# Patient Record
Sex: MALE | Race: WHITE | NOT HISPANIC OR LATINO | Employment: OTHER | URBAN - METROPOLITAN AREA
[De-identification: names, ages, dates, MRNs, and addresses within clinical notes are randomized per-mention and may not be internally consistent; named-entity substitution may affect disease eponyms.]

---

## 2017-01-09 ENCOUNTER — GENERIC CONVERSION - ENCOUNTER (OUTPATIENT)
Dept: OTHER | Facility: OTHER | Age: 77
End: 2017-01-09

## 2017-01-09 ENCOUNTER — APPOINTMENT (OUTPATIENT)
Dept: LAB | Facility: MEDICAL CENTER | Age: 77
End: 2017-01-09
Payer: MEDICARE

## 2017-01-09 ENCOUNTER — TRANSCRIBE ORDERS (OUTPATIENT)
Dept: ADMINISTRATIVE | Facility: HOSPITAL | Age: 77
End: 2017-01-09

## 2017-01-09 DIAGNOSIS — K76.0 FATTY (CHANGE OF) LIVER, NOT ELSEWHERE CLASSIFIED: ICD-10-CM

## 2017-01-09 DIAGNOSIS — E87.1 HYPO-OSMOLALITY AND HYPONATREMIA: ICD-10-CM

## 2017-01-09 DIAGNOSIS — E87.6 HYPOKALEMIA: ICD-10-CM

## 2017-01-09 DIAGNOSIS — D72.829 ELEVATED WHITE BLOOD CELL COUNT: ICD-10-CM

## 2017-01-09 LAB
ALBUMIN SERPL BCP-MCNC: 3.9 G/DL (ref 3.5–5)
ALP SERPL-CCNC: 72 U/L (ref 46–116)
ALT SERPL W P-5'-P-CCNC: 128 U/L (ref 12–78)
ANION GAP SERPL CALCULATED.3IONS-SCNC: 10 MMOL/L (ref 4–13)
AST SERPL W P-5'-P-CCNC: 104 U/L (ref 5–45)
BASOPHILS # BLD AUTO: 0.03 THOUSANDS/ΜL (ref 0–0.1)
BASOPHILS NFR BLD AUTO: 0 % (ref 0–1)
BILIRUB SERPL-MCNC: 0.6 MG/DL (ref 0.2–1)
BUN SERPL-MCNC: 27 MG/DL (ref 5–25)
CALCIUM SERPL-MCNC: 9.5 MG/DL (ref 8.3–10.1)
CHLORIDE SERPL-SCNC: 102 MMOL/L (ref 100–108)
CO2 SERPL-SCNC: 28 MMOL/L (ref 21–32)
CREAT SERPL-MCNC: 1 MG/DL (ref 0.6–1.3)
EOSINOPHIL # BLD AUTO: 0.02 THOUSAND/ΜL (ref 0–0.61)
EOSINOPHIL NFR BLD AUTO: 0 % (ref 0–6)
ERYTHROCYTE [DISTWIDTH] IN BLOOD BY AUTOMATED COUNT: 13.4 % (ref 11.6–15.1)
GFR SERPL CREATININE-BSD FRML MDRD: >60 ML/MIN/1.73SQ M
GLUCOSE SERPL-MCNC: 128 MG/DL (ref 65–140)
HCT VFR BLD AUTO: 42.1 % (ref 36.5–49.3)
HGB BLD-MCNC: 14.5 G/DL (ref 12–17)
LYMPHOCYTES # BLD AUTO: 4.06 THOUSANDS/ΜL (ref 0.6–4.47)
LYMPHOCYTES NFR BLD AUTO: 50 % (ref 14–44)
MCH RBC QN AUTO: 30.9 PG (ref 26.8–34.3)
MCHC RBC AUTO-ENTMCNC: 34.4 G/DL (ref 31.4–37.4)
MCV RBC AUTO: 90 FL (ref 82–98)
MONOCYTES # BLD AUTO: 1.13 THOUSAND/ΜL (ref 0.17–1.22)
MONOCYTES NFR BLD AUTO: 14 % (ref 4–12)
NEUTROPHILS # BLD AUTO: 2.93 THOUSANDS/ΜL (ref 1.85–7.62)
NEUTS SEG NFR BLD AUTO: 36 % (ref 43–75)
NRBC BLD AUTO-RTO: 0 /100 WBCS
PLATELET # BLD AUTO: 279 THOUSANDS/UL (ref 149–390)
PMV BLD AUTO: 9.1 FL (ref 8.9–12.7)
POTASSIUM SERPL-SCNC: 3.7 MMOL/L (ref 3.5–5.3)
PROT SERPL-MCNC: 8.1 G/DL (ref 6.4–8.2)
RBC # BLD AUTO: 4.69 MILLION/UL (ref 3.88–5.62)
SODIUM SERPL-SCNC: 140 MMOL/L (ref 136–145)
WBC # BLD AUTO: 8.19 THOUSAND/UL (ref 4.31–10.16)

## 2017-01-09 PROCEDURE — 36415 COLL VENOUS BLD VENIPUNCTURE: CPT

## 2017-01-09 PROCEDURE — 80053 COMPREHEN METABOLIC PANEL: CPT

## 2017-01-09 PROCEDURE — 85025 COMPLETE CBC W/AUTO DIFF WBC: CPT

## 2017-01-11 ENCOUNTER — ALLSCRIPTS OFFICE VISIT (OUTPATIENT)
Dept: OTHER | Facility: OTHER | Age: 77
End: 2017-01-11

## 2017-01-11 LAB
BILIRUB UR QL STRIP: NORMAL
CLARITY UR: NORMAL
COLOR UR: YELLOW
GLUCOSE (HISTORICAL): NORMAL
HGB UR QL STRIP.AUTO: NORMAL
KETONES UR STRIP-MCNC: NORMAL MG/DL
LEUKOCYTE ESTERASE UR QL STRIP: NORMAL
NITRITE UR QL STRIP: NORMAL
PH UR STRIP.AUTO: 5 [PH]
PROT UR STRIP-MCNC: NORMAL MG/DL
SP GR UR STRIP.AUTO: 1.01
UROBILINOGEN UR QL STRIP.AUTO: NORMAL

## 2017-02-10 ENCOUNTER — APPOINTMENT (EMERGENCY)
Dept: CT IMAGING | Facility: HOSPITAL | Age: 77
End: 2017-02-10
Payer: MEDICARE

## 2017-02-10 ENCOUNTER — HOSPITAL ENCOUNTER (EMERGENCY)
Facility: HOSPITAL | Age: 77
Discharge: HOME | End: 2017-02-10
Attending: EMERGENCY MEDICINE | Admitting: EMERGENCY MEDICINE
Payer: MEDICARE

## 2017-02-10 ENCOUNTER — APPOINTMENT (EMERGENCY)
Dept: RADIOLOGY | Facility: HOSPITAL | Age: 77
End: 2017-02-10
Payer: MEDICARE

## 2017-02-10 VITALS
OXYGEN SATURATION: 95 % | WEIGHT: 193.8 LBS | HEART RATE: 92 BPM | RESPIRATION RATE: 20 BRPM | DIASTOLIC BLOOD PRESSURE: 85 MMHG | TEMPERATURE: 98.2 F | SYSTOLIC BLOOD PRESSURE: 181 MMHG | BODY MASS INDEX: 35.45 KG/M2

## 2017-02-10 DIAGNOSIS — R07.89 NON-CARDIAC CHEST PAIN: Primary | ICD-10-CM

## 2017-02-10 LAB
ANION GAP SERPL CALCULATED.3IONS-SCNC: 13 MMOL/L (ref 4–13)
ATRIAL RATE: 106 BPM
BASOPHILS # BLD AUTO: 0.02 THOUSANDS/ΜL (ref 0–0.1)
BASOPHILS NFR BLD AUTO: 0 % (ref 0–1)
BUN SERPL-MCNC: 23 MG/DL (ref 5–25)
CALCIUM SERPL-MCNC: 9.2 MG/DL (ref 8.3–10.1)
CHLORIDE SERPL-SCNC: 101 MMOL/L (ref 100–108)
CO2 SERPL-SCNC: 24 MMOL/L (ref 21–32)
CREAT SERPL-MCNC: 0.95 MG/DL (ref 0.6–1.3)
EOSINOPHIL # BLD AUTO: 0.02 THOUSAND/ΜL (ref 0–0.61)
EOSINOPHIL NFR BLD AUTO: 0 % (ref 0–6)
ERYTHROCYTE [DISTWIDTH] IN BLOOD BY AUTOMATED COUNT: 13.2 % (ref 11.6–15.1)
GFR SERPL CREATININE-BSD FRML MDRD: >60 ML/MIN/1.73SQ M
GLUCOSE SERPL-MCNC: 103 MG/DL (ref 65–140)
HCT VFR BLD AUTO: 41.1 % (ref 36.5–49.3)
HGB BLD-MCNC: 14.6 G/DL (ref 12–17)
LYMPHOCYTES # BLD AUTO: 1.9 THOUSANDS/ΜL (ref 0.6–4.47)
LYMPHOCYTES NFR BLD AUTO: 30 % (ref 14–44)
MCH RBC QN AUTO: 31.1 PG (ref 26.8–34.3)
MCHC RBC AUTO-ENTMCNC: 35.5 G/DL (ref 31.4–37.4)
MCV RBC AUTO: 87 FL (ref 82–98)
MONOCYTES # BLD AUTO: 0.67 THOUSAND/ΜL (ref 0.17–1.22)
MONOCYTES NFR BLD AUTO: 11 % (ref 4–12)
NEUTROPHILS # BLD AUTO: 3.74 THOUSANDS/ΜL (ref 1.85–7.62)
NEUTS SEG NFR BLD AUTO: 59 % (ref 43–75)
NRBC BLD AUTO-RTO: 0 /100 WBCS
P AXIS: 44 DEGREES
PLATELET # BLD AUTO: 183 THOUSANDS/UL (ref 149–390)
PMV BLD AUTO: 9.4 FL (ref 8.9–12.7)
POTASSIUM SERPL-SCNC: 3.8 MMOL/L (ref 3.5–5.3)
PR INTERVAL: 174 MS
QRS AXIS: -44 DEGREES
QRSD INTERVAL: 92 MS
QT INTERVAL: 332 MS
QTC INTERVAL: 441 MS
RBC # BLD AUTO: 4.7 MILLION/UL (ref 3.88–5.62)
SODIUM SERPL-SCNC: 138 MMOL/L (ref 136–145)
SPECIMEN SOURCE: NORMAL
T WAVE AXIS: 60 DEGREES
TROPONIN I BLD-MCNC: 0.01 NG/ML (ref 0–0.08)
TROPONIN I SERPL-MCNC: <0.02 NG/ML
VENTRICULAR RATE: 106 BPM
WBC # BLD AUTO: 6.35 THOUSAND/UL (ref 4.31–10.16)

## 2017-02-10 PROCEDURE — 84484 ASSAY OF TROPONIN QUANT: CPT

## 2017-02-10 PROCEDURE — 36415 COLL VENOUS BLD VENIPUNCTURE: CPT | Performed by: EMERGENCY MEDICINE

## 2017-02-10 PROCEDURE — 93005 ELECTROCARDIOGRAM TRACING: CPT | Performed by: EMERGENCY MEDICINE

## 2017-02-10 PROCEDURE — 85025 COMPLETE CBC W/AUTO DIFF WBC: CPT | Performed by: EMERGENCY MEDICINE

## 2017-02-10 PROCEDURE — 71275 CT ANGIOGRAPHY CHEST: CPT

## 2017-02-10 PROCEDURE — 99285 EMERGENCY DEPT VISIT HI MDM: CPT

## 2017-02-10 PROCEDURE — 71020 HB CHEST X-RAY 2VW FRONTAL&LATL: CPT

## 2017-02-10 PROCEDURE — 80048 BASIC METABOLIC PNL TOTAL CA: CPT | Performed by: EMERGENCY MEDICINE

## 2017-02-10 PROCEDURE — 96375 TX/PRO/DX INJ NEW DRUG ADDON: CPT

## 2017-02-10 PROCEDURE — 96374 THER/PROPH/DIAG INJ IV PUSH: CPT

## 2017-02-10 PROCEDURE — 84484 ASSAY OF TROPONIN QUANT: CPT | Performed by: EMERGENCY MEDICINE

## 2017-02-10 RX ORDER — MIRTAZAPINE 15 MG/1
7.5 TABLET, FILM COATED ORAL
COMMUNITY
End: 2018-03-23 | Stop reason: DRUGHIGH

## 2017-02-10 RX ORDER — ASPIRIN 81 MG/1
324 TABLET, CHEWABLE ORAL ONCE
Status: COMPLETED | OUTPATIENT
Start: 2017-02-10 | End: 2017-02-10

## 2017-02-10 RX ORDER — ALPRAZOLAM 0.25 MG/1
0.25 TABLET ORAL
COMMUNITY
End: 2018-03-23 | Stop reason: SDUPTHER

## 2017-02-10 RX ORDER — LORAZEPAM 2 MG/ML
1 INJECTION INTRAMUSCULAR ONCE
Status: COMPLETED | OUTPATIENT
Start: 2017-02-10 | End: 2017-02-10

## 2017-02-10 RX ORDER — QUETIAPINE FUMARATE 50 MG/1
50 TABLET, FILM COATED ORAL
Qty: 14 TABLET | Refills: 0 | Status: SHIPPED | OUTPATIENT
Start: 2017-02-10 | End: 2017-09-04

## 2017-02-10 RX ORDER — MORPHINE SULFATE 4 MG/ML
4 INJECTION, SOLUTION INTRAMUSCULAR; INTRAVENOUS ONCE
Status: COMPLETED | OUTPATIENT
Start: 2017-02-10 | End: 2017-02-10

## 2017-02-10 RX ORDER — MAGNESIUM HYDROXIDE/ALUMINUM HYDROXICE/SIMETHICONE 120; 1200; 1200 MG/30ML; MG/30ML; MG/30ML
30 SUSPENSION ORAL ONCE
Status: COMPLETED | OUTPATIENT
Start: 2017-02-10 | End: 2017-02-10

## 2017-02-10 RX ADMIN — ASPIRIN 81 MG 324 MG: 81 TABLET ORAL at 14:33

## 2017-02-10 RX ADMIN — MORPHINE SULFATE 4 MG: 4 INJECTION, SOLUTION INTRAMUSCULAR; INTRAVENOUS at 14:39

## 2017-02-10 RX ADMIN — LORAZEPAM 1 MG: 2 INJECTION INTRAMUSCULAR; INTRAVENOUS at 14:35

## 2017-02-10 RX ADMIN — IOHEXOL 85 ML: 350 INJECTION, SOLUTION INTRAVENOUS at 15:30

## 2017-02-10 RX ADMIN — ALUMINUM HYDROXIDE, MAGNESIUM HYDROXIDE, AND SIMETHICONE 30 ML: 200; 200; 20 SUSPENSION ORAL at 14:33

## 2017-02-14 ENCOUNTER — ALLSCRIPTS OFFICE VISIT (OUTPATIENT)
Dept: OTHER | Facility: OTHER | Age: 77
End: 2017-02-14

## 2017-02-17 ENCOUNTER — GENERIC CONVERSION - ENCOUNTER (OUTPATIENT)
Dept: OTHER | Facility: OTHER | Age: 77
End: 2017-02-17

## 2017-03-17 ENCOUNTER — ALLSCRIPTS OFFICE VISIT (OUTPATIENT)
Dept: OTHER | Facility: OTHER | Age: 77
End: 2017-03-17

## 2017-04-11 ENCOUNTER — GENERIC CONVERSION - ENCOUNTER (OUTPATIENT)
Dept: OTHER | Facility: OTHER | Age: 77
End: 2017-04-11

## 2017-04-19 ENCOUNTER — APPOINTMENT (OUTPATIENT)
Dept: LAB | Facility: MEDICAL CENTER | Age: 77
End: 2017-04-19
Payer: MEDICARE

## 2017-04-19 ENCOUNTER — TRANSCRIBE ORDERS (OUTPATIENT)
Dept: ADMINISTRATIVE | Facility: HOSPITAL | Age: 77
End: 2017-04-19

## 2017-04-19 DIAGNOSIS — R73.9 HYPERGLYCEMIA: ICD-10-CM

## 2017-04-19 DIAGNOSIS — R73.9 BLOOD GLUCOSE ELEVATED: Primary | ICD-10-CM

## 2017-04-19 DIAGNOSIS — I10 ESSENTIAL (PRIMARY) HYPERTENSION: ICD-10-CM

## 2017-04-19 LAB
ALBUMIN SERPL BCP-MCNC: 3.9 G/DL (ref 3.5–5)
ALP SERPL-CCNC: 62 U/L (ref 46–116)
ALT SERPL W P-5'-P-CCNC: 106 U/L (ref 12–78)
ANION GAP SERPL CALCULATED.3IONS-SCNC: 10 MMOL/L (ref 4–13)
AST SERPL W P-5'-P-CCNC: 91 U/L (ref 5–45)
BASOPHILS # BLD AUTO: 0.03 THOUSANDS/ΜL (ref 0–0.1)
BASOPHILS NFR BLD AUTO: 1 % (ref 0–1)
BILIRUB SERPL-MCNC: 0.76 MG/DL (ref 0.2–1)
BUN SERPL-MCNC: 17 MG/DL (ref 5–25)
CALCIUM SERPL-MCNC: 9.4 MG/DL (ref 8.3–10.1)
CHLORIDE SERPL-SCNC: 103 MMOL/L (ref 100–108)
CHOLEST SERPL-MCNC: 177 MG/DL (ref 50–200)
CO2 SERPL-SCNC: 26 MMOL/L (ref 21–32)
CREAT SERPL-MCNC: 0.83 MG/DL (ref 0.6–1.3)
EOSINOPHIL # BLD AUTO: 0.05 THOUSAND/ΜL (ref 0–0.61)
EOSINOPHIL NFR BLD AUTO: 1 % (ref 0–6)
ERYTHROCYTE [DISTWIDTH] IN BLOOD BY AUTOMATED COUNT: 13.8 % (ref 11.6–15.1)
EST. AVERAGE GLUCOSE BLD GHB EST-MCNC: 105 MG/DL
GFR SERPL CREATININE-BSD FRML MDRD: >60 ML/MIN/1.73SQ M
GLUCOSE P FAST SERPL-MCNC: 81 MG/DL (ref 65–99)
HBA1C MFR BLD: 5.3 % (ref 4.2–6.3)
HCT VFR BLD AUTO: 40.4 % (ref 36.5–49.3)
HDLC SERPL-MCNC: 52 MG/DL (ref 40–60)
HGB BLD-MCNC: 13.9 G/DL (ref 12–17)
LDLC SERPL CALC-MCNC: 80 MG/DL (ref 0–100)
LYMPHOCYTES # BLD AUTO: 3.17 THOUSANDS/ΜL (ref 0.6–4.47)
LYMPHOCYTES NFR BLD AUTO: 52 % (ref 14–44)
MCH RBC QN AUTO: 30.5 PG (ref 26.8–34.3)
MCHC RBC AUTO-ENTMCNC: 34.4 G/DL (ref 31.4–37.4)
MCV RBC AUTO: 89 FL (ref 82–98)
MONOCYTES # BLD AUTO: 0.69 THOUSAND/ΜL (ref 0.17–1.22)
MONOCYTES NFR BLD AUTO: 11 % (ref 4–12)
NEUTROPHILS # BLD AUTO: 2.13 THOUSANDS/ΜL (ref 1.85–7.62)
NEUTS SEG NFR BLD AUTO: 35 % (ref 43–75)
NRBC BLD AUTO-RTO: 0 /100 WBCS
PLATELET # BLD AUTO: 268 THOUSANDS/UL (ref 149–390)
PMV BLD AUTO: 10.1 FL (ref 8.9–12.7)
POTASSIUM SERPL-SCNC: 3.7 MMOL/L (ref 3.5–5.3)
PROT SERPL-MCNC: 7.7 G/DL (ref 6.4–8.2)
RBC # BLD AUTO: 4.56 MILLION/UL (ref 3.88–5.62)
SODIUM SERPL-SCNC: 139 MMOL/L (ref 136–145)
TRIGL SERPL-MCNC: 226 MG/DL
TSH SERPL DL<=0.05 MIU/L-ACNC: 0.87 UIU/ML (ref 0.36–3.74)
WBC # BLD AUTO: 6.08 THOUSAND/UL (ref 4.31–10.16)

## 2017-04-19 PROCEDURE — 85025 COMPLETE CBC W/AUTO DIFF WBC: CPT | Performed by: FAMILY MEDICINE

## 2017-04-19 PROCEDURE — 83036 HEMOGLOBIN GLYCOSYLATED A1C: CPT | Performed by: FAMILY MEDICINE

## 2017-04-19 PROCEDURE — 84443 ASSAY THYROID STIM HORMONE: CPT | Performed by: FAMILY MEDICINE

## 2017-04-19 PROCEDURE — 80061 LIPID PANEL: CPT | Performed by: FAMILY MEDICINE

## 2017-04-19 PROCEDURE — 80053 COMPREHEN METABOLIC PANEL: CPT | Performed by: FAMILY MEDICINE

## 2017-04-19 PROCEDURE — 36415 COLL VENOUS BLD VENIPUNCTURE: CPT

## 2017-04-20 ENCOUNTER — GENERIC CONVERSION - ENCOUNTER (OUTPATIENT)
Dept: OTHER | Facility: OTHER | Age: 77
End: 2017-04-20

## 2017-04-27 ENCOUNTER — ALLSCRIPTS OFFICE VISIT (OUTPATIENT)
Dept: OTHER | Facility: OTHER | Age: 77
End: 2017-04-27

## 2017-04-27 ENCOUNTER — GENERIC CONVERSION - ENCOUNTER (OUTPATIENT)
Dept: OTHER | Facility: OTHER | Age: 77
End: 2017-04-27

## 2017-05-02 ENCOUNTER — GENERIC CONVERSION - ENCOUNTER (OUTPATIENT)
Dept: OTHER | Facility: OTHER | Age: 77
End: 2017-05-02

## 2017-05-12 ENCOUNTER — GENERIC CONVERSION - ENCOUNTER (OUTPATIENT)
Dept: OTHER | Facility: OTHER | Age: 77
End: 2017-05-12

## 2017-07-13 ENCOUNTER — ALLSCRIPTS OFFICE VISIT (OUTPATIENT)
Dept: OTHER | Facility: OTHER | Age: 77
End: 2017-07-13

## 2017-07-13 LAB
CLARITY UR: NORMAL
COLOR UR: YELLOW
GLUCOSE (HISTORICAL): NORMAL
HGB UR QL STRIP.AUTO: NORMAL
KETONES UR STRIP-MCNC: NORMAL MG/DL
LEUKOCYTE ESTERASE UR QL STRIP: NORMAL
NITRITE UR QL STRIP: NORMAL
PH UR STRIP.AUTO: 6.5 [PH]
PROT UR STRIP-MCNC: NORMAL MG/DL
SP GR UR STRIP.AUTO: 1.01

## 2017-09-04 ENCOUNTER — HOSPITAL ENCOUNTER (OUTPATIENT)
Facility: HOSPITAL | Age: 77
Setting detail: OBSERVATION
Discharge: HOME | End: 2017-09-05
Attending: EMERGENCY MEDICINE | Admitting: FAMILY MEDICINE
Payer: MEDICARE

## 2017-09-04 ENCOUNTER — APPOINTMENT (EMERGENCY)
Dept: RADIOLOGY | Facility: HOSPITAL | Age: 77
End: 2017-09-04
Payer: MEDICARE

## 2017-09-04 DIAGNOSIS — R07.9 CHEST PAIN: Primary | ICD-10-CM

## 2017-09-04 PROBLEM — N40.0 BPH (BENIGN PROSTATIC HYPERPLASIA): Chronic | Status: ACTIVE | Noted: 2017-09-04

## 2017-09-04 PROBLEM — R05.3 CHRONIC COUGH: Status: ACTIVE | Noted: 2017-09-04

## 2017-09-04 LAB
ALBUMIN SERPL BCP-MCNC: 4 G/DL (ref 3.5–5)
ALP SERPL-CCNC: 64 U/L (ref 46–116)
ALT SERPL W P-5'-P-CCNC: 109 U/L (ref 12–78)
ANION GAP SERPL CALCULATED.3IONS-SCNC: 11 MMOL/L (ref 4–13)
APTT PPP: 27 SECONDS (ref 23–35)
AST SERPL W P-5'-P-CCNC: 86 U/L (ref 5–45)
BASOPHILS # BLD AUTO: 0.03 THOUSANDS/ΜL (ref 0–0.1)
BASOPHILS NFR BLD AUTO: 0 % (ref 0–1)
BILIRUB SERPL-MCNC: 0.48 MG/DL (ref 0.2–1)
BUN SERPL-MCNC: 23 MG/DL (ref 5–25)
CALCIUM SERPL-MCNC: 9 MG/DL (ref 8.3–10.1)
CHLORIDE SERPL-SCNC: 104 MMOL/L (ref 100–108)
CO2 SERPL-SCNC: 27 MMOL/L (ref 21–32)
CREAT SERPL-MCNC: 0.96 MG/DL (ref 0.6–1.3)
EOSINOPHIL # BLD AUTO: 0.04 THOUSAND/ΜL (ref 0–0.61)
EOSINOPHIL NFR BLD AUTO: 1 % (ref 0–6)
ERYTHROCYTE [DISTWIDTH] IN BLOOD BY AUTOMATED COUNT: 14.3 % (ref 11.6–15.1)
GFR SERPL CREATININE-BSD FRML MDRD: 75 ML/MIN/1.73SQ M
GLUCOSE SERPL-MCNC: 104 MG/DL (ref 65–140)
HCT VFR BLD AUTO: 38.6 % (ref 36.5–49.3)
HGB BLD-MCNC: 13.5 G/DL (ref 12–17)
INR PPP: 0.95 (ref 0.86–1.16)
LYMPHOCYTES # BLD AUTO: 2.54 THOUSANDS/ΜL (ref 0.6–4.47)
LYMPHOCYTES NFR BLD AUTO: 31 % (ref 14–44)
MCH RBC QN AUTO: 29.7 PG (ref 26.8–34.3)
MCHC RBC AUTO-ENTMCNC: 35 G/DL (ref 31.4–37.4)
MCV RBC AUTO: 85 FL (ref 82–98)
MONOCYTES # BLD AUTO: 1.14 THOUSAND/ΜL (ref 0.17–1.22)
MONOCYTES NFR BLD AUTO: 14 % (ref 4–12)
NEUTROPHILS # BLD AUTO: 4.35 THOUSANDS/ΜL (ref 1.85–7.62)
NEUTS SEG NFR BLD AUTO: 54 % (ref 43–75)
NRBC BLD AUTO-RTO: 0 /100 WBCS
PLATELET # BLD AUTO: 205 THOUSANDS/UL (ref 149–390)
PMV BLD AUTO: 9.5 FL (ref 8.9–12.7)
POTASSIUM SERPL-SCNC: 3.7 MMOL/L (ref 3.5–5.3)
PROT SERPL-MCNC: 7.8 G/DL (ref 6.4–8.2)
PROTHROMBIN TIME: 12.7 SECONDS (ref 12.1–14.4)
RBC # BLD AUTO: 4.54 MILLION/UL (ref 3.88–5.62)
SODIUM SERPL-SCNC: 142 MMOL/L (ref 136–145)
SPECIMEN SOURCE: NORMAL
TROPONIN I BLD-MCNC: 0 NG/ML (ref 0–0.08)
TROPONIN I SERPL-MCNC: <0.02 NG/ML
WBC # BLD AUTO: 8.1 THOUSAND/UL (ref 4.31–10.16)

## 2017-09-04 PROCEDURE — 99285 EMERGENCY DEPT VISIT HI MDM: CPT

## 2017-09-04 PROCEDURE — 85730 THROMBOPLASTIN TIME PARTIAL: CPT | Performed by: EMERGENCY MEDICINE

## 2017-09-04 PROCEDURE — 85610 PROTHROMBIN TIME: CPT | Performed by: EMERGENCY MEDICINE

## 2017-09-04 PROCEDURE — 71020 HB CHEST X-RAY 2VW FRONTAL&LATL: CPT

## 2017-09-04 PROCEDURE — 85025 COMPLETE CBC W/AUTO DIFF WBC: CPT | Performed by: EMERGENCY MEDICINE

## 2017-09-04 PROCEDURE — 80053 COMPREHEN METABOLIC PANEL: CPT | Performed by: EMERGENCY MEDICINE

## 2017-09-04 PROCEDURE — 84484 ASSAY OF TROPONIN QUANT: CPT | Performed by: FAMILY MEDICINE

## 2017-09-04 PROCEDURE — 93005 ELECTROCARDIOGRAM TRACING: CPT | Performed by: EMERGENCY MEDICINE

## 2017-09-04 PROCEDURE — 84484 ASSAY OF TROPONIN QUANT: CPT

## 2017-09-04 PROCEDURE — 36415 COLL VENOUS BLD VENIPUNCTURE: CPT

## 2017-09-04 RX ORDER — TAMSULOSIN HYDROCHLORIDE 0.4 MG/1
0.4 CAPSULE ORAL
Status: DISCONTINUED | OUTPATIENT
Start: 2017-09-05 | End: 2017-09-05 | Stop reason: HOSPADM

## 2017-09-04 RX ORDER — FINASTERIDE 5 MG/1
5 TABLET, FILM COATED ORAL DAILY
Status: DISCONTINUED | OUTPATIENT
Start: 2017-09-05 | End: 2017-09-05 | Stop reason: HOSPADM

## 2017-09-04 RX ORDER — NITROGLYCERIN 0.4 MG/1
0.4 TABLET SUBLINGUAL ONCE
Status: COMPLETED | OUTPATIENT
Start: 2017-09-04 | End: 2017-09-04

## 2017-09-04 RX ORDER — POLYETHYLENE GLYCOL 3350 17 G/17G
17 POWDER, FOR SOLUTION ORAL DAILY
Status: DISCONTINUED | OUTPATIENT
Start: 2017-09-05 | End: 2017-09-05 | Stop reason: HOSPADM

## 2017-09-04 RX ORDER — HYDROCHLOROTHIAZIDE 25 MG/1
25 TABLET ORAL 2 TIMES DAILY
Status: DISCONTINUED | OUTPATIENT
Start: 2017-09-04 | End: 2017-09-05 | Stop reason: HOSPADM

## 2017-09-04 RX ORDER — PANTOPRAZOLE SODIUM 40 MG/1
40 TABLET, DELAYED RELEASE ORAL
Status: DISCONTINUED | OUTPATIENT
Start: 2017-09-05 | End: 2017-09-05 | Stop reason: HOSPADM

## 2017-09-04 RX ORDER — AMLODIPINE BESYLATE 10 MG/1
10 TABLET ORAL DAILY
Status: DISCONTINUED | OUTPATIENT
Start: 2017-09-05 | End: 2017-09-05 | Stop reason: HOSPADM

## 2017-09-04 RX ORDER — MIRTAZAPINE 15 MG/1
7.5 TABLET, FILM COATED ORAL
Status: DISCONTINUED | OUTPATIENT
Start: 2017-09-04 | End: 2017-09-05 | Stop reason: HOSPADM

## 2017-09-04 RX ORDER — ASPIRIN 325 MG
325 TABLET ORAL DAILY
Status: DISCONTINUED | OUTPATIENT
Start: 2017-09-05 | End: 2017-09-05 | Stop reason: HOSPADM

## 2017-09-04 RX ORDER — ALPRAZOLAM 0.25 MG/1
0.25 TABLET ORAL
Status: DISCONTINUED | OUTPATIENT
Start: 2017-09-04 | End: 2017-09-05 | Stop reason: HOSPADM

## 2017-09-04 RX ADMIN — MIRTAZAPINE 7.5 MG: 15 TABLET, FILM COATED ORAL at 22:38

## 2017-09-04 RX ADMIN — HYDROCHLOROTHIAZIDE 25 MG: 25 TABLET ORAL at 22:37

## 2017-09-04 RX ADMIN — NITROGLYCERIN 0.4 MG: 0.4 TABLET SUBLINGUAL at 20:18

## 2017-09-05 ENCOUNTER — APPOINTMENT (OUTPATIENT)
Dept: NON INVASIVE DIAGNOSTICS | Facility: HOSPITAL | Age: 77
End: 2017-09-05
Payer: MEDICARE

## 2017-09-05 ENCOUNTER — APPOINTMENT (OUTPATIENT)
Dept: NUCLEAR MEDICINE | Facility: HOSPITAL | Age: 77
End: 2017-09-05
Payer: MEDICARE

## 2017-09-05 VITALS
BODY MASS INDEX: 34.18 KG/M2 | RESPIRATION RATE: 18 BRPM | HEART RATE: 76 BPM | OXYGEN SATURATION: 98 % | HEIGHT: 63 IN | WEIGHT: 192.9 LBS | DIASTOLIC BLOOD PRESSURE: 85 MMHG | SYSTOLIC BLOOD PRESSURE: 153 MMHG | TEMPERATURE: 97.9 F

## 2017-09-05 PROBLEM — R07.9 CHEST PAIN: Status: RESOLVED | Noted: 2017-09-04 | Resolved: 2017-09-05

## 2017-09-05 LAB
ANION GAP SERPL CALCULATED.3IONS-SCNC: 7 MMOL/L (ref 4–13)
ATRIAL RATE: 93 BPM
BUN SERPL-MCNC: 21 MG/DL (ref 5–25)
CALCIUM SERPL-MCNC: 8.6 MG/DL (ref 8.3–10.1)
CHEST PAIN STATEMENT: NORMAL
CHLORIDE SERPL-SCNC: 104 MMOL/L (ref 100–108)
CO2 SERPL-SCNC: 28 MMOL/L (ref 21–32)
CREAT SERPL-MCNC: 0.88 MG/DL (ref 0.6–1.3)
ERYTHROCYTE [DISTWIDTH] IN BLOOD BY AUTOMATED COUNT: 14.3 % (ref 11.6–15.1)
GFR SERPL CREATININE-BSD FRML MDRD: 82 ML/MIN/1.73SQ M
GLUCOSE P FAST SERPL-MCNC: 100 MG/DL (ref 65–99)
GLUCOSE SERPL-MCNC: 100 MG/DL (ref 65–140)
HCT VFR BLD AUTO: 36.7 % (ref 36.5–49.3)
HGB BLD-MCNC: 12.6 G/DL (ref 12–17)
MAX DIASTOLIC BP: 78 MMHG
MAX HEART RATE: 109 BPM
MAX PREDICTED HEART RATE: 141 BPM
MAX. SYSTOLIC BP: 156 MMHG
MCH RBC QN AUTO: 29.2 PG (ref 26.8–34.3)
MCHC RBC AUTO-ENTMCNC: 34.3 G/DL (ref 31.4–37.4)
MCV RBC AUTO: 85 FL (ref 82–98)
P AXIS: 28 DEGREES
PLATELET # BLD AUTO: 188 THOUSANDS/UL (ref 149–390)
PMV BLD AUTO: 9.7 FL (ref 8.9–12.7)
POTASSIUM SERPL-SCNC: 3.4 MMOL/L (ref 3.5–5.3)
PR INTERVAL: 174 MS
PROTOCOL NAME: NORMAL
QRS AXIS: -21 DEGREES
QRSD INTERVAL: 98 MS
QT INTERVAL: 360 MS
QTC INTERVAL: 447 MS
RBC # BLD AUTO: 4.31 MILLION/UL (ref 3.88–5.62)
REASON FOR TERMINATION: NORMAL
SODIUM SERPL-SCNC: 139 MMOL/L (ref 136–145)
T WAVE AXIS: -4 DEGREES
TARGET HR FORMULA: NORMAL
TEST INDICATION: NORMAL
TIME IN EXERCISE PHASE: 180 S
TROPONIN I SERPL-MCNC: <0.02 NG/ML
VENTRICULAR RATE: 93 BPM
WBC # BLD AUTO: 6.54 THOUSAND/UL (ref 4.31–10.16)

## 2017-09-05 PROCEDURE — A9502 TC99M TETROFOSMIN: HCPCS

## 2017-09-05 PROCEDURE — 78452 HT MUSCLE IMAGE SPECT MULT: CPT

## 2017-09-05 PROCEDURE — 85027 COMPLETE CBC AUTOMATED: CPT | Performed by: FAMILY MEDICINE

## 2017-09-05 PROCEDURE — 93017 CV STRESS TEST TRACING ONLY: CPT

## 2017-09-05 PROCEDURE — 93005 ELECTROCARDIOGRAM TRACING: CPT | Performed by: FAMILY MEDICINE

## 2017-09-05 PROCEDURE — 84484 ASSAY OF TROPONIN QUANT: CPT | Performed by: FAMILY MEDICINE

## 2017-09-05 PROCEDURE — 80048 BASIC METABOLIC PNL TOTAL CA: CPT | Performed by: FAMILY MEDICINE

## 2017-09-05 RX ORDER — HYDRALAZINE HYDROCHLORIDE 20 MG/ML
5 INJECTION INTRAMUSCULAR; INTRAVENOUS EVERY 6 HOURS PRN
Status: DISCONTINUED | OUTPATIENT
Start: 2017-09-05 | End: 2017-09-05 | Stop reason: HOSPADM

## 2017-09-05 RX ADMIN — ENOXAPARIN SODIUM 40 MG: 40 INJECTION SUBCUTANEOUS at 08:12

## 2017-09-05 RX ADMIN — AMLODIPINE BESYLATE 10 MG: 10 TABLET ORAL at 08:16

## 2017-09-05 RX ADMIN — ASPIRIN 325 MG: 325 TABLET ORAL at 08:13

## 2017-09-05 RX ADMIN — HYDROCHLOROTHIAZIDE 25 MG: 25 TABLET ORAL at 08:12

## 2017-09-05 RX ADMIN — FINASTERIDE 5 MG: 5 TABLET, FILM COATED ORAL at 08:12

## 2017-09-05 RX ADMIN — PANTOPRAZOLE SODIUM 40 MG: 40 TABLET, DELAYED RELEASE ORAL at 05:25

## 2017-09-05 RX ADMIN — REGADENOSON 0.4 MG: 0.08 INJECTION, SOLUTION INTRAVENOUS at 10:32

## 2017-09-07 LAB
ATRIAL RATE: 76 BPM
ATRIAL RATE: 80 BPM
P AXIS: 44 DEGREES
P AXIS: 47 DEGREES
PR INTERVAL: 184 MS
PR INTERVAL: 184 MS
QRS AXIS: -41 DEGREES
QRS AXIS: -41 DEGREES
QRSD INTERVAL: 108 MS
QRSD INTERVAL: 110 MS
QT INTERVAL: 400 MS
QT INTERVAL: 408 MS
QTC INTERVAL: 459 MS
QTC INTERVAL: 461 MS
T WAVE AXIS: 18 DEGREES
T WAVE AXIS: 20 DEGREES
VENTRICULAR RATE: 76 BPM
VENTRICULAR RATE: 80 BPM

## 2017-09-08 ENCOUNTER — ALLSCRIPTS OFFICE VISIT (OUTPATIENT)
Dept: OTHER | Facility: OTHER | Age: 77
End: 2017-09-08

## 2017-09-22 ENCOUNTER — GENERIC CONVERSION - ENCOUNTER (OUTPATIENT)
Dept: OTHER | Facility: OTHER | Age: 77
End: 2017-09-22

## 2017-10-19 ENCOUNTER — TRANSCRIBE ORDERS (OUTPATIENT)
Dept: ADMINISTRATIVE | Facility: HOSPITAL | Age: 77
End: 2017-10-19

## 2017-10-19 ENCOUNTER — APPOINTMENT (OUTPATIENT)
Dept: LAB | Facility: MEDICAL CENTER | Age: 77
End: 2017-10-19
Payer: MEDICARE

## 2017-10-19 DIAGNOSIS — I10 ESSENTIAL (PRIMARY) HYPERTENSION: ICD-10-CM

## 2017-10-19 DIAGNOSIS — E87.6 HYPOKALEMIA: ICD-10-CM

## 2017-10-19 LAB
ALBUMIN SERPL BCP-MCNC: 4 G/DL (ref 3.5–5)
ALP SERPL-CCNC: 67 U/L (ref 46–116)
ALT SERPL W P-5'-P-CCNC: 97 U/L (ref 12–78)
ANION GAP SERPL CALCULATED.3IONS-SCNC: 11 MMOL/L (ref 4–13)
AST SERPL W P-5'-P-CCNC: 85 U/L (ref 5–45)
BASOPHILS # BLD AUTO: 0.03 THOUSANDS/ΜL (ref 0–0.1)
BASOPHILS NFR BLD AUTO: 0 % (ref 0–1)
BILIRUB SERPL-MCNC: 0.99 MG/DL (ref 0.2–1)
BUN SERPL-MCNC: 18 MG/DL (ref 5–25)
CALCIUM SERPL-MCNC: 9 MG/DL (ref 8.3–10.1)
CHLORIDE SERPL-SCNC: 98 MMOL/L (ref 100–108)
CHOLEST SERPL-MCNC: 159 MG/DL (ref 50–200)
CO2 SERPL-SCNC: 27 MMOL/L (ref 21–32)
CREAT SERPL-MCNC: 0.77 MG/DL (ref 0.6–1.3)
EOSINOPHIL # BLD AUTO: 0.08 THOUSAND/ΜL (ref 0–0.61)
EOSINOPHIL NFR BLD AUTO: 1 % (ref 0–6)
ERYTHROCYTE [DISTWIDTH] IN BLOOD BY AUTOMATED COUNT: 13.9 % (ref 11.6–15.1)
GFR SERPL CREATININE-BSD FRML MDRD: 86 ML/MIN/1.73SQ M
GLUCOSE P FAST SERPL-MCNC: 106 MG/DL (ref 65–99)
HCT VFR BLD AUTO: 38.5 % (ref 36.5–49.3)
HDLC SERPL-MCNC: 52 MG/DL (ref 40–60)
HGB BLD-MCNC: 12.7 G/DL (ref 12–17)
LDLC SERPL CALC-MCNC: 71 MG/DL (ref 0–100)
LYMPHOCYTES # BLD AUTO: 3.27 THOUSANDS/ΜL (ref 0.6–4.47)
LYMPHOCYTES NFR BLD AUTO: 47 % (ref 14–44)
MCH RBC QN AUTO: 27.7 PG (ref 26.8–34.3)
MCHC RBC AUTO-ENTMCNC: 33 G/DL (ref 31.4–37.4)
MCV RBC AUTO: 84 FL (ref 82–98)
MONOCYTES # BLD AUTO: 0.91 THOUSAND/ΜL (ref 0.17–1.22)
MONOCYTES NFR BLD AUTO: 13 % (ref 4–12)
NEUTROPHILS # BLD AUTO: 2.74 THOUSANDS/ΜL (ref 1.85–7.62)
NEUTS SEG NFR BLD AUTO: 39 % (ref 43–75)
NRBC BLD AUTO-RTO: 0 /100 WBCS
PLATELET # BLD AUTO: 195 THOUSANDS/UL (ref 149–390)
PMV BLD AUTO: 9.9 FL (ref 8.9–12.7)
POTASSIUM SERPL-SCNC: 3.1 MMOL/L (ref 3.5–5.3)
PROT SERPL-MCNC: 7.7 G/DL (ref 6.4–8.2)
RBC # BLD AUTO: 4.59 MILLION/UL (ref 3.88–5.62)
SODIUM SERPL-SCNC: 136 MMOL/L (ref 136–145)
TRIGL SERPL-MCNC: 178 MG/DL
TSH SERPL DL<=0.05 MIU/L-ACNC: 1.86 UIU/ML (ref 0.36–3.74)
WBC # BLD AUTO: 7.05 THOUSAND/UL (ref 4.31–10.16)

## 2017-10-19 PROCEDURE — 80061 LIPID PANEL: CPT

## 2017-10-19 PROCEDURE — 85025 COMPLETE CBC W/AUTO DIFF WBC: CPT

## 2017-10-19 PROCEDURE — 80053 COMPREHEN METABOLIC PANEL: CPT

## 2017-10-19 PROCEDURE — 36415 COLL VENOUS BLD VENIPUNCTURE: CPT

## 2017-10-19 PROCEDURE — 84443 ASSAY THYROID STIM HORMONE: CPT

## 2017-10-20 ENCOUNTER — GENERIC CONVERSION - ENCOUNTER (OUTPATIENT)
Dept: OTHER | Facility: OTHER | Age: 77
End: 2017-10-20

## 2017-10-26 ENCOUNTER — ALLSCRIPTS OFFICE VISIT (OUTPATIENT)
Dept: OTHER | Facility: OTHER | Age: 77
End: 2017-10-26

## 2017-10-27 NOTE — PROGRESS NOTES
Assessment  1  Hypertension (401 9) (I10)   2  Hyperlipidemia (272 4) (E78 5)   3  Hyperglycemia (790 29) (R73 9)   4  GERD without esophagitis (530 81) (K21 9)   5  Fatty liver disease, nonalcoholic (699 8) (G49 4)   6  Enlarged prostate without lower urinary tract symptoms (luts) (600 00) (N40 0)    Plan  Enlarged prostate without lower urinary tract symptoms (luts), Fatty liver disease,  nonalcoholic, GERD without esophagitis, Hyperglycemia, Hyperlipidemia, Hypertension    · Follow-up visit in 6 months Evaluation and Treatment  Follow-up  Status: Hold For -  Scheduling  Requested for: 19Apr2018  GERD without esophagitis    · Avoid alcoholic beverages ; Status:Complete;   Done: 08GQF9053   · Avoid foods and beverages that contain caffeine ; Status:Complete;   Done: 98KCJ3235   · Do not eat anything for at least 2 hours before going to bed ; Status:Complete;   Done:  26Oct2017   · Eat small frequent meals ; Status:Complete;   Done: 31TJM1445   · Raise the head of your bed to keep stomach acid from coming back up ;  Status:Complete;   Done: 26Oct2017   · Regular aerobic exercise can help reduce stress ; Status:Complete;   Done: 26Oct2017   · Several things can be done to help treat and prevent your gastric reflux ;  Status:Complete;   Done: 09BXD1790   · Call [phone] if: Breathing starts to have a wheeze or whistling sound ;  Status:Complete;   Done: 96AZM8203   · Call [phone] if: The wheezing is getting worse ; Status:Complete;   Done:  87GJV6722   · Call [phone] if: Your indigestion is worse ; Status:Complete;   Done: 13DFY3320   · Call [phone] if:  Your voice is becoming hoarse, or scratchy sounding ;  Status:Complete;   Done: 83QVI8988   · Seek Immediate Medical Attention if: You are vomiting any blood or material that looks  black, or like coffee grounds ; Status:Complete;   Done: 95NKR8892   · Seek Immediate Medical Attention if: You see any blood in the stool ; Status:Complete; Done: 00EJL1321  GERD without esophagitis, Hyperlipidemia    · Call [phone] if: You have pain in the stomach area ; Status:Complete;   Done:  64WGH9761   · Call [phone] if: You start vomiting ; Status:Complete;   Done: 88ZOL0167  GERD without esophagitis, PMH: History of chronic cough    · AmLODIPine Besylate 10 MG Oral Tablet; TAKE 1 TABLET DAILY AS DIRECTED  Hyperlipidemia    · A diet low in sugar may help your condition ; Status:Complete;   Done: 64GUZ0095   · Call [phone] if: You have muscle cramps ; Status:Complete;   Done: 87JNC9941  Hyperlipidemia, Hypertension    · Eat a low fat and low cholesterol diet ; Status:Complete;   Done: 11LKF4120   · Call 222 if: You experience a new kind of chest pain (angina) or pressure ;  Status:Complete;   Done: 36ICR2029   · Call 911 if: You have any symptoms of a stroke ; Status:Complete;   Done: 20JZM4927  Hypertension    · (1) CBC/PLT/DIFF; Status:Active; Requested LJN:47STC9903;    · (1) COMPREHENSIVE METABOLIC PANEL; Status:Active; Requested KPK:50FXL5046;    · (1) LIPID PANEL, FASTING; Status:Active; Requested for:19Apr2018;    · (1) TSH; Status:Active; Requested for:19Apr2018;    · Continue with our present treatment plan ; Status:Complete;   Done: 66QEO4844   · Restrict the salt in your diet by avoiding highly salted foods ; Status:Complete;   Done:  51GSU1132   · There are many exercise options for seniors ; Status:Complete;   Done: 66TEU7237   · Call [phone] if: You become dizzy or lightheaded, especially when you stand up  after sitting for a while ; Status:Complete;   Done: 37LMC9958   · Call [phone] if: You develop double vision (see two of everything) ;  Status:Complete;   Done: 68NCW5493   · Call [phone] if:  Your blood pressure is frequently higher than 140/90 ;  Status:Complete;   Done: 51TWD6963   · Seek Immediate Medical Attention if: You have a severe headache that will not go away ;  Status:Complete;   Done: 16SHQ3417   · Seek Immediate Medical Attention if: Your blood pressure is greater than 250/120 for 2  consecutive readings ; Status:Complete;   Done: 95QGK2741    Discussion/Summary    Patient cinthya blood pressures are all <150/90 We will montior blood pressure and contineu current meds for now Patient will stick with thte potassium and we will have repeat labs in 11/2017 for that Rest of labs reviewed and were stable  Chief Complaint  HERE TODAY FOR CHECK UP FOR HTN AND LIPIDS  REQUESTING REFILLS FOR 90 DAYS      History of Present Illness  Patient is here for checkup of hypertension, hyerplipidemia, hyperglycemia, GERD and also his hypokalemia pateint is doing well He has no new complaints at this time Patient had labs and they were also reviewed today patient has no compaltin He is not due to see GI until 2018 for his liver follwoup as things have been stable he has not had GERD symptoms either   The patient is being seen for follow-up of gastroesophageal reflux disease  The patient reports doing well  The patient is currently asymptomatic  No associated symptoms are reported  Medications:  the patient is adherent to his medication regimen, but-- he denies medication side effects  Disease management:  the patient is doing well with his goals  The patient is here today for a follow-up visit  His last LDL was 71 mg/dL  His hypertension is primary, but-- stable  the patient is adherent with his medication regimen  -- He denies medication side effects  Symptoms: The patient denies any symptoms currently  Lifestyle and Disease Management:      Review of Systems    Constitutional: No fever or chills, feels well, no tiredness, no recent weight gain or weight loss  Eyes: no eye pain-- and-- no eyesight problems  ENT: no complaints of earache, no hearing loss, no nosebleeds, no nasal discharge, no sore throat, no hoarseness  Cardiovascular: as noted in HPI  Respiratory: as noted in HPI  Gastrointestinal: as noted in HPI  Genitourinary: no dysuria-- and-- no incontinence  Musculoskeletal: no arthralgias-- and-- no myalgias  Integumentary: no rashes  Neurological: No compliants of headache, no confusion, no convulsions, no numbness or tingling, no dizziness or fainting, no limb weakness, no difficulty walking  Psychiatric: no anxiety,-- no sleep disturbances-- and-- no depression  Active Problems  1  Enlarged prostate without lower urinary tract symptoms (luts) (600 00) (N40 0)   2  Fatty liver disease, nonalcoholic (800 4) (P22 7)   3  GERD without esophagitis (530 81) (K21 9)   4  Hemangioma (228 00) (D18 00)   5  Hyperglycemia (790 29) (R73 9)   6  Hyperlipidemia (272 4) (E78 5)   7  Hypertension (401 9) (I10)   8  Hypokalemia (276 8) (E87 6)   9  Need for vaccination (V05 9) (Z23)   10  Organic impotence (607 84) (N52 9)    Past Medical History  1  History of Abdominal pain, LLQ (left lower quadrant) (789 04) (R10 32)   2  History of Actinic keratosis (702 0) (L57 0)   3  Acute bronchitis (466 0) (J20 9)   4  Acute upper respiratory infection (465 9) (J06 9)   5  History of Acute UTI (599 0) (N39 0)   6  History of Anxiety (300 00) (F41 9)   7  History of Atelectasis of right lung (518 0) (J98 11)   8  History of Atypical chest pain (786 59) (R07 89)   9  History of Benign paroxysmal vertigo (386 11) (H81 10)   10  History of Bronchitis (490) (J40)   11  History of Colonoscopy (Fiberoptic) Screening   12  History of Cough (786 2) (R05)   13  History of Degenerative arthritis of knee, bilateral (715 96) (M17 0)   14  History of Diverticulosis (562 10) (K57 90)   15  History of Foreign body, eye (930 9) (T15 90XA)   16  History of Functional gait abnormality (781 2) (R26 89)   17  History of acute bronchitis (V12 69) (Z87 09)   18  History of allergic rhinitis (V12 69) (Z87 09)   19  History of anxiety disorder (V11 8) (Z86 59)   20  History of chest pain (V13 89) (Z87 898)   21  History of chest pain (V13 89) (Z87 898)   22  History of chronic cough (V12 69) (Z87 09)   23  History of constipation (V12 79) (Z87 19)   24  History of diverticulitis of colon (V12 79) (Z87 19)   25  History of leukocytosis (V12 3) (Z86 2)   26  History of transient cerebral ischemia (V12 54) (Z86 73)   27  History of urinary incontinence (V13 09) (Z87 898)   28  History of Hyponatremia (276 1) (E87 1)   29  History of Medicare annual wellness visit, initial (V70 0) (Z00 00)   30  History of Medicare annual wellness visit, subsequent (V70 0) (Z00 00)   31  History of Newly recognized murmur (785 2) (R01 1)   32  History of Olecranon bursitis, unspecified laterality (726 33) (M70 20)   33  History of Preop examination (V72 84) (Z01 818)   34  History of Presence of indwelling urethral catheter (V45 89) (Z96 0)   35  History of Screening for glaucoma (V80 1) (Z13 5)   36  Status post left knee replacement (V43 65) (Z96 652)   37  Status post total right knee replacement (V43 65) (Z96 651)   38  History of Strain Of Right Biceps Tendon (840 8)   39  History of Symptoms of upper respiratory infection (URI) (786 09) (R09 89)   40  History of 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 (600 91,788 20)    (N40 1,R33  8)    The active problems and past medical history were reviewed and updated today  Surgical History  1  History of Adenoidectomy   2  History of Appendectomy   3  History of Colonoscopy (Fiberoptic)   4  History of Inguinal Hernia Repair For Person Over Age 6   11  History of Sinus Surgery   6  History of Tonsillectomy    The surgical history was reviewed and updated today  Family History  Family History    1  Family history of No Significant Family History    The family history was reviewed and updated today         Social History   · Being A Social Drinker   · Denied: History of Drug Use   · Former smoker (V15 82) (G59 423)   · Uses Safety Equipment - Seatbelts  The social history was reviewed and is unchanged  Current Meds   1  AmLODIPine Besylate 10 MG Oral Tablet; TAKE 1 TABLET DAILY AS DIRECTED; Therapy: 47WKG3444 to (Rosemary Meals)  Requested for: 95Qgw7523; Last   Rx:15Eqp3048 Ordered   2  Finasteride 5 MG Oral Tablet; TAKE 1 TABLET DAILY; Therapy: 18JXB6763 to (Evaluate:97Lyp3301); Last Rx:78Ddo5516 Ordered   3  Fluorouracil 5 % External Cream;   Therapy: 54QJO3666 to Recorded   4  HydroCHLOROthiazide 25 MG Oral Tablet; Take 1 tablet twice daily; Therapy: 63AOF4436 to (Evaluate:19Ntd0364)  Requested for: 48Pyx2951; Last   Rx:55Cvo3170 Ordered   5  Mirtazapine 7 5 MG Oral Tablet; TAKE 1 TABLET AT BEDTIME; Therapy: 38SYP1549 to (Evaluate:92Jyk7559)  Requested for: 79MKP3151; Last   FK:85ABT7182 Ordered   6  Pantoprazole Sodium 40 MG Oral Tablet Delayed Release; TAKE 1 TABLET TWICE DAILY   30 MINUTES BEFORE BREAKFAST AND DINNER; Therapy: 40TEN0738 to (Evaluate:27Nfy4711)  Requested for: 69ELN9898; Last   Rx:68Jsn4583 Ordered   7  Potassium Chloride ER 20 MEQ Oral Tablet Extended Release; Take 1 tablet daily; Therapy: 62SVO0700 to (Last Vasu Keto)  Requested for: 56Ueh4763 Ordered   8  Pravastatin Sodium 40 MG Oral Tablet; take one tablet by mouth daily; Therapy: 82LWP4593 to (77 873 135)  Requested for: 44Zrk0360; Last   Rx:63Hkg5479 Ordered   9  Tamsulosin HCl - 0 4 MG Oral Capsule; TAKE ONE CAPSULE BY MOUTH EVERY DAY AT   BEDTIME; Therapy: 89CZU8236 to (Evaluate:32Fyv5516)  Requested for: 02Qsx9964; Last   Rx:53Vee7623 Ordered    The medication list was reviewed and updated today  Allergies  1  ACE Inhibitors    Vitals  Vital Signs    Recorded: 56WZS6897 08:26AM   Temperature 61 7 F   Systolic 149   Diastolic 68   Height 5 ft 3 in   Weight 197 lb 4 oz   BMI Calculated 34 94   BSA Calculated 1 92     Physical Exam    Constitutional   General appearance: No acute distress, well appearing and well nourished      Eyes   Conjunctiva and lids: No swelling, erythema, or discharge  Pupils and irises: Equal, round and reactive to light  Ears, Nose, Mouth, and Throat   External inspection of ears and nose: Normal     Otoscopic examination: Tympanic membrance translucent with normal light reflex  Canals patent without erythema  Nasal mucosa, septum, and turbinates: Normal without edema or erythema  Oropharynx: Normal with no erythema, edema, exudate or lesions  Pulmonary   Respiratory effort: No increased work of breathing or signs of respiratory distress  Auscultation of lungs: Clear to auscultation, equal breath sounds bilaterally, no wheezes, no rales, no rhonci  Cardiovascular   Auscultation of heart: Normal rate and rhythm, normal S1 and S2, without murmurs  Examination of extremities for edema and/or varicosities: Normal     Carotid pulses: Normal     Abdomen   Abdomen: Non-tender, no masses  Liver and spleen: No hepatomegaly or splenomegaly  Lymphatic   Palpation of lymph nodes in neck: No lymphadenopathy  Musculoskeletal   Gait and station: Normal     Skin   Skin and subcutaneous tissue: Normal without rashes or lesions  Neurologic   Cranial nerves: Cranial nerves 2-12 intact  Psychiatric   Orientation to person, place and time: Normal     Mood and affect: Normal          Future Appointments    Date/Time Provider Specialty Site   07/19/2018 10:30 AM MAGUE Oliver   Urology  [facility]     Signatures   Electronically signed by : Eliot Richardson DO; Oct 26 2017  9:04AM EST                       (Author)

## 2017-11-03 ENCOUNTER — GENERIC CONVERSION - ENCOUNTER (OUTPATIENT)
Dept: OTHER | Facility: OTHER | Age: 77
End: 2017-11-03

## 2017-11-03 ENCOUNTER — APPOINTMENT (OUTPATIENT)
Dept: LAB | Facility: MEDICAL CENTER | Age: 77
End: 2017-11-03
Payer: MEDICARE

## 2017-11-03 DIAGNOSIS — E87.6 HYPOKALEMIA: ICD-10-CM

## 2017-11-03 LAB
ANION GAP SERPL CALCULATED.3IONS-SCNC: 8 MMOL/L (ref 4–13)
BUN SERPL-MCNC: 15 MG/DL (ref 5–25)
CALCIUM SERPL-MCNC: 9.1 MG/DL (ref 8.3–10.1)
CHLORIDE SERPL-SCNC: 105 MMOL/L (ref 100–108)
CO2 SERPL-SCNC: 25 MMOL/L (ref 21–32)
CREAT SERPL-MCNC: 0.79 MG/DL (ref 0.6–1.3)
GFR SERPL CREATININE-BSD FRML MDRD: 85 ML/MIN/1.73SQ M
GLUCOSE P FAST SERPL-MCNC: 101 MG/DL (ref 65–99)
POTASSIUM SERPL-SCNC: 3.4 MMOL/L (ref 3.5–5.3)
SODIUM SERPL-SCNC: 138 MMOL/L (ref 136–145)

## 2017-11-03 PROCEDURE — 36415 COLL VENOUS BLD VENIPUNCTURE: CPT

## 2017-11-03 PROCEDURE — 80048 BASIC METABOLIC PNL TOTAL CA: CPT

## 2017-11-14 ENCOUNTER — GENERIC CONVERSION - ENCOUNTER (OUTPATIENT)
Dept: OTHER | Facility: OTHER | Age: 77
End: 2017-11-14

## 2017-11-14 ENCOUNTER — ALLSCRIPTS OFFICE VISIT (OUTPATIENT)
Dept: OTHER | Facility: OTHER | Age: 77
End: 2017-11-14

## 2018-01-02 ENCOUNTER — ALLSCRIPTS OFFICE VISIT (OUTPATIENT)
Dept: OTHER | Facility: OTHER | Age: 78
End: 2018-01-02

## 2018-01-02 DIAGNOSIS — J32.9 CHRONIC SINUSITIS: ICD-10-CM

## 2018-01-03 NOTE — PROGRESS NOTES
Assessment   1  Recurrent sinusitis (473 9) (J32 9)    Plan   Recurrent sinusitis    · Ciprofloxacin HCl - 500 MG Oral Tablet; Take 1 tablet twice daily   · Promethazine-DM 6 25-15 MG/5ML Oral Syrup; TAKE 5 ML Every 6 hours PRN    cough and congestion   · Follow Up if Not Better Evaluation and Treatment  Follow-up  Status: Complete  Done:    57CPJ5878   · Call [phone] if: The symptoms come back after the medications are finished ;    Status:Complete;   Done: 33OPS6053   · Call [phone] if: You start vomiting ; Status:Complete;   Done: 38KPU0855   · Call [phone] if: Your sinus pain is worse ; Status:Complete;   Done: 73DQS5235   · Call [phone] if: Your temperature is higher than 101F ; Status:Complete;   Done:    67ZZH0651   · Seek Immediate Medical Attention if: You have a fever, headache, and vomiting, or have    a stiff neck ; Status:Complete;   Done: 95GOX8911   · Seek Immediate Medical Attention if: You have a severe headache that will not go away ;    Status:Complete;   Done: 21KTN2337   · Seek Immediate Medical Attention if: You have signs of infection in or around the affected    area ; Status:Complete;   Done: 97OFE6206   · Drink at least 6 glasses of water or juice a day ; Status:Complete;   Done: 74CYT9453   · CT SINUS WO CONTRAST; Status:Hold For - Scheduling; Requested for:02Jan2018; Discussion/Summary      Due to recurrent nature of the SInusitis I will order Ct scan of sinuses as well as 10 days of cipro with promethazine DM for cough and congestion  Possible side effects of new medications were reviewed with the patient/guardian today  The treatment plan was reviewed with the patient/guardian  The patient/guardian understands and agrees with the treatment plan      Chief Complaint   1  Cold Symptoms  cough,congestion x 1 - 2 wks  taking otc meds        History of Present Illness   HPI: Patient is here for cold symptoms for last 2 weeks he states he never fully recovered from the 11/14/2017 He states that after finishing those meds he had less congestion and sinus pressure but no resolution Patient then 2 weeks ago started with increase sinus pressure and discolored mucous he has postnasal drip also This is the 3 rd occurrence in last 1 yrs    Cold Symptoms:    Issac Aponte presents with complaints of sudden onset of constant episodes of moderate cold symptoms  Episodes started about 2 weeks ago  He is currently experiencing cold symptoms  Symptoms are improved by OTC cold medications  Symptoms are made worse by activity  Symptoms are unchanged  Risk Factors: exposure to cough and exposure to URI  Pertinent Medical History: allergic rhinitis and recurrent sinusitis  Associated symptoms include nasal congestion,-- runny nose,-- post nasal drainage,-- scratchy throat,-- dry cough,-- facial pressure-- and-- facial pain, but-- no sneezing,-- no sore throat,-- no hoarseness,-- no productive cough,-- no headache,-- no plugged ear(s),-- no ear pain,-- no swollen lymph nodes,-- no wheezing,-- no shortness of breath,-- no fatigue,-- no weakness,-- no nausea,-- no vomiting,-- no diarrhea,-- no fever-- and-- no chills  Review of Systems        Constitutional: no fever or chills, feels well, no tiredness, no recent weight loss or weight gain  ENT: as noted in HPI  Cardiovascular: no chest pain-- and-- no palpitations  Respiratory: as noted in HPI  Gastrointestinal: no nausea,-- no vomiting-- and-- no diarrhea  Active Problems   1  Enlarged prostate without lower urinary tract symptoms (luts) (600 00) (N40 0)   2  Fatty liver disease, nonalcoholic (881 0) (X99 9)   3  GERD without esophagitis (530 81) (K21 9)   4  Hemangioma (228 00) (D18 00)   5  Hyperglycemia (790 29) (R73 9)   6  Hyperlipidemia (272 4) (E78 5)   7  Hypertension (401 9) (I10)   8  Hypokalemia (276 8) (E87 6)   9  Need for vaccination (V05 9) (Z23)   10   Organic impotence (607 84) (N52 9)   11  Denied: History of Substance abuse    Past Medical History   1  History of Abdominal pain, LLQ (left lower quadrant) (789 04) (R10 32)   2  History of Actinic keratosis (702 0) (L57 0)   3  Acute bronchitis (466 0) (J20 9)   4  Acute upper respiratory infection (465 9) (J06 9)   5  History of Acute UTI (599 0) (N39 0)   6  History of Anxiety (300 00) (F41 9)   7  History of Atelectasis of right lung (518 0) (J98 11)   8  History of Atypical chest pain (786 59) (R07 89)   9  History of Benign paroxysmal vertigo (386 11) (H81 10)   10  History of Bronchitis (490) (J40)   11  History of Colonoscopy (Fiberoptic) Screening   12  History of Cough (786 2) (R05)   13  History of Degenerative arthritis of knee, bilateral (715 96) (M17 0)   14  History of Diverticulosis (562 10) (K57 90)   15  History of Foreign body, eye (930 9) (T15 90XA)   16  History of Functional gait abnormality (781 2) (R26 89)   17  History of acute bronchitis (V12 69) (Z87 09)   18  History of acute sinusitis (V12 69) (Z87 09)   19  History of allergic rhinitis (V12 69) (Z87 09)   20  History of anxiety disorder (V11 8) (Z86 59)   21  History of chest pain (V13 89) (Z87 898)   22  History of chest pain (V13 89) (Z87 898)   23  History of chronic cough (V12 69) (Z87 09)   24  History of constipation (V12 79) (Z87 19)   25  History of diverticulitis of colon (V12 79) (Z87 19)   26  History of leukocytosis (V12 3) (Z86 2)   27  History of transient cerebral ischemia (V12 54) (Z86 73)   28  History of urinary incontinence (V13 09) (Z87 898)   29  History of Hyponatremia (276 1) (E87 1)   30  History of Medicare annual wellness visit, initial (V70 0) (Z00 00)   31  History of Medicare annual wellness visit, subsequent (V70 0) (Z00 00)   32  History of Newly recognized murmur (785 2) (R01 1)   33  History of Olecranon bursitis, unspecified laterality (726 33) (M70 20)   34  History of Preop examination (V72 84) (Z01 818)   35  History of Presence of indwelling urethral catheter (V45 89) (Z96 0)   36  History of Screening for glaucoma (V80 1) (Z13 5)   37  Status post left knee replacement (V43 65) (Z96 652)   38  Status post total right knee replacement (V43 65) (Z96 651)   39  History of Strain Of Right Biceps Tendon (840 8)   40  Denied: History of Substance abuse   41  History of Symptoms of upper respiratory infection (URI) (786 09) (R09 89)   42  History of 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 (600 91,788 20)      (N40 1,R33  8)  Active Problems And Past Medical History Reviewed: The active problems and past medical history were reviewed and updated today  Family History   Family History    1  Denied: Family history of mental disorder   2  Denied: Family history of substance abuse   3  Family history of No Significant Family History    Social History    · Being A Social Drinker   · Denied: History of Drug Use   · Former smoker (V15 82) (Q94 798)   · [place]   · Uses Safety Equipment - Seatbelts  The social history was reviewed and is unchanged  Surgical History   1  History of Adenoidectomy   2  History of Appendectomy   3  History of Colonoscopy (Fiberoptic)   4  History of Inguinal Hernia Repair For Person Over Age 6   11  History of Sinus Surgery   6  History of Tonsillectomy    Current Meds    1  AmLODIPine Besylate 10 MG Oral Tablet; TAKE 1 TABLET DAILY AS DIRECTED; Therapy: 67WQG0461 to (Jesse Li)  Requested for: 91CPC9044; Last     Rx:29Nov2017 Ordered   2  Finasteride 5 MG Oral Tablet; TAKE 1 TABLET DAILY; Therapy: 12LPL1779 to (Evaluate:51Yzs5696); Last Rx:06Flk4563 Ordered   3  Fluorouracil 5 % External Cream;     Therapy: 07TQS3913 to Recorded   4  HydroCHLOROthiazide 25 MG Oral Tablet; Take 1 tablet twice daily; Therapy: 67FWR2276 to (Evaluate:19Jun2017)  Requested for: 89Fzy8546; Last     Rx:25Pnz4956 Ordered   5   Mirtazapine 7 5 MG Oral Tablet; TAKE 1 TABLET AT BEDTIME; Therapy: 31BIB2161 to (Evaluate:06Kqy8873)  Requested for: 53DNB9419; Last     ZE:87QVN3606 Ordered   6  Pantoprazole Sodium 40 MG Oral Tablet Delayed Release; TAKE 1 TABLET TWICE     DAILY 30 MINUTES BEFORE BREAKFAST AND DINNER; Therapy: 28NZW9483 to (Evaluate:73Uiw4134)  Requested for: 87JKS3890; Last     Rx:02Oct2017 Ordered   7  Potassium Chloride ER 20 MEQ Oral Tablet Extended Release; Take 1 tablet daily; Therapy: 10FQC2775 to (Last Rx:14Nov2017)  Requested for: 38HVW6998 Ordered   8  Pravastatin Sodium 40 MG Oral Tablet; take one tablet by mouth daily; Therapy: 70XMY3829 to (Dianne )  Requested for: 80SAR9506; Last     Rx:03Nov2017 Ordered   9  Tamsulosin HCl - 0 4 MG Oral Capsule; TAKE ONE CAPSULE BY MOUTH EVERY DAY     AT BEDTIME; Therapy: 81KAA0287 to (Evaluate:36Mnw3609)  Requested for: 38Inv2482; Last     Rx:58Oni7038 Ordered     The medication list was reviewed and updated today  Allergies   1  ACE Inhibitors    Vitals    Recorded: 02Jan2018 01:38PM   Temperature 64 5 F   Systolic 699   Diastolic 80   Height 5 ft 3 in   Weight 195 lb    BMI Calculated 34 54   BSA Calculated 1 91     Physical Exam        Constitutional: General appearance: No acute distress, well appearing and well nourished  -- Ability to communicate: Normal       Head and Face: Head and face: Normal -- Palpation of the face for sinus tenderness: Abnormal -- maxillary sinus pain to palpation  Eyes: Ocular motility: Normal       Ears, Nose, Mouth, and Throat: Otoscopic examination: Tympanic membranes gray, translucent with good bony landmarks and light reflex  Canals patent without erythema  -- Hearing: Normal -- External inspection of ears and nose: Normal without deformities or discharge  -- Nasal mucosa, septum, and turbinates: Abnormal -- purulent rhinorrhea  -- Lips, teeth, and gums: Normal, good dentition  -- Oropharynx: Abnormal -- PND        Neck: Neck: Supple, symmetric, trachea midline, no masses  -- Thyroid: Normal, no thyromegaly  Pulmonary: Respiratory effort: Normal respiratory rate and rhythm, no increased work of breathing -- Auscultation of lungs: Clear bilaterally  Cardiovascular: Auscultation of heart: Regular rate and rhythm, normal S1 and S2, no murmur -- Peripheral vascular system: Normal       Lymphatic: Palpation of lymph nodes: No lymphadenopathy  Neurological/Psychiatric: Cranial nerves: Normal -- Orientation to person, place, and time: Normal -- Mood and affect: Normal       Future Appointments      Date/Time Provider Specialty Site   07/19/2018 10:30 AM MAGUE Fitzpatrick   Urology  [facility]   04/26/2018 09:30 AM Elias Pérez DO Family Medicine [facility]     Signatures    Electronically signed by : Bea Young DO; Jan 2 2018  2:02PM EST                       (Author)

## 2018-01-11 ENCOUNTER — HOSPITAL ENCOUNTER (OUTPATIENT)
Dept: CT IMAGING | Facility: HOSPITAL | Age: 78
Discharge: HOME | End: 2018-01-11
Payer: MEDICARE

## 2018-01-11 DIAGNOSIS — J32.9 CHRONIC SINUSITIS: ICD-10-CM

## 2018-01-11 PROCEDURE — 70486 CT MAXILLOFACIAL W/O DYE: CPT

## 2018-01-11 NOTE — RESULT NOTES
Verified Results  (1) CBC/PLT/DIFF 19Apr2017 08:01AM Uriah Euceda     Test Name Result Flag Reference   WBC COUNT 6 08 Thousand/uL  4 31-10 16   RBC COUNT 4 56 Million/uL  3 88-5 62   HEMOGLOBIN 13 9 g/dL  12 0-17 0   HEMATOCRIT 40 4 %  36 5-49 3   MCV 89 fL  82-98   MCH 30 5 pg  26 8-34 3   MCHC 34 4 g/dL  31 4-37 4   RDW 13 8 %  11 6-15 1   MPV 10 1 fL  8 9-12 7   PLATELET COUNT 435 Thousands/uL  149-390   nRBC AUTOMATED 0 /100 WBCs     NEUTROPHILS RELATIVE PERCENT 35 % L 43-75   LYMPHOCYTES RELATIVE PERCENT 52 % H 14-44   MONOCYTES RELATIVE PERCENT 11 %  4-12   EOSINOPHILS RELATIVE PERCENT 1 %  0-6   BASOPHILS RELATIVE PERCENT 1 %  0-1   NEUTROPHILS ABSOLUTE COUNT 2 13 Thousands/? ??L  1 85-7 62   LYMPHOCYTES ABSOLUTE COUNT 3 17 Thousands/? ??L  0 60-4 47   MONOCYTES ABSOLUTE COUNT 0 69 Thousand/? ??L  0 17-1 22   EOSINOPHILS ABSOLUTE COUNT 0 05 Thousand/? ??L  0 00-0 61   BASOPHILS ABSOLUTE COUNT 0 03 Thousands/? ??L  0 00-0 10   This bloodwork is non-fasting  Please drink two glasses of water morning of  bloodwork  (1) LIPID PANEL, FASTING 19Apr2017 08:01AM Uriah Euceda     Test Name Result Flag Reference   CHOLESTEROL 177 mg/dL     HDL,DIRECT 52 mg/dL  40-60   Specimen collection should occur prior to Metamizole administration due to the potential for falsely depressed results  LDL CHOLESTEROL CALCULATED 80 mg/dL  0-100   This is a fasting blood test  Water,black tea or black  coffee only after 9:00pm the night before test  Drink 2 glasses of water the morning of test         Triglyceride:         Normal              <150 mg/dl       Borderline High    150-199 mg/dl       High               200-499 mg/dl       Very High          >499 mg/dl  Cholesterol:         Desirable        <200 mg/dl      Borderline High  200-239 mg/dl      High             >239 mg/dl  HDL Cholesterol:        High    >59 mg/dL      Low     <41 mg/dL  LDL CALCULATED:    This screening LDL is a calculated result    It does not have the accuracy of the Direct Measured LDL in the monitoring of patients with hyperlipidemia and/or statin therapy  Direct Measure LDL (FEB002) must be ordered separately in these patients  TRIGLYCERIDES 226 mg/dL H <=150   Specimen collection should occur prior to N-Acetylcysteine or Metamizole administration due to the potential for falsely depressed results  (1) COMPREHENSIVE METABOLIC PANEL [id number] 85:40BV Cherre Alicia     Test Name Result Flag Reference   SODIUM 139 mmol/L  136-145   POTASSIUM 3 7 mmol/L  3 5-5 3   CHLORIDE 103 mmol/L  100-108   CARBON DIOXIDE 26 mmol/L  21-32   ANION GAP (CALC) 10 mmol/L  4-13   BLOOD UREA NITROGEN 17 mg/dL  5-25   CREATININE 0 83 mg/dL  0 60-1 30   Standardized to IDMS reference method   CALCIUM 9 4 mg/dL  8 3-10 1   BILI, TOTAL 0 76 mg/dL  0 20-1 00   ALK PHOSPHATAS 62 U/L     ALT (SGPT) 106 U/L H 12-78   AST(SGOT) 91 U/L H 5-45   ALBUMIN 3 9 g/dL  3 5-5 0   TOTAL PROTEIN 7 7 g/dL  6 4-8 2   eGFR Non-African American      >60 0 ml/min/1 73sq [facility] Disease Education Program recommendations are as follows:  GFR calculation is accurate only with a steady state creatinine  Chronic Kidney disease less than 60 ml/min/1 73 sq  meters  Kidney failure less than 15 ml/min/1 73 sq  meters  GLUCOSE FASTING 81 mg/dL  65-99     (1) TSH 19Apr2017 08:01AM Landon Gonzalezia     Test Name Result Flag Reference   TSH 0 872 uIU/mL  0 358-3 740   This bloodwork is non-fasting  Please drink two glasses of water morning of  bloodwork  Patients undergoing fluorescein dye angiography may retain small amounts of fluorescein in the body for 48-72 hours post procedure  Samples containing fluorescein can produce falsely depressed TSH values  If the patient had this procedure,a specimen should be resubmitted post fluorescein clearance       (1) HEMOGLOBIN A1C [id number] 08:01AM Cherre Elrosa     Test Name Result Flag Reference   HEMOGLOBIN A1C 5 3 %  4 2-6 3   EST  AVG   GLUCOSE 105 mg/dl

## 2018-01-12 ENCOUNTER — GENERIC CONVERSION - ENCOUNTER (OUTPATIENT)
Dept: OTHER | Facility: OTHER | Age: 78
End: 2018-01-12

## 2018-01-12 VITALS
DIASTOLIC BLOOD PRESSURE: 72 MMHG | BODY MASS INDEX: 34.55 KG/M2 | HEIGHT: 63 IN | WEIGHT: 195 LBS | SYSTOLIC BLOOD PRESSURE: 148 MMHG

## 2018-01-12 VITALS
WEIGHT: 195 LBS | BODY MASS INDEX: 35.67 KG/M2 | DIASTOLIC BLOOD PRESSURE: 80 MMHG | HEART RATE: 100 BPM | SYSTOLIC BLOOD PRESSURE: 160 MMHG

## 2018-01-13 VITALS
BODY MASS INDEX: 34.95 KG/M2 | SYSTOLIC BLOOD PRESSURE: 160 MMHG | TEMPERATURE: 98.2 F | DIASTOLIC BLOOD PRESSURE: 68 MMHG | WEIGHT: 197.25 LBS | HEIGHT: 63 IN

## 2018-01-13 VITALS
DIASTOLIC BLOOD PRESSURE: 78 MMHG | SYSTOLIC BLOOD PRESSURE: 140 MMHG | WEIGHT: 195 LBS | HEIGHT: 63 IN | BODY MASS INDEX: 34.55 KG/M2

## 2018-01-14 VITALS
DIASTOLIC BLOOD PRESSURE: 78 MMHG | WEIGHT: 195.25 LBS | BODY MASS INDEX: 34.59 KG/M2 | SYSTOLIC BLOOD PRESSURE: 150 MMHG | HEIGHT: 63 IN

## 2018-01-14 NOTE — RESULT NOTES
Verified Results  (1) COMPREHENSIVE METABOLIC PANEL [id number] 62:16FI Malena Lynn Order Number: [id number]     Test Name Result Flag Reference   GLUCOSE,RANDM 128 mg/dL     If the patient is fasting, the ADA then defines impaired fasting glucose as > 100 mg/dL and diabetes as > or equal to 123 mg/dL  SODIUM 140 mmol/L  136-145   POTASSIUM 3 7 mmol/L  3 5-5 3   CHLORIDE 102 mmol/L  100-108   CARBON DIOXIDE 28 mmol/L  21-32   ANION GAP (CALC) 10 mmol/L  4-13   BLOOD UREA NITROGEN 27 mg/dL H 5-25   CREATININE 1 00 mg/dL  0 60-1 30   Standardized to IDMS reference method   CALCIUM 9 5 mg/dL  8 3-10 1   BILI, TOTAL 0 60 mg/dL  0 20-1 00   ALK PHOSPHATAS 72 U/L     ALT (SGPT) 128 U/L H 12-78   AST(SGOT) 104 U/L H 5-45   ALBUMIN 3 9 g/dL  3 5-5 0   TOTAL PROTEIN 8 1 g/dL  6 4-8 2   eGFR Non-African American      >60 0 ml/min/1 73sq m   [facility] Disease Education Program recommendations are as follows:  GFR calculation is accurate only with a steady state creatinine  Chronic Kidney disease less than 60 ml/min/1 73 sq  meters  Kidney failure less than 15 ml/min/1 73 sq  meters       (1) CBC/PLT/DIFF [id number] 07:15AM Serenity Manuel    Order Number: [id number]     Test Name Result Flag Reference   WBC COUNT 8 19 Thousand/uL  4 31-10 16   RBC COUNT 4 69 Million/uL  3 88-5 62   HEMOGLOBIN 14 5 g/dL  12 0-17 0   HEMATOCRIT 42 1 %  36 5-49 3   MCV 90 fL  82-98   MCH 30 9 pg  26 8-34 3   MCHC 34 4 g/dL  31 4-37 4   RDW 13 4 %  11 6-15 1   MPV 9 1 fL  8 9-12 7   PLATELET COUNT 247 Thousands/uL  149-390   nRBC AUTOMATED 0 /100 WBCs     NEUTROPHILS RELATIVE PERCENT 36 % L 43-75   LYMPHOCYTES RELATIVE PERCENT 50 % H 14-44   MONOCYTES RELATIVE PERCENT 14 % H 4-12   EOSINOPHILS RELATIVE PERCENT 0 %  0-6   BASOPHILS RELATIVE PERCENT 0 %  0-1   NEUTROPHILS ABSOLUTE COUNT 2 93 Thousands/?L  1 85-7 62   LYMPHOCYTES ABSOLUTE COUNT 4 06 Thousands/?L  0 60-4 47   MONOCYTES ABSOLUTE COUNT 1 13 Thousand/?L  0 17-1 22   EOSINOPHILS ABSOLUTE COUNT 0 02 Thousand/?L  0 00-0 61   BASOPHILS ABSOLUTE COUNT 0 03 Thousands/?L  0 00-0 10   - Patient Instructions: This bloodwork is non-fasting  Please drink two glasses of water morning of bloodwork  - Patient Instructions: This bloodwork is non-fasting  Please drink two glasses of water morning of bloodwork

## 2018-01-14 NOTE — RESULT NOTES
Verified Results  (1) BASIC METABOLIC PROFILE [id number] 06:56AM Marissa ARIAS Order Number: [id number]     Test Name Result Flag Reference   SODIUM 138 mmol/L  136-145   POTASSIUM 3 4 mmol/L L 3 5-5 3   CHLORIDE 105 mmol/L  100-108   CARBON DIOXIDE 25 mmol/L  21-32   ANION GAP (CALC) 8 mmol/L  4-13   BLOOD UREA NITROGEN 15 mg/dL  5-25   CREATININE 0 79 mg/dL  0 60-1 30   Standardized to IDMS reference method   CALCIUM 9 1 mg/dL  8 3-10 1   eGFR 85 ml/min/1 73sq m     National Kidney Disease Education Program recommendations are as follows:  GFR calculation is accurate only with a steady state creatinine  Chronic Kidney disease less than 60 ml/min/1 73 sq  meters  Kidney failure less than 15 ml/min/1 73 sq  meters  GLUCOSE FASTING 101 mg/dL H 65-99   Specimen collection should occur prior to Sulfasalazine administration due to the potential for falsely depressed results  Specimen collection should occur prior to Sulfapyridine administration due to the potential for falsely elevated results

## 2018-01-15 VITALS
DIASTOLIC BLOOD PRESSURE: 82 MMHG | HEIGHT: 63 IN | BODY MASS INDEX: 35.28 KG/M2 | WEIGHT: 199.13 LBS | HEART RATE: 88 BPM | SYSTOLIC BLOOD PRESSURE: 162 MMHG

## 2018-01-15 NOTE — MISCELLANEOUS
Assessment   1  Chest pain, unspecified type (786 50) (R07 9)  2  Hypertension (401 9) (I10)  3  Hyperlipidemia (272 4) (E78 5)  4  GERD without esophagitis (530 81) (K21 9)  5  Chronic cough (786 2) (R05)  6  Need for vaccination (V05 9) (Z23)    Plan  Chronic cough, GERD without esophagitis    · Start: AmLODIPine Besylate 10 MG Oral Tablet; TAKE 1 TABLET DAILY AS DIRECTED  Rx By: Juan Herrera; Dispense: 30 Days ; #:30 Tablet; Refill: 5;For: Chronic cough,   GERD without esophagitis; FANNY = N; Sent To: [facility]  Hypertension    · Stop: Amlodipine Besy-Benazepril HCl - 10-40 MG Oral Capsule (Lotrel)  Rx By: Juan Herrera; Dispense: 90 Days ; #:90 Capsule; Refill: 3;For: Hypertension;   FANNY = N; Verified Transmission to [facility]  Need for vaccination    · Administer: Fluzone High-Dose 0 5 ML Intramuscular Suspension Prefilled Syringe;  INJECT 0 5  ML Intramuscular; To Be Done: [id number]  For: Need for vaccination; Ordered By:Eliana Carlson; Effective Date:08Sep2017    Discussion/Summary  Discussion Summary: We will discontinue the benazapril Patient will take the amlodipine 10 mg daily and will then have blood pressure check here in 2 weeks and will adjust meds as needed Patient will check home blood pressures If his home blood pressures are >140/90 prior to the visit in 2 weeks they will call me Patient hosptila records were reviewed with patient including the stress test Patient will continue same cholesterol meds and also his GERD medicaiton1        1 Amended By: Juan Herrera; Sep 08 2017 2:56 PM EST    Chief Complaint  Chief Complaint Free Text Note Form: Here for [facility] visit      History of Present Illness  TCM Communication [facility]: The patient is being contacted for 09/08/2017  He was hospitalized at [address]  The date of admission: 09/04/2017, date of discharge: 09/05/2017  Diagnosis: Chest Pain  He was discharged to home   Counseling was provided to the patient  Communication performed and completed by [facility]   HPI: Patient is here for followup of his hospital stay He started with chest pain on 9/4/2017 Patient was admitted and had troponin done all 3 of them were negative Patient then had a Mk Ean and it was negative Patient states he has had cough dry that has come on often Patient would like to have a trial off the benzapril as the doctor in the hospital suggested that Patient has no GERD complaints at this time      Review of Systems  Complete-Male:   Constitutional: No fever or chills, feels well, no tiredness, no recent weight gain or weight loss  Eyes: no eye pain and no eyesight problems  ENT: no complaints of earache, no hearing loss, no nosebleeds, no nasal discharge, no sore throat, no hoarseness  Cardiovascular: no chest pain, no intermittent leg claudication, no palpitations and no extremity edema  Respiratory: No complaints of shortness of breath, no wheezing, no cough, no SOB on exertion, no orthopnea or PND  Gastrointestinal: No complaints of abdominal pain, no constipation, no nausea or vomiting, no diarrhea or bloody stools  Genitourinary: no dysuria and no incontinence  Musculoskeletal: no arthralgias and no myalgias  Integumentary: no rashes  Neurological: No compliants of headache, no confusion, no convulsions, no numbness or tingling, no dizziness or fainting, no limb weakness, no difficulty walking  Psychiatric: no anxiety, no sleep disturbances and no depression  Active Problems   1  Atelectasis of right lung (518 0) (J98 11)  2  Enlarged prostate without lower urinary tract symptoms (luts) (600 00) (N40 0)  3  Fatty liver disease, nonalcoholic (300 8) (V47 8)  4  GERD without esophagitis (530 81) (K21 9)  5  Hemangioma (228 00) (D18 00)  6  Hyperglycemia (790 29) (R73 9)  7  Hyperlipidemia (272 4) (E78 5)  8  Hypertension (401 9) (I10)  9  Medicare annual wellness visit, subsequent (V70 0) (Z00 00)  10  Organic impotence (607 84) (N52 9)  11  Screening for glaucoma (V80 1) (Z13 5)    Past Medical History   1  History of Abdominal pain, LLQ (left lower quadrant) (789 04) (R10 32)  2  History of Actinic keratosis (702 0) (L57 0)  3  Acute bronchitis (466 0) (J20 9)  4  Acute upper respiratory infection (465 9) (J06 9)  5  History of Acute UTI (599 0) (N39 0)  6  History of Anxiety (300 00) (F41 9)  7  History of Atypical chest pain (786 59) (R07 89)  8  History of Benign paroxysmal vertigo (386 11) (H81 10)  9  History of Bronchitis (490) (J40)  10  History of Colonoscopy (Fiberoptic) Screening  11  History of Cough (786 2) (R05)  12  History of Degenerative arthritis of knee, bilateral (715 96) (M17 0)  13  History of Diverticulosis (562 10) (K57 90)  14  History of Foreign body, eye (930 9) (T15 90XA)  15  History of Functional gait abnormality (781 2) (R26 89)  16  History of acute bronchitis (V12 69) (Z87 09)  17  History of allergic rhinitis (V12 69) (Z87 09)  18  History of anxiety disorder (V11 8) (Z86 59)  19  History of chest pain (V13 89) (Z87 898)  20  History of constipation (V12 79) (Z87 19)  21  History of diverticulitis of colon (V12 79) (Z87 19)  22  History of hypokalemia (V12 29) (Z86 39)  23  History of leukocytosis (V12 3) (Z86 2)  24  History of transient cerebral ischemia (V12 54) (Z86 73)  25  History of urinary incontinence (V13 09) (Z87 898)  26  History of Hyponatremia (276 1) (E87 1)  27  History of Medicare annual wellness visit, initial (V70 0) (Z00 00)  28  History of Newly recognized murmur (785 2) (R01 1)  29  History of Olecranon bursitis, unspecified laterality (726 33) (M70 20)  30  History of Preop examination (V72 84) (Z01 818)  31  History of Presence of indwelling urethral catheter (V45 89) (Z96 0)  32  Status post left knee replacement (V43 65) (Z96 652)  33  Status post total right knee replacement (V43 65) (Z96 651)  34  History of Strain Of Right Biceps Tendon (840 8)  35  History of Symptoms of upper respiratory infection (URI) (786 09) (R09 89)  36  History of 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 (600 91,788 20)    (N40 1,R33 8)    Surgical History   1  History of Adenoidectomy  2  History of Appendectomy  3  History of Colonoscopy (Fiberoptic)  4  History of Inguinal Hernia Repair For Person Over Age 11  5  History of Sinus Surgery  6  History of Tonsillectomy  Surgical History Reviewed: The surgical history was reviewed and updated today  Family History  Mother   1  No pertinent family history  Family History   2  Family history of No Significant Family History  Family History Reviewed: The family history was reviewed and updated today  Social History    · Being A Social Drinker   · Denied: History of Drug Use   · Former smoker (V15 82) (W91 204)   · Uses Safety Equipment - Seatbelts  Social History Reviewed: The social history was reviewed and is unchanged  Current Meds  1  Amlodipine Besy-Benazepril HCl - 10-40 MG Oral Capsule; TAKE ONE CAPSULE BY   MOUTH EVERY DAY; Therapy: 33UHP4210 to (Evaluate:22Apr2018)  Requested for: 27Apr2017; Last   Rx:27Apr2017 Ordered  2  Finasteride 5 MG Oral Tablet; TAKE 1 TABLET DAILY; Therapy: 13UDI6411 to (Evaluate:97Ivd4242); Last Rx:69Qpo0860 Ordered  3  Fluorouracil 5 % External Cream;   Therapy: 41GVX4495 to Recorded  4  HydroCHLOROthiazide 25 MG Oral Tablet; Take 1 tablet twice daily; Therapy: 00PZJ6082 to (Evaluate:19Jun2017)  Requested for: 02Dbt1030; Last   Rx:33Clu9028 Ordered  5  Mirtazapine 7 5 MG Oral Tablet; TAKE 1 TABLET AT BEDTIME; Therapy: 85WIN9404 to (Evaluate:98Ijy9916)  Requested for: 25HNE8972; Last   ZF:38RLF3159 Ordered  6  Pantoprazole Sodium 40 MG Oral Tablet Delayed Release; TAKE 1 TABLET TWICE DAILY   30 MINUTES BEFORE BREAKFAST AND DINNER; Therapy: 90MAG3671 to (Evaluate:88Aev4807)  Requested for: 27Jun2017; Last   SO:02TRR6489 Ordered  7   Polyethylene Glycol 3350 Oral Powder; 2 TABLESPOONS IN 8OZ WATER DAILY; Therapy: 83LHO8760 to (Last Rx:96Xrz4801) Ordered  8  Pravastatin Sodium 40 MG Oral Tablet; take one tablet by mouth daily; Therapy: 73CUW2216 to (23 873 135)  Requested for: 27Apr2017; Last   Rx:27Apr2017 Ordered  9  Tamsulosin HCl - 0 4 MG Oral Capsule; TAKE ONE CAPSULE BY MOUTH EVERY DAY AT   BEDTIME; Therapy: 93LYJ5645 to (Karlos Bonilla)  Requested for: 68Det7750; Last   Rx:06Hyb4071 Ordered  Medication List Reviewed: The medication list was reviewed and updated today  Allergies   1  No Known Drug Allergies    Vitals  Signs   Recorded: 08Sep2017 02:36PM   Temperature: 98 F  Systolic: 548, LUE, Sitting  Diastolic: 60, LUE, Sitting  BP Cuff Size: Large  Height: 5 ft 3 in  Weight: 195 lb   BMI Calculated: 34 54  BSA Calculated: 1 91    Physical Exam    Constitutional   General appearance: No acute distress, well appearing and well nourished  Eyes   Conjunctiva and lids: No swelling, erythema, or discharge  Pupils and irises: Equal, round and reactive to light  Ears, Nose, Mouth, and Throat   External inspection of ears and nose: Normal     Oropharynx: Normal with no erythema, edema, exudate or lesions  Pulmonary   Respiratory effort: No increased work of breathing or signs of respiratory distress  Auscultation of lungs: Clear to auscultation, equal breath sounds bilaterally, no wheezes, no rales, no rhonci  Cardiovascular   Auscultation of heart: Normal rate and rhythm, normal S1 and S2, without murmurs  Examination of extremities for edema and/or varicosities: Normal     Carotid pulses: Normal     Abdomen   Abdomen: Non-tender, no masses  Liver and spleen: No hepatomegaly or splenomegaly  Lymphatic   Palpation of lymph nodes in neck: No lymphadenopathy  Musculoskeletal   Gait and station: Normal     Skin   Skin and subcutaneous tissue: Normal without rashes or lesions      Neurologic   Cranial nerves: Cranial nerves 2-12 intact  Psychiatric   Orientation to person, place and time: Normal     Mood and affect: Normal          Future Appointments    Date/Time Provider Specialty Site   07/19/2018 10:30 AM MAGUE Valencia   [facility] [address] [facility]   10/26/2017 08:30 AM Nico Martinez DO Family Medicine [facility]     Signatures   Electronically signed by : Vania Caldera DO; Sep  8 2017  2:55PM EST                       (Author)    Electronically signed by : Vania Caldera DO; Sep  8 2017  2:56PM EST                       (Author)

## 2018-01-15 NOTE — MISCELLANEOUS
Assessment    1  Acute UTI (599 0) (N39 0)   2  Atelectasis of right lung (518 0) (J98 11)   3  Constipation (564 00) (K59 00)   4  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 (600 01,788 29) (N28 89,R33 8)   5  Anxiety (300 00) (F41 9)   6  Fatty liver disease, nonalcoholic (163 9) (L69 4)   7  Hypertension (401 9) (I10)   8  Hyponatremia (276 1) (E87 1)   9  Leukocytosis (288 60) (D72 829)   10  Hypokalemia (276 8) (E87 6)    Plan  Acute UTI, Anxiety, Atelectasis of right lung, Constipation, Fatty liver disease,  nonalcoholic, Hypokalemia, Hyponatremia, Leukocytosis, Urinary retention due to benign  prostatic hyperplasia    · Follow-up visit in 6 weeks Evaluation and Treatment  Follow-up  Status: Hold For -  Scheduling  Requested for: 47MUN2010   Ordered; For: Acute UTI, Anxiety, Atelectasis of right lung, Constipation, Fatty liver disease, nonalcoholic, Hypokalemia, Hyponatremia, Leukocytosis, 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; Ordered By: Oscar Ortiz Performed:  Due: 32ZIC9872  Anxiety    · Mirtazapine 7 5 MG Oral Tablet; TAKE 1 TABLET AT BEDTIME   Rx By: Oscar Ortiz; Dispense: 30 Days ; #:30 Tablet; Refill: 1; For: Anxiety; FANNY = N; Verified Transmission to [facility]; Last Updated By: System, [employer]; 12/8/2016 9:45:39 AM  Atelectasis of right lung    · * CT CHEST WO CONTRAST; Status:Hold For - Scheduling; Requested GHX:[id number];    Perform:[facility]; BFL:[id number]; Ordered; For:Atelectasis of right lung; Ordered By:Eliana Carlson;  Constipation    · Polyethylene Glycol 3350 Oral Powder; 2 TABLESPOONS IN 8OZ WATER DAILY   Rx By: Oscar Ortiz; Dispense: 0 Days ; #:238 GM; Refill: 1; For: Constipation; FANNY = N; Record  Fatty liver disease, nonalcoholic, Hypokalemia, Hyponatremia    · (1) COMPREHENSIVE METABOLIC PANEL; Status:Active; Requested EGA:51JOA7207;    Perform:[facility]; PEO:57IAV5965; Ordered;   For:Fatty liver disease, nonalcoholic, Hypokalemia, Hyponatremia; Ordered By:Eliana Carlson;  Leukocytosis    · (1) CBC/PLT/DIFF; Status:Active; Requested OAV:[id number];    Perform:[facility]; CAU:34JNR5604; Ordered; For:Leukocytosis; Ordered By:Lia Carlson;    Discussion/Summary  Discussion Summary:   Patient needs to have repeat labs in one month to insure stability of his sodium, potassium, liver functions and his white blood cell count  I explained to patient and his significant other that the infection worsened liver tests but not his known fatty liver disease  The infection caused increase in white blood cells as his body was trying to fight infections  The infection also caused some dehydration because he is also on a diuretic for control of blood pressure  For the urinary retention we will continue both flomax and proscar  We will avoid constipation with miralax and 6-8 glasses of water  We will continue thw medication for his nerves nightly and have them sign for records from Dr Nuris De Oliveira regarding that  I will repeat the CT scan of the chest in 3 months to be sure the lung issue has cleared up I will see him in 6 months He will have the repeat urine culture next week as urology requested  Chief Complaint  Chief Complaint Free Text Note Form: [address] [facility]      History of Present Illness  TCM Communication [facility]: The patient is being contacted for 12/08/2016  He was hospitalized at [facility]  The date of admission: 12/02/2016, date of discharge: 12/05/2016  Diagnosis: UTI  He was discharged to home  Medications reviewed and updated today  The patient is currently asymptomatic  anxiety is only issue at this [place] Counseling was provided to the patient     Communication performed and completed by Ever Harding   HPI: Patient was seen in ED on 11/252/016 for contipation that worsened urinary retention He had a catheter placed Patient then on 12/2/2016 developed dysuria, abdominal pain and fever He was takent to ER and a UTI was found  Patent was also slightly dehydrated had hyponatremia, hypokalemia, leukocytosis Patient also had a increase in his LFT's and increase in the sound of his heart murmur There was a ECHO done that was stable Patient had a CT scan done showing fracture left 10th rib as well as atelectasis of the right lung Radiology recommended a repeat CT scan Patient is very anxious Patient was seen by psychiatry and was supposed to take mirtazapine daily He has not been doing that and has been very anxious and having nightmares, crying spells and over all worry per his significant other She is questioning what they can do for his nerves Patient is seeing the urologist for followup next month      Review of Systems  Complete-Male:   Constitutional: No fever or chills, feels well, no tiredness, no recent weight gain or weight loss  Eyes: no eye pain and no eyesight problems  Cardiovascular: no chest pain, no intermittent leg claudication, no palpitations and no extremity edema  Respiratory: No complaints of shortness of breath, no wheezing, no cough, no SOB on exertion, no orthopnea or PND  Gastrointestinal: No complaints of abdominal pain, no constipation, no nausea or vomiting, no diarrhea or bloody stools  Genitourinary: catheter out since yesterday, but no dysuria, no urinary hesitancy, no incontinence and no nocturia  Musculoskeletal: no arthralgias and no myalgias  Integumentary: no rashes  Neurological: No compliants of headache, no confusion, no convulsions, no numbness or tingling, no dizziness or fainting, no limb weakness, no difficulty walking  Psychiatric: no anxiety, no sleep disturbances and no depression  Active Problems     1  Fatty liver disease, nonalcoholic (773 8) (X14 1)   2  GERD without esophagitis (530 81) (K21 9)   3  Hemangioma (228 00) (D18 00)   4  Hyperglycemia (790 29) (R73 9)   5  Hyperlipidemia (272 4) (E78 5)   6   Hypertension (401 9) (I10)   7  Need for immunization against influenza (V04 81) (Z23)   8  Organic impotence (607 84) (N52 9)   9  Presence of indwelling urethral catheter (V45 89) (Z96 0)    Enlarged prostate without lower urinary tract symptoms (luts) (600 00) (N40 0)          Past Medical History    1  History of Abdominal pain, LLQ (left lower quadrant) (789 04) (R10 32)   2  History of Actinic keratosis (702 0) (L57 0)   3  Acute bronchitis (466 0) (J20 9)   4  Acute upper respiratory infection (465 9) (J06 9)   5  History of Atypical chest pain (786 59) (R07 89)   6  History of Benign paroxysmal vertigo (386 11) (H81 10)   7  History of Bronchitis (490) (J40)   8  History of Chronic cough (786 2) (R05)   9  History of Colonoscopy (Fiberoptic) Screening   10  History of Cough (786 2) (R05)   11  History of Degenerative arthritis of knee, bilateral (715 96) (M17 0)   12  History of Diverticulosis (562 10) (K57 90)   13  History of Foreign body, eye (930 9) (T15 90XA)   14  History of Functional gait abnormality (781 2) (R26 89)   15  History of acute bronchitis (V12 69) (Z87 09)   16  History of allergic rhinitis (V12 69) (Z87 09)   17  History of anxiety disorder (V11 8) (Z86 59)   18  History of chest pain (V13 89) (Z87 898)   19  History of diverticulitis of colon (V12 79) (Z87 19)   20  History of transient cerebral ischemia (V12 54) (Z86 73)   21  History of urinary incontinence (V13 09) (Z87 898)   22  History of Medicare annual wellness visit, initial (V70 0) (Z00 00)   23  History of Newly recognized murmur (785 2) (R01 1)   24  History of Olecranon bursitis, unspecified laterality (726 33) (M70 20)   25  History of Preop examination (V72 84) (Z01 818)   26  History of Screening for glaucoma (V80 1) (Z13 5)   27  Status post left knee replacement (V43 65) (Z96 652)   28  Status post total right knee replacement (V43 65) (Z96 651)   29  History of Strain Of Right Biceps Tendon (840 8)    Surgical History    1   History of Adenoidectomy   2  History of Appendectomy   3  History of Colonoscopy (Fiberoptic)   4  History of Inguinal Hernia Repair For Person Over Age 6   11  History of Sinus Surgery   6  History of Tonsillectomy  Surgical History Reviewed: The surgical history was reviewed and updated today  Family History  Mother    1  No pertinent family history  Family History    2  Family history of No Significant Family History  Family History Reviewed: The family history was reviewed and updated today  Social History    · Being A Social Drinker   · Denied: History of Drug Use   · Former smoker (V15 82) (Z89 535)   · Uses Safety Equipment - Seatbelts  Social History Reviewed: The social history was reviewed and is unchanged  Current Meds   1  Amlodipine Besy-Benazepril HCl - 10-40 MG Oral Capsule; TAKE ONE CAPSULE BY   MOUTH EVERY DAY; Therapy: 61XOQ7589 to (Evaluate:16Apr2017)  Requested for: 21Apr2016; Last   Rx:21Apr2016 Ordered   2  Fluorouracil 5 % External Cream;   Therapy: 56ZCI8555 to Recorded   3  Fluticasone Propionate 50 MCG/ACT Nasal Suspension; USE 2 SPRAYS IN EACH   NOSTRIL ONCE DAILY  Requested for: 36NVE3568; Last Rx:13Jun2014 Ordered   4  HydroCHLOROthiazide 25 MG Oral Tablet; Take 1 tablet twice daily; Therapy: 88FBK2645 to (Evaluate:18Oct2016)  Requested for: 21Apr2016; Last   Rx:21Apr2016 Ordered   5  Lidocaine HCl - 2 % External Gel; 10 ml lido-jet; Therapy: 63VBX3077 to (Last Rx:11Jrn3733)  Requested for: 76XCF4217; Status:   ACTIVE - Retrospective Authorization Ordered   6  Oxybutynin Chloride ER 10 MG Oral Tablet Extended Release 24 Hour; take one tablet by   mouth every day; Therapy: 51QEE6967 to (Evaluate:16Jun2017)  Requested for: 21Jun2016; Last   Rx:21Jun2016 Ordered   7  Pantoprazole Sodium 40 MG Oral Tablet Delayed Release; take one tablet by mouth   twice daily; Therapy: 11DPB4854 to (Evaluate:17Jan2017)  Requested for: 35LPC1153; Last   Rx:19Oct2016 Ordered   8  Pravastatin Sodium 40 MG Oral Tablet; take one tablet by mouth daily; Therapy: 98AYS4371 to (Evaluate:14Jan2017)  Requested for: 56PHG7525; Last   Rx:28Yrm3164 Ordered   9  Tamsulosin HCl - 0 4 MG Oral Capsule; TAKE ONE CAPSULE BY MOUTH EVERY DAY AT   BEDTIME; Therapy: 34CZP1777 to (Evaluate:16Jun2017)  Requested for: 21Jun2016; Last   Rx:21Jun2016 Ordered   10  Viagra 100 MG Oral Tablet; TAKE AS DIRECTED; Therapy: 05SAA8368 to (Magnusell Nipper)  Requested for: 91ZXB7879; Last    Rx:81Odw6758 Ordered  Medication List Reviewed: The medication list was reviewed and updated today  Allergies    1  No Known Drug Allergies    Vitals  Signs   Recorded: 26IOA3630 29:53YS   Systolic: 352  Diastolic: 78  Height: 5 ft 3 in  Weight: 193 lb 6 oz  BMI Calculated: 34 26  BSA Calculated: 1 91    Physical Exam    Constitutional   General appearance: No acute distress, well appearing and well nourished  Eyes   Conjunctiva and lids: No swelling, erythema, or discharge  Pupils and irises: Equal, round and reactive to light  Ears, Nose, Mouth, and Throat   External inspection of ears and nose: Normal     Otoscopic examination: Tympanic membrance translucent with normal light reflex  Canals patent without erythema  Nasal mucosa, septum, and turbinates: Normal without edema or erythema  Oropharynx: Normal with no erythema, edema, exudate or lesions  Pulmonary   Respiratory effort: No increased work of breathing or signs of respiratory distress  Auscultation of lungs: Clear to auscultation, equal breath sounds bilaterally, no wheezes, no rales, no rhonci  Cardiovascular   Auscultation of heart: Normal rate and rhythm, normal S1 and S2, without murmurs  Examination of extremities for edema and/or varicosities: Normal     Carotid pulses: Normal     Abdomen   Abdomen: Non-tender, no masses  Liver and spleen: No hepatomegaly or splenomegaly      Lymphatic   Palpation of lymph nodes in neck: No lymphadenopathy  Musculoskeletal   Gait and station: Normal     Skin   Skin and subcutaneous tissue: Normal without rashes or lesions  Neurologic   Cranial nerves: Cranial nerves 2-12 intact  Psychiatric   Orientation to person, place and time: Normal     Mood and affect: Normal          Future Appointments    Date/Time Provider Specialty Site   03/23/2017 09:00 AM MAGUE Li   Urology [facility]   04/27/2017 08:30 AM Bree Tatum DO [place] [id number] [facility]   01/11/2017 01:45 PM Priscilla Christopher [facility] Urology [address]   Electronically signed by : Aria Quzeada DO; Dec  8 2016  9:50AM EST                       (Author)

## 2018-01-16 NOTE — RESULT NOTES
Verified Results  (1) CBC/PLT/DIFF 19Oct2017 07:02AM Jeanna Dawson Order Number: [id number]     Test Name Result Flag Reference   WBC COUNT 7 05 Thousand/uL  4 31-10 16   RBC COUNT 4 59 Million/uL  3 88-5 62   HEMOGLOBIN 12 7 g/dL  12 0-17 0   HEMATOCRIT 38 5 %  36 5-49 3   MCV 84 fL  82-98   MCH 27 7 pg  26 8-34 3   MCHC 33 0 g/dL  31 4-37 4   RDW 13 9 %  11 6-15 1   MPV 9 9 fL  8 9-12 7   PLATELET COUNT 062 Thousands/uL  149-390   nRBC AUTOMATED 0 /100 WBCs     NEUTROPHILS RELATIVE PERCENT 39 % L 43-75   LYMPHOCYTES RELATIVE PERCENT 47 % H 14-44   MONOCYTES RELATIVE PERCENT 13 % H 4-12   EOSINOPHILS RELATIVE PERCENT 1 %  0-6   BASOPHILS RELATIVE PERCENT 0 %  0-1   NEUTROPHILS ABSOLUTE COUNT 2 74 Thousands/? ??L  1 85-7 62   LYMPHOCYTES ABSOLUTE COUNT 3 27 Thousands/? ??L  0 60-4 47   MONOCYTES ABSOLUTE COUNT 0 91 Thousand/? ??L  0 17-1 22   EOSINOPHILS ABSOLUTE COUNT 0 08 Thousand/? ??L  0 00-0 61   BASOPHILS ABSOLUTE COUNT 0 03 Thousands/? ??L  0 00-0 10     (1) COMPREHENSIVE METABOLIC PANEL 55KRL8311 21:26XB Jeanna Dawson Order Number: [id number]     Test Name Result Flag Reference   SODIUM 136 mmol/L  136-145   POTASSIUM 3 1 mmol/L L 3 5-5 3   CHLORIDE 98 mmol/L L 100-108   CARBON DIOXIDE 27 mmol/L  21-32   ANION GAP (CALC) 11 mmol/L  4-13   BLOOD UREA NITROGEN 18 mg/dL  5-25   CREATININE 0 77 mg/dL  0 60-1 30   Standardized to IDMS reference method   CALCIUM 9 0 mg/dL  8 3-10 1   BILI, TOTAL 0 99 mg/dL  0 20-1 00   ALK PHOSPHATAS 67 U/L     ALT (SGPT) 97 U/L H 12-78   Specimen collection should occur prior to Sulfasalazine and/or Sulfapyridine administration due to the potential for falsely depressed results  AST(SGOT) 85 U/L H 5-45   Specimen collection should occur prior to Sulfasalazine administration due to the potential for falsely depressed results     ALBUMIN 4 0 g/dL  3 5-5 0   TOTAL PROTEIN 7 7 g/dL  6 4-8 2   eGFR 86 ml/min/1 73sq m     [place] [employer] Disease Education Program recommendations are as follows:  GFR calculation is accurate only with a steady state creatinine  Chronic Kidney disease less than 60 ml/min/1 73 sq  meters  Kidney failure less than 15 ml/min/1 73 sq  meters  GLUCOSE FASTING 106 mg/dL H 65-99   Specimen collection should occur prior to Sulfasalazine administration due to the potential for falsely depressed results  Specimen collection should occur prior to Sulfapyridine administration due to the potential for falsely elevated results  (1) LIPID PANEL, FASTING 27ANT4184 07:02AM [employer] Order Number: [id number]     Test Name Result Flag Reference   CHOLESTEROL 159 mg/dL     HDL,DIRECT 52 mg/dL  40-60   Specimen collection should occur prior to Metamizole administration due to the potential for falsley depressed results  LDL CHOLESTEROL CALCULATED 71 mg/dL  0-100   Triglyceride:        Normal <150 mg/dl   Borderline High 150-199 mg/dl   High 200-499 mg/dl   Very High >499 mg/dl      Cholesterol:       Desirable <200 mg/dl    Borderline High 200-239 mg/dl    High >239 mg/dl      HDL Cholesterol:       High>59 mg/dL    Low <41 mg/dL      This screening LDL is a calculated result  It does not have the accuracy of the Direct Measured LDL in the monitoring of patients with hyperlipidemia and/or statin therapy  Direct Measure LDL (UHE583) must be ordered separately in these patients  TRIGLYCERIDES 178 mg/dL H <=150   Specimen collection should occur prior to N-Acetylcysteine or Metamizole administration due to the potential for falsely depressed results  (1) TSH 19Oct2017 07:02AM [employer] Order Number: [id number]     Test Name Result Flag Reference   TSH 1 860 uIU/mL  0 358-3 740   Patients undergoing fluorescein dye angiography may retain small amounts of fluorescein in the body for 48-72 hours post procedure   Samples containing fluorescein can produce falsely depressed TSH values  If the patient had this procedure,a specimen should be resubmitted post fluorescein clearance  Plan  Hypokalemia    · Potassium Chloride ER 20 MEQ Oral Tablet Extended Release; Take 1 tablet daily   · (1) BASIC METABOLIC PROFILE; Status:Active;  Requested YR:[id number];

## 2018-01-16 NOTE — RESULT NOTES
Verified Results  (1) CBC/PLT/DIFF 38Klo9637 07:51AM Cherre Noyack     Test Name Result Flag Reference   WBC COUNT 7 92 Thousand/uL  4 31-10 16   RBC COUNT 4 83 Million/uL  3 88-5 62   HEMOGLOBIN 14 9 g/dL  12 0-17 0   HEMATOCRIT 43 1 %  36 5-49 3   MCV 89 fL  82-98   MCH 30 8 pg  26 8-34 3   MCHC 34 6 g/dL  31 4-37 4   RDW 12 7 %  11 6-15 1   MPV 10 5 fL  8 9-12 7   PLATELET COUNT 995 Thousands/uL  149-390   nRBC AUTOMATED 0 /100 WBCs     NEUTROPHILS RELATIVE PERCENT 41 % L 43-75   LYMPHOCYTES RELATIVE PERCENT 43 %  14-44   MONOCYTES RELATIVE PERCENT 13 % H 4-12   EOSINOPHILS RELATIVE PERCENT 2 %  0-6   BASOPHILS RELATIVE PERCENT 1 %  0-1   NEUTROPHILS ABSOLUTE COUNT 3 25 Thousands/µL  1 85-7 62   LYMPHOCYTES ABSOLUTE COUNT 3 47 Thousands/µL  0 60-4 47   MONOCYTES ABSOLUTE COUNT 1 00 Thousand/µL  0 17-1 22   EOSINOPHILS ABSOLUTE COUNT 0 14 Thousand/µL  0 00-0 61   BASOPHILS ABSOLUTE COUNT 0 04 Thousands/µL  0 00-0 10

## 2018-01-17 NOTE — RESULT NOTES
Verified Results  (1) HEPATIC FUNCTION PANEL [id number] 07:49AM Vitaliyalo Destinee Order Number: [id number]   Order Number: [id number]     Test Name Result Flag Reference   BILI, DIRECT 0 27 mg/dL H 0 00-0 20

## 2018-01-19 ENCOUNTER — GENERIC CONVERSION - ENCOUNTER (OUTPATIENT)
Dept: FAMILY MEDICINE CLINIC | Facility: CLINIC | Age: 78
End: 2018-01-19

## 2018-01-22 VITALS
BODY MASS INDEX: 34.95 KG/M2 | DIASTOLIC BLOOD PRESSURE: 78 MMHG | HEIGHT: 63 IN | SYSTOLIC BLOOD PRESSURE: 140 MMHG | WEIGHT: 197.25 LBS | TEMPERATURE: 98.2 F

## 2018-01-22 VITALS
SYSTOLIC BLOOD PRESSURE: 124 MMHG | TEMPERATURE: 98 F | HEIGHT: 63 IN | DIASTOLIC BLOOD PRESSURE: 60 MMHG | BODY MASS INDEX: 34.55 KG/M2 | WEIGHT: 195 LBS

## 2018-01-22 VITALS
SYSTOLIC BLOOD PRESSURE: 122 MMHG | BODY MASS INDEX: 34.73 KG/M2 | WEIGHT: 196 LBS | DIASTOLIC BLOOD PRESSURE: 80 MMHG | HEIGHT: 63 IN

## 2018-01-22 VITALS
SYSTOLIC BLOOD PRESSURE: 140 MMHG | TEMPERATURE: 98.4 F | DIASTOLIC BLOOD PRESSURE: 80 MMHG | BODY MASS INDEX: 34.55 KG/M2 | WEIGHT: 195 LBS | HEIGHT: 63 IN

## 2018-02-26 NOTE — RESULT NOTES
Verified Results  CT SINUS WO CONTRAST [id number] 03:44PM Mariluz Tobias Order Number: [id number]    - Patient Instructions: To schedule this appointment, please contact Central Scheduling at [phone]  Test Name Result Flag Reference   CT SINUS WO CONTRAST (Report)     CT SINUSES     INDICATION: Chronic sinusitis  Congestion  COMPARISON: None  TECHNIQUE: Axial CT imaging through the sinuses was performed  In addition, sagittal and coronal reformatted images were submitted for interpretation  Radiation dose length product (DLP) for this visit: 291 mGy-cm   This examination, like all CT scans performed in the [facility], was performed utilizing techniques to minimize radiation dose exposure, including the use of iterative    reconstruction and automated exposure control  IMAGE QUALITY: Diagnostic  FINDINGS:      FRONTAL SINUSES: Small mucus retention cyst anterior medial right frontal sinus  ETHMOID AIR CELLS: Clear  MAXILLARY SINUSES: Mild mucosal thickening with mucous retention cyst left maxillary sinus  Complete opacification of the right maxillary sinus identified  Decreased right sinus volume identified suggesting potential of chronic obstruction and silent    sinus syndrome  SPHENOID SINUSES: Clear  NASAL SEPTUM AND CAVITY: Rightward deviated Normal nasal cavity  ANTERIOR OSTEOMEATAL COMPLEX: Obstructed on the right  OSSEOUS STRUCTURES: No bony erosion or destruction  Normal cribiform plate and Planum Spendoidale  Visulaized mastoid temporal bones are clear  The bony walls of the carotid canals are intact  SOFT TISSUES: Normal        IMPRESSION:     Chronic opacification right maxillary sinus with decreased right maxillary sinus volume, and slight lateral tenting of the medial wall the right maxillary sinus  Correlate clinically for silent sinus syndrome  Polypoid mucosal thickening left maxillary   sinus noted  Rightward nasal septal deviation         Workstation performed: [id number]     Signed by:   Gracia 6, DO   1/12/18       Plan  Abnormal computed tomography of paranasal sinus, Recurrent sinusitis    · 2 - Hectorndgris DO, Billy  (Otolaryngology) Co-Management  *  Status: Hold For -  Scheduling  Requested for: 89FBA2512  Care Summary provided  : Yes

## 2018-03-05 ENCOUNTER — TELEPHONE (OUTPATIENT)
Dept: FAMILY MEDICINE CLINIC | Facility: CLINIC | Age: 78
End: 2018-03-05

## 2018-03-05 NOTE — TELEPHONE ENCOUNTER
Call patient son back and let him know we need his dad's permission to speak to him about his dad's health care and we have no record of that in our system  See if he will then give you some sort of message

## 2018-03-23 ENCOUNTER — OFFICE VISIT (OUTPATIENT)
Dept: FAMILY MEDICINE CLINIC | Facility: CLINIC | Age: 78
End: 2018-03-23
Payer: MEDICARE

## 2018-03-23 ENCOUNTER — OFFICE VISIT (OUTPATIENT)
Dept: URGENT CARE | Facility: MEDICAL CENTER | Age: 78
End: 2018-03-23
Payer: MEDICARE

## 2018-03-23 VITALS
HEIGHT: 63 IN | DIASTOLIC BLOOD PRESSURE: 70 MMHG | SYSTOLIC BLOOD PRESSURE: 140 MMHG | BODY MASS INDEX: 34.55 KG/M2 | WEIGHT: 195 LBS

## 2018-03-23 VITALS
TEMPERATURE: 97 F | HEART RATE: 90 BPM | SYSTOLIC BLOOD PRESSURE: 138 MMHG | DIASTOLIC BLOOD PRESSURE: 60 MMHG | RESPIRATION RATE: 20 BRPM | OXYGEN SATURATION: 97 %

## 2018-03-23 DIAGNOSIS — F33.1 MODERATE EPISODE OF RECURRENT MAJOR DEPRESSIVE DISORDER (HCC): Primary | ICD-10-CM

## 2018-03-23 DIAGNOSIS — R06.02 SHORTNESS OF BREATH: ICD-10-CM

## 2018-03-23 DIAGNOSIS — I49.3 PVC (PREMATURE VENTRICULAR CONTRACTION): Primary | ICD-10-CM

## 2018-03-23 DIAGNOSIS — F41.1 GENERALIZED ANXIETY DISORDER: ICD-10-CM

## 2018-03-23 PROBLEM — R05.3 CHRONIC COUGH: Status: RESOLVED | Noted: 2017-09-04 | Resolved: 2018-03-23

## 2018-03-23 LAB
ATRIAL RATE: 106 BPM
ATRIAL RATE: 107 BPM
P AXIS: 33 DEGREES
P AXIS: 35 DEGREES
PR INTERVAL: 160 MS
PR INTERVAL: 174 MS
QRS AXIS: -40 DEGREES
QRS AXIS: -42 DEGREES
QRSD INTERVAL: 96 MS
QRSD INTERVAL: 96 MS
QT INTERVAL: 374 MS
QT INTERVAL: 380 MS
QTC INTERVAL: 496 MS
QTC INTERVAL: 507 MS
T WAVE AXIS: 108 DEGREES
T WAVE AXIS: 99 DEGREES
VENTRICULAR RATE: 106 BPM
VENTRICULAR RATE: 107 BPM

## 2018-03-23 PROCEDURE — G0463 HOSPITAL OUTPT CLINIC VISIT: HCPCS | Performed by: PHYSICIAN ASSISTANT

## 2018-03-23 PROCEDURE — 93005 ELECTROCARDIOGRAM TRACING: CPT | Performed by: PHYSICIAN ASSISTANT

## 2018-03-23 PROCEDURE — 71046 X-RAY EXAM CHEST 2 VIEWS: CPT

## 2018-03-23 PROCEDURE — 93010 ELECTROCARDIOGRAM REPORT: CPT | Performed by: INTERNAL MEDICINE

## 2018-03-23 PROCEDURE — 99214 OFFICE O/P EST MOD 30 MIN: CPT | Performed by: FAMILY MEDICINE

## 2018-03-23 PROCEDURE — 99213 OFFICE O/P EST LOW 20 MIN: CPT | Performed by: PHYSICIAN ASSISTANT

## 2018-03-23 RX ORDER — MIRTAZAPINE 15 MG/1
15 TABLET, FILM COATED ORAL
Qty: 30 TABLET | Refills: 1 | Status: SHIPPED | OUTPATIENT
Start: 2018-03-23 | End: 2018-05-25 | Stop reason: SDUPTHER

## 2018-03-23 RX ORDER — ALPRAZOLAM 0.25 MG/1
0.25 TABLET ORAL 2 TIMES DAILY PRN
Qty: 30 TABLET | Refills: 0 | Status: SHIPPED | OUTPATIENT
Start: 2018-03-23 | End: 2018-03-28 | Stop reason: SDUPTHER

## 2018-03-23 NOTE — PROGRESS NOTES
[facility]        NAME: Robert Barnett is a [de-identified] y o  male  : 1940    MRN: [id number]  DATE: 2018  TIME: 7:48 PM    Assessment and Plan   PVC (premature ventricular contraction) [I49 3]  1  PVC (premature ventricular contraction)     2  Shortness of breath  XR chest pa & lateral         Patient Instructions       Follow up with PCP in 3-5 days  Proceed to  ER if symptoms worsen  Chief Complaint     Chief Complaint   Patient presents with    Fatigue     Pt c/o of weakness, fatigue, SOB upon exertion and not feeling well for the past few days  Pt has trouble sleeping  History of Present Illness       78-year-old male with a history of hyperlipidemia, hypertension, mitral regurgitation and anxiety presents with granddaughter, with the granddaughter complaining of irregular pulse  Patient's saw PCP earlier today for follow-up of depression and anxiety and he did not mention any complaints  He states that he feels fine today, maybe a little tired  Granddaughter is an R N  and is stating that she notices that he appears to get short of breatheasier when walking around and this was concerning to her  When taking his pulse she noted that it was irregular  No chest pain or sensation of palpitations, nausea, vomiting, diaphoresis, pain radiating down arms or to jaw  EKG today showed PVCs  EKG in September did not show PVCs  He also had a stress test at that time which was unremarkable  Chest x-ray today was negative        Review of Systems   Review of Systems   Constitutional: Positive for fatigue  Negative for chills, diaphoresis and fever  Respiratory: Positive for shortness of breath  Negative for cough  Cardiovascular: Positive for leg swelling  Negative for chest pain and palpitations  Gastrointestinal: Negative for abdominal pain, diarrhea, nausea, rectal pain and vomiting  Musculoskeletal: Negative for arthralgias and myalgias     Skin: Negative for pallor and rash  Neurological: Negative for light-headedness and headaches  Current Medications       Current Outpatient Prescriptions:     ALPRAZolam (XANAX) 0 25 mg tablet, Take 1 tablet (0 25 mg total) by mouth 2 (two) times a day as needed for anxiety, Disp: 30 tablet, Rfl: 0    amLODIPine-benazepril (LOTREL) 10-40 MG per capsule, Take 1 capsule by mouth daily  , Disp: , Rfl:     aspirin 325 mg tablet, Take 325 mg by mouth daily, Disp: , Rfl:     finasteride (PROSCAR) 5 mg tablet, Take 1 tablet by mouth daily for 30 days, Disp: 30 tablet, Rfl: 0    hydrochlorothiazide (HYDRODIURIL) 12 5 mg tablet, Take 25 mg by mouth 2 (two) times a day   , Disp: , Rfl:     mirtazapine (REMERON) 15 mg tablet, Take 1 tablet (15 mg total) by mouth daily at bedtime, Disp: 30 tablet, Rfl: 1    pantoprazole (PROTONIX) 40 mg tablet, Take 40 mg by mouth 2 (two) times a day , Disp: , Rfl:     polyethylene glycol (MIRALAX) 17 g packet, Take 17 g by mouth daily, Disp: , Rfl:     tamsulosin (FLOMAX) 0 4 mg, Take 1 capsule by mouth daily with dinner for 30 days, Disp: 30 capsule, Rfl: 0    Current Allergies     Allergies as of 03/23/2018 - Reviewed 03/23/2018   Allergen Reaction Noted    Ace inhibitors Cough 09/22/2017            The following portions of the patient's history were reviewed and updated as appropriate: allergies, current medications, past family history, past medical history, past social history, past surgical history and problem list      Past Medical History:   Diagnosis Date    Anxiety     Chronic GERD     Hyperlipidemia     Hypertension     Murmur        Past Surgical History:   Procedure Laterality Date    APPENDECTOMY      JOINT REPLACEMENT      b/l TKR Sept 2015       History reviewed  No pertinent family history  Medications have been verified          Objective   /60   Pulse 90   Temp (!) 97 °F (36 1 °C)   Resp 20   SpO2 97%        Physical Exam     Physical Exam   Constitutional: He is oriented to person, place, and time  He appears well-developed and well-nourished  No distress  HENT:   Head: Normocephalic and atraumatic  Mouth/Throat: Oropharynx is clear and moist    Eyes: Conjunctivae and EOM are normal  Pupils are equal, round, and reactive to light  Right eye exhibits no discharge  Left eye exhibits no discharge  No scleral icterus  Neck: Normal range of motion  Neck supple  No thyromegaly present  Cardiovascular: Normal rate and normal heart sounds  An irregular rhythm present  Exam reveals no gallop and no friction rub  No murmur heard  Pulmonary/Chest: Effort normal and breath sounds normal  No respiratory distress  He has no wheezes  He has no rales  Musculoskeletal: Normal range of motion  He exhibits no edema  Lymphadenopathy:     He has no cervical adenopathy  Neurological: He is alert and oriented to person, place, and time  No cranial nerve deficit  Skin: Skin is warm and dry  No rash noted  He is not diaphoretic  No erythema  No pallor

## 2018-03-23 NOTE — PATIENT INSTRUCTIONS
Patient has agreed to see the geriatrician He will try to take the xanax for acute anxiety patient will increase hte mirtzapineDepression, Ambulatory Care   GENERAL INFORMATION:   Depression  is a medical condition that causes feelings of sadness or hopelessness that do not go away  Depression may cause you to lose interest in things you used to enjoy  These feelings may interfere with your daily life  Common symptoms include the following:   · Appetite changes, or weight gain or loss    · Trouble going to sleep or staying asleep, or sleeping too much    · Fatigue or lack of energy    · Feeling restless, irritable, or withdrawn    · Feeling worthless, hopeless, discouraged, or very guilty    · Trouble concentrating, remembering things, doing daily tasks, or making decisions    · Thoughts about hurting or killing yourself  Seek immediate care for the following symptoms:   · You think about harming yourself or someone else  Treatment for depression  may include medicine to improve or balance your mood  Therapy may also be used to treat your depression  A therapist will help you learn to cope with your thoughts and feelings  Therapy can be done alone or in a group  It may also be done with family members or a significant other  Manage depression:   · Get regular physical activity  Try to exercise for 30 minutes, 3 to 5 days a week  Work with your healthcare provider to develop an exercise plan that you enjoy  · Get enough sleep  Create a routine to help you relax before bed  Try to go to bed and wake up at the same time every day  Sleep is important for emotional health  · Eat a variety of healthy foods  Healthy foods include fruits, vegetables, whole-grain breads, low-fat dairy products, beans, lean meats, and fish  A healthy meal plan is low in fat, salt, and added sugar  · Avoid or limit alcohol  Ask your healthcare provider how much alcohol is safe for you to drink   A drink of alcohol is 12 ounces of beer, 5 ounces of wine, or 1½ ounces of liquor  Follow up with your healthcare provider as directed: You will need to return so your healthcare provider can monitor your progress  Write down your questions so you remember to ask them during your visits  CARE AGREEMENT:   You have the right to help plan your care  Learn about your health condition and how it may be treated  Discuss treatment options with your caregivers to decide what care you want to receive  You always have the right to refuse treatment  The above information is an  only  It is not intended as medical advice for individual conditions or treatments  Talk to your doctor, nurse or pharmacist before following any medical regimen to see if it is safe and effective for you  © 2014 [address] is for End User's use only and may not be sold, redistributed or otherwise used for commercial purposes  All illustrations and images included in CareNotes® are the copyrighted property of A D A M , Inc  or Aravind Monreal

## 2018-03-23 NOTE — ASSESSMENT & PLAN NOTE
Patient in the past utilized xanax for acute anxiety and it 'does help" Patient will go ahead and try that out again He will consider counselling He has agreed to see geriatrician

## 2018-03-23 NOTE — PROGRESS NOTES
Assessment/Plan:    Generalized anxiety disorder  Patient in the past utilized xanax for acute anxiety and it 'does help" Patient will go ahead and try that out again He will consider counselling He has agreed to see geriatrician    Moderate episode of recurrent major depressive disorder ([facility] [address] )  Patient depression has worsened I will increase the mirtazpine to 15 mg daily and refer him to geriatrician also He will see me in one month We discussed that if he has any suicidal or homicidal they should call 911 or crisis intervention 30 minutes spent discussing depression and its treatment options We also discussed counselor to [facility] him deal with his stressors patient denies any suicidal or homicidal ideation       Diagnoses and all orders for this visit:    Moderate episode of recurrent major depressive disorder (HCC)  -     mirtazapine (REMERON) 15 mg tablet; Take 1 tablet (15 mg total) by mouth daily at bedtime  -     Ambulatory referral to Geriatrics; Future    Generalized anxiety disorder  -     ALPRAZolam (XANAX) 0 25 mg tablet; Take 1 tablet (0 25 mg total) by mouth 2 (two) times a day as needed for anxiety  -     Ambulatory referral to Geriatrics; Future          Subjective:   Chief Complaint   Patient presents with    Depression          Patient ID: Bernadette Hamilton is a [de-identified] y o  male      Patient is here with his girlfreind for worsening of his depressionand anxiety patient was initally seen by psychiatry late in 2016 He was started on mitrapine and also xanax patent was to followup with them He did not He felt that he "was handling things" Patient girlfriend states he has terrible time sleeping, he is upset all the time about family issues, about friends passing away Patient girlfriend thinks it is much worse in the winter Ivy He 541 for last seeral weeks has not been able to sleep much at all He cries for no apparent reason Finally yesterday after his son told him that they were not able to come see him for Alexandra Guillermo, it was too much and he agreed to come in Patient does not want to see psychiatry He will agree to talking to a geriatrician and he and his girlfrined got a recommendation from An Mccain  in law for Dr Jostin Bowers Patient is having nightmares at night also      Depression   This is a chronic problem  The current episode started more than 1 year ago  The problem occurs constantly  The problem has been gradually worsening  Associated symptoms include anorexia and fatigue  Pertinent negatives include no abdominal pain, arthralgias, change in bowel habit, chest pain, chills, congestion, coughing, diaphoresis, fever, headaches, joint swelling, myalgias, nausea, neck pain, numbness, rash, sore throat, swollen glands, urinary symptoms, vertigo, visual change, vomiting or weakness  The symptoms are aggravated by stress  He has tried rest and relaxation for the symptoms  The treatment provided mild relief  The following portions of the patient's history were reviewed and updated as appropriate: allergies, current medications, past social history and problem list     Review of Systems   Constitutional: Positive for fatigue  Negative for activity change, appetite change, chills, diaphoresis, fever and unexpected weight change  HENT: Negative for congestion and sore throat  Respiratory: Negative for cough and chest tightness  Cardiovascular: Negative for chest pain and palpitations  Gastrointestinal: Positive for anorexia  Negative for abdominal pain, change in bowel habit, nausea and vomiting  Musculoskeletal: Negative for arthralgias, joint swelling, myalgias and neck pain  Skin: Negative for rash  Neurological: Negative for dizziness, vertigo, tremors, syncope, facial asymmetry, speech difficulty, weakness, numbness and headaches  Psychiatric/Behavioral: Positive for depression, dysphoric mood and sleep disturbance  Negative for confusion, hallucinations, self-injury and suicidal ideas   The patient is nervous/anxious  The patient is not hyperactive  Objective:      /70   Ht 5' 3" (1 6 m)   Wt 88 5 kg (195 lb)   BMI 34 54 kg/m²          Physical Exam   Constitutional: He is oriented to person, place, and time  He appears well-developed and well-nourished  HENT:   Head: Normocephalic and atraumatic  Right Ear: Hearing, tympanic membrane and external ear normal    Left Ear: Hearing, tympanic membrane and external ear normal    Eyes: Conjunctivae and EOM are normal  Pupils are equal, round, and reactive to light  Neck: Neck supple  No thyromegaly present  Cardiovascular: Normal rate and normal heart sounds  Pulmonary/Chest: Effort normal and breath sounds normal  He has no wheezes  He has no rales  Abdominal: Soft  Bowel sounds are normal  He exhibits no distension  There is no tenderness  Musculoskeletal: He exhibits no edema or tenderness  Lymphadenopathy:     He has no cervical adenopathy  Neurological: He is alert and oriented to person, place, and time  No cranial nerve deficit  Coordination normal    Skin: Skin is warm and dry  No rash noted  Psychiatric: His speech is normal  His mood appears anxious  He is withdrawn  He is not agitated, not aggressive, not hyperactive and not combative  Thought content is not paranoid and not delusional  Cognition and memory are not impaired  He does not express impulsivity or inappropriate judgment  He exhibits a depressed mood  He expresses no homicidal and no suicidal ideation  He expresses no suicidal plans and no homicidal plans  He exhibits normal recent memory and normal remote memory  Patient is tearful in office He relates taht he "will be fine" he states that "my problems cannot be changed" He cites the familial isseus and the sense of loss he has had over losing friends over the last few years   Patient also states he "doesn't think I can talk to anyone about all this'

## 2018-03-23 NOTE — ASSESSMENT & PLAN NOTE
Patient depression has worsened I will increase the mirtazpine to 15 mg daily and refer him to geriatrician also He will see me in one month We discussed that if he has any suicidal or homicidal they should call 911 or crisis intervention 30 minutes spent discussing depression and its treatment options We also discussed counselor to hlep him deal with his stressors patient denies any suicidal or homicidal ideation

## 2018-03-27 ENCOUNTER — TELEPHONE (OUTPATIENT)
Dept: FAMILY MEDICINE CLINIC | Facility: CLINIC | Age: 78
End: 2018-03-27

## 2018-03-27 DIAGNOSIS — R00.2 PALPITATIONS: Primary | ICD-10-CM

## 2018-03-28 DIAGNOSIS — F41.1 GENERALIZED ANXIETY DISORDER: ICD-10-CM

## 2018-03-28 RX ORDER — ALPRAZOLAM 0.25 MG/1
TABLET ORAL
Qty: 30 TABLET | Refills: 0 | OUTPATIENT
Start: 2018-03-28 | End: 2018-04-26 | Stop reason: SDUPTHER

## 2018-04-10 ENCOUNTER — OFFICE VISIT (OUTPATIENT)
Dept: CARDIOLOGY CLINIC | Facility: CLINIC | Age: 78
End: 2018-04-10
Payer: MEDICARE

## 2018-04-10 VITALS
WEIGHT: 197 LBS | DIASTOLIC BLOOD PRESSURE: 60 MMHG | BODY MASS INDEX: 34.91 KG/M2 | SYSTOLIC BLOOD PRESSURE: 124 MMHG | HEART RATE: 100 BPM | HEIGHT: 63 IN

## 2018-04-10 DIAGNOSIS — R00.2 PALPITATIONS: ICD-10-CM

## 2018-04-10 DIAGNOSIS — I10 ESSENTIAL HYPERTENSION: ICD-10-CM

## 2018-04-10 DIAGNOSIS — I49.3 PVC (PREMATURE VENTRICULAR CONTRACTION): ICD-10-CM

## 2018-04-10 DIAGNOSIS — R01.1 MURMUR: Primary | ICD-10-CM

## 2018-04-10 PROCEDURE — 93000 ELECTROCARDIOGRAM COMPLETE: CPT | Performed by: INTERNAL MEDICINE

## 2018-04-10 PROCEDURE — 99204 OFFICE O/P NEW MOD 45 MIN: CPT | Performed by: INTERNAL MEDICINE

## 2018-04-10 RX ORDER — DIPHENOXYLATE HYDROCHLORIDE AND ATROPINE SULFATE 2.5; .025 MG/1; MG/1
1 TABLET ORAL
COMMUNITY
End: 2019-01-28 | Stop reason: ALTCHOICE

## 2018-04-10 NOTE — PROGRESS NOTES
Consultation - Cardiology   Bryan Bhardwaj [de-identified] y o  male MRN: [id number]    Encounter: [id number]    Assessment/Plan     Assessment:   PVCs  HTN  Murmur    Plan:    PVCs: He is relatively asymptomatic  No previous significant cardiac history  Will do echocardiogram and holter    HTN: BP is stable on current medical therapy  Murmur: c/w AS  Will do echocardiogram  He is asymptomatic  History of Present Illness   Physician Requesting Consult: No att  providers found  Reason for Consult / Principal Problem: PVC  HPI: Bryan Bhardwaj is a [de-identified]y o  year old male who presents with PVCs  He recently has had some difficulty with anxiety and also some shaking as well  His daughter in law checked his pulse and he went to urgent care  He was found to have PVCs and was referred here for further evaluation  He is rather sedentary during the winter  He is active during the spring  He has no chest pain, no palpitations, no exertional dyspnea, no dizziness, and has trace LE edema  No documented history of CAD or CHF  He has had bilateral knee replacements  He has hypertension and Bp has been stable at home  Review of Systems   Constitution: Negative  HENT: Negative  Eyes: Negative  Cardiovascular: Negative  Respiratory: Negative  Endocrine: Negative  Hematologic/Lymphatic: Negative  Skin: Negative  Musculoskeletal: Negative  Gastrointestinal: Negative  Genitourinary: Negative  Neurological: Negative  Psychiatric/Behavioral: Negative  Allergic/Immunologic: Negative          Historical Information   Past Medical History:   Diagnosis Date    Actinic keratosis     LAST ASSESSED: [id number]    Allergic rhinitis     LAST ASSESSED: [id number]    Anxiety     LAST ASSESSED: [id number]    Anxiety disorder     LAST ASSESSED: [id number]    Atelectasis of right lung     ABNORMAL CT SCAN OF THE [address] 12/2/2016 REPEAT NEEDED PER RADIOLOGY IN 3 MONTHS LAST ASSESSED: [id number]  Atypical chest pain     LAST ASSESSED: [id number]    Benign paroxysmal vertigo     LAST ASSESSED: [id number]    Chronic cough     LAST ASSESSED: [id number]    Chronic GERD     Degenerative arthritis of knee, bilateral     LAST ASSESSED: [id number]    Diverticulitis of colon     LAST ASSESSED: [id number]    Diverticulosis     LAST ASSESSED: [id number]    Foreign body, eye     LAST ASSESSED: [id number]    Functional gait abnormality     LAST ASSESSED: [id number]    Hyperlipidemia     Hypertension     Hyponatremia     LAST ASSESSED: [id number]    Leukocytosis     LAST ASSESSED: [id number]    Murmur     Newly recognized murmur     LAST ASSESSED: [id number]    Olecranon bursitis, unspecified elbow     Presence of indwelling urethral catheter     RESOLVED: [id number]    Transient cerebral ischemia     LAST ASSESSED: [id number]    Urinary incontinence     LAST ASSESSED: [id number]    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     LAST ASSESSED: [id number]     Past Surgical History:   Procedure Laterality Date    ADENOIDECTOMY      APPENDECTOMY      COLONOSCOPY  10/2006    FIBEROPTIC    INGUINAL HERNIA REPAIR      JOINT REPLACEMENT      b/l TKR Sept 2015    SINUS SURGERY      TONSILLECTOMY         Social History:  History   Alcohol Use    Yes     Comment: occ, SOCIAL     History   Drug Use No     History   Smoking Status    Former Smoker    Quit date: 1960   Smokeless Tobacco    Never Used       Family History:   Family History   Problem Relation Age of Onset    No Known Problems Family        Meds/Allergies   Allergies   Allergen Reactions    Ace Inhibitors Cough       Current Outpatient Prescriptions:     ALPRAZolam (XANAX) 0 25 mg tablet, TAKE 1 TABLET BY MOUTH TWICE DAILY AS NEEDED FOR ANXIETY, Disp: 30 tablet, Rfl: 0    amLODIPine-benazepril (LOTREL) 10-40 MG per capsule, Take 1 capsule by mouth daily  , Disp: , Rfl:     finasteride (PROSCAR) 5 mg tablet, Take 1 tablet by mouth daily for 30 days, Disp: 30 tablet, Rfl: 0    mirtazapine (REMERON) 15 mg tablet, Take 1 tablet (15 mg total) by mouth daily at bedtime, Disp: 30 tablet, Rfl: 1    multivitamin (THERAGRAN) TABS, Take 1 tablet by mouth, Disp: , Rfl:     Omega-3 Fatty Acids (FISH OIL PO), Take by mouth 2 (two) times a day, Disp: , Rfl:     pantoprazole (PROTONIX) 40 mg tablet, Take 40 mg by mouth 2 (two) times a day , Disp: , Rfl:     Probiotic Product (PROBIOTIC DAILY PO), Take by mouth daily, Disp: , Rfl:     tamsulosin (FLOMAX) 0 4 mg, Take 1 capsule by mouth daily with dinner for 30 days, Disp: 30 capsule, Rfl: 0    VITAMIN D, ERGOCALCIFEROL, PO, Take by mouth, Disp: , Rfl:     VITAMIN E PO, Take by mouth, Disp: , Rfl:     aspirin 325 mg tablet, Take 325 mg by mouth daily, Disp: , Rfl:     hydrochlorothiazide (HYDRODIURIL) 12 5 mg tablet, Take 25 mg by mouth 2 (two) times a day   , Disp: , Rfl:     polyethylene glycol (MIRALAX) 17 g packet, Take 17 g by mouth daily, Disp: , Rfl:     Vitals:   Pulse: 100  Blood Pressue: 124/60  Weight: 89 4 kg (197 lb)      Physical Exam   Constitutional: He is oriented to person, place, and time  No distress  HENT:   Mouth/Throat: No oropharyngeal exudate  Eyes: No scleral icterus  Neck: No JVD present  Cardiovascular: Normal rate and regular rhythm  Murmur heard  Pulmonary/Chest: Effort normal and breath sounds normal  No respiratory distress  He has no wheezes  He has no rales  Abdominal: Soft  Bowel sounds are normal  He exhibits no distension  There is no tenderness  There is no rebound  Musculoskeletal: He exhibits no edema  Neurological: He is alert and oriented to person, place, and time  Skin: Skin is warm and dry  He is not diaphoretic  Psychiatric: He has a normal mood and affect   His behavior is normal        [unfilled]    Invasive Devices     Peripheral Intravenous Line            Peripheral IV 09/04/17 Right Antecubital 217 days                Lab Results Component Value Date     11/03/2017     11/03/2017    CO2 25 11/03/2017    ANIONGAP 8 11/03/2017    BUN 15 11/03/2017    CREATININE 0 79 11/03/2017    EGFR 85 11/03/2017    GLUCOSE 100 09/05/2017    CALCIUM 9 1 11/03/2017    AST 85 (H) 10/19/2017    ALT 97 (H) 10/19/2017    ALKPHOS 67 10/19/2017    PROT 7 7 10/19/2017    BILITOT 0 99 10/19/2017     Lab Results   Component Value Date    WBC 7 05 10/19/2017    HGB 12 7 10/19/2017     10/19/2017     No components found for: TROP    Imaging:     EKG: Normal Sinus Rhythm  PVCs  Counseling / Coordination of Care  Total floor / unit time spent today 45 minutes  Greater than 50% of total time was spent with the patient and / or family counseling and / or coordination of care  A description of the counseling / coordination of care

## 2018-04-19 ENCOUNTER — APPOINTMENT (OUTPATIENT)
Dept: LAB | Facility: MEDICAL CENTER | Age: 78
End: 2018-04-19
Payer: MEDICARE

## 2018-04-19 DIAGNOSIS — I10 ESSENTIAL (PRIMARY) HYPERTENSION: ICD-10-CM

## 2018-04-19 LAB
ALBUMIN SERPL BCP-MCNC: 3.7 G/DL (ref 3.5–5)
ALP SERPL-CCNC: 70 U/L (ref 46–116)
ALT SERPL W P-5'-P-CCNC: 76 U/L (ref 12–78)
ANION GAP SERPL CALCULATED.3IONS-SCNC: 9 MMOL/L (ref 4–13)
AST SERPL W P-5'-P-CCNC: 89 U/L (ref 5–45)
BASOPHILS # BLD AUTO: 0.05 THOUSANDS/ΜL (ref 0–0.1)
BASOPHILS NFR BLD AUTO: 1 % (ref 0–1)
BILIRUB SERPL-MCNC: 0.93 MG/DL (ref 0.2–1)
BUN SERPL-MCNC: 18 MG/DL (ref 5–25)
CALCIUM SERPL-MCNC: 8.7 MG/DL (ref 8.3–10.1)
CHLORIDE SERPL-SCNC: 102 MMOL/L (ref 100–108)
CHOLEST SERPL-MCNC: 138 MG/DL (ref 50–200)
CO2 SERPL-SCNC: 25 MMOL/L (ref 21–32)
CREAT SERPL-MCNC: 0.71 MG/DL (ref 0.6–1.3)
EOSINOPHIL # BLD AUTO: 0.06 THOUSAND/ΜL (ref 0–0.61)
EOSINOPHIL NFR BLD AUTO: 1 % (ref 0–6)
ERYTHROCYTE [DISTWIDTH] IN BLOOD BY AUTOMATED COUNT: 16.4 % (ref 11.6–15.1)
GFR SERPL CREATININE-BSD FRML MDRD: 89 ML/MIN/1.73SQ M
GLUCOSE P FAST SERPL-MCNC: 110 MG/DL (ref 65–99)
HCT VFR BLD AUTO: 34.6 % (ref 36.5–49.3)
HDLC SERPL-MCNC: 40 MG/DL (ref 40–60)
HGB BLD-MCNC: 10.5 G/DL (ref 12–17)
LDLC SERPL CALC-MCNC: 69 MG/DL (ref 0–100)
LYMPHOCYTES # BLD AUTO: 2.3 THOUSANDS/ΜL (ref 0.6–4.47)
LYMPHOCYTES NFR BLD AUTO: 33 % (ref 14–44)
MCH RBC QN AUTO: 24.7 PG (ref 26.8–34.3)
MCHC RBC AUTO-ENTMCNC: 30.3 G/DL (ref 31.4–37.4)
MCV RBC AUTO: 81 FL (ref 82–98)
MONOCYTES # BLD AUTO: 1.04 THOUSAND/ΜL (ref 0.17–1.22)
MONOCYTES NFR BLD AUTO: 15 % (ref 4–12)
NEUTROPHILS # BLD AUTO: 3.46 THOUSANDS/ΜL (ref 1.85–7.62)
NEUTS SEG NFR BLD AUTO: 50 % (ref 43–75)
NONHDLC SERPL-MCNC: 98 MG/DL
NRBC BLD AUTO-RTO: 0 /100 WBCS
PLATELET # BLD AUTO: 241 THOUSANDS/UL (ref 149–390)
PMV BLD AUTO: 9.6 FL (ref 8.9–12.7)
POTASSIUM SERPL-SCNC: 3.5 MMOL/L (ref 3.5–5.3)
PROT SERPL-MCNC: 7.5 G/DL (ref 6.4–8.2)
RBC # BLD AUTO: 4.25 MILLION/UL (ref 3.88–5.62)
SODIUM SERPL-SCNC: 136 MMOL/L (ref 136–145)
TRIGL SERPL-MCNC: 143 MG/DL
TSH SERPL DL<=0.05 MIU/L-ACNC: 1.21 UIU/ML (ref 0.36–3.74)
WBC # BLD AUTO: 6.93 THOUSAND/UL (ref 4.31–10.16)

## 2018-04-19 PROCEDURE — 85025 COMPLETE CBC W/AUTO DIFF WBC: CPT

## 2018-04-19 PROCEDURE — 80053 COMPREHEN METABOLIC PANEL: CPT

## 2018-04-19 PROCEDURE — 36415 COLL VENOUS BLD VENIPUNCTURE: CPT

## 2018-04-19 PROCEDURE — 80061 LIPID PANEL: CPT

## 2018-04-19 PROCEDURE — 84443 ASSAY THYROID STIM HORMONE: CPT

## 2018-04-26 ENCOUNTER — OFFICE VISIT (OUTPATIENT)
Dept: FAMILY MEDICINE CLINIC | Facility: CLINIC | Age: 78
End: 2018-04-26
Payer: MEDICARE

## 2018-04-26 VITALS
WEIGHT: 199.2 LBS | HEIGHT: 63 IN | DIASTOLIC BLOOD PRESSURE: 62 MMHG | BODY MASS INDEX: 35.3 KG/M2 | SYSTOLIC BLOOD PRESSURE: 118 MMHG

## 2018-04-26 DIAGNOSIS — R29.6 MULTIPLE FALLS: ICD-10-CM

## 2018-04-26 DIAGNOSIS — F33.1 MODERATE EPISODE OF RECURRENT MAJOR DEPRESSIVE DISORDER (HCC): ICD-10-CM

## 2018-04-26 DIAGNOSIS — R01.1 MURMUR: Chronic | ICD-10-CM

## 2018-04-26 DIAGNOSIS — F41.1 GENERALIZED ANXIETY DISORDER: ICD-10-CM

## 2018-04-26 DIAGNOSIS — Z00.00 MEDICARE ANNUAL WELLNESS VISIT, SUBSEQUENT: Primary | ICD-10-CM

## 2018-04-26 DIAGNOSIS — D50.8 IRON DEFICIENCY ANEMIA SECONDARY TO INADEQUATE DIETARY IRON INTAKE: ICD-10-CM

## 2018-04-26 DIAGNOSIS — I49.3 PVC (PREMATURE VENTRICULAR CONTRACTION): ICD-10-CM

## 2018-04-26 DIAGNOSIS — I10 ESSENTIAL HYPERTENSION: ICD-10-CM

## 2018-04-26 PROCEDURE — 99214 OFFICE O/P EST MOD 30 MIN: CPT | Performed by: FAMILY MEDICINE

## 2018-04-26 PROCEDURE — G0439 PPPS, SUBSEQ VISIT: HCPCS | Performed by: FAMILY MEDICINE

## 2018-04-26 RX ORDER — FERROUS SULFATE 325(65) MG
325 TABLET ORAL
COMMUNITY
End: 2018-07-19 | Stop reason: SDUPTHER

## 2018-04-26 RX ORDER — ALPRAZOLAM 0.25 MG/1
0.25 TABLET ORAL 2 TIMES DAILY PRN
Qty: 30 TABLET | Refills: 0 | OUTPATIENT
Start: 2018-04-26 | End: 2018-06-13 | Stop reason: SDUPTHER

## 2018-04-26 NOTE — PATIENT INSTRUCTIONS

## 2018-04-26 NOTE — PROGRESS NOTES
Assessment/Plan:    Medicare annual wellness visit, subsequent  Patient is to eat more fruits and vegetables patient has had multiple falls and will go for PT patient has no other concerns at this time    Multiple falls  Patient will have PT       Diagnoses and all orders for this visit:    Medicare annual wellness visit, subsequent    Multiple falls  -     Ambulatory referral to Physical Therapy; Future          Subjective:   Chief Complaint   Patient presents with    Medicare Wellness Visit          Patient ID: Cyndi Bronson is a [de-identified] y o  male  HPI    The following portions of the patient's history were reviewed and updated as appropriate: allergies, current medications, past social history and problem list     Review of Systems      Objective:      /62   Ht 5' 3" (1 6 m)   Wt 90 4 kg (199 lb 3 2 oz)   BMI 35 29 kg/m²          Physical Exam      HPI:  Cyndi Bronson is a [de-identified] y o  male here for his Subsequent Wellness Visit      Patient Active Problem List   Diagnosis    Chronic GERD    Mixed hyperlipidemia    Essential hypertension    Murmur    Constipation    BPH (benign prostatic hyperplasia)    Enlarged prostate without lower urinary tract symptoms (luts)    Fatty liver disease, nonalcoholic    Generalized anxiety disorder    Moderate episode of recurrent major depressive disorder ([address] )    PVC (premature ventricular contraction)    Shortness of breath    Medicare annual wellness visit, subsequent    Multiple falls     Past Medical History:   Diagnosis Date    Actinic keratosis     LAST ASSESSED: [id number]    Allergic rhinitis     LAST ASSESSED: [id number]    Anxiety     LAST ASSESSED: [id number]    Anxiety disorder     LAST ASSESSED: [id number]    Atelectasis of right lung     ABNORMAL CT SCAN OF THE [address] 12/2/2016 REPEAT NEEDED PER RADIOLOGY IN 3 MONTHS LAST ASSESSED: [id number]    Atypical chest pain     LAST ASSESSED: [id number]    Benign paroxysmal vertigo LAST ASSESSED: [id number]    Chronic cough     LAST ASSESSED: 20STJ5120    Chronic GERD     Degenerative arthritis of knee, bilateral     LAST ASSESSED: 77UTM1360    Diverticulitis of colon     LAST ASSESSED: [id number]    Diverticulosis     LAST ASSESSED: [id number]    Foreign body, eye     LAST ASSESSED: [id number]    Functional gait abnormality     LAST ASSESSED: 49GII6042    Hyperlipidemia     Hypertension     Hyponatremia     LAST ASSESSED: [id number]    Leukocytosis     LAST ASSESSED: 71DRI4799    Murmur     Newly recognized murmur     LAST ASSESSED: 74SRL9120    Olecranon bursitis, unspecified elbow     Presence of indwelling urethral catheter     RESOLVED: [id number]    Transient cerebral ischemia     LAST ASSESSED: 80EMC1408    Urinary incontinence     LAST ASSESSED: 55XNB1269    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     LAST ASSESSED: 67FJT0843     Past Surgical History:   Procedure Laterality Date    ADENOIDECTOMY      APPENDECTOMY      COLONOSCOPY  10/2006    FIBEROPTIC    INGUINAL HERNIA REPAIR      JOINT REPLACEMENT      b/l TKR Sept 2015    SINUS SURGERY      TONSILLECTOMY       Family History   Problem Relation Age of Onset    No Known Problems Family      History   Smoking Status    Former Smoker    Quit date: 1960   Smokeless Tobacco    Never Used     History   Alcohol Use    Yes     Comment: occ, SOCIAL      History   Drug Use No     /62   Ht 5' 3" (1 6 m)   Wt 90 4 kg (199 lb 3 2 oz)   BMI 35 29 kg/m²       Current Outpatient Prescriptions   Medication Sig Dispense Refill    ALPRAZolam (XANAX) 0 25 mg tablet TAKE 1 TABLET BY MOUTH TWICE DAILY AS NEEDED FOR ANXIETY 30 tablet 0    amLODIPine-benazepril (LOTREL) 10-40 MG per capsule Take 1 capsule by mouth daily   aspirin 325 mg tablet Take 325 mg by mouth daily      hydrochlorothiazide (HYDRODIURIL) 12 5 mg tablet Take 25 mg by mouth 2 (two) times a day          mirtazapine (REMERON) 15 mg tablet Take 1 tablet (15 mg total) by mouth daily at bedtime 30 tablet 1    multivitamin (THERAGRAN) TABS Take 1 tablet by mouth      Omega-3 Fatty Acids (FISH OIL PO) Take by mouth 2 (two) times a day      pantoprazole (PROTONIX) 40 mg tablet Take 40 mg by mouth 2 (two) times a day   polyethylene glycol (MIRALAX) 17 g packet Take 17 g by mouth daily      Probiotic Product (PROBIOTIC DAILY PO) Take by mouth daily      VITAMIN D, ERGOCALCIFEROL, PO Take by mouth      VITAMIN E PO Take by mouth      finasteride (PROSCAR) 5 mg tablet Take 1 tablet by mouth daily for 30 days 30 tablet 0    tamsulosin (FLOMAX) 0 4 mg Take 1 capsule by mouth daily with dinner for 30 days 30 capsule 0     No current facility-administered medications for this visit        Allergies   Allergen Reactions    Ace Inhibitors Cough     Immunization History   Administered Date(s) Administered    Influenza Split High Dose Preservative Free IM 10/06/2014, 09/18/2015, 10/28/2016, 09/08/2017    Influenza TIV (IM) 08/31/2011, 09/25/2012    Pneumococcal Conjugate 13-Valent 04/21/2016    Pneumococcal Polysaccharide PPV23 12/09/2014    Tdap 01/23/2016    Zoster 05/23/2016       Patient Care Team:  Ritesh Esteves DO as PCP - General  Newell Caldron, MD Brain Riedel, PA-C      Medicare Screening Tests and Risk Assessments:  AWV Clinical     ISAR:   Previous hospitalizations?:  Yes   How many hospitalizations have you had in the last year?:  1-2   Additional Comments:  ER visit       Once in a Lifetime Medicare Screening:   EKG performed?:  Yes Results:  PVS   AAA screening performed? (if performed, please add date to Health Maintenance):  No       Medicare Screening Tests and Risk Assessment:   AAA Risk Assessment    None Indicated:  Yes    Osteoporosis Risk Assessment    :  Yes    HIV Risk Assessment    None indicated:  Yes        Drug and Alcohol Use:   Tobacco use    Cigarettes:  former smoker    Smokeless:  never used smokeless tobacco    Tobacco use duration    Tobacco Cessation Readiness    Alcohol use    Alcohol use:  occasional use    Alcohol Treatment Readiness   Illicit Drug Use    Drug use:  never    Drug type:  sedative use       Diet & Exercise:   Diet   How many servings a day of the following:   Fruits and Vegetables:  1-2 Meat:  1-2   Whole Grains:  0 Simple Carbs:  0   Dairy:  2 Soda:  0   Coffee:  1 Tea:  0   Exercise    Do you currently exercise?:  yes    Frequency:  daily    Minutes per day:  20    Type of exercise:  walking       Cognitive Impairment Screening:   Cognitive Impairment Screening    Do you have difficulty learning or retaining new information?:  No Do you have difficulty handling new tasks?:  No   Do you have difficulty with reasoning?:  No Do you have difficulty with spatial ability and orientation?:  No   Do you have difficulty with language?:  No Do you have difficulty with behavior?:  No       Functional Ability/Level of Safety:   Hearing    Hearing difficulties:  No Bilateral:  normal   Left:  normal Right:  normal   Hearing aid:  No    Hearing Impairment Assessment    Hearing status:  No impairment   Current Activities    Status:  unlimited ADL's, unlimited IADL's, unlimited social activities   Help needed with the folllowing:    Using the phone:  No Transportation:  No   Shopping:  No Preparing Meals:  No   Doing Housework:  No Doing Laundry:  No   Managing Medications:  No    ADL    Feeding:  Independant   Oral hygiene and Facial grooming:  Independant   Bathing:  Independant   Upper Body Dressing:  Independant   Lower Body Dressing:  Independant   Toileting:  Independant   Bed Mobility:  Independant   Fall Risk   Have you fallen in the last 12 months?:  Yes    How many times?:  2    Injury History   Polypharmacy:  Yes Antidepressant Use:  Yes   Sedative Use:  Yes Antihypertensive Use:  Yes   Previous Fall:  No Alcohol Use:  No   Deconditioning:  No Visual Impairment:  No   Cogitive Impairment:  No Mmobility Impairment:  No   Postural Hypotension:  No Urinary Incontinence:  No       Home Safety:   Are there hazards in your environment?:  No   If you fell, would you need help to get back up from the ground?:  Yes Do you have problems or concerns getting in/out of a bed, chair, tub, or toilet?:  No   Do you feel unsteady when walking?:  No Is your activity limited by pain?:  No   Do you have handrails and grab-bars in the home?:  Yes Are emergency numbers kept by the phone and regularly updated?:  Yes   Do you have working smoke alarms and fire extinguisher?:  Yes Do all household members know how to use them?:  Yes   Have you left the stove on unsupervised?:  No    Home Safety Risk Factors   Unfamilar with surroundings:  No Uneven floors:  No   Stairs or handrail saftey risk:  Yes Loose rugs:  No   Household clutter:  No Poor household lighting:  No   No grab bars in bathroom:  Yes        Advanced Directives:   Advanced Directives    Living Will:  Yes Durable POA for healthcare:   Yes   Advanced directive:  Yes    Patient's End of Life Decisions    Reviewed with patient:  Yes Provider agrees with end of life decisions:  Yes   End of life decisions comments:  See copy from 9/2017       Urinary Incontinence:   Do you have urinary incontinence?:  No Do you have incomplete emptying?:  No   Do you urinate frequently?:  No Do you have urinary urgency?:  No   Do you have urinary hesitancy?:  No Do you have dysuria (painful and/or difficult urination)?:  No   Do you have nocturia (waking up to urinate)?:  Yes Do you strain when urinating (have to push to urinate)?:  No   Do you have a weak stream when urinating?:  No Do you have intermittent streaming when urinating?:  No   Do you dribble urine after finishing?:  No        Glaucoma:            Provider Screening     Preventative Screening/Counseling:   Cardiovascular Screening/Counseling:   (Labs Q5 years, EKG optional one-time)   General:  Risks and Benefits Discussed, Screening Current           Diabetes Screening/Counseling:   (2 tests/year if Pre-Diabetes or 1 test/year if no Diabetes)   General:  Risks and Benefits Discussed, Screening Current           Colorectal Cancer Screening/Counseling:   (FOBT Q1 yr; Flex Sig Q4 yrs or Q10 yrs after Screening Colonoscopy; Screening Colonoscpy Q2 yrs High Risk or Q10 yrs Low Risk; Barium Enema Q2 yrs High Risk or Q4 yrs Low Risk)   General:  Risks and Benefits Discussed, Screening Not Indicated           Prostate Cancer Screening/Counseling:   (Annual)    General:  Risks and Benefits Discussed, Screening Not Indicated          Breast Cancer Screening/Counseling:   (Baseline Age 28 - 43; Annual Age 36+)         Cervical Cancer Screening/Counseling:   (Annual for High Risk or Childbearing Age with Abnormal Pap in Last 3 yrs; Every 2 all others)         Osteoporosis Screening/Counseling:   (Every 2 Yrs if at risk or more if medically necessary)   General:  Risks and Benefits Discussed, Screening Not Indicated           AAA Screening/Counseling:   (Once per Lifetime with risk factors)    General:  Risks and Benefits Discussed, Screening Not Indicated           Glaucoma Screening/Counseling:   (Annual)   General:  Risks and Benefits Discussed, Screening Current          HIV Screening/Counseling:   (Voluntary; Once annually for high risk OR 3 times for Pregnancy at diagnosis of IUP; 3rd trimester; and at Labor   General:  Risks and Benefits Discussed, Screening Not Indicated           Hepatitis C Screening:   Hepatitis C Counseling Provided:   Yes               Immunizations:   Influenza (annual):  Risks & Benefits Discussed, Influenza UTD This Year   Pneumococcal (Once in a Lifetime):  Risks & Benefits Discussed, Pneumococcal Due Today   Hepatitis B Series (low risk patients):  Prevention Counseling, Series Not Indicated   Zostavax (Medicare D Coverage, Pt >72 yo):  Risks & Benefits Discussed, Zostavax Vaccine UTD   TD (Non-Medicare Wellness  Visit required):  Risks & Benefits Discussed, Patient Declines   Tdap (Non-Medicare Wellness Visit required):  Risks & Benefits Discussed, Patient Declines       Other Preventative Couseling (Non-Medicare Wellness Visit Required):       Referrals (Non-Medicare Wellness Visit Required):       Medical Equipment/Suppliers:           No exam data present  Reviewed Updated [facility] Prior Wellness Visits:   Last Medicare wellness visit information was reviewed, patient interviewed , no change since last AWVyes  Last Medicare wellness visit information was reviewed, patient interviewed and updates made to the record today yes    Assessment and Plan:  1  Medicare annual wellness visit, subsequent     2   Multiple falls  Ambulatory referral to Physical Therapy       Health Maintenance Due   Topic Date Due    Fall Risk  01/18/2003    GLAUCOMA SCREENING 67+ YR  01/18/2005

## 2018-04-26 NOTE — PROGRESS NOTES
Assessment/Plan:    Medicare annual wellness visit, subsequent  Patient is to eat more fruits and vegetables patient has had multiple falls and will go for PT patient has no other concerns at this time    Multiple falls  Patient will have PT    Essential hypertension  Blood pressure is controlled continue [facility] care    PVC (premature ventricular contraction)  Patient to have holter monitor He has no symptoms of palpiatations of shortness of breath Suggest avoid alcohol and caffeine    Generalized anxiety disorder  Anxiety is controlled he has taken 20 pills of the xanax since 3/23/2018    Iron deficiency anemia secondary to inadequate dietary iron intake  Start daily iron Will do hemoccult test and will repeat CBC in 3 motnhs    Moderate episode of recurrent major depressive disorder ([facility] [address] )  Depression is improved Continue current care       Diagnoses and all orders for this visit:    Medicare annual wellness visit, subsequent    Multiple falls  -     Ambulatory referral to Physical Therapy; Future    Essential hypertension    PVC (premature ventricular contraction)    Moderate episode of recurrent major depressive disorder (HCC)    Murmur    Iron deficiency anemia secondary to inadequate dietary iron intake  -     CBC and differential; Future  -     Occult Bloood,Fecal Immunochemical; Future    Generalized anxiety disorder    Other orders  -     ferrous sulfate 325 (65 Fe) mg tablet; Take 325 mg by mouth daily with breakfast          Subjective:      Patient ID: Mona Bond is a [de-identified] y o  male      Patient is here for followup of his hypertension, PVC's, heart murmur, depression with anxiety and to reveiwe his labs Patient blood pressure is controlled jose last cardiology note was reveiwed Patient has ECHO scheduled due to heart murmur and will also have holter monitor done Patient depression and anxiety is much improved with medication He does not want to see geriatrician or any psychiatrist at this time Patient last hshowed hemoglobine of 10 5 otherwise labs were nromal He has chronci GERD he denies any rectal bleeding and he denies any dark stools Patient has no weakness or fatigue However he does note he had 2 falls in last 2 months Both where he "slipped in the grass" Patient has now felt that getting outside has helped his moods        The following portions of the patient's history were reviewed and updated as appropriate: allergies, current medications, past social history and problem list     Review of Systems   Constitutional: Negative for fatigue, fever and unexpected weight change  HENT: Negative for congestion, sinus pain and trouble swallowing  Eyes: Negative for discharge and visual disturbance  Respiratory: Negative for cough, chest tightness, shortness of breath and wheezing  Cardiovascular: Negative for chest pain, palpitations and leg swelling  Gastrointestinal: Negative for abdominal pain, blood in stool, constipation, diarrhea, nausea and vomiting  Genitourinary: Negative for difficulty urinating, dysuria, frequency and hematuria  Musculoskeletal: Negative for arthralgias, gait problem and joint swelling  Skin: Negative for rash and wound  Allergic/Immunologic: Negative for environmental allergies and food allergies  Neurological: Negative for dizziness, syncope, weakness, numbness and headaches  Hematological: Negative for adenopathy  Does not bruise/bleed easily  Psychiatric/Behavioral: Negative for confusion, decreased concentration and sleep disturbance  The patient is not nervous/anxious  Objective:      /62   Ht 5' 3" (1 6 m)   Wt 90 4 kg (199 lb 3 2 oz)   BMI 35 29 kg/m²          Physical Exam   Constitutional: He is oriented to person, place, and time  He appears well-developed and well-nourished  HENT:   Head: Normocephalic and atraumatic     Right Ear: Hearing, tympanic membrane and external ear normal    Left Ear: Hearing, tympanic membrane and external ear normal    Eyes: Conjunctivae and EOM are normal  Pupils are equal, round, and reactive to light  Neck: Neck supple  No thyromegaly present  Cardiovascular: Normal rate  Murmur heard  2/6 BRENDAN   Pulmonary/Chest: Effort normal and breath sounds normal  He has no wheezes  He has no rales  Abdominal: Soft  Bowel sounds are normal  He exhibits no distension  There is no tenderness  Musculoskeletal: He exhibits no edema or tenderness  Lymphadenopathy:     He has no cervical adenopathy  Neurological: He is alert and oriented to person, place, and time  No cranial nerve deficit  Coordination normal    Skin: Skin is warm and dry  No rash noted  Psychiatric: He has a normal mood and affect   His behavior is normal  Judgment and thought content normal

## 2018-04-26 NOTE — ASSESSMENT & PLAN NOTE
Patient is to eat more fruits and vegetables patient has had multiple falls and will go for PT patient has no other concerns at this time

## 2018-04-26 NOTE — ASSESSMENT & PLAN NOTE
Patient to have holter monitor He has no symptoms of palpiatations of shortness of breath Suggest avoid alcohol and caffeine

## 2018-05-10 ENCOUNTER — EVALUATION (OUTPATIENT)
Dept: PHYSICAL THERAPY | Facility: REHABILITATION | Age: 78
End: 2018-05-10
Payer: MEDICARE

## 2018-05-10 DIAGNOSIS — R29.6 MULTIPLE FALLS: Primary | ICD-10-CM

## 2018-05-10 PROCEDURE — 97162 PT EVAL MOD COMPLEX 30 MIN: CPT

## 2018-05-10 PROCEDURE — G8979 MOBILITY GOAL STATUS: HCPCS

## 2018-05-10 PROCEDURE — 97110 THERAPEUTIC EXERCISES: CPT

## 2018-05-10 PROCEDURE — G8978 MOBILITY CURRENT STATUS: HCPCS

## 2018-05-10 NOTE — PROGRESS NOTES
PT Initial Evalution     Date: 5/10/2018  Name: Abe Ramirez  : 1940  MRN: [id number]  DUNF:[phone] (home)   Mobile: [phone] (mobile)  Insurance Information: Payor: Lelia Ellis / Plan: MEDICARE A AND B / Product Type: Medicare A & B Fee for Service /   Referring Provider: Lacey Payer,     Subjective    HPI: Abe Ramirez is a [de-identified] y o  male referred to outpatient physical therapy for   1  Multiple falls      Patient reports he was referred to outpatient physical therapy secondary to 3 falls within the last year  The first was a result of catching toe on a sidewalk when stepping up onto a curb  The second and third falls were a result of slipping on a rock outside  Patient reports he had B knee replacements that were completed at the same time  He says for about two years he was doing well with keeping up with his home exercises and performing a frequent walking routine with his wife  However, his pain returned and therefore he stopped exercising  Patient says that he does not think his falls were secondary to poor balance and were more related to the knees  Home Environment: two steps to enter with railing, 1 flight to basement with B rails  DME: [facility], RW  Pain: R knee pain at rest 5/10  L knee pain at rest 3/10  R knee pain with activity 6-7/10  L knee pain with activity 5/10  Patient Goal: Patient wants to feel better  He would like to be able to garden, go on vacation, and walk more  Objective    LE ROM: WFL throughout B    Manual Muscle Testing - Hip Left Right   Flexion 4 4   Adduction 5 5   Abduction 5 5   External Rotation 5 - with pain 5- with pain      Manual Muscle Testing - Knee Left Right   Flexion 5 5 - with pain    Extension 5 5     Manual Muscle Testing - Ankle Left Right   Doriflexion 3+ 5   Plantarflexion NT NT       Gait Assessment: Ambulating with decreased gait speed, decreased toe clearance B R > L, decreased trunk rotation   Good arm swing    Outcome Measures    6 Minute Walk Test (ft): 1000 ft with no AD   10MWT 10 50 sec    5x Sit To Stand (s): 13 25 sec    TUG: NT     MCTSIB Sway Index   Firm, Eyes Open 0 99   Firm, Eyes Closed 1 24   Foam, Eyes Open 1 86   Foam, Eyes Closed Falls in 15 sec     Abbreviated ABC: 33 3% confidence     HEP Provided:  Supine SLR  SL SLR  Bridges  Sit <> stands  Seated HS curls    Assessment/Plan    Assessment:  Patient presents to outpatient physical therapy with a mild decrease in LE strength, decreased balance, decreased endurance, pain that limits function, and gait deviations including those listed in observation section  These impairments are limiting the patient's ability to work in his garden, walk with his wife for exercise, and go on vacation  The patient is at an increased falls risk indicated by the time to complete 5x sit <> stand and he has low confidence in his balance indicated by the score on the abbreviated ABC  Patient will benefit from skilled outpatient physical therapy to address the above impairments in order to improve patient independence and safety in home and the community       STG's:     - Patient will complete 30 sec of  EC on foam during the [facility] for improved static balance within 4 wk    - Patient is independent with initial home exercise program for improvements at home within 4 wk    - Patient ambulates for 10 consecutive min with appropriate vital sign response for improved endurance within 4 wk  LTG's:   - Patient improves distance ambulated on 6MWT by 10% within 8 wk for improved endurance   - Patient decreased time to complete 5x sit <> stand by 3 seconds for decreased risk of falls   - Patient improves gait speed to within 10% of age matched norms within 8 wk   - Patient reports an overall decreased in knee pain by at least 2 points on a 10 point scale within 8 wk for improved QOL    - Patient has at least a 10% increase in abbreviated ABC within 8 wk for improved confidence his his balance  Plan:  Patient will benefit from skilled outpatient physical therapy 2x/wk for 8wk  A table of sample exercises can be found below      Specialty Daily Treatment Diary     Manual         Knee medial glides                                            Exercise Diary  5/10       Sit <> stands 10 min       Ambulation 6 min       Bridges 10x       Supine SLR 10x B       SL SLR 10x B       Seated HS curl 10x B with green Tband       Biodex        Static Balance        Dynamic Balance

## 2018-05-14 ENCOUNTER — TELEPHONE (OUTPATIENT)
Dept: FAMILY MEDICINE CLINIC | Facility: CLINIC | Age: 78
End: 2018-05-14

## 2018-05-14 DIAGNOSIS — I10 ESSENTIAL HYPERTENSION: Primary | ICD-10-CM

## 2018-05-14 RX ORDER — AMLODIPINE BESYLATE AND BENAZEPRIL HYDROCHLORIDE 10; 40 MG/1; MG/1
1 CAPSULE ORAL DAILY
Qty: 90 CAPSULE | Refills: 1 | Status: SHIPPED | OUTPATIENT
Start: 2018-05-14 | End: 2018-05-16 | Stop reason: SDUPTHER

## 2018-05-14 NOTE — TELEPHONE ENCOUNTER
We took him off it when he was having his memory/depression issues Find out what dose he was on and since he is doing better I will restart it

## 2018-05-16 ENCOUNTER — OFFICE VISIT (OUTPATIENT)
Dept: PHYSICAL THERAPY | Facility: REHABILITATION | Age: 78
End: 2018-05-16
Payer: MEDICARE

## 2018-05-16 DIAGNOSIS — R29.6 MULTIPLE FALLS: Primary | ICD-10-CM

## 2018-05-16 DIAGNOSIS — I10 ESSENTIAL HYPERTENSION: ICD-10-CM

## 2018-05-16 DIAGNOSIS — E78.2 MIXED HYPERLIPIDEMIA: Primary | ICD-10-CM

## 2018-05-16 PROCEDURE — 97110 THERAPEUTIC EXERCISES: CPT | Performed by: PHYSICAL THERAPIST

## 2018-05-16 PROCEDURE — 97112 NEUROMUSCULAR REEDUCATION: CPT | Performed by: PHYSICAL THERAPIST

## 2018-05-16 RX ORDER — PRAVASTATIN SODIUM 40 MG
40 TABLET ORAL DAILY
Qty: 90 TABLET | Refills: 0 | Status: SHIPPED | OUTPATIENT
Start: 2018-05-16 | End: 2018-06-16 | Stop reason: SDUPTHER

## 2018-05-16 RX ORDER — AMLODIPINE BESYLATE AND BENAZEPRIL HYDROCHLORIDE 10; 40 MG/1; MG/1
1 CAPSULE ORAL DAILY
Qty: 90 CAPSULE | Refills: 0 | Status: SHIPPED | OUTPATIENT
Start: 2018-05-16 | End: 2018-05-18 | Stop reason: SINTOL

## 2018-05-16 NOTE — PROGRESS NOTES
PROGRESS NOTE     Date: 18  Name: Mona Bond  : 1940  MRN: [id number]  CNYY:[phone] (home)   Mobile: [phone] (mobile)  Insurance Information: Payor: Kylee Fuentes / Plan: MEDICARE A AND B / Product Type: Medicare A & B Fee for Service /   Referring Provider: Michelle Esposito, DO    HPI: Mona Bond is a [de-identified] y o  male referred to outpatient physical therapy for   1  Multiple falls    SUBJECTIVE  Patient has treadmill and bike at home, but reports over the past couple months he hasn't used them because of knee pain  Patient does walking outside around the block when the weather is better, started again a week ago  OBJECTIVE    Specialty Daily Treatment Diary     Manual   18      Knee medial glides                                            Exercise Diary  5/10 5/16/18      Recumbent bike  Level 1-2, 6 min      Sit <> stands 10 min 10 x 2 sets       heel raise  15 each x 2 sets      Step ups  6" step, frequent use of railing, 10 each      Ambulation 6 min --      Biodex        Static balance   Modified SLS tap objects 40 sec x 2 each    Semi-tandem EC, 30 sec x 2    FTEC head movements 1 min    Passing soccer ball sole of foot, 3 min    FA foam head movements, 1 min  FA foam EC, 1 min        Dynamic balance  High step walking 1 min      LE strengthening        Bridges 10x Single leg       Supine SLR 10x B 15 B      SL SLR 10x B 15 B      Seated HS curl 10x B with green Tband 15 standing open chain each      stretch  Standing calf stretch 1 min B                                                                                                                ASSESSMENT AND PLAN: Good return demonstration of initial home exercises, progress as tolerated

## 2018-05-17 ENCOUNTER — HOSPITAL ENCOUNTER (OUTPATIENT)
Dept: NON INVASIVE DIAGNOSTICS | Facility: HOSPITAL | Age: 78
Discharge: HOME | End: 2018-05-17
Attending: INTERNAL MEDICINE
Payer: MEDICARE

## 2018-05-17 ENCOUNTER — TELEPHONE (OUTPATIENT)
Dept: FAMILY MEDICINE CLINIC | Facility: CLINIC | Age: 78
End: 2018-05-17

## 2018-05-17 DIAGNOSIS — I49.3 PVC (PREMATURE VENTRICULAR CONTRACTION): ICD-10-CM

## 2018-05-17 DIAGNOSIS — R01.1 MURMUR: ICD-10-CM

## 2018-05-17 PROCEDURE — 93226 XTRNL ECG REC<48 HR SCAN A/R: CPT

## 2018-05-17 PROCEDURE — 93225 XTRNL ECG REC<48 HRS REC: CPT

## 2018-05-17 PROCEDURE — 93306 TTE W/DOPPLER COMPLETE: CPT

## 2018-05-17 PROCEDURE — 93306 TTE W/DOPPLER COMPLETE: CPT | Performed by: INTERNAL MEDICINE

## 2018-05-18 ENCOUNTER — TELEPHONE (OUTPATIENT)
Dept: FAMILY MEDICINE CLINIC | Facility: CLINIC | Age: 78
End: 2018-05-18

## 2018-05-18 DIAGNOSIS — I10 ESSENTIAL HYPERTENSION: Primary | ICD-10-CM

## 2018-05-18 RX ORDER — AMLODIPINE BESYLATE 10 MG/1
10 TABLET ORAL DAILY
Qty: 90 TABLET | Refills: 1 | Status: SHIPPED | OUTPATIENT
Start: 2018-05-18 | End: 2018-11-12 | Stop reason: SDUPTHER

## 2018-05-18 NOTE — TELEPHONE ENCOUNTER
Something is wrong here Patient had the cough so was off the benzapril portion of the the lotrel and on amlodipine 10mg  (norvasc ) with the HCTZ   I think the amlodipine/benzarpil  (lotrel ) came through by mistake He should be on amlodipine 10 mg with HCTZ

## 2018-05-22 ENCOUNTER — OFFICE VISIT (OUTPATIENT)
Dept: PHYSICAL THERAPY | Facility: REHABILITATION | Age: 78
End: 2018-05-22
Payer: MEDICARE

## 2018-05-22 DIAGNOSIS — R29.6 MULTIPLE FALLS: Primary | ICD-10-CM

## 2018-05-22 PROCEDURE — 97112 NEUROMUSCULAR REEDUCATION: CPT

## 2018-05-22 PROCEDURE — 97110 THERAPEUTIC EXERCISES: CPT

## 2018-05-22 PROCEDURE — 97140 MANUAL THERAPY 1/> REGIONS: CPT

## 2018-05-22 NOTE — PROGRESS NOTES
PROGRESS NOTE     Date: 18  Name: Kelton John  : 1940  MRN: [id number]  HRZX:[phone] (home)   Mobile: [phone] (mobile)  Insurance Information: Payor: Lloyd All / Plan: MEDICARE A AND B / Product Type: Medicare A & B Fee for Service /   Referring Provider: Serenity Manuel DO    HPI: Kelton John is a [de-identified] y o  male referred to outpatient physical therapy for   1  Multiple falls      SUBJECTIVE  Patient reports he had a fall a couple of days ago  He slipped on his stairs and scraped his elbow  OBJECTIVE    Specialty Daily Treatment Diary     Manual   18     Knee medial glides   10 min total                                         Exercise Diary  5/10 5/16/18 5/22/18     Recumbent bike  Level 1-2, 6 min Level 1, 7 min     Sit <> stands 10x  10 x 2 sets      heel raise  15 each x 2 sets      Step ups  6" step, frequent use of railing, 10 each      Ambulation 6 min --      Biodex   Lateral weight shifts locked 1 min no UE support    Lateral weight shifts unlocked 1 min unilateral UE support    LOS locked 17%    LOS unlocked 30%       Static balance   Modified SLS tap objects 40 sec x 2 each    Semi-tandem EC, 30 sec x 2    FTEC head movements 1 min    Passing soccer ball sole of foot, 3 min    FA foam head movements, 1 min  FA foam EC, 1 min   FTEO: 30 sec    FTEC: 30 sec x 2    Semi tandem: 1 min B     Dynamic balance  High step walking 1 min Side-stepping 2 min    Small hurdles 2 min    Backward walking 2 min     LE strengthening        Bridges 10x Single leg       Supine SLR 10x B 15 B      SL SLR 10x B 15 B      Seated HS curl 10x B with green Tband 15 standing open chain each      stretch  Standing calf stretch 1 min B Supine HS stretch 1 min B                                                                                                               ASSESSMENT AND PLAN: Patient with good participation this session   He reported no fatigue at end of session  Reported no pain in knees at end of session  Continue with plan of care

## 2018-05-24 ENCOUNTER — OFFICE VISIT (OUTPATIENT)
Dept: PHYSICAL THERAPY | Facility: REHABILITATION | Age: 78
End: 2018-05-24
Payer: MEDICARE

## 2018-05-24 DIAGNOSIS — R29.6 MULTIPLE FALLS: Primary | ICD-10-CM

## 2018-05-24 PROCEDURE — 97110 THERAPEUTIC EXERCISES: CPT

## 2018-05-24 PROCEDURE — 97112 NEUROMUSCULAR REEDUCATION: CPT

## 2018-05-24 NOTE — PROGRESS NOTES
Daily Note    Date: 18  Name: Indra Jackson  : 1940  MRN: [id number]  UZF:[phone] (home)   Mobile: [phone] (mobile)  Insurance Information: Payor: Ray Matthew / Plan: MEDICARE A AND B / Product Type: Medicare A & B Fee for Service /   Referring Provider: Candace Hardy DO    HPI: Indra Jackson is a [de-identified] y o  male referred to outpatient physical therapy for   1  Multiple falls      SUBJECTIVE  Patient reports no new falls since last visit  OBJECTIVE    Specialty Daily Treatment Diary     Manual   18    Knee medial glides   10 min total No co pain                                        Exercise Diary  5/10 5/16/18 5/22/18 5/24/18    Recumbent bike  Level 1-2, 6 min Level 1, 7 min Level 1, 7 min    Sit <> stands 10x  10 x 2 sets  10 x 2 sets    heel raise  15 each x 2 sets  15 ea x 2 sets    Step ups  6" step, frequent use of railing, 10 each  6" step, use of railing 15 ea    Ambulation 6 min --      Biodex   Lateral weight shifts locked 1 min no UE support    Lateral weight shifts unlocked 1 min unilateral UE support    LOS locked 17%    LOS unlocked 30%   Late weight shifts locked   Unlocked 0-1 UE support    Lateral weight shifts unlocked 1 min unilat UE support    Static balance   Modified SLS tap objects 40 sec x 2 each    Semi-tandem EC, 30 sec x 2    FTEC head movements 1 min    Passing soccer ball sole of foot, 3 min    FA foam head movements, 1 min  FA foam EC, 1 min   FTEO: 30 sec    FTEC: 30 sec x 2    Semi tandem: 1 min B FTEO 30 sec x 2    FTEC 30 sec x 2    Semi tasdem 1 min B    Dynamic balance  High step walking 1 min Side-stepping 2 min    Small hurdles 2 min    Backward walking 2 min Side- stepping 2 min    Small hurdles 2 min forwards  sideways    LE strengthening        Bridges 10x Single leg       Supine SLR 10x B 15 B      SL SLR 10x B 15 B      Seated HS curl 10x B with green Tband 15 standing open chain each      stretch  Standing calf stretch 1 min B Supine HS stretch 1 min B                                                                                                               ASSESSMENT AND PLAN: Patient with good participation this session  He reported no fatigue at end of session  Reported no pain in knees at end of session  Continue with plan of care

## 2018-05-24 NOTE — PROGRESS NOTES
Daily Note     Date: 18  Name: Ronny Richardson  : 1940  MRN: [id number]  UFIR:[phone] (home)   Mobile: [phone] (mobile)  Insurance Information: Payor: Fariba BanerjeeBosworth / Plan: MEDICARE A AND B / Product Type: Medicare A & B Fee for Service /   Referring Provider: Janiya Lake DO    HPI: Ronny Richardson is a [de-identified] y o  male referred to outpatient physical therapy for   1  Multiple falls      SUBJECTIVE  Patient reports he had a fall a couple of days ago  He slipped on his stairs and scraped his elbow  OBJECTIVE    Specialty Daily Treatment Diary     Manual   18    Knee medial glides   10 min total KS NT                                        Exercise Diary  5/10 5/16/18 5/22/18 5/24/18    Recumbent bike  Level 1-2, 6 min Level 1, 7 min Level 1 7 min    Sit <> stands 10x  10 x 2 sets  10 x 2    heel raise  15 each x 2 sets      Step ups  6" step, frequent use of railing, 10 each      Ambulation 6 min --      Biodex   Lateral weight shifts locked 1 min no UE support    Lateral weight shifts unlocked 1 min unilateral UE support    LOS locked 17%    LOS unlocked 30%   Lat wght shifts, locked 1 non no  UE support x 2    Lat   Weigh shift unlocked 1 min unilat UE support x 2    LOS  Unlocked 30%    Static balance   Modified SLS tap objects 40 sec x 2 each    Semi-tandem EC, 30 sec x 2    FTEC head movements 1 min    Passing soccer ball sole of foot, 3 min    FA foam head movements, 1 min  FA foam EC, 1 min   FTEO: 30 sec    FTEC: 30 sec x 2    Semi tandem: 1 min B FTEO 30 sec  FTEC 30 sec    FT Seme tamdem 1 min B    FA foam head movements EO/EC 30 sec    Dynamic balance  High step walking 1 min Side-stepping 2 min    Small hurdles 2 min    Backward walking 2 min Side step 2 min    Small hurdles grw/ sideways    Backwardwalking 2 min    LE strengthening        Bridges 10x Single leg       Supine SLR 10x B 15 B      SL SLR 10x B 15 B      Seated HS curl 10x B with green Tband 15 standing open chain each      stretch  Standing calf stretch 1 min B Supine HS stretch 1 min B     5x sit to stans    16 24 seconds    hamstrinh stretches    Green strap 30 " x 3 ea                                                                                              ASSESSMENT AND PLAN: patient is very motivated  Requires cues as for taking his time to perform exercises as well as proper   Will continue to benefit from skilled PT intervention    Will continue with plan of care

## 2018-05-25 DIAGNOSIS — F33.1 MODERATE EPISODE OF RECURRENT MAJOR DEPRESSIVE DISORDER (HCC): ICD-10-CM

## 2018-05-25 RX ORDER — MIRTAZAPINE 15 MG/1
15 TABLET, FILM COATED ORAL
Qty: 90 TABLET | Refills: 1 | Status: SHIPPED | OUTPATIENT
Start: 2018-05-25 | End: 2018-08-20 | Stop reason: SDUPTHER

## 2018-05-29 ENCOUNTER — OFFICE VISIT (OUTPATIENT)
Dept: PHYSICAL THERAPY | Facility: REHABILITATION | Age: 78
End: 2018-05-29
Payer: MEDICARE

## 2018-05-29 DIAGNOSIS — R29.6 MULTIPLE FALLS: Primary | ICD-10-CM

## 2018-05-29 PROCEDURE — 97112 NEUROMUSCULAR REEDUCATION: CPT

## 2018-05-29 PROCEDURE — 97110 THERAPEUTIC EXERCISES: CPT

## 2018-05-29 NOTE — PROGRESS NOTES
Daily Note     Date: 18  Name: Erwin Madden  : 1940  MRN: [id number]  KNYP:[phone] (home)   Mobile: [phone] (mobile)  Insurance Information: Payor: Brandan Sheth / Plan: MEDICARE A AND B / Product Type: Medicare A & B Fee for Service /   Referring Provider: Daniela Quesada DO    HPI: Erwin Madden is a [address] y o  male referred to outpatient physical therapy for   1  Multiple falls      SUBJECTIVE  No falls since last seen  Minimal pain in knees today  OBJECTIVE    Specialty Daily Treatment Diary     Manual  18   Knee medial glides  10 min total KS NT NT                                   Exercise Diary  18   Recumbent bike Level 1-2, 6 min Level 1, 7 min Level 1 7 min Level 1, 8 min   Sit <> stands 10 x 2 sets  10 x 2 10x2   Standing hip ext    10x2 orange   Standing hip abd    10x2 orange   heel raise 15 each x 2 sets   15x 2    Step ups 6" step, frequent use of railing, 10 each   10x B 6" step    Then step downs 10x B   Ambulation --      Biodex  Lateral weight shifts locked 1 min no UE support    Lateral weight shifts unlocked 1 min unilateral UE support    LOS locked 17%    LOS unlocked 30%   Lat wght shifts, locked 1 non no  UE support x 2    Lat   Weigh shift unlocked 1 min unilat UE support x 2    LOS  Unlocked 30%    Static balance  Modified SLS tap objects 40 sec x 2 each    Semi-tandem EC, 30 sec x 2    FTEC head movements 1 min    Passing soccer ball sole of foot, 3 min    FA foam head movements, 1 min  FA foam EC, 1 min   FTEO: 30 sec    FTEC: 30 sec x 2    Semi tandem: 1 min B FTEO 30 sec  FTEC 30 sec    FT Seme tamdem 1 min B    FA foam head movements EO/EC 30 sec    Dynamic balance High step walking 1 min Side-stepping 2 min    Small hurdles 2 min    Backward walking 2 min Side step 2 min    Small hurdles grw/ sideways    Backwardwalking 2 min Side-stepping 2 min    Forward with narrow CHRISTINE 2 min    Backward walking 2 min    Small hurdles 2 min     Bridges Single leg       Supine SLR 15 B      SL SLR 15 B      Seated HS curl 15 standing open chain each      stretching Standing calf stretch 1 min B Supine HS stretch 1 min B  Supine HS stretch 1 min B    Hip ER stretch 1 min B    SKTC 1 min B    Gastroc stretch 1 min B   5x sit to stans   16 24 seconds    hamstring stretches   Green strap 30 " x 3 ea                                                                                   ASSESSMENT AND PLAN: Patient had a great session today  He was unable to perform tandem walking secondary to balance deficits, therefore, he completed ambulation with a narrow CHRISTINE instead  No rest breaks required, however, patient was fatigued at end of session  Continue with plan of care

## 2018-05-31 ENCOUNTER — OFFICE VISIT (OUTPATIENT)
Dept: PHYSICAL THERAPY | Facility: REHABILITATION | Age: 78
End: 2018-05-31
Payer: MEDICARE

## 2018-05-31 DIAGNOSIS — R29.6 MULTIPLE FALLS: Primary | ICD-10-CM

## 2018-05-31 PROCEDURE — 97112 NEUROMUSCULAR REEDUCATION: CPT

## 2018-05-31 PROCEDURE — 97140 MANUAL THERAPY 1/> REGIONS: CPT

## 2018-05-31 PROCEDURE — 97110 THERAPEUTIC EXERCISES: CPT

## 2018-05-31 NOTE — PROGRESS NOTES
Daily Note     Date: 18  Name: Balaji Fan  : 1940  MRN: [id number]  ZX:[phone] (home)   Mobile: [phone] (mobile)  Insurance Information: Payor: Rosaline Coronado / Plan: MEDICARE A AND B / Product Type: Medicare A & B Fee for Service /   Referring Provider: Patrica Harvey DO    HPI: Balaji Fan is a [de-identified] y o  male referred to outpatient physical therapy for   1  Multiple falls      SUBJECTIVE  No falls since last seen  Minimal pain in knees today  OBJECTIVE    Specialty Daily Treatment Diary     Manual  18   Knee medial glides 10 min total KS NT NT                                    Exercise Diary  18   Recumbent bike Level 1, 7 min Level 1 7 min Level 1, 8 min Lv 1 4 min   Sit <> stands  10 x 2 10x2 10x feet on foam, 10x feet on gray dynadisc   Standing hip ext   10x2 orange    Standing hip abd   10x2 orange    heel raise   15x 2     Step ups   10x B 6" step    Then step downs 10x B    Ambulation    4 min    Biodex Lateral weight shifts locked 1 min no UE support    Lateral weight shifts unlocked 1 min unilateral UE support    LOS locked 17%    LOS unlocked 30%   Lat wght shifts, locked 1 non no  UE support x 2    Lat   Weigh shift unlocked 1 min unilat UE support x 2    LOS  Unlocked 30%     Static balance  FTEO: 30 sec    FTEC: 30 sec x 2    Semi tandem: 1 min B FTEO 30 sec  FTEC 30 sec    FT Seme tamdem 1 min B    FA foam head movements EO/EC 30 sec     Dynamic balance Side-stepping 2 min    Small hurdles 2 min    Backward walking 2 min Side step 2 min    Small hurdles grw/ sideways    Backwardwalking 2 min Side-stepping 2 min    Forward with narrow CHRISTINE 2 min    Backward walking 2 min    Small hurdles 2 min Side-stepping 2 min    Ambulation with intermittent 180 degree turns 2 min    High knee walking 2 min    Walking over objects hidden under mat 2 min     Bridges    15x2   HL clamshells    20x blue TB   Supine SLR    10x2 with resisted ext green TB   SL SLR       Seated HS curl       stretching Supine HS stretch 1 min B  Supine HS stretch 1 min B    Hip ER stretch 1 min B    SKTC 1 min B    Gastroc stretch 1 min B    5x sit to stands  16 24 seconds     hamstring stretches  Green strap 30 " x 3 ea                                                                                    ASSESSMENT AND PLAN: Patient with good endurance this session  Patient is to be seen for 2 more weeks

## 2018-06-05 ENCOUNTER — OFFICE VISIT (OUTPATIENT)
Dept: PHYSICAL THERAPY | Facility: REHABILITATION | Age: 78
End: 2018-06-05
Payer: MEDICARE

## 2018-06-05 DIAGNOSIS — R29.6 MULTIPLE FALLS: Primary | ICD-10-CM

## 2018-06-05 PROCEDURE — 97112 NEUROMUSCULAR REEDUCATION: CPT | Performed by: PHYSICAL THERAPIST

## 2018-06-05 PROCEDURE — 97110 THERAPEUTIC EXERCISES: CPT | Performed by: PHYSICAL THERAPIST

## 2018-06-05 NOTE — PROGRESS NOTES
Daily Note     Date: 18  Name: Melodie Partida  : 1940  MRN: [id number]  [phone] (home)   Mobile: [phone] (mobile)  Insurance Information: Payor: Gustavo Liner / Plan: MEDICARE A AND B / Product Type: Medicare A & B Fee for Service /   Referring Provider: Joce Mixon DO    HPI: Melodie Partida is a [de-identified] y o  male referred to outpatient physical therapy for   1  Multiple falls      SUBJECTIVE  Patient reports frequent pain about the knee caps  Denies pain with stairs, but reports pain when bending in the yard  OBJECTIVE    Specialty Daily Treatment Diary     Manual  18   Knee medial glides KS NT NT                                     Exercise Diary  18   Recumbent bike Level 1 7 min Level 1, 8 min Lv 1 4 min Level 1, 6 5 min   Sit <> stands 10 x 2 10x2 10x feet on foam, 10x feet on gray dynadisc 10 x 2 sets on foam   Standing hip ext  10x2 orange     Standing hip abd  10x2 orange  10 each orange   heel raise  15x 2   15 each   Step ups  10x B 6" step    Then step downs 10x B  Lateral step down 4" step 10 each   Ambulation   4 min     Biodex Lat wght shifts, locked 1 non no  UE support x 2    Lat   Weigh shift unlocked 1 min unilat UE support x 2    LOS  Unlocked 30%      Static balance  FTEO 30 sec  FTEC 30 sec    FT Seme tamdem 1 min B    FA foam head movements EO/EC 30 sec   On foam:   Eyes closed 1 min    Head movements FT 1 min x 2   Dynamic balance Side step 2 min    Small hurdles grw/ sideways    Backwardwalking 2 min Side-stepping 2 min    Forward with narrow CHRISTINE 2 min    Backward walking 2 min    Small hurdles 2 min Side-stepping 2 min    Ambulation with intermittent 180 degree turns 2 min    High knee walking 2 min    Walking over objects hidden under mat 2 min   Ambulation with intermittent 180 degree turns 2 min    High knee walking 2 min       Bridges   15x2 15   10 each   HL clamshells   20x blue TB    Supine SLR   10x2 with resisted ext green TB 3 lbs, 12 each   SL SLR    3 lbs, 12 each   Seated HS curl       stretching  Supine HS stretch 1 min B    Hip ER stretch 1 min B    SKTC 1 min B    Gastroc stretch 1 min B     5x sit to stands 16 24 seconds      hamstring stretches Green strap 30 " x 3 ea   Seated hamstring stretch, 30 sec                           ASSESSMENT AND PLAN: Good endurance this session, limited and short rest breaks  Continue per plan of care

## 2018-06-07 ENCOUNTER — OFFICE VISIT (OUTPATIENT)
Dept: PHYSICAL THERAPY | Facility: REHABILITATION | Age: 78
End: 2018-06-07
Payer: MEDICARE

## 2018-06-07 DIAGNOSIS — R29.6 MULTIPLE FALLS: Primary | ICD-10-CM

## 2018-06-07 PROCEDURE — 97110 THERAPEUTIC EXERCISES: CPT | Performed by: PHYSICAL THERAPIST

## 2018-06-07 PROCEDURE — 97112 NEUROMUSCULAR REEDUCATION: CPT | Performed by: PHYSICAL THERAPIST

## 2018-06-07 PROCEDURE — G8979 MOBILITY GOAL STATUS: HCPCS | Performed by: PHYSICAL THERAPIST

## 2018-06-07 PROCEDURE — G8978 MOBILITY CURRENT STATUS: HCPCS | Performed by: PHYSICAL THERAPIST

## 2018-06-07 NOTE — PROGRESS NOTES
Re-evaluation / Daily Note     Date: 18  Name: Fanny Iverson  : 1940  MRN: [id number]  YRRI:[phone] (home)   Mobile: [phone] (mobile)  Insurance Information: Payor: Emile Alejandro / Plan: MEDICARE A AND B / Product Type: Medicare A & B Fee for Service /   Referring Provider: Livia Carpenter DO    HPI: Fanny Iverson is a [de-identified] y o  male referred to outpatient physical therapy for   1  Multiple falls      SUBJECTIVE  Patient is now walking two times per day  He notes difficulty sustaining kneeling for more than a couple minutes        OBJECTIVE    Outcome Measures    6 Minute Walk Test (ft):  1140 feet (initial evaluation 1000 ft)   10MWT  10 5 sec    5x Sit To Stand (s): 9 0 secc    TUG: NT     Dynamic Gait Index  3/3 Gait level surface  3/3 Change in gait speed  3/3 Gait with horizontal head turns  2/3 Gait with vertical head turns  2/3 Gait and pivot turn  1/3 Step over obstacle  2/3 Step around obstacles  2/3 Steps  18/24 Total score (less than 20/24 indicates increased risk of falls in elderly; at least 22/24 indicates safe ambulators)    Activities-specific Balance Confidence Scale: 53%     Specialty Daily Treatment Diary     Exercise Diary  18   Recumbent bike Lv 1 4 min Level 1, 6 5 min    Sit <> stands 10x feet on foam, 10x feet on gray dynadisc 10 x 2 sets on foam 10 x   12 x foam   Standing hip ext      Standing hip abd  10 each orange 15 each orange band   heel raise  15 each 20 each   Step ups  Lateral step down 4" step 10 each 15 each close supervision no railing   Ambulation 4 min      Biodex      Static balance   On foam:   Eyes closed 1 min    Head movements FT 1 min x 2    Dynamic balance Side-stepping 2 min    Ambulation with intermittent 180 degree turns 2 min    High knee walking 2 min    Walking over objects hidden under mat 2 min   Ambulation with intermittent 180 degree turns 2 min    High knee walking 2 min   Step over 4" obstacles, 2 min    Walking head turns 3 min     Bridges 15x2 15   10 each Increased one-sided weight bearing 12 each   HL clamshells 20x blue TB     Supine SLR 10x2 with resisted ext green TB 3 lbs, 12 each    SL SLR  3 lbs, 12 each    Seated HS curl      stretching      5x sit to stands      hamstring stretches  Seated hamstring stretch, 30 sec                         ASSESSMENT  Patient with improved endurance, functional lower extremity strength and balance  Continue physical therapy 2-4 weeks to maximize mobility and minimize risk of falling  STG's:     - Patient will complete 30 sec of  EC on foam during the MCTSIB for improved static balance within 4 wk    - Patient is independent with initial home exercise program for improvements at home within 4 wk  Met     - Patient ambulates for 10 consecutive min with appropriate vital sign response for improved endurance within 4 wk  Met per activity report, vitals not assessed  - Patient improves Dynamic Gait Index to at least 20/24 in 3 weeks  LTG's:   - Patient improves distance ambulated on 6MWT by 10% within 8 wk for improved endurance  Met    - Patient decreased time to complete 5x sit <> stand by 3 seconds for decreased risk of falls  Met    - Patient improves gait speed to within 10% of age matched norms within 8 wk  Not met  - Patient reports an overall decreased in knee pain by at least 2 points on a 10 point scale within 8 wk for improved QOL  Not assessed  - Patient has at least a 10% increase in abbreviated ABC within 8 wk for improved confidence his his balance  Met  Plan:  Therapeutic exercise, neuromuscular re-education will benefit from skilled outpatient physical therapy 2x/wk for 8wk  A table of sample exercises can be found below

## 2018-06-12 ENCOUNTER — OFFICE VISIT (OUTPATIENT)
Dept: PHYSICAL THERAPY | Facility: REHABILITATION | Age: 78
End: 2018-06-12
Payer: MEDICARE

## 2018-06-12 DIAGNOSIS — R29.6 MULTIPLE FALLS: Primary | ICD-10-CM

## 2018-06-12 PROCEDURE — 97110 THERAPEUTIC EXERCISES: CPT

## 2018-06-12 PROCEDURE — 97112 NEUROMUSCULAR REEDUCATION: CPT

## 2018-06-12 NOTE — PROGRESS NOTES
Daily Note     Date: 18  Name: Maritza Best  : 1940  MRN: [id number]  LGBX:[phone] (home)   Mobile: [phone] (mobile)  Insurance Information: Payor: Kishor Wu / Plan: MEDICARE A AND B / Product Type: Medicare A & B Fee for Service /   Referring Provider: Valeria Garcia DO    HPI: Maritza Best is a [de-identified] y o  male referred to outpatient physical therapy for   1  Multiple falls      SUBJECTIVE  Patient reports he is doing a lot better  He has had no LOB recently and is performing exercises at home       OBJECTIVE  From 18  Outcome Measures    6 Minute Walk Test (ft):  1140 feet (initial evaluation 1000 ft)   10MWT  10 5 sec    5x Sit To Stand (s): 9 0 secc    TUG: NT     From 18  Dynamic Gait Index  3/3 Gait level surface  3/3 Change in gait speed  3/3 Gait with horizontal head turns  2/3 Gait with vertical head turns  2/3 Gait and pivot turn  1/3 Step over obstacle  2/3 Step around obstacles  2/3 Steps  18/24 Total score (less than 20/24 indicates increased risk of falls in elderly; at least 22/24 indicates safe ambulators)    From 18  Activities-specific Balance Confidence Scale: 53%     Specialty Daily Treatment Diary     BP: 155/68    Exercise Diary  18   Recumbent bike Level 1, 6 5 min     Sit <> stands 10 x 2 sets on foam 10 x   12 x foam 12x seated on foam   Standing hip ext   15 each orange band   Standing hip abd 10 each orange 15 each orange band 15 each orange band   heel raise 15 each 20 each 20 each     Runner's stretch 20 sec x 2 B   Step ups Lateral step down 4" step 10 each 15 each close supervision no railing    Ambulation   TM 1 3 mph 5 min   BP: 160/73   Biodex      Static balance  On foam:   Eyes closed 1 min    Head movements FT 1 min x 2  FTEC: 30 sec  Semitandem: 30 sec B  A/P shifts EO: 1 min  A/P shifts EC: 30 sec    Dynamic balance Ambulation with intermittent 180 degree turns 2 min    High knee walking 2 min   Step over 4" obstacles, 2 min    Walking head turns 3 min Step over 4" obstacles, 2 min    Lateral step over 4" obstacles, 2 min    Backwards walking 2 min    Weaving around cones, bouncing physioball, 2 min     Bridges 15   10 each Increased one-sided weight bearing 12 each    HL clamshells      Supine SLR 3 lbs, 12 each     SL SLR 3 lbs, 12 each     Seated HS curl      stretching      5x sit to stands      hamstring stretches Seated hamstring stretch, 30 sec                          Assessment:  Patient had a great session today as he tolerated added exercises well and demonstrated good balance strategies during static and dynamic balance activities  He reported fatigue post treatment       Plan:  Review HEP and D/C next session

## 2018-06-12 NOTE — PROGRESS NOTES
PT D/C Summary / Daily Note     Date: 2018  Name: Ida Awan  : 1940  MRN: [id number]  ZW:[phone] (home)   Mobile: [phone] (mobile)  Insurance Information: Payor: Narciso Mclean / Plan: MEDICARE A AND B / Product Type: Medicare A & B Fee for Service /   Referring Provider: DO Tatianna Donnelly    HPI: Ida Awan is a [de-identified] y o  male referred to outpatient physical therapy for   No diagnosis found  Patient reports he was referred to outpatient physical therapy secondary to 3 falls within the last year  The first was a result of catching toe on a sidewalk when stepping up onto a curb  The second and third falls were a result of slipping on a rock outside  Patient reports he had B knee replacements that were completed at the same time  He says for about two years he was doing well with keeping up with his home exercises and performing a frequent walking routine with his wife  However, his pain returned and therefore he stopped exercising  Patient says that he does not think his falls were secondary to poor balance and were more related to the knees  Home Environment: two steps to enter with railing, 1 flight to basement with B rails  DME: SPC, RW  Pain: R knee pain at rest 5/10  L knee pain at rest 3/10  R knee pain with activity 6-7/10  L knee pain with activity 5/10  Patient Goal: Patient wants to feel better  He would like to be able to garden, go on vacation, and walk more  Objective    LE ROM: WFL throughout B    Manual Muscle Testing - Hip Left Right   Flexion 4 4   Adduction 5 5   Abduction 5 5   External Rotation 5 - with pain 5- with pain      Manual Muscle Testing - Knee Left Right   Flexion 5 5 - with pain    Extension 5 5     Manual Muscle Testing - Ankle Left Right   Doriflexion 3+ 5   Plantarflexion NT NT       Gait Assessment: Ambulating with decreased gait speed, decreased toe clearance B R > L, decreased trunk rotation   Good arm swing    Outcome Measures    6 Minute Walk Test (ft): 1000 ft with no AD   10MWT 10 50 sec    5x Sit To Stand (s): 13 25 sec    TUG: NT     MCTSIB Sway Index   Firm, Eyes Open 0 99   Firm, Eyes Closed 1 24   Foam, Eyes Open 1 86   Foam, Eyes Closed Falls in 15 sec     Abbreviated ABC: 33 3% confidence     Assessment/Plan    Assessment:  Patient presents to outpatient physical therapy with a mild decrease in LE strength, decreased balance, decreased endurance, pain that limits function, and gait deviations including those listed in observation section  These impairments are limiting the patient's ability to work in his garden, walk with his wife for exercise, and go on vacation  The patient is at an increased falls risk indicated by the time to complete 5x sit <> stand and he has low confidence in his balance indicated by the score on the abbreviated ABC  Patient will benefit from skilled outpatient physical therapy to address the above impairments in order to improve patient independence and safety in home and the community  STG's:     - Patient will complete 30 sec of  EC on foam during the [facility] for improved static balance within 4 wk    - Patient is independent with initial home exercise program for improvements at home within 4 wk    - Patient ambulates for 10 consecutive min with appropriate vital sign response for improved endurance within 4 wk  LTG's:   - Patient improves distance ambulated on 6MWT by 10% within 8 wk for improved endurance   - Patient decreased time to complete 5x sit <> stand by 3 seconds for decreased risk of falls   - Patient improves gait speed to within 10% of age matched norms within 8 wk   - Patient reports an overall decreased in knee pain by at least 2 points on a 10 point scale within 8 wk for improved QOL   - Patient has at least a 10% increase in abbreviated ABC within 8 wk for improved confidence his his balance  Plan:  Patient to be D/C from outpatient physical therapy at this time

## 2018-06-13 DIAGNOSIS — F41.1 GENERALIZED ANXIETY DISORDER: ICD-10-CM

## 2018-06-13 RX ORDER — ALPRAZOLAM 0.25 MG/1
0.25 TABLET ORAL 2 TIMES DAILY PRN
Qty: 30 TABLET | Refills: 0 | OUTPATIENT
Start: 2018-06-13 | End: 2018-08-20 | Stop reason: SDUPTHER

## 2018-06-14 ENCOUNTER — OFFICE VISIT (OUTPATIENT)
Dept: PHYSICAL THERAPY | Facility: REHABILITATION | Age: 78
End: 2018-06-14
Payer: MEDICARE

## 2018-06-14 DIAGNOSIS — R29.6 MULTIPLE FALLS: Primary | ICD-10-CM

## 2018-06-14 PROCEDURE — G8979 MOBILITY GOAL STATUS: HCPCS

## 2018-06-14 PROCEDURE — 97110 THERAPEUTIC EXERCISES: CPT

## 2018-06-14 PROCEDURE — 97112 NEUROMUSCULAR REEDUCATION: CPT

## 2018-06-14 PROCEDURE — G8980 MOBILITY D/C STATUS: HCPCS

## 2018-06-14 PROCEDURE — 97150 GROUP THERAPEUTIC PROCEDURES: CPT

## 2018-06-14 NOTE — PROGRESS NOTES
Discharge Summary / Daily Note     Date: 2018  Name: Alexandra Phillips  : 1940  MRN: [id number]  LEVA:[phone] (home)   Mobile: [phone] (mobile)  Insurance Information: Payor: Alex Saliva / Plan: MEDICARE A AND B / Product Type: Medicare A & B Fee for Service /   Referring Provider: DO Tatianna Jordan    HPI: Alexandra Phillips is a [de-identified] y o  male referred to outpatient physical therapy for   1  Multiple falls      Patient reports he was referred to outpatient physical therapy secondary to 3 falls within the last year  He reports since starting PT he has become more confident in his balance  He is now taking 2 shorts bouts of walking a day and has had no recent falls  During his walks, he carries his [facility] with him, however, has not been using it  He has been walking out to his pond to feed his fish with less difficulty  Home Environment: two steps to enter with railing, 1 flight to basement with B rails  DME: SPC, RW  Pain: B knee pain at rest 0/10  R knee pain with activity 6-7/10  Objective  Gait Assessment: Ambulating with decreased gait speed, decreased toe clearance B R > L, decreased trunk rotation   Good arm swing    Outcome Measures    6 Minute Walk Test (ft): 1100 ft with no AD (intial evaluation 1000ft)   10MWT 9 68 sec,  1 03 m/s (10 5 sec initial evaluation)   5x Sit To Stand (s): 9 03 sec    TUG: NT     MCTSIB Sway Index   Firm, Eyes Open 0 95   Firm, Eyes Closed 2 15   Foam, Eyes Open 1 64   Foam, Eyes Closed Falls in 15 sec     Abbreviated ABC: 53 3% confidence     Exercise Diary  18   Recumbent bike        Sit <> stands 10 x   12 x foam 12x seated on foam    Standing hip ext   15 each orange band 10x2 B   Standing hip flex   10x2 B   Standing HS curls   10x B   Standing hip abd 15 each orange band 15 each orange band 10x2 B   heel raise 20 each 20 each      Runner's stretch 20 sec x 2 B 20x   Step ups 15 each close supervision no railing      Ambulation   TM 1 3 mph 5 min   BP: 160/73 6MWT   Biodex        Static balance    FTEC: 30 sec  Semitandem: 30 sec B  A/P shifts EO: 1 min  A/P shifts EC: 30 sec  FTEO: 1 min  FTEC: 30 sec  Semi-tandem: 30 sec B  FTHT: 1 min  AP shifts: 1 min   Dynamic balance Step over 4" obstacles, 2 min     Walking head turns 3 min Step over 4" obstacles, 2 min     Lateral step over 4" obstacles, 2 min     Backwards walking 2 min     Weaving around cones, bouncing physioball, 2 min    Bridges Increased one-sided weight bearing 12 each      HL clamshells        Supine SLR        SL SLR        Seated HS curl        stretching        5x sit to stands     Performed   hamstring stretches                                          Updated HEP provided:  Sit <> stands  Standing hip abd  Standing hip ext  Standing hip flex  Standing HS curls  Heel raises  FTEO  FTEC  Semi-tandem  AP shifts  FTHT    Assessment/Plan    Assessment:  Patient has met 6/9 goals for outpatient physical therapy  He has improved his confidence in his mobility which has increased his activity level at home  Patient was provided with an updated HEP for LE strengthening and balance that he is to perform 3-5x a week  He has had no recent falls and is appropriate for D/C at this time  STG's:     - Patient will complete 30 sec of  EC on foam during the [facility] for improved static balance within 4 wk - Not met, D/C goal  Provided with EC task in HEP    - Patient is independent with initial home exercise program for improvements at home within 4 wk  Met     - Patient ambulates for 10 consecutive min with appropriate vital sign response for improved endurance within 4 wk  Met per activity report, vitals not assessed  - Patient improves Dynamic Gait Index to at least 20/24 in 3 weeks  - Not Met, 18/24, D/C goal    LTG's:   - Patient improves distance ambulated on 6MWT by 10% within 8 wk for improved endurance    Met    - Patient decreased time to complete 5x sit <> stand by 3 seconds for decreased risk of falls  Met    - Patient improves gait speed to within 10% of age matched norms within 8 wk  Not met, however, improved    - Patient reports an overall decreased in knee pain by at least 2 points on a 10 point scale within 8 wk for improved QOL  Not met with activity, however, met at rest      - Patient has at least a 10% increase in abbreviated ABC within 8 wk for improved confidence his his balance  Met  Plan:  Patient to be D/C from outpatient physical therapy at this time

## 2018-06-16 DIAGNOSIS — N40.1 BENIGN LOCALIZED PROSTATIC HYPERPLASIA WITH LOWER URINARY TRACT SYMPTOMS (LUTS): Primary | ICD-10-CM

## 2018-06-16 DIAGNOSIS — E78.2 MIXED HYPERLIPIDEMIA: ICD-10-CM

## 2018-06-18 RX ORDER — TAMSULOSIN HYDROCHLORIDE 0.4 MG/1
CAPSULE ORAL
Qty: 90 CAPSULE | Refills: 3 | Status: SHIPPED | OUTPATIENT
Start: 2018-06-18 | End: 2018-08-09 | Stop reason: SDUPTHER

## 2018-06-18 RX ORDER — PRAVASTATIN SODIUM 40 MG
TABLET ORAL
Qty: 90 TABLET | Refills: 0 | Status: SHIPPED | OUTPATIENT
Start: 2018-06-18 | End: 2018-08-20 | Stop reason: SDUPTHER

## 2018-07-10 DIAGNOSIS — K21.9 GASTROESOPHAGEAL REFLUX DISEASE WITHOUT ESOPHAGITIS: ICD-10-CM

## 2018-07-10 DIAGNOSIS — I10 ESSENTIAL HYPERTENSION: Primary | ICD-10-CM

## 2018-07-10 RX ORDER — HYDROCHLOROTHIAZIDE 12.5 MG/1
25 TABLET ORAL 2 TIMES DAILY
Qty: 180 TABLET | Refills: 1 | Status: SHIPPED | OUTPATIENT
Start: 2018-07-10 | End: 2018-07-26 | Stop reason: SDUPTHER

## 2018-07-10 RX ORDER — PANTOPRAZOLE SODIUM 40 MG/1
40 TABLET, DELAYED RELEASE ORAL DAILY
Qty: 90 TABLET | Refills: 1 | Status: SHIPPED | OUTPATIENT
Start: 2018-07-10 | End: 2018-12-20 | Stop reason: SDUPTHER

## 2018-07-18 ENCOUNTER — APPOINTMENT (OUTPATIENT)
Dept: LAB | Facility: MEDICAL CENTER | Age: 78
End: 2018-07-18
Payer: MEDICARE

## 2018-07-18 DIAGNOSIS — D50.8 IRON DEFICIENCY ANEMIA SECONDARY TO INADEQUATE DIETARY IRON INTAKE: ICD-10-CM

## 2018-07-18 LAB
BASOPHILS # BLD AUTO: 0.12 THOUSANDS/ΜL (ref 0–0.1)
BASOPHILS NFR BLD AUTO: 2 % (ref 0–1)
EOSINOPHIL # BLD AUTO: 0.18 THOUSAND/ΜL (ref 0–0.61)
EOSINOPHIL NFR BLD AUTO: 2 % (ref 0–6)
ERYTHROCYTE [DISTWIDTH] IN BLOOD BY AUTOMATED COUNT: 17.3 % (ref 11.6–15.1)
HCT VFR BLD AUTO: 33.3 % (ref 36.5–49.3)
HGB BLD-MCNC: 9.7 G/DL (ref 12–17)
IMM GRANULOCYTES # BLD AUTO: 0.02 THOUSAND/UL (ref 0–0.2)
IMM GRANULOCYTES NFR BLD AUTO: 0 % (ref 0–2)
LYMPHOCYTES # BLD AUTO: 3.14 THOUSANDS/ΜL (ref 0.6–4.47)
LYMPHOCYTES NFR BLD AUTO: 40 % (ref 14–44)
MCH RBC QN AUTO: 22 PG (ref 26.8–34.3)
MCHC RBC AUTO-ENTMCNC: 29.1 G/DL (ref 31.4–37.4)
MCV RBC AUTO: 76 FL (ref 82–98)
MONOCYTES # BLD AUTO: 1.25 THOUSAND/ΜL (ref 0.17–1.22)
MONOCYTES NFR BLD AUTO: 16 % (ref 4–12)
NEUTROPHILS # BLD AUTO: 3.24 THOUSANDS/ΜL (ref 1.85–7.62)
NEUTS SEG NFR BLD AUTO: 40 % (ref 43–75)
NRBC BLD AUTO-RTO: 0 /100 WBCS
PLATELET # BLD AUTO: 194 THOUSANDS/UL (ref 149–390)
PMV BLD AUTO: 10.1 FL (ref 8.9–12.7)
RBC # BLD AUTO: 4.4 MILLION/UL (ref 3.88–5.62)
WBC # BLD AUTO: 7.95 THOUSAND/UL (ref 4.31–10.16)

## 2018-07-18 PROCEDURE — 36415 COLL VENOUS BLD VENIPUNCTURE: CPT

## 2018-07-18 PROCEDURE — 85025 COMPLETE CBC W/AUTO DIFF WBC: CPT

## 2018-07-19 ENCOUNTER — TELEPHONE (OUTPATIENT)
Dept: FAMILY MEDICINE CLINIC | Facility: CLINIC | Age: 78
End: 2018-07-19

## 2018-07-19 DIAGNOSIS — D50.9 IRON DEFICIENCY ANEMIA, UNSPECIFIED IRON DEFICIENCY ANEMIA TYPE: Primary | ICD-10-CM

## 2018-07-19 RX ORDER — FERROUS SULFATE 325(65) MG
325 TABLET ORAL 2 TIMES DAILY WITH MEALS
Qty: 60 TABLET | Refills: 1 | Status: SHIPPED | OUTPATIENT
Start: 2018-07-19 | End: 2018-11-12 | Stop reason: SDUPTHER

## 2018-07-19 NOTE — TELEPHONE ENCOUNTER
[id number] Junie Barlow I will resend it as it is on med list However I still want him to see hematologist as they may recommend that we do IV infusion of iron

## 2018-07-26 ENCOUNTER — OFFICE VISIT (OUTPATIENT)
Dept: FAMILY MEDICINE CLINIC | Facility: CLINIC | Age: 78
End: 2018-07-26
Payer: MEDICARE

## 2018-07-26 VITALS
WEIGHT: 197 LBS | BODY MASS INDEX: 34.91 KG/M2 | HEIGHT: 63 IN | SYSTOLIC BLOOD PRESSURE: 120 MMHG | DIASTOLIC BLOOD PRESSURE: 78 MMHG

## 2018-07-26 DIAGNOSIS — K59.03 DRUG-INDUCED CONSTIPATION: ICD-10-CM

## 2018-07-26 DIAGNOSIS — F41.1 GENERALIZED ANXIETY DISORDER: ICD-10-CM

## 2018-07-26 DIAGNOSIS — I10 ESSENTIAL HYPERTENSION: Primary | ICD-10-CM

## 2018-07-26 DIAGNOSIS — F33.1 MODERATE EPISODE OF RECURRENT MAJOR DEPRESSIVE DISORDER (HCC): ICD-10-CM

## 2018-07-26 DIAGNOSIS — D50.8 IRON DEFICIENCY ANEMIA SECONDARY TO INADEQUATE DIETARY IRON INTAKE: ICD-10-CM

## 2018-07-26 PROBLEM — R06.02 SHORTNESS OF BREATH: Status: RESOLVED | Noted: 2018-03-23 | Resolved: 2018-07-26

## 2018-07-26 PROBLEM — Z00.00 MEDICARE ANNUAL WELLNESS VISIT, SUBSEQUENT: Status: RESOLVED | Noted: 2018-04-26 | Resolved: 2018-07-26

## 2018-07-26 PROBLEM — R29.6 MULTIPLE FALLS: Status: RESOLVED | Noted: 2018-04-26 | Resolved: 2018-07-26

## 2018-07-26 PROCEDURE — 99214 OFFICE O/P EST MOD 30 MIN: CPT | Performed by: FAMILY MEDICINE

## 2018-07-26 RX ORDER — POTASSIUM CHLORIDE 20 MEQ/1
20 TABLET, EXTENDED RELEASE ORAL DAILY
COMMUNITY
End: 2018-08-10 | Stop reason: SDUPTHER

## 2018-07-26 RX ORDER — HYDROCHLOROTHIAZIDE 12.5 MG/1
25 TABLET ORAL 2 TIMES DAILY
Qty: 180 TABLET | Refills: 1 | Status: SHIPPED | OUTPATIENT
Start: 2018-07-26 | End: 2018-08-20 | Stop reason: DRUGHIGH

## 2018-07-26 NOTE — PATIENT INSTRUCTIONS
Patient will continue same medication as he is stable Patient will have repeat fasting labs in 10/2018 Patient to keep his cardiology and hematology follwoup appointments

## 2018-07-26 NOTE — PROGRESS NOTES
Assessment/Plan:    Constipation  Continue iwht miralax    Essential hypertension  Blood pressure is well controlled continue current care    Generalized anxiety disorder  Nerves are stable on as needed alprazolam    Moderate episode of recurrent major depressive disorder (HCC)  depressin is stable on meds continue same medication Patient to see me in 4 months    Iron deficiency anemia secondary to inadequate dietary iron intake  Patient is back on iron therapy Patien to see hematology Await the hemoccult card testing       Diagnoses and all orders for this visit:    Moderate episode of recurrent major depressive disorder ([address] )    Essential hypertension  -     hydrochlorothiazide (HYDRODIURIL) 12 5 mg tablet; Take 2 tablets (25 mg total) by mouth 2 (two) times a day  -     Comprehensive metabolic panel; Future  -     Lipid panel; Future  -     TSH baseline; Future    Generalized anxiety disorder    Iron deficiency anemia secondary to inadequate dietary iron intake  -     CBC and differential; Future    Drug-induced constipation    Other orders  -     potassium chloride (K-DUR,KLOR-CON) 20 mEq tablet; Take 20 mEq by mouth daily          Subjective:   Chief Complaint   Patient presents with    Hypertension    Hyperlipidemia    GERD    Anxiety          Patient ID: Tania Lipscomb is a [de-identified] y o  male      Patient is here for follwoup of his depression, anxiety anemia and his hypertension and multiple fall history Patient has had PT and feels his balance is much improved He is doing home exercises and walking daily Patient depression and anxiety is also much improved and he is sleeping better Patient anemia has worsened Hb is 9 7 was 10 5 in April 2018 and was 12 in 10/2017 Patient deneis any dark colored stool or any blood in stool He saw Dr Silvia Farr last week and had hemoccult testing done and is awaiting the results Patient is seeing hematology in mid August Patient is taking the irone daily and his constipation is being managed with the miralax Patient is overall doing well Patient has no complaints at this time        The following portions of the patient's history were reviewed and updated as appropriate: allergies, current medications, past social history and problem list     Review of Systems   Constitutional: Negative for fatigue, fever and unexpected weight change  HENT: Negative for congestion, sinus pain and trouble swallowing  Eyes: Negative for discharge and visual disturbance  Respiratory: Negative for cough, chest tightness, shortness of breath and wheezing  Cardiovascular: Negative for chest pain, palpitations and leg swelling  Gastrointestinal: Negative for abdominal pain, blood in stool, constipation, diarrhea, nausea and vomiting  Genitourinary: Negative for difficulty urinating, dysuria, frequency and hematuria  Musculoskeletal: Negative for arthralgias, gait problem and joint swelling  Skin: Negative for rash and wound  Allergic/Immunologic: Negative for environmental allergies and food allergies  Neurological: Negative for dizziness, syncope, weakness, numbness and headaches  Hematological: Negative for adenopathy  Does not bruise/bleed easily  Psychiatric/Behavioral: Negative for confusion, decreased concentration and sleep disturbance  The patient is not nervous/anxious  Objective:      /78   Ht 5' 3" (1 6 m)   Wt 89 4 kg (197 lb)   BMI 34 90 kg/m²          Physical Exam   Constitutional: He is oriented to person, place, and time  He appears well-developed and well-nourished  HENT:   Head: Normocephalic and atraumatic  Right Ear: Hearing, tympanic membrane and external ear normal    Left Ear: Hearing, tympanic membrane and external ear normal    Eyes: Conjunctivae and EOM are normal  Pupils are equal, round, and reactive to light  Neck: Neck supple  No thyromegaly present  Cardiovascular: Normal rate  Murmur heard    Pulmonary/Chest: Effort normal and breath sounds normal  He has no wheezes  He has no rales  Abdominal: Soft  Bowel sounds are normal  He exhibits no distension  There is no tenderness  Musculoskeletal: He exhibits no edema or tenderness  Lymphadenopathy:     He has no cervical adenopathy  Neurological: He is alert and oriented to person, place, and time  No cranial nerve deficit  Coordination normal    Skin: Skin is warm and dry  No rash noted  Psychiatric: He has a normal mood and affect   His behavior is normal  Judgment and thought content normal

## 2018-08-09 ENCOUNTER — OFFICE VISIT (OUTPATIENT)
Dept: UROLOGY | Facility: CLINIC | Age: 78
End: 2018-08-09
Payer: MEDICARE

## 2018-08-09 VITALS
BODY MASS INDEX: 32.99 KG/M2 | HEIGHT: 65 IN | HEART RATE: 60 BPM | DIASTOLIC BLOOD PRESSURE: 80 MMHG | SYSTOLIC BLOOD PRESSURE: 140 MMHG | WEIGHT: 198 LBS

## 2018-08-09 DIAGNOSIS — N40.1 BENIGN LOCALIZED PROSTATIC HYPERPLASIA WITH LOWER URINARY TRACT SYMPTOMS (LUTS): ICD-10-CM

## 2018-08-09 DIAGNOSIS — Z87.438 HISTORY OF BPH: Primary | ICD-10-CM

## 2018-08-09 LAB
POST-VOID RESIDUAL VOLUME, ML POC: 57 ML
SL AMB  POCT GLUCOSE, UA: NORMAL
SL AMB LEUKOCYTE ESTERASE,UA: NORMAL
SL AMB POCT BILIRUBIN,UA: NORMAL
SL AMB POCT BLOOD,UA: NORMAL
SL AMB POCT CLARITY,UA: CLEAR
SL AMB POCT COLOR,UA: YELLOW
SL AMB POCT KETONES,UA: NORMAL
SL AMB POCT NITRITE,UA: NORMAL
SL AMB POCT PH,UA: 7
SL AMB POCT SPECIFIC GRAVITY,UA: 1
SL AMB POCT URINE PROTEIN: NORMAL
SL AMB POCT UROBILINOGEN: NORMAL

## 2018-08-09 PROCEDURE — 81002 URINALYSIS NONAUTO W/O SCOPE: CPT | Performed by: UROLOGY

## 2018-08-09 PROCEDURE — 99213 OFFICE O/P EST LOW 20 MIN: CPT | Performed by: UROLOGY

## 2018-08-09 PROCEDURE — 51798 US URINE CAPACITY MEASURE: CPT | Performed by: UROLOGY

## 2018-08-09 RX ORDER — TAMSULOSIN HYDROCHLORIDE 0.4 MG/1
0.4 CAPSULE ORAL
Qty: 90 CAPSULE | Refills: 2 | Status: SHIPPED | OUTPATIENT
Start: 2018-08-09 | End: 2019-05-31 | Stop reason: SDUPTHER

## 2018-08-09 NOTE — PROGRESS NOTES
8/9/2018    Sheryle Doles  1940  [id number]        Assessment  BPH    Plan  We discussed his prostate examination  I recommend discontinuing Proscar at this time if he wishes  They are very happy to discontinue medications  For now will continue tamsulosin and observe symptoms  Will schedule routine follow-up in 1 year, sooner if he develops symptoms  They are comfortable with this plan  History of Present Illness  Ga Padgett is a [de-identified] y o  male with history of BPH, urinary tract infection, urinary retention  He has done very well for the last few years  He has been taking tamsulosin and finasteride daily  No urinary complaints at this time  AUA symptom score 6  Bladder scan 57 mL postvoid residual       AUA SYMPTOM SCORE      Most Recent Value   AUA SYMPTOM SCORE   How often have you had a sensation of not emptying your bladder completely after you finished urinating? 0   How often have you had to urinate again less than two hours after you finished urinating? 2   How often have you found you stopped and started again several times when you urinate?  0   How often have you found it difficult to postpone urination? 1   How often have you had a weak urinary stream?  0   How often have you had to push or strain to begin urination? 1   How many times did you most typically get up to urinate from the time you went to bed at night until the time you got up in the morning? 2   Quality of Life: If you were to spend the rest of your life with your urinary condition just the way it is now, how would you feel about that?  0   AUA SYMPTOM SCORE  6          Review of Systems  Review of Systems   Constitutional: Negative  HENT: Negative  Respiratory: Negative  Cardiovascular: Negative  Gastrointestinal: Negative  Genitourinary:        As per HPI   Musculoskeletal: Negative  Skin: Negative  Neurological: Negative  Hematological: Negative            Past Medical History  Past Medical History:   Diagnosis Date    Actinic keratosis     LAST ASSESSED: [id number]    Allergic rhinitis     LAST ASSESSED: [id number]    Anxiety     LAST ASSESSED: [id number]    Anxiety disorder     LAST ASSESSED: [id number]    Atelectasis of right lung     ABNORMAL CT SCAN OF [address] 12/2/2016 REPEAT NEEDED PER RADIOLOGY IN 3 MONTHS LAST ASSESSED: [id number]    Atypical chest pain     LAST ASSESSED: [id number]    Benign paroxysmal vertigo     LAST ASSESSED: 25VBX6448    Chronic cough     LAST ASSESSED: 48GLF3643    Chronic GERD     Degenerative arthritis of knee, bilateral     LAST ASSESSED: [id number]    Diverticulitis of colon     LAST ASSESSED: [id number]    Diverticulosis     LAST ASSESSED: [id number]    Foreign body, eye     LAST ASSESSED: 08TXW1128    Functional gait abnormality     LAST ASSESSED: 81ICV6325    Hyperlipidemia     Hypertension     Hyponatremia     LAST ASSESSED: [id number]    Leukocytosis     LAST ASSESSED: [id number]    Murmur     Newly recognized murmur     LAST ASSESSED: [id number]    Olecranon bursitis, unspecified elbow     Presence of indwelling urethral catheter     RESOLVED: [id number]    Transient cerebral ischemia     LAST ASSESSED: [id number]    Urinary incontinence     LAST ASSESSED: [id number]    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     LAST ASSESSED: 43KDZ5150       Past Social History  Past Surgical History:   Procedure Laterality Date    ADENOIDECTOMY      APPENDECTOMY      COLONOSCOPY  10/2006    FIBEROPTIC    INGUINAL HERNIA REPAIR      JOINT REPLACEMENT      b/l TKR Sept 2015    SINUS SURGERY      TONSILLECTOMY         Past Family History  Family History   Problem Relation Age of Onset    No Known Problems Family        Past Social history  Social History     Social History    Marital status:       Spouse name: N/A    Number of children: N/A    Years of education: N/A     Occupational History    Michael perry retired      Social History Main Topics    Smoking status: Former Smoker     Quit date: 1960    Smokeless tobacco: Never Used    Alcohol use Yes      Comment: occ, SOCIAL    Drug use: No    Sexual activity: Not on file     Other Topics Concern    Not on file     Social History Narrative    USES [employer] - [employer]         History   Smoking Status    Former Smoker    Quit date: 1960   Smokeless Tobacco    Never Used       Current Medications  Current Outpatient Prescriptions   Medication Sig Dispense Refill    ALPRAZolam (XANAX) 0 25 mg tablet Take 1 tablet (0 25 mg total) by mouth 2 (two) times a day as needed for anxiety 30 tablet 0    amLODIPine (NORVASC) 10 mg tablet Take 1 tablet (10 mg total) by mouth daily 90 tablet 1    aspirin 325 mg tablet Take 325 mg by mouth daily      ferrous sulfate 325 (65 Fe) mg tablet Take 1 tablet (325 mg total) by mouth 2 (two) times a day with meals 60 tablet 1    hydrochlorothiazide (HYDRODIURIL) 12 5 mg tablet Take 2 tablets (25 mg total) by mouth 2 (two) times a day 180 tablet 1    mirtazapine (REMERON) 15 mg tablet Take 1 tablet (15 mg total) by mouth daily at bedtime 90 tablet 1    multivitamin (THERAGRAN) TABS Take 1 tablet by mouth      Omega-3 Fatty Acids (FISH OIL PO) Take by mouth 2 (two) times a day      pantoprazole (PROTONIX) 40 mg tablet Take 1 tablet (40 mg total) by mouth daily 90 tablet 1    polyethylene glycol (MIRALAX) 17 g packet Take 17 g by mouth daily      potassium chloride (K-DUR,KLOR-CON) 20 mEq tablet Take 20 mEq by mouth daily      pravastatin (PRAVACHOL) 40 mg tablet TAKE 1 TABLET BY MOUTH ONCE DAILY 90 tablet 0    Probiotic Product (PROBIOTIC DAILY PO) Take by mouth daily      tamsulosin (FLOMAX) 0 4 mg Take 1 capsule (0 4 mg total) by mouth daily at bedtime 90 capsule 2    VITAMIN D, ERGOCALCIFEROL, PO Take by mouth      VITAMIN E PO Take by mouth      finasteride (PROSCAR) 5 mg tablet Take 1 tablet by mouth daily for 30 days 30 tablet 0     No current facility-administered medications for this visit  Allergies  Allergies   Allergen Reactions    Ace Inhibitors Cough       Past Medical History, Social History, Family History, medications and allergies were reviewed  Vitals  Vitals:    08/09/18 0826   BP: 140/80   Pulse: 60   Weight: 89 8 kg (198 lb)   Height: 5' 4 5" (1 638 m)       Physical Exam  Physical Exam   Constitutional: He is oriented to person, place, and time  He appears well-developed and well-nourished  Cardiovascular: Normal rate  Pulmonary/Chest: Effort normal    Abdominal: Soft  Genitourinary:   Genitourinary Comments: Prostate relatively small, about 30 g, smooth and soft  Musculoskeletal: Normal range of motion  Neurological: He is alert and oriented to person, place, and time  Skin: Skin is warm, dry and intact  Psychiatric: He has a normal mood and affect  Vitals reviewed          Results  No results found for: PSA  Lab Results   Component Value Date    GLUCOSE 100 09/05/2017    CALCIUM 8 7 04/19/2018     04/19/2018    K 3 5 04/19/2018    CO2 25 04/19/2018     04/19/2018    BUN 18 04/19/2018    CREATININE 0 71 04/19/2018     Lab Results   Component Value Date    WBC 7 95 07/18/2018    HGB 9 7 (L) 07/18/2018    HCT 33 3 (L) 07/18/2018    MCV 76 (L) 07/18/2018     07/18/2018

## 2018-08-09 NOTE — LETTER
August 9, 2018     [facility], DO  [address] [facility]  [address]  [address]    Patient: Sonali Stokes   YOB: 1940   Date of Visit: 8/9/2018       Dear Dr Corinne Knight:    Thank you for referring Sonali Stokes to me for evaluation  Below are my notes for this consultation  If you have questions, please do not hesitate to call me  I look forward to following your patient along with you  Sincerely,        Nico Jarrett MD        CC: No Recipients  Nico Jarrett MD  8/9/2018  9:44 AM  Sign at close encounter  8/9/2018    Sonali Stokes  1940  [id number]        Assessment  BPH    Plan  We discussed his prostate examination  I recommend discontinuing Proscar at this time if he wishes  They are very happy to discontinue medications  For now will continue tamsulosin and observe symptoms  Will schedule routine follow-up in 1 year, sooner if he develops symptoms  They are comfortable with this plan  History of Present Illness  Devi Bell is a [de-identified] y o  male with history of BPH, urinary tract infection, urinary retention  He has done very well for the last few years  He has been taking tamsulosin and finasteride daily  No urinary complaints at this time  AUA symptom score 6  Bladder scan 57 mL postvoid residual       AUA SYMPTOM SCORE      Most Recent Value   AUA SYMPTOM SCORE   How often have you had a sensation of not emptying your bladder completely after you finished urinating? 0   How often have you had to urinate again less than two hours after you finished urinating? 2   How often have you found you stopped and started again several times when you urinate?  0   How often have you found it difficult to postpone urination? 1   How often have you had a weak urinary stream?  0   How often have you had to push or strain to begin urination?   1   How many times did you most typically get up to urinate from the time you went to bed at night until the time you got up in the morning? 2   Quality of Life: If you were to spend the rest of your life with your urinary condition just the way it is now, how would you feel about that?  0   AUA SYMPTOM SCORE  6          Review of Systems  Review of Systems   Constitutional: Negative  HENT: Negative  Respiratory: Negative  Cardiovascular: Negative  Gastrointestinal: Negative  Genitourinary:        As per HPI   Musculoskeletal: Negative  Skin: Negative  Neurological: Negative  Hematological: Negative            Past Medical History  Past Medical History:   Diagnosis Date    Actinic keratosis     LAST ASSESSED: [id number]    Allergic rhinitis     LAST ASSESSED: 11CMW7192    Anxiety     LAST ASSESSED: 63DCM7001    Anxiety disorder     LAST ASSESSED: 37EJK4717    Atelectasis of right lung     ABNORMAL CT SCAN OF [address] 12/2/2016 REPEAT NEEDED PER RADIOLOGY IN 3 MONTHS LAST ASSESSED: [id number]    Atypical chest pain     LAST ASSESSED: 73DJW7764    Benign paroxysmal vertigo     LAST ASSESSED: 56HDP1104    Chronic cough     LAST ASSESSED: 25WHM1320    Chronic GERD     Degenerative arthritis of knee, bilateral     LAST ASSESSED: 18HQK5812    Diverticulitis of colon     LAST ASSESSED: 79JTS2506    Diverticulosis     LAST ASSESSED: 57RLY8784    Foreign body, eye     LAST ASSESSED: 22SFY7512    Functional gait abnormality     LAST ASSESSED: 94ZRT6243    Hyperlipidemia     Hypertension     Hyponatremia     LAST ASSESSED: 09FKK0528    Leukocytosis     LAST ASSESSED: 20ASQ2443    Murmur     Newly recognized murmur     LAST ASSESSED: 25EKV5816    Olecranon bursitis, unspecified elbow     Presence of indwelling urethral catheter     RESOLVED: 06HDA9954    Transient cerebral ischemia     LAST ASSESSED: 48ZIW5345    Urinary incontinence     LAST ASSESSED: 41RVR4662    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     LAST ASSESSED: 56WVE8109       Past Social History  Past Surgical History:   Procedure Laterality Date    ADENOIDECTOMY      APPENDECTOMY      COLONOSCOPY  10/2006    FIBEROPTIC    INGUINAL HERNIA REPAIR      JOINT REPLACEMENT      b/l TKR Sept 2015    SINUS SURGERY      TONSILLECTOMY         Past Family History  Family History   Problem Relation Age of Onset    No Known Problems Family        Past Social history  Social History     Social History    Marital status:       Spouse name: N/A    Number of children: N/A    Years of education: N/A     Occupational History    tailor, 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      Social History Main Topics    Smoking status: Former Smoker     Quit date: 1960    Smokeless tobacco: Never Used    Alcohol use Yes      Comment: occ, SOCIAL    Drug use: No    Sexual activity: Not on file     Other Topics Concern    Not on file     Social History Narrative    USES SAFETY EQUIPMENT - [employer]         History   Smoking Status    Former Smoker    Quit date: 1960   Smokeless Tobacco    Never Used       Current Medications  Current Outpatient Prescriptions   Medication Sig Dispense Refill    ALPRAZolam (XANAX) 0 25 mg tablet Take 1 tablet (0 25 mg total) by mouth 2 (two) times a day as needed for anxiety 30 tablet 0    amLODIPine (NORVASC) 10 mg tablet Take 1 tablet (10 mg total) by mouth daily 90 tablet 1    aspirin 325 mg tablet Take 325 mg by mouth daily      ferrous sulfate 325 (65 Fe) mg tablet Take 1 tablet (325 mg total) by mouth 2 (two) times a day with meals 60 tablet 1    hydrochlorothiazide (HYDRODIURIL) 12 5 mg tablet Take 2 tablets (25 mg total) by mouth 2 (two) times a day 180 tablet 1    mirtazapine (REMERON) 15 mg tablet Take 1 tablet (15 mg total) by mouth daily at bedtime 90 tablet 1    multivitamin (THERAGRAN) TABS Take 1 tablet by mouth      Omega-3 Fatty Acids (FISH OIL PO) Take by mouth 2 (two) times a day      pantoprazole (PROTONIX) 40 mg tablet Take 1 tablet (40 mg total) by mouth daily 90 tablet 1    polyethylene glycol (MIRALAX) 17 g packet Take 17 g by mouth daily      potassium chloride (K-DUR,KLOR-CON) 20 mEq tablet Take 20 mEq by mouth daily      pravastatin (PRAVACHOL) 40 mg tablet TAKE 1 TABLET BY MOUTH ONCE DAILY 90 tablet 0    Probiotic Product (PROBIOTIC DAILY PO) Take by mouth daily      tamsulosin (FLOMAX) 0 4 mg Take 1 capsule (0 4 mg total) by mouth daily at bedtime 90 capsule 2    VITAMIN D, ERGOCALCIFEROL, PO Take by mouth      VITAMIN E PO Take by mouth      finasteride (PROSCAR) 5 mg tablet Take 1 tablet by mouth daily for 30 days 30 tablet 0     No current facility-administered medications for this visit  Allergies  Allergies   Allergen Reactions    Ace Inhibitors Cough       Past Medical History, Social History, Family History, medications and allergies were reviewed  Vitals  Vitals:    08/09/18 0826   BP: 140/80   Pulse: 60   Weight: 89 8 kg (198 lb)   Height: 5' 4 5" (1 638 m)       Physical Exam  Physical Exam   Constitutional: He is oriented to person, place, and time  He appears well-developed and well-nourished  Cardiovascular: Normal rate  Pulmonary/Chest: Effort normal    Abdominal: Soft  Genitourinary:   Genitourinary Comments: Prostate relatively small, about 30 g, smooth and soft  Musculoskeletal: Normal range of motion  Neurological: He is alert and oriented to person, place, and time  Skin: Skin is warm, dry and intact  Psychiatric: He has a normal mood and affect  Vitals reviewed          Results  No results found for: PSA  Lab Results   Component Value Date    GLUCOSE 100 09/05/2017    CALCIUM 8 7 04/19/2018     04/19/2018    K 3 5 04/19/2018    CO2 25 04/19/2018     04/19/2018    BUN 18 04/19/2018    CREATININE 0 71 04/19/2018     Lab Results   Component Value Date    WBC 7 95 07/18/2018    HGB 9 7 (L) 07/18/2018    HCT 33 3 (L) 07/18/2018    MCV 76 (L) 07/18/2018     07/18/2018

## 2018-08-10 DIAGNOSIS — E87.6 HYPOKALEMIA: Primary | ICD-10-CM

## 2018-08-10 RX ORDER — POTASSIUM CHLORIDE 20 MEQ/1
20 TABLET, EXTENDED RELEASE ORAL DAILY
Qty: 90 TABLET | Refills: 0 | Status: SHIPPED | OUTPATIENT
Start: 2018-08-10 | End: 2018-12-20 | Stop reason: SDUPTHER

## 2018-08-15 ENCOUNTER — TELEPHONE (OUTPATIENT)
Dept: HEMATOLOGY ONCOLOGY | Facility: CLINIC | Age: 78
End: 2018-08-15

## 2018-08-15 ENCOUNTER — OFFICE VISIT (OUTPATIENT)
Dept: HEMATOLOGY ONCOLOGY | Facility: CLINIC | Age: 78
End: 2018-08-15
Payer: MEDICARE

## 2018-08-15 VITALS
BODY MASS INDEX: 34.15 KG/M2 | OXYGEN SATURATION: 95 % | DIASTOLIC BLOOD PRESSURE: 76 MMHG | HEART RATE: 97 BPM | WEIGHT: 200 LBS | TEMPERATURE: 98.6 F | HEIGHT: 64 IN | SYSTOLIC BLOOD PRESSURE: 138 MMHG | RESPIRATION RATE: 17 BRPM

## 2018-08-15 DIAGNOSIS — D53.9 NUTRITIONAL ANEMIA: Primary | ICD-10-CM

## 2018-08-15 DIAGNOSIS — D50.9 IRON DEFICIENCY ANEMIA, UNSPECIFIED IRON DEFICIENCY ANEMIA TYPE: ICD-10-CM

## 2018-08-15 PROCEDURE — 99204 OFFICE O/P NEW MOD 45 MIN: CPT | Performed by: PHYSICIAN ASSISTANT

## 2018-08-15 NOTE — TELEPHONE ENCOUNTER
Per Linda Section called [facility] to get Leopoldo's office notes with Dr Rojas and occult stool testing results that he had done there  I spoke with their Medical Records Office  They are sending the office notes and are looking for the occult stool testing results  I had them fax it to [phone]

## 2018-08-15 NOTE — PROGRESS NOTES
Hematology/Oncology Outpatient Consult  Ronny Richardson [de-identified] y o  male 1940 [id number]    Date:  8/15/2018      Assessment and Plan:  1  Iron deficiency anemia, unspecified iron deficiency anemia type  68-year-old male presents for consultation regarding anemia  This began in April 2018  MCV was decreased at that time and subsequently is more decreased in CBC assessed in July 2018  Previously, CBCs have been entirely normal   Likely, patient has iron deficiency anemia due to microcytosis and elevated RDW  He has been taking oral iron twice daily for 3 weeks  He has been tolerating this well except for constipation for which he has needed MiraLax  Advised patient to decrease oral iron to once daily as recent studies have showed that the twice daily dosing does not have greater benefit in regards to iron absorption, only increase in side effects  Patient was recently evaluated by his gastroenterologist, Dr Shania Almazan  Per patient he states that he was not recommended to have endoscopy to assess for source of blood due to occult stool testing being negative  I will request records from this office  Discussed that other possibility is that patient may have malabsorption of iron due to use of proton pump inhibitor for 10 years or greater  He will continue on oral iron  If hemoglobin does not improve, patient may need intravenous iron therapy  Thankfully, patient is minimally symptomatic from his anemia only having mild fatigue  WBC and platelet count remain normal -- thus likely not underlying bone marrow condition       - Ambulatory referral to Hematology / Oncology  - Iron Panel; Future  - CBC and differential  - Reticulocytes; Future  - Sedimentation rate, automated; Future    2  Nutritional anemia  - Vitamin B12; Future  - Folate; Future      HPI:  [de-identified]year old female presents for consultation for anemia  Patient was noted to have decrease in hemoglobin in April 2018, hemoglobin 10 5, MCV 81  Repeat hemoglobin in July 2018 was 9 7, MCV 76  Platelets and WBC remain normal on both readings  Previously hemoglobin had been in the normal range  Most recent colonoscopy 10-12 years ago  He saw Dr Coby Anaya in July  Occult stool testing was negative  Patient states that he was told by Dr Coby Anaya that he did not need to have scope  He also follows with him due to chronic fatty liver  I will request records from the office  He has been taking oral iron BID for 3 weeks  He did have constipation with this but now resolved with Miralax  He takes pantoprazole for GERD and has been taking for about 10 years  He states he thinks he takes this BID  Records though show once daily  He will take once daily in the AM      ROS: Review of Systems   Constitutional: Positive for fatigue (mild )  Negative for activity change, appetite change, chills, fever and unexpected weight change  HENT: Negative for mouth sores and nosebleeds  Respiratory: Negative for cough and shortness of breath  Cardiovascular: Negative for chest pain, palpitations and leg swelling  Gastrointestinal: Negative for abdominal pain, blood in stool, constipation, diarrhea, nausea and vomiting  Genitourinary: Negative for difficulty urinating, dysuria and hematuria (urine dip at urology was negative in Aug 2018)  Musculoskeletal: Negative for arthralgias, joint swelling and myalgias  Skin: Negative  Neurological: Negative for dizziness, weakness, light-headedness, numbness and headaches  Hematological: Negative  Psychiatric/Behavioral: Negative          Past Medical History:   Diagnosis Date    Actinic keratosis     LAST ASSESSED: 12JUN2012    Allergic rhinitis     LAST ASSESSED: 66SXZ2390    Anxiety     LAST ASSESSED: 23TMC3939    Anxiety disorder     LAST ASSESSED: 88OJP0691    Atelectasis of right lung     ABNORMAL CT SCAN OF THE [address] 12/2/2016 REPEAT NEEDED PER RADIOLOGY IN 3 MONTHS LAST ASSESSED: [id number]    Atypical chest pain     LAST ASSESSED: 54YRQ6242    Benign paroxysmal vertigo     LAST ASSESSED: 28TCX6233    Chronic cough     LAST ASSESSED: 14XFK9543    Chronic GERD     Degenerative arthritis of knee, bilateral     LAST ASSESSED: 74ELY0640    Diverticulitis of colon     LAST ASSESSED: 18YXS1412    Diverticulosis     LAST ASSESSED: 67NEJ2197    Foreign body, eye     LAST ASSESSED: 64WGQ8307    Functional gait abnormality     LAST ASSESSED: 89HUJ7570    Hyperlipidemia     Hypertension     Hyponatremia     LAST ASSESSED: 89QPK1829    Leukocytosis     LAST ASSESSED: 31FZB0457    Murmur     Newly recognized murmur     LAST ASSESSED: 10WQZ9085    Olecranon bursitis, unspecified elbow     Presence of indwelling urethral catheter     RESOLVED: 67FPD7498    Transient cerebral ischemia     LAST ASSESSED: 88ALP9570    Urinary incontinence     LAST ASSESSED: 04CXR8901    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     LAST ASSESSED: 43FFE4625       Past Surgical History:   Procedure Laterality Date    ADENOIDECTOMY      APPENDECTOMY      COLONOSCOPY  10/2006    FIBEROPTIC    INGUINAL HERNIA REPAIR      JOINT REPLACEMENT      b/l TKR Sept 2015    SINUS SURGERY      TONSILLECTOMY         Social History     Social History    Marital status:       Spouse name: N/A    Number of children: N/A    Years of education: N/A     Occupational History    tailor, 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      Social History Main Topics    Smoking status: Former Smoker     Quit date: 1960    Smokeless tobacco: Never Used    Alcohol use Yes      Comment: occ, SOCIAL    Drug use: No    Sexual activity: Not on file     Other Topics Concern    Not on file     Social History Narrative    USES [employer]AFETY EQUIPMENT - [employer]           Family History   Problem Relation Age of Onset    No Known Problems Family        Allergies   Allergen Reactions    Ace Inhibitors Cough         Current Outpatient Prescriptions:     ALPRAZolam (XANAX) 0 25 mg tablet, Take 1 tablet (0 25 mg total) by mouth 2 (two) times a day as needed for anxiety, Disp: 30 tablet, Rfl: 0    amLODIPine (NORVASC) 10 mg tablet, Take 1 tablet (10 mg total) by mouth daily, Disp: 90 tablet, Rfl: 1    aspirin 325 mg tablet, Take 325 mg by mouth daily, Disp: , Rfl:     ferrous sulfate 325 (65 Fe) mg tablet, Take 1 tablet (325 mg total) by mouth 2 (two) times a day with meals, Disp: 60 tablet, Rfl: 1    finasteride (PROSCAR) 5 mg tablet, Take 1 tablet by mouth daily for 30 days, Disp: 30 tablet, Rfl: 0    hydrochlorothiazide (HYDRODIURIL) 12 5 mg tablet, Take 2 tablets (25 mg total) by mouth 2 (two) times a day, Disp: 180 tablet, Rfl: 1    mirtazapine (REMERON) 15 mg tablet, Take 1 tablet (15 mg total) by mouth daily at bedtime, Disp: 90 tablet, Rfl: 1    multivitamin (THERAGRAN) TABS, Take 1 tablet by mouth, Disp: , Rfl:     Omega-3 Fatty Acids (FISH OIL PO), Take by mouth 2 (two) times a day, Disp: , Rfl:     pantoprazole (PROTONIX) 40 mg tablet, Take 1 tablet (40 mg total) by mouth daily, Disp: 90 tablet, Rfl: 1    polyethylene glycol (MIRALAX) 17 g packet, Take 17 g by mouth daily, Disp: , Rfl:     potassium chloride (K-DUR,KLOR-CON) 20 mEq tablet, Take 1 tablet (20 mEq total) by mouth daily, Disp: 90 tablet, Rfl: 0    pravastatin (PRAVACHOL) 40 mg tablet, TAKE 1 TABLET BY MOUTH ONCE DAILY, Disp: 90 tablet, Rfl: 0    Probiotic Product (PROBIOTIC DAILY PO), Take by mouth daily, Disp: , Rfl:     tamsulosin (FLOMAX) 0 4 mg, Take 1 capsule (0 4 mg total) by mouth daily at bedtime, Disp: 90 capsule, Rfl: 2    VITAMIN D, ERGOCALCIFEROL, PO, Take by mouth, Disp: , Rfl:     VITAMIN E PO, Take by mouth, Disp: , Rfl:       Physical Exam:  There were no vitals taken for this visit  Physical Exam   Constitutional: He is oriented to person, place, and time  He appears well-developed and well-nourished  No distress     HENT:   Head: Normocephalic and atraumatic  Eyes: Conjunctivae are normal  No scleral icterus  Neck: Normal range of motion  Neck supple  Cardiovascular: Normal rate and regular rhythm  Murmur heard  Pulmonary/Chest: Effort normal and breath sounds normal  No respiratory distress  Abdominal: Soft  There is no tenderness  Musculoskeletal: Normal range of motion  He exhibits no edema or tenderness  Lymphadenopathy:     He has no cervical adenopathy  Neurological: He is alert and oriented to person, place, and time  No cranial nerve deficit  Skin: Skin is warm and dry  Psychiatric: He has a normal mood and affect  Labs:  Lab Results   Component Value Date    WBC 7 95 07/18/2018    HGB 9 7 (L) 07/18/2018    HCT 33 3 (L) 07/18/2018    MCV 76 (L) 07/18/2018     07/18/2018     Lab Results   Component Value Date     04/19/2018    K 3 5 04/19/2018     04/19/2018    CO2 25 04/19/2018    ANIONGAP 9 04/19/2018    BUN 18 04/19/2018    CREATININE 0 71 04/19/2018    GLUCOSE 100 09/05/2017    GLUF 110 (H) 04/19/2018    CALCIUM 8 7 04/19/2018    AST 89 (H) 04/19/2018    ALT 76 04/19/2018    ALKPHOS 70 04/19/2018    PROT 7 5 04/19/2018    BILITOT 0 93 04/19/2018    EGFR 89 04/19/2018       Patient voiced understanding and agreement in the above discussion  Aware to contact our office with questions/symptoms in the interim

## 2018-08-15 NOTE — Clinical Note
Please request records from GI, Dr Talya Hanley at [facility] in [facility]  I need office notes and occult stool testing  Thanks!

## 2018-08-17 ENCOUNTER — TELEPHONE (OUTPATIENT)
Dept: HEMATOLOGY ONCOLOGY | Facility: HOSPITAL | Age: 78
End: 2018-08-17

## 2018-08-17 NOTE — TELEPHONE ENCOUNTER
Per Jah Saravia, called Adriana Santiago and LM to let him know that we did receive his GI records from [facility]  Adriana Santiago did not have the occult stool testing done like he thought he had so I also reminded him that he needs to have that done and that Dr Rojas wants to see him back in the next 2-3 weeks so he should follow up with [facility]  I gave him our number to call if he had any questions

## 2018-08-17 NOTE — TELEPHONE ENCOUNTER
Received records from GI in July 2018  Patient did not complete occult stool testing  He needs to complete  He was also instructed to follow up with GI in 2-3 weeks  He needs follow up with Dr Ivett Gaitan at [facility]

## 2018-08-20 ENCOUNTER — TELEPHONE (OUTPATIENT)
Dept: HEMATOLOGY ONCOLOGY | Facility: CLINIC | Age: 78
End: 2018-08-20

## 2018-08-20 ENCOUNTER — OFFICE VISIT (OUTPATIENT)
Dept: FAMILY MEDICINE CLINIC | Facility: CLINIC | Age: 78
End: 2018-08-20
Payer: MEDICARE

## 2018-08-20 VITALS
DIASTOLIC BLOOD PRESSURE: 78 MMHG | HEIGHT: 65 IN | WEIGHT: 196 LBS | SYSTOLIC BLOOD PRESSURE: 140 MMHG | BODY MASS INDEX: 32.65 KG/M2

## 2018-08-20 DIAGNOSIS — F33.1 MODERATE EPISODE OF RECURRENT MAJOR DEPRESSIVE DISORDER (HCC): ICD-10-CM

## 2018-08-20 DIAGNOSIS — B35.4 TINEA CORPORIS: Primary | ICD-10-CM

## 2018-08-20 DIAGNOSIS — I10 ESSENTIAL HYPERTENSION: ICD-10-CM

## 2018-08-20 DIAGNOSIS — F41.1 GENERALIZED ANXIETY DISORDER: ICD-10-CM

## 2018-08-20 DIAGNOSIS — E78.2 MIXED HYPERLIPIDEMIA: ICD-10-CM

## 2018-08-20 PROCEDURE — 99213 OFFICE O/P EST LOW 20 MIN: CPT | Performed by: FAMILY MEDICINE

## 2018-08-20 RX ORDER — CLOTRIMAZOLE 1 %
CREAM (GRAM) TOPICAL 2 TIMES DAILY
Qty: 60 G | Refills: 0 | Status: SHIPPED | OUTPATIENT
Start: 2018-08-20 | End: 2018-11-20 | Stop reason: ALTCHOICE

## 2018-08-20 RX ORDER — ALPRAZOLAM 0.25 MG/1
0.25 TABLET ORAL 2 TIMES DAILY PRN
Qty: 30 TABLET | Refills: 0 | OUTPATIENT
Start: 2018-08-20 | End: 2018-08-22 | Stop reason: SDUPTHER

## 2018-08-20 RX ORDER — PRAVASTATIN SODIUM 40 MG
40 TABLET ORAL DAILY
Qty: 90 TABLET | Refills: 1 | Status: SHIPPED | OUTPATIENT
Start: 2018-08-20 | End: 2019-02-26 | Stop reason: SDUPTHER

## 2018-08-20 RX ORDER — HYDROCHLOROTHIAZIDE 25 MG/1
25 TABLET ORAL DAILY
Qty: 90 TABLET | Refills: 1 | Status: SHIPPED | OUTPATIENT
Start: 2018-08-20 | End: 2019-02-26 | Stop reason: SDUPTHER

## 2018-08-20 NOTE — TELEPHONE ENCOUNTER
Itzel Castillo called saying this patient stopped in their office today confused  She said they did do stool screen cards--which were all negative  They do not need a follow up visit, only a follow up phone call in 2-3 weeks  They did not know about a records request--saying if we need records they can be requested from their medical records department

## 2018-08-20 NOTE — PATIENT INSTRUCTIONS
Tinea Corporis   AMBULATORY CARE:   Tinea corporis , or ringworm, is a skin infection caused by a fungus  It usually affects the skin on your face, chest, or limbs  Tinea corporis is most common in children and athletes  Common signs and symptoms include the following:  Tinea corporis may begin as 1 or more flat, red patches  As the infection grows, it spreads out in a Pala or ring, leaving normal-looking skin in the middle  At the edge of the ring, the skin is red and raised  It may be either dry and scaly, or moist and crusty  The infected skin may itch  Although the infection looks like you have a worm under your skin, there is no worm  Contact your healthcare provider if:   · You have a fever  · Your rash continues to spread after 7 days of treatment  · Your rash is not gone in 2 weeks  · The area around your rash becomes red, warm, tender, swollen, or smells bad  · You have questions or concerns about your condition or care  Treatment:  Tinea corporis is usually treated with antifungal medicine  It may be given as a cream or pill  Take the medicine until it is gone, even if it looks like your infection is gone sooner  Prevent the spread of tinea corporis:   · Wash all items that come into contact with infected skin  Wash all towels, clothes, and bedding in hot water  Use laundry soap  Clean shower stalls, mats, and floors with a germ-killing or fungus-killing   · Do not share personal items  Do not share towels, brushes, gonzalez, or hair accessories  · Keep your skin, hair, and nails clean and dry  Bathe every day, and dry your skin before you put medicine on the infected area  Wash your hands often  Do not scratch your sores  This may cause the infection to spread  · Do not participate in contact sports , such as wrestling, for 72 hours after starting treatment  Talk to your healthcare provider before you participate in contact sports       · Have infected pets treated by a   A patch of missing fur is a sign of infection in a pet  Wear gloves and long sleeves if you handle an infected animal  Always wash your hands after handling the animal  Vacuum your home to remove infected fur or skin flakes  Disinfect surfaces and bedding that your animal comes into contact with  Follow up with your healthcare provider as directed:  Write down your questions so you remember to ask them during your visits  © 2017 [facility] Information is for End User's use only and may not be sold, redistributed or otherwise used for commercial purposes  All illustrations and images included in CareNotes® are the copyrighted property of A D A M [employer] Inc  or Aravind Monreal  The above information is an  only  It is not intended as medical advice for individual conditions or treatments  Talk to your doctor, nurse or pharmacist before following any medical regimen to see if it is safe and effective for you

## 2018-08-20 NOTE — PROGRESS NOTES
Assessment/Plan:    Tinea corporis  Patient to use the cream as directed       Diagnoses and all orders for this visit:    Tinea corporis  -     clotrimazole (LOTRIMIN) 1 % cream; Apply topically 2 (two) times a day    Essential hypertension  -     hydrochlorothiazide (HYDRODIURIL) 25 mg tablet; Take 1 tablet (25 mg total) by mouth daily (Patient taking differently: Take 12 5 mg by mouth 2 (two) times a day  )    Mixed hyperlipidemia  -     pravastatin (PRAVACHOL) 40 mg tablet; Take 1 tablet (40 mg total) by mouth daily    Generalized anxiety disorder  -     Discontinue: ALPRAZolam (XANAX) 0 25 mg tablet; Take 1 tablet (0 25 mg total) by mouth 2 (two) times a day as needed for anxiety          Subjective:   Chief Complaint   Patient presents with    Rash     buttocks  x 1 month           Patient ID: Saima Barber is a [de-identified] y o  male  Patient is here for rash for last 4 weeks Patient rash is on his buttocks It is 3 circular lesions Patient states they itch No new soaps or detergents Patient has not had any new medications either Patent ahs not tried anything for the rash      Rash   This is a new problem  The current episode started 1 to 4 weeks ago  The problem has been gradually worsening since onset  The affected locations include the right buttock and left buttock  The rash is characterized by dryness, itchiness, redness and scaling  It is unknown if there was an exposure to a precipitant  Pertinent negatives include no anorexia, congestion, cough, diarrhea, eye pain, facial edema, fatigue, fever, joint pain, nail changes, rhinorrhea, shortness of breath, sore throat or vomiting  Past treatments include nothing  There is no history of allergies, asthma or eczema  The following portions of the patient's history were reviewed and updated as appropriate: allergies, current medications, past social history and problem list     Review of Systems   Constitutional: Negative for fatigue and fever     HENT: Negative for congestion, rhinorrhea and sore throat  Eyes: Negative for pain  Respiratory: Negative for cough and shortness of breath  Gastrointestinal: Negative for anorexia, diarrhea and vomiting  Musculoskeletal: Negative for joint pain  Skin: Positive for rash  Negative for nail changes  Objective:      /78   Ht 5' 4 5" (1 638 m)   Wt 88 9 kg (196 lb)   BMI 33 12 kg/m²          Physical Exam   Constitutional: He appears well-developed and well-nourished  Eyes: Conjunctivae and EOM are normal  Pupils are equal, round, and reactive to light  Cardiovascular: Normal rate, regular rhythm and normal heart sounds  Pulmonary/Chest: Effort normal and breath sounds normal  No respiratory distress  He has no wheezes  He has no rales  Skin: Rash noted  3 coin sized lesions on the buttocks that are red and circular with flaking   Psychiatric: He has a normal mood and affect   His behavior is normal  Judgment and thought content normal

## 2018-08-20 NOTE — TELEPHONE ENCOUNTER
Called [facility]  The last time I had spoken to them regarding Hellen Albrecht and his records, they faxed everything to me, but stated his fecal occult stool tests were ordered, but not done  Evidently, there were stool tests done in office  They don't have anything documented other than a note from the nurse to the doctor stating that they were done and that they were negative  I asked them to fax me that note  I passed it along to [facility]

## 2018-08-21 RX ORDER — MIRTAZAPINE 15 MG/1
TABLET, FILM COATED ORAL
Qty: 90 TABLET | Refills: 1 | Status: SHIPPED | OUTPATIENT
Start: 2018-08-21 | End: 2018-11-20 | Stop reason: SDUPTHER

## 2018-08-22 DIAGNOSIS — F41.1 GENERALIZED ANXIETY DISORDER: ICD-10-CM

## 2018-08-22 RX ORDER — ALPRAZOLAM 0.25 MG/1
0.25 TABLET ORAL 2 TIMES DAILY PRN
Qty: 30 TABLET | Refills: 0 | OUTPATIENT
Start: 2018-08-22 | End: 2018-11-12 | Stop reason: SDUPTHER

## 2018-09-15 ENCOUNTER — APPOINTMENT (OUTPATIENT)
Dept: LAB | Facility: MEDICAL CENTER | Age: 78
End: 2018-09-15
Payer: MEDICARE

## 2018-09-15 DIAGNOSIS — D50.9 IRON DEFICIENCY ANEMIA, UNSPECIFIED IRON DEFICIENCY ANEMIA TYPE: ICD-10-CM

## 2018-09-15 DIAGNOSIS — D53.9 NUTRITIONAL ANEMIA: ICD-10-CM

## 2018-09-15 LAB
BASOPHILS # BLD AUTO: 0.08 THOUSANDS/ΜL (ref 0–0.1)
BASOPHILS NFR BLD AUTO: 1 % (ref 0–1)
EOSINOPHIL # BLD AUTO: 0.11 THOUSAND/ΜL (ref 0–0.61)
EOSINOPHIL NFR BLD AUTO: 2 % (ref 0–6)
ERYTHROCYTE [DISTWIDTH] IN BLOOD BY AUTOMATED COUNT: 19.4 % (ref 11.6–15.1)
ERYTHROCYTE [SEDIMENTATION RATE] IN BLOOD: 13 MM/HOUR (ref 0–10)
FERRITIN SERPL-MCNC: 22 NG/ML (ref 8–388)
FOLATE SERPL-MCNC: >20 NG/ML (ref 3.1–17.5)
HCT VFR BLD AUTO: 41.6 % (ref 36.5–49.3)
HGB BLD-MCNC: 13.4 G/DL (ref 12–17)
IMM GRANULOCYTES # BLD AUTO: 0.01 THOUSAND/UL (ref 0–0.2)
IMM GRANULOCYTES NFR BLD AUTO: 0 % (ref 0–2)
IRON SATN MFR SERPL: 33 %
IRON SERPL-MCNC: 137 UG/DL (ref 65–175)
LYMPHOCYTES # BLD AUTO: 2.91 THOUSANDS/ΜL (ref 0.6–4.47)
LYMPHOCYTES NFR BLD AUTO: 41 % (ref 14–44)
MCH RBC QN AUTO: 26.5 PG (ref 26.8–34.3)
MCHC RBC AUTO-ENTMCNC: 32.2 G/DL (ref 31.4–37.4)
MCV RBC AUTO: 82 FL (ref 82–98)
MONOCYTES # BLD AUTO: 0.97 THOUSAND/ΜL (ref 0.17–1.22)
MONOCYTES NFR BLD AUTO: 14 % (ref 4–12)
NEUTROPHILS # BLD AUTO: 2.95 THOUSANDS/ΜL (ref 1.85–7.62)
NEUTS SEG NFR BLD AUTO: 42 % (ref 43–75)
NRBC BLD AUTO-RTO: 0 /100 WBCS
PLATELET # BLD AUTO: 232 THOUSANDS/UL (ref 149–390)
PMV BLD AUTO: 10.7 FL (ref 8.9–12.7)
RBC # BLD AUTO: 5.05 MILLION/UL (ref 3.88–5.62)
RETICS # AUTO: ABNORMAL 10*3/UL (ref 14356–105094)
RETICS # CALC: 2.09 % (ref 0.37–1.87)
TIBC SERPL-MCNC: 411 UG/DL (ref 250–450)
VIT B12 SERPL-MCNC: 701 PG/ML (ref 100–900)
WBC # BLD AUTO: 7.03 THOUSAND/UL (ref 4.31–10.16)

## 2018-09-15 PROCEDURE — 36415 COLL VENOUS BLD VENIPUNCTURE: CPT | Performed by: PHYSICIAN ASSISTANT

## 2018-09-15 PROCEDURE — 82746 ASSAY OF FOLIC ACID SERUM: CPT

## 2018-09-15 PROCEDURE — 85652 RBC SED RATE AUTOMATED: CPT

## 2018-09-15 PROCEDURE — 82607 VITAMIN B-12: CPT

## 2018-09-15 PROCEDURE — 82728 ASSAY OF FERRITIN: CPT

## 2018-09-15 PROCEDURE — 83540 ASSAY OF IRON: CPT

## 2018-09-15 PROCEDURE — 85025 COMPLETE CBC W/AUTO DIFF WBC: CPT | Performed by: PHYSICIAN ASSISTANT

## 2018-09-15 PROCEDURE — 83550 IRON BINDING TEST: CPT

## 2018-09-15 PROCEDURE — 85045 AUTOMATED RETICULOCYTE COUNT: CPT

## 2018-09-19 ENCOUNTER — OFFICE VISIT (OUTPATIENT)
Dept: HEMATOLOGY ONCOLOGY | Facility: CLINIC | Age: 78
End: 2018-09-19
Payer: MEDICARE

## 2018-09-19 VITALS
SYSTOLIC BLOOD PRESSURE: 150 MMHG | OXYGEN SATURATION: 96 % | WEIGHT: 197 LBS | RESPIRATION RATE: 18 BRPM | TEMPERATURE: 99 F | HEART RATE: 90 BPM | BODY MASS INDEX: 32.82 KG/M2 | HEIGHT: 65 IN | DIASTOLIC BLOOD PRESSURE: 92 MMHG

## 2018-09-19 DIAGNOSIS — D50.0 IRON DEFICIENCY ANEMIA DUE TO CHRONIC BLOOD LOSS: Primary | ICD-10-CM

## 2018-09-19 PROCEDURE — 99213 OFFICE O/P EST LOW 20 MIN: CPT | Performed by: PHYSICIAN ASSISTANT

## 2018-09-19 NOTE — PROGRESS NOTES
Hematology/Oncology Outpatient Follow-up  Mona Bond [de-identified] y o  male 1940 [id number]    Date:  9/19/2018      Assessment and Plan:  1  Iron deficiency anemia due to chronic blood loss  [de-identified]year old male with iron deficiency anemia  He had hemoglobin 9 7 in July 2018  He started to take oral iron once daily and hemoglobin now is 13 4  His energy level is improved  He has not yet had follow up with GI  Discussed that he should still follow up with GI regarding his iron deficiency  Underlying malignancy in the GI tract is not excluded  His iron deficiency is unlikely related to malabsorption  If it was due to malabsorption, oral iron would not have been beneficial  Oral iron was very effective in this patient  He will continue to take oral iron 1 tablet daily        - CBC and differential  - Iron Panel; Future      HPI:  [de-identified]year old female presents for consultation for anemia  Patient was noted to have decrease in hemoglobin in April 2018, hemoglobin 10 5, MCV 81  Repeat hemoglobin in July 2018 was 9 7, MCV 76  Platelets and WBC remain normal on both readings  Previously hemoglobin had been in the normal range       Most recent colonoscopy 10-12 years ago       He saw Dr Ade De La Rosa in July  Occult stool testing was negative  Patient states that he was told by Dr Ade De La Rosa that he did not need to have scope  He also follows with him due to chronic fatty liver  Records from Dr Ade De La Rosa did indicate occult stool testing was negative in the office  He did want to see patient back in the office       He has been taking oral iron BID for 3 weeks  He did have constipation with this but now resolved with Miralax       He takes pantoprazole for GERD and has been taking for about 10 years  He states he thinks he takes this BID  Records though show once daily   He will take once daily in the AM      Interval history: feeling better since taking oral iron    ROS: Review of Systems   Constitutional: Negative for appetite change, chills, fatigue, fever and unexpected weight change  HENT: Negative for mouth sores and nosebleeds  Respiratory: Negative for cough and shortness of breath  Cardiovascular: Negative for chest pain, palpitations and leg swelling  Gastrointestinal: Negative for abdominal pain, blood in stool, constipation, diarrhea, nausea and vomiting  Genitourinary: Negative for difficulty urinating, dysuria and hematuria  Musculoskeletal: Negative for arthralgias, joint swelling and myalgias  Skin: Negative  Neurological: Negative for dizziness, weakness, light-headedness, numbness and headaches  Hematological: Negative  Psychiatric/Behavioral: Negative          Past Medical History:   Diagnosis Date    Actinic keratosis     LAST ASSESSED: [id number]    Allergic rhinitis     LAST ASSESSED: [id number]    Anxiety     LAST ASSESSED: [id number]    Anxiety disorder     LAST ASSESSED: [id number]    Atelectasis of right lung     ABNORMAL CT SCAN OF [address] 12/2/2016 REPEAT NEEDED PER RADIOLOGY IN 3 MONTHS LAST ASSESSED: [id number]    Atypical chest pain     LAST ASSESSED: [id number]    Benign paroxysmal vertigo     LAST ASSESSED: 53JEB2266    Chronic cough     LAST ASSESSED: 99HHQ1951    Chronic GERD     Degenerative arthritis of knee, bilateral     LAST ASSESSED: 40JPX9765    Diverticulitis of colon     LAST ASSESSED: 49IGM8714    Diverticulosis     LAST ASSESSED: [id number]    Foreign body, eye     LAST ASSESSED: 36KOV2469    Functional gait abnormality     LAST ASSESSED: 02MLF4159    Hyperlipidemia     Hypertension     Hyponatremia     LAST ASSESSED: 71IWG3462    Leukocytosis     LAST ASSESSED: 24XOF0956    Murmur     Newly recognized murmur     LAST ASSESSED: 41UVW6671    Olecranon bursitis, unspecified elbow     Presence of indwelling urethral catheter     RESOLVED: 15BTK4314    Transient cerebral ischemia     LAST ASSESSED: 64QXX1579    Urinary incontinence     LAST ASSESSED: 57BXU5358    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     LAST ASSESSED: 58BRW6574       Past Surgical History:   Procedure Laterality Date    ADENOIDECTOMY      APPENDECTOMY      COLONOSCOPY  10/2006    FIBEROPTIC    INGUINAL HERNIA REPAIR      JOINT REPLACEMENT      b/l TKR Sept 2015    SINUS SURGERY      TONSILLECTOMY         Social History     Social History    Marital status:       Spouse name: N/A    Number of children: N/A    Years of education: N/A     Occupational History    tailor, 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      Social History Main Topics    Smoking status: Former Smoker     Quit date: 1960    Smokeless tobacco: Never Used      Comment: 2 ppd for 12 years     Alcohol use Yes      Comment: occ, SOCIAL    Drug use: No    Sexual activity: Not on file     Other Topics Concern    Not on file     Social History Narrative    USES [employer] E[employer]MENT - [employer]           Family History   Problem Relation Age of Onset    No Known Problems Family        Allergies   Allergen Reactions    Ace Inhibitors Cough     Pt denied any allergies           Current Outpatient Prescriptions:     ALPRAZolam (XANAX) 0 25 mg tablet, Take 1 tablet (0 25 mg total) by mouth 2 (two) times a day as needed for anxiety, Disp: 30 tablet, Rfl: 0    amLODIPine (NORVASC) 10 mg tablet, Take 1 tablet (10 mg total) by mouth daily, Disp: 90 tablet, Rfl: 1    aspirin 325 mg tablet, Take 325 mg by mouth daily, Disp: , Rfl:     clotrimazole (LOTRIMIN) 1 % cream, Apply topically 2 (two) times a day, Disp: 60 g, Rfl: 0    ferrous sulfate 325 (65 Fe) mg tablet, Take 1 tablet (325 mg total) by mouth 2 (two) times a day with meals, Disp: 60 tablet, Rfl: 1    hydrochlorothiazide (HYDRODIURIL) 25 mg tablet, Take 1 tablet (25 mg total) by mouth daily, Disp: 90 tablet, Rfl: 1    mirtazapine (REMERON) 15 mg tablet, TAKE 1 TABLET BY MOUTH ONCE DAILY AT BEDTIME, Disp: 90 tablet, Rfl: 1    multivitamin (THERAGRAN) TABS, Take 1 tablet by mouth, Disp: , Rfl:     Omega-3 Fatty Acids (FISH OIL PO), Take by mouth 2 (two) times a day, Disp: , Rfl:     pantoprazole (PROTONIX) 40 mg tablet, Take 1 tablet (40 mg total) by mouth daily, Disp: 90 tablet, Rfl: 1    polyethylene glycol (MIRALAX) 17 g packet, Take 17 g by mouth daily, Disp: , Rfl:     potassium chloride (K-DUR,KLOR-CON) 20 mEq tablet, Take 1 tablet (20 mEq total) by mouth daily, Disp: 90 tablet, Rfl: 0    pravastatin (PRAVACHOL) 40 mg tablet, Take 1 tablet (40 mg total) by mouth daily, Disp: 90 tablet, Rfl: 1    Probiotic Product (PROBIOTIC DAILY PO), Take by mouth daily, Disp: , Rfl:     tamsulosin (FLOMAX) 0 4 mg, Take 1 capsule (0 4 mg total) by mouth daily at bedtime, Disp: 90 capsule, Rfl: 2    VITAMIN D, ERGOCALCIFEROL, PO, Take by mouth, Disp: , Rfl:     VITAMIN E PO, Take by mouth, Disp: , Rfl:     finasteride (PROSCAR) 5 mg tablet, Take 1 tablet by mouth daily for 30 days, Disp: 30 tablet, Rfl: 0      Physical Exam:  /92 (BP Location: Left arm, Patient Position: Sitting, Cuff Size: Standard)   Pulse 90   Temp 99 °F (37 2 °C) (Tympanic)   Resp 18   Ht 5' 4 6" (1 641 m)   Wt 89 4 kg (197 lb)   SpO2 96%   BMI 33 19 kg/m²     Physical Exam   Constitutional: He is oriented to person, place, and time  He appears well-developed and well-nourished  No distress  HENT:   Head: Normocephalic and atraumatic  Eyes: Conjunctivae are normal  No scleral icterus  Neck: Normal range of motion  Neck supple  Cardiovascular: Normal rate, regular rhythm and normal heart sounds  No murmur heard  Pulmonary/Chest: Effort normal and breath sounds normal  No respiratory distress  Abdominal: Soft  There is no tenderness  Musculoskeletal: Normal range of motion  He exhibits no edema or tenderness  Neurological: He is alert and oriented to person, place, and time  No cranial nerve deficit  Skin: Skin is warm and dry     Psychiatric: He has a normal mood and affect  Vitals reviewed  Labs:  Lab Results   Component Value Date    WBC 7 03 09/15/2018    HGB 13 4 09/15/2018    HCT 41 6 09/15/2018    MCV 82 09/15/2018     09/15/2018         Patient voiced understanding and agreement in the above discussion  Aware to contact our office with questions/symptoms in the interim

## 2018-09-25 ENCOUNTER — TELEPHONE (OUTPATIENT)
Dept: FAMILY MEDICINE CLINIC | Facility: CLINIC | Age: 78
End: 2018-09-25

## 2018-09-25 DIAGNOSIS — B35.4 TINEA CORPORIS: Primary | ICD-10-CM

## 2018-09-25 RX ORDER — KETOCONAZOLE 20 MG/G
CREAM TOPICAL DAILY
Qty: 60 G | Refills: 0 | Status: SHIPPED | OUTPATIENT
Start: 2018-09-25 | End: 2018-11-20 | Stop reason: ALTCHOICE

## 2018-10-03 ENCOUNTER — OFFICE VISIT (OUTPATIENT)
Dept: CARDIOLOGY CLINIC | Facility: CLINIC | Age: 78
End: 2018-10-03
Payer: MEDICARE

## 2018-10-03 VITALS
DIASTOLIC BLOOD PRESSURE: 78 MMHG | WEIGHT: 196 LBS | SYSTOLIC BLOOD PRESSURE: 118 MMHG | HEIGHT: 64 IN | BODY MASS INDEX: 33.46 KG/M2 | HEART RATE: 89 BPM

## 2018-10-03 DIAGNOSIS — I49.3 PVC (PREMATURE VENTRICULAR CONTRACTION): Primary | ICD-10-CM

## 2018-10-03 PROCEDURE — 99214 OFFICE O/P EST MOD 30 MIN: CPT | Performed by: INTERNAL MEDICINE

## 2018-10-03 NOTE — PROGRESS NOTES
Cardiology Follow Up    Apolinar Silva  1940  [id number]  [address] [facility]  [phone]    No diagnosis found  Interval History: Followup for PVCs  No chest pain, no dyspnea and no palpitations       Problem List     Chronic GERD (Chronic)    Mixed hyperlipidemia    Essential hypertension    Murmur (Chronic)    Constipation    BPH (benign prostatic hyperplasia) (Chronic)    Enlarged prostate without lower urinary tract symptoms (luts)    Fatty liver disease, nonalcoholic    Generalized anxiety disorder    Moderate episode of recurrent major depressive disorder (HCC)    PVC (premature ventricular contraction)    Iron deficiency anemia secondary to inadequate dietary iron intake    Tinea corporis        Past Medical History:   Diagnosis Date    Actinic keratosis     LAST ASSESSED: [id number]    Allergic rhinitis     LAST ASSESSED: 90QJQ9029    Anxiety     LAST ASSESSED: 65ULM6804    Anxiety disorder     LAST ASSESSED: 48OET5730    Atelectasis of right lung     ABNORMAL CT SCAN OF [address] [facility] 12/2/2016 REPEAT NEEDED PER RADIOLOGY IN 3 MONTHS LAST ASSESSED: [id number]    Atypical chest pain     LAST ASSESSED: 07NJJ8005    Benign paroxysmal vertigo     LAST ASSESSED: 36PLG3480    Chronic cough     LAST ASSESSED: 86VAA9064    Chronic GERD     Degenerative arthritis of knee, bilateral     LAST ASSESSED: 12KGC6281    Diverticulitis of colon     LAST ASSESSED: 71TAI7863    Diverticulosis     LAST ASSESSED: 53PVY1047    Foreign body, eye     LAST ASSESSED: 65PCO6194    Functional gait abnormality     LAST ASSESSED: 52FGV3910    Hyperlipidemia     Hypertension     Hyponatremia     LAST ASSESSED: 34AKQ5153    Leukocytosis     LAST ASSESSED: 73XER1433    Murmur     Newly recognized murmur     LAST ASSESSED: 26VXG3917    Olecranon bursitis, unspecified elbow     Presence of indwelling urethral catheter     RESOLVED: 71RGF1765    Transient cerebral ischemia     LAST ASSESSED: 58IUC3743    Urinary incontinence     LAST ASSESSED: 84IQM7912    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     LAST ASSESSED: 73VWE2312     Social History     Social History    Marital status:       Spouse name: N/A    Number of children: N/A    Years of education: N/A     Occupational History    tailor, 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      Social History Main Topics    Smoking status: Former Smoker     Quit date: 1960    Smokeless tobacco: Never Used      Comment: 2 ppd for 12 years     Alcohol use Yes      Comment: occ, SOCIAL    Drug use: No    Sexual activity: Not on file     Other Topics Concern    Not on file     Social History Narrative    USES [employer]          Family History   Problem Relation Age of Onset    No Known Problems Family      Past Surgical History:   Procedure Laterality Date    ADENOIDECTOMY      APPENDECTOMY      COLONOSCOPY  10/2006    FIBEROPTIC    INGUINAL HERNIA REPAIR      JOINT REPLACEMENT      b/l TKR Sept 2015    SINUS SURGERY      TONSILLECTOMY         Current Outpatient Prescriptions:     ALPRAZolam (XANAX) 0 25 mg tablet, Take 1 tablet (0 25 mg total) by mouth 2 (two) times a day as needed for anxiety, Disp: 30 tablet, Rfl: 0    amLODIPine (NORVASC) 10 mg tablet, Take 1 tablet (10 mg total) by mouth daily, Disp: 90 tablet, Rfl: 1    aspirin 325 mg tablet, Take 81 mg by mouth  , Disp: , Rfl:     clotrimazole (LOTRIMIN) 1 % cream, Apply topically 2 (two) times a day, Disp: 60 g, Rfl: 0    ferrous sulfate 325 (65 Fe) mg tablet, Take 1 tablet (325 mg total) by mouth 2 (two) times a day with meals, Disp: 60 tablet, Rfl: 1    finasteride (PROSCAR) 5 mg tablet, Take 1 tablet by mouth daily for 30 days, Disp: 30 tablet, Rfl: 0    ketoconazole (NIZORAL) 2 % cream, Apply topically daily Apply daily for 2 weeks, Disp: 60 g, Rfl: 0    mirtazapine (REMERON) 15 mg tablet, TAKE 1 TABLET BY MOUTH ONCE DAILY AT BEDTIME, Disp: 90 tablet, Rfl: 1    multivitamin (THERAGRAN) TABS, Take 1 tablet by mouth, Disp: , Rfl:     Omega-3 Fatty Acids (FISH OIL PO), Take by mouth 2 (two) times a day, Disp: , Rfl:     pantoprazole (PROTONIX) 40 mg tablet, Take 1 tablet (40 mg total) by mouth daily, Disp: 90 tablet, Rfl: 1    polyethylene glycol (MIRALAX) 17 g packet, Take 17 g by mouth daily, Disp: , Rfl:     potassium chloride (K-DUR,KLOR-CON) 20 mEq tablet, Take 1 tablet (20 mEq total) by mouth daily, Disp: 90 tablet, Rfl: 0    pravastatin (PRAVACHOL) 40 mg tablet, Take 1 tablet (40 mg total) by mouth daily, Disp: 90 tablet, Rfl: 1    Probiotic Product (PROBIOTIC DAILY PO), Take by mouth daily, Disp: , Rfl:     tamsulosin (FLOMAX) 0 4 mg, Take 1 capsule (0 4 mg total) by mouth daily at bedtime, Disp: 90 capsule, Rfl: 2    VITAMIN E PO, Take by mouth, Disp: , Rfl:     hydrochlorothiazide (HYDRODIURIL) 25 mg tablet, Take 1 tablet (25 mg total) by mouth daily, Disp: 90 tablet, Rfl: 1    VITAMIN D, ERGOCALCIFEROL, PO, Take by mouth, Disp: , Rfl:   Allergies   Allergen Reactions    Ace Inhibitors Cough     Pt denied any allergies         Labs:     Chemistry        Component Value Date/Time     04/19/2018 1014     (L) 07/25/2015 0545    K 3 5 04/19/2018 1014    K 3 5 07/25/2015 0545     04/19/2018 1014     07/25/2015 0545    CO2 25 04/19/2018 1014    CO2 24 9 07/25/2015 0545    BUN 18 04/19/2018 1014    BUN 14 07/25/2015 0545    CREATININE 0 71 04/19/2018 1014    CREATININE 0 77 07/25/2015 0545        Component Value Date/Time    CALCIUM 8 7 04/19/2018 1014    CALCIUM 8 7 07/25/2015 0545    ALKPHOS 70 04/19/2018 1014    ALKPHOS 57 07/24/2015 1345    AST 89 (H) 04/19/2018 1014    AST 35 07/24/2015 1345    ALT 76 04/19/2018 1014    ALT 68 07/24/2015 1345    BILITOT 0 92 07/24/2015 1345            Lab Results   Component Value Date    CHOL 161 07/25/2015    CHOL 181 06/18/2015    CHOL 177 06/13/2014     Lab Results   Component Value Date    HDL 40 04/19/2018    HDL 52 10/19/2017    HDL 52 04/19/2017     Lab Results   Component Value Date    LDLCALC 69 04/19/2018    LDLCALC 71 10/19/2017    LDLCALC 80 04/19/2017     Lab Results   Component Value Date    TRIG 143 04/19/2018    TRIG 178 (H) 10/19/2017    TRIG 226 (H) 04/19/2017     No components found for: CHOLHDL    Imaging: No results found  Review of Systems   Constitution: Negative  HENT: Negative  Eyes: Negative  Cardiovascular: Negative  Respiratory: Negative  Endocrine: Negative  Hematologic/Lymphatic: Negative  Skin: Negative  Musculoskeletal: Negative  Gastrointestinal: Negative  Genitourinary: Negative  Neurological: Negative  Psychiatric/Behavioral: Negative  Allergic/Immunologic: Negative  Vitals:    10/03/18 0858   BP: 118/78   Pulse: 89           Physical Exam   Constitutional: He is oriented to person, place, and time  No distress  HENT:   Mouth/Throat: No oropharyngeal exudate  Eyes: No scleral icterus  Neck: No JVD present  Cardiovascular: Normal rate and regular rhythm  No murmur heard  Pulmonary/Chest: Effort normal and breath sounds normal  No respiratory distress  He has no wheezes  He has no rales  Abdominal: Soft  Bowel sounds are normal  He exhibits no distension  There is no tenderness  There is no rebound  Musculoskeletal: He exhibits no edema  Neurological: He is alert and oriented to person, place, and time  Skin: Skin is warm and dry  He is not diaphoretic  Psychiatric: He has a normal mood and affect  His behavior is normal        Discussion/Summary:    PVCs: He is relatively asymptomatic  No previous significant cardiac history   Normal degree of ectopy on his last holter       HTN: BP is stable on current medical therapy       Mild to Moderate AS: Will continue to follow           The patient was counseled regarding diagnostic results, instructions for management, risk factor reductions, impressions  total time of encounter was 25 minutes and 15 minutes was spent counseling

## 2018-11-10 ENCOUNTER — LAB (OUTPATIENT)
Dept: LAB | Facility: MEDICAL CENTER | Age: 78
End: 2018-11-10
Payer: MEDICARE

## 2018-11-10 DIAGNOSIS — I10 ESSENTIAL HYPERTENSION: ICD-10-CM

## 2018-11-10 DIAGNOSIS — D50.8 IRON DEFICIENCY ANEMIA SECONDARY TO INADEQUATE DIETARY IRON INTAKE: ICD-10-CM

## 2018-11-10 LAB
ALBUMIN SERPL BCP-MCNC: 3.8 G/DL (ref 3.5–5)
ALP SERPL-CCNC: 62 U/L (ref 46–116)
ALT SERPL W P-5'-P-CCNC: 44 U/L (ref 12–78)
ANION GAP SERPL CALCULATED.3IONS-SCNC: 7 MMOL/L (ref 4–13)
AST SERPL W P-5'-P-CCNC: 30 U/L (ref 5–45)
BASOPHILS # BLD AUTO: 0.09 THOUSANDS/ΜL (ref 0–0.1)
BASOPHILS NFR BLD AUTO: 1 % (ref 0–1)
BILIRUB SERPL-MCNC: 0.86 MG/DL (ref 0.2–1)
BUN SERPL-MCNC: 19 MG/DL (ref 5–25)
CALCIUM SERPL-MCNC: 9.2 MG/DL (ref 8.3–10.1)
CHLORIDE SERPL-SCNC: 102 MMOL/L (ref 100–108)
CHOLEST SERPL-MCNC: 156 MG/DL (ref 50–200)
CO2 SERPL-SCNC: 28 MMOL/L (ref 21–32)
CREAT SERPL-MCNC: 0.88 MG/DL (ref 0.6–1.3)
EOSINOPHIL # BLD AUTO: 0.16 THOUSAND/ΜL (ref 0–0.61)
EOSINOPHIL NFR BLD AUTO: 2 % (ref 0–6)
ERYTHROCYTE [DISTWIDTH] IN BLOOD BY AUTOMATED COUNT: 14.6 % (ref 11.6–15.1)
GFR SERPL CREATININE-BSD FRML MDRD: 81 ML/MIN/1.73SQ M
GLUCOSE P FAST SERPL-MCNC: 111 MG/DL (ref 65–99)
HCT VFR BLD AUTO: 44 % (ref 36.5–49.3)
HDLC SERPL-MCNC: 50 MG/DL (ref 40–60)
HGB BLD-MCNC: 15 G/DL (ref 12–17)
IMM GRANULOCYTES # BLD AUTO: 0.03 THOUSAND/UL (ref 0–0.2)
IMM GRANULOCYTES NFR BLD AUTO: 0 % (ref 0–2)
LDLC SERPL CALC-MCNC: 67 MG/DL (ref 0–100)
LYMPHOCYTES # BLD AUTO: 3.34 THOUSANDS/ΜL (ref 0.6–4.47)
LYMPHOCYTES NFR BLD AUTO: 39 % (ref 14–44)
MCH RBC QN AUTO: 29.9 PG (ref 26.8–34.3)
MCHC RBC AUTO-ENTMCNC: 34.1 G/DL (ref 31.4–37.4)
MCV RBC AUTO: 88 FL (ref 82–98)
MONOCYTES # BLD AUTO: 1.03 THOUSAND/ΜL (ref 0.17–1.22)
MONOCYTES NFR BLD AUTO: 12 % (ref 4–12)
NEUTROPHILS # BLD AUTO: 3.94 THOUSANDS/ΜL (ref 1.85–7.62)
NEUTS SEG NFR BLD AUTO: 46 % (ref 43–75)
NONHDLC SERPL-MCNC: 106 MG/DL
NRBC BLD AUTO-RTO: 0 /100 WBCS
PLATELET # BLD AUTO: 196 THOUSANDS/UL (ref 149–390)
PMV BLD AUTO: 10.9 FL (ref 8.9–12.7)
POTASSIUM SERPL-SCNC: 3.5 MMOL/L (ref 3.5–5.3)
PROT SERPL-MCNC: 7.4 G/DL (ref 6.4–8.2)
RBC # BLD AUTO: 5.01 MILLION/UL (ref 3.88–5.62)
SODIUM SERPL-SCNC: 137 MMOL/L (ref 136–145)
TRIGL SERPL-MCNC: 193 MG/DL
TSH SERPL DL<=0.05 MIU/L-ACNC: 1.2 UIU/ML
WBC # BLD AUTO: 8.59 THOUSAND/UL (ref 4.31–10.16)

## 2018-11-10 PROCEDURE — 80061 LIPID PANEL: CPT

## 2018-11-10 PROCEDURE — 85025 COMPLETE CBC W/AUTO DIFF WBC: CPT

## 2018-11-10 PROCEDURE — 84443 ASSAY THYROID STIM HORMONE: CPT

## 2018-11-10 PROCEDURE — 80053 COMPREHEN METABOLIC PANEL: CPT

## 2018-11-10 PROCEDURE — 36415 COLL VENOUS BLD VENIPUNCTURE: CPT

## 2018-11-12 DIAGNOSIS — I10 ESSENTIAL HYPERTENSION: ICD-10-CM

## 2018-11-12 DIAGNOSIS — D50.9 IRON DEFICIENCY ANEMIA, UNSPECIFIED IRON DEFICIENCY ANEMIA TYPE: ICD-10-CM

## 2018-11-12 DIAGNOSIS — F41.1 GENERALIZED ANXIETY DISORDER: ICD-10-CM

## 2018-11-12 RX ORDER — ALPRAZOLAM 0.25 MG/1
0.25 TABLET ORAL 2 TIMES DAILY PRN
Qty: 30 TABLET | Refills: 0 | OUTPATIENT
Start: 2018-11-12 | End: 2018-11-15 | Stop reason: SDUPTHER

## 2018-11-12 RX ORDER — FERROUS SULFATE 325(65) MG
325 TABLET ORAL 2 TIMES DAILY WITH MEALS
Qty: 180 TABLET | Refills: 1 | Status: SHIPPED | OUTPATIENT
Start: 2018-11-12 | End: 2019-03-22 | Stop reason: HOSPADM

## 2018-11-12 RX ORDER — AMLODIPINE BESYLATE AND BENAZEPRIL HYDROCHLORIDE 10; 40 MG/1; MG/1
1 CAPSULE ORAL
COMMUNITY
End: 2018-11-17

## 2018-11-12 RX ORDER — AMLODIPINE BESYLATE 10 MG/1
TABLET ORAL
Qty: 90 TABLET | Refills: 1 | Status: SHIPPED | OUTPATIENT
Start: 2018-11-12 | End: 2019-02-26 | Stop reason: SDUPTHER

## 2018-11-15 DIAGNOSIS — F41.1 GENERALIZED ANXIETY DISORDER: ICD-10-CM

## 2018-11-15 RX ORDER — ALPRAZOLAM 0.25 MG/1
0.25 TABLET ORAL 2 TIMES DAILY PRN
Qty: 30 TABLET | Refills: 0 | OUTPATIENT
Start: 2018-11-15 | End: 2018-11-28 | Stop reason: SDUPTHER

## 2018-11-17 ENCOUNTER — HOSPITAL ENCOUNTER (EMERGENCY)
Facility: HOSPITAL | Age: 78
Discharge: HOME | End: 2018-11-17
Attending: EMERGENCY MEDICINE | Admitting: EMERGENCY MEDICINE
Payer: MEDICARE

## 2018-11-17 VITALS
HEART RATE: 91 BPM | HEIGHT: 64 IN | WEIGHT: 185 LBS | BODY MASS INDEX: 31.58 KG/M2 | TEMPERATURE: 98 F | OXYGEN SATURATION: 94 % | DIASTOLIC BLOOD PRESSURE: 74 MMHG | RESPIRATION RATE: 22 BRPM | SYSTOLIC BLOOD PRESSURE: 168 MMHG

## 2018-11-17 DIAGNOSIS — E87.6 HYPOKALEMIA: ICD-10-CM

## 2018-11-17 DIAGNOSIS — F32.A DEPRESSION: ICD-10-CM

## 2018-11-17 DIAGNOSIS — L40.9 PSORIASIS: Primary | ICD-10-CM

## 2018-11-17 LAB
ALBUMIN SERPL BCP-MCNC: 3.7 G/DL (ref 3.5–5)
ALP SERPL-CCNC: 70 U/L (ref 46–116)
ALT SERPL W P-5'-P-CCNC: 48 U/L (ref 12–78)
ANION GAP SERPL CALCULATED.3IONS-SCNC: 11 MMOL/L (ref 4–13)
AST SERPL W P-5'-P-CCNC: 38 U/L (ref 5–45)
BASOPHILS # BLD AUTO: 0.05 THOUSANDS/ΜL (ref 0–0.1)
BASOPHILS NFR BLD AUTO: 1 % (ref 0–1)
BILIRUB SERPL-MCNC: 0.9 MG/DL (ref 0.2–1)
BUN SERPL-MCNC: 23 MG/DL (ref 5–25)
CALCIUM SERPL-MCNC: 8.9 MG/DL (ref 8.3–10.1)
CHLORIDE SERPL-SCNC: 101 MMOL/L (ref 100–108)
CO2 SERPL-SCNC: 28 MMOL/L (ref 21–32)
CREAT SERPL-MCNC: 0.99 MG/DL (ref 0.6–1.3)
EOSINOPHIL # BLD AUTO: 0.01 THOUSAND/ΜL (ref 0–0.61)
EOSINOPHIL NFR BLD AUTO: 0 % (ref 0–6)
ERYTHROCYTE [DISTWIDTH] IN BLOOD BY AUTOMATED COUNT: 13.9 % (ref 11.6–15.1)
ETHANOL SERPL-MCNC: <3 MG/DL (ref 0–3)
GFR SERPL CREATININE-BSD FRML MDRD: 72 ML/MIN/1.73SQ M
GLUCOSE SERPL-MCNC: 177 MG/DL (ref 65–140)
HCT VFR BLD AUTO: 43.1 % (ref 36.5–49.3)
HGB BLD-MCNC: 14.7 G/DL (ref 12–17)
IMM GRANULOCYTES # BLD AUTO: 0.01 THOUSAND/UL (ref 0–0.2)
IMM GRANULOCYTES NFR BLD AUTO: 0 % (ref 0–2)
LYMPHOCYTES # BLD AUTO: 1.31 THOUSANDS/ΜL (ref 0.6–4.47)
LYMPHOCYTES NFR BLD AUTO: 21 % (ref 14–44)
MCH RBC QN AUTO: 29.8 PG (ref 26.8–34.3)
MCHC RBC AUTO-ENTMCNC: 34.1 G/DL (ref 31.4–37.4)
MCV RBC AUTO: 87 FL (ref 82–98)
MONOCYTES # BLD AUTO: 1.01 THOUSAND/ΜL (ref 0.17–1.22)
MONOCYTES NFR BLD AUTO: 16 % (ref 4–12)
NEUTROPHILS # BLD AUTO: 3.78 THOUSANDS/ΜL (ref 1.85–7.62)
NEUTS SEG NFR BLD AUTO: 62 % (ref 43–75)
NRBC BLD AUTO-RTO: 0 /100 WBCS
PLATELET # BLD AUTO: 185 THOUSANDS/UL (ref 149–390)
PMV BLD AUTO: 9.5 FL (ref 8.9–12.7)
POTASSIUM SERPL-SCNC: 3.1 MMOL/L (ref 3.5–5.3)
PROT SERPL-MCNC: 7.5 G/DL (ref 6.4–8.2)
RBC # BLD AUTO: 4.94 MILLION/UL (ref 3.88–5.62)
SODIUM SERPL-SCNC: 140 MMOL/L (ref 136–145)
TSH SERPL DL<=0.05 MIU/L-ACNC: 0.41 UIU/ML (ref 0.36–3.74)
WBC # BLD AUTO: 6.17 THOUSAND/UL (ref 4.31–10.16)

## 2018-11-17 PROCEDURE — 99284 EMERGENCY DEPT VISIT MOD MDM: CPT

## 2018-11-17 PROCEDURE — 85025 COMPLETE CBC W/AUTO DIFF WBC: CPT

## 2018-11-17 PROCEDURE — 84443 ASSAY THYROID STIM HORMONE: CPT

## 2018-11-17 PROCEDURE — 36415 COLL VENOUS BLD VENIPUNCTURE: CPT

## 2018-11-17 PROCEDURE — 80320 DRUG SCREEN QUANTALCOHOLS: CPT

## 2018-11-17 PROCEDURE — 80053 COMPREHEN METABOLIC PANEL: CPT

## 2018-11-17 PROCEDURE — 93005 ELECTROCARDIOGRAM TRACING: CPT

## 2018-11-17 RX ORDER — HYDROXYZINE HYDROCHLORIDE 25 MG/1
25 TABLET, FILM COATED ORAL EVERY 6 HOURS PRN
Qty: 15 TABLET | Refills: 0 | Status: SHIPPED | OUTPATIENT
Start: 2018-11-17 | End: 2019-03-22 | Stop reason: SDUPTHER

## 2018-11-17 RX ORDER — PREDNISONE 20 MG/1
TABLET ORAL
Qty: 15 TABLET | Refills: 0 | Status: SHIPPED | OUTPATIENT
Start: 2018-11-17 | End: 2019-03-22 | Stop reason: ALTCHOICE

## 2018-11-17 RX ORDER — POTASSIUM CHLORIDE 20 MEQ/1
40 TABLET, EXTENDED RELEASE ORAL ONCE
Status: COMPLETED | OUTPATIENT
Start: 2018-11-17 | End: 2018-11-17

## 2018-11-17 RX ADMIN — POTASSIUM CHLORIDE 40 MEQ: 1500 TABLET, EXTENDED RELEASE ORAL at 11:10

## 2018-11-17 NOTE — DISCHARGE INSTRUCTIONS
Follow up with the numbers given to you by crisis worker  Follow up with family doctor in 1 week for recheck  Follow up with dermatologist as scheduled  Take prednisone as directed and atarax as needed for itching  Depression   WHAT YOU NEED TO KNOW:   Depression is a medical condition that causes feelings of sadness or hopelessness that do not go away  Depression may cause you to lose interest in things you used to enjoy  These feelings may interfere with your daily life  DISCHARGE INSTRUCTIONS:   Call 911 for any of the following:   · You think about harming yourself or someone else  Contact your healthcare provider if:   · Your symptoms do not improve  · You cannot make it to your next appointment  · You have new symptoms  · You have questions or concerns about your condition or care  Medicines:   · Antidepressants  may be given to improve or balance your mood  You may need to take this medicine for several weeks before you begin to feel better  · Take your medicine as directed  Contact your healthcare provider if you think your medicine is not helping or if you have side effects  Tell him of her if you are allergic to any medicine  Keep a list of the medicines, vitamins, and herbs you take  Include the amounts, and when and why you take them  Bring the list or the pill bottles to follow-up visits  Carry your medicine list with you in case of an emergency  Therapy  may be used to treat your depression  A therapist will help you learn to cope with your thoughts and feelings  This can be done alone or in a group  It may also be done with family members or a significant other  Self-care:   · Get regular physical activity  Try to exercise for 30 minutes, 3 to 5 days a week  Work with your healthcare provider to develop an exercise plan that you enjoy  Physical activity may improve your symptoms  · Get enough sleep  Create a routine to help you relax before bed   You can listen to music, read, or do yoga  Try to go to bed and wake up at the same time every day  Sleep is important for emotional health  · Eat a variety of healthy foods from all of the food groups  A healthy meal plan is low in fat, salt, and added sugar  Ask your healthcare provider for more information about a meal plan that is right for you  · Do not drink alcohol or use drugs  Alcohol and drugs can make your symptoms worse  Follow up with your healthcare provider as directed: Your healthcare provider will monitor your progress at follow-up visits  He or she will also monitor your medicine if you take antidepressants  Your healthcare provider will ask if the medicine is helping  Tell him or her about any side effects or problems you may have with your medicine  The type or amount of medicine may need to be changed  Write down your questions so you remember to ask them during your visits  © 2017 [address] [employer] Information is for End User's use only and may not be sold, redistributed or otherwise used for commercial purposes  All illustrations and images included in CareNotes® are the copyrighted property of A [employer] A M [employer]  or Aravind Monreal  The above information is an  only  It is not intended as medical advice for individual conditions or treatments  Talk to your doctor, nurse or pharmacist before following any medical regimen to see if it is safe and effective for you  Hypokalemia   WHAT YOU NEED TO KNOW:   Hypokalemia is a low level of potassium in your blood  Potassium helps control how your muscles, heart, and digestive system work  Hypokalemia occurs when your body loses too much potassium or does not absorb enough from food  DISCHARGE INSTRUCTIONS:   Seek care immediately if:   · You cannot move your arm or leg  · You have a fast or irregular heartbeat  · You are too tired or weak to stand up    Contact your healthcare provider if:   · You are vomiting, or you have diarrhea  · You have numbness or tingling in your arms or legs  · Your symptoms do not go away or they get worse  · You have questions or concerns about your condition or care  Medicines:   · Potassium  will be given to bring your potassium levels back to normal     · Take your medicine as directed  Contact your healthcare provider if you think your medicine is not helping or if you have side effects  Tell him of her if you are allergic to any medicine  Keep a list of the medicines, vitamins, and herbs you take  Include the amounts, and when and why you take them  Bring the list or the pill bottles to follow-up visits  Carry your medicine list with you in case of an emergency  Eat foods that are high in potassium:  Foods that are high in potassium include bananas, oranges, tomatoes, potatoes, and avocado  Schwarz beans, turkey, salmon, lean beef, yogurt, and milk are also high in potassium  Ask your healthcare provider or dietitian for more information about foods that are high in potassium  Follow up with your healthcare provider as directed:  Write down your questions so you remember to ask them during your visits  © 2017 [address] [facility] Information is for End User's use only and may not be sold, redistributed or otherwise used for commercial purposes  All illustrations and images included in CareNotes® are the copyrighted property of [employer]  or Aravind Monreal  The above information is an  only  It is not intended as medical advice for individual conditions or treatments  Talk to your doctor, nurse or pharmacist before following any medical regimen to see if it is safe and effective for you  Psoriasis   WHAT YOU NEED TO KNOW:   Psoriasis is a long-term skin disease in which the skin cells grow faster than normal  This abnormal growth causes a buildup of cells on the surface of the skin   Red, raised patches of skin that are covered with silver-colored scales form on your skin  DISCHARGE INSTRUCTIONS:   Medicines:   · Topical medicine: These medicines can be ointments, creams, and pastes and are applied on the skin  ¨ Moisturizers: These soothe your skin by keeping it moist and preventing dryness  ¨ Steroids: This medicine may be given to decrease inflammation  ¨ Vitamin D and retinoids: These are vitamin-based creams that are used to clear plaques  ¨ Anthralin:  This medicine decreases swelling and excess skin cells that form scales  ¨ Salicylic acid: This peeling agent helps decrease scaling of the skin and scalp  ¨ Tar preparations: These medicines decrease your itching, scaling, and inflammation  They may be shampoos, creams, or bath oils  · Oral medicine: These medicines are used to treat serious types of psoriasis and are taken by mouth  These medicines include steroids or retinoids  They may also include medicines that decrease the rate of growth of your skin cells or that affect your immune system  · Take your medicine as directed  Contact your healthcare provider if you think your medicine is not helping or if you have side effects  Tell him or her if you are allergic to any medicine  Keep a list of the medicines, vitamins, and herbs you take  Include the amounts, and when and why you take them  Bring the list or the pill bottles to follow-up visits  Carry your medicine list with you in case of an emergency  Follow up with your healthcare provider or dermatologist as directed:  Write down your questions so you remember to ask them during your visits  Self-care:   · Take care of your skin:  Apply emollients, lubricants, or moisturizing creams to your skin regularly  Apply these while your skin is still damp when you bathe  Stop using them if they sting or irritate your skin  Use mild soaps and add bath oils to soothe your skin when you bathe       · Protect your skin from sun exposure:  When you get sun exposure for short periods of time, it can help your psoriasis  Too much sun exposure or a sunburn can make your psoriasis worse  Talk to your dermatologist or healthcare provider about how much sun exposure is right for you  · Manage stress:  Stress can trigger a flare-up  Find healthy ways to manage stress, such as deep breathing or meditation  · Watch for symptoms with new medicines or herbal supplements:  Some medicines, including herbal supplements, may trigger a psoriasis flare-up  Ask if any of the medicines you take may be making your psoriasis worse  Always check for skin changes when you take your medicines  · Do not smoke:  Smoking can trigger a flare-up of psoriasis  If you smoke, it is never too late to quit  Ask for information about how to stop  · Avoid triggers:  Injury to the skin, cold weather, and heavy alcohol use are other things that can trigger psoriasis flare-ups  Contact your healthcare provider if:   · You get pregnant  · You have a fever  · Your skin plaques are not getting better or are getting worse  · You cannot sleep because your skin itches  · Your skin plaques have pus draining from them or they have soft yellow scabs  · You have questions or concerns about your condition or care  Seek care immediately if:   · Psoriasis suddenly covers larger areas of your body and becomes more painful  © 2017 [address] Information is for End User's use only and may not be sold, redistributed or otherwise used for commercial purposes  All illustrations and images included in CareNotes® are the copyrighted property of [employer]  or Aravind Monreal  The above information is an  only  It is not intended as medical advice for individual conditions or treatments  Talk to your doctor, nurse or pharmacist before following any medical regimen to see if it is safe and effective for you

## 2018-11-17 NOTE — ED NOTES
Patient denies current complaints  Family remains at bedside  Will continue to monitor        Nia Snider RN  11/17/18 0620

## 2018-11-17 NOTE — ED PROVIDER NOTES
History  Chief Complaint   Patient presents with    Depression     Patient brought in with family due to increased depression and anxiety  Family reports "a lot of loss in the family recently " Patient has expressed feelings of "wanting to die " Patient denies SI/HI or a plan to harm himself  Son states that patient does not show much emotion due to cultural expectations  Patient is an [de-identified] y/o M that presents to the ED with a rash to his trunk for over 1 month  He has been seen by dermatologist and was told it is psoriasis  He was given 2 types of steroid creams, but they are not helping  He does have an appointment with dermatologist again in 3 days for recheck, but states he cannot take the itching for another 3 days  He was taking benadryl at night, but did not take it last night because he drank alcohol  His family concerned because patient has been depressed due to recent deaths in the family  Patient denies SI/HI  He states he does not want to see a psychiatrist because he "is not crazy " Patient does live with a girlfriend who is currently in the ER with patient  She states he has been drinking a little more alcohol lately due to his depression  Patient does agree to f/u with outpatient psychiatrist, but declines inpatient treatment  I spoke with Farzana Mandujano from crisis and he did send a list of outpatient psychiatrists and therapists for patient           History provided by:  Patient  Depression   Presenting symptoms: depression    Presenting symptoms: no suicidal thoughts    Patient accompanied by:  Family member (girlfriend)  Timing:  Constant  Progression:  Unchanged  Chronicity:  New  Context: stressful life event    Treatment compliance:  Untreated  Relieved by:  Nothing  Worsened by:  Nothing  Ineffective treatments:  None tried  Associated symptoms: feelings of worthlessness    Associated symptoms: no anxiety    Risk factors: no hx of mental illness    Rash   Location:  Torso  Torso rash location:  Upper back, lower back, L chest and R chest  Quality: dryness, itchiness and redness    Quality: not blistering, not painful and not swelling    Severity:  Moderate  Onset quality:  Gradual  Duration:  1 month  Timing:  Constant  Progression:  Unchanged  Chronicity:  New  Relieved by:  Nothing  Worsened by:  Nothing  Ineffective treatments:  Topical steroids  Associated symptoms: no fever, no nausea and not vomiting        Prior to Admission Medications   Prescriptions Last Dose Informant Patient Reported? Taking?    ALPRAZolam (XANAX) 0 25 mg tablet   No No   Sig: Take 1 tablet (0 25 mg total) by mouth 2 (two) times a day as needed for anxiety   Omega-3 Fatty Acids (FISH OIL PO)  Self Yes No   Sig: Take by mouth 2 (two) times a day   Probiotic Product (PROBIOTIC DAILY PO)  Self Yes No   Sig: Take by mouth daily   VITAMIN D, ERGOCALCIFEROL, PO  Self Yes No   Sig: Take by mouth   VITAMIN E PO  Self Yes No   Sig: Take by mouth   amLODIPine (NORVASC) 10 mg tablet   No No   Sig: TAKE 1 TABLET BY MOUTH ONCE DAILY   aspirin 325 mg tablet  Self Yes No   Sig: Take 81 mg by mouth     clotrimazole (LOTRIMIN) 1 % cream  Self No No   Sig: Apply topically 2 (two) times a day   ferrous sulfate 325 (65 Fe) mg tablet   No No   Sig: Take 1 tablet (325 mg total) by mouth 2 (two) times a day with meals   finasteride (PROSCAR) 5 mg tablet   No No   Sig: Take 1 tablet by mouth daily for 30 days   hydrochlorothiazide (HYDRODIURIL) 25 mg tablet  Self No No   Sig: Take 1 tablet (25 mg total) by mouth daily   Patient taking differently: Take 12 5 mg by mouth 2 (two) times a day     ketoconazole (NIZORAL) 2 % cream   No No   Sig: Apply topically daily Apply daily for 2 weeks   mirtazapine (REMERON) 15 mg tablet  Self No No   Sig: TAKE 1 TABLET BY MOUTH ONCE DAILY AT BEDTIME   multivitamin (THERAGRAN) TABS  Self Yes No   Sig: Take 1 tablet by mouth   pantoprazole (PROTONIX) 40 mg tablet  Self No No   Sig: Take 1 tablet (40 mg total) by mouth daily   polyethylene glycol (MIRALAX) 17 g packet  Self Yes No   Sig: Take 17 g by mouth daily   potassium chloride (K-DUR,KLOR-CON) 20 mEq tablet  Self No No   Sig: Take 1 tablet (20 mEq total) by mouth daily   pravastatin (PRAVACHOL) 40 mg tablet  Self No No   Sig: Take 1 tablet (40 mg total) by mouth daily   tamsulosin (FLOMAX) 0 4 mg  Self No No   Sig: Take 1 capsule (0 4 mg total) by mouth daily at bedtime      Facility-Administered Medications: None       Past Medical History:   Diagnosis Date    Actinic keratosis     LAST ASSESSED: [id number]    Allergic rhinitis     LAST ASSESSED: [id number]    Anxiety     LAST ASSESSED: 87ILR7580    Anxiety disorder     LAST ASSESSED: 49HZZ1571    Atelectasis of right lung     ABNORMAL CT SCAN OF 14 [facility] 12/2/2016 REPEAT NEEDED PER RADIOLOGY IN 3 MONTHS LAST ASSESSED: [id number]    Atypical chest pain     LAST ASSESSED: [id number]    Benign paroxysmal vertigo     LAST ASSESSED: 22OEP4841    Chronic cough     LAST ASSESSED: 28PSZ3740    Chronic GERD     Degenerative arthritis of knee, bilateral     LAST ASSESSED: 21GDR4896    Diverticulitis of colon     LAST ASSESSED: 72MTV4867    Diverticulosis     LAST ASSESSED: 11LXC4864    Foreign body, eye     LAST ASSESSED: 72DMY9834    Functional gait abnormality     LAST ASSESSED: 26RXN3082    Hyperlipidemia     Hypertension     Hyponatremia     LAST ASSESSED: 80OVU8861    Leukocytosis     LAST ASSESSED: 20FQZ6810    Murmur     Newly recognized murmur     LAST ASSESSED: 52CPR8422    Olecranon bursitis, unspecified elbow     Presence of indwelling urethral catheter     RESOLVED: 19FCW6760    Transient cerebral ischemia     LAST ASSESSED: 24CNP7264    Urinary incontinence     LAST ASSESSED: 86EJO9630    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     LAST ASSESSED: 50CRK8025       Past Surgical History:   Procedure Laterality Date    ADENOIDECTOMY      APPENDECTOMY      COLONOSCOPY  10/2006    FIBEROPTIC    INGUINAL HERNIA REPAIR      JOINT REPLACEMENT      b/l TKR Sept 2015    SINUS SURGERY      TONSILLECTOMY         Family History   Problem Relation Age of Onset    No Known Problems Family      I have reviewed and agree with the history as documented  Social History   Substance Use Topics    Smoking status: Former Smoker     Quit date: 1960    Smokeless tobacco: Never Used      Comment: 2 ppd for 12 years     Alcohol use Yes      Comment: occ, SOCIAL        Review of Systems   Constitutional: Negative for chills and fever  Gastrointestinal: Negative for nausea and vomiting  Skin: Positive for rash  Neurological: Negative for dizziness, weakness and numbness  Psychiatric/Behavioral: Positive for depression  Negative for suicidal ideas  The patient is not nervous/anxious  All other systems reviewed and are negative  Physical Exam  Physical Exam   Constitutional: He is oriented to person, place, and time  He appears well-developed and well-nourished  HENT:   Head: Normocephalic and atraumatic  Eyes: Conjunctivae are normal    Neck: Normal range of motion  Cardiovascular: Normal rate and normal heart sounds  An irregular rhythm present  No murmur heard  Pulmonary/Chest: Effort normal and breath sounds normal  He has no wheezes  He has no rhonchi  He has no rales  Abdominal: Soft  Normal appearance and bowel sounds are normal  There is no tenderness  Neurological: He is alert and oriented to person, place, and time  No sensory deficit  Skin: Skin is warm and dry  Rash noted  Annular plaques to back and chest c/w psoriasis  Psychiatric: His speech is normal and behavior is normal  He exhibits a depressed mood  He expresses no homicidal and no suicidal ideation  Nursing note and vitals reviewed        Vital Signs  ED Triage Vitals [11/17/18 0931]   Temperature Pulse Respirations Blood Pressure SpO2   98 °F (36 7 °C) 99 19 164/75 95 % Temp Source Heart Rate Source Patient Position - Orthostatic VS BP Location FiO2 (%)   Temporal Monitor Sitting Right arm --      Pain Score       No Pain           Vitals:    11/17/18 1000 11/17/18 1015 11/17/18 1030 11/17/18 1100   BP: 145/61 147/65 140/69 168/74   Pulse: 92 92 95 91   Patient Position - Orthostatic VS: Sitting Sitting Sitting        Visual Acuity      ED Medications  Medications   potassium chloride (K-DUR,KLOR-CON) CR tablet 40 mEq (40 mEq Oral Given 11/17/18 1110)       Diagnostic Studies  Results Reviewed     Procedure Component Value Units Date/Time    Ethanol [[id number]]  (Normal) Collected:  11/17/18 1008    Lab Status:  Final result Specimen:  Blood from Arm, Left Updated:  11/17/18 1050     Ethanol Lvl <3 0 mg/dL     TSH [[id number]]  (Normal) Collected:  11/17/18 1008    Lab Status:  Final result Specimen:  Blood from Arm, Left Updated:  11/17/18 1045     TSH 3RD GENERATON 0 414 uIU/mL     Narrative:         Patients undergoing fluorescein dye angiography may retain small amounts of fluorescein in the body for 48-72 hours post procedure  Samples containing fluorescein can produce falsely depressed TSH values  If the patient had this procedure,a specimen should be resubmitted post fluorescein clearance      Comprehensive metabolic panel [[id number]]  (Abnormal) Collected:  11/17/18 1008    Lab Status:  Final result Specimen:  Blood from Arm, Left Updated:  11/17/18 1037     Sodium 140 mmol/L      Potassium 3 1 (L) mmol/L      Chloride 101 mmol/L      CO2 28 mmol/L      ANION GAP 11 mmol/L      BUN 23 mg/dL      Creatinine 0 99 mg/dL      Glucose 177 (H) mg/dL      Calcium 8 9 mg/dL      AST 38 U/L      ALT 48 U/L      Alkaline Phosphatase 70 U/L      Total Protein 7 5 g/dL      Albumin 3 7 g/dL      Total Bilirubin 0 90 mg/dL      eGFR 72 ml/min/1 73sq m     Narrative:         National Kidney Disease Education Program recommendations are as follows:  GFR calculation is accurate only with a steady state creatinine  Chronic Kidney disease less than 60 ml/min/1 73 sq  meters  Kidney failure less than 15 ml/min/1 73 sq  meters  CBC and differential [[id number]]  (Abnormal) Collected:  11/17/18 1008    Lab Status:  Final result Specimen:  Blood from Arm, Left Updated:  11/17/18 1020     WBC 6 17 Thousand/uL      RBC 4 94 Million/uL      Hemoglobin 14 7 g/dL      Hematocrit 43 1 %      MCV 87 fL      MCH 29 8 pg      MCHC 34 1 g/dL      RDW 13 9 %      MPV 9 5 fL      Platelets 197 Thousands/uL      nRBC 0 /100 WBCs      Neutrophils Relative 62 %      Immat GRANS % 0 %      Lymphocytes Relative 21 %      Monocytes Relative 16 (H) %      Eosinophils Relative 0 %      Basophils Relative 1 %      Neutrophils Absolute 3 78 Thousands/µL      Immature Grans Absolute 0 01 Thousand/uL      Lymphocytes Absolute 1 31 Thousands/µL      Monocytes Absolute 1 01 Thousand/µL      Eosinophils Absolute 0 01 Thousand/µL      Basophils Absolute 0 05 Thousands/µL                  No orders to display              Procedures  ECG 12 Lead Documentation  Date/Time: 11/17/2018 10:00 AM  Performed by: Prashanth Costello by: Mg Chambers     Indications / Diagnosis:  BHU clearance  Patient location:  ED  Previous ECG:     Previous ECG:  Compared to current    Similarity:  No change  Rate:     ECG rate:  100  Rhythm:     Rhythm: sinus rhythm    Ectopy:     Ectopy: PVCs      PVCs:  Infrequent  Conduction:     Conduction: abnormal      Abnormal conduction: LAFB    ST segments:     ST segments:  Normal  Other findings:     Other findings: prolonged qTc interval             Phone Contacts  ED Phone Contact    ED Course  ED Course as of Nov 17 1545   Sat Nov 17, 2018   1031 Spoke with Karlee Ortiz from crisis, he will fax numbers of psychiatrist and therapists in the area                                   MDM  Number of Diagnoses or Management Options  Depression: new and requires workup  Hypokalemia: new and does not require workup  Psoriasis: established and worsening  Diagnosis management comments: Patient with psoriasis which is worsening, will start on oral steroids and atarax for itching  Patient given numbers for outpatient [facility]  He denies SI/HI, does not require inpatient treatment at this time  Amount and/or Complexity of Data Reviewed  Clinical lab tests: ordered and reviewed    Patient Progress  Patient progress: stable    CritCare Time    Disposition  Final diagnoses:   Psoriasis   Depression   Hypokalemia     Time reflects when diagnosis was documented in both MDM as applicable and the Disposition within this note     Time User Action Codes Description Comment    11/17/2018 11:12 AM Tracy Berger Add [L40 9] Psoriasis     11/17/2018 11:12 AM Tracy Berger Add [F32 9] Depression     11/17/2018 11:15 AM Tracy Berger Add [E87 6] Hypokalemia       ED Disposition     ED Disposition Condition Comment    Discharge  [facility] discharge to home/self care  Condition at discharge: Stable        Follow-up Information     Follow up With Specialties Details Why [address], DO Family Medicine In 1 week For recheck of potassium [address]      your dermatologist  In 3 days For recheck           Discharge Medication List as of 11/17/2018 11:17 AM      START taking these medications    Details   hydrOXYzine HCL (ATARAX) 25 mg tablet Take 1 tablet (25 mg total) by mouth every 6 (six) hours as needed for itching, Starting Sat 11/17/2018, Normal      predniSONE 20 mg tablet Take 60mg po daily x 3 days, then 40mg po daily x 3 days, then 20mg po daily x 3 days  , Normal         CONTINUE these medications which have NOT CHANGED    Details   ALPRAZolam (XANAX) 0 25 mg tablet Take 1 tablet (0 25 mg total) by mouth 2 (two) times a day as needed for anxiety, Starting Thu 11/15/2018, Phone In      amLODIPine ([address]) 10 mg tablet TAKE 1 TABLET BY MOUTH ONCE DAILY, Normal      aspirin 325 mg tablet Take 81 mg by mouth  , Historical Med      clotrimazole (LOTRIMIN) 1 % cream Apply topically 2 (two) times a day, Starting Mon 8/20/2018, Normal      ferrous sulfate 325 (65 Fe) mg tablet Take 1 tablet (325 mg total) by mouth 2 (two) times a day with meals, Starting Mon 11/12/2018, Normal      finasteride (PROSCAR) 5 mg tablet Take 1 tablet by mouth daily for 30 days, Starting Mon 12/5/2016, Until Wed 10/3/2018, Print      hydrochlorothiazide (HYDRODIURIL) 25 mg tablet Take 1 tablet (25 mg total) by mouth daily, Starting Mon 8/20/2018, Normal      ketoconazole (NIZORAL) 2 % cream Apply topically daily Apply daily for 2 weeks, Starting Tue 9/25/2018, Normal      mirtazapine (REMERON) 15 mg tablet TAKE 1 TABLET BY MOUTH ONCE DAILY AT BEDTIME, Normal      multivitamin (THERAGRAN) TABS Take 1 tablet by mouth, Historical Med      Omega-3 Fatty Acids (FISH OIL PO) Take by mouth 2 (two) times a day, Historical Med      pantoprazole (PROTONIX) 40 mg tablet Take 1 tablet (40 mg total) by mouth daily, Starting Tue 7/10/2018, Normal      polyethylene glycol (MIRALAX) 17 g packet Take 17 g by mouth daily, Historical Med      potassium chloride (K-DUR,KLOR-CON) 20 mEq tablet Take 1 tablet (20 mEq total) by mouth daily, Starting Fri 8/10/2018, Normal      pravastatin (PRAVACHOL) 40 mg tablet Take 1 tablet (40 mg total) by mouth daily, Starting Mon 8/20/2018, Normal      Probiotic Product (PROBIOTIC DAILY PO) Take by mouth daily, Historical Med      tamsulosin (FLOMAX) 0 4 mg Take 1 capsule (0 4 mg total) by mouth daily at bedtime, Starting Thu 8/9/2018, Normal      VITAMIN D, ERGOCALCIFEROL, PO Take by mouth, Historical Med      VITAMIN E PO Take by mouth, Historical Med           No discharge procedures on file      ED Provider  Electronically Signed by           Claudene Keener, PA-C  11/17/18 [facility]  11/17/18 5158

## 2018-11-19 LAB
ATRIAL RATE: 100 BPM
P AXIS: 55 DEGREES
PR INTERVAL: 188 MS
QRS AXIS: -55 DEGREES
QRSD INTERVAL: 116 MS
QT INTERVAL: 388 MS
QTC INTERVAL: 500 MS
T WAVE AXIS: 89 DEGREES
VENTRICULAR RATE: 100 BPM

## 2018-11-19 PROCEDURE — 93010 ELECTROCARDIOGRAM REPORT: CPT | Performed by: INTERNAL MEDICINE

## 2018-11-20 ENCOUNTER — OFFICE VISIT (OUTPATIENT)
Dept: FAMILY MEDICINE CLINIC | Facility: CLINIC | Age: 78
End: 2018-11-20
Payer: MEDICARE

## 2018-11-20 VITALS
SYSTOLIC BLOOD PRESSURE: 140 MMHG | BODY MASS INDEX: 32.95 KG/M2 | HEIGHT: 64 IN | DIASTOLIC BLOOD PRESSURE: 82 MMHG | WEIGHT: 193 LBS

## 2018-11-20 DIAGNOSIS — F33.1 MODERATE EPISODE OF RECURRENT MAJOR DEPRESSIVE DISORDER (HCC): ICD-10-CM

## 2018-11-20 DIAGNOSIS — K76.0 FATTY LIVER DISEASE, NONALCOHOLIC: ICD-10-CM

## 2018-11-20 DIAGNOSIS — Z23 NEED FOR VACCINATION: ICD-10-CM

## 2018-11-20 DIAGNOSIS — D50.8 IRON DEFICIENCY ANEMIA SECONDARY TO INADEQUATE DIETARY IRON INTAKE: ICD-10-CM

## 2018-11-20 DIAGNOSIS — I10 ESSENTIAL HYPERTENSION: Primary | ICD-10-CM

## 2018-11-20 DIAGNOSIS — E78.2 MIXED HYPERLIPIDEMIA: ICD-10-CM

## 2018-11-20 PROBLEM — B35.4 TINEA CORPORIS: Status: RESOLVED | Noted: 2018-08-20 | Resolved: 2018-11-20

## 2018-11-20 PROCEDURE — 99214 OFFICE O/P EST MOD 30 MIN: CPT | Performed by: FAMILY MEDICINE

## 2018-11-20 PROCEDURE — 90662 IIV NO PRSV INCREASED AG IM: CPT | Performed by: FAMILY MEDICINE

## 2018-11-20 PROCEDURE — G0008 ADMIN INFLUENZA VIRUS VAC: HCPCS | Performed by: FAMILY MEDICINE

## 2018-11-20 RX ORDER — MIRTAZAPINE 15 MG/1
15 TABLET, FILM COATED ORAL
Qty: 90 TABLET | Refills: 0 | Status: SHIPPED | OUTPATIENT
Start: 2018-11-20 | End: 2019-01-28 | Stop reason: ALTCHOICE

## 2018-11-20 NOTE — ASSESSMENT & PLAN NOTE
Last CBC was stable Pateint will continue one iron tabletl daily Hematology note was reviewed also Will repeat hemoccult test in spring

## 2018-11-20 NOTE — PROGRESS NOTES
Assessment/Plan:    Fatty liver disease, nonalcoholic  Continue current care check the LFT"s    Moderate episode of recurrent major depressive disorder [facility])  Patient will continue current meds Patient family making appt with counselor for patient    Mixed hyperlipidemia  Last labs were reivewed Patient LDL was stable Patien tto continue current meds and will see me in 4 months with repeat labs    Iron deficiency anemia secondary to inadequate dietary iron intake  Last CBC was stable Pateint will continue one iron tabletl daily Hematology note was reviewed also Will repeat hemoccult test in spring    Essential hypertension  Blood pressure is stable continue current meds and recheck in spring       Diagnoses and all orders for this visit:    Essential hypertension    Mixed hyperlipidemia    Iron deficiency anemia secondary to inadequate dietary iron intake    Moderate episode of recurrent major depressive disorder (HCC)  -     mirtazapine (REMERON) 15 mg tablet; Take 1 tablet (15 mg total) by mouth daily at bedtime    Fatty liver disease, nonalcoholic    Need for vaccination  -     influenza vaccine, 5795-1397, high-dose, PF 0 5 mL, for patients 65 yr+ (FLUZONE HIGH-DOSE)          Subjective:   Chief Complaint   Patient presents with    Hyperlipidemia    Hypertension    Anxiety          Patient ID: Emma Barber is a [de-identified] y o  male      Patient is here for checkup of hypertenison, hyperlipidemia, depression, fatty liver disease and anemia Patient last albs were reivewed Patient noted to have stable iron and stable lipids He is toleratieng meds Patient is battling with psoriasis and it is depressing him Patient has had a lot of loss in his life lately patient has finally agreed to counselling He also is still ahveing sleep issues        The following portions of the patient's history were reviewed and updated as appropriate: allergies, current medications, past social history and problem list     Review of Systems   Constitutional: Negative for activity change, appetite change, chills, fatigue and fever  HENT: Negative for congestion, dental problem, ear pain, hearing loss, postnasal drip, sinus pain, sore throat and voice change  Eyes: Negative for pain, discharge, itching and visual disturbance  Respiratory: Negative for cough, shortness of breath and wheezing  Cardiovascular: Negative for chest pain, palpitations and leg swelling  Gastrointestinal: Negative for abdominal distention, abdominal pain, blood in stool, constipation, diarrhea, nausea and vomiting  Endocrine: Negative for cold intolerance, polydipsia, polyphagia and polyuria  Genitourinary: Negative for decreased urine volume, difficulty urinating, discharge, dysuria, hematuria, penile pain, scrotal swelling and testicular pain  Musculoskeletal: Negative for arthralgias, back pain, gait problem and myalgias  Skin: Positive for rash  Allergic/Immunologic: Negative for environmental allergies, food allergies and immunocompromised state  Neurological: Negative for dizziness, seizures and headaches  Hematological: Negative for adenopathy  Psychiatric/Behavioral: Positive for dysphoric mood and sleep disturbance  Negative for agitation and confusion  The patient is not nervous/anxious  Objective:      /82   Ht 5' 4" (1 626 m)   Wt 87 5 kg (193 lb)   BMI 33 13 kg/m²          Physical Exam   Constitutional: He is oriented to person, place, and time  He appears well-developed and well-nourished  HENT:   Head: Normocephalic and atraumatic  Right Ear: Hearing, tympanic membrane, external ear and ear canal normal    Left Ear: Hearing, tympanic membrane, external ear and ear canal normal    Eyes: Pupils are equal, round, and reactive to light  Conjunctivae and EOM are normal  Right eye exhibits no discharge  Left eye exhibits no discharge  No scleral icterus  Neck: Normal range of motion  Neck supple   No tracheal deviation present  No thyromegaly present  Cardiovascular: Normal rate, regular rhythm, normal heart sounds and intact distal pulses  Pulmonary/Chest: Effort normal and breath sounds normal    Abdominal: Soft  Bowel sounds are normal  He exhibits no distension and no mass  There is no tenderness  There is no rebound and no guarding  Hernia confirmed negative in the right inguinal area and confirmed negative in the left inguinal area  Genitourinary: Testes normal    Musculoskeletal: Normal range of motion  Lymphadenopathy:     He has no cervical adenopathy  Neurological: He is alert and oriented to person, place, and time  He has normal reflexes  No cranial nerve deficit  Skin: Skin is warm  Rash noted  Red raised plaque like rash on trunk and back   Psychiatric: He has a normal mood and affect   Judgment and thought content normal

## 2018-11-20 NOTE — ASSESSMENT & PLAN NOTE
Last labs were reivewed Patient LDL was stable Patien tto continue current meds and will see me in 4 months with repeat labs

## 2018-11-20 NOTE — PATIENT INSTRUCTIONS
Cholesterol and Your Health   WHAT YOU NEED TO KNOW:   What is cholesterol? Cholesterol is a waxy, fat-like substance  Cholesterol is made by your body, but also comes from certain foods you eat  Your body uses cholesterol to make hormones and new cells  Your body also uses cholesterol to protect nerves  Cholesterol comes from foods such as meat and dairy products  Your total cholesterol level is made up by LDL cholesterol, HDL cholesterol, and triglycerides:  · LDL cholesterol  is called bad cholesterol  because it forms plaque in your arteries  As plaque builds up, your arteries become narrow, and less blood flows through  When plaque decreases blood flow to your heart, you may have chest pain  If plaque completely blocks an artery that bring blood to your heart, you may have a heart attack  Plaque can break off and form blood clots  Blood clots may block arteries in your brain and cause a stroke  · HDL cholesterol  is called good cholesterol  because it helps remove LDL cholesterol from your arteries  It does this by attaching to LDL cholesterol and carrying it to your liver  Your liver breaks down LDL cholesterol so your body can get rid of it  High levels of HDL cholesterol can help prevent a heart attack and stroke  Low levels of HDL cholesterol can increase your risk for heart disease, heart attack, and stroke  · Triglycerides  are a type of fat that store energy from foods you eat  High levels of triglycerides also cause plaque buildup  This can increase your risk for a heart attack or stroke  If your triglyceride level is high, your LDL cholesterol level may also be high  How does food affect my cholesterol levels? · Unhealthy fats  increase LDL cholesterol and triglyceride levels in your blood  They are found in foods high in cholesterol, saturated fat, and trans fat:     ¨ Cholesterol  is found in eggs, dairy, and meat       ¨ Saturated fat  is found in butter, cheese, ice cream, whole milk, and coconut oil  Saturated fat is also found in meat, such as sausage, hot dogs, and bologna  ¨ Trans fat  is found in liquid oils and is used in fried and baked foods  Foods that contain trans fats include chips, crackers, muffins, sweet rolls, microwave popcorn, and cookies  · Healthy fats,  also called unsaturated fats, help lower LDL cholesterol and triglyceride levels  Healthy fats include the following:     ¨ Monounsaturated fats  are found in foods such as olive oil, canola oil, avocado, nuts, and olives  ¨ Polyunsaturated fats,  such as omega 3 fats, are found in fish, such as salmon, trout, and tuna  They can also be found in plant foods such as flaxseed, walnuts, and soybeans  What other things affect my cholesterol levels? · Smoking cigarettes    · Being overweight or obese     · Drinking large amounts of alcohol    · Not enough exercise or no exercise    · Certain genes passed from your parents to you  What do I need to know about having my cholesterol checked? Adults 21to 39years of age should have their cholesterol levels checked every 4 to 6 years  Adults 45 years and older should have their cholesterol checked every 1 to 2 years  You may need your cholesterol checked more often, or at a younger age, if you have risk factors for heart disease  You may also need to have your cholesterol checked more often if you have other health conditions, such as diabetes  Blood tests are used to check cholesterol levels  Blood tests measure your levels of triglycerides, LDL cholesterol, and HDL cholesterol  What should my cholesterol level be? Your cholesterol level goal may depend on your risk for heart disease  It may also depend on your age and other health conditions  Ask your healthcare provider if the following goals are right for you:  · Your total cholesterol level  should be less than 200 mg/dL  This number may also depend on your HDL and LDL cholesterol goals       · Your LDL cholesterol level  should be less than 130 mg/dL  · Your HDL cholesterol level  should be 60 mg/dL or higher  · Your triglyceride level  should be less than 150 mg/dL  How is high cholesterol treated? Treatment for high cholesterol will also decrease your risk of heart disease, heart attack, and stroke  Treatment may include any of the following:  · Medicines  may be given to lower your LDL cholesterol, triglyceride levels, or total cholesterol level  You may need medicines to lower your cholesterol if any of the following is true:     ¨ You have a history of stroke, TIA, unstable angina, or a heart attack    ¨ Your LDL cholesterol level is 190 mg/dL or higher    ¨ You are age 36to 76years of age, have diabetes, and your LDL cholesterol is 70 mg/dL or higher    ¨ You are age 36to 76years of age, have risk factors for heart disease, and your LDL cholesterol is 70 mg/dL or higher    · Lifestyle changes  include changes to your diet, exercise, weight loss, and quitting smoking  It also includes decreasing the amount of alcohol you drink  · Supplements  include fish oil, red yeast rice, and garlic  Fish oil may help lower your triglyceride and LDL cholesterol levels  It may also increase your HDL cholesterol level  Red yeast rice may help decrease your total cholesterol level and LDL cholesterol level  Garlic may help lower your total cholesterol level  Do not take these supplements without talking to your healthcare provider  What changes can I make to the foods I eat to lower my cholesterol levels? A registered dietitian can help you create a healthy eating plan  Read food labels and choose foods low in saturated fat, trans fats, and cholesterol  · Decrease the total amount of fat you eat  Choose lean meats, fat-free or 1% fat milk, and low-fat dairy products, such as yogurt and cheese  Try to limit or avoid red meats  Limit or do not eat fried foods or baked goods such as cookies       · Replace unhealthy fats with healthy fats  Cook foods in olive oil or canola oil  Choose soft margarines that are low in saturated fat and trans fat  Seeds, nuts, and avocados are other examples of healthy fats  · Eat foods with omega-3 fats  Examples include salmon, tuna, mackerel, walnuts, and flaxseed  Eat fish 2 times per week  Children and pregnant women should not eat fish that have high levels of mercury, such as shark, swordfish, and brandan mackerel  · Increase the amount of plant-based foods you eat  Plant-based foods are low in cholesterol and fat  Eating more of these foods may help lower your cholesterol and help you lose weight  Examples of plant-based foods includes fruits, vegetables, legumes, and whole grains  Replace milk that contains dairy with almond, soy, or coconut milk  Eat beans and foods with soy for protein instead of meat  Ask your healthcare provider or dietitian for more information on plant-based foods  · Increase the amount of fiber you eat  High-fiber foods can help lower your LDL cholesterol  You should eat between 20 and 30 grams of fiber each day  Eat at least 5 servings of fruits and vegetables each day  Other examples of high-fiber foods include whole-grain or whole-wheat breads, pastas, or cereals, and brown rice  Eat 3 ounces of whole-grain foods each day  Increase fiber slowly  You may have abdominal discomfort, bloating, and gas if you add fiber to your diet too quickly  What lifestyle changes can I make to lower my cholesterol levels? · Maintain a healthy weight  Ask your healthcare provider how much you should weigh  Ask him or her to help you create a weight loss plan if you are overweight  Weight loss can decrease your total cholesterol and triglyceride levels  · Exercise regularly  Exercise can help lower your total cholesterol level and maintain a healthy weight  Exercise can also help increase your HDL cholesterol level   Work with your healthcare provider to create an exercise program that is right for you  Get at least 30 minutes of moderate exercise most days of the week  Examples of exercise include brisk walking, swimming, or biking  · Do not smoke  Nicotine and other chemicals in cigarettes and cigars can damage your lungs, heart, and blood vessels  They can also raise your triglyceride levels  Ask your healthcare provider for information if you currently smoke and need help to quit  E-cigarettes or smokeless tobacco still contain nicotine  Talk to your healthcare provider before you use these products  · Limit or do not drink alcohol  Alcohol can increase your triglyceride levels  Ask your healthcare provider if it is safe for you to drink alcohol  Also ask how much is safe for you to drink each day  CARE AGREEMENT:   You have the right to help plan your care  Discuss treatment options with your caregivers to decide what care you want to receive  You always have the right to refuse treatment  The above information is an  only  It is not intended as medical advice for individual conditions or treatments  Talk to your doctor, nurse or pharmacist before following any medical regimen to see if it is safe and effective for you  © 2017 [employer] Information is for End User's use only and may not be sold, redistributed or otherwise used for commercial purposes  All illustrations and images included in CareNotes® are the copyrighted property of [employer]  or A[employer]

## 2018-11-28 DIAGNOSIS — F41.1 GENERALIZED ANXIETY DISORDER: ICD-10-CM

## 2018-11-28 RX ORDER — ALPRAZOLAM 0.25 MG/1
0.25 TABLET ORAL 2 TIMES DAILY PRN
Qty: 30 TABLET | Refills: 0 | Status: SHIPPED | OUTPATIENT
Start: 2018-11-28 | End: 2019-01-28 | Stop reason: ALTCHOICE

## 2018-12-05 ENCOUNTER — TELEPHONE (OUTPATIENT)
Dept: FAMILY MEDICINE CLINIC | Facility: CLINIC | Age: 78
End: 2018-12-05

## 2018-12-05 NOTE — TELEPHONE ENCOUNTER
His ALT   AST are his liver fuction tests they have been elevated and he has seen GI for that so no concerns at this time

## 2018-12-19 ENCOUNTER — APPOINTMENT (OUTPATIENT)
Dept: LAB | Facility: MEDICAL CENTER | Age: 78
End: 2018-12-19
Payer: MEDICARE

## 2018-12-19 DIAGNOSIS — D50.0 IRON DEFICIENCY ANEMIA DUE TO CHRONIC BLOOD LOSS: ICD-10-CM

## 2018-12-19 LAB
BASOPHILS # BLD AUTO: 0.05 THOUSANDS/ΜL (ref 0–0.1)
BASOPHILS NFR BLD AUTO: 1 % (ref 0–1)
EOSINOPHIL # BLD AUTO: 0.13 THOUSAND/ΜL (ref 0–0.61)
EOSINOPHIL NFR BLD AUTO: 2 % (ref 0–6)
ERYTHROCYTE [DISTWIDTH] IN BLOOD BY AUTOMATED COUNT: 14.2 % (ref 11.6–15.1)
FERRITIN SERPL-MCNC: 50 NG/ML (ref 8–388)
HCT VFR BLD AUTO: 45.8 % (ref 36.5–49.3)
HGB BLD-MCNC: 15.6 G/DL (ref 12–17)
IMM GRANULOCYTES # BLD AUTO: 0.03 THOUSAND/UL (ref 0–0.2)
IMM GRANULOCYTES NFR BLD AUTO: 0 % (ref 0–2)
IRON SATN MFR SERPL: 17 %
IRON SERPL-MCNC: 73 UG/DL (ref 65–175)
LYMPHOCYTES # BLD AUTO: 3.77 THOUSANDS/ΜL (ref 0.6–4.47)
LYMPHOCYTES NFR BLD AUTO: 52 % (ref 14–44)
MCH RBC QN AUTO: 30.8 PG (ref 26.8–34.3)
MCHC RBC AUTO-ENTMCNC: 34.1 G/DL (ref 31.4–37.4)
MCV RBC AUTO: 91 FL (ref 82–98)
MONOCYTES # BLD AUTO: 0.75 THOUSAND/ΜL (ref 0.17–1.22)
MONOCYTES NFR BLD AUTO: 11 % (ref 4–12)
NEUTROPHILS # BLD AUTO: 2.39 THOUSANDS/ΜL (ref 1.85–7.62)
NEUTS SEG NFR BLD AUTO: 34 % (ref 43–75)
NRBC BLD AUTO-RTO: 0 /100 WBCS
PLATELET # BLD AUTO: 236 THOUSANDS/UL (ref 149–390)
PMV BLD AUTO: 10.5 FL (ref 8.9–12.7)
RBC # BLD AUTO: 5.06 MILLION/UL (ref 3.88–5.62)
TIBC SERPL-MCNC: 422 UG/DL (ref 250–450)
WBC # BLD AUTO: 7.12 THOUSAND/UL (ref 4.31–10.16)

## 2018-12-19 PROCEDURE — 85025 COMPLETE CBC W/AUTO DIFF WBC: CPT | Performed by: PHYSICIAN ASSISTANT

## 2018-12-19 PROCEDURE — 36415 COLL VENOUS BLD VENIPUNCTURE: CPT

## 2018-12-19 PROCEDURE — 82728 ASSAY OF FERRITIN: CPT

## 2018-12-19 PROCEDURE — 83540 ASSAY OF IRON: CPT

## 2018-12-19 PROCEDURE — 83550 IRON BINDING TEST: CPT

## 2018-12-20 DIAGNOSIS — E87.6 HYPOKALEMIA: ICD-10-CM

## 2018-12-20 DIAGNOSIS — K21.9 GASTROESOPHAGEAL REFLUX DISEASE WITHOUT ESOPHAGITIS: ICD-10-CM

## 2018-12-20 RX ORDER — POTASSIUM CHLORIDE 20 MEQ/1
20 TABLET, EXTENDED RELEASE ORAL DAILY
Qty: 90 TABLET | Refills: 0 | Status: SHIPPED | OUTPATIENT
Start: 2018-12-20 | End: 2019-03-22 | Stop reason: CLARIF

## 2018-12-20 RX ORDER — PANTOPRAZOLE SODIUM 40 MG/1
40 TABLET, DELAYED RELEASE ORAL DAILY
Qty: 90 TABLET | Refills: 0 | Status: SHIPPED | OUTPATIENT
Start: 2018-12-20 | End: 2019-03-22

## 2018-12-31 ENCOUNTER — TELEPHONE (OUTPATIENT)
Dept: GERIATRICS | Age: 78
End: 2018-12-31

## 2018-12-31 NOTE — TELEPHONE ENCOUNTER
Daughter Luis Miguel Lobato called concerned about worse sypotoms-  Sun downing, anxiety  Recent er visit at [de-identified] ([facility])  Last week wife was diagnosis with breast cancer  First appt is 4/1/19 is there any sooner appt?   Please advise

## 2019-01-02 ENCOUNTER — OFFICE VISIT (OUTPATIENT)
Dept: HEMATOLOGY ONCOLOGY | Facility: CLINIC | Age: 79
End: 2019-01-02
Payer: MEDICARE

## 2019-01-02 VITALS
SYSTOLIC BLOOD PRESSURE: 140 MMHG | BODY MASS INDEX: 33.97 KG/M2 | DIASTOLIC BLOOD PRESSURE: 70 MMHG | HEIGHT: 64 IN | WEIGHT: 199 LBS | HEART RATE: 88 BPM | OXYGEN SATURATION: 99 % | TEMPERATURE: 98.7 F | RESPIRATION RATE: 18 BRPM

## 2019-01-02 DIAGNOSIS — D50.9 IRON DEFICIENCY ANEMIA, UNSPECIFIED IRON DEFICIENCY ANEMIA TYPE: Primary | ICD-10-CM

## 2019-01-02 PROCEDURE — 99213 OFFICE O/P EST LOW 20 MIN: CPT | Performed by: PHYSICIAN ASSISTANT

## 2019-01-02 NOTE — TELEPHONE ENCOUNTER
Spoke with Daughter Gaetano Hightower  Dr Kassie Hodge reviewed chart patient does need to continue seek a mental health professional   Will look to move appointment sooner   Daughter aware I will call back due to her concern with start of dementia

## 2019-01-02 NOTE — PROGRESS NOTES
Hematology/Oncology Outpatient Follow-up  Rhonda Fagan [de-identified] y o  male 1940 [id number]    Date:  1/2/2019      Assessment and Plan:    1  Iron deficiency anemia, unspecified iron deficiency anemia type  78-year-old male presents for follow-up regarding iron deficiency anemia  He was noted to have a hemoglobin of 9 7 July 2018  He has been taking oral iron ever since hemoglobin is now in the 15 g range  He had seen Gastroenterology in summer 2018  He states that he had a stool test that was negative  We did contact them and test was performed in the office and was negative  He is going to be having a repeat stool test in the springtime ordered by his PCP  Discussed that I do advise she should follow up with GI  Continue oral iron 1 tablet daily  Repeat CBC and iron panel      - CBC and differential  - Iron Panel; Future    HPI:  [de-identified]year old female presents for consultation for anemia   Patient was noted to have decrease in hemoglobin in April 2018, hemoglobin 10 5, MCV 81  Repeat hemoglobin in July 2018 was 9 7, MCV 76  Platelets and WBC remain normal on both readings  Previously hemoglobin had been in the normal range       Most recent colonoscopy 10-12 years ago       He saw Dr Donald Mercado in July  Occult stool testing was negative  Patient states that he was told by Dr Donald Mercado that he did not need to have scope  He also follows with him due to chronic fatty liver      Records from Dr Donald Mercado did indicate occult stool testing was negative in the office  He did want to see patient back in the office       He has been taking oral iron BID for 3 weeks  He did have constipation with this but now resolved with Miralax       He takes pantoprazole for GERD and has been taking for about 10 years  He states he thinks he takes this BID  Records though show once daily  He will take once daily in the AM      Interval history: He continues to take oral iron 1 tablet daily       ROS: Review of Systems Constitutional: Negative for activity change, appetite change, chills, fatigue, fever and unexpected weight change  Respiratory: Negative for cough and shortness of breath  Cardiovascular: Negative for chest pain and palpitations  Gastrointestinal: Negative for abdominal pain, blood in stool, constipation, diarrhea, nausea and vomiting  Denies change in size of stools  Dark stool since taking iron pills      Genitourinary: Negative for difficulty urinating, dysuria and hematuria  Musculoskeletal: Negative for arthralgias and myalgias  Skin: Positive for rash (legs, back, chest; following with derm and allergist )  Neurological: Negative for dizziness, weakness, light-headedness and headaches  Hematological: Negative for adenopathy  Does not bruise/bleed easily         Past Medical History:   Diagnosis Date    Actinic keratosis     LAST ASSESSED: [id number]    Allergic rhinitis     LAST ASSESSED: [id number]    Anxiety     LAST ASSESSED: [id number]    Anxiety disorder     LAST ASSESSED: [id number]    Atelectasis of right lung     ABNORMAL CT SCAN OF [facility] 12/2/2016 REPEAT NEEDED PER RADIOLOGY IN 3 MONTHS LAST ASSESSED: [id number]    Atypical chest pain     LAST ASSESSED: [id number]    Benign paroxysmal vertigo     LAST ASSESSED: [id number]    Chronic cough     LAST ASSESSED: [id number]    Chronic GERD     Degenerative arthritis of knee, bilateral     LAST ASSESSED: 23MJY5804    Diverticulitis of colon     LAST ASSESSED: [id number]    Diverticulosis     LAST ASSESSED: [id number]    Foreign body, eye     LAST ASSESSED: [id number]    Functional gait abnormality     LAST ASSESSED: [id number]    Hyperlipidemia     Hypertension     Hyponatremia     LAST ASSESSED: [id number]    Leukocytosis     LAST ASSESSED: [id number]    Murmur     Newly recognized murmur     LAST ASSESSED: [id number]    Olecranon bursitis, unspecified elbow     Presence of indwelling urethral catheter RESOLVED: [id number]    Transient cerebral ischemia     LAST ASSESSED: 77NDA4199    Urinary incontinence     LAST ASSESSED: 99PMV1277    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     LAST ASSESSED: 70VSC1109       Past Surgical History:   Procedure Laterality Date    ADENOIDECTOMY      APPENDECTOMY      COLONOSCOPY  10/2006    FIBEROPTIC    INGUINAL HERNIA REPAIR      JOINT REPLACEMENT      b/l TKR Sept 2015    SINUS SURGERY      TONSILLECTOMY         Social History     Social History    Marital status:       Spouse name: N/A    Number of children: N/A    Years of education: N/A     Occupational History    [employer], 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      Social History Main Topics    Smoking status: Former Smoker     Quit date: 1960    Smokeless tobacco: Never Used      Comment: 2 ppd for 12 years     Alcohol use Yes      Comment: occ, SOCIAL    Drug use: No    Sexual activity: Not on file     Other Topics Concern    Not on file     Social History Narrative    USES [employer]MENT - [employer]           Family History   Problem Relation Age of Onset    No Known Problems Family        Allergies   Allergen Reactions    Ace Inhibitors Cough     Pt denied any allergies           Current Outpatient Prescriptions:     ALPRAZolam (XANAX) 0 25 mg tablet, Take 1 tablet (0 25 mg total) by mouth 2 (two) times a day as needed for anxiety, Disp: 30 tablet, Rfl: 0    amLODIPine (NORVASC) 10 mg tablet, TAKE 1 TABLET BY MOUTH ONCE DAILY, Disp: 90 tablet, Rfl: 1    aspirin 325 mg tablet, Take 81 mg by mouth  , Disp: , Rfl:     ferrous sulfate 325 (65 Fe) mg tablet, Take 1 tablet (325 mg total) by mouth 2 (two) times a day with meals, Disp: 180 tablet, Rfl: 1    hydrochlorothiazide (HYDRODIURIL) 25 mg tablet, Take 1 tablet (25 mg total) by mouth daily (Patient taking differently: Take 12 5 mg by mouth 2 (two) times a day  ), Disp: 90 tablet, Rfl: 1    hydrOXYzine HCL (ATARAX) 25 mg tablet, Take 1 tablet (25 mg total) by mouth every 6 (six) hours as needed for itching, Disp: 15 tablet, Rfl: 0    mirtazapine (REMERON) 15 mg tablet, Take 1 tablet (15 mg total) by mouth daily at bedtime, Disp: 90 tablet, Rfl: 0    multivitamin (THERAGRAN) TABS, Take 1 tablet by mouth, Disp: , Rfl:     Omega-3 Fatty Acids (FISH OIL PO), Take by mouth 2 (two) times a day, Disp: , Rfl:     pantoprazole (PROTONIX) 40 mg tablet, Take 1 tablet (40 mg total) by mouth daily, Disp: 90 tablet, Rfl: 0    polyethylene glycol (MIRALAX) 17 g packet, Take 17 g by mouth daily, Disp: , Rfl:     potassium chloride (K-DUR,KLOR-CON) 20 mEq tablet, Take 1 tablet (20 mEq total) by mouth daily, Disp: 90 tablet, Rfl: 0    pravastatin (PRAVACHOL) 40 mg tablet, Take 1 tablet (40 mg total) by mouth daily, Disp: 90 tablet, Rfl: 1    Probiotic Product (PROBIOTIC DAILY PO), Take by mouth daily, Disp: , Rfl:     tamsulosin (FLOMAX) 0 4 mg, Take 1 capsule (0 4 mg total) by mouth daily at bedtime, Disp: 90 capsule, Rfl: 2    VITAMIN D, ERGOCALCIFEROL, PO, Take by mouth, Disp: , Rfl:     VITAMIN E PO, Take by mouth, Disp: , Rfl:     finasteride (PROSCAR) 5 mg tablet, Take 1 tablet by mouth daily for 30 days, Disp: 30 tablet, Rfl: 0    predniSONE 20 mg tablet, Take 60mg po daily x 3 days, then 40mg po daily x 3 days, then 20mg po daily x 3 days  (Patient not taking: Reported on 1/2/2019 ), Disp: 15 tablet, Rfl: 0      Physical Exam:  /70   Pulse 88   Temp 98 7 °F (37 1 °C) (Tympanic)   Resp 18   Ht 5' 4" (1 626 m)   Wt 90 3 kg (199 lb)   SpO2 99%   BMI 34 16 kg/m²     Physical Exam   Constitutional: He is oriented to person, place, and time  He appears well-developed and well-nourished  No distress  HENT:   Head: Normocephalic and atraumatic  Eyes: Conjunctivae are normal  No scleral icterus  Neck: Normal range of motion  Neck supple  Cardiovascular: Normal rate, regular rhythm and normal heart sounds      No murmur heard   Pulmonary/Chest: Effort normal and breath sounds normal  No respiratory distress  Abdominal: Soft  There is no tenderness  Musculoskeletal: Normal range of motion  He exhibits no edema or tenderness  Lymphadenopathy:     He has no cervical adenopathy  Neurological: He is alert and oriented to person, place, and time  No cranial nerve deficit  Skin: Skin is warm and dry  Psychiatric: He has a normal mood and affect  Vitals reviewed  Labs:  Lab Results   Component Value Date    WBC 7 12 12/19/2018    HGB 15 6 12/19/2018    HCT 45 8 12/19/2018    MCV 91 12/19/2018     12/19/2018         Patient voiced understanding and agreement in the above discussion  Aware to contact our office with questions/symptoms in the interim

## 2019-01-07 ENCOUNTER — CONSULT (OUTPATIENT)
Dept: GERIATRICS | Age: 79
End: 2019-01-07
Payer: MEDICARE

## 2019-01-07 VITALS
DIASTOLIC BLOOD PRESSURE: 90 MMHG | SYSTOLIC BLOOD PRESSURE: 150 MMHG | RESPIRATION RATE: 18 BRPM | HEART RATE: 68 BPM | TEMPERATURE: 98.8 F | WEIGHT: 197.8 LBS | BODY MASS INDEX: 32.96 KG/M2 | HEIGHT: 65 IN

## 2019-01-07 DIAGNOSIS — H54.7 IMPAIRED VISION: Chronic | ICD-10-CM

## 2019-01-07 DIAGNOSIS — G31.84 MILD COGNITIVE IMPAIRMENT: Primary | Chronic | ICD-10-CM

## 2019-01-07 DIAGNOSIS — F41.1 GENERALIZED ANXIETY DISORDER: ICD-10-CM

## 2019-01-07 DIAGNOSIS — F33.1 MODERATE EPISODE OF RECURRENT MAJOR DEPRESSIVE DISORDER (HCC): ICD-10-CM

## 2019-01-07 DIAGNOSIS — R26.2 AMBULATORY DYSFUNCTION: Chronic | ICD-10-CM

## 2019-01-07 DIAGNOSIS — G47.9 SLEEPING DIFFICULTIES: Chronic | ICD-10-CM

## 2019-01-07 DIAGNOSIS — N40.1 BENIGN PROSTATIC HYPERPLASIA WITH LOWER URINARY TRACT SYMPTOMS, SYMPTOM DETAILS UNSPECIFIED: Chronic | ICD-10-CM

## 2019-01-07 DIAGNOSIS — F32.A DEPRESSION, UNSPECIFIED DEPRESSION TYPE: Chronic | ICD-10-CM

## 2019-01-07 DIAGNOSIS — D50.9 IRON DEFICIENCY ANEMIA, UNSPECIFIED IRON DEFICIENCY ANEMIA TYPE: ICD-10-CM

## 2019-01-07 DIAGNOSIS — I10 ESSENTIAL HYPERTENSION: ICD-10-CM

## 2019-01-07 PROBLEM — R21 RASH: Chronic | Status: ACTIVE | Noted: 2019-01-07

## 2019-01-07 PROCEDURE — 99204 OFFICE O/P NEW MOD 45 MIN: CPT | Performed by: INTERNAL MEDICINE

## 2019-01-07 RX ORDER — SERTRALINE HYDROCHLORIDE 25 MG/1
25 TABLET, FILM COATED ORAL DAILY
Qty: 30 TABLET | Refills: 0 | Status: SHIPPED | OUTPATIENT
Start: 2019-01-07 | End: 2019-01-28 | Stop reason: SDUPTHER
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
## 2019-01-07 NOTE — PATIENT INSTRUCTIONS
We recommend you have a driving evaluation to determine your safeness to drive  Start taking Zoloft 25mg daily one time daily   Stop taking Mirtazepine (sleeping pill)  Continue Xanax daily as needed for two weeks then every other day as needed then stop, the plan is to taper it off once the Zoloft has started to take effect  We recommend you discontinue your multivitamin as well as Vitamin D, E, and Iron  Please make an appointment to see Psychiatry  Please make an appointment to see your eye doctor

## 2019-01-25 ENCOUNTER — TELEPHONE (OUTPATIENT)
Dept: GERIATRICS | Age: 79
End: 2019-01-25

## 2019-01-28 ENCOUNTER — OFFICE VISIT (OUTPATIENT)
Dept: GERIATRICS | Age: 79
End: 2019-01-28
Payer: MEDICARE

## 2019-01-28 VITALS
HEART RATE: 72 BPM | SYSTOLIC BLOOD PRESSURE: 150 MMHG | TEMPERATURE: 98.7 F | WEIGHT: 193 LBS | BODY MASS INDEX: 32.15 KG/M2 | DIASTOLIC BLOOD PRESSURE: 70 MMHG | RESPIRATION RATE: 18 BRPM | HEIGHT: 65 IN

## 2019-01-28 DIAGNOSIS — D50.9 IRON DEFICIENCY ANEMIA, UNSPECIFIED IRON DEFICIENCY ANEMIA TYPE: ICD-10-CM

## 2019-01-28 DIAGNOSIS — K21.9 CHRONIC GERD: Primary | Chronic | ICD-10-CM

## 2019-01-28 DIAGNOSIS — R26.2 AMBULATORY DYSFUNCTION: Chronic | ICD-10-CM

## 2019-01-28 DIAGNOSIS — G31.84 MILD COGNITIVE IMPAIRMENT: Chronic | ICD-10-CM

## 2019-01-28 DIAGNOSIS — F32.A DEPRESSION, UNSPECIFIED DEPRESSION TYPE: Chronic | ICD-10-CM

## 2019-01-28 DIAGNOSIS — F33.1 MODERATE EPISODE OF RECURRENT MAJOR DEPRESSIVE DISORDER (HCC): ICD-10-CM

## 2019-01-28 DIAGNOSIS — H54.7 IMPAIRED VISION: Chronic | ICD-10-CM

## 2019-01-28 DIAGNOSIS — R21 RASH: Chronic | ICD-10-CM

## 2019-01-28 DIAGNOSIS — I10 ESSENTIAL HYPERTENSION: ICD-10-CM

## 2019-01-28 DIAGNOSIS — N40.1 BENIGN PROSTATIC HYPERPLASIA WITH LOWER URINARY TRACT SYMPTOMS, SYMPTOM DETAILS UNSPECIFIED: Chronic | ICD-10-CM

## 2019-01-28 DIAGNOSIS — G47.9 SLEEPING DIFFICULTIES: Chronic | ICD-10-CM

## 2019-01-28 DIAGNOSIS — E78.2 MIXED HYPERLIPIDEMIA: ICD-10-CM

## 2019-01-28 PROCEDURE — 99215 OFFICE O/P EST HI 40 MIN: CPT | Performed by: INTERNAL MEDICINE

## 2019-01-28 RX ORDER — SERTRALINE HYDROCHLORIDE 25 MG/1
25 TABLET, FILM COATED ORAL DAILY
Qty: 30 TABLET | Refills: 6 | Status: SHIPPED | OUTPATIENT
Start: 2019-01-28 | End: 2019-07-19 | Stop reason: SDUPTHER

## 2019-01-28 NOTE — PATIENT INSTRUCTIONS
Continue Zoloft 25mg daily, will refill today  Follow-up with PCP regarding BP  Return in 6mo for follow-up

## 2019-01-28 NOTE — LETTER
Dr Evita Dior,    I had the pleasure of seeing Chantell Singh and his significant other in consultation today along with our team  Attached please find the team notes from our visit  Thank you! Sincerely,  Howard Morejon [facility] Note  [address] [place] [address]  [phone]    NAME: Suzy Buitrago  AGE: 78 y o   SEX: male    DATE OF ENCOUNTER: 1/28/2019    Family Present: Mary Byrne (significant other), Lenka Schneider   Staff Present: Dr Mikel William, MSW and Dr Xenia Pritchett (Fellow)  [facility] of Conference[facility] [address]       Assessment and Plan     Medical Problems:   HTN  Iron deficiency anemia  Diffuse macular rash  HLD  GERD  BPH    Geriatric Syndromes/Age Related Syndromes:  Mild cognitive impairment  Depression  Anxiety  Sleep difficulty  Polypharmacy  Ambulatory Dysfunction  Driving Safety    Neuropsychological:   Mild Cognitive Impairment  MOCHA: 20/30  [place]: Deferred due to concern for worsening anxiety of testing   Recommendations:   Remain active physically, mentally and socially,   Engage in cognitively challenging activities (stimulating puzzles) and cooking with family and friends   Maintain chronic conditions under control   Repeat cognitive assessment in 6 months    Depression Screen:  Geriatric Depression Scale: 3/15   Recommendations:   Started Zoloft 25mg daily at initial appointment and tolerating well with significant  benefit, anxiety has  drastically decreased and mood improved, patient is brighter and more engaged with family and  friends, is returning to activities he previously enjoyed, sleep is better and more restful, less  daytime somnolence and no longer having nightmares    Continue Zoloft at current dose   As patient was not using Xanax frequently he has tapered off and recommend to  remain off   Agree with discontinuing Mirtazapine as he had not noticed significant benefit with this medication        Physical Finding Impacting Function:  TUGT: 12 seconds  Fall Risk Assessment: High  Activities of Daily Living: Independent  Instrumental Activities of Daily Living: Independent   Recommendations:   Fall prevention measures including fall alert bracelet or pendant discussed, patient has prior one  at home will speak to company to see if can be updated/reactivated   Physical Therapy recommended: yes, patient reported having assessment and being provided with  exercises which he is performing at home   Encourage daily walking program, consider indoor for poor weather   Plans to join local gym with wife for pool exercises    Medications:   Medications reviewed in detail   Polypharmacy concerns discussed with patient and family   Recommend discontinuing multivitamin and vitamins C/D as long as maintains  well balanced diet   In effort to minimize polypharmacy also encouraged patient to discuss  anti-hypertensive regimen with PCP with the understanding that he was intolerant of ACE-I in past he may have limited options but may consider B-blocker if  possible to minimize need for HCTZ and potassium supplementation  Also discussed with family that due to his HTN and intolerance of certain medications his current regimen may be the one best suited for him at this time and encourage him to discuss this with his PCP who more regularly manages his anti-hypertensive regimen and will have a more intimate understanding of what has/has not worked for him in the past  Also discussed current iron dosing, as hemoglobin has remained stable over recent months with no overt bleeding consider holding iron in attempt to minimize polypharmacy, if hemoglobin drops at next repeat lab draw in 3 months would agree with restarting  Patient advised to avoid over the counter medications that can affect cognition (e g , Benadryl, Tylenol PM, NSAIDs)    Other Findings:   BMI: 34 16   Maintain well-balanced diet    Daytime somnolence and poor sleep   Improved with medication adjustments   Encourage sleep hygiene   Minimize daytime naps   Continue regular daily exercise    Health Maintenance     Immunization History   Administered Date(s) Administered    Influenza 09/30/2009, 09/22/2010, 08/31/2011, 09/25/2012, 11/05/2013, 10/06/2014, 09/18/2015, 10/28/2016    Influenza Split High Dose Preservative Free IM 10/06/2014, 09/18/2015, 10/28/2016, 09/08/2017    Influenza TIV (IM) 08/31/2011, 09/25/2012    Influenza, high dose seasonal 0 5 mL 11/20/2018    Pneumococcal Conjugate 13-Valent 04/21/2016    Pneumococcal Polysaccharide PPV23 12/09/2014    Tdap 01/23/2016, 10/14/2018    Zoster 05/23/2016     Recommend Flu vaccine yearly, if not contraindicated: Patient reports up to date  Recommend Pneumo vaccine every 5 years, if not contraindicated, patient reports up to date  Recommend Shingles vaccine (Zostavax), if not contraindicated: patient reports up to date and is awaiting Shingrix arrival to pharmacy as is backordered     Social/Safety Concerns   Driving safety: strongly encouraged fitness to drive evaluation, discussed  importance of safety and its  utilization of more time to plan for if patient becomes unable to drive in future   Patient and significant other are not considering it at this time, strongly encouraged and counseled  them and their family regarding importance of driving safety for themselves and others in community   Driving evaluation information provided for multiple centers in area, also discussed alternative  transportation methods including family in area and public transportation geared towards elderly      Diagnostic Studies   No new imaging studies required at this time, CT sinuses and [address] 3/18 and 1/2016 reviewed in [facility] Directives: discussed, patient reports having POA and living will on file  with PCP    Medical problems recommendations:  HTN: in effort to minimize polypharmacy encouraged to discuss alternative to Amlodipine with PCP, including possibly B-blocker in effort to minimize edema and need for HCTZ as well as supplemental potassium, would also need to consider risk of B-blocker worsening depression  Iron deficiency anemia: in effort to minimize polypharmacy regimen consider holding oral iron for 2-3months until next CBC as hemoglobin has remained stable and without bleeding, continue follow-up with heme-onc  Diffuse macular rash: following Derm and Allergy, treatments have improved symptoms greatly, encourage follow-up with subspecialties  HLD: continue statin and heart healthy diet  GERD: discussed tapering Protonix to minimize polypharmacy as well as avoid long term effects including osteopenia/osteoporosis and renal injury, as no GI bleeding consider taking every other day for two weeks than d/c, may use Ranitidine PRN reflux but encouraged to notify PCP if symptoms develop   BPH: symptoms well controlled on current Flomax regimen  Patient and family verbalized understanding of above Care Plan  History of Present Illness     Lee Baca is here today for his family conference  Since his last visit at which time he was started on Zoloft he reports that his mood is significantly improved, he is no longer feeling down, he is eating better, sleeping better at night, no longer requiring long naps during the day, and has not suffered emotional lability  His family feels that he has returned to himself  He again enjoys cooking and entertaining neighbors  He has not had any falls or hospitalizations since his last visit  He reports that he had a PT appointment and evaluation at which time he was provided exercises to do and he has been doing them at home  He denies side effects of his new medication and would like to continue taking it       Past Medical, Social, Surgical, Medications and Allergy history reviewed  Review of Systems     Review of Systems   Constitutional: Negative for activity change and unexpected weight change  HENT: Negative  Respiratory: Negative  Cardiovascular: Negative  Gastrointestinal: Negative  Genitourinary: Negative  Musculoskeletal: Negative  Skin: Negative  Neurological: Negative  Psychiatric/Behavioral: Negative for decreased concentration, dysphoric mood, self-injury and sleep disturbance  The patient is not nervous/anxious and is not hyperactive  All other systems reviewed and are negative        History     Past Medical History:   Diagnosis Date    Actinic keratosis     LAST ASSESSED: [id number]    Allergic rhinitis     LAST ASSESSED: [id number]    Anxiety     LAST ASSESSED: [id number]    Anxiety disorder     LAST ASSESSED: [id number]    Atelectasis of right lung     ABNORMAL CT SCAN OF [address] 12/2/2016 REPEAT NEEDED PER RADIOLOGY IN 3 MONTHS LAST ASSESSED: [id number]    Atypical chest pain     LAST ASSESSED: [id number]    Benign paroxysmal vertigo     LAST ASSESSED: [id number]    Chronic cough     LAST ASSESSED: [id number]    Chronic GERD     Degenerative arthritis of knee, bilateral     LAST ASSESSED: [id number]    Diverticulitis of colon     LAST ASSESSED: [id number]    Diverticulosis     LAST ASSESSED: [id number]    Foreign body, eye     LAST ASSESSED: [id number]    Functional gait abnormality     LAST ASSESSED: [id number]    Hyperlipidemia     Hypertension     Hyponatremia     LAST ASSESSED: [id number]    Leukocytosis     LAST ASSESSED: [id number]    Murmur     Newly recognized murmur     LAST ASSESSED: [id number]    Olecranon bursitis, unspecified elbow     Presence of indwelling urethral catheter     RESOLVED: [id number]    Transient cerebral ischemia     LAST ASSESSED: [id number]    Urinary incontinence     LAST ASSESSED: [id number]    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     LAST ASSESSED: [id number]     Past Surgical History:   Procedure Laterality Date    ADENOIDECTOMY      APPENDECTOMY      COLONOSCOPY  10/2006    FIBEROPTIC    INGUINAL HERNIA REPAIR      JOINT REPLACEMENT      b/l TKR Sept 2015    SINUS SURGERY      TONSILLECTOMY       Social History     Social History    Marital status:       Spouse name: N/A    Number of children: N/A    Years of education: N/A     Occupational History    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      Social History Main Topics    Smoking status: Former Smoker     Quit date: 1960    Smokeless tobacco: Never Used      Comment: 2 ppd for 12 years     Alcohol use Yes      Comment: occ, SOCIAL    Drug use: No    Sexual activity: Not on file     Other Topics Concern    Not on file     Social History Narrative    USES SAFETY EQUIPMENT - [employer]         Current Outpatient Prescriptions   Medication Sig Dispense Refill    ALPRAZolam (XANAX) 0 25 mg tablet Take 1 tablet (0 25 mg total) by mouth 2 (two) times a day as needed for anxiety 30 tablet 0    amLODIPine (NORVASC) 10 mg tablet TAKE 1 TABLET BY MOUTH ONCE DAILY 90 tablet 1    aspirin 325 mg tablet Take 81 mg by mouth        ferrous sulfate 325 (65 Fe) mg tablet Take 1 tablet (325 mg total) by mouth 2 (two) times a day with meals 180 tablet 1    finasteride (PROSCAR) 5 mg tablet Take 1 tablet by mouth daily for 30 days 30 tablet 0    hydrochlorothiazide (HYDRODIURIL) 25 mg tablet Take 1 tablet (25 mg total) by mouth daily (Patient taking differently: Take 12 5 mg by mouth 2 (two) times a day  ) 90 tablet 1    hydrOXYzine HCL (ATARAX) 25 mg tablet Take 1 tablet (25 mg total) by mouth every 6 (six) hours as needed for itching 15 tablet 0    mirtazapine (REMERON) 15 mg tablet Take 1 tablet (15 mg total) by mouth daily at bedtime 90 tablet 0    multivitamin (THERAGRAN) TABS Take 1 tablet by mouth      Omega-3 Fatty Acids (FISH OIL PO) Take by mouth 2 (two) times a day      pantoprazole (PROTONIX) 40 mg tablet Take 1 tablet (40 mg total) by mouth daily 90 tablet 0    polyethylene glycol (MIRALAX) 17 g packet Take 17 g by mouth daily      potassium chloride (K-DUR,KLOR-CON) 20 mEq tablet Take 1 tablet (20 mEq total) by mouth daily 90 tablet 0    pravastatin (PRAVACHOL) 40 mg tablet Take 1 tablet (40 mg total) by mouth daily 90 tablet 1    predniSONE 20 mg tablet Take 60mg po daily x 3 days, then 40mg po daily x 3 days, then 20mg po daily x 3 days  (Patient not taking: Reported on 1/2/2019 ) 15 tablet 0    Probiotic Product (PROBIOTIC DAILY PO) Take by mouth daily      sertraline (ZOLOFT) 25 mg tablet Take 1 tablet (25 mg total) by mouth daily 30 tablet 6    tamsulosin (FLOMAX) 0 4 mg Take 1 capsule (0 4 mg total) by mouth daily at bedtime 90 capsule 2    VITAMIN D, ERGOCALCIFEROL, PO Take by mouth      VITAMIN E PO Take by mouth       No current facility-administered medications for this visit  Family History   Problem Relation Age of Onset    Alzheimer's disease Mother     Coronary artery disease Brother      Allergies: Allergies   Allergen Reactions    Ace Inhibitors Cough     Pt denied any allergies       Objective      Vitals:  Vitals:    01/28/19 1425   BP: 150/70   Pulse: 72   Resp: 18   Temp: 98 7 °F (37 1 °C)     Physical Exam   Constitutional: He is oriented to person, place, and time  He appears well-developed and well-nourished  HENT:   Head: Normocephalic and atraumatic  Mouth/Throat: No oropharyngeal exudate  Eyes: Pupils are equal, round, and reactive to light  Conjunctivae are normal  No scleral icterus  Neck: Neck supple  No tracheal deviation present  No thyromegaly present  Cardiovascular: Normal rate and regular rhythm  Exam reveals friction rub  Murmur heard    BP remains slightly elevated, suspect 2/2 anxiety of appointment, he reports BP was 130/70 yesterday at other appointment, encourage follow-up with PCP    Pulmonary/Chest: Effort normal and breath sounds normal  No respiratory distress  Abdominal: Soft  Bowel sounds are normal  He exhibits no distension  There is no tenderness  Musculoskeletal: Normal range of motion  Neurological: He is alert and oriented to person, place, and time  Skin: Skin is warm and dry  Psychiatric: He has a normal mood and affect  Nursing note and vitals reviewed  Maura WERNER    Geriatrics Fellow PGY-4  1/28/2019 4:50 PM

## 2019-01-28 NOTE — PROGRESS NOTES
[facility] ([place], [place]) Note  [address]  [phone]    NAME: Bobby Overton  AGE: 78 y o   SEX: male    DATE OF ENCOUNTER: 1/28/2019    Family Present: Octaviotarsha Stanley (significant other), Alan Laughlin   Staff Present: Dr Brian Angelo, MSW and Dr Feli Dill (Fellow)  Location of Conference: [facility], [place]       Assessment and Plan     Medical Problems:   HTN  Iron deficiency anemia  Diffuse macular rash  HLD  GERD  BPH    Geriatric Syndromes/Age Related Syndromes:  Mild cognitive impairment  Depression  Anxiety  Sleep difficulty  Polypharmacy  Ambulatory Dysfunction  Driving Safety    Neuropsychological:   Mild Cognitive Impairment  MOCHA: 20/30  [place]: Deferred due to concern for worsening anxiety of testing   Recommendations:   Remain active physically, mentally and socially,   Engage in cognitively challenging activities (stimulating puzzles) and cooking with  family and friends   Maintain chronic conditions under control   Repeat cognitive assessment in 6 months    Depression Screen:  Geriatric Depression Scale: 3/15   Recommendations:   Started Zoloft 25mg daily at initial appointment and tolerating well with significant  benefit, anxiety has drastically decreased and mood improved, patient is brighter  and more engaged with family and friends, is returning to activities he previously  enjoyed, sleep is better and more restful, less daytime somnolence and no longer  having nightmares    Continue Zoloft at current dose   As patient was not using Xanax frequently he has tapered off and recommend to  remain off   Agree with discontinuing Mirtazapine as he had not noticed significant benefit with  this medication    Physical Finding Impacting Function:  TUGT: 12 seconds  Fall Risk Assessment: High  Activities of Daily Living: Independent  Instrumental Activities of Daily Living: Independent   Recommendations:   Fall prevention measures including fall alert bracelet or pendant discussed, patient  has prior one at home will speak to company to see if can be updated/reactivated   Physical Therapy recommended: yes, patient reported having assessment and  being provided with exercises which he is performing at home   Encourage daily walking program, consider indoor for poor weather   Plans to join local gym with wife for pool exercises    Medications:   Medications reviewed in detail   Polypharmacy concerns discussed with patient and family   Recommend discontinuing multivitamin and vitamins C/D as long as maintains  well balanced diet   In effort to minimize polypharmacy also encouraged patient to discuss  anti-hypertensive regimen with PCP with the understanding that he was intolerant  of ACE-I in past he may have limited options but may consider B-blocker if  possible to minimize need for HCTZ and potassium supplementation  Also discussed with family that due to his HTN and intolerance of certain medications his current regimen may be the one best suited for him at this time and encourage him to discuss this with his PCP who more regularly manages his anti-hypertensive regimen and will have a more intimate understanding of what has/has not worked for him in the past  Also discussed current iron dosing, as hemoglobin has remained stable over recent months with no overt bleeding consider holding iron in attempt to minimize polypharmacy, if hemoglobin drops at next repeat lab draw in 3 months would agree with restarting  Patient advised to avoid over the counter medications that can affect cognition (e g , Benadryl, Tylenol PM, NSAIDs)    Other Findings:   BMI: 34 16   Maintain well-balanced diet    Daytime somnolence and poor sleep   Improved with medication adjustments   Encourage sleep hygiene   Minimize daytime naps   Continue regular daily exercise    Health Maintenance     Immunization History   Administered Date(s) Administered    Influenza 09/30/2009, 09/22/2010, 08/31/2011, 09/25/2012, 11/05/2013, 10/06/2014, 09/18/2015, 10/28/2016    Influenza Split High Dose Preservative Free IM 10/06/2014, 09/18/2015, 10/28/2016, 09/08/2017    Influenza TIV (IM) 08/31/2011, 09/25/2012    Influenza, high dose seasonal 0 5 mL 11/20/2018    Pneumococcal Conjugate 13-Valent 04/21/2016    Pneumococcal Polysaccharide PPV23 12/09/2014    Tdap 01/23/2016, 10/14/2018    Zoster 05/23/2016     Recommend Flu vaccine yearly, if not contraindicated: Patient reports up to date  Recommend Pneumo vaccine every 5 years, if not contraindicated, patient reports up to date  Recommend Shingles vaccine (Zostavax), if not contraindicated: patient reports up to date and is awaiting Shingrix arrival to pharmacy as is backordered     Social/Safety Concerns   Driving safety: strongly encouraged fitness to drive evaluation, discussed  importance of safety and its utilization of more time to plan for if patient becomes  unable  to drive in future   Patient and significant other are not considering it at this time, strongly  encouraged and counseled them and their family regarding importance of driving  safety for themselves and others in community   Driving evaluation information provided for multiple centers in area, also discussed  alternative transportation methods including family in area and public  transportation geared towards elderly      Diagnostic Studies   No new imaging studies required at this time, CT sinuses and [facility] 3/18 and  1/2016 reviewed in [address] [place] [address]: discussed, patient reports having POA and living will on file  with PCP    Medical problems recommendations:  HTN: in effort to minimize polypharmacy encouraged to discuss alternative to Amlodipine with PCP, including possibly B-blocker in effort to minimize edema and need for HCTZ as well as supplemental potassium, would also need to consider risk of B-blocker worsening depression  Iron deficiency anemia: in effort to minimize polypharmacy regimen consider holding oral iron for 2-3months until next CBC as hemoglobin has remained stable and without bleeding, continue follow-up with heme-onc  Diffuse macular rash: following Derm and Allergy, treatments have improved symptoms greatly, encourage follow-up with subspecialties  HLD: continue statin and heart healthy diet  GERD: discussed tapering Protonix to minimize polypharmacy as well as avoid long term effects including osteopenia/osteoporosis and renal injury, as no GI bleeding consider taking every other day for two weeks than d/c, may use Ranitidine PRN reflux but encouraged to notify PCP if symptoms develop   BPH: symptoms well controlled on current Flomax regimen  Patient and family verbalized understanding of above Care Plan  History of Present Illness     Ester Erickson is here today for his family conference  Since his last visit at which time he was started on Zoloft he reports that his mood is significantly improved, he is no longer feeling down, he is eating better, sleeping better at night, no longer requiring long naps during the day, and has not suffered emotional lability  His family feels that he has returned to himself  He again enjoys cooking and entertaining neighbors  He has not had any falls or hospitalizations since his last visit  He reports that he had a PT appointment and evaluation at which time he was provided exercises to do and he has been doing them at home  He denies side effects of his new medication and would like to continue taking it  Past Medical, Social, Surgical, Medications and Allergy history reviewed  Review of Systems     Review of Systems   Constitutional: Negative for activity change and unexpected weight change  HENT: Negative  Respiratory: Negative  Cardiovascular: Negative  Gastrointestinal: Negative      Genitourinary: Negative  Musculoskeletal: Negative  Skin: Negative  Neurological: Negative  Psychiatric/Behavioral: Negative for decreased concentration, dysphoric mood, self-injury and sleep disturbance  The patient is not nervous/anxious and is not hyperactive  All other systems reviewed and are negative        History     Past Medical History:   Diagnosis Date    Actinic keratosis     LAST ASSESSED: [id number]    Allergic rhinitis     LAST ASSESSED: [id number]    Anxiety     LAST ASSESSED: [id number]    Anxiety disorder     LAST ASSESSED: [id number]    Atelectasis of right lung     ABNORMAL CT SCAN OF [address] 12/2/2016 REPEAT NEEDED PER RADIOLOGY IN 3 MONTHS LAST ASSESSED: [id number]    Atypical chest pain     LAST ASSESSED: [id number]    Benign paroxysmal vertigo     LAST ASSESSED: [id number]    Chronic cough     LAST ASSESSED: 93DSQ1299    Chronic GERD     Degenerative arthritis of knee, bilateral     LAST ASSESSED: [id number]    Diverticulitis of colon     LAST ASSESSED: [id number]    Diverticulosis     LAST ASSESSED: [id number]    Foreign body, eye     LAST ASSESSED: [id number]    Functional gait abnormality     LAST ASSESSED: 09WPT9905    Hyperlipidemia     Hypertension     Hyponatremia     LAST ASSESSED: [id number]    Leukocytosis     LAST ASSESSED: [id number]    Murmur     Newly recognized murmur     LAST ASSESSED: [id number]    Olecranon bursitis, unspecified elbow     Presence of indwelling urethral catheter     RESOLVED: [id number]    Transient cerebral ischemia     LAST ASSESSED: [id number]    Urinary incontinence     LAST ASSESSED: [id number]    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     LAST ASSESSED: [id number]     Past Surgical History:   Procedure Laterality Date    ADENOIDECTOMY      APPENDECTOMY      COLONOSCOPY  10/2006    FIBEROPTIC    INGUINAL HERNIA REPAIR      JOINT REPLACEMENT      b/l TKR Sept 2015    SINUS SURGERY      TONSILLECTOMY       Social History     Social History    Marital status:       Spouse name: N/A    Number of children: N/A    Years of education: N/A     Occupational History    tailor, 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      Social History Main Topics    Smoking status: Former Smoker     Quit date: 1960    Smokeless tobacco: Never Used      Comment: 2 ppd for 12 years     Alcohol use Yes      Comment: occ, SOCIAL    Drug use: No    Sexual activity: Not on file     Other Topics Concern    Not on file     Social History Narrative    USES SAFETY EQUIPMENT - [employer]         Current Outpatient Prescriptions   Medication Sig Dispense Refill    ALPRAZolam (XANAX) 0 25 mg tablet Take 1 tablet (0 25 mg total) by mouth 2 (two) times a day as needed for anxiety 30 tablet 0    amLODIPine (NORVASC) 10 mg tablet TAKE 1 TABLET BY MOUTH ONCE DAILY 90 tablet 1    aspirin 325 mg tablet Take 81 mg by mouth        ferrous sulfate 325 (65 Fe) mg tablet Take 1 tablet (325 mg total) by mouth 2 (two) times a day with meals 180 tablet 1    finasteride (PROSCAR) 5 mg tablet Take 1 tablet by mouth daily for 30 days 30 tablet 0    hydrochlorothiazide (HYDRODIURIL) 25 mg tablet Take 1 tablet (25 mg total) by mouth daily (Patient taking differently: Take 12 5 mg by mouth 2 (two) times a day  ) 90 tablet 1    hydrOXYzine HCL (ATARAX) 25 mg tablet Take 1 tablet (25 mg total) by mouth every 6 (six) hours as needed for itching 15 tablet 0    mirtazapine (REMERON) 15 mg tablet Take 1 tablet (15 mg total) by mouth daily at bedtime 90 tablet 0    multivitamin (THERAGRAN) TABS Take 1 tablet by mouth      Omega-3 Fatty Acids (FISH OIL PO) Take by mouth 2 (two) times a day      pantoprazole (PROTONIX) 40 mg tablet Take 1 tablet (40 mg total) by mouth daily 90 tablet 0    polyethylene glycol (MIRALAX) 17 g packet Take 17 g by mouth daily      potassium chloride (K-DUR,KLOR-CON) 20 mEq tablet Take 1 tablet (20 mEq total) by mouth daily 90 tablet 0    pravastatin (PRAVACHOL) 40 mg tablet Take 1 tablet (40 mg total) by mouth daily 90 tablet 1    predniSONE 20 mg tablet Take 60mg po daily x 3 days, then 40mg po daily x 3 days, then 20mg po daily x 3 days  (Patient not taking: Reported on 1/2/2019 ) 15 tablet 0    Probiotic Product (PROBIOTIC DAILY PO) Take by mouth daily      sertraline (ZOLOFT) 25 mg tablet Take 1 tablet (25 mg total) by mouth daily 30 tablet 6    tamsulosin (FLOMAX) 0 4 mg Take 1 capsule (0 4 mg total) by mouth daily at bedtime 90 capsule 2    VITAMIN D, ERGOCALCIFEROL, PO Take by mouth      VITAMIN E PO Take by mouth       No current facility-administered medications for this visit  Family History   Problem Relation Age of Onset    Alzheimer's disease Mother     Coronary artery disease Brother        Allergies: Allergies   Allergen Reactions    Ace Inhibitors Cough     Pt denied any allergies           Objective      Vitals:  Vitals:    01/28/19 1425   BP: 150/70   Pulse: 72   Resp: 18   Temp: 98 7 °F (37 1 °C)     Physical Exam   Constitutional: He is oriented to person, place, and time  He appears well-developed and well-nourished  HENT:   Head: Normocephalic and atraumatic  Mouth/Throat: No oropharyngeal exudate  Eyes: Pupils are equal, round, and reactive to light  Conjunctivae are normal  No scleral icterus  Neck: Neck supple  No tracheal deviation present  No thyromegaly present  Cardiovascular: Normal rate and regular rhythm  Exam reveals friction rub  Murmur heard  BP remains slightly elevated, suspect 2/2 anxiety of appointment, he reports BP was 130/70 yesterday at other appointment, encourage follow-up with PCP    Pulmonary/Chest: Effort normal and breath sounds normal  No respiratory distress  Abdominal: Soft  Bowel sounds are normal  He exhibits no distension  There is no tenderness  Musculoskeletal: Normal range of motion  Neurological: He is alert and oriented to person, place, and time     Skin: Skin is warm and dry  Psychiatric: He has a normal mood and affect  Nursing note and vitals reviewed  Antonio WERNER    Geriatrics Fellow PGY-4  1/28/2019 4:50 PM

## 2019-01-29 ENCOUNTER — TELEPHONE (OUTPATIENT)
Dept: FAMILY MEDICINE CLINIC | Facility: CLINIC | Age: 79
End: 2019-01-29

## 2019-01-29 NOTE — TELEPHONE ENCOUNTER
No this is the best regimen for patient as he was intolerant of other medications in the past He was seen by cardiology in 10/2018 and me in 11/2018  His blood pressures and his labs were stable at that time   I would not advise changing medications at this time I reviewed the note from geriatrics also and they also agree that this regimen is likely best If patient is having some sort of problem with his blood pressure then I should see him sooner than his scheduled checkup

## 2019-02-26 DIAGNOSIS — E78.2 MIXED HYPERLIPIDEMIA: ICD-10-CM

## 2019-02-26 DIAGNOSIS — I10 ESSENTIAL HYPERTENSION: ICD-10-CM

## 2019-02-26 RX ORDER — PRAVASTATIN SODIUM 40 MG
40 TABLET ORAL DAILY
Qty: 90 TABLET | Refills: 1 | Status: SHIPPED | OUTPATIENT
Start: 2019-02-26 | End: 2019-08-23 | Stop reason: SDUPTHER

## 2019-02-26 RX ORDER — AMLODIPINE BESYLATE 10 MG/1
10 TABLET ORAL DAILY
Qty: 90 TABLET | Refills: 1 | Status: SHIPPED | OUTPATIENT
Start: 2019-02-26 | End: 2019-08-23 | Stop reason: SDUPTHER

## 2019-02-26 RX ORDER — HYDROCHLOROTHIAZIDE 25 MG/1
25 TABLET ORAL DAILY
Qty: 90 TABLET | Refills: 1 | Status: SHIPPED | OUTPATIENT
Start: 2019-02-26 | End: 2019-08-23 | Stop reason: SDUPTHER

## 2019-03-07 ENCOUNTER — TELEPHONE (OUTPATIENT)
Dept: UROLOGY | Facility: MEDICAL CENTER | Age: 79
End: 2019-03-07

## 2019-03-07 DIAGNOSIS — N40.1 BPH WITH OBSTRUCTION/LOWER URINARY TRACT SYMPTOMS: Primary | ICD-10-CM

## 2019-03-07 DIAGNOSIS — N13.8 BPH WITH OBSTRUCTION/LOWER URINARY TRACT SYMPTOMS: Primary | ICD-10-CM

## 2019-03-07 RX ORDER — FINASTERIDE 5 MG/1
5 TABLET, FILM COATED ORAL DAILY
Qty: 90 TABLET | Refills: 3 | Status: SHIPPED | OUTPATIENT
Start: 2019-03-07 | End: 2020-04-22

## 2019-03-07 NOTE — TELEPHONE ENCOUNTER
Patient managed by Dr Candis Raza, seen in the [place] [address] office  Patient last seen 8/9/18 for history of BPH  Patient had been taking both Finasteride and Tamsulosin  At last appointment, it was decided patient would stop Finasteride, but continue Tamsulosin  Spoke with both patient and his significant other, Heidi   Both report recent problems with increased frequency of urination and constipation  Patient denies any difficulty emptying his bladder, but report symptoms are similar to those he experienced before taking Finasteride  Patient questions if he should restart Finasteride  Advised both patient and Heidi  of importance to resolve constipation  Advised, will discuss with provider, restarting Finasteride, will call back after discussing  Per Heidi , patient does still have Finasteride left, with refills, would not need resent to pharmacy yet

## 2019-03-07 NOTE — TELEPHONE ENCOUNTER
Although his prostate was relatively small on exam at his last appointment okay to restart finasteride if he has had return of symptoms since discontinuing

## 2019-03-07 NOTE — TELEPHONE ENCOUNTER
Spoke with Lorena Mccall, advised, ok for patient to restart Finasteride  Per Lorena Mccall, she looked after getting off the phone and patient will need new script, he only has a few pills left, with no refills  Requesting 90 day supply be sent [facility]

## 2019-03-07 NOTE — TELEPHONE ENCOUNTER
Pt wife calling- as of 8/2018 office note pt was to discontinue Finasteride 5 mg; pt stated he is having trouble urinating and if possible would like to continue taking  Please advise    [phone]

## 2019-03-19 ENCOUNTER — TELEPHONE (OUTPATIENT)
Dept: FAMILY MEDICINE CLINIC | Facility: CLINIC | Age: 79
End: 2019-03-19

## 2019-03-22 ENCOUNTER — OFFICE VISIT (OUTPATIENT)
Dept: FAMILY MEDICINE CLINIC | Facility: CLINIC | Age: 79
End: 2019-03-22
Payer: MEDICARE

## 2019-03-22 VITALS
BODY MASS INDEX: 31.99 KG/M2 | HEIGHT: 65 IN | SYSTOLIC BLOOD PRESSURE: 132 MMHG | WEIGHT: 192 LBS | DIASTOLIC BLOOD PRESSURE: 78 MMHG

## 2019-03-22 DIAGNOSIS — E78.2 MIXED HYPERLIPIDEMIA: ICD-10-CM

## 2019-03-22 DIAGNOSIS — I10 ESSENTIAL HYPERTENSION: Primary | ICD-10-CM

## 2019-03-22 DIAGNOSIS — N40.1 BENIGN PROSTATIC HYPERPLASIA WITH LOWER URINARY TRACT SYMPTOMS, SYMPTOM DETAILS UNSPECIFIED: Chronic | ICD-10-CM

## 2019-03-22 DIAGNOSIS — G31.84 MILD COGNITIVE IMPAIRMENT: Chronic | ICD-10-CM

## 2019-03-22 DIAGNOSIS — N40.0 ENLARGED PROSTATE WITHOUT LOWER URINARY TRACT SYMPTOMS (LUTS): ICD-10-CM

## 2019-03-22 DIAGNOSIS — F33.1 MODERATE EPISODE OF RECURRENT MAJOR DEPRESSIVE DISORDER (HCC): ICD-10-CM

## 2019-03-22 PROBLEM — R21 RASH: Chronic | Status: RESOLVED | Noted: 2019-01-07 | Resolved: 2019-03-22

## 2019-03-22 PROBLEM — H54.7 IMPAIRED VISION: Chronic | Status: RESOLVED | Noted: 2019-01-07 | Resolved: 2019-03-22

## 2019-03-22 PROBLEM — F10.920 ALCOHOLIC INTOXICATION WITHOUT COMPLICATION (HCC): Status: ACTIVE | Noted: 2019-03-22

## 2019-03-22 PROBLEM — F10.920 ALCOHOLIC INTOXICATION WITHOUT COMPLICATION (HCC): Status: RESOLVED | Noted: 2019-03-22 | Resolved: 2019-03-22

## 2019-03-22 PROBLEM — G47.9 SLEEPING DIFFICULTIES: Chronic | Status: RESOLVED | Noted: 2019-01-07 | Resolved: 2019-03-22

## 2019-03-22 PROCEDURE — 99214 OFFICE O/P EST MOD 30 MIN: CPT | Performed by: FAMILY MEDICINE

## 2019-03-22 NOTE — PROGRESS NOTES
Assessment/Plan:    Essential hypertension  Blood pressure is well controlled continue with current meds and follwoup as scheduled    Mild cognitive impairment  Memory isseus are stable follwoup with the geriatrician as directed    Enlarged prostate without lower urinary tract symptoms (luts)  Continue with meds and follwoup of urologist    Mixed hyperlipidemia  Lipids stable continue with same meds and rpeat labs in May    Moderate episode of recurrent major depressive disorder ([address] )  Depression is stable on meds Pateint to see the specialist as directed follwoup in 4 months       Diagnoses and all orders for this visit:    Essential hypertension  -     Comprehensive metabolic panel; Future    Mixed hyperlipidemia  -     Lipid panel; Future    Moderate episode of recurrent major depressive disorder (HCC)  -     TSH, 3rd generation with Free T4 reflex; Future    Benign prostatic hyperplasia with lower urinary tract symptoms, symptom details unspecified    Enlarged prostate without lower urinary tract symptoms (luts)    Mild cognitive impairment          Subjective:   Chief Complaint   Patient presents with    Follow-up     no refills needed           Patient ID: Sindy Houston is a 78 y o  male      Patient is here for follwoup of hypertension, hyperipdmiea mild cognitive disorder BPH and depression patient blood presusre looks good His last lipids were also good Patient is due for labs in 5/2019 Patine tblood pressure is controlled also patient geriatric consult was reveiwed patient is on sertraline for his nerves and his memory patient feels much better Patietn is seeing urology about his prostate However urination is good at thistime       The following portions of the patient's history were reviewed and updated as appropriate: allergies, current medications, past medical history, past social history and problem list     Review of Systems   Constitutional: Negative for fatigue, fever and unexpected weight change  HENT: Negative for congestion, sinus pain and trouble swallowing  Eyes: Negative for discharge and visual disturbance  Respiratory: Negative for cough, chest tightness, shortness of breath and wheezing  Cardiovascular: Negative for chest pain, palpitations and leg swelling  Gastrointestinal: Negative for abdominal pain, blood in stool, constipation, diarrhea, nausea and vomiting  Genitourinary: Negative for difficulty urinating, dysuria, frequency and hematuria  Musculoskeletal: Negative for arthralgias, gait problem and joint swelling  Skin: Negative for rash and wound  Allergic/Immunologic: Negative for environmental allergies and food allergies  Neurological: Negative for dizziness, syncope, weakness, numbness and headaches  Hematological: Negative for adenopathy  Does not bruise/bleed easily  Psychiatric/Behavioral: Negative for confusion, decreased concentration and sleep disturbance  The patient is not nervous/anxious  Objective:      /78   Ht 5' 4 5" (1 638 m)   Wt 87 1 kg (192 lb)   BMI 32 45 kg/m²          Physical Exam   Constitutional: He is oriented to person, place, and time  He appears well-developed and well-nourished  HENT:   Head: Normocephalic and atraumatic  Right Ear: Hearing, tympanic membrane and external ear normal    Left Ear: Hearing, tympanic membrane and external ear normal    Eyes: Pupils are equal, round, and reactive to light  Conjunctivae and EOM are normal    Neck: Neck supple  No thyromegaly present  Cardiovascular: Normal rate and normal heart sounds  Pulmonary/Chest: Effort normal and breath sounds normal  He has no wheezes  He has no rales  Abdominal: Soft  Bowel sounds are normal  He exhibits no distension  There is no tenderness  Musculoskeletal: He exhibits no edema or tenderness  Lymphadenopathy:     He has no cervical adenopathy  Neurological: He is alert and oriented to person, place, and time   No cranial nerve deficit  Coordination normal    Skin: Skin is warm and dry  No rash noted  Psychiatric: He has a normal mood and affect   His behavior is normal  Judgment and thought content normal

## 2019-03-22 NOTE — PATIENT INSTRUCTIONS
Memory Loss in Older Adults   AMBULATORY CARE:   Some memory loss  is common with aging  You may have sharp long-term memories from many years ago but have trouble remembering new information  Normal memory loss does not get worse and does not affect daily activities  Memory loss that gets worse over time or affects daily activities can be a sign of a serious medical problem, such as Alzheimer disease  Talk with your healthcare provider if you or someone close to you notices that your memory is worsening  Signs and symptoms of memory loss that may happen with aging:   · Not remembering where you put your keys or glasses    · Trouble recalling a familiar person's name, but then remembering it    · Trouble remembering why you walked into a room    · Missing an appointment because you forgot about it    · Forgetting someone's birthday or an anniversary  Signs and symptoms of severe memory loss: The following may be signs of a more serious health problem that needs treatment:  · Not knowing how to do something you used to do    · Trouble learning new facts, or trouble learning skills that need you to remember steps    · Trouble following directions, or getting lost, even in an area you know    · Not remembering if you took your medicine or finished a task    · Not being able to remember events from your past, such as a trip you took    · Thinking events that happened years ago happened recently    · Forgetting to bathe, brush your teeth, or do other daily care tasks    · Not recognizing a family member or friend you have known a long time  You or someone close to you should contact your healthcare provider if:   · You have new, sudden, or worsening memory problems  · You have questions or concerns about your condition or care  Follow up with your healthcare provider as directed: You may need to have regular memory tests to check for new or worsening problems   Write down your questions so you remember to ask them during your visits  Manage memory loss:  Some memory loss cannot be treated, but you may be able to stop it from getting worse  Your healthcare provider may need to stop or change certain medicines you are taking, or change the dose  The provider may also recommend vitamins or supplements to help improve your memory  The following are ways to help manage memory loss:  · Ask someone to help you if needed  Ask the person to help you create lists of things you need to do or set up medicine reminders  The person might be able to call you to remind you of an upcoming task, event, or anniversary  · Find a place for items you use often  You may be able to remember where your keys, wallet, glasses, or other items are if you create a place for each item  It may also help if you say that you are returning an item to its place  This may trigger your memory later when you are looking for the item  · Set up reminders  Calendars, timers, or alarm clocks can help you remember to do tasks such as taking your medicine  Some medicine dispensers can be set to sound an alarm when it is time to take the medicine  Containers are also available that are labelled for each day of the week  These containers can help you remember if you need to take the medicine or already took it  You may be able to set up reminders with your bank to help you remember to pay your bills on time  · Write down anything you need to remember  Examples include upcoming healthcare appointments and medication refills  Put the reminders where you will see them, such as on your bathroom mirror or refrigerator  You may want to make a list of things you need to do each day  You can cross the item off when the task is finished  · Create a quiet learning environment  This may help you remember new information more easily  Try to find a place where you will not be distracted by noise, light, or other people   Go slowly and repeat the information to help you remember  Prevent your memory loss from getting worse:   · Play brain games  Games such as crossword puzzles, jigsaw puzzles, or math games can help sharpen your memory  · Spend time with other people  This can help strengthen your memory, especially if you talk about past or upcoming events  · Take a class to learn something new  This will help you think in new ways and make your memory work harder  · Eat a variety of healthy foods  Healthy foods include fruits, vegetables, low-fat dairy products, lean meats, fish, whole-grain breads, and cooked beans  Eat more foods with high amounts of omega-3 fatty acids  Examples include salmon, tuna, walnuts, and flaxseeds  Ask your healthcare provider for a list of foods that contain fatty acids and how much you should eat each day  · Exercise regularly  Exercise improves blood flow and can help you think and remember more easily  Most healthy adults should try to get at least 30 minutes of exercise on most days of the week  Ask your healthcare provider how much exercise you need and which exercises are best for you  · Create a sleep routine  Try to go to bed and wake up at the same times each day  Sleep is important for memory  Talk to your healthcare provider if you are having trouble sleeping  · Do not smoke  Nicotine and other chemicals in cigarettes and cigars can reduce the amount of oxygen going to your brain  This can make memory problems worse  Ask your healthcare provider for information if you currently smoke and need help to quit  E-cigarettes or smokeless tobacco still contain nicotine  Talk to your healthcare provider before you use these products  · Limit or do not drink alcohol  Alcohol can lead to both short-term and long-term memory problems  Ask your healthcare provider if alcohol is safe for you, and how much is safe to drink    © 2017 [employer] Information is for End User's use only and may not be sold, redistributed or otherwise used for commercial purposes  All illustrations and images included in CareNotes® are the copyrighted property of [employer]  or Aravind [employer]  The above information is an  only  It is not intended as medical advice for individual conditions or treatments  Talk to your doctor, nurse or pharmacist before following any medical regimen to see if it is safe and effective for you

## 2019-05-31 DIAGNOSIS — N40.1 BENIGN LOCALIZED PROSTATIC HYPERPLASIA WITH LOWER URINARY TRACT SYMPTOMS (LUTS): ICD-10-CM

## 2019-05-31 RX ORDER — TAMSULOSIN HYDROCHLORIDE 0.4 MG/1
0.4 CAPSULE ORAL
Qty: 90 CAPSULE | Refills: 0 | Status: SHIPPED | OUTPATIENT
Start: 2019-05-31 | End: 2019-08-23 | Stop reason: SDUPTHER

## 2019-07-12 ENCOUNTER — APPOINTMENT (OUTPATIENT)
Dept: LAB | Facility: MEDICAL CENTER | Age: 79
End: 2019-07-12
Payer: MEDICARE

## 2019-07-12 DIAGNOSIS — E78.2 MIXED HYPERLIPIDEMIA: ICD-10-CM

## 2019-07-12 DIAGNOSIS — F33.1 MODERATE EPISODE OF RECURRENT MAJOR DEPRESSIVE DISORDER (HCC): ICD-10-CM

## 2019-07-12 DIAGNOSIS — I10 ESSENTIAL HYPERTENSION: ICD-10-CM

## 2019-07-12 DIAGNOSIS — D50.9 IRON DEFICIENCY ANEMIA, UNSPECIFIED IRON DEFICIENCY ANEMIA TYPE: ICD-10-CM

## 2019-07-12 LAB
ALBUMIN SERPL BCP-MCNC: 4 G/DL (ref 3.5–5)
ALP SERPL-CCNC: 62 U/L (ref 46–116)
ALT SERPL W P-5'-P-CCNC: 39 U/L (ref 12–78)
ANION GAP SERPL CALCULATED.3IONS-SCNC: 8 MMOL/L (ref 4–13)
AST SERPL W P-5'-P-CCNC: 29 U/L (ref 5–45)
BASOPHILS # BLD AUTO: 0.08 THOUSANDS/ΜL (ref 0–0.1)
BASOPHILS NFR BLD AUTO: 1 % (ref 0–1)
BILIRUB SERPL-MCNC: 0.84 MG/DL (ref 0.2–1)
BUN SERPL-MCNC: 16 MG/DL (ref 5–25)
CALCIUM SERPL-MCNC: 9.3 MG/DL (ref 8.3–10.1)
CHLORIDE SERPL-SCNC: 104 MMOL/L (ref 100–108)
CHOLEST SERPL-MCNC: 152 MG/DL (ref 50–200)
CO2 SERPL-SCNC: 27 MMOL/L (ref 21–32)
CREAT SERPL-MCNC: 0.7 MG/DL (ref 0.6–1.3)
EOSINOPHIL # BLD AUTO: 0.13 THOUSAND/ΜL (ref 0–0.61)
EOSINOPHIL NFR BLD AUTO: 1 % (ref 0–6)
ERYTHROCYTE [DISTWIDTH] IN BLOOD BY AUTOMATED COUNT: 13.1 % (ref 11.6–15.1)
FERRITIN SERPL-MCNC: 49 NG/ML (ref 8–388)
GFR SERPL CREATININE-BSD FRML MDRD: 89 ML/MIN/1.73SQ M
GLUCOSE P FAST SERPL-MCNC: 99 MG/DL (ref 65–99)
HCT VFR BLD AUTO: 44.4 % (ref 36.5–49.3)
HDLC SERPL-MCNC: 48 MG/DL (ref 40–60)
HGB BLD-MCNC: 15.1 G/DL (ref 12–17)
IMM GRANULOCYTES # BLD AUTO: 0.04 THOUSAND/UL (ref 0–0.2)
IMM GRANULOCYTES NFR BLD AUTO: 0 % (ref 0–2)
IRON SATN MFR SERPL: 28 %
IRON SERPL-MCNC: 108 UG/DL (ref 65–175)
LDLC SERPL CALC-MCNC: 74 MG/DL (ref 0–100)
LYMPHOCYTES # BLD AUTO: 3.44 THOUSANDS/ΜL (ref 0.6–4.47)
LYMPHOCYTES NFR BLD AUTO: 35 % (ref 14–44)
MCH RBC QN AUTO: 30.7 PG (ref 26.8–34.3)
MCHC RBC AUTO-ENTMCNC: 34 G/DL (ref 31.4–37.4)
MCV RBC AUTO: 90 FL (ref 82–98)
MONOCYTES # BLD AUTO: 1.06 THOUSAND/ΜL (ref 0.17–1.22)
MONOCYTES NFR BLD AUTO: 11 % (ref 4–12)
NEUTROPHILS # BLD AUTO: 4.96 THOUSANDS/ΜL (ref 1.85–7.62)
NEUTS SEG NFR BLD AUTO: 52 % (ref 43–75)
NONHDLC SERPL-MCNC: 104 MG/DL
NRBC BLD AUTO-RTO: 0 /100 WBCS
PLATELET # BLD AUTO: 208 THOUSANDS/UL (ref 149–390)
PMV BLD AUTO: 10.7 FL (ref 8.9–12.7)
POTASSIUM SERPL-SCNC: 3.5 MMOL/L (ref 3.5–5.3)
PROT SERPL-MCNC: 7.4 G/DL (ref 6.4–8.2)
RBC # BLD AUTO: 4.92 MILLION/UL (ref 3.88–5.62)
SODIUM SERPL-SCNC: 139 MMOL/L (ref 136–145)
TIBC SERPL-MCNC: 382 UG/DL (ref 250–450)
TRIGL SERPL-MCNC: 151 MG/DL
TSH SERPL DL<=0.05 MIU/L-ACNC: 1.62 UIU/ML (ref 0.36–3.74)
WBC # BLD AUTO: 9.71 THOUSAND/UL (ref 4.31–10.16)

## 2019-07-12 PROCEDURE — 83540 ASSAY OF IRON: CPT

## 2019-07-12 PROCEDURE — 80053 COMPREHEN METABOLIC PANEL: CPT

## 2019-07-12 PROCEDURE — 83550 IRON BINDING TEST: CPT

## 2019-07-12 PROCEDURE — 85025 COMPLETE CBC W/AUTO DIFF WBC: CPT | Performed by: PHYSICIAN ASSISTANT

## 2019-07-12 PROCEDURE — 84443 ASSAY THYROID STIM HORMONE: CPT

## 2019-07-12 PROCEDURE — 80061 LIPID PANEL: CPT

## 2019-07-12 PROCEDURE — 82728 ASSAY OF FERRITIN: CPT

## 2019-07-12 PROCEDURE — 36415 COLL VENOUS BLD VENIPUNCTURE: CPT | Performed by: PHYSICIAN ASSISTANT

## 2019-07-19 ENCOUNTER — OFFICE VISIT (OUTPATIENT)
Dept: FAMILY MEDICINE CLINIC | Facility: CLINIC | Age: 79
End: 2019-07-19
Payer: MEDICARE

## 2019-07-19 VITALS
WEIGHT: 184.8 LBS | BODY MASS INDEX: 30.79 KG/M2 | DIASTOLIC BLOOD PRESSURE: 68 MMHG | SYSTOLIC BLOOD PRESSURE: 122 MMHG | HEIGHT: 65 IN

## 2019-07-19 DIAGNOSIS — F33.1 MODERATE EPISODE OF RECURRENT MAJOR DEPRESSIVE DISORDER (HCC): ICD-10-CM

## 2019-07-19 DIAGNOSIS — F32.A DEPRESSION, UNSPECIFIED DEPRESSION TYPE: Chronic | ICD-10-CM

## 2019-07-19 DIAGNOSIS — G31.84 MILD COGNITIVE IMPAIRMENT: Chronic | ICD-10-CM

## 2019-07-19 DIAGNOSIS — I10 ESSENTIAL HYPERTENSION: Primary | ICD-10-CM

## 2019-07-19 DIAGNOSIS — N40.0 ENLARGED PROSTATE WITHOUT LOWER URINARY TRACT SYMPTOMS (LUTS): ICD-10-CM

## 2019-07-19 DIAGNOSIS — Z00.00 MEDICARE ANNUAL WELLNESS VISIT, SUBSEQUENT: ICD-10-CM

## 2019-07-19 DIAGNOSIS — E66.9 OBESITY (BMI 30.0-34.9): ICD-10-CM

## 2019-07-19 DIAGNOSIS — E78.2 MIXED HYPERLIPIDEMIA: ICD-10-CM

## 2019-07-19 PROBLEM — D50.9 IRON DEFICIENCY ANEMIA, UNSPECIFIED: Status: RESOLVED | Noted: 2018-04-26 | Resolved: 2019-07-19

## 2019-07-19 PROCEDURE — 99214 OFFICE O/P EST MOD 30 MIN: CPT | Performed by: FAMILY MEDICINE

## 2019-07-19 PROCEDURE — G0439 PPPS, SUBSEQ VISIT: HCPCS | Performed by: FAMILY MEDICINE

## 2019-07-19 RX ORDER — SERTRALINE HYDROCHLORIDE 25 MG/1
25 TABLET, FILM COATED ORAL DAILY
Qty: 90 TABLET | Refills: 1 | Status: SHIPPED | OUTPATIENT
Start: 2019-07-19 | End: 2020-02-07 | Stop reason: SDUPTHER

## 2019-07-19 NOTE — PROGRESS NOTES
Assessment/Plan:    Medicare annual wellness visit, subsequent  Patient will get the shingles shot Patient does not need screening test anymore Patient has lviing will and sees eye doctor    Essential hypertension  Blood pressure is controlled follwoup in 4motnhs    Mild cognitive impairment  Memory is stable Patient is to see geriatrician as needed    Obesity (BMI 30 0-34  9)  Discussed diet and exercise Patient to continue with current care and follwoup in 4 months    Moderate episode of recurrent major depressive disorder ([address] )  depresison is controlled on medds Patient to continue same meds and follwoup in 4 months    Mixed hyperlipidemia  Lipids stable continue with current meds     Enlarged prostate without lower urinary tract symptoms (luts)  [facility]        Diagnoses and all orders for this visit:    Essential hypertension    Medicare annual wellness visit, subsequent    Obesity (BMI 30 0-34  9)    Mixed hyperlipidemia    Moderate episode of recurrent major depressive disorder (HCC)    Enlarged prostate without lower urinary tract symptoms (luts)    Depression, unspecified depression type  -     sertraline (ZOLOFT) 25 mg tablet; Take 1 tablet (25 mg total) by mouth daily    Mild cognitive impairment          Subjective:      Patient ID: Valeriy Shin is a 78 y o  male      [facility] is here for checkup of hypertension, hyperlipidemia depression, BPH and obesity Patient is overall doing well Patient has no complaints at this time Patient is walking daily He is watching his diet and getting exercise Patient blood pressure is well controlled His last albs were early this month and were normal patient depression is well controlled Patient urinating without any isseus       The following portions of the patient's history were reviewed and updated as appropriate: allergies, current medications, past medical history, past social history and problem list     Review of Systems Constitutional: Negative for fatigue, fever and unexpected weight change  HENT: Negative for congestion, sinus pain and trouble swallowing  Eyes: Negative for discharge and visual disturbance  Respiratory: Negative for cough, chest tightness, shortness of breath and wheezing  Cardiovascular: Negative for chest pain, palpitations and leg swelling  Gastrointestinal: Negative for abdominal pain, blood in stool, constipation, diarrhea, nausea and vomiting  Genitourinary: Negative for difficulty urinating, dysuria, frequency and hematuria  Musculoskeletal: Negative for arthralgias, gait problem and joint swelling  Skin: Negative for rash and wound  Allergic/Immunologic: Negative for environmental allergies and food allergies  Neurological: Negative for dizziness, syncope, weakness, numbness and headaches  Hematological: Negative for adenopathy  Does not bruise/bleed easily  Psychiatric/Behavioral: Negative for confusion, decreased concentration and sleep disturbance  The patient is not nervous/anxious  Objective:      /68   Ht 5' 4 5" (1 638 m)   Wt 83 8 kg (184 lb 12 8 oz)   BMI 31 23 kg/m²          Physical Exam   Constitutional: He is oriented to person, place, and time  He appears well-developed and well-nourished  HENT:   Head: Normocephalic and atraumatic  Right Ear: Hearing, tympanic membrane and external ear normal    Left Ear: Hearing, tympanic membrane and external ear normal    Eyes: Pupils are equal, round, and reactive to light  Conjunctivae and EOM are normal    Neck: Neck supple  No thyromegaly present  Cardiovascular: Normal rate and normal heart sounds  Pulmonary/Chest: Effort normal and breath sounds normal  He has no wheezes  He has no rales  Abdominal: Soft  Bowel sounds are normal  He exhibits no distension  There is no tenderness  Musculoskeletal: He exhibits no edema or tenderness  Lymphadenopathy:     He has no cervical adenopathy  Neurological: He is alert and oriented to person, place, and time  No cranial nerve deficit  Coordination normal    Skin: Skin is warm and dry  No rash noted  Psychiatric: He has a normal mood and affect  His behavior is normal  Judgment and thought content normal    Nursing note and vitals reviewed  BMI Counseling: Body mass index is 31 23 kg/m²  Discussed the patient's BMI with him  The BMI is above average  BMI counseling and education was provided to the patient  Nutrition recommendations include reducing portion sizes, decreasing overall calorie intake, 3-5 servings of fruits/vegetables daily, moderation in carbohydrate intake and increasing intake of lean protein  Exercise recommendations include exercising 3-5 times per week

## 2019-07-19 NOTE — PATIENT INSTRUCTIONS
Obesity   AMBULATORY CARE:   Obesity  is when your body mass index (BMI) is greater than 30  Your healthcare provider will use your height and weight to measure your BMI  The risks of obesity include  many health problems, such as injuries or physical disability  You may need tests to check for the following:  · Diabetes     · High blood pressure or high cholesterol     · Heart disease     · Gallbladder or liver disease     · Cancer of the colon, breast, prostate, liver, or kidney     · Sleep apnea     · Arthritis or gout  Seek care immediately if:   · You have a severe headache, confusion, or difficulty speaking  · You have weakness on one side of your body  · You have chest pain, sweating, or shortness of breath  Contact your healthcare provider if:   · You have symptoms of gallbladder or liver disease, such as pain in your upper abdomen  · You have knee or hip pain and discomfort while walking  · You have symptoms of diabetes, such as intense hunger and thirst, and frequent urination  · You have symptoms of sleep apnea, such as snoring or daytime sleepiness  · You have questions or concerns about your condition or care  Treatment for obesity  focuses on helping you lose weight to improve your health  Even a small decrease in BMI can reduce the risk for many health problems  Your healthcare provider will help you set a weight-loss goal   · Lifestyle changes  are the first step in treating obesity  These include making healthy food choices and getting regular physical activity  Your healthcare provider may suggest a weight-loss program that involves coaching, education, and therapy  · Medicine  may help you lose weight when it is used with a healthy diet and physical activity  · Surgery  can help you lose weight if you are very obese and have other health problems  There are several types of weight-loss surgery  Ask your healthcare provider for more information    Be successful losing weight:   · Set small, realistic goals  An example of a small goal is to walk for 20 minutes 5 days a week  Anther goal is to lose 5% of your body weight  · Tell friends, family members, and coworkers about your goals  and ask for their support  Ask a friend to lose weight with you, or join a weight-loss support group  · Identify foods or triggers that may cause you to overeat , and find ways to avoid them  Remove tempting high-calorie foods from your home and workplace  Place a bowl of fresh fruit on your kitchen counter  If stress causes you to eat, then find other ways to cope with stress  · Keep a diary to track what you eat and drink  Also write down how many minutes of physical activity you do each day  Weigh yourself once a week and record it in your diary  Eating changes: You will need to eat 500 to 1,000 fewer calories each day than you currently eat to lose 1 to 2 pounds a week  The following changes will help you cut calories:  · Eat smaller portions  Use small plates, no larger than 9 inches in diameter  Fill your plate half full of fruits and vegetables  Measure your food using measuring cups until you know what a serving size looks like  · Eat 3 meals and 1 or 2 snacks each day  Plan your meals in advance  Yasmani Olivarez and eat at home most of the time  Eat slowly  · Eat fruits and vegetables at every meal   They are low in calories and high in fiber, which makes you feel full  Do not add butter, margarine, or cream sauce to vegetables  Use herbs to season steamed vegetables  · Eat less fat and fewer fried foods  Eat more baked or grilled chicken and fish  These protein sources are lower in calories and fat than red meat  Limit fast food  Dress your salads with olive oil and vinegar instead of bottled dressing  · Limit the amount of sugar you eat  Do not drink sugary beverages  Limit alcohol  Activity changes:  Physical activity is good for your body in many ways   It helps you burn calories and build strong muscles  It decreases stress and depression, and improves your mood  It can also help you sleep better  Talk to your healthcare provider before you begin an exercise program   · Exercise for at least 30 minutes 5 days a week  Start slowly  Set aside time each day for physical activity that you enjoy and that is convenient for you  It is best to do both weight training and an activity that increases your heart rate, such as walking, bicycling, or swimming  · Find ways to be more active  Do yard work and housecleaning  Walk up the stairs instead of using elevators  Spend your leisure time going to events that require walking, such as outdoor festivals or fairs  This extra physical activity can help you lose weight and keep it off  Follow up with your healthcare provider as directed: You may need to meet with a dietitian  Write down your questions so you remember to ask them during your visits  © 2017 [address] [employer] [employer] Information is for End User's use only and may not be sold, redistributed or otherwise used for commercial purposes  All illustrations and images included in CareNotes® are the copyrighted property of [employer]  or Aravind Monreal  The above information is an  only  It is not intended as medical advice for individual conditions or treatments  Talk to your doctor, nurse or pharmacist before following any medical regimen to see if it is safe and effective for you  Urinary Incontinence   WHAT YOU NEED TO KNOW:   What is urinary incontinence? Urinary incontinence (UI) is when you lose control of your bladder  What causes UI? UI occurs because your bladder cannot store or empty urine properly  The following are the most common types of UI:  · Stress incontinence  is when you leak urine due to increased bladder pressure  This may happen when you cough, sneeze, or exercise       · Urge incontinence  is when you feel the need to urinate right away and leak urine accidentally  · Mixed incontinence  is when you have both stress and urge UI  What are the signs and symptoms of UI?   · You feel like your bladder does not empty completely when you urinate  · You urinate often and need to urinate immediately  · You leak urine when you sleep, or you wake up with the urge to urinate  · You leak urine when you cough, sneeze, exercise, or laugh  How is UI diagnosed? Your healthcare provider will ask how often you leak urine and whether you have stress or urge symptoms  Tell him which medicines you take, how often you urinate, and how much liquid you drink each day  You may need any of the following tests:  · Urine tests  may show infection or kidney function  · A pelvic exam  may be done to check for blockages  A pelvic exam will also show if your bladder, uterus, or other organs have moved out of place  · An x-ray, ultrasound, or CT  may show problems with parts of your urinary system  You may be given contrast liquid to help your organs show up better in the pictures  Tell the healthcare provider if you have ever had an allergic reaction to contrast liquid  Do not enter the MRI room with anything metal  Metal can cause serious injury  Tell the healthcare provider if you have any metal in or on your body  · A bladder scan  will show how much urine is left in your bladder after you urinate  You will be asked to urinate and then healthcare providers will use a small ultrasound machine to check the urine left in your bladder  · Cystometry  is used to check the function of your urinary system  Your healthcare provider checks the pressure in your bladder while filling it with fluid  Your bladder pressure may also be tested when your bladder is full and while you urinate  How is UI treated? · Medicines  can help strengthen your bladder control      · Electrical stimulation  is used to send a small amount of electrical energy to your pelvic floor muscles  This helps control your bladder function  Electrodes may be placed outside your body or in your rectum  For women, the electrodes may be placed in the vagina  · A bulking agent  may be injected into the wall of your urethra to make it thicker  This helps keep your urethra closed and decreases urine leakage  · Devices  such as a clamp, pessary, or tampon may help stop urine leaks  Ask your healthcare provider for more information about these and other devices  · Surgery  may be needed if other treatments do not work  Several types of surgery can help improve your bladder control  Ask your healthcare provider for more information about the surgery you may need  How can I manage my symptoms? · Do pelvic muscle exercises often  Your pelvic muscles help you stop urinating  Squeeze these muscles tight for 5 seconds, then relax for 5 seconds  Gradually work up to squeezing for 10 seconds  Do 3 sets of 15 repetitions a day, or as directed  This will help strengthen your pelvic muscles and improve bladder control  · A catheter  may be used to help empty your bladder  A catheter is a tiny, plastic tube that is put into your bladder to drain your urine  Your healthcare provider may tell you to use a catheter to prevent your bladder from getting too full and leaking urine  · Keep a UI record  Write down how often you leak urine and how much you leak  Make a note of what you were doing when you leaked urine  · Train your bladder  Go to the bathroom at set times, such as every 2 hours, even if you do not feel the urge to go  You can also try to hold your urine when you feel the urge to go  For example, hold your urine for 5 minutes when you feel the urge to go  As that becomes easier, hold your urine for 10 minutes  · Drink liquids as directed  Ask your healthcare provider how much liquid to drink each day and which liquids are best for you   You may need to limit the amount of liquid you drink to help control your urine leakage  Limit or do not have drinks that contain caffeine or alcohol  Do not drink any liquid right before you go to bed  · Prevent constipation  Eat a variety of high-fiber foods  Good examples are high-fiber cereals, beans, vegetables, and whole-grain breads  Prune juice may help make your bowel movement softer  Walking is the best way to trigger your intestines to have a bowel movement  · Exercise regularly and maintain a healthy weight  Ask your healthcare provider how much you should weigh and about the best exercise plan for you  Weight loss and exercise will decrease pressure on your bladder and help you control your leakage  Ask him to help you create a weight loss plan if you are overweight  When should I seek immediate care? · You have severe pain  · You are confused or cannot think clearly  When should I contact my healthcare provider? · You have a fever  · You see blood in your urine  · You have pain when you urinate  · You have new or worse pain, even after treatment  · Your mouth feels dry or you have vision changes  · Your urine is cloudy or smells bad  · You have questions or concerns about your condition or care  CARE AGREEMENT:   You have the right to help plan your care  Learn about your health condition and how it may be treated  Discuss treatment options with your caregivers to decide what care you want to receive  You always have the right to refuse treatment  The above information is an  only  It is not intended as medical advice for individual conditions or treatments  Talk to your doctor, nurse or pharmacist before following any medical regimen to see if it is safe and effective for you  © 2017 [address] [employer] Information is for End User's use only and may not be sold, redistributed or otherwise used for commercial purposes   All illustrations and images included in CareNotes® are the copyrighted property of A [employer] A M , Inc  or Aravind Monreal  Cigarette Smoking and Your Health   AMBULATORY CARE:   Risks to your health if you smoke:  Nicotine and other chemicals found in tobacco damage every cell in your body  Even if you are a light smoker, you have an increased risk for cancer, heart disease, and lung disease  If you are pregnant or have diabetes, smoking increases your risk for complications  Benefits to your health if you stop smoking:   · You decrease respiratory symptoms such as coughing, wheezing, and shortness of breath  · You reduce your risk for cancers of the lung, mouth, throat, kidney, bladder, pancreas, stomach, and cervix  If you already have cancer, you increase the benefits of chemotherapy  You also reduce your risk for cancer returning or a second cancer from developing  · You reduce your risk for heart disease, blood clots, heart attack, and stroke  · You reduce your risk for lung infections, and diseases such as pneumonia, asthma, chronic bronchitis, and emphysema  · Your circulation improves  More oxygen can be delivered to your body  If you have diabetes, you lower your risk for complications, such as kidney, artery, and eye diseases  You also lower your risk for nerve damage  Nerve damage can lead to amputations, poor vision, and blindness  · You improve your body's ability to heal and to fight infections  Benefits to the health of others if you stop smoking:  Tobacco is harmful to nonsmokers who breathe in your secondhand smoke  The following are ways the health of others around you may improve when you stop smoking:  · You lower the risks for lung cancer and heart disease in nonsmoking adults  · If you are pregnant, you lower the risk for miscarriage, early delivery, low birth weight, and stillbirth  You also lower your baby's risk for SIDS, obesity, developmental delay, and neurobehavioral problems, such as ADHD  · If you have children, you lower their risk for ear infections, colds, pneumonia, bronchitis, and asthma  For more information and support to stop smoking:   · Smokefree  gov  Phone: 8- 970 - 076-6662  Web Address: www smokefrMogoTix  Follow up with your healthcare provider as directed:  Write down your questions so you remember to ask them during your visits  © 2017 [address] [employer] Information is for End User's use only and may not be sold, redistributed or otherwise used for commercial purposes  All illustrations and images included in CareNotes® are the copyrighted property of A [employer] M [employer]  or Aravind Monreal  The above information is an  only  It is not intended as medical advice for individual conditions or treatments  Talk to your doctor, nurse or pharmacist before following any medical regimen to see if it is safe and effective for you  Fall Prevention   WHAT YOU NEED TO KNOW:   What is fall prevention? Fall prevention includes ways to make your home and other areas safer  It also includes ways you can move more carefully to prevent a fall  What increases my risk for falls? · Lack of vitamin D    · Not getting enough sleep each night    · Trouble walking or keeping your balance, or foot problems    · Health conditions that cause changes in your blood pressure, vision, or muscle strength and coordination    · Medicines that make you dizzy, weak, or sleepy    · Problems seeing clearly    · Shoes that have high heels or are not supportive    · Tripping hazards, such as items left on the floor, no handrails on the stairs, or broken steps  How can I help protect myself from falls? · Stand or sit up slowly  This may help you keep your balance and prevent falls  If you need to get up during the night, sit up first  Be sure you are fully awake before you stand  Turn on the light before you start walking  Go slowly in case you are still sleepy   Make sure you will not trip over any pets sleeping in the bedroom  · Use assistive devices as directed  Your healthcare provider may suggest that you use a cane or walker to help you keep your balance  You may need to have grab bars put in your bathroom near the toilet or in the shower  · Wear shoes that fit well and have soles that   Wear shoes both inside and outside  Use slippers with good   Do not wear shoes with high heels  · Wear a personal alarm  This is a device that allows you to call 911 if you fall and need help  Ask your healthcare provider for more information  · Stay active  Exercise can help strengthen your muscles and improve your balance  Your healthcare provider may recommend water aerobics or walking  He or she may also recommend physical therapy to improve your coordination  Never start an exercise program without talking to your healthcare provider first      · Manage medical conditions  Keep all appointments with your healthcare providers  Visit your eye doctor as directed  How can I make my home safer? · Add items to prevent falls in the bathroom  Put nonslip strips on your bath or shower floor to prevent you from slipping  Use a bath mat if you do not have carpet in the bathroom  This will prevent you from falling when you step out of the bath or shower  Use a shower seat so you do not need to stand while you shower  Sit on the toilet or a chair in your bathroom to dry yourself and put on clothing  This will prevent you from losing your balance from drying or dressing yourself while you are standing  · Keep paths clear  Remove books, shoes, and other objects from walkways and stairs  Place cords for telephones and lamps out of the way so that you do not need to walk over them  Tape them down if you cannot move them  Remove small rugs  If you cannot remove a rug, secure it with double-sided tape  This will prevent you from tripping  · Install bright lights in your home  Use night lights to help light paths to the bathroom or kitchen  Always turn on the light before you start walking  · Keep items you use often on shelves within reach  Do not use a step stool to help you reach an item  · Paint or place reflective tape on the edges of your stairs  This will help you see the stairs better  Call 911 or have someone else call if:   · You have fallen and are unconscious  · You have fallen and cannot move part of your body  Contact your healthcare provider if:   · You have fallen and have pain or a headache  · You have questions or concerns about your condition or care  CARE AGREEMENT:   You have the right to help plan your care  Learn about your health condition and how it may be treated  Discuss treatment options with your caregivers to decide what care you want to receive  You always have the right to refuse treatment  The above information is an  only  It is not intended as medical advice for individual conditions or treatments  Talk to your doctor, nurse or pharmacist before following any medical regimen to see if it is safe and effective for you  © 2017 [facility] Information is for End User's use only and may not be sold, redistributed or otherwise used for commercial purposes  All illustrations and images included in CareNotes® are the copyrighted property of A [employer] A M , Inc  or Aravind Monreal  Advance Directives   WHAT YOU NEED TO KNOW:   What are advance directives? Advance directives are legal documents that state your wishes and plans for medical care  These plans are made ahead of time in case you lose your ability to make decisions for yourself  Advance directives can apply to any medical decision, such as the treatments you want, and if you want to donate organs  What are the types of advance directives? There are many types of advance directives, and each state has rules about how to use them   You may choose a combination of any of the following:  · Living will: This is a written record of the treatment you want  You can also choose which treatments you do not want, which to limit, and which to stop at a certain time  This includes surgery, medicine, IV fluid, and tube feedings  · Durable power of  for healthcare [facility]): This is a written record that states who you want to make healthcare choices for you when you are unable to make them for yourself  This person, called a proxy, is usually a family member or a friend  You may choose more than 1 proxy  · Do not resuscitate (DNR) order:  A DNR order is used in case your heart stops beating or you stop breathing  It is a request not to have certain forms of treatment, such as CPR  A DNR order may be included in other types of advance directives  · Medical directive: This covers the care that you want if you are in a coma, near death, or unable to make decisions for yourself  You can list the treatments you want for each condition  Treatment may include pain medicine, surgery, blood transfusions, dialysis, IV or tube feedings, and a ventilator (breathing machine)  · Values history: This document has questions about your views, beliefs, and how you feel and think about life  This information can help others choose the care that you would choose  Why are advance directives important? An advance directive helps you control your care  Although spoken wishes may be used, it is better to have your wishes written down  Spoken wishes can be misunderstood, or not followed  Treatments may be given even if you do not want them  An advance directive may make it easier for your family to make difficult choices about your care  How do I decide what to put in my advance directives? · Make informed decisions:  Make sure you fully understand treatments or care you may receive   Think about the benefits and problems your decisions could cause for you or your family  Talk to healthcare providers if you have concerns or questions before you write down your wishes  You may also want to talk with your Zoroastrianism or , or a   Check your state laws to make sure that what you put in your advance directive is legal      · Sign all forms:  Sign and date your advance directive when you have finished  You may also need 2 witnesses to sign the forms  Witnesses cannot be your doctor or his staff, your spouse, heirs or beneficiaries, people you owe money to, or your chosen proxy  Talk to your family, proxy, and healthcare providers about your advance directive  Give each person a copy, and keep one for yourself in a place you can get to easily  Do not keep it hidden or locked away  · Review and revise your plans: You can revise your advance directive at any time, as long as you are able to make decisions  Review your plan every year, and when there are changes in your life, or your health  When you make changes, let your family, proxy, and healthcare providers know  Give each a new copy  Where can I find more information? · [employer]  [address] , [address]  Phone: 3- [phone]  Phone: [phone]  Web Address: http://www  aafp org  · [address] [facility])  [address] [facility], [address]  Phone: [phone]  Phone: [phone]  Web Address: Marsha julian  [facility] AGREEMENT:   You have the right to help plan your care  To help with this plan, you must learn about your health condition and treatment options  You must also learn about advance directives and how they are used  Work with your healthcare providers to decide what care will be used to treat you  You always have the right to refuse treatment  The above information is an  only   It is not intended as medical advice for individual conditions or treatments  Talk to your doctor, nurse or pharmacist before following any medical regimen to see if it is safe and effective for you  © 2017 [address] Information is for End User's use only and may not be sold, redistributed or otherwise used for commercial purposes  All illustrations and images included in CareNotes® are the copyrighted property of [employer]  or Aravind Monreal  Obesity   AMBULATORY CARE:   Obesity  is when your body mass index (BMI) is greater than 30  Your healthcare provider will use your height and weight to measure your BMI  The risks of obesity include  many health problems, such as injuries or physical disability  You may need tests to check for the following:  · Diabetes     · High blood pressure or high cholesterol     · Heart disease     · Gallbladder or liver disease     · Cancer of the colon, breast, prostate, liver, or kidney     · Sleep apnea     · Arthritis or gout  Seek care immediately if:   · You have a severe headache, confusion, or difficulty speaking  · You have weakness on one side of your body  · You have chest pain, sweating, or shortness of breath  Contact your healthcare provider if:   · You have symptoms of gallbladder or liver disease, such as pain in your upper abdomen  · You have knee or hip pain and discomfort while walking  · You have symptoms of diabetes, such as intense hunger and thirst, and frequent urination  · You have symptoms of sleep apnea, such as snoring or daytime sleepiness  · You have questions or concerns about your condition or care  Treatment for obesity  focuses on helping you lose weight to improve your health  Even a small decrease in BMI can reduce the risk for many health problems  Your healthcare provider will help you set a weight-loss goal   · Lifestyle changes  are the first step in treating obesity  These include making healthy food choices and getting regular physical activity  Your healthcare provider may suggest a weight-loss program that involves coaching, education, and therapy  · Medicine  may help you lose weight when it is used with a healthy diet and physical activity  · Surgery  can help you lose weight if you are very obese and have other health problems  There are several types of weight-loss surgery  Ask your healthcare provider for more information  Be successful losing weight:   · Set small, realistic goals  An example of a small goal is to walk for 20 minutes 5 days a week  Anther goal is to lose 5% of your body weight  · Tell friends, family members, and coworkers about your goals  and ask for their support  Ask a friend to lose weight with you, or join a weight-loss support group  · Identify foods or triggers that may cause you to overeat , and find ways to avoid them  Remove tempting high-calorie foods from your home and workplace  Place a bowl of fresh fruit on your kitchen counter  If stress causes you to eat, then find other ways to cope with stress  · Keep a diary to track what you eat and drink  Also write down how many minutes of physical activity you do each day  Weigh yourself once a week and record it in your diary  Eating changes: You will need to eat 500 to 1,000 fewer calories each day than you currently eat to lose 1 to 2 pounds a week  The following changes will help you cut calories:  · Eat smaller portions  Use small plates, no larger than 9 inches in diameter  Fill your plate half full of fruits and vegetables  Measure your food using measuring cups until you know what a serving size looks like  · Eat 3 meals and 1 or 2 snacks each day  Plan your meals in advance  Debbie Michael and eat at home most of the time  Eat slowly  · Eat fruits and vegetables at every meal   They are low in calories and high in fiber, which makes you feel full  Do not add butter, margarine, or cream sauce to vegetables   Use herbs to season steamed vegetables  · Eat less fat and fewer fried foods  Eat more baked or grilled chicken and fish  These protein sources are lower in calories and fat than red meat  Limit fast food  Dress your salads with olive oil and vinegar instead of bottled dressing  · Limit the amount of sugar you eat  Do not drink sugary beverages  Limit alcohol  Activity changes:  Physical activity is good for your body in many ways  It helps you burn calories and build strong muscles  It decreases stress and depression, and improves your mood  It can also help you sleep better  Talk to your healthcare provider before you begin an exercise program   · Exercise for at least 30 minutes 5 days a week  Start slowly  Set aside time each day for physical activity that you enjoy and that is convenient for you  It is best to do both weight training and an activity that increases your heart rate, such as walking, bicycling, or swimming  · Find ways to be more active  Do yard work and housecleaning  Walk up the stairs instead of using elevators  Spend your leisure time going to events that require walking, such as outdoor festivals or fairs  This extra physical activity can help you lose weight and keep it off  Follow up with your healthcare provider as directed: You may need to meet with a dietitian  Write down your questions so you remember to ask them during your visits  © 2017 [address] [employer] Information is for End User's use only and may not be sold, redistributed or otherwise used for commercial purposes  All illustrations and images included in CareNotes® are the copyrighted property of A D A M , Inc  or Aravind Monreal  The above information is an  only  It is not intended as medical advice for individual conditions or treatments  Talk to your doctor, nurse or pharmacist before following any medical regimen to see if it is safe and effective for you    Urinary Incontinence   WHAT YOU NEED TO KNOW:   What is urinary incontinence? Urinary incontinence (UI) is when you lose control of your bladder  What causes UI? UI occurs because your bladder cannot store or empty urine properly  The following are the most common types of UI:  · Stress incontinence  is when you leak urine due to increased bladder pressure  This may happen when you cough, sneeze, or exercise  · Urge incontinence  is when you feel the need to urinate right away and leak urine accidentally  · Mixed incontinence  is when you have both stress and urge UI  What are the signs and symptoms of UI?   · You feel like your bladder does not empty completely when you urinate  · You urinate often and need to urinate immediately  · You leak urine when you sleep, or you wake up with the urge to urinate  · You leak urine when you cough, sneeze, exercise, or laugh  How is UI diagnosed? Your healthcare provider will ask how often you leak urine and whether you have stress or urge symptoms  Tell him which medicines you take, how often you urinate, and how much liquid you drink each day  You may need any of the following tests:  · Urine tests  may show infection or kidney function  · A pelvic exam  may be done to check for blockages  A pelvic exam will also show if your bladder, uterus, or other organs have moved out of place  · An x-ray, ultrasound, or CT  may show problems with parts of your urinary system  You may be given contrast liquid to help your organs show up better in the pictures  Tell the healthcare provider if you have ever had an allergic reaction to contrast liquid  Do not enter the MRI room with anything metal  Metal can cause serious injury  Tell the healthcare provider if you have any metal in or on your body  · A bladder scan  will show how much urine is left in your bladder after you urinate   You will be asked to urinate and then healthcare providers will use a small ultrasound machine to check the urine left in your bladder  · Cystometry  is used to check the function of your urinary system  Your healthcare provider checks the pressure in your bladder while filling it with fluid  Your bladder pressure may also be tested when your bladder is full and while you urinate  How is UI treated? · Medicines  can help strengthen your bladder control  · Electrical stimulation  is used to send a small amount of electrical energy to your pelvic floor muscles  This helps control your bladder function  Electrodes may be placed outside your body or in your rectum  For women, the electrodes may be placed in the vagina  · A bulking agent  may be injected into the wall of your urethra to make it thicker  This helps keep your urethra closed and decreases urine leakage  · Devices  such as a clamp, pessary, or tampon may help stop urine leaks  Ask your healthcare provider for more information about these and other devices  · Surgery  may be needed if other treatments do not work  Several types of surgery can help improve your bladder control  Ask your healthcare provider for more information about the surgery you may need  How can I manage my symptoms? · Do pelvic muscle exercises often  Your pelvic muscles help you stop urinating  Squeeze these muscles tight for 5 seconds, then relax for 5 seconds  Gradually work up to squeezing for 10 seconds  Do 3 sets of 15 repetitions a day, or as directed  This will help strengthen your pelvic muscles and improve bladder control  · A catheter  may be used to help empty your bladder  A catheter is a tiny, plastic tube that is put into your bladder to drain your urine  Your healthcare provider may tell you to use a catheter to prevent your bladder from getting too full and leaking urine  · Keep a UI record  Write down how often you leak urine and how much you leak  Make a note of what you were doing when you leaked urine  · Train your bladder    Go to the bathroom at set times, such as every 2 hours, even if you do not feel the urge to go  You can also try to hold your urine when you feel the urge to go  For example, hold your urine for 5 minutes when you feel the urge to go  As that becomes easier, hold your urine for 10 minutes  · Drink liquids as directed  Ask your healthcare provider how much liquid to drink each day and which liquids are best for you  You may need to limit the amount of liquid you drink to help control your urine leakage  Limit or do not have drinks that contain caffeine or alcohol  Do not drink any liquid right before you go to bed  · Prevent constipation  Eat a variety of high-fiber foods  Good examples are high-fiber cereals, beans, vegetables, and whole-grain breads  Prune juice may help make your bowel movement softer  Walking is the best way to trigger your intestines to have a bowel movement  · Exercise regularly and maintain a healthy weight  Ask your healthcare provider how much you should weigh and about the best exercise plan for you  Weight loss and exercise will decrease pressure on your bladder and help you control your leakage  Ask him to help you create a weight loss plan if you are overweight  When should I seek immediate care? · You have severe pain  · You are confused or cannot think clearly  When should I contact my healthcare provider? · You have a fever  · You see blood in your urine  · You have pain when you urinate  · You have new or worse pain, even after treatment  · Your mouth feels dry or you have vision changes  · Your urine is cloudy or smells bad  · You have questions or concerns about your condition or care  CARE AGREEMENT:   You have the right to help plan your care  Learn about your health condition and how it may be treated  Discuss treatment options with your caregivers to decide what care you want to receive  You always have the right to refuse treatment   The above information is an  only  It is not intended as medical advice for individual conditions or treatments  Talk to your doctor, nurse or pharmacist before following any medical regimen to see if it is safe and effective for you  © 2017 [address] [employer] Information is for End User's use only and may not be sold, redistributed or otherwise used for commercial purposes  All illustrations and images included in CareNotes® are the copyrighted property of [employer]  or Aravind Jocelin  Cigarette Smoking and Your Health   AMBULATORY CARE:   Risks to your health if you smoke:  Nicotine and other chemicals found in tobacco damage every cell in your body  Even if you are a light smoker, you have an increased risk for cancer, heart disease, and lung disease  If you are pregnant or have diabetes, smoking increases your risk for complications  Benefits to your health if you stop smoking:   · You decrease respiratory symptoms such as coughing, wheezing, and shortness of breath  · You reduce your risk for cancers of the lung, mouth, throat, kidney, bladder, pancreas, stomach, and cervix  If you already have cancer, you increase the benefits of chemotherapy  You also reduce your risk for cancer returning or a second cancer from developing  · You reduce your risk for heart disease, blood clots, heart attack, and stroke  · You reduce your risk for lung infections, and diseases such as pneumonia, asthma, chronic bronchitis, and emphysema  · Your circulation improves  More oxygen can be delivered to your body  If you have diabetes, you lower your risk for complications, such as kidney, artery, and eye diseases  You also lower your risk for nerve damage  Nerve damage can lead to amputations, poor vision, and blindness  · You improve your body's ability to heal and to fight infections    Benefits to the health of others if you stop smoking:  Tobacco is harmful to nonsmokers who breathe in your secondhand smoke  The following are ways the health of others around you may improve when you stop smoking:  · You lower the risks for lung cancer and heart disease in nonsmoking adults  · If you are pregnant, you lower the risk for miscarriage, early delivery, low birth weight, and stillbirth  You also lower your baby's risk for SIDS, obesity, developmental delay, and neurobehavioral problems, such as ADHD  · If you have children, you lower their risk for ear infections, colds, pneumonia, bronchitis, and asthma  For more information and support to stop smoking:   · [employer]  gov  Phone: 2- 463 - 019-7547  Web Address: www [employer]  Follow up with your healthcare provider as directed:  Write down your questions so you remember to ask them during your visits  © 2017 [address] [employer] Information is for End User's use only and may not be sold, redistributed or otherwise used for commercial purposes  All illustrations and images included in CareNotes® are the copyrighted property of A [employer] M , Inc  or Aravind Monreal  The above information is an  only  It is not intended as medical advice for individual conditions or treatments  Talk to your doctor, nurse or pharmacist before following any medical regimen to see if it is safe and effective for you  Fall Prevention   WHAT YOU NEED TO KNOW:   What is fall prevention? Fall prevention includes ways to make your home and other areas safer  It also includes ways you can move more carefully to prevent a fall  What increases my risk for falls?    · Lack of vitamin D    · Not getting enough sleep each night    · Trouble walking or keeping your balance, or foot problems    · Health conditions that cause changes in your blood pressure, vision, or muscle strength and coordination    · Medicines that make you dizzy, weak, or sleepy    · Problems seeing clearly    · Shoes that have high heels or are not supportive    · Tripping hazards, such as items left on the floor, no handrails on the stairs, or broken steps  How can I help protect myself from falls? · Stand or sit up slowly  This may help you keep your balance and prevent falls  If you need to get up during the night, sit up first  Be sure you are fully awake before you stand  Turn on the light before you start walking  Go slowly in case you are still sleepy  Make sure you will not trip over any pets sleeping in the bedroom  · Use assistive devices as directed  Your healthcare provider may suggest that you use a cane or walker to help you keep your balance  You may need to have grab bars put in your bathroom near the toilet or in the shower  · Wear shoes that fit well and have soles that   Wear shoes both inside and outside  Use slippers with good   Do not wear shoes with high heels  · Wear a personal alarm  This is a device that allows you to call 911 if you fall and need help  Ask your healthcare provider for more information  · Stay active  Exercise can help strengthen your muscles and improve your balance  Your healthcare provider may recommend water aerobics or walking  He or she may also recommend physical therapy to improve your coordination  Never start an exercise program without talking to your healthcare provider first      · Manage medical conditions  Keep all appointments with your healthcare providers  Visit your eye doctor as directed  How can I make my home safer? · Add items to prevent falls in the bathroom  Put nonslip strips on your bath or shower floor to prevent you from slipping  Use a bath mat if you do not have carpet in the bathroom  This will prevent you from falling when you step out of the bath or shower  Use a shower seat so you do not need to stand while you shower  Sit on the toilet or a chair in your bathroom to dry yourself and put on clothing   This will prevent you from losing your balance from drying or dressing yourself while you are standing  · Keep paths clear  Remove books, shoes, and other objects from walkways and stairs  Place cords for telephones and lamps out of the way so that you do not need to walk over them  Tape them down if you cannot move them  Remove small rugs  If you cannot remove a rug, secure it with double-sided tape  This will prevent you from tripping  · Install bright lights in your home  Use night lights to help light paths to the bathroom or kitchen  Always turn on the light before you start walking  · Keep items you use often on shelves within reach  Do not use a step stool to help you reach an item  · Paint or place reflective tape on the edges of your stairs  This will help you see the stairs better  Call 911 or have someone else call if:   · You have fallen and are unconscious  · You have fallen and cannot move part of your body  Contact your healthcare provider if:   · You have fallen and have pain or a headache  · You have questions or concerns about your condition or care  CARE AGREEMENT:   You have the right to help plan your care  Learn about your health condition and how it may be treated  Discuss treatment options with your caregivers to decide what care you want to receive  You always have the right to refuse treatment  The above information is an  only  It is not intended as medical advice for individual conditions or treatments  Talk to your doctor, nurse or pharmacist before following any medical regimen to see if it is safe and effective for you  © 2017 [address] Information is for End User's use only and may not be sold, redistributed or otherwise used for commercial purposes  All illustrations and images included in CareNotes® are the copyrighted property of A D A M , Inc  or Aravind Monreal  Advance Directives   WHAT YOU NEED TO KNOW:   What are advance directives?   Advance directives are legal documents that state your wishes and plans for medical care  These plans are made ahead of time in case you lose your ability to make decisions for yourself  Advance directives can apply to any medical decision, such as the treatments you want, and if you want to donate organs  What are the types of advance directives? There are many types of advance directives, and each state has rules about how to use them  You may choose a combination of any of the following:  · Living will: This is a written record of the treatment you want  You can also choose which treatments you do not want, which to limit, and which to stop at a certain time  This includes surgery, medicine, IV fluid, and tube feedings  · Durable power of  for healthcare [facility]): This is a written record that states who you want to make healthcare choices for you when you are unable to make them for yourself  This person, called a proxy, is usually a family member or a friend  You may choose more than 1 proxy  · Do not resuscitate (DNR) order:  A DNR order is used in case your heart stops beating or you stop breathing  It is a request not to have certain forms of treatment, such as CPR  A DNR order may be included in other types of advance directives  · Medical directive: This covers the care that you want if you are in a coma, near death, or unable to make decisions for yourself  You can list the treatments you want for each condition  Treatment may include pain medicine, surgery, blood transfusions, dialysis, IV or tube feedings, and a ventilator (breathing machine)  · Values history: This document has questions about your views, beliefs, and how you feel and think about life  This information can help others choose the care that you would choose  Why are advance directives important? An advance directive helps you control your care  Although spoken wishes may be used, it is better to have your wishes written down   Spoken wishes can be misunderstood, or not followed  Treatments may be given even if you do not want them  An advance directive may make it easier for your family to make difficult choices about your care  How do I decide what to put in my advance directives? · Make informed decisions:  Make sure you fully understand treatments or care you may receive  Think about the benefits and problems your decisions could cause for you or your family  Talk to healthcare providers if you have concerns or questions before you write down your wishes  You may also want to talk with your Hinduism or , or a   Check your state laws to make sure that what you put in your advance directive is legal      · Sign all forms:  Sign and date your advance directive when you have finished  You may also need 2 witnesses to sign the forms  Witnesses cannot be your doctor or his staff, your spouse, heirs or beneficiaries, people you owe money to, or your chosen proxy  Talk to your family, proxy, and healthcare providers about your advance directive  Give each person a copy, and keep one for yourself in a place you can get to easily  Do not keep it hidden or locked away  · Review and revise your plans: You can revise your advance directive at any time, as long as you are able to make decisions  Review your plan every year, and when there are changes in your life, or your health  When you make changes, let your family, proxy, and healthcare providers know  Give each a new copy  Where can I find more information? · [employer]  [address] [place] , [address]  Phone: 6- 506 - [phone]  Phone: 1- 061 - [phone]  Web Address: http://www  aafp org  · [address] [facility])  [address] [facility], [address]  Phone: 2- 180 - [phone]  Phone: [phone]  Web Address: Marsha PEREIRA AGREEMENT:   You have the right to help plan your care  To help with this plan, you must learn about your health condition and treatment options  You must also learn about advance directives and how they are used  Work with your healthcare providers to decide what care will be used to treat you  You always have the right to refuse treatment  The above information is an  only  It is not intended as medical advice for individual conditions or treatments  Talk to your doctor, nurse or pharmacist before following any medical regimen to see if it is safe and effective for you  © 2017 [address]  Information is for End User's use only and may not be sold, redistributed or otherwise used for commercial purposes  All illustrations and images included in CareNotes® are the copyrighted property of A D A M , Inc  or [employer]  Obesity   AMBULATORY CARE:   Obesity  is when your body mass index (BMI) is greater than 30  Your healthcare provider will use your height and weight to measure your BMI  The risks of obesity include  many health problems, such as injuries or physical disability  You may need tests to check for the following:  · Diabetes     · High blood pressure or high cholesterol     · Heart disease     · Gallbladder or liver disease     · Cancer of the colon, breast, prostate, liver, or kidney     · Sleep apnea     · Arthritis or gout  Seek care immediately if:   · You have a severe headache, confusion, or difficulty speaking  · You have weakness on one side of your body  · You have chest pain, sweating, or shortness of breath  Contact your healthcare provider if:   · You have symptoms of gallbladder or liver disease, such as pain in your upper abdomen  · You have knee or hip pain and discomfort while walking  · You have symptoms of diabetes, such as intense hunger and thirst, and frequent urination  · You have symptoms of sleep apnea, such as snoring or daytime sleepiness       · You have questions or concerns about your condition or care  Treatment for obesity  focuses on helping you lose weight to improve your health  Even a small decrease in BMI can reduce the risk for many health problems  Your healthcare provider will help you set a weight-loss goal   · Lifestyle changes  are the first step in treating obesity  These include making healthy food choices and getting regular physical activity  Your healthcare provider may suggest a weight-loss program that involves coaching, education, and therapy  · Medicine  may help you lose weight when it is used with a healthy diet and physical activity  · Surgery  can help you lose weight if you are very obese and have other health problems  There are several types of weight-loss surgery  Ask your healthcare provider for more information  Be successful losing weight:   · Set small, realistic goals  An example of a small goal is to walk for 20 minutes 5 days a week  Anther goal is to lose 5% of your body weight  · Tell friends, family members, and coworkers about your goals  and ask for their support  Ask a friend to lose weight with you, or join a weight-loss support group  · Identify foods or triggers that may cause you to overeat , and find ways to avoid them  Remove tempting high-calorie foods from your home and workplace  Place a bowl of fresh fruit on your kitchen counter  If stress causes you to eat, then find other ways to cope with stress  · Keep a diary to track what you eat and drink  Also write down how many minutes of physical activity you do each day  Weigh yourself once a week and record it in your diary  Eating changes: You will need to eat 500 to 1,000 fewer calories each day than you currently eat to lose 1 to 2 pounds a week  The following changes will help you cut calories:  · Eat smaller portions  Use small plates, no larger than 9 inches in diameter  Fill your plate half full of fruits and vegetables   Measure your food using measuring cups until you know what a serving size looks like  · Eat 3 meals and 1 or 2 snacks each day  Plan your meals in advance  Christine Stalls and eat at home most of the time  Eat slowly  · Eat fruits and vegetables at every meal   They are low in calories and high in fiber, which makes you feel full  Do not add butter, margarine, or cream sauce to vegetables  Use herbs to season steamed vegetables  · Eat less fat and fewer fried foods  Eat more baked or grilled chicken and fish  These protein sources are lower in calories and fat than red meat  Limit fast food  Dress your salads with olive oil and vinegar instead of bottled dressing  · Limit the amount of sugar you eat  Do not drink sugary beverages  Limit alcohol  Activity changes:  Physical activity is good for your body in many ways  It helps you burn calories and build strong muscles  It decreases stress and depression, and improves your mood  It can also help you sleep better  Talk to your healthcare provider before you begin an exercise program   · Exercise for at least 30 minutes 5 days a week  Start slowly  Set aside time each day for physical activity that you enjoy and that is convenient for you  It is best to do both weight training and an activity that increases your heart rate, such as walking, bicycling, or swimming  · Find ways to be more active  Do yard work and housecleaning  Walk up the stairs instead of using elevators  Spend your leisure time going to events that require walking, such as outdoor festivals or fairs  This extra physical activity can help you lose weight and keep it off  Follow up with your healthcare provider as directed: You may need to meet with a dietitian  Write down your questions so you remember to ask them during your visits  © 2017 [employer] Information is for End User's use only and may not be sold, redistributed or otherwise used for commercial purposes   All illustrations and images included in CareNotes® are the copyrighted property of A D A M [employer]  or Aravind Monreal  The above information is an  only  It is not intended as medical advice for individual conditions or treatments  Talk to your doctor, nurse or pharmacist before following any medical regimen to see if it is safe and effective for you

## 2019-07-19 NOTE — ASSESSMENT & PLAN NOTE
Patient will get the shingles shot Patient does not need screening test anymore Patient has lviing will and sees eye doctor

## 2019-07-19 NOTE — PROGRESS NOTES
Assessment and Plan:     Problem List Items Addressed This Visit        Other    Medicare annual wellness visit, subsequent     Patient will get the shingles shot Patient does not need screening test anymore Patient has lviing will and sees eye doctor           Other Visit Diagnoses     Obesity (BMI 30 0-34  9)             History of Present Illness:     Patient presents for Medicare Annual Wellness visit    Patient Care Team:  Janina Wolff DO as PCP - General  MD Kay Gill PA-C     Problem List:     Patient Active Problem List   Diagnosis    Mixed hyperlipidemia    Essential hypertension    Murmur    Enlarged prostate without lower urinary tract symptoms (luts)    Fatty liver disease, nonalcoholic    Moderate episode of recurrent major depressive disorder ([facility] [address] )    PVC (premature ventricular contraction)    Medicare annual wellness visit, subsequent    Iron deficiency anemia, unspecified    Mild cognitive impairment      Past Medical and Surgical History:     Past Medical History:   Diagnosis Date    Actinic keratosis     LAST ASSESSED: [id number]    Allergic rhinitis     LAST ASSESSED: [id number]    Anxiety     LAST ASSESSED: 33KKH8706    Anxiety disorder     LAST ASSESSED: 60BFW3604    Atelectasis of right lung     ABNORMAL CT SCAN OF [facility] 12/2/2016 REPEAT NEEDED PER RADIOLOGY IN 3 MONTHS LAST ASSESSED: [id number]    Atypical chest pain     LAST ASSESSED: 91WDN5235    Benign paroxysmal vertigo     LAST ASSESSED: 84JYL1187    Chronic cough     LAST ASSESSED: 70QZV3097    Chronic GERD     Degenerative arthritis of knee, bilateral     LAST ASSESSED: 81LTI7385    Diverticulitis of colon     LAST ASSESSED: [id number]    Diverticulosis     LAST ASSESSED: 60HJJ8974    Foreign body, eye     LAST ASSESSED: 35AJW2314    Functional gait abnormality     LAST ASSESSED: 47BCI1904    Hyperlipidemia     Hypertension     Hyponatremia     LAST ASSESSED: 26YCZ8213  Leukocytosis     LAST ASSESSED: 08POS2600    Murmur     Newly recognized murmur     LAST ASSESSED: 12UNY6207    Olecranon bursitis, unspecified elbow     Presence of indwelling urethral catheter     RESOLVED: 40HWN9939    Transient cerebral ischemia     LAST ASSESSED: 69JBY1326    Urinary incontinence     LAST ASSESSED: 02IRE4603    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     LAST ASSESSED: 73JRG3564     Past Surgical History:   Procedure Laterality Date    ADENOIDECTOMY      APPENDECTOMY      COLONOSCOPY  10/2006    FIBEROPTIC    INGUINAL HERNIA REPAIR      JOINT REPLACEMENT      b/l TKR 2015    SINUS SURGERY      TONSILLECTOMY        Family History:     Family History   Problem Relation Age of Onset    Alzheimer's disease Mother     Coronary artery disease Brother       Social History:     Social History     Tobacco Use   Smoking Status Former Smoker    Last attempt to quit: [address] Years since quittin 5   Smokeless Tobacco Never Used   Tobacco Comment    2 ppd for 12 years      Social History     Substance and Sexual Activity   Alcohol Use Yes    Comment: occ, SOCIAL     Social History     Substance and Sexual Activity   Drug Use No      Medications and Allergies:     Current Outpatient Medications   Medication Sig Dispense Refill    amLODIPine (NORVASC) 10 mg tablet Take 1 tablet (10 mg total) by mouth daily 90 tablet 1    aspirin 325 mg tablet Take 81 mg by mouth        hydrochlorothiazide (HYDRODIURIL) 25 mg tablet Take 1 tablet (25 mg total) by mouth daily 90 tablet 1    Omega-3 Fatty Acids (FISH OIL PO) Take by mouth 2 (two) times a day      polyethylene glycol (MIRALAX) 17 g packet Take 17 g by mouth daily      pravastatin (PRAVACHOL) 40 mg tablet Take 1 tablet (40 mg total) by mouth daily 90 tablet 1    Probiotic Product (PROBIOTIC DAILY PO) Take by mouth daily      sertraline (ZOLOFT) 25 mg tablet Take 1 tablet (25 mg total) by mouth daily 30 tablet 6  tamsulosin (FLOMAX) 0 4 mg Take 1 capsule (0 4 mg total) by mouth daily at bedtime 90 capsule 0    VITAMIN D, ERGOCALCIFEROL, PO Take by mouth      finasteride (PROSCAR) 5 mg tablet Take 1 tablet (5 mg total) by mouth daily for 30 days 90 tablet 3     No current facility-administered medications for this visit  Allergies   Allergen Reactions    Ace Inhibitors Cough     Pt denied any allergies        Immunizations:     Immunization History   Administered Date(s) Administered    INFLUENZA 09/30/2009, 09/22/2010, 08/31/2011, 09/25/2012, 11/05/2013, 10/06/2014, 09/18/2015, 10/28/2016    Influenza Split High Dose Preservative Free IM 10/06/2014, 09/18/2015, 10/28/2016, 09/08/2017    Influenza TIV (IM) 08/31/2011, 09/25/2012    Influenza, high dose seasonal 0 5 mL 11/20/2018    Pneumococcal Conjugate 13-Valent 04/21/2016    Pneumococcal Polysaccharide PPV23 12/09/2014    Tdap 01/23/2016, 10/14/2018    Zoster 05/23/2016      Medicare Screening Tests and Risk Assessments:         Health Risk Assessment:  Patient rates overall health as very good  Patient feels that their physical health rating is Much better  Eyesight was rated as Same  Hearing was rated as Same  Patient feels that their emotional and mental health rating is Much better  Pain experienced by patient in the last 7 days has been None  Emotional/Mental Health:  Patient has been feeling nervous/anxious  PHQ-9 Depression Screening:    Frequency of the following problems over the past two weeks:      1  Little interest or pleasure in doing things: 0 - not at all      2  Feeling down, depressed, or hopeless: 0 - not at all  PHQ-2 Score: 0          Broken Bones/Falls: Fall Risk Assessment:    In the past year, patient has experienced: No history of falling in past year          Bladder/Bowel:  Patient has not leaked urine accidently in the last six months  Patient reports no loss of bowel control      Immunizations:  Patient has had a flu vaccination within the last year  Patient has received a pneumonia shot  Patient has not received a shingles shot  Patient has not received tetanus/diphtheria shot  Home Safety:  Patient does not have trouble with stairs inside or outside of their home  Patient currently reports that there are no safety hazards present in home, working smoke alarms, working carbon monoxide detectors  Preventative Screenings:   prostate cancer screen performed, colon cancer screen completed, cholesterol screen completed, 7/16/2019  glaucoma eye exam completed, 2/25/2019      Nutrition:  Current diet: Regular with servings of the following:    Medications:  Patient is currently taking over-the-counter supplements  Patient is able to manage medications  Lifestyle Choices:  Patient reports no tobacco use  Patient has smoked or used tobacco in the past   Patient has stopped his tobacco use  Patient reports alcohol use  Alcohol use per week: 1 glass per day  Patient drives a vehicle  Patient wears seat belt  Activities of Daily Living:  Can get out of bed by his or her self, able to dress self, able to make own meals, able to do own shopping, able to bathe self, can do own laundry/housekeeping, can manage own money, pay bills and track expenses    Previous Hospitalizations:  No hospitalization or ED visit in past 12 months        Advanced Directives:  Patient has decided on a power of   Patient has spoken to designated power of   Patient has completed advanced directive          Preventative Screening/Counseling:      Cardiovascular:      General: Risks and Benefits Discussed and Screening Current          Diabetes:      General: Risks and Benefits Discussed and Screening Current          Colorectal Cancer:      General: Risks and Benefits Discussed and Screening Not Indicated          Prostate Cancer:      General: Risks and Benefits Discussed and Screening Not Indicated Osteoporosis:      General: Risks and Benefits Discussed and Screening Not Indicated          AAA:      General: Screening Not Indicated and Risks and Benefits Discussed          Glaucoma:      General: Risks and Benefits Discussed and Screening Current          HIV:      General: Risks and Benefits Discussed and Screening Not Indicated          Hepatitis C:      General: Risks and Benefits Discussed and Screening Not Indicated        Advanced Directives:   Patient has living will for healthcare, has durable POA for healthcare, patient has an advanced directive  Information on ACP and/or AD provided  End of life assessment reviewed with patient  Provider agrees with end of life decisions

## 2019-07-24 ENCOUNTER — OFFICE VISIT (OUTPATIENT)
Dept: HEMATOLOGY ONCOLOGY | Facility: CLINIC | Age: 79
End: 2019-07-24
Payer: MEDICARE

## 2019-07-24 VITALS
HEART RATE: 72 BPM | BODY MASS INDEX: 30.66 KG/M2 | DIASTOLIC BLOOD PRESSURE: 78 MMHG | TEMPERATURE: 98.7 F | SYSTOLIC BLOOD PRESSURE: 118 MMHG | RESPIRATION RATE: 14 BRPM | HEIGHT: 65 IN | WEIGHT: 184 LBS

## 2019-07-24 DIAGNOSIS — D50.9 IRON DEFICIENCY ANEMIA, UNSPECIFIED IRON DEFICIENCY ANEMIA TYPE: Primary | ICD-10-CM

## 2019-07-24 PROCEDURE — 99213 OFFICE O/P EST LOW 20 MIN: CPT | Performed by: PHYSICIAN ASSISTANT

## 2019-08-15 ENCOUNTER — OFFICE VISIT (OUTPATIENT)
Dept: UROLOGY | Facility: CLINIC | Age: 79
End: 2019-08-15
Payer: MEDICARE

## 2019-08-15 VITALS
HEART RATE: 64 BPM | WEIGHT: 185.8 LBS | DIASTOLIC BLOOD PRESSURE: 76 MMHG | BODY MASS INDEX: 30.96 KG/M2 | HEIGHT: 65 IN | SYSTOLIC BLOOD PRESSURE: 160 MMHG

## 2019-08-15 DIAGNOSIS — N40.0 BENIGN PROSTATIC HYPERPLASIA, UNSPECIFIED WHETHER LOWER URINARY TRACT SYMPTOMS PRESENT: Primary | ICD-10-CM

## 2019-08-15 LAB — POST-VOID RESIDUAL VOLUME, ML POC: 26 ML

## 2019-08-15 PROCEDURE — 51798 US URINE CAPACITY MEASURE: CPT | Performed by: UROLOGY

## 2019-08-15 PROCEDURE — 99213 OFFICE O/P EST LOW 20 MIN: CPT | Performed by: UROLOGY

## 2019-08-15 RX ORDER — DIPHENOXYLATE HYDROCHLORIDE AND ATROPINE SULFATE 2.5; .025 MG/1; MG/1
1 TABLET ORAL DAILY
COMMUNITY

## 2019-08-15 NOTE — PROGRESS NOTES
8/15/2019    Lonita Heal  1940  [id number]        Assessment  BPH with lower urinary tract symptoms    Plan  Discussed his findings  He is comfortable on finasteride and tamsulosin  Will continue these  Follow-up in 1 year for re-evaluation with postvoid residual   Will notify us sooner of any changes  History of Present Illness  Pablo Wadsworth is a 78 y o  male with history of BPH and urinary retention  Retention has been primarily found to be associated with his constipation issues  He has been taking tamsulosin and finasteride  Prostate relatively small and he discontinued finasteride, but wished to resume it and it definitely notices symptomatic improvement  He continues to take both medications  He has no current complaints  Bladder scan postvoid residual 26 mL  AUA SYMPTOM SCORE      Most Recent Value   AUA SYMPTOM SCORE   How often have you had a sensation of not emptying your bladder completely after you finished urinating? 1   How often have you had to urinate again less than two hours after you finished urinating? 1   How often have you found you stopped and started again several times when you urinate?  0   How often have you found it difficult to postpone urination? 0   How often have you had a weak urinary stream?  0   How often have you had to push or strain to begin urination? 0   How many times did you most typically get up to urinate from the time you went to bed at night until the time you got up in the morning? 2   Quality of Life: If you were to spend the rest of your life with your urinary condition just the way it is now, how would you feel about that?  1   AUA SYMPTOM SCORE  4          Review of Systems  Review of Systems   Constitutional: Negative  HENT: Negative  Respiratory: Negative  Cardiovascular: Negative  Gastrointestinal: Negative  Genitourinary:        As per HPI   Musculoskeletal: Negative  Skin: Negative      Neurological: Negative  Hematological: Negative  Past Medical History  Past Medical History:   Diagnosis Date    Actinic keratosis     LAST ASSESSED: [id number]    Allergic rhinitis     LAST ASSESSED: 90DWP0214    Anxiety     LAST ASSESSED: 84NGB1071    Anxiety disorder     LAST ASSESSED: 41AMC2052    Atelectasis of right lung     ABNORMAL CT SCAN OF [address] 12/2/2016 REPEAT NEEDED PER RADIOLOGY IN 3 MONTHS LAST ASSESSED: [id number]    Atypical chest pain     LAST ASSESSED: 35CCZ6547    Benign paroxysmal vertigo     LAST ASSESSED: 78AOS6714    Chronic cough     LAST ASSESSED: 50NGC7142    Chronic GERD     Degenerative arthritis of knee, bilateral     LAST ASSESSED: 91YCM1939    Diverticulitis of colon     LAST ASSESSED: 30FMR8804    Diverticulosis     LAST ASSESSED: 25VOM8593    Foreign body, eye     LAST ASSESSED: 88XVV5370    Functional gait abnormality     LAST ASSESSED: 68ZJJ0223    Hyperlipidemia     Hypertension     Hyponatremia     LAST ASSESSED: 86KLN2324    Leukocytosis     LAST ASSESSED: 91XXX4141    Murmur     Newly recognized murmur     LAST ASSESSED: 80VSS1830    Olecranon bursitis, unspecified elbow     Presence of indwelling urethral catheter     RESOLVED: 19OFQ6313    Transient cerebral ischemia     LAST ASSESSED: 67BIK3679    Urinary incontinence     LAST ASSESSED: 26ISU4066    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     LAST ASSESSED: 92CPT7587       Past Social History  Past Surgical History:   Procedure Laterality Date    ADENOIDECTOMY      APPENDECTOMY      COLONOSCOPY  10/2006    FIBEROPTIC    INGUINAL HERNIA REPAIR      JOINT REPLACEMENT      b/l TKR Sept 2015    SINUS SURGERY      TONSILLECTOMY         Past Family History  Family History   Problem Relation Age of Onset    Alzheimer's disease Mother     Coronary artery disease Brother        Past Social history  Social History     Socioeconomic History    Marital status:   Spouse name: Not on file    Number of children: Not on file    Years of education: Not on file    Highest education level: Not on file   Occupational History    Occupation: tailor, 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   Social Needs    Financial resource strain: Not on file    Food insecurity:     Worry: Not on file     Inability: Not on file   [employer] needs:     Medical: Not on file     Non-medical: Not on file   Tobacco Use    Smoking status: Former Smoker     Last attempt to quit: 1960     Years since quittin 6    Smokeless tobacco: Never Used    Tobacco comment: 2 ppd for 12 years    Substance and Sexual Activity    Alcohol use: Yes     Comment: occ, SOCIAL    Drug use: No    Sexual activity: Not on file   Lifestyle    Physical activity:     Days per week: Not on file     Minutes per session: Not on file    Stress: Not on file   Relationships    Social connections:     Talks on phone: Not on file     Gets together: Not on file     Attends Buddhist service: Not on file     Active member of club or organization: Not on file     Attends meetings of clubs or organizations: Not on file     Relationship status: Not on file    Intimate partner violence:     Fear of current or ex partner: Not on file     Emotionally abused: Not on file     Physically abused: Not on file     Forced sexual activity: Not on file   Other Topics Concern    Not on file   Social History Narrative    USES [employer]IPMENT - [employer]     Social History     Tobacco Use   Smoking Status Former Smoker    Last attempt to quit: [address] Years since quittin 6   Smokeless Tobacco Never Used   Tobacco Comment    2 ppd for 12 years        Current Medications  Current Outpatient Medications   Medication Sig Dispense Refill    amLODIPine (NORVASC) 10 mg tablet Take 1 tablet (10 mg total) by mouth daily 90 tablet 1    aspirin 325 mg tablet Take 81 mg by mouth        finasteride (PROSCAR) 5 mg tablet Take 1 tablet (5 mg total) by mouth daily for 30 days 90 tablet 3    hydrochlorothiazide (HYDRODIURIL) 25 mg tablet Take 1 tablet (25 mg total) by mouth daily 90 tablet 1    multivitamin (THERAGRAN) TABS Take 1 tablet by mouth daily      Omega-3 Fatty Acids (FISH OIL PO) Take by mouth 2 (two) times a day      polyethylene glycol (MIRALAX) 17 g packet Take 17 g by mouth daily      pravastatin (PRAVACHOL) 40 mg tablet Take 1 tablet (40 mg total) by mouth daily 90 tablet 1    sertraline (ZOLOFT) 25 mg tablet Take 1 tablet (25 mg total) by mouth daily 90 tablet 1    tamsulosin (FLOMAX) 0 4 mg Take 1 capsule (0 4 mg total) by mouth daily at bedtime 90 capsule 0    VITAMIN D, ERGOCALCIFEROL, PO Take by mouth      Probiotic Product (PROBIOTIC DAILY PO) Take by mouth daily       No current facility-administered medications for this visit  Allergies  Allergies   Allergen Reactions    Ace Inhibitors Cough     Pt denied any allergies         Past Medical History, Social History, Family History, medications and allergies were reviewed  Vitals  Vitals:    08/15/19 0806   BP: 160/76   BP Location: Left arm   Patient Position: Sitting   Cuff Size: Adult   Pulse: 64   Weight: 84 3 kg (185 lb 12 8 oz)   Height: 5' 4 5" (1 638 m)       Physical Exam  Physical Exam   Constitutional: He is oriented to person, place, and time  He appears well-developed and well-nourished  Cardiovascular: Normal rate  Pulmonary/Chest: Effort normal    Abdominal: Soft  Genitourinary:   Genitourinary Comments: Prostate small and firm, about 25 to 30 g  Musculoskeletal: Normal range of motion  Neurological: He is alert and oriented to person, place, and time  Skin: Skin is warm, dry and intact  Psychiatric: He has a normal mood and affect  Vitals reviewed          Results  No results found for: PSA  Lab Results   Component Value Date    GLUCOSE 116 07/25/2015    CALCIUM 9 3 07/12/2019     (L) 07/25/2015    K 3 5 07/12/2019    CO2 27 07/12/2019  07/12/2019    BUN 16 07/12/2019    CREATININE 0 70 07/12/2019     Lab Results   Component Value Date    WBC 9 71 07/12/2019    HGB 15 1 07/12/2019    HCT 44 4 07/12/2019    MCV 90 07/12/2019     07/12/2019

## 2019-08-22 NOTE — PROGRESS NOTES
Hematology/Oncology Outpatient Follow-up  Sonali Stokes 78 y o  male 1940 [id number]    Date:  8/22/2019      Assessment and Plan:  1  Iron deficiency anemia, unspecified iron deficiency anemia type  25-year-old male presents for follow-up regarding iron deficiency anemia  He was last seen in January 2019  He presented to consultation in July 2018 with hemoglobin of 9 7  He then began taking oral iron and hemoglobin has remained normal in the 15 g range, noted in January 2019  He follows with Gastroenterology, [facility]  Stool testing was performed in the office and was negative  He had been taking oral iron 1 tablet daily  Again repeat assessment shows a hemoglobin of 15 1  Hemoglobin has remained stable over the course of 6 months  Patient remained asymptomatic  He can continue oral iron therapy  He can continue to follow with PCP  Follow-up with us as needed  HPI:  [de-identified]year old female presents for consultation for anemia   Patient was noted to have decrease in hemoglobin in April 2018, hemoglobin 10 5, MCV 81  Repeat hemoglobin in July 2018 was 9 7, MCV 76  Platelets and WBC remain normal on both readings  Previously hemoglobin had been in the normal range       Most recent colonoscopy 10-12 years ago       He saw Dr Addison Gibbs in July  Occult stool testing was negative  Patient states that he was told by Dr Addison Gibbs that he did not need to have scope  He also follows with him due to chronic fatty liver      Records from Dr Addison Gibbs did indicate occult stool testing was negative in the office  He did want to see patient back in the office       He has been taking oral iron BID for 3 weeks  He did have constipation with this but now resolved with Miralax       He takes pantoprazole for GERD and has been taking for about 10 years  He states he thinks he takes this BID  Records though show once daily  He will take once daily in the AM      Interval history: no complaints  Continues to travel frequently  ROS: Review of Systems   Constitutional: Negative for appetite change, chills, fatigue, fever and unexpected weight change  Respiratory: Positive for shortness of breath (with extended activitiy, chronic, stable)  Negative for cough  Cardiovascular: Negative for chest pain, palpitations and leg swelling  Gastrointestinal: Negative for abdominal pain, blood in stool, constipation, diarrhea, nausea and vomiting  Denies black stool   Genitourinary: Negative for difficulty urinating, dysuria and hematuria  Musculoskeletal: Negative for arthralgias  Skin: Negative  Neurological: Negative for dizziness, weakness, light-headedness, numbness and headaches  Hematological: Negative          Past Medical History:   Diagnosis Date    Actinic keratosis     LAST ASSESSED: [id number]    Allergic rhinitis     LAST ASSESSED: [id number]    Anxiety     LAST ASSESSED: 54RWB1809    Anxiety disorder     LAST ASSESSED: 35RGN9114    Atelectasis of right lung     ABNORMAL CT SCAN OF [address] 12/2/2016 REPEAT NEEDED PER RADIOLOGY IN 3 MONTHS LAST ASSESSED: [id number]    Atypical chest pain     LAST ASSESSED: 16WHA4610    Benign paroxysmal vertigo     LAST ASSESSED: 63EUZ3465    Chronic cough     LAST ASSESSED: 31RWJ7982    Chronic GERD     Degenerative arthritis of knee, bilateral     LAST ASSESSED: 23FYE2658    Diverticulitis of colon     LAST ASSESSED: 75JOB7551    Diverticulosis     LAST ASSESSED: 74MXH2491    Foreign body, eye     LAST ASSESSED: 06BJW1975    Functional gait abnormality     LAST ASSESSED: 50TIQ2630    Hyperlipidemia     Hypertension     Hyponatremia     LAST ASSESSED: 27DSZ7984    Leukocytosis     LAST ASSESSED: 06UZM3102    Murmur     Newly recognized murmur     LAST ASSESSED: 79LJM8219    Olecranon bursitis, unspecified elbow     Presence of indwelling urethral catheter     RESOLVED: 84OGZ7435    Transient cerebral ischemia     LAST ASSESSED: 64CZT7641    Urinary incontinence     LAST ASSESSED: 30RCC8021    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     LAST ASSESSED: 97GXM2160       Past Surgical History:   Procedure Laterality Date    ADENOIDECTOMY      APPENDECTOMY      COLONOSCOPY  10/2006    FIBEROPTIC    INGUINAL HERNIA REPAIR      JOINT REPLACEMENT      b/l TKR 2015    SINUS SURGERY      TONSILLECTOMY         Social History     Socioeconomic History    Marital status:       Spouse name: None    Number of children: None    Years of education: None    Highest education level: None   Occupational History    Occupation: tailor, 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   Social Needs    Financial resource strain: None    Food insecurity:     Worry: None     Inability: None    Transportation needs:     Medical: None     Non-medical: None   Tobacco Use    Smoking status: Former Smoker     Last attempt to quit: 1960     Years since quittin 6    Smokeless tobacco: Never Used    Tobacco comment: 2 ppd for 12 years    Substance and Sexual Activity    Alcohol use: Yes     Comment: occ, SOCIAL    Drug use: No    Sexual activity: None   Lifestyle    Physical activity:     Days per week: None     Minutes per session: None    Stress: None   Relationships    Social connections:     Talks on phone: None     Gets together: None     Attends Episcopal service: None     Active member of club or organization: None     Attends meetings of clubs or organizations: None     Relationship status: None    Intimate partner violence:     Fear of current or ex partner: None     Emotionally abused: None     Physically abused: None     Forced sexual activity: None   Other Topics Concern    None   Social History Narrative    USES SAFETY EQUIPMENT - [employer]       Family History   Problem Relation Age of Onset    Alzheimer's disease Mother     Coronary artery disease Brother        Allergies   Allergen Reactions    Ace Inhibitors Cough     Pt denied any allergies           Current Outpatient Medications:     amLODIPine (NORVASC) 10 mg tablet, Take 1 tablet (10 mg total) by mouth daily, Disp: 90 tablet, Rfl: 1    aspirin 325 mg tablet, Take 81 mg by mouth  , Disp: , Rfl:     finasteride (PROSCAR) 5 mg tablet, Take 1 tablet (5 mg total) by mouth daily for 30 days, Disp: 90 tablet, Rfl: 3    hydrochlorothiazide (HYDRODIURIL) 25 mg tablet, Take 1 tablet (25 mg total) by mouth daily, Disp: 90 tablet, Rfl: 1    multivitamin (THERAGRAN) TABS, Take 1 tablet by mouth daily, Disp: , Rfl:     Omega-3 Fatty Acids (FISH OIL PO), Take by mouth 2 (two) times a day, Disp: , Rfl:     polyethylene glycol (MIRALAX) 17 g packet, Take 17 g by mouth daily, Disp: , Rfl:     pravastatin (PRAVACHOL) 40 mg tablet, Take 1 tablet (40 mg total) by mouth daily, Disp: 90 tablet, Rfl: 1    Probiotic Product (PROBIOTIC DAILY PO), Take by mouth daily, Disp: , Rfl:     sertraline (ZOLOFT) 25 mg tablet, Take 1 tablet (25 mg total) by mouth daily, Disp: 90 tablet, Rfl: 1    tamsulosin (FLOMAX) 0 4 mg, Take 1 capsule (0 4 mg total) by mouth daily at bedtime, Disp: 90 capsule, Rfl: 0    VITAMIN D, ERGOCALCIFEROL, PO, Take by mouth, Disp: , Rfl:       Physical Exam:  /78   Pulse 72   Temp 98 7 °F (37 1 °C)   Resp 14   Ht 5' 4 5" (1 638 m)   Wt 83 5 kg (184 lb)   BMI 31 10 kg/m²     Physical Exam   Constitutional: He is oriented to person, place, and time  He appears well-developed and well-nourished  No distress  HENT:   Head: Normocephalic and atraumatic  Eyes: Conjunctivae are normal  No scleral icterus  Neck: Normal range of motion  Neck supple  Cardiovascular: Normal rate, regular rhythm and normal heart sounds  No murmur heard  Pulmonary/Chest: Effort normal and breath sounds normal  No respiratory distress  Abdominal: Soft  There is no tenderness  Musculoskeletal: Normal range of motion  He exhibits no edema or tenderness     Lymphadenopathy:     He has no cervical adenopathy  Neurological: He is alert and oriented to person, place, and time  No cranial nerve deficit  Skin: Skin is warm and dry  Psychiatric: He has a normal mood and affect  Vitals reviewed  Labs:  Lab Results   Component Value Date    WBC 9 71 07/12/2019    HGB 15 1 07/12/2019    HCT 44 4 07/12/2019    MCV 90 07/12/2019     07/12/2019     Lab Results   Component Value Date     (L) 07/25/2015    K 3 5 07/12/2019     07/12/2019    CO2 27 07/12/2019    ANIONGAP 10 07/25/2015    BUN 16 07/12/2019    CREATININE 0 70 07/12/2019    GLUCOSE 116 07/25/2015    GLUF 99 07/12/2019    CALCIUM 9 3 07/12/2019    AST 29 07/12/2019    ALT 39 07/12/2019    ALKPHOS 62 07/12/2019    PROT 7 3 07/24/2015    BILITOT 0 92 07/24/2015    EGFR 89 07/12/2019       Patient voiced understanding and agreement in the above discussion  Aware to contact our office with questions/symptoms in the interim

## 2019-08-23 DIAGNOSIS — N40.1 BENIGN LOCALIZED PROSTATIC HYPERPLASIA WITH LOWER URINARY TRACT SYMPTOMS (LUTS): ICD-10-CM

## 2019-08-23 DIAGNOSIS — E78.2 MIXED HYPERLIPIDEMIA: ICD-10-CM

## 2019-08-23 DIAGNOSIS — I10 ESSENTIAL HYPERTENSION: ICD-10-CM

## 2019-08-23 RX ORDER — TAMSULOSIN HYDROCHLORIDE 0.4 MG/1
0.4 CAPSULE ORAL
Qty: 90 CAPSULE | Refills: 3 | Status: SHIPPED | OUTPATIENT
Start: 2019-08-23 | End: 2020-08-20 | Stop reason: SDUPTHER

## 2019-08-23 RX ORDER — PRAVASTATIN SODIUM 40 MG
40 TABLET ORAL DAILY
Qty: 90 TABLET | Refills: 1 | Status: SHIPPED | OUTPATIENT
Start: 2019-08-23 | End: 2020-03-05

## 2019-08-23 RX ORDER — HYDROCHLOROTHIAZIDE 25 MG/1
25 TABLET ORAL DAILY
Qty: 90 TABLET | Refills: 1 | Status: SHIPPED | OUTPATIENT
Start: 2019-08-23 | End: 2020-02-07 | Stop reason: SDUPTHER

## 2019-08-23 RX ORDER — AMLODIPINE BESYLATE 10 MG/1
10 TABLET ORAL DAILY
Qty: 90 TABLET | Refills: 1 | Status: SHIPPED | OUTPATIENT
Start: 2019-08-23 | End: 2020-02-07 | Stop reason: SDUPTHER

## 2019-08-23 NOTE — TELEPHONE ENCOUNTER
Patient's emergency contact left a message on the Medication Refill voice mail line requesting a new prescription for Tamsulosin 0 4mg, 90 day supply to [facility]  The patient was last seen on 8/15/19 by Dr Markie Ferrara in the [address] [address]  location; continuation of the medication was authorized at that time    Request for same, 90 day supply with 3 refills was queued and forwarded to [facility] for approval

## 2019-10-15 ENCOUNTER — OFFICE VISIT (OUTPATIENT)
Dept: CARDIOLOGY CLINIC | Facility: CLINIC | Age: 79
End: 2019-10-15
Payer: MEDICARE

## 2019-10-15 VITALS
SYSTOLIC BLOOD PRESSURE: 142 MMHG | HEIGHT: 64 IN | BODY MASS INDEX: 32.27 KG/M2 | OXYGEN SATURATION: 96 % | HEART RATE: 88 BPM | DIASTOLIC BLOOD PRESSURE: 68 MMHG | WEIGHT: 189 LBS

## 2019-10-15 DIAGNOSIS — I35.0 NONRHEUMATIC AORTIC VALVE STENOSIS: Primary | ICD-10-CM

## 2019-10-15 PROCEDURE — 99214 OFFICE O/P EST MOD 30 MIN: CPT | Performed by: INTERNAL MEDICINE

## 2019-10-15 NOTE — PROGRESS NOTES
Cardiology Follow Up    Gabi Gunter  1940  [id number]  [facility]  [address]  [address]  [phone]    No diagnosis found  Interval History: Followup for Aortic stenosis and PVCs    Doing well  No chest pain, no dyspnea no palps  Problem List     Mixed hyperlipidemia    Essential hypertension    Murmur (Chronic)    Enlarged prostate without lower urinary tract symptoms (luts)    Fatty liver disease, nonalcoholic    Moderate episode of recurrent major depressive disorder (HCC)    PVC (premature ventricular contraction)    Medicare annual wellness visit, subsequent    Mild cognitive impairment (Chronic)    Obesity (BMI 30 0-34  9)        Past Medical History:   Diagnosis Date    Actinic keratosis     LAST ASSESSED: [id number]    Allergic rhinitis     LAST ASSESSED: 58EDH5420    Anxiety     LAST ASSESSED: 51VDR6455    Anxiety disorder     LAST ASSESSED: 86VEM0214    Atelectasis of right lung     ABNORMAL CT SCAN OF [address] 12/2/2016 REPEAT NEEDED PER RADIOLOGY IN 3 MONTHS LAST ASSESSED: [id number]    Atypical chest pain     LAST ASSESSED: 08ZBO6460    Benign paroxysmal vertigo     LAST ASSESSED: 08YOM8404    Chronic cough     LAST ASSESSED: 26ZIU9614    Chronic GERD     Degenerative arthritis of knee, bilateral     LAST ASSESSED: 67TQT4917    Diverticulitis of colon     LAST ASSESSED: 03XOL1944    Diverticulosis     LAST ASSESSED: 66VJA5586    Foreign body, eye     LAST ASSESSED: 27UTQ9919    Functional gait abnormality     LAST ASSESSED: 49TMM1226    Hyperlipidemia     Hypertension     Hyponatremia     LAST ASSESSED: 98OGR1342    Leukocytosis     LAST ASSESSED: 32ZYK6836    Murmur     Newly recognized murmur     LAST ASSESSED: 85CBN0243    Olecranon bursitis, unspecified elbow     Presence of indwelling urethral catheter     RESOLVED: 67ISS6410    Transient cerebral ischemia     LAST ASSESSED: 81NRM5824    Urinary incontinence     LAST ASSESSED: 34CVZ7751    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     LAST ASSESSED: 19USM2058     Social History     Socioeconomic History    Marital status:       Spouse name: Not on file    Number of children: Not on file    Years of education: Not on file    Highest education level: Not on file   Occupational History    Occupation: tailor, 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   Social Needs    Financial resource strain: Not on file    Food insecurity:     Worry: Not on file     Inability: Not on file   [employer] needs:     Medical: Not on file     Non-medical: Not on file   Tobacco Use    Smoking status: Former Smoker     Last attempt to quit: 1960     Years since quittin 8    Smokeless tobacco: Never Used    Tobacco comment: 2 ppd for 12 years    Substance and Sexual Activity    Alcohol use: Yes     Comment: occ, SOCIAL    Drug use: No    Sexual activity: Not on file   Lifestyle    Physical activity:     Days per week: Not on file     Minutes per session: Not on file    Stress: Not on file   Relationships    Social connections:     Talks on phone: Not on file     Gets together: Not on file     Attends Caodaism service: Not on file     Active member of club or organization: Not on file     Attends meetings of clubs or organizations: Not on file     Relationship status: Not on file    Intimate partner violence:     Fear of current or ex partner: Not on file     Emotionally abused: Not on file     Physically abused: Not on file     Forced sexual activity: Not on file   Other Topics Concern    Not on file   Social History Narrative    USES [employer]AFETY EQUIPMENT - [employer]      Family History   Problem Relation Age of Onset    Alzheimer's disease Mother     Coronary artery disease Brother      Past Surgical History:   Procedure Laterality Date    ADENOIDECTOMY      APPENDECTOMY      COLONOSCOPY  10/2006 FIBEROPTIC    INGUINAL HERNIA REPAIR      JOINT REPLACEMENT      b/l TKR Sept 2015    SINUS SURGERY      TONSILLECTOMY         Current Outpatient Medications:     amLODIPine (NORVASC) 10 mg tablet, Take 1 tablet (10 mg total) by mouth daily, Disp: 90 tablet, Rfl: 1    aspirin 81 MG tablet, Take 81 mg by mouth , Disp: , Rfl:     finasteride (PROSCAR) 5 mg tablet, Take 1 tablet (5 mg total) by mouth daily for 30 days, Disp: 90 tablet, Rfl: 3    hydrochlorothiazide (HYDRODIURIL) 25 mg tablet, Take 1 tablet (25 mg total) by mouth daily, Disp: 90 tablet, Rfl: 1    multivitamin (THERAGRAN) TABS, Take 1 tablet by mouth daily, Disp: , Rfl:     Omega-3 Fatty Acids (FISH OIL PO), Take by mouth 2 (two) times a day, Disp: , Rfl:     polyethylene glycol (MIRALAX) 17 g packet, Take 17 g by mouth daily, Disp: , Rfl:     pravastatin (PRAVACHOL) 40 mg tablet, Take 1 tablet (40 mg total) by mouth daily, Disp: 90 tablet, Rfl: 1    sertraline (ZOLOFT) 25 mg tablet, Take 1 tablet (25 mg total) by mouth daily, Disp: 90 tablet, Rfl: 1    tamsulosin (FLOMAX) 0 4 mg, Take 1 capsule (0 4 mg total) by mouth daily at bedtime, Disp: 90 capsule, Rfl: 3    VITAMIN D, ERGOCALCIFEROL, PO, Take by mouth, Disp: , Rfl:     Probiotic Product (PROBIOTIC DAILY PO), Take by mouth daily, Disp: , Rfl:   Allergies   Allergen Reactions    Ace Inhibitors Cough     Pt denied any allergies         Labs:     Chemistry        Component Value Date/Time     (L) 07/25/2015 0545    K 3 5 07/12/2019 0650    K 3 5 07/25/2015 0545     07/12/2019 0650     07/25/2015 0545    CO2 27 07/12/2019 0650    CO2 24 9 07/25/2015 0545    BUN 16 07/12/2019 0650    BUN 14 07/25/2015 0545    CREATININE 0 70 07/12/2019 0650    CREATININE 0 77 07/25/2015 0545        Component Value Date/Time    CALCIUM 9 3 07/12/2019 0650    CALCIUM 8 7 07/25/2015 0545    ALKPHOS 62 07/12/2019 0650    ALKPHOS 57 07/24/2015 1345    AST 29 07/12/2019 0650    AST 35 07/24/2015 1345    ALT 39 07/12/2019 0650    ALT 68 07/24/2015 1345    BILITOT 0 92 07/24/2015 1345            Lab Results   Component Value Date    CHOL 161 07/25/2015    CHOL 181 06/18/2015    CHOL 177 06/13/2014     Lab Results   Component Value Date    HDL 48 07/12/2019    HDL 50 11/10/2018    HDL 40 04/19/2018     Lab Results   Component Value Date    LDLCALC 74 07/12/2019    LDLCALC 67 11/10/2018    LDLCALC 69 04/19/2018     Lab Results   Component Value Date    TRIG 151 (H) 07/12/2019    TRIG 193 (H) 11/10/2018    TRIG 143 04/19/2018     No results found for: CHOLHDL    Imaging: No results found  Review of Systems   Constitution: Negative  HENT: Negative  Eyes: Negative  Cardiovascular: Negative  Respiratory: Negative  Endocrine: Negative  Hematologic/Lymphatic: Negative  Skin: Negative  Musculoskeletal: Negative  Gastrointestinal: Negative  Genitourinary: Negative  Neurological: Negative  Psychiatric/Behavioral: Negative  Allergic/Immunologic: Negative  Vitals:    10/15/19 1007   BP: 142/68   Pulse: 88   SpO2: 96%           Physical Exam   Constitutional: He is oriented to person, place, and time  No distress  HENT:   Mouth/Throat: No oropharyngeal exudate  Eyes: No scleral icterus  Neck: No JVD present  Cardiovascular: Normal rate and regular rhythm  Exam reveals no friction rub  Murmur heard  Systolic murmur is present  Pulmonary/Chest: Effort normal and breath sounds normal  No stridor  No respiratory distress  He has no wheezes  Abdominal: Soft  Bowel sounds are normal  He exhibits no distension  There is no tenderness  There is no guarding  Musculoskeletal: He exhibits no edema  Neurological: He is alert and oriented to person, place, and time  Skin: Skin is warm and dry  He is not diaphoretic  Psychiatric: He has a normal mood and affect   His behavior is normal        Discussion/Summary:    PVCs and PACs: He is relatively asymptomatic  No previous significant cardiac history  Normal degree of ectopy on his last holter       HTN: BP is stable on current medical therapy       Mild to Moderate AS: Will recheck next year  The patient was counseled regarding diagnostic results, instructions for management, risk factor reductions, impressions  total time of encounter was 25 minutes and 15 minutes was spent counseling

## 2019-11-21 ENCOUNTER — OFFICE VISIT (OUTPATIENT)
Dept: FAMILY MEDICINE CLINIC | Facility: CLINIC | Age: 79
End: 2019-11-21
Payer: MEDICARE

## 2019-11-21 VITALS
BODY MASS INDEX: 32.1 KG/M2 | DIASTOLIC BLOOD PRESSURE: 68 MMHG | SYSTOLIC BLOOD PRESSURE: 132 MMHG | WEIGHT: 188 LBS | HEIGHT: 64 IN

## 2019-11-21 DIAGNOSIS — Z23 NEED FOR VACCINATION: ICD-10-CM

## 2019-11-21 DIAGNOSIS — I49.3 PVC (PREMATURE VENTRICULAR CONTRACTION): ICD-10-CM

## 2019-11-21 DIAGNOSIS — G31.84 MILD COGNITIVE IMPAIRMENT: Chronic | ICD-10-CM

## 2019-11-21 DIAGNOSIS — E78.2 MIXED HYPERLIPIDEMIA: ICD-10-CM

## 2019-11-21 DIAGNOSIS — I10 ESSENTIAL HYPERTENSION: Primary | ICD-10-CM

## 2019-11-21 DIAGNOSIS — N40.0 ENLARGED PROSTATE WITHOUT LOWER URINARY TRACT SYMPTOMS (LUTS): ICD-10-CM

## 2019-11-21 DIAGNOSIS — F33.1 MODERATE EPISODE OF RECURRENT MAJOR DEPRESSIVE DISORDER (HCC): ICD-10-CM

## 2019-11-21 DIAGNOSIS — E66.9 OBESITY (BMI 30.0-34.9): ICD-10-CM

## 2019-11-21 DIAGNOSIS — K76.0 FATTY LIVER DISEASE, NONALCOHOLIC: ICD-10-CM

## 2019-11-21 PROCEDURE — G0008 ADMIN INFLUENZA VIRUS VAC: HCPCS | Performed by: FAMILY MEDICINE

## 2019-11-21 PROCEDURE — 90662 IIV NO PRSV INCREASED AG IM: CPT | Performed by: FAMILY MEDICINE

## 2019-11-21 PROCEDURE — 99214 OFFICE O/P EST MOD 30 MIN: CPT | Performed by: FAMILY MEDICINE

## 2019-11-21 NOTE — PATIENT INSTRUCTIONS

## 2019-11-21 NOTE — PROGRESS NOTES
Assessment/Plan:    PVC (premature ventricular contraction)  Reviewed the cardiology consult Patient feels well continue current meds and followup    Obesity (BMI 30 0-34  9)  Weight is stable Continue current care and followup in 4 months    Moderate episode of recurrent major depressive disorder ([address] )  Patient depression is stable Patient to conitnue current care and follwoup in 4 months    Mixed hyperlipidemia  Lipids are stable Patient to continue current meds and follwoup in 4 months    Mild cognitive impairment  Memory is stable PRN followup with geriatrics    Fatty liver disease, nonalcoholic  Patient to continue current care and followup in 4 months     Essential hypertension  Patient will continue iwht same blood pressure medication and see me in 4 months    Enlarged prostate without lower urinary tract symptoms (luts)  Urination is stable iwht flomax continue flomax       Diagnoses and all orders for this visit:    Essential hypertension    Mixed hyperlipidemia    Moderate episode of recurrent major depressive disorder (HCC)    Mild cognitive impairment    Fatty liver disease, nonalcoholic    Enlarged prostate without lower urinary tract symptoms (luts)    Need for vaccination  -     influenza vaccine, 5216-9761, high-dose, PF 0 5 mL (FLUZONE HIGH-DOSE)    PVC (premature ventricular contraction)    Obesity (BMI 30 0-34  9)          Subjective:   Chief Complaint   Patient presents with    Hypertension    Hyperlipidemia    Depression     no refills needed           Patient ID: Bryon Murphy is a 78 y o  male      Patient is here for follwoup of hypertension, PVC's hyperlipidemia non alcoholic fatty liver disease cognitive dysfunction depression and his weight Patient is overall feeling well I reviewed his cardiology visit and last labs with him Things are stable Patient blood presusre and lipids are at goal His depression is controlled and his memory is stable Patient has no complaints He is staying acitve [place]      The following portions of the patient's history were reviewed and updated as appropriate: allergies, current medications, past medical history, past social history and problem list     Review of Systems   Constitutional: Negative for fatigue, fever and unexpected weight change  HENT: Negative for congestion, sinus pain and trouble swallowing  Eyes: Negative for discharge and visual disturbance  Respiratory: Negative for cough, chest tightness, shortness of breath and wheezing  Cardiovascular: Negative for chest pain, palpitations and leg swelling  Gastrointestinal: Negative for abdominal pain, blood in stool, constipation, diarrhea, nausea and vomiting  Genitourinary: Negative for difficulty urinating, dysuria, frequency and hematuria  Musculoskeletal: Negative for arthralgias, gait problem and joint swelling  Skin: Negative for rash and wound  Allergic/Immunologic: Negative for environmental allergies and food allergies  Neurological: Negative for dizziness, syncope, weakness, numbness and headaches  Hematological: Negative for adenopathy  Does not bruise/bleed easily  Psychiatric/Behavioral: Negative for confusion, decreased concentration and sleep disturbance  The patient is not nervous/anxious  Objective:      /68   Ht 5' 4" (1 626 m)   Wt 85 3 kg (188 lb)   BMI 32 27 kg/m²          Physical Exam   Constitutional: He is oriented to person, place, and time  He appears well-developed and well-nourished  HENT:   Head: Normocephalic and atraumatic  Right Ear: Hearing, tympanic membrane and external ear normal    Left Ear: Hearing, tympanic membrane and external ear normal    Eyes: Pupils are equal, round, and reactive to light  Conjunctivae and EOM are normal    Neck: Neck supple  No thyromegaly present  Cardiovascular: Normal rate  Murmur heard    3/6 murmur   Pulmonary/Chest: Effort normal and breath sounds normal  He has no wheezes  He has no rales  Abdominal: Soft  Bowel sounds are normal  He exhibits no distension  There is no tenderness  Musculoskeletal: He exhibits no edema or tenderness  Lymphadenopathy:     He has no cervical adenopathy  Neurological: He is alert and oriented to person, place, and time  No cranial nerve deficit  Coordination normal    Skin: Skin is warm and dry  No rash noted  Psychiatric: He has a normal mood and affect  His behavior is normal  Judgment and thought content normal    Nursing note and vitals reviewed

## 2019-12-21 ENCOUNTER — APPOINTMENT (OUTPATIENT)
Dept: RADIOLOGY | Facility: MEDICAL CENTER | Age: 79
End: 2019-12-21
Payer: MEDICARE

## 2019-12-21 ENCOUNTER — OFFICE VISIT (OUTPATIENT)
Dept: URGENT CARE | Facility: MEDICAL CENTER | Age: 79
End: 2019-12-21
Payer: MEDICARE

## 2019-12-21 VITALS
BODY MASS INDEX: 31.64 KG/M2 | RESPIRATION RATE: 18 BRPM | TEMPERATURE: 98.7 F | DIASTOLIC BLOOD PRESSURE: 67 MMHG | SYSTOLIC BLOOD PRESSURE: 149 MMHG | WEIGHT: 184.3 LBS | OXYGEN SATURATION: 94 % | HEART RATE: 79 BPM

## 2019-12-21 DIAGNOSIS — M54.50 LUMBAR PAIN: Primary | ICD-10-CM

## 2019-12-21 DIAGNOSIS — M54.50 LUMBAR PAIN: ICD-10-CM

## 2019-12-21 PROCEDURE — G0463 HOSPITAL OUTPT CLINIC VISIT: HCPCS | Performed by: PHYSICIAN ASSISTANT

## 2019-12-21 PROCEDURE — 72100 X-RAY EXAM L-S SPINE 2/3 VWS: CPT

## 2019-12-21 PROCEDURE — 99213 OFFICE O/P EST LOW 20 MIN: CPT | Performed by: PHYSICIAN ASSISTANT

## 2019-12-21 NOTE — PROGRESS NOTES
[facility]        NAME: Burgess Mcnally is a 78 y o  male  : 1940    MRN: [id number]  DATE: 2019  TIME: 1:36 PM    Assessment and Plan   Lumbar pain [M54 5]  1  Lumbar pain  XR spine lumbar 2 or 3 views injury         Patient Instructions     Take Tylenol as directed for pain  Salon pas patches as directed  Follow up with PCP in 3-5 days  Proceed to  ER if symptoms worsen  Chief Complaint     Chief Complaint   Patient presents with   Junius Musa Fall     fell on bottom then back;  5 days ago; slightly better         History of Present Illness       This is an 78-year-old male complaining of lower back pain status post fall on   Patient reports he woke up in the middle night to use the bathroom  Reached for the light switch and slipped  Patient reports he fell backwards onto his button is back  Patient denies hitting his head or lost consciousness  Denies numbness, tingling, loss of sensation, saddle anesthesia, bowel or bladder incontinence  Pain is located in lumbar spine  Patient has been worsened for negative bed  Denies nausea, vomiting, chest pain, shortness of breath  Patient reports relief of symptoms with Tylenol and icy Hot  Review of Systems   Review of Systems   Constitutional: Negative for chills and fever  HENT: Negative  Eyes: Negative  Respiratory: Negative for chest tightness, shortness of breath and wheezing  Cardiovascular: Negative for chest pain and palpitations  Musculoskeletal: Positive for back pain  Negative for joint swelling  Skin: Negative for rash           Current Medications       Current Outpatient Medications:     amLODIPine (NORVASC) 10 mg tablet, Take 1 tablet (10 mg total) by mouth daily, Disp: 90 tablet, Rfl: 1    aspirin 81 MG tablet, Take 81 mg by mouth , Disp: , Rfl:     finasteride (PROSCAR) 5 mg tablet, Take 1 tablet (5 mg total) by mouth daily for 30 days, Disp: 90 tablet, Rfl: 3    hydrochlorothiazide (HYDRODIURIL) 25 mg tablet, Take 1 tablet (25 mg total) by mouth daily, Disp: 90 tablet, Rfl: 1    multivitamin (THERAGRAN) TABS, Take 1 tablet by mouth daily, Disp: , Rfl:     Omega-3 Fatty Acids (FISH OIL PO), Take by mouth 2 (two) times a day, Disp: , Rfl:     polyethylene glycol (MIRALAX) 17 g packet, Take 17 g by mouth daily, Disp: , Rfl:     pravastatin (PRAVACHOL) 40 mg tablet, Take 1 tablet (40 mg total) by mouth daily, Disp: 90 tablet, Rfl: 1    Probiotic Product (PROBIOTIC DAILY PO), Take by mouth daily, Disp: , Rfl:     sertraline (ZOLOFT) 25 mg tablet, Take 1 tablet (25 mg total) by mouth daily, Disp: 90 tablet, Rfl: 1    tamsulosin (FLOMAX) 0 4 mg, Take 1 capsule (0 4 mg total) by mouth daily at bedtime, Disp: 90 capsule, Rfl: 3    VITAMIN D, ERGOCALCIFEROL, PO, Take by mouth, Disp: , Rfl:     Current Allergies     Allergies as of 12/21/2019 - Reviewed 12/21/2019   Allergen Reaction Noted    Ace inhibitors Cough 09/22/2017            The following portions of the patient's history were reviewed and updated as appropriate: allergies, current medications, past family history, past medical history, past social history, past surgical history and problem list      Past Medical History:   Diagnosis Date    Actinic keratosis     LAST ASSESSED: [id number]    Allergic rhinitis     LAST ASSESSED: [id number]    Anxiety     LAST ASSESSED: [id number]    Anxiety disorder     LAST ASSESSED: [id number]    Atelectasis of right lung     ABNORMAL CT SCAN OF 14 [facility] 12/2/2016 REPEAT NEEDED PER RADIOLOGY IN 3 MONTHS LAST ASSESSED: [id number]    Atypical chest pain     LAST ASSESSED: 13PFZ2722    Benign paroxysmal vertigo     LAST ASSESSED: 35GQT1271    Chronic cough     LAST ASSESSED: 12OBS2501    Chronic GERD     Degenerative arthritis of knee, bilateral     LAST ASSESSED: 84IGT2651    Diverticulitis of colon     LAST ASSESSED: 37JIA6334    Diverticulosis     LAST ASSESSED: 32SZP7827    Foreign body, eye     LAST ASSESSED: 79VEI6630    Functional gait abnormality     LAST ASSESSED: 90CKJ8322    Hyperlipidemia     Hypertension     Hyponatremia     LAST ASSESSED: 78MOF4090    Leukocytosis     LAST ASSESSED: 14ZRW2950    Murmur     Newly recognized murmur     LAST ASSESSED: 83NGO6944    Olecranon bursitis, unspecified elbow     Presence of indwelling urethral catheter     RESOLVED: 69GNO3585    Transient cerebral ischemia     LAST ASSESSED: 17DRO8545    Urinary incontinence     LAST ASSESSED: 42MLY9171    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     LAST ASSESSED: 42UNY2294       Past Surgical History:   Procedure Laterality Date    ADENOIDECTOMY      APPENDECTOMY      COLONOSCOPY  10/2006    FIBEROPTIC    INGUINAL HERNIA REPAIR      JOINT REPLACEMENT      b/l TKR Sept 2015    SINUS SURGERY      TONSILLECTOMY         Family History   Problem Relation Age of Onset    Alzheimer's disease Mother     Coronary artery disease Brother          Medications have been verified  Objective   /67   Pulse 79   Temp 98 7 °F (37 1 °C)   Resp 18   Wt 83 6 kg (184 lb 4 9 oz)   SpO2 94%   BMI 31 64 kg/m²        Physical Exam     Physical Exam   Constitutional: He is oriented to person, place, and time  He appears well-developed and well-nourished  No distress  Cardiovascular: Normal rate, regular rhythm and normal heart sounds  Pulmonary/Chest: Effort normal and breath sounds normal  No respiratory distress  Musculoskeletal: He exhibits tenderness  Lumbar back: He exhibits decreased range of motion and tenderness (Paraspinal muscles lumbar spine)  He exhibits no bony tenderness, no swelling, no deformity, no laceration, no spasm and normal pulse  Neurological: He is alert and oriented to person, place, and time  Skin: No rash noted  Nursing note and vitals reviewed      Lumbar spine XR:  Degenerative changes no acute osseous abnormalities

## 2019-12-21 NOTE — PATIENT INSTRUCTIONS
Take Tylenol as directed for pain  Salon pas patches as directed  Follow up with PCP in 3-5 days  Proceed to  ER if symptoms worsen

## 2019-12-22 ENCOUNTER — TELEPHONE (OUTPATIENT)
Dept: URGENT CARE | Facility: MEDICAL CENTER | Age: 79
End: 2019-12-22

## 2019-12-22 NOTE — TELEPHONE ENCOUNTER
Patient was notified of L1 compression fracture on lumbar spine x-ray  He will follow up with Orthopedics tomorrow  No heavy lifting or strenuous activity  Tylenol as needed for pain

## 2019-12-26 ENCOUNTER — TELEPHONE (OUTPATIENT)
Dept: FAMILY MEDICINE CLINIC | Facility: CLINIC | Age: 79
End: 2019-12-26

## 2019-12-26 DIAGNOSIS — S32.010D COMPRESSION FRACTURE OF L1 VERTEBRA WITH ROUTINE HEALING, SUBSEQUENT ENCOUNTER: ICD-10-CM
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
## 2019-12-30 ENCOUNTER — OFFICE VISIT (OUTPATIENT)
Dept: OBGYN CLINIC | Facility: HOSPITAL | Age: 79
End: 2019-12-30
Payer: MEDICARE

## 2019-12-30 VITALS
HEIGHT: 64 IN | HEART RATE: 88 BPM | DIASTOLIC BLOOD PRESSURE: 62 MMHG | BODY MASS INDEX: 31.41 KG/M2 | SYSTOLIC BLOOD PRESSURE: 147 MMHG | WEIGHT: 184 LBS
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
## 2019-12-30 DIAGNOSIS — S32.010D COMPRESSION FRACTURE OF L1 VERTEBRA WITH ROUTINE HEALING, SUBSEQUENT ENCOUNTER: ICD-10-CM

## 2019-12-30 PROCEDURE — 99203 OFFICE O/P NEW LOW 30 MIN: CPT | Performed by: ORTHOPAEDIC SURGERY

## 2019-12-30 NOTE — PROGRESS NOTES
Assessment/Plan:    No problem-specific Assessment & Plan notes found for this encounter  Age indeterminate L1 compression fracture  · Patient provided with LSO brace  · Patient to use Tylenol 1000mg 2-3x/day as needed for pain  · Lumbar MRI can be considered if patient does not progress  · Follow up in 4 weeks  Problem List Items Addressed This Visit     None      Visit Diagnoses     Fall, subsequent encounter        Compression fracture of L1 vertebra with routine healing, subsequent encounter                Subjective:      Patient ID: Gabi Gunter is a 78 y o  male  HPI   The patient presents for initial evaluation of L1 compression fracture  The patient is here from his PCP  He did fall 12/17/19 and had immediate pain following  Today he complains central low back pain  He denies radiating symptoms into his legs  He rates his pain at 4/10  He does use Tylenol and Salonpas with benefit  He denies past physical therapy, injections or surgery of the spine  The following portions of the patient's history were reviewed and updated as appropriate: allergies, current medications, past family history, past medical history, past social history, past surgical history and problem list     Review of Systems   Constitutional: Negative for chills, fever and unexpected weight change  HENT: Negative for hearing loss, nosebleeds and sore throat  Eyes: Negative for pain, redness and visual disturbance  Respiratory: Negative for cough, shortness of breath and wheezing  Cardiovascular: Negative for chest pain, palpitations and leg swelling  Gastrointestinal: Negative for abdominal pain, nausea and vomiting  Endocrine: Negative for polydipsia and polyuria  Genitourinary: Negative for difficulty urinating and hematuria  Skin: Negative for rash and wound  Psychiatric/Behavioral: Negative for decreased concentration and suicidal ideas  The patient is not nervous/anxious  Objective:      /62   Pulse 88   Ht 5' 4" (1 626 m)   Wt 83 5 kg (184 lb)   BMI 31 58 kg/m²          Physical Exam   Constitutional: He is oriented to person, place, and time  He appears well-developed and well-nourished  HENT:   Right Ear: External ear normal    Left Ear: External ear normal    Nose: Nose normal    Eyes: Conjunctivae are normal    Neck: Normal range of motion  Pulmonary/Chest: Effort normal    Neurological: He is alert and oriented to person, place, and time  Skin: Skin is warm and dry  Psychiatric: He has a normal mood and affect  His behavior is normal  Judgment and thought content normal        Patient ambulates without assistance  Tender to palpation: none  Modified straight leg raise negative bilaterally  Strength L2-S1 5/5 bilaterally   Sensation L2-S1 intact bilaterally     Imaging:  Lumbar x-rays:  Age indeterminate L1 compression fracture        Scribe Attestation    I,:   Frederick Velarde am acting as a scribe while in the presence of the attending physician :        I,:   Dennie Gunner, MD personally performed the services described in this documentation    as scribed in my presence :

## 2020-01-17 ENCOUNTER — APPOINTMENT (OUTPATIENT)
Dept: LAB | Facility: MEDICAL CENTER | Age: 80
End: 2020-01-17
Payer: MEDICARE

## 2020-01-17 DIAGNOSIS — I10 ESSENTIAL HYPERTENSION: ICD-10-CM

## 2020-01-17 DIAGNOSIS — E78.2 MIXED HYPERLIPIDEMIA: ICD-10-CM

## 2020-01-17 LAB
ALBUMIN SERPL BCP-MCNC: 4.1 G/DL (ref 3.5–5)
ALP SERPL-CCNC: 80 U/L (ref 46–116)
ALT SERPL W P-5'-P-CCNC: 31 U/L (ref 12–78)
ANION GAP SERPL CALCULATED.3IONS-SCNC: 6 MMOL/L (ref 4–13)
AST SERPL W P-5'-P-CCNC: 21 U/L (ref 5–45)
BASOPHILS # BLD AUTO: 0.06 THOUSANDS/ΜL (ref 0–0.1)
BASOPHILS NFR BLD AUTO: 1 % (ref 0–1)
BILIRUB SERPL-MCNC: 0.92 MG/DL (ref 0.2–1)
BUN SERPL-MCNC: 16 MG/DL (ref 5–25)
CALCIUM SERPL-MCNC: 9.4 MG/DL (ref 8.3–10.1)
CHLORIDE SERPL-SCNC: 103 MMOL/L (ref 100–108)
CHOLEST SERPL-MCNC: 146 MG/DL (ref 50–200)
CO2 SERPL-SCNC: 26 MMOL/L (ref 21–32)
CREAT SERPL-MCNC: 0.7 MG/DL (ref 0.6–1.3)
EOSINOPHIL # BLD AUTO: 0.08 THOUSAND/ΜL (ref 0–0.61)
EOSINOPHIL NFR BLD AUTO: 1 % (ref 0–6)
ERYTHROCYTE [DISTWIDTH] IN BLOOD BY AUTOMATED COUNT: 12.9 % (ref 11.6–15.1)
GFR SERPL CREATININE-BSD FRML MDRD: 89 ML/MIN/1.73SQ M
GLUCOSE P FAST SERPL-MCNC: 109 MG/DL (ref 65–99)
HCT VFR BLD AUTO: 39.8 % (ref 36.5–49.3)
HDLC SERPL-MCNC: 48 MG/DL
HGB BLD-MCNC: 12.9 G/DL (ref 12–17)
IMM GRANULOCYTES # BLD AUTO: 0.02 THOUSAND/UL (ref 0–0.2)
IMM GRANULOCYTES NFR BLD AUTO: 0 % (ref 0–2)
LDLC SERPL CALC-MCNC: 66 MG/DL (ref 0–100)
LYMPHOCYTES # BLD AUTO: 3.04 THOUSANDS/ΜL (ref 0.6–4.47)
LYMPHOCYTES NFR BLD AUTO: 35 % (ref 14–44)
MCH RBC QN AUTO: 29.3 PG (ref 26.8–34.3)
MCHC RBC AUTO-ENTMCNC: 32.4 G/DL (ref 31.4–37.4)
MCV RBC AUTO: 91 FL (ref 82–98)
MONOCYTES # BLD AUTO: 1.03 THOUSAND/ΜL (ref 0.17–1.22)
MONOCYTES NFR BLD AUTO: 12 % (ref 4–12)
NEUTROPHILS # BLD AUTO: 4.42 THOUSANDS/ΜL (ref 1.85–7.62)
NEUTS SEG NFR BLD AUTO: 51 % (ref 43–75)
NONHDLC SERPL-MCNC: 98 MG/DL
NRBC BLD AUTO-RTO: 0 /100 WBCS
PLATELET # BLD AUTO: 173 THOUSANDS/UL (ref 149–390)
PMV BLD AUTO: 10.7 FL (ref 8.9–12.7)
POTASSIUM SERPL-SCNC: 3.4 MMOL/L (ref 3.5–5.3)
PROT SERPL-MCNC: 7.6 G/DL (ref 6.4–8.2)
RBC # BLD AUTO: 4.4 MILLION/UL (ref 3.88–5.62)
SODIUM SERPL-SCNC: 135 MMOL/L (ref 136–145)
TRIGL SERPL-MCNC: 162 MG/DL
WBC # BLD AUTO: 8.65 THOUSAND/UL (ref 4.31–10.16)

## 2020-01-17 PROCEDURE — 80053 COMPREHEN METABOLIC PANEL: CPT

## 2020-01-17 PROCEDURE — 85025 COMPLETE CBC W/AUTO DIFF WBC: CPT

## 2020-01-17 PROCEDURE — 36415 COLL VENOUS BLD VENIPUNCTURE: CPT

## 2020-01-17 PROCEDURE — 80061 LIPID PANEL: CPT

## 2020-01-27 ENCOUNTER — OFFICE VISIT (OUTPATIENT)
Dept: OBGYN CLINIC | Facility: HOSPITAL | Age: 80
End: 2020-01-27
Payer: MEDICARE

## 2020-01-27 VITALS
HEIGHT: 64 IN | SYSTOLIC BLOOD PRESSURE: 126 MMHG | WEIGHT: 184 LBS | HEART RATE: 86 BPM | BODY MASS INDEX: 31.41 KG/M2 | DIASTOLIC BLOOD PRESSURE: 62 MMHG

## 2020-01-27 DIAGNOSIS — M54.50 RIGHT-SIDED LOW BACK PAIN WITHOUT SCIATICA, UNSPECIFIED CHRONICITY: Primary | ICD-10-CM

## 2020-01-27 PROCEDURE — 99213 OFFICE O/P EST LOW 20 MIN: CPT | Performed by: ORTHOPAEDIC SURGERY

## 2020-01-27 NOTE — PROGRESS NOTES
78 y o male   Follow-up for L1 compression fracture  He has been compliant with wearing the LSO brace except for at night when sleeping  Today he describes significant pain relief  And is not in any pain      Review of Systems  Review of systems negative unless otherwise specified in HPI    Past Medical History  Past Medical History:   Diagnosis Date    Actinic keratosis     LAST ASSESSED: 12JUN2012    Allergic rhinitis     LAST ASSESSED: [id number]    Anxiety     LAST ASSESSED: 76UZI5969    Anxiety disorder     LAST ASSESSED: 89IDS0670    Atelectasis of right lung     ABNORMAL CT SCAN OF [address] 12/2/2016 REPEAT NEEDED PER RADIOLOGY IN 3 MONTHS LAST ASSESSED: [id number]    Atypical chest pain     LAST ASSESSED: 94TZF5593    Benign paroxysmal vertigo     LAST ASSESSED: 38EPV2172    Chronic cough     LAST ASSESSED: 45AAS9141    Chronic GERD     Degenerative arthritis of knee, bilateral     LAST ASSESSED: 57JKK9155    Diverticulitis of colon     LAST ASSESSED: 44OPI4934    Diverticulosis     LAST ASSESSED: 95URB2902    Foreign body, eye     LAST ASSESSED: 96KKL5205    Functional gait abnormality     LAST ASSESSED: 84NFL8666    Hyperlipidemia     Hypertension     Hyponatremia     LAST ASSESSED: 28TQH7357    Leukocytosis     LAST ASSESSED: 53NYW8112    Murmur     Newly recognized murmur     LAST ASSESSED: 46FVV7336    Olecranon bursitis, unspecified elbow     Presence of indwelling urethral catheter     RESOLVED: 06HMS4537    Transient cerebral ischemia     LAST ASSESSED: 36FEW4142    Urinary incontinence     LAST ASSESSED: 58AJQ5522    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     LAST ASSESSED: 42NGN7769       Past Surgical History  Past Surgical History:   Procedure Laterality Date    ADENOIDECTOMY      APPENDECTOMY      COLONOSCOPY  10/2006    FIBEROPTIC    INGUINAL HERNIA REPAIR      JOINT REPLACEMENT      b/l TKR Sept 2015    SINUS SURGERY      TONSILLECTOMY Current Medications  Current Outpatient Medications on File Prior to Visit   Medication Sig Dispense Refill    amLODIPine (NORVASC) 10 mg tablet Take 1 tablet (10 mg total) by mouth daily 90 tablet 1    aspirin 81 MG tablet Take 81 mg by mouth       hydrochlorothiazide (HYDRODIURIL) 25 mg tablet Take 1 tablet (25 mg total) by mouth daily 90 tablet 1    multivitamin (THERAGRAN) TABS Take 1 tablet by mouth daily      Omega-3 Fatty Acids (FISH OIL PO) Take by mouth 2 (two) times a day      polyethylene glycol (MIRALAX) 17 g packet Take 17 g by mouth daily      pravastatin (PRAVACHOL) 40 mg tablet Take 1 tablet (40 mg total) by mouth daily 90 tablet 1    Probiotic Product (PROBIOTIC DAILY PO) Take by mouth daily      sertraline (ZOLOFT) 25 mg tablet Take 1 tablet (25 mg total) by mouth daily 90 tablet 1    tamsulosin (FLOMAX) 0 4 mg Take 1 capsule (0 4 mg total) by mouth daily at bedtime 90 capsule 3    VITAMIN D, ERGOCALCIFEROL, PO Take by mouth      finasteride (PROSCAR) 5 mg tablet Take 1 tablet (5 mg total) by mouth daily for 30 days 90 tablet 3     No current facility-administered medications on file prior to visit          Recent Labs [facility])  0   Lab Value Date/Time    HCT 39 8 01/17/2020 0743    HCT 40 2 07/25/2015 0545    HGB 12 9 01/17/2020 0743    HGB 14 2 07/25/2015 0545    WBC 8 65 01/17/2020 0743    WBC 6 71 07/25/2015 0545    INR 0 95 09/04/2017 1949    INR 1 02 07/24/2015 1345    ESR 13 (H) 09/15/2018 0802    GLUCOSE 116 07/25/2015 0545    HGBA1C 5 3 04/19/2017 0801         Physical exam  · General: Awake, Alert, Oriented  · Eyes: Pupils equal, round and reactive to light  · Heart: regular rate and rhythm  · Lungs: No audible wheezing  · Abdomen: soft    Lumbar  Spine    No tenderness palpation over the lumbar spine  5/5 motor strength L2 through S1 bilaterally  Sensation is intact throughout and symmetric bilaterally  Negative modified straight leg raise bilaterally   Legs are warm well perfused  Imaging    No new imaging to review today  Previous lumbar spine x-rays reviewed personally showing L1 compression fracture age indeterminate  Procedure    None    Assessment/Plan:   80 y o male  L1 compression fracture being treated in LSO brace with significant improvement  He may discontinue the LSO brace in 2 weeks time  Follow-up PRN

## 2020-02-04 ENCOUNTER — OFFICE VISIT (OUTPATIENT)
Dept: FAMILY MEDICINE CLINIC | Facility: CLINIC | Age: 80
End: 2020-02-04
Payer: MEDICARE

## 2020-02-04 VITALS
TEMPERATURE: 98.4 F | BODY MASS INDEX: 30.93 KG/M2 | WEIGHT: 181.2 LBS | HEIGHT: 64 IN | DIASTOLIC BLOOD PRESSURE: 62 MMHG | SYSTOLIC BLOOD PRESSURE: 122 MMHG

## 2020-02-04 DIAGNOSIS — J01.00 ACUTE NON-RECURRENT MAXILLARY SINUSITIS: Primary | ICD-10-CM

## 2020-02-04 PROCEDURE — 3078F DIAST BP <80 MM HG: CPT | Performed by: FAMILY MEDICINE

## 2020-02-04 PROCEDURE — 3074F SYST BP LT 130 MM HG: CPT | Performed by: FAMILY MEDICINE

## 2020-02-04 PROCEDURE — 99213 OFFICE O/P EST LOW 20 MIN: CPT | Performed by: FAMILY MEDICINE

## 2020-02-04 PROCEDURE — 3008F BODY MASS INDEX DOCD: CPT | Performed by: FAMILY MEDICINE

## 2020-02-04 PROCEDURE — 4040F PNEUMOC VAC/ADMIN/RCVD: CPT | Performed by: FAMILY MEDICINE

## 2020-02-04 PROCEDURE — 1160F RVW MEDS BY RX/DR IN RCRD: CPT | Performed by: FAMILY MEDICINE

## 2020-02-04 PROCEDURE — 1036F TOBACCO NON-USER: CPT | Performed by: FAMILY MEDICINE

## 2020-02-04 RX ORDER — AMOXICILLIN 875 MG/1
875 TABLET, COATED ORAL 2 TIMES DAILY
Qty: 20 TABLET | Refills: 0 | Status: SHIPPED | OUTPATIENT
Start: 2020-02-04 | End: 2020-02-14

## 2020-02-04 NOTE — PROGRESS NOTES
Assessment/Plan:    Acute non-recurrent maxillary sinusitis  Continue OTC decongestant and add the antibiotic        Diagnoses and all orders for this visit:    Acute non-recurrent maxillary sinusitis  -     amoxicillin (AMOXIL) 875 mg tablet; Take 1 tablet (875 mg total) by mouth 2 (two) times a day for 10 days          Subjective:   Chief Complaint   Patient presents with    Cough    Sore Throat      x 1 wk          Patient ID: Pam Corrales is a 78 y o  male  The following portions of the patient's history were reviewed and updated as appropriate: allergies, current medications, past medical history, past social history and problem list   Patient is here for 1 week of cough congestion and sore thraot he is taking dayquil and nyquil with control of symptoms Howeve once the medication wears off he is back to the same symptoms He has has sinus pressure and discolored mucous from the nose No fever or chills    URI    This is a new problem  The current episode started in the past 7 days  The problem has been unchanged  There has been no fever  Associated symptoms include congestion, coughing, rhinorrhea, sinus pain and sneezing  Pertinent negatives include no abdominal pain, chest pain, diarrhea, dysuria, ear pain, headaches, joint pain, joint swelling, nausea, neck pain, plugged ear sensation, rash, sore throat, swollen glands, vomiting or wheezing  He has tried decongestant for the symptoms  The treatment provided mild relief  Review of Systems   Constitutional: Negative for activity change, appetite change, fatigue and fever  HENT: Positive for congestion, postnasal drip, rhinorrhea, sinus pain and sneezing  Negative for ear pain and sore throat  Eyes: Negative  Respiratory: Positive for cough  Negative for shortness of breath and wheezing  Cardiovascular: Negative for chest pain  Gastrointestinal: Negative for abdominal pain, diarrhea, nausea and vomiting     Genitourinary: Negative for dysuria  Musculoskeletal: Negative for joint pain and neck pain  Skin: Negative for rash  Neurological: Negative for headaches  Objective:      /62   Temp 98 4 °F (36 9 °C)   Ht 5' 4" (1 626 m)   Wt 82 2 kg (181 lb 3 2 oz)   BMI 31 10 kg/m²          Physical Exam   Constitutional: He is oriented to person, place, and time  He appears well-developed and well-nourished  HENT:   Head: Normocephalic and atraumatic  Right Ear: External ear normal    Left Ear: External ear normal    Mouth/Throat: Oropharynx is clear and moist    Maxillary sinus pain to palpationPND    Eyes: Pupils are equal, round, and reactive to light  Conjunctivae and EOM are normal    Neck: Normal range of motion  Neck supple  Cardiovascular: Normal rate and regular rhythm  Pulmonary/Chest: Effort normal and breath sounds normal    Lymphadenopathy:     He has no cervical adenopathy  Neurological: He is alert and oriented to person, place, and time  Psychiatric: He has a normal mood and affect  His behavior is normal    Nursing note and vitals reviewed

## 2020-02-04 NOTE — PATIENT INSTRUCTIONS
Sinusitis   WHAT YOU NEED TO KNOW:   What is sinusitis? Sinusitis is inflammation or infection of your sinuses  It is most often caused by a virus  Acute sinusitis may last up to 12 weeks  Chronic sinusitis lasts longer than 12 weeks  Recurrent sinusitis means you have 4 or more times in 1 year  What increases my risk for sinusitis? · Medical conditions, such as an upper respiratory infection, allergies, asthma, or cystic fibrosis    · Dental infections or procedures, such as gum infections, tooth decay, or a root canal    · Smoking    · Abnormal sinus structure, such as nasal growths, swollen tonsils, or a deviated septum    · A weak immune system, from diseases such as diabetes or HIV  What are the signs and symptoms of sinusitis? · Fever    · Pain, pressure, redness, or swelling around the forehead, cheeks, or eyes    · Thick yellow or green discharge from your nose    · Tenderness when you touch your face over your sinuses    · Dry cough that happens mostly at night or when you lie down    · Headache and face pain that is worse when you lean forward    · Tooth pain, or pain when you chew  How is sinusitis diagnosed? Your healthcare provider will examine you and ask about your symptoms  He or she will check inside your nose using a nasal speculum  This is a small tool used to open your nostrils  A sample of the mucus from your nose may show what germ is causing your infection  How is sinusitis treated? Your symptoms may go away on their own  Your healthcare provider may recommend watchful waiting for up to 10 days before starting antibiotics  You may  need any of the following:  · Acetaminophen  decreases pain and fever  It is available without a doctor's order  Ask how much to take and how often to take it  Follow directions   Read the labels of all other medicines you are using to see if they also contain acetaminophen, or ask your doctor or pharmacist  Acetaminophen can cause liver damage if not taken correctly  Do not use more than 4 grams (4,000 milligrams) total of acetaminophen in one day  · NSAIDs , such as ibuprofen, help decrease swelling, pain, and fever  This medicine is available with or without a doctor's order  NSAIDs can cause stomach bleeding or kidney problems in certain people  If you take blood thinner medicine, always ask your healthcare provider if NSAIDs are safe for you  Always read the medicine label and follow directions  · Nasal steroid sprays  may help decrease inflammation in your nose and sinuses  · Decongestants  help reduce swelling and drain mucus in the nose and sinuses  They may help you breathe easier  · Antihistamines  help dry mucus in the nose and relieve sneezing  · Antibiotics  help treat or prevent a bacterial infection  How can I manage my symptoms? · Rinse your sinuses  Use a sinus rinse device to rinse your nasal passages with a saline (salt water) solution or distilled water  Do not use tap water  This will help thin the mucus in your nose and rinse away pollen and dirt  It will also help reduce swelling so you can breathe normally  Ask your healthcare provider how often to do this  · Breathe in steam   Heat a bowl of water until you see steam  Lean over the bowl and make a tent over your head with a large towel  Breathe deeply for about 20 minutes  Be careful not to get too close to the steam or burn yourself  Do this 3 times a day  You can also breathe deeply when you take a hot shower  · Sleep with your head elevated  Place an extra pillow under your head before you go to sleep to help your sinuses drain  · Drink liquids as directed  Ask your healthcare provider how much liquid to drink each day and which liquids are best for you  Liquids will thin the mucus in your nose and help it drain  Avoid drinks that contain alcohol or caffeine  · Do not smoke, and avoid secondhand smoke    Nicotine and other chemicals in cigarettes and cigars can make your symptoms worse  Ask your healthcare provider for information if you currently smoke and need help to quit  E-cigarettes or smokeless tobacco still contain nicotine  Talk to your healthcare provider before you use these products  How can I help prevent the spread of germs that cause sinusitis? Wash your hands often with soap and water  Wash your hands after you use the bathroom, change a child's diaper, or sneeze  Wash your hands before you prepare or eat food  When should I seek immediate care? · Your eye and eyelid are red, swollen, and painful  · You cannot open your eye  · You have vision changes, such as double vision  · Your eyeball bulges out or you cannot move your eye  · You are more sleepy than normal, or you notice changes in your ability to think, move, or talk  · You have a stiff neck, a fever, or a bad headache  · You have swelling of your forehead or scalp  When should I contact my healthcare provider? · Your symptoms do not improve after 3 days  · Your symptoms do not go away after 10 days  · You have nausea and are vomiting  · Your nose is bleeding  · You have questions or concerns about your condition or care  CARE AGREEMENT:   You have the right to help plan your care  Learn about your health condition and how it may be treated  Discuss treatment options with your caregivers to decide what care you want to receive  You always have the right to refuse treatment  The above information is an  only  It is not intended as medical advice for individual conditions or treatments  Talk to your doctor, nurse or pharmacist before following any medical regimen to see if it is safe and effective for you  © 2017 [employer] Information is for End User's use only and may not be sold, redistributed or otherwise used for commercial purposes   All illustrations and images included in CareNotes® are the copyrighted property of A [employer]  or Aravind Monreal

## 2020-02-07 DIAGNOSIS — F32.A DEPRESSION, UNSPECIFIED DEPRESSION TYPE: Chronic | ICD-10-CM

## 2020-02-07 DIAGNOSIS — I10 ESSENTIAL HYPERTENSION: ICD-10-CM

## 2020-02-07 RX ORDER — SERTRALINE HYDROCHLORIDE 25 MG/1
25 TABLET, FILM COATED ORAL DAILY
Qty: 90 TABLET | Refills: 1 | Status: SHIPPED | OUTPATIENT
Start: 2020-02-07 | End: 2020-07-22 | Stop reason: SDUPTHER

## 2020-02-07 RX ORDER — AMLODIPINE BESYLATE 10 MG/1
10 TABLET ORAL DAILY
Qty: 90 TABLET | Refills: 1 | Status: SHIPPED | OUTPATIENT
Start: 2020-02-07 | End: 2020-08-17 | Stop reason: SDUPTHER

## 2020-02-07 RX ORDER — HYDROCHLOROTHIAZIDE 25 MG/1
25 TABLET ORAL DAILY
Qty: 90 TABLET | Refills: 1 | Status: SHIPPED | OUTPATIENT
Start: 2020-02-07 | End: 2020-08-17 | Stop reason: SDUPTHER

## 2020-03-04 DIAGNOSIS — E78.2 MIXED HYPERLIPIDEMIA: ICD-10-CM

## 2020-03-05 RX ORDER — PRAVASTATIN SODIUM 40 MG
TABLET ORAL
Qty: 90 TABLET | Refills: 0 | Status: SHIPPED | OUTPATIENT
Start: 2020-03-05 | End: 2020-05-29 | Stop reason: SDUPTHER

## 2020-04-22 DIAGNOSIS — N40.1 BPH WITH OBSTRUCTION/LOWER URINARY TRACT SYMPTOMS: ICD-10-CM

## 2020-04-22 DIAGNOSIS — N13.8 BPH WITH OBSTRUCTION/LOWER URINARY TRACT SYMPTOMS: ICD-10-CM

## 2020-04-22 RX ORDER — FINASTERIDE 5 MG/1
TABLET, FILM COATED ORAL
Qty: 90 TABLET | Refills: 0 | Status: SHIPPED | OUTPATIENT
Start: 2020-04-22 | End: 2020-07-22 | Stop reason: SDUPTHER

## 2020-05-29 DIAGNOSIS — E78.2 MIXED HYPERLIPIDEMIA: ICD-10-CM

## 2020-05-29 RX ORDER — PRAVASTATIN SODIUM 40 MG
40 TABLET ORAL DAILY
Qty: 90 TABLET | Refills: 1 | Status: SHIPPED | OUTPATIENT
Start: 2020-05-29 | End: 2020-07-22 | Stop reason: SDUPTHER

## 2020-07-22 DIAGNOSIS — N40.1 BPH WITH OBSTRUCTION/LOWER URINARY TRACT SYMPTOMS: ICD-10-CM

## 2020-07-22 DIAGNOSIS — E78.2 MIXED HYPERLIPIDEMIA: ICD-10-CM

## 2020-07-22 DIAGNOSIS — N13.8 BPH WITH OBSTRUCTION/LOWER URINARY TRACT SYMPTOMS: ICD-10-CM

## 2020-07-22 DIAGNOSIS — F32.A DEPRESSION, UNSPECIFIED DEPRESSION TYPE: Chronic | ICD-10-CM

## 2020-07-22 RX ORDER — SERTRALINE HYDROCHLORIDE 25 MG/1
25 TABLET, FILM COATED ORAL DAILY
Qty: 90 TABLET | Refills: 0 | Status: SHIPPED | OUTPATIENT
Start: 2020-07-22 | End: 2020-10-20 | Stop reason: SDUPTHER

## 2020-07-22 RX ORDER — FINASTERIDE 5 MG/1
5 TABLET, FILM COATED ORAL DAILY
Qty: 90 TABLET | Refills: 0 | Status: SHIPPED | OUTPATIENT
Start: 2020-07-22 | End: 2020-08-20 | Stop reason: SDUPTHER

## 2020-07-22 RX ORDER — PRAVASTATIN SODIUM 40 MG
40 TABLET ORAL DAILY
Qty: 90 TABLET | Refills: 0 | Status: SHIPPED | OUTPATIENT
Start: 2020-07-22 | End: 2020-11-20 | Stop reason: SDUPTHER

## 2020-07-22 NOTE — TELEPHONE ENCOUNTER
The patient has an upcoming office visit scheduled for 8/20/20 with Dr Sowmya Denson in the [address] [place] [address] location but will run out of medication until then    Request for same, 90 day supply with NO refills was queued and forwarded to the Advanced Practitioner covering the [facility] location for approval

## 2020-07-22 NOTE — TELEPHONE ENCOUNTER
Patient left a message on the Medication Refill voice mail line requesting a new prescription for Finasteride 5mg, 90 day supply to [facility]  on [address]

## 2020-07-24 ENCOUNTER — OFFICE VISIT (OUTPATIENT)
Dept: FAMILY MEDICINE CLINIC | Facility: CLINIC | Age: 80
End: 2020-07-24
Payer: MEDICARE

## 2020-07-24 VITALS
TEMPERATURE: 97 F | WEIGHT: 181 LBS | HEIGHT: 64 IN | BODY MASS INDEX: 30.9 KG/M2 | DIASTOLIC BLOOD PRESSURE: 68 MMHG | SYSTOLIC BLOOD PRESSURE: 132 MMHG

## 2020-07-24 DIAGNOSIS — E78.2 MIXED HYPERLIPIDEMIA: ICD-10-CM

## 2020-07-24 DIAGNOSIS — I35.0 AORTIC STENOSIS, MODERATE: ICD-10-CM

## 2020-07-24 DIAGNOSIS — G31.84 MILD COGNITIVE IMPAIRMENT: Chronic | ICD-10-CM

## 2020-07-24 DIAGNOSIS — E66.9 OBESITY (BMI 30.0-34.9): ICD-10-CM

## 2020-07-24 DIAGNOSIS — K76.0 FATTY LIVER DISEASE, NONALCOHOLIC: ICD-10-CM

## 2020-07-24 DIAGNOSIS — I10 ESSENTIAL HYPERTENSION: Primary | ICD-10-CM

## 2020-07-24 DIAGNOSIS — F33.1 MODERATE EPISODE OF RECURRENT MAJOR DEPRESSIVE DISORDER (HCC): ICD-10-CM

## 2020-07-24 PROBLEM — J01.00 ACUTE NON-RECURRENT MAXILLARY SINUSITIS: Status: RESOLVED | Noted: 2020-02-04 | Resolved: 2020-07-24

## 2020-07-24 PROCEDURE — 4040F PNEUMOC VAC/ADMIN/RCVD: CPT | Performed by: FAMILY MEDICINE

## 2020-07-24 PROCEDURE — 3008F BODY MASS INDEX DOCD: CPT | Performed by: FAMILY MEDICINE

## 2020-07-24 PROCEDURE — 1160F RVW MEDS BY RX/DR IN RCRD: CPT | Performed by: FAMILY MEDICINE

## 2020-07-24 PROCEDURE — 99214 OFFICE O/P EST MOD 30 MIN: CPT | Performed by: FAMILY MEDICINE

## 2020-07-24 PROCEDURE — 3075F SYST BP GE 130 - 139MM HG: CPT | Performed by: FAMILY MEDICINE

## 2020-07-24 PROCEDURE — 3078F DIAST BP <80 MM HG: CPT | Performed by: FAMILY MEDICINE

## 2020-07-24 PROCEDURE — 1036F TOBACCO NON-USER: CPT | Performed by: FAMILY MEDICINE

## 2020-07-24 NOTE — PATIENT INSTRUCTIONS

## 2020-07-24 NOTE — PROGRESS NOTES
Assessment/Plan:    Mixed hyperlipidemia  Continue meds and check lipids    Essential hypertension  Blood pressure is stable Patient to continue curretn care and follwoup in 6 months    Aortic stenosis, moderate  Patient to see cardiology in October    Fatty liver disease, nonalcoholic  Check LFT's    Moderate episode of recurrent major depressive disorder ([facility] [address] )  Stable on meds Patient to continue current care and follwoup as scheduled    Mild cognitive impairment  stable    Obesity (BMI 30 0-34  9)  Weight is unchanged continue current care       Diagnoses and all orders for this visit:    Essential hypertension  -     Comprehensive metabolic panel; Future  -     Lipid panel; Future    Mixed hyperlipidemia  -     Comprehensive metabolic panel; Future  -     Lipid panel; Future    Fatty liver disease, nonalcoholic    Moderate episode of recurrent major depressive disorder (HCC)    Mild cognitive impairment    Aortic stenosis, moderate    Obesity (BMI 30 0-34  9)          Subjective:   Chief Complaint   Patient presents with    Hypertension    Hyperlipidemia     no refills needed           Patient ID: Veronique Olivarez is a 78 y o  male      Patient is here for followup of hypertension hyperlipidemia his weight aortic stenosis  Major depression fatty liver diseaes Patient blood pressure is stable Patient lipids were stable in [facility] Patient is due for repeat labs Patient has no issues iwt hhis depression and his  Memory is stable Patient is due in 10/2020 for cardiology follouwp Patient BPH is stable on meds He has appt with the urologist et up for next month His urination is stable Patient weight is stable also      The following portions of the patient's history were reviewed and updated as appropriate: allergies, current medications, past family history, past medical history, past social history, past surgical history and problem list     Review of Systems   Constitutional: Negative for fatigue, fever and unexpected weight change  HENT: Negative for congestion, sinus pain and trouble swallowing  Eyes: Negative for discharge and visual disturbance  Respiratory: Negative for cough, chest tightness, shortness of breath and wheezing  Cardiovascular: Negative for chest pain, palpitations and leg swelling  Gastrointestinal: Negative for abdominal pain, blood in stool, constipation, diarrhea, nausea and vomiting  Genitourinary: Negative for difficulty urinating, dysuria, frequency and hematuria  Musculoskeletal: Negative for arthralgias, gait problem and joint swelling  Skin: Negative for rash and wound  Allergic/Immunologic: Negative for environmental allergies and food allergies  Neurological: Negative for dizziness, syncope, weakness, numbness and headaches  Hematological: Negative for adenopathy  Does not bruise/bleed easily  Psychiatric/Behavioral: Negative for confusion, decreased concentration and sleep disturbance  The patient is not nervous/anxious  Objective:      /68   Temp (!) 97 °F (36 1 °C)   Ht 5' 4" (1 626 m)   Wt 82 1 kg (181 lb)   BMI 31 07 kg/m²          Physical Exam   Constitutional: He is oriented to person, place, and time  He appears well-developed and well-nourished  HENT:   Head: Normocephalic and atraumatic  Right Ear: Hearing, tympanic membrane and external ear normal    Left Ear: Hearing, tympanic membrane and external ear normal    Eyes: Pupils are equal, round, and reactive to light  Conjunctivae and EOM are normal    Neck: Neck supple  No thyromegaly present  Cardiovascular: Normal rate and normal heart sounds  Pulmonary/Chest: Effort normal and breath sounds normal  He has no wheezes  He has no rales  Abdominal: Soft  Bowel sounds are normal  He exhibits no distension  There is no tenderness  Musculoskeletal: He exhibits no edema or tenderness  Lymphadenopathy:     He has no cervical adenopathy     Neurological: He is alert and oriented to person, place, and time  No cranial nerve deficit  Coordination normal    Skin: Skin is warm and dry  No rash noted  Psychiatric: He has a normal mood and affect  His behavior is normal  Judgment and thought content normal    Nursing note and vitals reviewed

## 2020-07-27 ENCOUNTER — APPOINTMENT (OUTPATIENT)
Dept: LAB | Facility: MEDICAL CENTER | Age: 80
End: 2020-07-27
Payer: MEDICARE

## 2020-07-27 DIAGNOSIS — E78.2 MIXED HYPERLIPIDEMIA: ICD-10-CM

## 2020-07-27 DIAGNOSIS — E87.6 HYPOKALEMIA: Primary | ICD-10-CM

## 2020-07-27 DIAGNOSIS — I10 ESSENTIAL HYPERTENSION: ICD-10-CM

## 2020-07-27 LAB
ALBUMIN SERPL BCP-MCNC: 4.2 G/DL (ref 3.5–5)
ALP SERPL-CCNC: 70 U/L (ref 46–116)
ALT SERPL W P-5'-P-CCNC: 27 U/L (ref 12–78)
ANION GAP SERPL CALCULATED.3IONS-SCNC: 10 MMOL/L (ref 4–13)
AST SERPL W P-5'-P-CCNC: 20 U/L (ref 5–45)
BILIRUB SERPL-MCNC: 1.39 MG/DL (ref 0.2–1)
BUN SERPL-MCNC: 15 MG/DL (ref 5–25)
CALCIUM SERPL-MCNC: 9.3 MG/DL (ref 8.3–10.1)
CHLORIDE SERPL-SCNC: 101 MMOL/L (ref 100–108)
CHOLEST SERPL-MCNC: 168 MG/DL (ref 50–200)
CO2 SERPL-SCNC: 25 MMOL/L (ref 21–32)
CREAT SERPL-MCNC: 0.78 MG/DL (ref 0.6–1.3)
GFR SERPL CREATININE-BSD FRML MDRD: 84 ML/MIN/1.73SQ M
GLUCOSE P FAST SERPL-MCNC: 122 MG/DL (ref 65–99)
HDLC SERPL-MCNC: 53 MG/DL
LDLC SERPL CALC-MCNC: 72 MG/DL (ref 0–100)
NONHDLC SERPL-MCNC: 115 MG/DL
POTASSIUM SERPL-SCNC: 3.2 MMOL/L (ref 3.5–5.3)
PROT SERPL-MCNC: 8.1 G/DL (ref 6.4–8.2)
SODIUM SERPL-SCNC: 136 MMOL/L (ref 136–145)
TRIGL SERPL-MCNC: 214 MG/DL

## 2020-07-27 PROCEDURE — 36415 COLL VENOUS BLD VENIPUNCTURE: CPT

## 2020-07-27 PROCEDURE — 80053 COMPREHEN METABOLIC PANEL: CPT

## 2020-07-27 PROCEDURE — 80061 LIPID PANEL: CPT

## 2020-07-27 RX ORDER — POTASSIUM CHLORIDE 750 MG/1
TABLET, EXTENDED RELEASE ORAL
Qty: 90 TABLET | Refills: 1 | Status: SHIPPED | OUTPATIENT
Start: 2020-07-27 | End: 2021-01-25 | Stop reason: SDUPTHER

## 2020-08-10 ENCOUNTER — TELEPHONE (OUTPATIENT)
Dept: FAMILY MEDICINE CLINIC | Facility: CLINIC | Age: 80
End: 2020-08-10

## 2020-08-10 ENCOUNTER — APPOINTMENT (OUTPATIENT)
Dept: LAB | Facility: MEDICAL CENTER | Age: 80
End: 2020-08-10
Payer: MEDICARE

## 2020-08-10 DIAGNOSIS — E87.6 HYPOKALEMIA: ICD-10-CM

## 2020-08-10 LAB
ANION GAP SERPL CALCULATED.3IONS-SCNC: 9 MMOL/L (ref 4–13)
BUN SERPL-MCNC: 19 MG/DL (ref 5–25)
CALCIUM SERPL-MCNC: 9.1 MG/DL (ref 8.3–10.1)
CHLORIDE SERPL-SCNC: 104 MMOL/L (ref 100–108)
CO2 SERPL-SCNC: 25 MMOL/L (ref 21–32)
CREAT SERPL-MCNC: 0.68 MG/DL (ref 0.6–1.3)
GFR SERPL CREATININE-BSD FRML MDRD: 89 ML/MIN/1.73SQ M
GLUCOSE P FAST SERPL-MCNC: 110 MG/DL (ref 65–99)
POTASSIUM SERPL-SCNC: 3.4 MMOL/L (ref 3.5–5.3)
SODIUM SERPL-SCNC: 138 MMOL/L (ref 136–145)

## 2020-08-10 PROCEDURE — 36415 COLL VENOUS BLD VENIPUNCTURE: CPT

## 2020-08-10 PROCEDURE — 80048 BASIC METABOLIC PNL TOTAL CA: CPT

## 2020-08-10 NOTE — TELEPHONE ENCOUNTER
----- Message from Dickson Cheng DO sent at 8/10/2020  1:57 PM EDT -----  Potassium seems to be doing better  Continue current dose  Recheck lab in 6 months

## 2020-08-17 DIAGNOSIS — I10 ESSENTIAL HYPERTENSION: ICD-10-CM

## 2020-08-17 RX ORDER — HYDROCHLOROTHIAZIDE 25 MG/1
25 TABLET ORAL DAILY
Qty: 90 TABLET | Refills: 1 | Status: SHIPPED | OUTPATIENT
Start: 2020-08-17 | End: 2021-01-28 | Stop reason: SDUPTHER

## 2020-08-17 RX ORDER — AMLODIPINE BESYLATE 10 MG/1
10 TABLET ORAL DAILY
Qty: 90 TABLET | Refills: 1 | Status: SHIPPED | OUTPATIENT
Start: 2020-08-17 | End: 2021-01-28 | Stop reason: SDUPTHER

## 2020-08-20 ENCOUNTER — OFFICE VISIT (OUTPATIENT)
Dept: UROLOGY | Facility: CLINIC | Age: 80
End: 2020-08-20
Payer: MEDICARE

## 2020-08-20 VITALS
TEMPERATURE: 97.7 F | HEIGHT: 64 IN | BODY MASS INDEX: 31.04 KG/M2 | HEART RATE: 70 BPM | DIASTOLIC BLOOD PRESSURE: 58 MMHG | WEIGHT: 181.8 LBS | SYSTOLIC BLOOD PRESSURE: 138 MMHG

## 2020-08-20 DIAGNOSIS — N40.1 BENIGN LOCALIZED PROSTATIC HYPERPLASIA WITH LOWER URINARY TRACT SYMPTOMS (LUTS): Primary | ICD-10-CM

## 2020-08-20 DIAGNOSIS — N13.8 BPH WITH OBSTRUCTION/LOWER URINARY TRACT SYMPTOMS: ICD-10-CM

## 2020-08-20 DIAGNOSIS — N40.1 BPH WITH OBSTRUCTION/LOWER URINARY TRACT SYMPTOMS: ICD-10-CM

## 2020-08-20 LAB — POST-VOID RESIDUAL VOLUME, ML POC: 57 ML

## 2020-08-20 PROCEDURE — 99213 OFFICE O/P EST LOW 20 MIN: CPT | Performed by: UROLOGY

## 2020-08-20 PROCEDURE — 4040F PNEUMOC VAC/ADMIN/RCVD: CPT | Performed by: UROLOGY

## 2020-08-20 PROCEDURE — 3008F BODY MASS INDEX DOCD: CPT | Performed by: UROLOGY

## 2020-08-20 PROCEDURE — 1160F RVW MEDS BY RX/DR IN RCRD: CPT | Performed by: UROLOGY

## 2020-08-20 PROCEDURE — 1036F TOBACCO NON-USER: CPT | Performed by: UROLOGY

## 2020-08-20 PROCEDURE — 3075F SYST BP GE 130 - 139MM HG: CPT | Performed by: UROLOGY

## 2020-08-20 PROCEDURE — 51798 US URINE CAPACITY MEASURE: CPT | Performed by: UROLOGY

## 2020-08-20 PROCEDURE — 3078F DIAST BP <80 MM HG: CPT | Performed by: UROLOGY

## 2020-08-20 RX ORDER — TAMSULOSIN HYDROCHLORIDE 0.4 MG/1
0.4 CAPSULE ORAL
Qty: 90 CAPSULE | Refills: 3 | Status: SHIPPED | OUTPATIENT
Start: 2020-08-20 | End: 2020-10-20 | Stop reason: SDUPTHER

## 2020-08-20 RX ORDER — FINASTERIDE 5 MG/1
5 TABLET, FILM COATED ORAL DAILY
Qty: 90 TABLET | Refills: 0 | Status: SHIPPED | OUTPATIENT
Start: 2020-08-20 | End: 2020-10-20 | Stop reason: SDUPTHER

## 2020-08-20 NOTE — PROGRESS NOTES
8/20/2020    Sridevi [place]  1940  [id number]        Assessment  BPH with history of urinary retention    Plan  He wishes to continue both medications  I explained that his prostate has certainly shrunken over time as compared to previous examinations  He is most comfortable continuing medications as he is doing very well and very happy overall  Will follow up in 1 year for re-evaluation with PVR  History of Present Illness  Clemencia Conti is a 78 y o  male with history of BPH and urinary retention  Retention was found to be associated primarily with constipation issues  He has been taking tamsulosin and finasteride for several years as much happier now  Currently has absolutely no symptoms  Bladder scan postvoid residual 57 mL  Review of Systems  Review of Systems   Constitutional: Negative  HENT: Negative  Respiratory: Negative  Cardiovascular: Negative  Gastrointestinal: Negative  Genitourinary:        As per HPI   Musculoskeletal: Negative  Skin: Negative  Neurological: Negative  Hematological: Negative            Past Medical History  Past Medical History:   Diagnosis Date    Actinic keratosis     LAST ASSESSED: [id number]    Allergic rhinitis     LAST ASSESSED: [id number]    Anxiety     LAST ASSESSED: 51ZEV4864    Anxiety disorder     LAST ASSESSED: 13AAJ9459    Atelectasis of right lung     ABNORMAL CT SCAN OF [facility] 12/2/2016 REPEAT NEEDED PER RADIOLOGY IN 3 MONTHS LAST ASSESSED: [id number]    Atypical chest pain     LAST ASSESSED: 54GWC2447    Benign paroxysmal vertigo     LAST ASSESSED: 97WJI0295    Chronic cough     LAST ASSESSED: 56TGT7226    Chronic GERD     Degenerative arthritis of knee, bilateral     LAST ASSESSED: 15DIY1501    Diverticulitis of colon     LAST ASSESSED: 06FLO0664    Diverticulosis     LAST ASSESSED: 47YTU9267    Foreign body, eye     LAST ASSESSED: 24IIO1925    Functional gait abnormality     LAST ASSESSED: 50VDS1147    Hyperlipidemia     Hypertension     Hyponatremia     LAST ASSESSED: 17DQZ5202    Leukocytosis     LAST ASSESSED: 19BTV7600    Murmur     Newly recognized murmur     LAST ASSESSED: 10DAB9314    Olecranon bursitis, unspecified elbow     Presence of indwelling urethral catheter     RESOLVED: 02OPG8073    Transient cerebral ischemia     LAST ASSESSED: 42MQC4026    Urinary incontinence     LAST ASSESSED: 87RJK5993    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     LAST ASSESSED: 94VBV0274       Past Social History  Past Surgical History:   Procedure Laterality Date    ADENOIDECTOMY      APPENDECTOMY      COLONOSCOPY  10/2006    FIBEROPTIC    INGUINAL HERNIA REPAIR      JOINT REPLACEMENT      b/l TKR 2015    SINUS SURGERY      TONSILLECTOMY         Past Family History  Family History   Problem Relation Age of Onset    Alzheimer's disease Mother     Coronary artery disease Brother        Past Social history  Social History     Socioeconomic History    Marital status:       Spouse name: Not on file    Number of children: Not on file    Years of education: Not on file    Highest education level: Not on file   Occupational History    Occupation: tailor, 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   Social Needs    Financial resource strain: Not on file    Food insecurity     Worry: Not on file     Inability: Not on file   [employer] needs     Medical: Not on file     Non-medical: Not on file   Tobacco Use    Smoking status: Former Smoker     Last attempt to quit: 1960     Years since quittin 6    Smokeless tobacco: Never Used    Tobacco comment: 2 ppd for 12 years    Substance and Sexual Activity    Alcohol use: Yes     Comment: occ, SOCIAL    Drug use: No    Sexual activity: Not on file   Lifestyle    Physical activity     Days per week: Not on file     Minutes per session: Not on file    Stress: Not on file   Relationships    Social connections     Talks on phone: Not on file     Gets together: Not on file     Attends Yazdanism service: Not on file     Active member of club or organization: Not on file     Attends meetings of clubs or organizations: Not on file     Relationship status: Not on file    Intimate partner violence     Fear of current or ex partner: Not on file     Emotionally abused: Not on file     Physically abused: Not on file     Forced sexual activity: Not on file   Other Topics Concern    Not on file   Social History Narrative    USES SAFETY EQUIPMENT - [employer]     Social History     Tobacco Use   Smoking Status Former Smoker    Last attempt to quit: [address] Years since quittin 6   Smokeless Tobacco Never Used   Tobacco Comment    2 ppd for 12 years        Current Medications  Current Outpatient Medications   Medication Sig Dispense Refill    amLODIPine (NORVASC) 10 mg tablet Take 1 tablet (10 mg total) by mouth daily 90 tablet 1    aspirin 81 MG tablet Take 81 mg by mouth       finasteride (PROSCAR) 5 mg tablet Take 1 tablet (5 mg total) by mouth daily 90 tablet 0    hydrochlorothiazide (HYDRODIURIL) 25 mg tablet Take 1 tablet (25 mg total) by mouth daily 90 tablet 1    multivitamin (THERAGRAN) TABS Take 1 tablet by mouth daily      Omega-3 Fatty Acids (FISH OIL PO) Take by mouth 2 (two) times a day      polyethylene glycol (MIRALAX) 17 g packet Take 17 g by mouth daily      potassium chloride (K-DUR,KLOR-CON) 10 mEq tablet 2 daily for 3 days, then 1 daily  90 tablet 1    pravastatin (PRAVACHOL) 40 mg tablet Take 1 tablet (40 mg total) by mouth daily 90 tablet 0    sertraline (ZOLOFT) 25 mg tablet Take 1 tablet (25 mg total) by mouth daily 90 tablet 0    tamsulosin (FLOMAX) 0 4 mg Take 1 capsule (0 4 mg total) by mouth daily at bedtime 90 capsule 3    VITAMIN D, ERGOCALCIFEROL, PO Take by mouth      Probiotic Product (PROBIOTIC DAILY PO) Take by mouth daily       No current facility-administered medications for this visit  Allergies  Allergies   Allergen Reactions    Ace Inhibitors Cough     Pt denied any allergies         Past Medical History, Social History, Family History, medications and allergies were reviewed  Vitals  Vitals:    08/20/20 0821   BP: 138/58   BP Location: Left arm   Patient Position: Sitting   Cuff Size: Adult   Pulse: 70   Temp: 97 7 °F (36 5 °C)   Weight: 82 5 kg (181 lb 12 8 oz)   Height: 5' 4" (1 626 m)       Physical Exam  Physical Exam  Vitals signs reviewed  Constitutional:       Appearance: He is well-developed  HENT:      Head: Normocephalic and atraumatic  Eyes:      Conjunctiva/sclera: Conjunctivae normal    Cardiovascular:      Rate and Rhythm: Normal rate  Pulmonary:      Effort: Pulmonary effort is normal    Abdominal:      Palpations: Abdomen is soft  Genitourinary:     Comments: Prostate smaller, approximately 25 to 30 g, smooth, no palpable nodule  Musculoskeletal: Normal range of motion  Skin:     General: Skin is warm and dry  Neurological:      Mental Status: He is alert and oriented to person, place, and time             Results  No results found for: PSA  Lab Results   Component Value Date    GLUCOSE 116 07/25/2015    CALCIUM 9 1 08/10/2020     (L) 07/25/2015    K 3 4 (L) 08/10/2020    CO2 25 08/10/2020     08/10/2020    BUN 19 08/10/2020    CREATININE 0 68 08/10/2020     Lab Results   Component Value Date    WBC 8 65 01/17/2020    HGB 12 9 01/17/2020    HCT 39 8 01/17/2020    MCV 91 01/17/2020     01/17/2020

## 2020-10-20 DIAGNOSIS — N13.8 BPH WITH OBSTRUCTION/LOWER URINARY TRACT SYMPTOMS: ICD-10-CM

## 2020-10-20 DIAGNOSIS — N40.1 BPH WITH OBSTRUCTION/LOWER URINARY TRACT SYMPTOMS: ICD-10-CM

## 2020-10-20 DIAGNOSIS — N40.1 BENIGN LOCALIZED PROSTATIC HYPERPLASIA WITH LOWER URINARY TRACT SYMPTOMS (LUTS): ICD-10-CM

## 2020-10-20 DIAGNOSIS — F32.A DEPRESSION, UNSPECIFIED DEPRESSION TYPE: Chronic | ICD-10-CM

## 2020-10-20 RX ORDER — FINASTERIDE 5 MG/1
5 TABLET, FILM COATED ORAL DAILY
Qty: 90 TABLET | Refills: 0 | Status: SHIPPED | OUTPATIENT
Start: 2020-10-20 | End: 2021-01-11 | Stop reason: SDUPTHER

## 2020-10-20 RX ORDER — SERTRALINE HYDROCHLORIDE 25 MG/1
25 TABLET, FILM COATED ORAL DAILY
Qty: 90 TABLET | Refills: 0 | Status: SHIPPED | OUTPATIENT
Start: 2020-10-20 | End: 2021-01-11 | Stop reason: SDUPTHER

## 2020-10-20 RX ORDER — TAMSULOSIN HYDROCHLORIDE 0.4 MG/1
0.4 CAPSULE ORAL
Qty: 90 CAPSULE | Refills: 3 | Status: SHIPPED | OUTPATIENT
Start: 2020-10-20

## 2020-11-20 DIAGNOSIS — E78.2 MIXED HYPERLIPIDEMIA: ICD-10-CM

## 2020-11-20 RX ORDER — PRAVASTATIN SODIUM 40 MG
40 TABLET ORAL DAILY
Qty: 90 TABLET | Refills: 0 | Status: SHIPPED | OUTPATIENT
Start: 2020-11-20 | End: 2021-03-11 | Stop reason: SDUPTHER

## 2021-01-11 DIAGNOSIS — N40.1 BPH WITH OBSTRUCTION/LOWER URINARY TRACT SYMPTOMS: ICD-10-CM

## 2021-01-11 DIAGNOSIS — N13.8 BPH WITH OBSTRUCTION/LOWER URINARY TRACT SYMPTOMS: ICD-10-CM

## 2021-01-11 DIAGNOSIS — F32.A DEPRESSION, UNSPECIFIED DEPRESSION TYPE: Chronic | ICD-10-CM

## 2021-01-11 RX ORDER — SERTRALINE HYDROCHLORIDE 25 MG/1
25 TABLET, FILM COATED ORAL DAILY
Qty: 90 TABLET | Refills: 1 | Status: SHIPPED | OUTPATIENT
Start: 2021-01-11 | End: 2021-02-23 | Stop reason: HOSPADM

## 2021-01-11 RX ORDER — FINASTERIDE 5 MG/1
5 TABLET, FILM COATED ORAL DAILY
Qty: 90 TABLET | Refills: 2 | Status: SHIPPED | OUTPATIENT
Start: 2021-01-11

## 2021-01-11 NOTE — TELEPHONE ENCOUNTER
The patient has an upcoming office visit scheduled for 8/31/21 with Dr Nathalie Munguia in the [facility] location but will run out of medication until then    Request for same, 90 day supply with 2 refills was queued and forwarded to the Advanced Practitioner covering the [facility] location for approval

## 2021-01-11 NOTE — TELEPHONE ENCOUNTER
A female individual left a message on the Medication Refill voice mail line requesting a new prescription for Finasteride 5mg, 90 day supply with refill to [address] on [address]

## 2021-01-25 ENCOUNTER — OFFICE VISIT (OUTPATIENT)
Dept: FAMILY MEDICINE CLINIC | Facility: CLINIC | Age: 81
End: 2021-01-25
Payer: MEDICARE

## 2021-01-25 VITALS
WEIGHT: 180 LBS | SYSTOLIC BLOOD PRESSURE: 132 MMHG | BODY MASS INDEX: 30.73 KG/M2 | HEIGHT: 64 IN | TEMPERATURE: 98 F | DIASTOLIC BLOOD PRESSURE: 72 MMHG

## 2021-01-25 DIAGNOSIS — E78.2 MIXED HYPERLIPIDEMIA: ICD-10-CM

## 2021-01-25 DIAGNOSIS — E66.9 OBESITY (BMI 30.0-34.9): ICD-10-CM

## 2021-01-25 DIAGNOSIS — E87.6 HYPOKALEMIA: ICD-10-CM

## 2021-01-25 DIAGNOSIS — Z66 DO NOT RESUSCITATE STATUS: ICD-10-CM

## 2021-01-25 DIAGNOSIS — K59.09 CHRONIC CONSTIPATION: ICD-10-CM

## 2021-01-25 DIAGNOSIS — Z00.00 MEDICARE ANNUAL WELLNESS VISIT, SUBSEQUENT: Primary | ICD-10-CM

## 2021-01-25 DIAGNOSIS — F33.1 MODERATE EPISODE OF RECURRENT MAJOR DEPRESSIVE DISORDER (HCC): ICD-10-CM

## 2021-01-25 DIAGNOSIS — I10 ESSENTIAL HYPERTENSION: ICD-10-CM

## 2021-01-25 DIAGNOSIS — N40.0 ENLARGED PROSTATE WITHOUT LOWER URINARY TRACT SYMPTOMS (LUTS): ICD-10-CM

## 2021-01-25 DIAGNOSIS — I35.0 AORTIC STENOSIS, MODERATE: ICD-10-CM

## 2021-01-25 PROCEDURE — 99214 OFFICE O/P EST MOD 30 MIN: CPT | Performed by: FAMILY MEDICINE

## 2021-01-25 PROCEDURE — G0439 PPPS, SUBSEQ VISIT: HCPCS | Performed by: FAMILY MEDICINE

## 2021-01-25 RX ORDER — POTASSIUM CHLORIDE 750 MG/1
TABLET, EXTENDED RELEASE ORAL
Qty: 90 TABLET | Refills: 1 | Status: SHIPPED | OUTPATIENT
Start: 2021-01-25 | End: 2021-02-23 | Stop reason: HOSPADM

## 2021-01-25 NOTE — ASSESSMENT & PLAN NOTE
holly is stable discussed diet and exercise with patient He will try to continue with his current regimen

## 2021-01-25 NOTE — ASSESSMENT & PLAN NOTE
Patient to [facility] with current care Patient desires to be DNR Patient had covid shot 1 and has #2 set up Patient to get copy of living will

## 2021-01-25 NOTE — PATIENT INSTRUCTIONS

## 2021-01-25 NOTE — PROGRESS NOTES
Assessment and Plan:     Problem List Items Addressed This Visit        Other    Medicare annual wellness visit, subsequent - Primary     Patient to susanne with current care Patient desires to be DNR Patient had covid shot 1 and has #2 set up Patient to get copy of living will         Do not resuscitate status     Patient advised to get  A bracelet and also to get me copy of paper work                Preventive health issues were discussed with patient, and age appropriate screening tests were ordered as noted in patient's After Visit Summary  Personalized health advice and appropriate referrals for health education or preventive services given if needed, as noted in patient's After Visit Summary  History of Present Illness:     Patient presents for Medicare Annual Wellness visit    Patient Care Team:  Darlin Saint, DO as PCP - General  Denver Fey, MD Merl Haymaker, PA-C     Problem List:     Patient Active Problem List   Diagnosis    Mixed hyperlipidemia    Essential hypertension    Aortic stenosis, moderate    Enlarged prostate without lower urinary tract symptoms (luts)    Fatty liver disease, nonalcoholic    Moderate episode of recurrent major depressive disorder ([address] )    PVC (premature ventricular contraction)    Medicare annual wellness visit, subsequent    Mild cognitive impairment    Obesity (BMI 30 0-34  9)    Do not resuscitate status      Past Medical and Surgical History:     Past Medical History:   Diagnosis Date    Actinic keratosis     LAST ASSESSED: [id number]    Allergic rhinitis     LAST ASSESSED: 27NUP0620    Anxiety     LAST ASSESSED: 88YWW9366    Anxiety disorder     LAST ASSESSED: 45BML5536    Atelectasis of right lung     ABNORMAL CT SCAN OF THE [address] 12/2/2016 REPEAT NEEDED PER RADIOLOGY IN 3 MONTHS LAST ASSESSED: [id number]    Atypical chest pain     LAST ASSESSED: 15FTJ8223    Benign paroxysmal vertigo     LAST ASSESSED: 60TIV5438    Chronic cough     LAST ASSESSED: 46VNY1789    Chronic GERD     Degenerative arthritis of knee, bilateral     LAST ASSESSED: 84EBI2180    Diverticulitis of colon     LAST ASSESSED: 04NSF4539    Diverticulosis     LAST ASSESSED: 68DIZ0137    Foreign body, eye     LAST ASSESSED: 87NAW3645    Functional gait abnormality     LAST ASSESSED: 41QIN6879    Hyperlipidemia     Hypertension     Hyponatremia     LAST ASSESSED: 80LSD4989    Leukocytosis     LAST ASSESSED: 87CYW4664    Murmur     Newly recognized murmur     LAST ASSESSED: 34OSG0337    Olecranon bursitis, unspecified elbow     Presence of indwelling urethral catheter     RESOLVED: 19XBT1582    Transient cerebral ischemia     LAST ASSESSED: 65VJU8972    Urinary incontinence     LAST ASSESSED: 83EHT7360    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     LAST ASSESSED: 23CTT5986     Past Surgical History:   Procedure Laterality Date    ADENOIDECTOMY      APPENDECTOMY      COLONOSCOPY  10/2006    FIBEROPTIC    INGUINAL HERNIA REPAIR      JOINT REPLACEMENT      b/l TKR 2015    SINUS SURGERY      TONSILLECTOMY        Family History:     Family History   Problem Relation Age of Onset    Alzheimer's disease Mother     Coronary artery disease Brother       Social History:        Social History     Socioeconomic History    Marital status:       Spouse name: None    Number of children: None    Years of education: None    Highest education level: None   Occupational History    Occupation: tailor, 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   Social Needs    Financial resource strain: None    Food insecurity     Worry: None     Inability: None    Transportation needs     Medical: None     Non-medical: None   Tobacco Use    Smoking status: Former Smoker     Quit date: 1960     Years since quittin 1    Smokeless tobacco: Never Used    Tobacco comment: 2 ppd for 12 years    Substance and Sexual Activity    Alcohol use: Yes     Comment: occ, SOCIAL  Drug use: No    Sexual activity: None   Lifestyle    Physical activity     Days per week: None     Minutes per session: None    Stress: None   Relationships    Social connections     Talks on phone: None     Gets together: None     Attends Yarsani service: None     Active member of club or organization: None     Attends meetings of clubs or organizations: None     Relationship status: None    Intimate partner violence     Fear of current or ex partner: None     Emotionally abused: None     Physically abused: None     Forced sexual activity: None   Other Topics Concern    None   Social History Narrative    USES SAFETY EQUIPMENT - [employer]      Medications and Allergies:     Current Outpatient Medications   Medication Sig Dispense Refill    amLODIPine (NORVASC) 10 mg tablet Take 1 tablet (10 mg total) by mouth daily 90 tablet 1    aspirin 81 MG tablet Take 81 mg by mouth       finasteride (PROSCAR) 5 mg tablet Take 1 tablet (5 mg total) by mouth daily 90 tablet 2    hydrochlorothiazide (HYDRODIURIL) 25 mg tablet Take 1 tablet (25 mg total) by mouth daily 90 tablet 1    multivitamin (THERAGRAN) TABS Take 1 tablet by mouth daily      Omega-3 Fatty Acids (FISH OIL PO) Take by mouth 2 (two) times a day      polyethylene glycol (MIRALAX) 17 g packet Take 17 g by mouth daily      potassium chloride (K-DUR,KLOR-CON) 10 mEq tablet 2 daily for 3 days, then 1 daily  90 tablet 1    pravastatin (PRAVACHOL) 40 mg tablet Take 1 tablet (40 mg total) by mouth daily 90 tablet 0    Probiotic Product (PROBIOTIC DAILY PO) Take by mouth daily      sertraline (ZOLOFT) 25 mg tablet Take 1 tablet (25 mg total) by mouth daily 90 tablet 1    tamsulosin (FLOMAX) 0 4 mg Take 1 capsule (0 4 mg total) by mouth daily at bedtime 90 capsule 3    VITAMIN D, ERGOCALCIFEROL, PO Take by mouth       No current facility-administered medications for this visit        Allergies   Allergen Reactions    Ace Inhibitors Cough     Pt denied any allergies        Immunizations:     Immunization History   Administered Date(s) Administered    INFLUENZA 09/30/2009, 09/22/2010, 08/31/2011, 09/25/2012, 11/05/2013, 10/06/2014, 09/18/2015, 10/28/2016    Influenza Split High Dose Preservative Free IM 10/06/2014, 09/18/2015, 10/28/2016, 09/08/2017, 10/26/2020    Influenza, high dose seasonal 0 7 mL 11/20/2018, 11/21/2019    Influenza, seasonal, injectable 08/31/2011, 09/25/2012    Pneumococcal Conjugate 13-Valent 04/21/2016    Pneumococcal Polysaccharide PPV23 12/09/2014    SARS-CoV-2 / COVID-19 mRNA IM ([place] [facility]) 01/11/2021    Tdap 01/23/2016, 10/14/2018    Zoster 05/23/2016      Health Maintenance: There are no preventive care reminders to display for this patient  Topic Date Due    Influenza Vaccine (1) 09/01/2020      Medicare Health Risk Assessment:     /72   Temp 98 °F (36 7 °C)   Ht 5' 4" (1 626 m)   Wt 81 6 kg (180 lb)   BMI 30 90 kg/m²      Gregoria Shetty is here for his Subsequent Wellness visit  Health Risk Assessment:   Patient rates overall health as good  Patient feels that their physical health rating is same  Eyesight was rated as slightly worse  Hearing was rated as slightly worse  Patient feels that their emotional and mental health rating is same  Pain experienced in the last 7 days has been a lot  Patient's pain rating has been 7/10  Patient states that he has experienced no weight loss or gain in last 6 months  Patient needs to consider treatment options for this Patient needs to see ortho    Depression Screening:   PHQ-2 Score: 0  PHQ-9 Score: 1      Fall Risk Screening: In the past year, patient has experienced: no history of falling in past year      Home Safety:  Patient does not have trouble with stairs inside or outside of their home  Patient has working smoke alarms and has working carbon monoxide detector  Home safety hazards include: none  Nutrition:   Current diet is Regular       Medications: Patient is currently taking over-the-counter supplements  OTC medications include: see medication list  Patient is able to manage medications  Activities of Daily Living (ADLs)/Instrumental Activities of Daily Living (IADLs):   Walk and transfer into and out of bed and chair?: Yes  Dress and groom yourself?: Yes    Bathe or shower yourself?: Yes    Feed yourself? Yes  Do your laundry/housekeeping?: Yes  Manage your money, pay your bills and track your expenses?: Yes  Make your own meals?: Yes    Do your own shopping?: Yes    Previous Hospitalizations:   Any hospitalizations or ED visits within the last 12 months?: No      Advance Care Planning:   Living will: Yes    Durable POA for healthcare:  Yes    Advanced directive: Yes    Advanced directive counseling given: Yes    Five wishes given: Yes    End of Life Decisions reviewed with patient: Yes      Comments: Patient does want to be DNR    Cognitive Screening:   Provider or family/friend/caregiver concerned regarding cognition?: No    PREVENTIVE SCREENINGS      Cardiovascular Screening:    General: Screening Not Indicated and History Lipid Disorder      Diabetes Screening:     General: Screening Current      Colorectal Cancer Screening:     General: Screening Not Indicated      Prostate Cancer Screening:    General: Screening Not Indicated      Abdominal Aortic Aneurysm (AAA) Screening:    Risk factors include: family history of AAA, age between 73-69 yo and tobacco use        General: Screening Current      Lung Cancer Screening:     General: Screening Not Indicated      Hepatitis C Screening:    General: Screening Not Indicated      Sivakumar Muro DO

## 2021-01-25 NOTE — PROGRESS NOTES
Assessment/Plan:    Medicare annual wellness visit, subsequent  [facility] Patient desires to be D[facility] Patient had covid shot 1 and has #2 set up Patient to get copy of living will    Do not resuscitate status  Patient advised to get  A bracelet and also to get me copy of paper work    Aortic stenosis, moderate  Patient to see cardiology and have ECHO Patient has no symptoms at this time Patient to monitor     Chronic constipation  Continue miralax    Enlarged prostate without lower urinary tract symptoms (luts)  Urination is stable with nocturia 2 times at most Patient to continue current care Reviewed last urology note continue meds and follwoup with me in  6months    Essential hypertension  BP is well controlled on medication patient to malindan meds and see me in 6 months    Mixed hyperlipidemia  Lipids at goal on meds Patient to continue same meds and followup with me in 6 months    Moderate episode of recurrent major depressive disorder ([facility] [address] )  Depression is stable Patient to continue with current care and followup in 6 months    Obesity (BMI 30 0-34 9)  wegiht is stable discussed diet and exercise with patient He will try to continue with his current regimen       Diagnoses and all orders for this visit:    Medicare annual wellness visit, subsequent    Do not resuscitate status    Essential hypertension  -     Comprehensive metabolic panel; Future  -     Lipid panel; Future    Mixed hyperlipidemia  -     Comprehensive metabolic panel; Future  -     Lipid panel; Future    Moderate episode of recurrent major depressive disorder (HCC)    Obesity (BMI 30 0-34  9)    Enlarged prostate without lower urinary tract symptoms (luts)    Aortic stenosis, moderate    Chronic constipation    Hypokalemia  -     potassium chloride (K-DUR,KLOR-CON) 10 mEq tablet; 1 daily  Subjective:      Patient ID: Shakila Stevenson is a 78 y o  male      Patient is here for followup of his hypertension hyperlipidemia aortic stenosis chronic constipation and depression Patient is doing well on meds Patient is still having Post nasal drip Patient has continue to have constipation issues and uses miralax Patient BP is controlled on meds His lipids are at goal Patient had knee pain Patient is overdue for cardiology follow up and echo Patient moods are stable       The following portions of the patient's history were reviewed and updated as appropriate: allergies, current medications, past family history, past medical history, past social history, past surgical history and problem list     Review of Systems   Constitutional: Negative for fatigue, fever and unexpected weight change  HENT: Negative for congestion, sinus pain and trouble swallowing  Eyes: Negative for discharge and visual disturbance  Respiratory: Negative for cough, chest tightness, shortness of breath and wheezing  Cardiovascular: Negative for chest pain, palpitations and leg swelling  Gastrointestinal: Negative for abdominal pain, blood in stool, constipation, diarrhea, nausea and vomiting  Genitourinary: Negative for difficulty urinating, dysuria, frequency and hematuria  Musculoskeletal: Negative for arthralgias, gait problem and joint swelling  Skin: Negative for rash and wound  Allergic/Immunologic: Negative for environmental allergies and food allergies  Neurological: Negative for dizziness, syncope, weakness, numbness and headaches  Hematological: Negative for adenopathy  Does not bruise/bleed easily  Psychiatric/Behavioral: Negative for confusion, decreased concentration and sleep disturbance  The patient is not nervous/anxious  Objective:      /72   Temp 98 °F (36 7 °C)   Ht 5' 4" (1 626 m)   Wt 81 6 kg (180 lb)   BMI 30 90 kg/m²          Physical Exam  Vitals signs and nursing note reviewed  Constitutional:       Appearance: He is well-developed  He is obese     HENT:      Head: Normocephalic and atraumatic  Right Ear: Hearing, tympanic membrane and external ear normal       Left Ear: Hearing, tympanic membrane and external ear normal    Eyes:      Extraocular Movements: Extraocular movements intact  Conjunctiva/sclera: Conjunctivae normal       Pupils: Pupils are equal, round, and reactive to light  Neck:      Musculoskeletal: Neck supple  Thyroid: No thyromegaly  Cardiovascular:      Rate and Rhythm: Normal rate and regular rhythm  Pulses: Normal pulses  Heart sounds: Normal heart sounds  Pulmonary:      Effort: Pulmonary effort is normal       Breath sounds: Normal breath sounds  No wheezing or rales  Abdominal:      General: Abdomen is flat  Bowel sounds are normal  There is no distension  Palpations: Abdomen is soft  Tenderness: There is no abdominal tenderness  Musculoskeletal:         General: No tenderness  Lymphadenopathy:      Cervical: No cervical adenopathy  Skin:     General: Skin is warm and dry  Findings: No rash  Neurological:      General: No focal deficit present  Mental Status: He is alert and oriented to person, place, and time  Cranial Nerves: No cranial nerve deficit  Coordination: Coordination normal    Psychiatric:         Mood and Affect: Mood normal          Behavior: Behavior normal          Thought Content:  Thought content normal          Judgment: Judgment normal

## 2021-01-25 NOTE — ASSESSMENT & PLAN NOTE
Urination is stable with nocturia 2 times at most Patient to continue current care Reviewed last urology note continue meds and follwoup with me in  6months

## 2021-01-28 DIAGNOSIS — I10 ESSENTIAL HYPERTENSION: ICD-10-CM

## 2021-01-28 RX ORDER — AMLODIPINE BESYLATE 10 MG/1
10 TABLET ORAL DAILY
Qty: 90 TABLET | Refills: 1 | Status: SHIPPED | OUTPATIENT
Start: 2021-01-28 | End: 2021-03-19 | Stop reason: SDUPTHER

## 2021-01-28 RX ORDER — HYDROCHLOROTHIAZIDE 25 MG/1
25 TABLET ORAL DAILY
Qty: 90 TABLET | Refills: 1 | Status: SHIPPED | OUTPATIENT
Start: 2021-01-28 | End: 2021-02-23 | Stop reason: HOSPADM

## 2021-02-19 ENCOUNTER — APPOINTMENT (EMERGENCY)
Dept: CT IMAGING | Facility: HOSPITAL | Age: 81
DRG: 057 | End: 2021-02-19
Payer: MEDICARE

## 2021-02-19 ENCOUNTER — HOSPITAL ENCOUNTER (INPATIENT)
Facility: HOSPITAL | Age: 81
LOS: 3 days | Discharge: HOME | DRG: 057 | End: 2021-02-23
Attending: EMERGENCY MEDICINE | Admitting: STUDENT IN AN ORGANIZED HEALTH CARE EDUCATION/TRAINING PROGRAM
Payer: MEDICARE

## 2021-02-19 ENCOUNTER — TELEMEDICINE (OUTPATIENT)
Dept: FAMILY MEDICINE CLINIC | Facility: CLINIC | Age: 81
End: 2021-02-19
Payer: MEDICARE

## 2021-02-19 DIAGNOSIS — D64.9 ANEMIA: ICD-10-CM

## 2021-02-19 DIAGNOSIS — R29.6 RECURRENT FALLS: ICD-10-CM

## 2021-02-19 DIAGNOSIS — D50.8 IRON DEFICIENCY ANEMIA SECONDARY TO INADEQUATE DIETARY IRON INTAKE: ICD-10-CM

## 2021-02-19 DIAGNOSIS — G31.84 MILD COGNITIVE IMPAIRMENT: ICD-10-CM

## 2021-02-19 DIAGNOSIS — F32.A DEPRESSION, UNSPECIFIED DEPRESSION TYPE: Chronic | ICD-10-CM

## 2021-02-19 DIAGNOSIS — E87.6 HYPOKALEMIA: ICD-10-CM

## 2021-02-19 DIAGNOSIS — G31.84 MILD COGNITIVE IMPAIRMENT: Chronic | ICD-10-CM

## 2021-02-19 DIAGNOSIS — F33.1 MODERATE EPISODE OF RECURRENT MAJOR DEPRESSIVE DISORDER (HCC): ICD-10-CM

## 2021-02-19 DIAGNOSIS — R41.82 ALTERED MENTAL STATUS: Primary | ICD-10-CM

## 2021-02-19 DIAGNOSIS — K20.90 ESOPHAGITIS: ICD-10-CM

## 2021-02-19 DIAGNOSIS — R41.0 DISORIENTATION: Primary | ICD-10-CM

## 2021-02-19 PROBLEM — G93.40 ENCEPHALOPATHY: Status: ACTIVE | Noted: 2021-02-19

## 2021-02-19 LAB
ALBUMIN SERPL BCP-MCNC: 3.9 G/DL (ref 3.5–5)
ALP SERPL-CCNC: 66 U/L (ref 46–116)
ALT SERPL W P-5'-P-CCNC: 58 U/L (ref 12–78)
ANION GAP SERPL CALCULATED.3IONS-SCNC: 12 MMOL/L (ref 4–13)
AST SERPL W P-5'-P-CCNC: 80 U/L (ref 5–45)
BACTERIA UR QL AUTO: ABNORMAL /HPF
BASOPHILS # BLD AUTO: 0.08 THOUSANDS/ΜL (ref 0–0.1)
BASOPHILS NFR BLD AUTO: 1 % (ref 0–1)
BILIRUB SERPL-MCNC: 0.34 MG/DL (ref 0.2–1)
BILIRUB UR QL STRIP: NEGATIVE
BUN SERPL-MCNC: 23 MG/DL (ref 5–25)
CALCIUM SERPL-MCNC: 8.7 MG/DL (ref 8.3–10.1)
CHLORIDE SERPL-SCNC: 103 MMOL/L (ref 100–108)
CLARITY UR: CLEAR
CO2 SERPL-SCNC: 27 MMOL/L (ref 21–32)
COLOR UR: YELLOW
CREAT SERPL-MCNC: 0.83 MG/DL (ref 0.6–1.3)
EOSINOPHIL # BLD AUTO: 0.06 THOUSAND/ΜL (ref 0–0.61)
EOSINOPHIL NFR BLD AUTO: 1 % (ref 0–6)
ERYTHROCYTE [DISTWIDTH] IN BLOOD BY AUTOMATED COUNT: 18.1 % (ref 11.6–15.1)
GFR SERPL CREATININE-BSD FRML MDRD: 81 ML/MIN/1.73SQ M
GLUCOSE SERPL-MCNC: 95 MG/DL (ref 65–140)
GLUCOSE UR STRIP-MCNC: NEGATIVE MG/DL
HCT VFR BLD AUTO: 29 % (ref 36.5–49.3)
HGB BLD-MCNC: 8.5 G/DL (ref 12–17)
HGB UR QL STRIP.AUTO: NEGATIVE
IMM GRANULOCYTES # BLD AUTO: 0.04 THOUSAND/UL (ref 0–0.2)
IMM GRANULOCYTES NFR BLD AUTO: 1 % (ref 0–2)
KETONES UR STRIP-MCNC: NEGATIVE MG/DL
LEUKOCYTE ESTERASE UR QL STRIP: NEGATIVE
LYMPHOCYTES # BLD AUTO: 1.76 THOUSANDS/ΜL (ref 0.6–4.47)
LYMPHOCYTES NFR BLD AUTO: 28 % (ref 14–44)
MCH RBC QN AUTO: 20.4 PG (ref 26.8–34.3)
MCHC RBC AUTO-ENTMCNC: 29.3 G/DL (ref 31.4–37.4)
MCV RBC AUTO: 70 FL (ref 82–98)
MONOCYTES # BLD AUTO: 1.18 THOUSAND/ΜL (ref 0.17–1.22)
MONOCYTES NFR BLD AUTO: 19 % (ref 4–12)
MUCOUS THREADS UR QL AUTO: ABNORMAL
NEUTROPHILS # BLD AUTO: 3.2 THOUSANDS/ΜL (ref 1.85–7.62)
NEUTS SEG NFR BLD AUTO: 50 % (ref 43–75)
NITRITE UR QL STRIP: NEGATIVE
NON-SQ EPI CELLS URNS QL MICRO: ABNORMAL /HPF
NRBC BLD AUTO-RTO: 0 /100 WBCS
PH UR STRIP.AUTO: 6 [PH] (ref 4.5–8)
PLATELET # BLD AUTO: 253 THOUSANDS/UL (ref 149–390)
PMV BLD AUTO: 8.5 FL (ref 8.9–12.7)
POTASSIUM SERPL-SCNC: 3.2 MMOL/L (ref 3.5–5.3)
PROT SERPL-MCNC: 7.5 G/DL (ref 6.4–8.2)
PROT UR STRIP-MCNC: ABNORMAL MG/DL
RBC # BLD AUTO: 4.17 MILLION/UL (ref 3.88–5.62)
RBC #/AREA URNS AUTO: ABNORMAL /HPF
SODIUM SERPL-SCNC: 142 MMOL/L (ref 136–145)
SP GR UR STRIP.AUTO: 1.02 (ref 1–1.03)
UROBILINOGEN UR QL STRIP.AUTO: 0.2 E.U./DL
WBC # BLD AUTO: 6.32 THOUSAND/UL (ref 4.31–10.16)
WBC #/AREA URNS AUTO: ABNORMAL /HPF

## 2021-02-19 PROCEDURE — 36415 COLL VENOUS BLD VENIPUNCTURE: CPT | Performed by: EMERGENCY MEDICINE

## 2021-02-19 PROCEDURE — 83540 ASSAY OF IRON: CPT | Performed by: INTERNAL MEDICINE

## 2021-02-19 PROCEDURE — 82728 ASSAY OF FERRITIN: CPT | Performed by: INTERNAL MEDICINE

## 2021-02-19 PROCEDURE — 70450 CT HEAD/BRAIN W/O DYE: CPT

## 2021-02-19 PROCEDURE — 80053 COMPREHEN METABOLIC PANEL: CPT | Performed by: EMERGENCY MEDICINE

## 2021-02-19 PROCEDURE — 99285 EMERGENCY DEPT VISIT HI MDM: CPT | Performed by: EMERGENCY MEDICINE

## 2021-02-19 PROCEDURE — 99214 OFFICE O/P EST MOD 30 MIN: CPT | Performed by: FAMILY MEDICINE

## 2021-02-19 PROCEDURE — 72125 CT NECK SPINE W/O DYE: CPT

## 2021-02-19 PROCEDURE — 1124F ACP DISCUSS-NO DSCNMKR DOCD: CPT | Performed by: EMERGENCY MEDICINE

## 2021-02-19 PROCEDURE — 83550 IRON BINDING TEST: CPT | Performed by: INTERNAL MEDICINE

## 2021-02-19 PROCEDURE — 81001 URINALYSIS AUTO W/SCOPE: CPT

## 2021-02-19 PROCEDURE — G1004 CDSM NDSC: HCPCS

## 2021-02-19 PROCEDURE — 84443 ASSAY THYROID STIM HORMONE: CPT | Performed by: INTERNAL MEDICINE

## 2021-02-19 PROCEDURE — 85025 COMPLETE CBC W/AUTO DIFF WBC: CPT | Performed by: EMERGENCY MEDICINE

## 2021-02-19 PROCEDURE — 99220 PR INITIAL OBSERVATION CARE/DAY 70 MINUTES: CPT | Performed by: INTERNAL MEDICINE

## 2021-02-19 PROCEDURE — 99285 EMERGENCY DEPT VISIT HI MDM: CPT

## 2021-02-19 PROCEDURE — 82607 VITAMIN B-12: CPT | Performed by: INTERNAL MEDICINE

## 2021-02-19 RX ORDER — ACETAMINOPHEN 325 MG/1
650 TABLET ORAL EVERY 6 HOURS PRN
Status: DISCONTINUED | OUTPATIENT
Start: 2021-02-19 | End: 2021-02-23 | Stop reason: HOSPADM

## 2021-02-19 RX ORDER — HEPARIN SODIUM 5000 [USP'U]/ML
5000 INJECTION, SOLUTION INTRAVENOUS; SUBCUTANEOUS EVERY 8 HOURS SCHEDULED
Status: DISCONTINUED | OUTPATIENT
Start: 2021-02-20 | End: 2021-02-23 | Stop reason: HOSPADM

## 2021-02-19 RX ORDER — POTASSIUM CHLORIDE 20 MEQ/1
40 TABLET, EXTENDED RELEASE ORAL ONCE
Status: COMPLETED | OUTPATIENT
Start: 2021-02-19 | End: 2021-02-19

## 2021-02-19 RX ORDER — TAMSULOSIN HYDROCHLORIDE 0.4 MG/1
0.4 CAPSULE ORAL
Status: DISCONTINUED | OUTPATIENT
Start: 2021-02-19 | End: 2021-02-23 | Stop reason: HOSPADM

## 2021-02-19 RX ORDER — DOCUSATE SODIUM 100 MG/1
100 CAPSULE, LIQUID FILLED ORAL 2 TIMES DAILY
Status: DISCONTINUED | OUTPATIENT
Start: 2021-02-20 | End: 2021-02-23 | Stop reason: HOSPADM

## 2021-02-19 RX ORDER — ONDANSETRON 2 MG/ML
4 INJECTION INTRAMUSCULAR; INTRAVENOUS EVERY 6 HOURS PRN
Status: DISCONTINUED | OUTPATIENT
Start: 2021-02-19 | End: 2021-02-23 | Stop reason: HOSPADM

## 2021-02-19 RX ORDER — POTASSIUM CHLORIDE 14.9 MG/ML
20 INJECTION INTRAVENOUS
Status: COMPLETED | OUTPATIENT
Start: 2021-02-20 | End: 2021-02-20

## 2021-02-19 RX ORDER — SERTRALINE HYDROCHLORIDE 25 MG/1
25 TABLET, FILM COATED ORAL DAILY
Status: DISCONTINUED | OUTPATIENT
Start: 2021-02-20 | End: 2021-02-19

## 2021-02-19 RX ORDER — LANOLIN ALCOHOL/MO/W.PET/CERES
3 CREAM (GRAM) TOPICAL
Status: DISCONTINUED | OUTPATIENT
Start: 2021-02-19 | End: 2021-02-23 | Stop reason: HOSPADM

## 2021-02-19 RX ORDER — AMLODIPINE BESYLATE 10 MG/1
10 TABLET ORAL DAILY
Status: DISCONTINUED | OUTPATIENT
Start: 2021-02-20 | End: 2021-02-23 | Stop reason: HOSPADM

## 2021-02-19 RX ORDER — MAGNESIUM HYDROXIDE/ALUMINUM HYDROXICE/SIMETHICONE 120; 1200; 1200 MG/30ML; MG/30ML; MG/30ML
30 SUSPENSION ORAL EVERY 6 HOURS PRN
Status: DISCONTINUED | OUTPATIENT
Start: 2021-02-19 | End: 2021-02-23 | Stop reason: HOSPADM

## 2021-02-19 RX ORDER — PRAVASTATIN SODIUM 40 MG
40 TABLET ORAL DAILY
Status: DISCONTINUED | OUTPATIENT
Start: 2021-02-20 | End: 2021-02-23 | Stop reason: HOSPADM

## 2021-02-19 RX ORDER — FINASTERIDE 5 MG/1
5 TABLET, FILM COATED ORAL DAILY
Status: DISCONTINUED | OUTPATIENT
Start: 2021-02-20 | End: 2021-02-23 | Stop reason: HOSPADM

## 2021-02-19 RX ORDER — POTASSIUM CHLORIDE 750 MG/1
TABLET, FILM COATED, EXTENDED RELEASE ORAL
COMMUNITY
Start: 2021-01-25 | End: 2021-02-23 | Stop reason: HOSPADM

## 2021-02-19 RX ORDER — ASPIRIN 81 MG/1
81 TABLET ORAL DAILY
Status: DISCONTINUED | OUTPATIENT
Start: 2021-02-20 | End: 2021-02-23 | Stop reason: HOSPADM

## 2021-02-19 RX ORDER — CALCIUM CARBONATE 200(500)MG
1000 TABLET,CHEWABLE ORAL DAILY PRN
Status: DISCONTINUED | OUTPATIENT
Start: 2021-02-19 | End: 2021-02-23 | Stop reason: HOSPADM

## 2021-02-19 RX ORDER — HYDROCHLOROTHIAZIDE 25 MG/1
25 TABLET ORAL DAILY
Status: DISCONTINUED | OUTPATIENT
Start: 2021-02-20 | End: 2021-02-21

## 2021-02-19 RX ADMIN — MELATONIN 3 MG: at 22:27

## 2021-02-19 RX ADMIN — POTASSIUM CHLORIDE 40 MEQ: 1500 TABLET, EXTENDED RELEASE ORAL at 20:00

## 2021-02-19 NOTE — ED PROVIDER NOTES
History  Chief Complaint   Patient presents with    Altered Mental Status     Pt's family states that the pt has had 3 days of increased confusion, falls, and possible constipation  HPI   49-year-old gentleman here with change in mental status  Patient has a mild cognitive impairment at baseline but over the past 3 days has been more aggressive and yelling at his family members  He has been having some difficulty falling asleep and personality changes  Patient also has ambulatory dysfunction at baseline and uses a walker, has occasional falls but has been falling more frequently  Family believes that these are mechanical in nature, not syncopal episodes  He does have a history of aortic stenosis  Patient has a history of constipation and gets frequent UTIs from urinary retention  Family thinks that patient likely has urinary tract infection  He denies dysuria, frequency, and flank pain  He has not been febrile but takes scheduled acetaminophen for pain  He has not been nauseated or vomiting  No complaint of any chest pain, shortness of breath, abdominal pain, or diarrhea  He takes aspirin but no anticoagulant medications  Prior to Admission Medications   Prescriptions Last Dose Informant Patient Reported? Taking?    Omega-3 Fatty Acids (FISH OIL PO)  Self Yes No   Sig: Take by mouth 2 (two) times a day   Probiotic Product (PROBIOTIC DAILY PO)  Self Yes No   Sig: Take by mouth daily   VITAMIN D, ERGOCALCIFEROL, PO  Self Yes No   Sig: Take by mouth   amLODIPine (NORVASC) 10 mg tablet   No No   Sig: Take 1 tablet (10 mg total) by mouth daily   aspirin 81 MG tablet  Self Yes No   Sig: Take 81 mg by mouth    finasteride (PROSCAR) 5 mg tablet   No No   Sig: Take 1 tablet (5 mg total) by mouth daily   hydrochlorothiazide (HYDRODIURIL) 25 mg tablet   No No   Sig: Take 1 tablet (25 mg total) by mouth daily   multivitamin (THERAGRAN) TABS  Self Yes No   Sig: Take 1 tablet by mouth daily   polyethylene glycol (MIRALAX) 17 g packet  Self Yes No   Sig: Take 17 g by mouth daily   potassium chloride (K-DUR,KLOR-CON) 10 mEq tablet   No No   Si daily     potassium chloride (Klor-Con) 10 mEq tablet   Yes No   pravastatin (PRAVACHOL) 40 mg tablet   No No   Sig: Take 1 tablet (40 mg total) by mouth daily   sertraline (ZOLOFT) 25 mg tablet   No No   Sig: Take 1 tablet (25 mg total) by mouth daily   tamsulosin (FLOMAX) 0 4 mg   No No   Sig: Take 1 capsule (0 4 mg total) by mouth daily at bedtime      Facility-Administered Medications: None       Past Medical History:   Diagnosis Date    Actinic keratosis     LAST ASSESSED: [id number]    Allergic rhinitis     LAST ASSESSED: [id number]    Anxiety     LAST ASSESSED: [id number]    Anxiety disorder     LAST ASSESSED: 86KEB5900    Atelectasis of right lung     ABNORMAL CT SCAN OF [facility] 2016 REPEAT NEEDED PER RADIOLOGY IN 3 MONTHS LAST ASSESSED: [id number]    Atypical chest pain     LAST ASSESSED: 72TVG4834    Benign paroxysmal vertigo     LAST ASSESSED: 27UEN3884    Chronic cough     LAST ASSESSED: [id number]    Chronic GERD     Degenerative arthritis of knee, bilateral     LAST ASSESSED: 56IDH5837    Diverticulitis of colon     LAST ASSESSED: 83IHA2990    Diverticulosis     LAST ASSESSED: 11FJW9319    Foreign body, eye     LAST ASSESSED: 91TOK5933    Functional gait abnormality     LAST ASSESSED: 32AYX4726    Hyperlipidemia     Hypertension     Hyponatremia     LAST ASSESSED: 91YVL5559    Leukocytosis     LAST ASSESSED: 16BYA8077    Murmur     Newly recognized murmur     LAST ASSESSED: 73IXI5815    Olecranon bursitis, unspecified elbow     Presence of indwelling urethral catheter     RESOLVED: 19KCC8858    Transient cerebral ischemia     LAST ASSESSED: 51QZR3252    Urinary incontinence     LAST ASSESSED: 31TEJ9089    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     LAST ASSESSED: 23FAS9628       Past Surgical History: Procedure Laterality Date    ADENOIDECTOMY      APPENDECTOMY      COLONOSCOPY  10/2006    FIBEROPTIC    INGUINAL HERNIA REPAIR      JOINT REPLACEMENT      b/l TKR 2015    SINUS SURGERY      TONSILLECTOMY         Family History   Problem Relation Age of Onset    Alzheimer's disease Mother     Coronary artery disease Brother      I have reviewed and agree with the history as documented  E-Cigarette/Vaping     E-Cigarette/Vaping Substances     Social History     Tobacco Use    Smoking status: Former Smoker     Quit date: 1960     Years since quittin 2    Smokeless tobacco: Never Used    Tobacco comment: 2 ppd for 12 years    Substance Use Topics    Alcohol use: Yes     Comment: occ, SOCIAL    Drug use: No        Review of Systems   Constitutional: Negative for chills and fever  Respiratory: Negative for shortness of breath  Cardiovascular: Negative for chest pain  Gastrointestinal: Positive for constipation  Negative for abdominal pain, diarrhea, nausea and vomiting  Genitourinary: Negative for dysuria, flank pain and frequency  Musculoskeletal: Negative for arthralgias and myalgias  Psychiatric/Behavioral: Positive for agitation, behavioral problems, confusion and sleep disturbance  Negative for hallucinations, self-injury and suicidal ideas  The patient is nervous/anxious  All other systems reviewed and are negative        Physical Exam  ED Triage Vitals   Temperature Pulse Respirations Blood Pressure SpO2   21 1635 21 1635 21 1635 21 1635 21 1635   99 °F (37 2 °C) 96 20 161/80 97 %      Temp Source Heart Rate Source Patient Position - Orthostatic VS BP Location FiO2 (%)   21 1635 21 1857 21 1635 21 1635 --   Oral Monitor Sitting Right arm       Pain Score       21 1635       5             Orthostatic Vital Signs  Vitals:    21 1635 21 1857 21 2152 21 2211   BP: 161/80 161/72 159/75 153/78 Pulse: 96 92 96 101   Patient Position - Orthostatic VS: Sitting Lying Lying Lying       Physical Exam  Vitals signs and nursing note reviewed  Constitutional:       General: He is not in acute distress  Appearance: He is well-developed  He is not diaphoretic  HENT:      Head: Normocephalic and atraumatic  Eyes:      General: No scleral icterus  Conjunctiva/sclera: Conjunctivae normal       Pupils: Pupils are equal, round, and reactive to light  Neck:      Musculoskeletal: Normal range of motion and neck supple  Cardiovascular:      Rate and Rhythm: Normal rate and regular rhythm  Heart sounds: No murmur  No friction rub  No gallop  Pulmonary:      Breath sounds: Normal breath sounds  No wheezing or rales  Abdominal:      General: There is no distension  Palpations: Abdomen is soft  Tenderness: There is no abdominal tenderness  There is no guarding or rebound  Musculoskeletal: Normal range of motion  General: No tenderness  Skin:     General: Skin is warm and dry  Coloration: Skin is not pale  Findings: No erythema  Neurological:      Mental Status: He is alert and oriented to person, place, and time  Cranial Nerves: No cranial nerve deficit  Sensory: No sensory deficit  Motor: No abnormal muscle tone  Comments: Oriented to person, place, and time  Easily agitated  Intermittently tearful  Confusing family members with each other over phone  Psychiatric:         Mood and Affect: Mood is anxious  Behavior: Behavior is agitated  ED Medications  Medications   melatonin tablet 3 mg (3 mg Oral Given 2/19/21 2227)   potassium chloride (K-DUR,KLOR-CON) CR tablet 40 mEq (40 mEq Oral Given 2/19/21 2000)       Diagnostic Studies  Results Reviewed     Procedure Component Value Units Date/Time    Iron Saturation % [[id number]] Collected: 02/19/21 1852    Lab Status:  In process Specimen: Blood from Arm, Right Updated: 02/19/21 2139    Ferritin [[id number]] Collected: 02/19/21 1852    Lab Status:  In process Specimen: Blood from Arm, Right Updated: 02/19/21 2139    Urine Microscopic [[id number]]  (Abnormal) Collected: 02/19/21 1908    Lab Status: Final result Specimen: Urine, Clean Catch Updated: 02/19/21 1955     RBC, UA None Seen /hpf      WBC, UA 0-1 /hpf      Epithelial Cells Occasional /hpf      Bacteria, UA Occasional /hpf      MUCUS THREADS Occasional    Comprehensive metabolic panel [[id number]]  (Abnormal) Collected: 02/19/21 1852    Lab Status: Final result Specimen: Blood from Arm, Right Updated: 02/19/21 1918     Sodium 142 mmol/L      Potassium 3 2 mmol/L      Chloride 103 mmol/L      CO2 27 mmol/L      ANION GAP 12 mmol/L      BUN 23 mg/dL      Creatinine 0 83 mg/dL      Glucose 95 mg/dL      Calcium 8 7 mg/dL      AST 80 U/L      ALT 58 U/L      Alkaline Phosphatase 66 U/L      Total Protein 7 5 g/dL      Albumin 3 9 g/dL      Total Bilirubin 0 34 mg/dL      eGFR 81 ml/min/1 73sq m     Narrative:      [facility] guidelines for Chronic Kidney Disease (CKD):     Stage 1 with normal or high GFR (GFR > 90 mL/min/1 73 square meters)    Stage 2 Mild CKD (GFR = 60-89 mL/min/1 73 square meters)    Stage 3A Moderate CKD (GFR = 45-59 mL/min/1 73 square meters)    Stage 3B Moderate CKD (GFR = 30-44 mL/min/1 73 square meters)    Stage 4 Severe CKD (GFR = 15-29 mL/min/1 73 square meters)    Stage 5 End Stage CKD (GFR <15 mL/min/1 73 square meters)  Note: GFR calculation is accurate only with a steady state creatinine    Urine Macroscopic, POC [[id number]]  (Abnormal) Collected: 02/19/21 1908    Lab Status: Final result Specimen: Urine Updated: 02/19/21 1909     Color, UA Yellow     Clarity, UA Clear     pH, UA 6 0     Leukocytes, UA Negative     Nitrite, UA Negative     Protein, UA 30 (1+) mg/dl      Glucose, UA Negative mg/dl      Ketones, UA Negative mg/dl      Urobilinogen, UA 0 2 E U /dl      Bilirubin, UA Negative     Blood, UA Negative     Specific Gravity, UA 1 025    Narrative:      CLINITEK RESULT    CBC and differential [[id number]]  (Abnormal) Collected: 02/19/21 1852    Lab Status: Final result Specimen: Blood from Arm, Right Updated: 02/19/21 1905     WBC 6 32 Thousand/uL      RBC 4 17 Million/uL      Hemoglobin 8 5 g/dL      Hematocrit 29 0 %      MCV 70 fL      MCH 20 4 pg      MCHC 29 3 g/dL      RDW 18 1 %      MPV 8 5 fL      Platelets 837 Thousands/uL      nRBC 0 /100 WBCs      Neutrophils Relative 50 %      Immat GRANS % 1 %      Lymphocytes Relative 28 %      Monocytes Relative 19 %      Eosinophils Relative 1 %      Basophils Relative 1 %      Neutrophils Absolute 3 20 Thousands/µL      Immature Grans Absolute 0 04 Thousand/uL      Lymphocytes Absolute 1 76 Thousands/µL      Monocytes Absolute 1 18 Thousand/µL      Eosinophils Absolute 0 06 Thousand/µL      Basophils Absolute 0 08 Thousands/µL                  CT head without contrast   Final Result by Katherine Woodard MD (02/19 1910)      No acute intracranial abnormality  Workstation performed: [id number]         CT spine cervical without contrast   Final Result by Katherine Woodard MD (02/19 1920)      No cervical spine fracture or traumatic malalignment  Workstation performed: [id number]               Procedures  Procedures      ED Course  ED Course as of Feb 19 2242 Fri Feb 19, 2021 1908 12 9 1 yr ago   Hemoglobin(!): 8 5   1908 MCV(!): 70   1908 WBC: 6 32   1925 Leukocytes, UA: Negative   1925 Nitrite, UA: Negative   1956 WBC, UA(!): 0-1                             SBIRT 22yo+      Most Recent Value   SBIRT (25 yo +)   In order to provide better care to our patients, we are screening all of our patients for alcohol and drug use  Would it be okay to ask you these screening questions? Yes Filed at: 02/19/2021 1929   Initial Alcohol Screen: US AUDIT-C    1   How often do you have a drink containing alcohol?  0 Filed at: 02/19/2021 1929   2  How many drinks containing alcohol do you have on a typical day you are drinking? 0 Filed at: 02/19/2021 1929   3b  FEMALE Any Age, or MALE 65+: How often do you have 4 or more drinks on one occassion? 0 Filed at: 02/19/2021 1929   Audit-C Score  0 Filed at: 02/19/2021 1929   MERLYN: How many times in the past year have you    Used an illegal drug or used a prescription medication for non-medical reasons? Never Filed at: 02/19/2021 1929                MDM  Number of Diagnoses or Management Options  Altered mental status: new and requires workup  Anemia: new and requires workup  Hypokalemia: new and requires workup     Amount and/or Complexity of Data Reviewed  Clinical lab tests: ordered and reviewed  Tests in the radiology section of CPT®: ordered and reviewed  Tests in the medicine section of CPT®: ordered and reviewed  Decide to obtain previous medical records or to obtain history from someone other than the patient: yes  Obtain history from someone other than the patient: yes  Discuss the patient with other providers: yes  Independent visualization of images, tracings, or specimens: yes    Patient Progress  Patient progress: stable     78year-old gentleman here with altered from baseline mental status  Patient is alert and oriented to person place and time  He has no external signs of trauma  No neck or spine tenderness  No obvious suprapubic distention or abdominal tenderness to palpation  Patient will have urinalysis for possible UTI  Given reported falls with aspirin and change in mental status patient will be evaluated for intracranial hemorrhage or cervical spine fracture with CT head and cervical spine  Will check screening CBC and CMP  CT head and cervical spine are negative for any traumatic injury  Patient has no bacteriuria or leukocytes in urine  He does have anemia to 8 5 that is new for him    Denies any melena or bright red blood per rectum  This is unlikely to explain patient's personality change/altered behavior but may explain his increasingly frequent falls  Patient's altered behavior could be secondary to poor sleep as son reports that he has not been sleeping well at home for several days  Worsening of his underlying dementia is a possibility, although this appears to be a relatively acute change  Possible polypharmacy  Would benefit from geriatrics consult  Family does not feel that patient is safe to return home given his behavior and ambulatory dysfunction  The patient would like to be discharged but is not able to appropriately articulate reasons I recommend admission or risks of discharge against medical advice  I do not believe the patient currently has decision-making capacity  Discussed my recommendation for admission with patient's son/medical decision maker who agrees with plan of care  Disposition  Final diagnoses: Altered mental status   Anemia   Hypokalemia     Time reflects when diagnosis was documented in both MDM as applicable and the Disposition within this note     Time User Action Codes Description Comment    2/19/2021  9:15 PM Harrison Feather Add [R41 82] Altered mental status     2/19/2021  9:15 PM Deena Begin [D64 9] Anemia     2/19/2021  9:15 PM Harrison Feather Add [E87 6] Hypokalemia       ED Disposition     ED Disposition Condition Date/Time Comment    Admit Fair Fri Feb 19, 2021  9:15 PM Case was discussed with Dr Ladonna Allen, and the patient's admission status was agreed to be Admission Status: observation status to the service of Dr Ladonna Allen          Follow-up Information    None         Current Discharge Medication List      CONTINUE these medications which have NOT CHANGED    Details   amLODIPine (NORVASC) 10 mg tablet Take 1 tablet (10 mg total) by mouth daily  Qty: 90 tablet, Refills: 1    Associated Diagnoses: Essential hypertension      aspirin 81 MG tablet Take 81 mg by mouth finasteride (PROSCAR) 5 mg tablet Take 1 tablet (5 mg total) by mouth daily  Qty: 90 tablet, Refills: 2    Associated Diagnoses: BPH with obstruction/lower urinary tract symptoms      hydrochlorothiazide (HYDRODIURIL) 25 mg tablet Take 1 tablet (25 mg total) by mouth daily  Qty: 90 tablet, Refills: 1    Associated Diagnoses: Essential hypertension      multivitamin (THERAGRAN) TABS Take 1 tablet by mouth daily      Omega-3 Fatty Acids (FISH OIL PO) Take by mouth 2 (two) times a day      polyethylene glycol (MIRALAX) 17 g packet Take 17 g by mouth daily      potassium chloride (K-DUR,KLOR-CON) 10 mEq tablet 1 daily  Qty: 90 tablet, Refills: 1    Associated Diagnoses: Hypokalemia      potassium chloride (Klor-Con) 10 mEq tablet       pravastatin (PRAVACHOL) 40 mg tablet Take 1 tablet (40 mg total) by mouth daily  Qty: 90 tablet, Refills: 0    Associated Diagnoses: Mixed hyperlipidemia      Probiotic Product (PROBIOTIC DAILY PO) Take by mouth daily      sertraline (ZOLOFT) 25 mg tablet Take 1 tablet (25 mg total) by mouth daily  Qty: 90 tablet, Refills: 1    Associated Diagnoses: Depression, unspecified depression type      tamsulosin (FLOMAX) 0 4 mg Take 1 capsule (0 4 mg total) by mouth daily at bedtime  Qty: 90 capsule, Refills: 3    Associated Diagnoses: Benign localized prostatic hyperplasia with lower urinary tract symptoms (LUTS)      VITAMIN D, ERGOCALCIFEROL, PO Take by mouth           No discharge procedures on file  PDMP Review     None           ED Provider  Attending physically available and evaluated [facility]  I managed the patient along with the ED Attending      Electronically Signed by         Kyaw Gale MD  02/19/21 8188

## 2021-02-19 NOTE — PROGRESS NOTES
Virtual Regular Visit      Assessment/Plan:    Problem List Items Addressed This Visit        Other    Mild cognitive impairment (Chronic)    Recurrent falls    Disorientation - Primary     Patient is not at baseline mental status Patient has also had falls Patient needs ER evaluation Patient may have had stroke , he may have hit his head with the falls or he may have an infetion somewhere Patient family agreed to take him to ER                     Reason for visit is No chief complaint on file  Encounter provider Alpesh Damon DO    Provider located at [address] [place] [address] Road [id number]      Recent Visits  No visits were found meeting these conditions  Showing recent visits within past 7 days and meeting all other requirements     Today's Visits  Date Type Provider Dept   02/19/21 Telemedicine Alpesh Damon DO [facility]   Showing today's visits and meeting all other requirements     Future Appointments  No visits were found meeting these conditions  Showing future appointments within next 150 days and meeting all other requirements        The patient was identified by name and date of birth  Ruth Olsen was informed that this is a telemedicine visit and that the visit is being conducted through [address] [place] [address] and patient was informed that this is not a secure, HIPAA-compliant platform  He agrees to proceed     My office door was closed  No one else was in the room  Patient girlfriend and daughter in law were with the patinet and assisted in giving history He acknowledged consent and understanding of privacy and security of the video platform  The patient has agreed to participate and understands they can discontinue the visit at any time  Patient is aware this is a billable service       Subjective  Ruth Olsen is a 78 y o  male with change in mental status over the last 2 days Patient has also had 2 falls during the last 2 days at night when he got up to urinate         Patient girlfriend notes patient is not himself Patient is confused aggitated and tearful for last 2 days Patient girlfriend stats in the middle of the night Wednesday he got up to urinate He had a fall Patient and his girlfriend are unsure if he hit his head Patient ahs been crying for now reason at times Patient has also been confused Patient has at times a garbled speech pattern Patient girlfriend inquired if had any alcohol as once in awhile he will have a drinkg before bed Patient got very angry saying " I am not an alcoholic" Patient has not had any chest pain or shortness of breath Patient has no headache or dizziness Patient denies any urinary complaints Patient is eating fine Patient has had a second fall today Patient family wanted to bring him in but patient refused to get dress and was argumentative Patient has no cough        Past Medical History:   Diagnosis Date    Actinic keratosis     LAST ASSESSED: [id number]    Allergic rhinitis     LAST ASSESSED: [id number]    Anxiety     LAST ASSESSED: [id number]    Anxiety disorder     LAST ASSESSED: [id number]    Atelectasis of right lung     ABNORMAL CT SCAN OF [facility] 12/2/2016 REPEAT NEEDED PER RADIOLOGY IN 3 MONTHS LAST ASSESSED: [id number]    Atypical chest pain     LAST ASSESSED: [id number]    Benign paroxysmal vertigo     LAST ASSESSED: 71QMH6339    Chronic cough     LAST ASSESSED: 56NUP0462    Chronic GERD     Degenerative arthritis of knee, bilateral     LAST ASSESSED: 58SIA3207    Diverticulitis of colon     LAST ASSESSED: 37TGZ3628    Diverticulosis     LAST ASSESSED: 89IUK2945    Foreign body, eye     LAST ASSESSED: 03EVE6441    Functional gait abnormality     LAST ASSESSED: [id number]    Hyperlipidemia     Hypertension     Hyponatremia     LAST ASSESSED: 57WXJ8856    Leukocytosis     LAST ASSESSED: [id number]    Murmur     Newly recognized murmur     LAST ASSESSED: 52WSA6843   [place] Drop Olecranon bursitis, unspecified elbow     Presence of indwelling urethral catheter     RESOLVED: [id number]    Transient cerebral ischemia     LAST ASSESSED: 45IMR2395    Urinary incontinence     LAST ASSESSED: 68WTA8624    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     LAST ASSESSED: 15PJL1272       Past Surgical History:   Procedure Laterality Date    ADENOIDECTOMY      APPENDECTOMY      COLONOSCOPY  10/2006    FIBEROPTIC    INGUINAL HERNIA REPAIR      JOINT REPLACEMENT      b/l TKR Sept 2015    SINUS SURGERY      TONSILLECTOMY         Current Outpatient Medications   Medication Sig Dispense Refill    amLODIPine (NORVASC) 10 mg tablet Take 1 tablet (10 mg total) by mouth daily 90 tablet 1    aspirin 81 MG tablet Take 81 mg by mouth       finasteride (PROSCAR) 5 mg tablet Take 1 tablet (5 mg total) by mouth daily 90 tablet 2    hydrochlorothiazide (HYDRODIURIL) 25 mg tablet Take 1 tablet (25 mg total) by mouth daily 90 tablet 1    multivitamin (THERAGRAN) TABS Take 1 tablet by mouth daily      Omega-3 Fatty Acids (FISH OIL PO) Take by mouth 2 (two) times a day      polyethylene glycol (MIRALAX) 17 g packet Take 17 g by mouth daily      potassium chloride (K-DUR,KLOR-CON) 10 mEq tablet 1 daily  90 tablet 1    potassium chloride (Klor-Con) 10 mEq tablet       pravastatin (PRAVACHOL) 40 mg tablet Take 1 tablet (40 mg total) by mouth daily 90 tablet 0    Probiotic Product (PROBIOTIC DAILY PO) Take by mouth daily      sertraline (ZOLOFT) 25 mg tablet Take 1 tablet (25 mg total) by mouth daily 90 tablet 1    tamsulosin (FLOMAX) 0 4 mg Take 1 capsule (0 4 mg total) by mouth daily at bedtime 90 capsule 3    VITAMIN D, ERGOCALCIFEROL, PO Take by mouth       No current facility-administered medications for this visit           Allergies   Allergen Reactions    Ace Inhibitors Cough     Pt denied any allergies         Review of Systems   Constitutional: Negative for activity change, appetite change, fatigue, fever and unexpected weight change  Respiratory: Negative for cough and shortness of breath  Cardiovascular: Negative for chest pain and palpitations  Gastrointestinal: Negative for abdominal pain  Genitourinary: Positive for frequency  Negative for dysuria, flank pain and hematuria  Musculoskeletal: Negative for arthralgias, back pain and gait problem  Skin: Negative for rash  Neurological: Negative for dizziness, syncope, facial asymmetry, light-headedness, numbness and headaches  2 unwitnessed falls   Psychiatric/Behavioral: Positive for agitation, confusion, decreased concentration, dysphoric mood and sleep disturbance  Negative for suicidal ideas  Video Exam    There were no vitals filed for this visit  Physical Exam  HENT:      Head: Normocephalic and atraumatic  Right Ear: External ear normal       Left Ear: External ear normal    Eyes:      Extraocular Movements: Extraocular movements intact  Conjunctiva/sclera: Conjunctivae normal       Pupils: Pupils are equal, round, and reactive to light  Pulmonary:      Effort: Pulmonary effort is normal    Neurological:      General: No focal deficit present  Mental Status: He is alert  Psychiatric:      Comments: Patient is angry on the video Patient speech is a bit jumbled           I spent 15 minutes directly with the patient during this visit      VIRTUAL VISIT DISCLAIMER    Becca Avelar acknowledges that he has consented to an online visit or consultation  He understands that the online visit is based solely on information provided by him, and that, in the absence of a face-to-face physical evaluation by the physician, the diagnosis he receives is both limited and provisional in terms of accuracy and completeness  This is not intended to replace a full medical face-to-face evaluation by the physician  Becca Avelar understands and accepts these terms

## 2021-02-19 NOTE — LETTER
February 22, 2021     Christi Ward      Dear Mr Christi Ward,    We are happy to offer you a secure way to log in and see medical information in Point for:    Marcelinokatherine Agueda       How Do I Sign Up? 1  Download the [address] in the [employer] or Epic[employer] or visit us online to [employer]           [employer]: [email or web address]     2  When prompted, enter the Point Activation Code:    Point Activation Code: [id number]  Expiration Date: 3/8/2021  3:32 PM         Additional Information  If you have questions, you can call [phone] to talk to our customer service staff  Remember, [employer] is NOT to be used for urgent needs  For medical emergencies, dial 911        Sincerely,        Jada Garcia MD

## 2021-02-20 LAB
ANION GAP SERPL CALCULATED.3IONS-SCNC: 15 MMOL/L (ref 4–13)
BUN SERPL-MCNC: 17 MG/DL (ref 5–25)
CALCIUM SERPL-MCNC: 8.8 MG/DL (ref 8.3–10.1)
CHLORIDE SERPL-SCNC: 101 MMOL/L (ref 100–108)
CO2 SERPL-SCNC: 21 MMOL/L (ref 21–32)
CREAT SERPL-MCNC: 0.61 MG/DL (ref 0.6–1.3)
ERYTHROCYTE [DISTWIDTH] IN BLOOD BY AUTOMATED COUNT: 18.5 % (ref 11.6–15.1)
FERRITIN SERPL-MCNC: 14 NG/ML (ref 8–388)
GFR SERPL CREATININE-BSD FRML MDRD: 92 ML/MIN/1.73SQ M
GLUCOSE SERPL-MCNC: 107 MG/DL (ref 65–140)
HCT VFR BLD AUTO: 33.1 % (ref 36.5–49.3)
HGB BLD-MCNC: 8.8 G/DL (ref 12–17)
IRON SATN MFR SERPL: 4 %
IRON SERPL-MCNC: 20 UG/DL (ref 65–175)
MAGNESIUM SERPL-MCNC: 1.9 MG/DL (ref 1.6–2.6)
MCH RBC QN AUTO: 20.3 PG (ref 26.8–34.3)
MCHC RBC AUTO-ENTMCNC: 26.6 G/DL (ref 31.4–37.4)
MCV RBC AUTO: 76 FL (ref 82–98)
PLATELET # BLD AUTO: 250 THOUSANDS/UL (ref 149–390)
PLATELET # BLD AUTO: 258 THOUSANDS/UL (ref 149–390)
PMV BLD AUTO: 10 FL (ref 8.9–12.7)
PMV BLD AUTO: 8.6 FL (ref 8.9–12.7)
POTASSIUM SERPL-SCNC: 4 MMOL/L (ref 3.5–5.3)
RBC # BLD AUTO: 4.34 MILLION/UL (ref 3.88–5.62)
SODIUM SERPL-SCNC: 137 MMOL/L (ref 136–145)
TIBC SERPL-MCNC: 451 UG/DL (ref 250–450)
TSH SERPL DL<=0.05 MIU/L-ACNC: 0.52 UIU/ML (ref 0.36–3.74)
VIT B12 SERPL-MCNC: 567 PG/ML (ref 100–900)
WBC # BLD AUTO: 7.67 THOUSAND/UL (ref 4.31–10.16)

## 2021-02-20 PROCEDURE — 80048 BASIC METABOLIC PNL TOTAL CA: CPT | Performed by: INTERNAL MEDICINE

## 2021-02-20 PROCEDURE — 99232 SBSQ HOSP IP/OBS MODERATE 35: CPT | Performed by: STUDENT IN AN ORGANIZED HEALTH CARE EDUCATION/TRAINING PROGRAM

## 2021-02-20 PROCEDURE — 99223 1ST HOSP IP/OBS HIGH 75: CPT | Performed by: INTERNAL MEDICINE

## 2021-02-20 PROCEDURE — 85027 COMPLETE CBC AUTOMATED: CPT | Performed by: INTERNAL MEDICINE

## 2021-02-20 PROCEDURE — 85049 AUTOMATED PLATELET COUNT: CPT | Performed by: INTERNAL MEDICINE

## 2021-02-20 PROCEDURE — 83735 ASSAY OF MAGNESIUM: CPT | Performed by: INTERNAL MEDICINE

## 2021-02-20 RX ORDER — PANTOPRAZOLE SODIUM 40 MG/1
40 TABLET, DELAYED RELEASE ORAL
Status: DISCONTINUED | OUTPATIENT
Start: 2021-02-20 | End: 2021-02-23 | Stop reason: HOSPADM

## 2021-02-20 RX ORDER — GUAIFENESIN 100 MG/5ML
200 SOLUTION ORAL EVERY 4 HOURS PRN
Status: DISCONTINUED | OUTPATIENT
Start: 2021-02-20 | End: 2021-02-23 | Stop reason: HOSPADM

## 2021-02-20 RX ADMIN — AMLODIPINE BESYLATE 10 MG: 10 TABLET ORAL at 08:16

## 2021-02-20 RX ADMIN — POTASSIUM CHLORIDE 20 MEQ: 14.9 INJECTION, SOLUTION INTRAVENOUS at 03:54

## 2021-02-20 RX ADMIN — PRAVASTATIN SODIUM 40 MG: 40 TABLET ORAL at 08:16

## 2021-02-20 RX ADMIN — ASPIRIN 81 MG: 81 TABLET, COATED ORAL at 08:16

## 2021-02-20 RX ADMIN — POTASSIUM CHLORIDE 20 MEQ: 14.9 INJECTION, SOLUTION INTRAVENOUS at 01:34

## 2021-02-20 RX ADMIN — DOCUSATE SODIUM 100 MG: 100 CAPSULE, LIQUID FILLED ORAL at 16:22

## 2021-02-20 RX ADMIN — HYDROCHLOROTHIAZIDE 25 MG: 25 TABLET ORAL at 08:16

## 2021-02-20 RX ADMIN — HEPARIN SODIUM 5000 UNITS: 5000 INJECTION INTRAVENOUS; SUBCUTANEOUS at 21:27

## 2021-02-20 RX ADMIN — DOCUSATE SODIUM 100 MG: 100 CAPSULE, LIQUID FILLED ORAL at 08:16

## 2021-02-20 RX ADMIN — SERTRALINE HYDROCHLORIDE 50 MG: 50 TABLET ORAL at 08:16

## 2021-02-20 RX ADMIN — TAMSULOSIN HYDROCHLORIDE 0.4 MG: 0.4 CAPSULE ORAL at 01:43

## 2021-02-20 RX ADMIN — MELATONIN 3 MG: at 21:27

## 2021-02-20 RX ADMIN — GUAIFENESIN 200 MG: 100 SOLUTION ORAL at 16:26

## 2021-02-20 RX ADMIN — HEPARIN SODIUM 5000 UNITS: 5000 INJECTION INTRAVENOUS; SUBCUTANEOUS at 16:21

## 2021-02-20 RX ADMIN — FINASTERIDE 5 MG: 5 TABLET, FILM COATED ORAL at 08:16

## 2021-02-20 RX ADMIN — PANTOPRAZOLE SODIUM 40 MG: 40 TABLET, DELAYED RELEASE ORAL at 11:40

## 2021-02-20 RX ADMIN — HEPARIN SODIUM 5000 UNITS: 5000 INJECTION INTRAVENOUS; SUBCUTANEOUS at 05:40

## 2021-02-20 RX ADMIN — IRON SUCROSE 200 MG: 20 INJECTION, SOLUTION INTRAVENOUS at 12:16

## 2021-02-20 RX ADMIN — TAMSULOSIN HYDROCHLORIDE 0.4 MG: 0.4 CAPSULE ORAL at 21:27

## 2021-02-20 NOTE — PLAN OF CARE
Problem: Potential for Falls  Goal: Patient will remain free of falls  Description: INTERVENTIONS:  - Assess patient frequently for physical needs  -  Identify cognitive and physical deficits and behaviors that affect risk of falls    -  [place] fall precautions as indicated by assessment   - Educate patient/family on patient safety including physical limitations  - Instruct patient to call for assistance with activity based on assessment  - Modify environment to reduce risk of injury  - Consider OT/PT consult to assist with strengthening/mobility  Outcome: Progressing     Problem: PAIN - ADULT  Goal: Verbalizes/displays adequate comfort level or baseline comfort level  Description: Interventions:  - Encourage patient to monitor pain and request assistance  - Assess pain using appropriate pain scale  - Administer analgesics based on type and severity of pain and evaluate response  - Implement non-pharmacological measures as appropriate and evaluate response  - Consider cultural and social influences on pain and pain management  - Notify physician/advanced practitioner if interventions unsuccessful or patient reports new pain  Outcome: Progressing     Problem: INFECTION - ADULT  Goal: Absence or prevention of progression during hospitalization  Description: INTERVENTIONS:  - Assess and monitor for signs and symptoms of infection  - Monitor lab/diagnostic results  - Monitor all insertion sites, i e  indwelling lines, tubes, and drains  - Monitor endotracheal if appropriate and nasal secretions for changes in amount and color  - [place] appropriate cooling/warming therapies per order  - Administer medications as ordered  - Instruct and encourage patient and family to use good hand hygiene technique  - Identify and instruct in appropriate isolation precautions for identified infection/condition  Outcome: Progressing  Goal: Absence of fever/infection during neutropenic period  Description: INTERVENTIONS:  - Monitor WBC    Outcome: Progressing     Problem: SAFETY ADULT  Goal: Patient will remain free of falls  Description: INTERVENTIONS:  - Assess patient frequently for physical needs  -  Identify cognitive and physical deficits and behaviors that affect risk of falls    -  [place] fall precautions as indicated by assessment   - Educate patient/family on patient safety including physical limitations  - Instruct patient to call for assistance with activity based on assessment  - Modify environment to reduce risk of injury  - Consider OT/PT consult to assist with strengthening/mobility  Outcome: Progressing  Goal: Maintain or return to baseline ADL function  Description: INTERVENTIONS:  -  Assess patient's ability to carry out ADLs; assess patient's baseline for ADL function and identify physical deficits which impact ability to perform ADLs (bathing, care of mouth/teeth, toileting, grooming, dressing, etc )  - Assess/evaluate cause of self-care deficits   - Assess range of motion  - Assess patient's mobility; develop plan if impaired  - Assess patient's need for assistive devices and provide as appropriate  - Encourage maximum independence but intervene and supervise when necessary  - Involve family in performance of ADLs  - Assess for home care needs following discharge   - Consider OT consult to assist with ADL evaluation and planning for discharge  - Provide patient education as appropriate  Outcome: Progressing  Goal: Maintain or return mobility status to optimal level  Description: INTERVENTIONS:  - Assess patient's baseline mobility status (ambulation, transfers, stairs, etc )    - Identify cognitive and physical deficits and behaviors that affect mobility  - Identify mobility aids required to assist with transfers and/or ambulation (gait belt, sit-to-stand, lift, walker, cane, etc )  - [place] fall precautions as indicated by assessment  - Record patient progress and toleration of activity level on Mobility SBAR; progress patient to next Phase/Stage  - Instruct patient to call for assistance with activity based on assessment  - Consider rehabilitation consult to assist with strengthening/weightbearing, etc   Outcome: Progressing     Problem: DISCHARGE PLANNING  Goal: Discharge to home or other facility with appropriate resources  Description: INTERVENTIONS:  - Identify barriers to discharge w/patient and caregiver  - Arrange for needed discharge resources and transportation as appropriate  - Identify discharge learning needs (meds, wound care, etc )  - Arrange for interpretive services to assist at discharge as needed  - Refer to Case Management Department for coordinating discharge planning if the patient needs post-hospital services based on physician/advanced practitioner order or complex needs related to functional status, cognitive ability, or social support system  Outcome: Progressing     Problem: Knowledge Deficit  Goal: Patient/family/caregiver demonstrates understanding of disease process, treatment plan, medications, and discharge instructions  Description: Complete learning assessment and assess knowledge base  Interventions:  - Provide teaching at level of understanding  - Provide teaching via preferred learning methods  Outcome: Progressing     Problem: NEUROSENSORY - ADULT  Goal: Achieves stable or improved neurological status  Description: INTERVENTIONS  - Monitor and report changes in neurological status  - Monitor vital signs such as temperature, blood pressure, glucose, and any other labs ordered   - Initiate measures to prevent increased intracranial pressure  - Monitor for seizure activity and implement precautions if appropriate      Outcome: Progressing  Goal: Achieves maximal functionality and self care  Description: INTERVENTIONS  - Monitor swallowing and airway patency with patient fatigue and changes in neurological status  - Encourage and assist patient to increase activity and self care     - Encourage visually impaired, hearing impaired and aphasic patients to use assistive/communication devices  Outcome: Progressing     Problem: METABOLIC, FLUID AND ELECTROLYTES - ADULT  Goal: Electrolytes maintained within normal limits  Description: INTERVENTIONS:  - Monitor labs and assess patient for signs and symptoms of electrolyte imbalances  - Administer electrolyte replacement as ordered  - Monitor response to electrolyte replacements, including repeat lab results as appropriate  - Instruct patient on fluid and nutrition as appropriate  Outcome: Progressing  Goal: Fluid balance maintained  Description: INTERVENTIONS:  - Monitor labs   - Monitor I/O and WT  - Instruct patient on fluid and nutrition as appropriate  - Assess for signs & symptoms of volume excess or deficit  Outcome: Progressing     Problem: MUSCULOSKELETAL - ADULT  Goal: Maintain or return mobility to safest level of function  Description: INTERVENTIONS:  - Assess patient's ability to carry out ADLs; assess patient's baseline for ADL function and identify physical deficits which impact ability to perform ADLs (bathing, care of mouth/teeth, toileting, grooming, dressing, etc )  - Assess/evaluate cause of self-care deficits   - Assess range of motion  - Assess patient's mobility  - Assess patient's need for assistive devices and provide as appropriate  - Encourage maximum independence but intervene and supervise when necessary  - Involve family in performance of ADLs  - Assess for home care needs following discharge   - Consider OT consult to assist with ADL evaluation and planning for discharge  - Provide patient education as appropriate  Outcome: Progressing  Goal: Maintain proper alignment of affected body part  Description: INTERVENTIONS:  - Support, maintain and protect limb and body alignment  - Provide patient/ family with appropriate education  Outcome: Progressing     Problem: GENITOURINARY - ADULT  Goal: Maintains or returns to baseline urinary function  Description: INTERVENTIONS:  - Assess urinary function  - Encourage oral fluids to ensure adequate hydration if ordered  - Administer IV fluids as ordered to ensure adequate hydration  - Administer ordered medications as needed  - Offer frequent toileting  - Follow urinary retention protocol if ordered  Outcome: Progressing  Goal: Absence of urinary retention  Description: INTERVENTIONS:  - Assess patients ability to void and empty bladder  - Monitor I/O  - Bladder scan as needed  - Discuss with physician/AP medications to alleviate retention as needed  - Discuss catheterization for long term situations as appropriate  Outcome: Progressing

## 2021-02-20 NOTE — ASSESSMENT & PLAN NOTE
History of chronic anemia followed in the outpatient setting by Hematology  Previously on iron supplementation  Hemoglobin 8 5  Check iron panel  Consider GI evaluation    Recent Labs     02/19/21  1852   HGB 8 5*

## 2021-02-20 NOTE — ASSESSMENT & PLAN NOTE
History of chronic anemia followed in the outpatient setting by Hematology  Patient was previously on iron supplement but has been off of it for quite some time  Baseline hemoglobin is around 12  Presented with hemoglobin 8 5  Hemoglobin stable around 8 8 range  Denies hematuria, melena, hematochezia, hematemesis  Iron panel consistent with iron deficiency anemia  Will start on IV Venofer for 3 days  Transition to oral iron supplement upon discharge  F/u with [facility]  F/u with GI consult

## 2021-02-20 NOTE — ASSESSMENT & PLAN NOTE
Previous history of mild cognitive impairment, likely with progression to dementia  Will consult physical therapy and OT cognitive evaluation    Check B12 and TSH

## 2021-02-20 NOTE — ASSESSMENT & PLAN NOTE
Patient presenting with change in mental status per family members  Also had falls in the near past   Currently awake, alert, oriented x3  UA negative for UTI  CT head report noted negative for acute finding  CT cervical spine report noted negative for acute finding  He does have baseline mild cognitive impairment, and underlying depression    Encephalopathy have now resolved  Geriatrics consultation has been placed  Fall precaution  Frequent orientation by nurses, high risk for delirium  Also concern for seasonal affective disorder  Start melatonin to help regulate sleep cycle    Follow-up with PT [place] knutson

## 2021-02-20 NOTE — ASSESSMENT & PLAN NOTE
Maintained on Zoloft 25 mg as an outpatient, increased to 50 mg here  Denies homicidal, suicidal ideation  Stable

## 2021-02-20 NOTE — UTILIZATION REVIEW
Initial Clinical Review    Admission: Date/Time/Statement: OBS [email or web address] UPGRADED TO INPT [email or web address] D/T ENCEPHALOPATHY WITH ANEMIA AND HYPOKALEMIA     02/20/21 1104  Inpatient Admission Once     Question Answer Comment   Level of Care Med Surg    Estimated length of stay More than 2 Midnights    Certification I certify that inpatient services are medically necessary for this patient for a duration of greater than two midnights  See H&P and MD Progress Notes for additional information about the patient's course of treatment  02/20/21 1103           ED Arrival Information     Expected Arrival Acuity Means of Arrival Escorted By Service Admission Type    2/19/2021 2/19/2021 16:30 Urgent Walk-In Self General Medicine Urgent    Arrival Complaint    Disorientation        Chief Complaint   Patient presents with    Altered Mental Status     Pt's family states that the pt has had 3 days of increased confusion, falls, and possible constipation  Assessment/Plan: 77 YO male to ED from home admitted Observation Upgraded to Inpt d/t    Obesity (BMI 30 0-34  9)  Assessment & Plan  Recommend outpatient weight loss     * Encephalopathy  Assessment & Plan  Patient presenting with change in mental status per family members    He is asking to go home, they were initially concern for urinary tract infection  He does have baseline mild cognitive impairment, and underlying depression     Geriatrics consultation has been placed  Will observe overnight for any significant changes  Frequent orientation by nurses, high risk for delirium  Also concern for seasonal affective disorder  Start melatonin to help regulate sleep cycle     Anemia  Assessment & Plan  History of chronic anemia followed in the outpatient setting by Hematology  Previously on iron supplementation  Hemoglobin 8 5  Check iron panel  Consider GI evaluation         Recent Labs     02/19/21  1852   HGB 8 5*            Hypokalemia  Assessment & Plan  Likely secondary to thiazide diuretic use  Replete here  Recheck in a m  Recent Labs     02/19/21  1852   K 3 2*            Mild cognitive impairment  Assessment & Plan  Previous history of mild cognitive impairment, likely with progression to dementia  Will consult physical therapy and OT cognitive evaluation  Check B12 and TSH     Moderate episode of recurrent major depressive disorder (HCC)  Assessment & Plan  Maintained on Zoloft 25 mg as an outpatient, increased to 50 mg here  No homicidal or suicidal ideation     Fatty liver disease, nonalcoholic  Assessment & Plan  Does have elevated transaminitis with ALT elevation, recommend weight loss      Recent Labs     02/19/21  1852   AST 80*            Enlarged prostate without lower urinary tract symptoms (luts)  Assessment & Plan  Continue Flomax        Aortic stenosis, moderate  Assessment & Plan  History of moderate aortic stenosis     Essential hypertension  Assessment & Plan  Resume home medications        Anticipated Length of Stay:  Patient will be admitted on an Observation basis with an anticipated length of stay of  less than 2 midnights     Justification for Hospital Stay:  Disorientation         ED Triage Vitals   Temperature Pulse Respirations Blood Pressure SpO2   02/19/21 1635 02/19/21 1635 02/19/21 1635 02/19/21 1635 02/19/21 1635   99 °F (37 2 °C) 96 20 161/80 97 %      Temp Source Heart Rate Source Patient Position - Orthostatic VS BP Location FiO2 (%)   02/19/21 1635 02/19/21 1857 02/19/21 1635 02/19/21 1635 --   Oral Monitor Sitting Right arm       Pain Score       02/19/21 1635       5          Wt Readings from Last 1 Encounters:   02/20/21 78 2 kg (172 lb 6 4 oz)     Additional Vital Signs:   02/20/21 0800  --  --  --  --  --  None (Room air)  --   02/20/21 0750  98 2 °F (36 8 °C)  93  18  162/79  95 %  None (Room air)  Lying   02/19/21 2301  98 9 °F (37 2 °C)  92  18  149/73  93 %  None (Room air)  Lying   02/19/21 2211  98 7 °F (37 1 °C)  101  18  153/78 94 %  None (Room air)  Lying   02/19/21 2201  --  --  --  --  --  None (Room air)  --   02/19/21 2152  --  96  18  159/75  97 %  None (Room air)  Lying   02/19/21 1857  --  92  21  161/72  96 %  None (Room air)  Lying   02/19/21 1635  99 °F (37 2 °C)  96  20  161/80  97 %  None (Room air)  Sitting       Pertinent Labs/Diagnostic Test Results:       Results from last 7 days   Lab Units 02/20/21  0444 02/20/21  0144 02/19/21  1852   WBC Thousand/uL 7 67  --  6 32   HEMOGLOBIN g/dL 8 8*  --  8 5*   HEMATOCRIT % 33 1*  --  29 0*   PLATELETS Thousands/uL 258 250 253   NEUTROS ABS Thousands/µL  --   --  3 20         Results from last 7 days   Lab Units 02/20/21  0444 02/19/21  1852   SODIUM mmol/L 137 142   POTASSIUM mmol/L 4 0 3 2*   CHLORIDE mmol/L 101 103   CO2 mmol/L 21 27   ANION GAP mmol/L 15* 12   BUN mg/dL 17 23   CREATININE mg/dL 0 61 0 83   EGFR ml/min/1 73sq m 92 81   CALCIUM mg/dL 8 8 8 7   MAGNESIUM mg/dL 1 9  --      Results from last 7 days   Lab Units 02/19/21  1852   AST U/L 80*   ALT U/L 58   ALK PHOS U/L 66   TOTAL PROTEIN g/dL 7 5   ALBUMIN g/dL 3 9   TOTAL BILIRUBIN mg/dL 0 34         Results from last 7 days   Lab Units 02/20/21  0444 02/19/21  1852   GLUCOSE RANDOM mg/dL 107 95       Results from last 7 days   Lab Units 02/19/21  1852   TSH 3RD GENERATON uIU/mL 0 520       Results from last 7 days   Lab Units 02/19/21  1852   FERRITIN ng/mL 14       Results from last 7 days   Lab Units 02/19/21  1908   CLARITY UA  Clear   COLOR UA  Yellow   SPEC GRAV UA  1 025   PH UA  6 0   GLUCOSE UA mg/dl Negative   KETONES UA mg/dl Negative   BLOOD UA  Negative   PROTEIN UA mg/dl 30 (1+)*   NITRITE UA  Negative   BILIRUBIN UA  Negative   UROBILINOGEN UA E U /dl 0 2   LEUKOCYTES UA  Negative   WBC UA /hpf 0-1*   RBC UA /hpf None Seen   BACTERIA UA /hpf Occasional   EPITHELIAL CELLS WET PREP /hpf Occasional   MUCUS THREADS  Occasional*     2/19 ct spine cervical:No cervical spine fracture or traumatic malalignment  2/19 ct head:No acute intracranial abnormality        ED Treatment:   Medication Administration from 02/19/2021 1545 to 02/19/2021 2158       Date/Time Order Dose Route Action Action by Comments     02/19/2021 2000 potassium chloride (K-DUR,KLOR-CON) CR tablet 40 mEq 40 mEq Oral Given          Past Medical History:   Diagnosis Date    Actinic keratosis     LAST ASSESSED: [id number]    Allergic rhinitis     LAST ASSESSED: [id number]    Anxiety     LAST ASSESSED: 63YEE5897    Anxiety disorder     LAST ASSESSED: 56BHW4852    Atelectasis of right lung     ABNORMAL CT SCAN OF [facility] 12/2/2016 REPEAT NEEDED PER RADIOLOGY IN 3 MONTHS LAST ASSESSED: [id number]    Atypical chest pain     LAST ASSESSED: 63THE4770    Benign paroxysmal vertigo     LAST ASSESSED: 76PTB3184    Chronic cough     LAST ASSESSED: 25GJO2431    Chronic GERD     Degenerative arthritis of knee, bilateral     LAST ASSESSED: 83QVR1831    Diverticulitis of colon     LAST ASSESSED: 28UPS9734    Diverticulosis     LAST ASSESSED: 80LXT0671    Foreign body, eye     LAST ASSESSED: 64APH2555    Functional gait abnormality     LAST ASSESSED: 44CKH8055    Hyperlipidemia     Hypertension     Hyponatremia     LAST ASSESSED: 58ZOY3951    Leukocytosis     LAST ASSESSED: 37GRA8986    Murmur     Newly recognized murmur     LAST ASSESSED: 16MIN0003    Olecranon bursitis, unspecified elbow     Presence of indwelling urethral catheter     RESOLVED: 54JDE3100    Transient cerebral ischemia     LAST ASSESSED: 87XUT2691    Urinary incontinence     LAST ASSESSED: 73WYM9140    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     LAST ASSESSED: 05SCK5644     Present on Admission:   Mild cognitive impairment   Essential hypertension   Aortic stenosis, moderate   Fatty liver disease, nonalcoholic   Moderate episode of recurrent major depressive disorder (HCC)   Obesity (BMI 30 0-34  9)   Enlarged prostate without lower urinary tract symptoms (luts)      Admitting Diagnosis: Hypokalemia [E87 6]  Altered mental status [R41 82]  Anemia [D64 9]  Age/Sex: 78 y o  male  Admission Orders:  Scheduled Medications:  amLODIPine, 10 mg, Oral, Daily  aspirin, 81 mg, Oral, Daily  docusate sodium, 100 mg, Oral, BID  finasteride, 5 mg, Oral, Daily  heparin (porcine), 5,000 Units, Subcutaneous, Q8H LUCIANO  hydrochlorothiazide, 25 mg, Oral, Daily  melatonin, 3 mg, Oral, HS  pravastatin, 40 mg, Oral, Daily  sertraline, 50 mg, Oral, Daily  tamsulosin, 0 4 mg, Oral, HS      PRN Meds:  acetaminophen, 650 mg, Oral, Q6H PRN  aluminum-magnesium hydroxide-simethicone, 30 mL, Oral, Q6H PRN  calcium carbonate, 1,000 mg, Oral, Daily PRN  ondansetron, 4 mg, Intravenous, Q6H PRN    Ambulate  Pt/ot  scd  Daily weights  Reg diet    IP CONSULT TO GERONTOLOGY  IP CONSULT TO [facility] Utilization Review Department  ATTENTION: Please call with any questions or concerns to [phone] and carefully listen to the prompts so that you are directed to the right person  All voicemails are confidential   Erwin Molina all requests for admission clinical reviews, approved or denied determinations and any other requests to dedicated fax number below belonging to the campus where the patient is receiving treatment   List of dedicated fax numbers for the Facilities:  [address] DENIALS (Administrative/Medical Necessity) [phone]   [address] (Maternity/NICU/Pediatrics) [phone]   [address] Silvana Yeh 9154 ([facility]) [address] [facility] 28 [phone] Lyle Astorga 37 [address] [place]) [address] [place], [place] [address] [phone]

## 2021-02-20 NOTE — PROGRESS NOTES
Progress Note - Elzbieta Rosado 1940, 78 y o  male MRN: [id number]    Unit/Bed#: E5 -01 Encounter: [id number]    Primary Care Provider: Dainaa Reeves DO   Date and time admitted to hospital: 2/19/2021  5:50 PM        * Encephalopathy  Assessment & Plan  Patient presenting with change in mental status per family members  Also had falls in the near past   Currently awake, alert, oriented x3  UA negative for UTI  CT head report noted negative for acute finding  CT cervical spine report noted negative for acute finding  He does have baseline mild cognitive impairment, and underlying depression    Encephalopathy have now resolved  Geriatrics consultation has been placed  Fall precaution  Frequent orientation by nurses, high risk for delirium  Also concern for seasonal affective disorder  Start melatonin to help regulate sleep cycle  Follow-up with PT OT eamon     Anemia  Assessment & Plan  History of chronic anemia followed in the outpatient setting by Hematology  Patient was previously on iron supplement but has been off of it for quite some time  Baseline hemoglobin is around 12  Presented with hemoglobin 8 5  Hemoglobin stable around 8 8 range  Denies hematuria, melena, hematochezia, hematemesis  Iron panel consistent with iron deficiency anemia  Will start on IV Venofer for 3 days  Transition to oral iron supplement upon discharge  F/u with FOBT  F/u with GI consult  Hypokalemia  Assessment & Plan  Now resolved  Supplement electrolytes as needed  Mild cognitive impairment  Assessment & Plan  Previous history of mild cognitive impairment, likely with progression to dementia  UA negative UTI  CT head report noted negative for acute finding  TSH, B12 within normal limits    Follow-up with PT OT eamon     Moderate episode of recurrent major depressive disorder (HCC)  Assessment & Plan  Maintained on Zoloft 25 mg as an outpatient, increased to 50 mg here  Denies homicidal, suicidal ideation  Stable  Fatty liver disease, nonalcoholic  Assessment & Plan  Does have elevated transaminitis with ALT elevation, recommend weight loss  Recent Labs     21  1852   AST 80*         Enlarged prostate without lower urinary tract symptoms (luts)  Assessment & Plan  Continue Flomax      Aortic stenosis, moderate  Assessment & Plan  History of moderate aortic stenosis    Essential hypertension  Assessment & Plan  Resume home medications    VTE Pharmacologic Prophylaxis:   Pharmacologic: Heparin    Patient Centered Rounds: I have performed bedside rounds with nursing staff today  Education and Discussions with Family / Patient:  Spoke with significant other Shireen Rodriguez over the phone at length  Time Spent for Care: 30 minutes  More than 50% of total time spent on counseling and coordination of care as described above  Current Length of Stay: 0 day(s)    Current Patient Status: Observation   Certification Statement: The patient will continue to require additional inpatient hospital stay due to Above    Discharge Plan:  24-48 hours    Code Status: Level 3 - DNAR and DNI      Subjective:   Afebrile, hemodynamically stable  Currently patient is awake, alert, oriented x3  Complaining of acid reflux  Denies chest pain, dyspnea, fever, chills, nausea, vomiting, diaphoreses, any other new complaints  No other events reported  Objective:     Vitals:   Temp (24hrs), Av 7 °F (37 1 °C), Min:98 2 °F (36 8 °C), Max:99 °F (37 2 °C)    Temp:  [98 2 °F (36 8 °C)-99 °F (37 2 °C)] 98 2 °F (36 8 °C)  HR:  [] 93  Resp:  [18-21] 18  BP: (149-162)/(72-80) 162/79  SpO2:  [93 %-97 %] 95 %  Body mass index is 29 59 kg/m²  Input and Output Summary (last 24 hours):        Intake/Output Summary (Last 24 hours) at 2021 1059  Last data filed at 2021 0809  Gross per 24 hour   Intake 120 ml   Output 700 ml   Net -580 ml       Physical Exam:     Physical Exam  Constitutional: General: He is not in acute distress  Appearance: Normal appearance  He is not ill-appearing  Comments: Elderly male patient, acutely nontoxic appearing  HENT:      Head: Normocephalic and atraumatic  Eyes:      Pupils: Pupils are equal, round, and reactive to light  Neck:      Musculoskeletal: Normal range of motion and neck supple  No neck rigidity  Cardiovascular:      Rate and Rhythm: Normal rate and regular rhythm  Pulses: Normal pulses  Heart sounds: Normal heart sounds  Pulmonary:      Effort: Pulmonary effort is normal  No respiratory distress  Breath sounds: Normal breath sounds  No stridor  No wheezing or rhonchi  Abdominal:      General: Bowel sounds are normal  There is no distension  Palpations: Abdomen is soft  Tenderness: There is no abdominal tenderness  Musculoskeletal: Normal range of motion  Neurological:      General: No focal deficit present  Mental Status: He is alert and oriented to person, place, and time  Mental status is at baseline  Psychiatric:         Mood and Affect: Mood normal          Behavior: Behavior normal        Additional Data:     Labs:    Results from last 7 days   Lab Units 02/20/21  0444  02/19/21  1852   WBC Thousand/uL 7 67  --  6 32   HEMOGLOBIN g/dL 8 8*  --  8 5*   HEMATOCRIT % 33 1*  --  29 0*   PLATELETS Thousands/uL 258   < > 253   NEUTROS PCT %  --   --  50   LYMPHS PCT %  --   --  28   MONOS PCT %  --   --  19*   EOS PCT %  --   --  1    < > = values in this interval not displayed       Results from last 7 days   Lab Units 02/20/21  0444 02/19/21  1852   SODIUM mmol/L 137 142   POTASSIUM mmol/L 4 0 3 2*   CHLORIDE mmol/L 101 103   CO2 mmol/L 21 27   BUN mg/dL 17 23   CREATININE mg/dL 0 61 0 83   ANION GAP mmol/L 15* 12   CALCIUM mg/dL 8 8 8 7   ALBUMIN g/dL  --  3 9   TOTAL BILIRUBIN mg/dL  --  0 34   ALK PHOS U/L  --  66   ALT U/L  --  58   AST U/L  --  80*   GLUCOSE RANDOM mg/dL 107 95 * I Have Reviewed All Lab Data Listed Above  * Additional Pertinent Lab Tests Reviewed: All Labs Within Last 24 Hours Reviewed    Imaging:    Imaging Reports Reviewed Today Include:  CT head report reviewed, CT cervical spine report reviewed  Recent Cultures (last 7 days):           Last 24 Hours Medication List:   Current Facility-Administered Medications   Medication Dose Route Frequency Provider Last Rate    acetaminophen  650 mg Oral Q6H PRN Nanda [place], DO      aluminum-magnesium hydroxide-simethicone  30 mL Oral Q6H PRN Nanda [place], DO      amLODIPine  10 mg Oral Daily Nicolás Galaviz, DO      aspirin  81 mg Oral Daily Nicolás Guillaume, DO      calcium carbonate  1,000 mg Oral Daily PRN Nanda [place], DO      docusate sodium  100 mg Oral BID Nicolás Galaviz, DO      finasteride  5 mg Oral Daily Nicolás Guillaume, DO      heparin (porcine)  5,000 Units Subcutaneous Q8H [address] Nicolás Guillaume, DO      hydrochlorothiazide  25 mg Oral Daily Nicolás Galaviz, DO      iron sucrose  200 mg Intravenous Daily Wilson CESPEDES MD      melatonin  3 mg Oral HS Nicolás Galaviz, DO      ondansetron  4 mg Intravenous Q6H PRN Nicolás Galaviz, DO      pravastatin  40 mg Oral Daily Nicolás Galaviz, DO      sertraline  50 mg Oral Daily Nicolás Guillaume, DO      tamsulosin  0 4 mg Oral HS Nicolás Campbell, DO          Today, Patient Was Seen By: Kevin Mendoza MD    ** Please Note: Dictation voice to text software may have been used in the creation of this document   **

## 2021-02-20 NOTE — ASSESSMENT & PLAN NOTE
Previous history of mild cognitive impairment, likely with progression to dementia  UA negative UTI  CT head report noted negative for acute finding  TSH, B12 within normal limits    Follow-up with PT OT eval

## 2021-02-20 NOTE — CONSULTS
Consultation - [facility]  Mikhail Briones 78 y o  male MRN: [id number]  Unit/Bed#: E5 -01 Encounter: [id number]        Inpatient consult to gastroenterology  Consult performed by: Ahmet Herrera PA-C  Consult ordered by: Candis Rooney MD        Reason for Consult / Principal Problem:     1  Anemia     ASSESSMENT AND PLAN:      78year old male with a significant PMHx of hyperlipidemia, PVC, hypertension, aortic stenosis, and iron deficiency anemia who presented with a change in mental status  1  Chronic iron def anemia   He was initially diagnosed with this in 2018 and was managed by hem onc with iron supplementation  The patients current HGB is 8 8  He denies any nausea, vomiting, abdominal pain, dysphagia, or blood in his stool  He does report black stool x 2 a few months ago  Has a history of GERD for 10+ years  The patient is previously known by Dr Lacey Gonzales  He has never undergone an EGD  His last colonoscopy was 10+ years ago  - discussed EGD and colonoscopy with the patient and he is agreeable  - will reassess tomorrow and plan tentatively for scopes on Monday  - continue to trend H&H  - continue PPI     ______________________________________________________________________    HPI: 78year old male with a significant PMHx of hyperlipidemia, PVC, hypertension, aortic stenosis, and iron deficiency anemia who presented with a change in mental status  GI has been consulted secondary to iron deficiency anemia  He was initially diagnosed with this in 2018 and was managed by hem onc with iron supplementation  The patients current HGB is 8 8  He denies any nausea, vomiting, abdominal pain, dysphagia, or blood in his stool  He does report black stool x 2 a few months ago  Has a history of GERD for 10+ years  The patient is previously known by Dr Lacey Gonzales  He has never undergone an EGD  His last colonoscopy was 10+ years ago         REVIEW OF SYSTEMS:    CONSTITUTIONAL: Denies any fever, chills, rigors, and weight loss  HEENT: No earache or tinnitus  Denies hearing loss or visual disturbances  CARDIOVASCULAR: No chest pain or palpitations  RESPIRATORY: Denies any cough, hemoptysis, shortness of breath or dyspnea on exertion  GASTROINTESTINAL: As noted in the History of Present Illness  GENITOURINARY: No problems with urination  Denies any hematuria or dysuria  NEUROLOGIC: No dizziness or vertigo, denies headaches  MUSCULOSKELETAL: Denies any muscle or joint pain  SKIN: Denies skin rashes or itching  ENDOCRINE: Denies excessive thirst  Denies intolerance to heat or cold  PSYCHOSOCIAL: Denies depression or anxiety  Denies any recent memory loss         Historical Information   Past Medical History:   Diagnosis Date    Actinic keratosis     LAST ASSESSED: [id number]    Allergic rhinitis     LAST ASSESSED: [id number]    Anxiety     LAST ASSESSED: 67JTU0635    Anxiety disorder     LAST ASSESSED: 63YSY1606    Atelectasis of right lung     ABNORMAL CT SCAN OF [facility] 12/2/2016 REPEAT NEEDED PER RADIOLOGY IN 3 MONTHS LAST ASSESSED: [id number]    Atypical chest pain     LAST ASSESSED: 12HMH8974    Benign paroxysmal vertigo     LAST ASSESSED: 68NVQ7162    Chronic cough     LAST ASSESSED: 93YFW4050    Chronic GERD     Degenerative arthritis of knee, bilateral     LAST ASSESSED: 62VBJ1113    Diverticulitis of colon     LAST ASSESSED: 63KQR7573    Diverticulosis     LAST ASSESSED: 82RNF4310    Foreign body, eye     LAST ASSESSED: 90VLR6483    Functional gait abnormality     LAST ASSESSED: 22ITU2906    Hyperlipidemia     Hypertension     Hyponatremia     LAST ASSESSED: 43JVY2014    Leukocytosis     LAST ASSESSED: 38XAM6225    Murmur     Newly recognized murmur     LAST ASSESSED: 25CUI5131    Olecranon bursitis, unspecified elbow     Presence of indwelling urethral catheter     RESOLVED: 19WPC4173    Transient cerebral ischemia     LAST ASSESSED: 09QRA5572  Urinary incontinence     LAST ASSESSED: 00TWS6930    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     LAST ASSESSED: 60NCK3458     Past Surgical History:   Procedure Laterality Date    ADENOIDECTOMY      APPENDECTOMY      COLONOSCOPY  10/2006    FIBEROPTIC    INGUINAL HERNIA REPAIR      JOINT REPLACEMENT      b/l TKR 2015    SINUS SURGERY      TONSILLECTOMY       Social History   Social History     Substance and Sexual Activity   Alcohol Use Yes    Comment: occ, SOCIAL     Social History     Substance and Sexual Activity   Drug Use No     Social History     Tobacco Use   Smoking Status Former Smoker    Quit date: 56    Years since quittin 1   Smokeless Tobacco Never Used   Tobacco Comment    2 ppd for 12 years      Family History   Problem Relation Age of Onset    Alzheimer's disease Mother     Coronary artery disease Brother        Meds/Allergies     Medications Prior to Admission   Medication    multivitamin (THERAGRAN) TABS    Omega-3 Fatty Acids (FISH OIL PO)    potassium chloride (K-DUR,KLOR-CON) 10 mEq tablet    potassium chloride (Klor-Con) 10 mEq tablet    Probiotic Product (PROBIOTIC DAILY PO)    VITAMIN D, ERGOCALCIFEROL, PO    amLODIPine (NORVASC) 10 mg tablet    aspirin 81 MG tablet    finasteride (PROSCAR) 5 mg tablet    hydrochlorothiazide (HYDRODIURIL) 25 mg tablet    polyethylene glycol (MIRALAX) 17 g packet    pravastatin (PRAVACHOL) 40 mg tablet    sertraline (ZOLOFT) 25 mg tablet    tamsulosin (FLOMAX) 0 4 mg     Current Facility-Administered Medications   Medication Dose Route Frequency    acetaminophen (TYLENOL) tablet 650 mg  650 mg Oral Q6H PRN    aluminum-magnesium hydroxide-simethicone (MYLANTA) oral suspension 30 mL  30 mL Oral Q6H PRN    amLODIPine (NORVASC) tablet 10 mg  10 mg Oral Daily    aspirin (ECOTRIN LOW STRENGTH) EC tablet 81 mg  81 mg Oral Daily    calcium carbonate (TUMS) chewable tablet 1,000 mg  1,000 mg Oral Daily PRN  docusate sodium (COLACE) capsule 100 mg  100 mg Oral BID    finasteride (PROSCAR) tablet 5 mg  5 mg Oral Daily    guaiFENesin (ROBITUSSIN) oral solution 200 mg  200 mg Oral Q4H PRN    heparin (porcine) subcutaneous injection 5,000 Units  5,000 Units Subcutaneous Q8H [address]    hydrochlorothiazide (HYDRODIURIL) tablet 25 mg  25 mg Oral Daily    iron sucrose (VENOFER) 200 mg in sodium chloride 0 9 % 100 mL IVPB  200 mg Intravenous Daily    melatonin tablet 3 mg  3 mg Oral HS    ondansetron (ZOFRAN) injection 4 mg  4 mg Intravenous Q6H PRN    pantoprazole (PROTONIX) EC tablet 40 mg  40 mg Oral Early Morning    pravastatin (PRAVACHOL) tablet 40 mg  40 mg Oral Daily    sertraline (ZOLOFT) tablet 50 mg  50 mg Oral Daily    tamsulosin (FLOMAX) capsule 0 4 mg  0 4 mg Oral HS       Allergies   Allergen Reactions    Ace Inhibitors Cough     Pt denied any allergies             Objective     Blood pressure 162/79, pulse 93, temperature 98 2 °F (36 8 °C), temperature source Temporal, resp  rate 18, weight 78 2 kg (172 lb 6 4 oz), SpO2 95 %  Body mass index is 29 59 kg/m²  Intake/Output Summary (Last 24 hours) at 2/20/2021 1232  Last data filed at 2/20/2021 0809  Gross per 24 hour   Intake 120 ml   Output 700 ml   Net -580 ml         PHYSICAL EXAM:      General Appearance:   Alert, cooperative, no distress   HEENT:   Normocephalic, atraumatic, anicteric      Neck:  Supple, symmetrical, trachea midline   Lungs:   Clear to auscultation bilaterally; no rales, rhonchi or wheezing; respirations unlabored    Heart[de-identified]   Regular rate and rhythm; no murmur, rub, or gallop     Abdomen:   Soft, non-tender, non-distended; normal bowel sounds; no masses, no organomegaly    Genitalia:   Deferred    Rectal:   Deferred    Extremities:  No cyanosis, clubbing or edema    Pulses:  2+ and symmetric all extremities    Skin:  No jaundice, rashes, or lesions    Lymph nodes:  No palpable cervical lymphadenopathy        Lab Results: Admission on 02/19/2021   Component Date Value    WBC 02/19/2021 6 32     RBC 02/19/2021 4 17     Hemoglobin 02/19/2021 8 5*    Hematocrit 02/19/2021 29 0*    MCV 02/19/2021 70*    MCH 02/19/2021 20 4*    MCHC 02/19/2021 29 3*    RDW 02/19/2021 18 1*    MPV 02/19/2021 8 5*    Platelets 88/88/0701 253     nRBC 02/19/2021 0     Neutrophils Relative 02/19/2021 50     Immat GRANS % 02/19/2021 1     Lymphocytes Relative 02/19/2021 28     Monocytes Relative 02/19/2021 19*    Eosinophils Relative 02/19/2021 1     Basophils Relative 02/19/2021 1     Neutrophils Absolute 02/19/2021 3 20     Immature Grans Absolute 02/19/2021 0 04     Lymphocytes Absolute 02/19/2021 1 76     Monocytes Absolute 02/19/2021 1 18     Eosinophils Absolute 02/19/2021 0 06     Basophils Absolute 02/19/2021 0 08     Sodium 02/19/2021 142     Potassium 02/19/2021 3 2*    Chloride 02/19/2021 103     CO2 02/19/2021 27     ANION GAP 02/19/2021 12     BUN 02/19/2021 23     Creatinine 02/19/2021 0 83     Glucose 02/19/2021 95     Calcium 02/19/2021 8 7     AST 02/19/2021 80*    ALT 02/19/2021 58     Alkaline Phosphatase 02/19/2021 66     Total Protein 02/19/2021 7 5     Albumin 02/19/2021 3 9     Total Bilirubin 02/19/2021 0 34     eGFR 02/19/2021 81     Color, UA 02/19/2021 Yellow     Clarity, UA 02/19/2021 Clear     pH, UA 02/19/2021 6 0     Leukocytes, UA 02/19/2021 Negative     Nitrite, UA 02/19/2021 Negative     Protein, UA 02/19/2021 30 (1+)*    Glucose, UA 02/19/2021 Negative     Ketones, UA 02/19/2021 Negative     Urobilinogen, UA 02/19/2021 0 2     Bilirubin, UA 02/19/2021 Negative     Blood, UA 02/19/2021 Negative     Specific Gravity, UA 02/19/2021 1 025     RBC, UA 02/19/2021 None Seen     WBC, UA 02/19/2021 0-1*    Epithelial Cells 02/19/2021 Occasional     Bacteria, UA 02/19/2021 Occasional     MUCUS THREADS 02/19/2021 Occasional*    Iron Saturation 02/19/2021 4     TIBC 02/19/2021 451*    Iron 02/19/2021 20*    Ferritin 02/19/2021 14     TSH 3RD GENERATON 02/19/2021 0 520     Vitamin B-12 02/19/2021 567     Platelets 36/02/0974 250     MPV 02/20/2021 8 6*    Sodium 02/20/2021 137     Potassium 02/20/2021 4 0     Chloride 02/20/2021 101     CO2 02/20/2021 21     ANION GAP 02/20/2021 15*    BUN 02/20/2021 17     Creatinine 02/20/2021 0 61     Glucose 02/20/2021 107     Calcium 02/20/2021 8 8     eGFR 02/20/2021 92     Magnesium 02/20/2021 1 9     WBC 02/20/2021 7 67     RBC 02/20/2021 4 34     Hemoglobin 02/20/2021 8 8*    Hematocrit 02/20/2021 33 1*    MCV 02/20/2021 76*    MCH 02/20/2021 20 3*    MCHC 02/20/2021 26 6*    RDW 02/20/2021 18 5*    Platelets 51/42/5087 258     MPV 02/20/2021 10 0        Imaging Studies: I have personally reviewed pertinent imaging studies

## 2021-02-20 NOTE — ASSESSMENT & PLAN NOTE
Patient presenting with change in mental status per family members    He is asking to go home, they were initially concern for urinary tract infection  He does have baseline mild cognitive impairment, and underlying depression    Geriatrics consultation has been placed  Will observe overnight for any significant changes  Frequent orientation by nurses, high risk for delirium  Also concern for seasonal affective disorder  Start melatonin to help regulate sleep cycle

## 2021-02-20 NOTE — ASSESSMENT & PLAN NOTE
Does have elevated transaminitis with ALT elevation, recommend weight loss  Recent Labs     02/19/21  1852   AST 80*

## 2021-02-20 NOTE — ASSESSMENT & PLAN NOTE
Likely secondary to thiazide diuretic use  Replete here  Recheck in manuel m    Recent Labs     02/19/21  1852   K 3 2*

## 2021-02-20 NOTE — H&P
H&P- Daniela Romeo 1940, 78 y o  male MRN: [id number]    Unit/Bed#: E5 -01 Encounter: [id number]    Primary Care Provider: Darlin Saint, DO   Date and time admitted to hospital: 2/19/2021  5:50 PM        Obesity (BMI 30 0-34  9)  Assessment & Plan  Recommend outpatient weight loss    * Encephalopathy  Assessment & Plan  Patient presenting with change in mental status per family members  He is asking to go home, they were initially concern for urinary tract infection  He does have baseline mild cognitive impairment, and underlying depression    Geriatrics consultation has been placed  Will observe overnight for any significant changes  Frequent orientation by nurses, high risk for delirium  Also concern for seasonal affective disorder  Start melatonin to help regulate sleep cycle    Anemia  Assessment & Plan  History of chronic anemia followed in the outpatient setting by Hematology  Previously on iron supplementation  Hemoglobin 8 5  Check iron panel  Consider GI evaluation    Recent Labs     02/19/21  1852   HGB 8 5*         Hypokalemia  Assessment & Plan  Likely secondary to thiazide diuretic use  Replete here  Recheck in a m  Recent Labs     02/19/21  1852   K 3 2*         Mild cognitive impairment  Assessment & Plan  Previous history of mild cognitive impairment, likely with progression to dementia  Will consult physical therapy and OT cognitive evaluation    Check B12 and TSH    Moderate episode of recurrent major depressive disorder (HCC)  Assessment & Plan  Maintained on Zoloft 25 mg as an outpatient, increased to 50 mg here  No homicidal or suicidal ideation    Fatty liver disease, nonalcoholic  Assessment & Plan  Does have elevated transaminitis with ALT elevation, recommend weight loss  Recent Labs     02/19/21  1852   AST 80*         Enlarged prostate without lower urinary tract symptoms (luts)  Assessment & Plan  Continue Flomax      Aortic stenosis, moderate  Assessment & Plan  History of moderate aortic stenosis    Essential hypertension  Assessment & Plan  Resume home medications      VTE Prophylaxis: Heparin  / sequential compression device   Code Status:  DNR DNI  POLST: There is no POLST form on file for this patient (pre-hospital)  Discussion with family:  Daughter who was a nurse practitioner, and  at bedside    Anticipated Length of Stay:  Patient will be admitted on an Observation basis with an anticipated length of stay of  less than 2 midnights  Justification for Hospital Stay:  Disorientation    Total Time for Visit, including Counseling / Coordination of Care: 45 minutes  Greater than 50% of this total time spent on direct patient counseling and coordination of care  Chief Complaint:   Disorientation, confusion    History of Present Illness:    Ruth Olsen is a 78 y o  male who presents with disorientation and confusion  The patient's family reports that he is not acting his self  They were hoping that he would have a UTI to explain the symptoms  His daughter reports that he has had increasing confusion over many years  He has had issues with depression, PTSD from World War II  He may have seasonal affective disorder and gets depressed during the winter time  He has significant stress with his sons which they feel may be causing some of his issues  Patient reports that he feels fine, he is asking to go home  He denies any black stools, no nausea vomiting or diarrhea, no fever  He received both of his COVID-19 vaccination      Review of Systems:    A complete and comprehensive 14 point organ system review was performed and all other systems are negative other than stated above in the HPI    Past Medical and Surgical History:     Past Medical History:   Diagnosis Date    Actinic keratosis     LAST ASSESSED: [id number]    Allergic rhinitis     LAST ASSESSED: 85SUE3589    Anxiety     LAST ASSESSED: 00EXY1331    Anxiety disorder     LAST ASSESSED: 58MXT5479    Atelectasis of right lung     ABNORMAL  [address] 12/2/2016 REPEAT NEEDED PER RADIOLOGY IN 3 MONTHS LAST ASSESSED: [id number]    Atypical chest pain     LAST ASSESSED: 70XUX9720    Benign paroxysmal vertigo     LAST ASSESSED: 10YPX7042    Chronic cough     LAST ASSESSED: 25FSF9584    Chronic GERD     Degenerative arthritis of knee, bilateral     LAST ASSESSED: 11WPD3807    Diverticulitis of colon     LAST ASSESSED: 85GOP6700    Diverticulosis     LAST ASSESSED: 10EYS3353    Foreign body, eye     LAST ASSESSED: 18CPO0326    Functional gait abnormality     LAST ASSESSED: 99GCN0921    Hyperlipidemia     Hypertension     Hyponatremia     LAST ASSESSED: 70URI3692    Leukocytosis     LAST ASSESSED: 65SQH9895    Murmur     Newly recognized murmur     LAST ASSESSED: 87KAA6269    Olecranon bursitis, unspecified elbow     Presence of indwelling urethral catheter     RESOLVED: 36KSW0564    Transient cerebral ischemia     LAST ASSESSED: 14ZSL4168    Urinary incontinence     LAST ASSESSED: 91WXM2520    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     LAST ASSESSED: 01ASM3350       Past Surgical History:   Procedure Laterality Date    ADENOIDECTOMY      APPENDECTOMY      COLONOSCOPY  10/2006    FIBEROPTIC    INGUINAL HERNIA REPAIR      JOINT REPLACEMENT      b/l TKR Sept 2015    SINUS SURGERY      TONSILLECTOMY         Meds/Allergies:    Prior to Admission medications    Medication Sig Start Date End Date Taking?  Authorizing Provider   amLODIPine (NORVASC) 10 mg tablet Take 1 tablet (10 mg total) by mouth daily 1/28/21   Loyce Appl, DO   aspirin 81 MG tablet Take 81 mg by mouth     Historical Provider, MD   finasteride (PROSCAR) 5 mg tablet Take 1 tablet (5 mg total) by mouth daily 1/11/21   ANATOLY Aleman   hydrochlorothiazide (HYDRODIURIL) 25 mg tablet Take 1 tablet (25 mg total) by mouth daily 1/28/21   Loyce Appl, DO multivitamin (THERAGRAN) TABS Take 1 tablet by mouth daily    Historical Provider, MD   Omega-3 Fatty Acids (FISH OIL PO) Take by mouth 2 (two) times a day    Historical Provider, MD   polyethylene glycol (MIRALAX) 17 g packet Take 17 g by mouth daily    Historical Provider, MD   potassium chloride (K-DUR,KLOR-CON) 10 mEq tablet 1 daily  21   [facility], DO   potassium chloride (Klor-Con) 10 mEq tablet  21   Historical Provider, MD   pravastatin (PRAVACHOL) 40 mg tablet Take 1 tablet (40 mg total) by mouth daily 20   [facility], DO   Probiotic Product (PROBIOTIC DAILY PO) Take by mouth daily    Historical Provider, MD   sertraline (ZOLOFT) 25 mg tablet Take 1 tablet (25 mg total) by mouth daily 21   [facility], DO   tamsulosin (FLOMAX) 0 4 mg Take 1 capsule (0 4 mg total) by mouth daily at bedtime 10/20/20   Lynn Aguirre PA-C   VITAMIN D, ERGOCALCIFEROL, PO Take by mouth    Historical Provider, MD     I have reviewed home medications with patient personally  Allergies: Allergies   Allergen Reactions    Ace Inhibitors Cough     Pt denied any allergies         Social History:     Marital Status:     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    Substance Use History:   Social History     Substance and Sexual Activity   Alcohol Use Yes    Comment: occ, SOCIAL     Social History     Tobacco Use   Smoking Status Former Smoker    Quit date: 1960    Years since quittin 1   Smokeless Tobacco Never Used   Tobacco Comment    2 ppd for 12 years      Social History     Substance and Sexual Activity   Drug Use No       Family History:    Family History   Problem Relation Age of Onset    Alzheimer's disease Mother     Coronary artery disease Brother        Physical Exam:     Vitals:   Blood Pressure: 153/78 (21)  Pulse: 101 (21)  Temperature: 98 7 °F (37 1 °C) (21)  Temp Source: Temporal (21)  Respirations: 18 (21 2211)  Weight - Scale: 82 5 kg (181 lb 14 1 oz) (02/19/21 1635)  SpO2: 94 % (02/19/21 2211)      General: well appearing, no acute distress  HEENT: atraumatic, PERRLA, moist mucosa, normal pharynx, normal tonsils and adenoids, normal tongue, no fluid in sinuses  Neck: Trachea midline, no carotid bruit, no masses  Respiratory: normal chest wall expansion, CTA B, no r/r/w, no rubs  Cardiovascular: RRR, no m/r/g, Normal S1 and S2  Abdomen: Soft, non-tender, non-distended, normal bowel sounds in all quadrants, no hepatosplenomegaly, no tympany  Rectal: deferred  Musculoskeletal: normal ROM in upper and lower extremities  Integumentary: warm, dry, and pink, with no rash, purpura, or petechia  Heme/Lymph: no lymphadenopathy, no bruises  Neurological: Cranial Nerves II-XII grossly intact  Psychiatric:  Apparent dementia    Additional Data:     Lab Results: I have personally reviewed pertinent reports  Results from last 7 days   Lab Units 02/19/21  1852   WBC Thousand/uL 6 32   HEMOGLOBIN g/dL 8 5*   HEMATOCRIT % 29 0*   PLATELETS Thousands/uL 253   NEUTROS PCT % 50   LYMPHS PCT % 28   MONOS PCT % 19*   EOS PCT % 1     Results from last 7 days   Lab Units 02/19/21  1852   SODIUM mmol/L 142   POTASSIUM mmol/L 3 2*   CHLORIDE mmol/L 103   CO2 mmol/L 27   BUN mg/dL 23   CREATININE mg/dL 0 83   ANION GAP mmol/L 12   CALCIUM mg/dL 8 7   ALBUMIN g/dL 3 9   TOTAL BILIRUBIN mg/dL 0 34   ALK PHOS U/L 66   ALT U/L 58   AST U/L 80*   GLUCOSE RANDOM mg/dL 95                       Imaging: I have personally reviewed pertinent reports  CT head without contrast   Final Result by Brent Diana MD (02/19 1910)      No acute intracranial abnormality  Workstation performed: [id number]         CT spine cervical without contrast   Final Result by Brent Diana MD (02/19 1920)      No cervical spine fracture or traumatic malalignment                     Workstation performed: [id number]             EKG, Pathology, and Other Studies Reviewed on Admission:   · Reviewed head CT, with no acute intracranial abnormality    Allscripts / Epic Records Reviewed: Yes     ** Please Note: This note has been constructed using a voice recognition system   **

## 2021-02-20 NOTE — PHYSICAL THERAPY NOTE
Physical Therapy Evaluation      Patient Active Problem List   Diagnosis    Mixed hyperlipidemia    Essential hypertension    Aortic stenosis, moderate    Enlarged prostate without lower urinary tract symptoms (luts)    Fatty liver disease, nonalcoholic    Moderate episode of recurrent major depressive disorder ([address] )    PVC (premature ventricular contraction)    Medicare annual wellness visit, subsequent    Recurrent falls    Mild cognitive impairment    Obesity (BMI 30 0-34  9)    Do not resuscitate status    Chronic constipation    Disorientation    Encephalopathy    Hypokalemia    Anemia       Past Medical History:   Diagnosis Date    Actinic keratosis     LAST ASSESSED: [id number]    Allergic rhinitis     LAST ASSESSED: 82EVM6952    Anxiety     LAST ASSESSED: 53OUM9259    Anxiety disorder     LAST ASSESSED: 14PSJ7427    Atelectasis of right lung     ABNORMAL CT SCAN OF [address] 12/2/2016 REPEAT NEEDED PER RADIOLOGY IN 3 MONTHS LAST ASSESSED: [id number]    Atypical chest pain     LAST ASSESSED: [id number]    Benign paroxysmal vertigo     LAST ASSESSED: 14GCL3122    Chronic cough     LAST ASSESSED: 22VVH9810    Chronic GERD     Degenerative arthritis of knee, bilateral     LAST ASSESSED: 52OVB2311    Diverticulitis of colon     LAST ASSESSED: 24SHL2756    Diverticulosis     LAST ASSESSED: 44CHB6528    Foreign body, eye     LAST ASSESSED: 18HFS4390    Functional gait abnormality     LAST ASSESSED: 14MAO6113    Hyperlipidemia     Hypertension     Hyponatremia     LAST ASSESSED: 00QYU3000    Leukocytosis     LAST ASSESSED: 42BQR8840    Murmur     Newly recognized murmur     LAST ASSESSED: 94DNH4046    Olecranon bursitis, unspecified elbow     Presence of indwelling urethral catheter     RESOLVED: 20XAH8933    Transient cerebral ischemia     LAST ASSESSED: 48BOF4654    Urinary incontinence     LAST ASSESSED: 52OLD6628    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     LAST ASSESSED: 70LDP3208       Past Surgical History:   Procedure Laterality Date    ADENOIDECTOMY      APPENDECTOMY      COLONOSCOPY  10/2006    FIBEROPTIC    INGUINAL HERNIA REPAIR      JOINT REPLACEMENT      b/l TKR Sept 2015    SINUS SURGERY      TONSILLECTOMY        02/20/21 1030   PT Last Visit   PT Visit Date 02/20/21   Note Type   Note type Evaluation   Pain Assessment   Pain Assessment Tool 0-10   Pain Score 4   Pain Location/Orientation Orientation: Bilateral;Location: Knee   Hospital Pain Intervention(s) Repositioned; Ambulation/increased activity; Emotional support   Home Living   Type of [address] level;Stairs to enter with rails  (2 stairs to enter)   [address] [employer] [place]   Prior Function   Level of [place] Independent with ADLs and functional mobility   Lives With Friend(s)   Receives Help From Friend(s)   ADL [address] [employer] in the last 6 months 0   Comments pt lives with friend , indep and uses cane outdoors  no device indoors  pt denies falls  reports had knees replaced but now they are bad  has treadmill and bike at home  Restrictions/Precautions   Weight Bearing Precautions Per Order No   Other Precautions Chair Alarm; Impulsive   General   Family/Caregiver Present No   Cognition   Overall Cognitive Status WFL   Orientation Level Oriented X4   RUE Assessment   RUE Assessment WFL   LUE Assessment   LUE Assessment WFL   RLE Assessment   RLE Assessment X  (quads 4/5)   LLE Assessment   LLE Assessment X  (quads 4/5)   Coordination   Movements are Fluid and Coordinated 1   Sensation WFL   Bed Mobility   Rolling R 7  Independent   Rolling L 7  Independent   Supine to Sit 7  Independent   Sit to Supine 7  Independent   Transfers   Sit to Stand 7  Independent   Stand to Sit 7  Independent   Ambulation/Elevation   Gait pattern WNL   Gait Assistance 7  Independent   Assistive Device Straight cane;None   Distance 300'   Stair Management Assistance 6  Modified independent   Stair Management Technique One rail R;Alternating pattern; Foreward   Number of Stairs 5   Balance   Static Sitting Normal   Dynamic Sitting Normal   Static Standing Good   Dynamic Standing Fair +   Ambulatory Fair +   Endurance Deficit   Endurance Deficit No   Activity Tolerance   Activity Tolerance Patient tolerated treatment well   Nurse Made Aware yes   Assessment   Assessment pt admitted with hypokalemia, anemia, change in mental status  referred to PT  pt was indep PTA, no device in home and with cane outdoors  pt lives in single floor home, 2 stairs to enter  no falls  pt demonstrated indep mobility with and without use of cane  pt able to perform 5 stairs  pt did not have any loss of balance or britton  pt has chronic deficits in strength (quads), joint integrity, balance, and gait stability on uneven surfaces  pt does not need skilled Pt and can return home   d/c PT   Barriers to Discharge None   Recommendation   PT Discharge Recommendation Return to previous environment with no needs   Equipment Recommended Cane  (has)   PT - OK to Discharge Yes   AM-PAC Basic Mobility Inpatient   Turning in Bed Without Bedrails 4   Lying on Back to Sitting on Edge of Flat Bed 4   Moving Bed to Chair 4   Standing Up From Chair 4   Walk in Room 4   Climb 3-5 Stairs 4   Basic Mobility Inpatient Raw Score 24   Basic Mobility Standardized Score 57 68   History: co - morbidities, fall risk, use of assistive device,   Exam: impairments in locomotion, musculoskeletal, balance,joint integrity,   Clinical: unstable/unpredictable  Complexity:high        Mirta Orange, PT

## 2021-02-21 LAB
ANION GAP SERPL CALCULATED.3IONS-SCNC: 13 MMOL/L (ref 4–13)
ANION GAP SERPL CALCULATED.3IONS-SCNC: 7 MMOL/L (ref 4–13)
BUN SERPL-MCNC: 14 MG/DL (ref 5–25)
BUN SERPL-MCNC: 16 MG/DL (ref 5–25)
CALCIUM SERPL-MCNC: 8.7 MG/DL (ref 8.3–10.1)
CALCIUM SERPL-MCNC: 8.8 MG/DL (ref 8.3–10.1)
CHLORIDE SERPL-SCNC: 101 MMOL/L (ref 100–108)
CHLORIDE SERPL-SCNC: 99 MMOL/L (ref 100–108)
CO2 SERPL-SCNC: 23 MMOL/L (ref 21–32)
CO2 SERPL-SCNC: 28 MMOL/L (ref 21–32)
CREAT SERPL-MCNC: 0.71 MG/DL (ref 0.6–1.3)
CREAT SERPL-MCNC: 0.83 MG/DL (ref 0.6–1.3)
ERYTHROCYTE [DISTWIDTH] IN BLOOD BY AUTOMATED COUNT: 18.5 % (ref 11.6–15.1)
GFR SERPL CREATININE-BSD FRML MDRD: 81 ML/MIN/1.73SQ M
GFR SERPL CREATININE-BSD FRML MDRD: 87 ML/MIN/1.73SQ M
GLUCOSE SERPL-MCNC: 119 MG/DL (ref 65–140)
GLUCOSE SERPL-MCNC: 124 MG/DL (ref 65–140)
HCT VFR BLD AUTO: 29.3 % (ref 36.5–49.3)
HGB BLD-MCNC: 8.4 G/DL (ref 12–17)
MAGNESIUM SERPL-MCNC: 1.9 MG/DL (ref 1.6–2.6)
MCH RBC QN AUTO: 20.3 PG (ref 26.8–34.3)
MCHC RBC AUTO-ENTMCNC: 28.7 G/DL (ref 31.4–37.4)
MCV RBC AUTO: 71 FL (ref 82–98)
PLATELET # BLD AUTO: 237 THOUSANDS/UL (ref 149–390)
PMV BLD AUTO: 9.6 FL (ref 8.9–12.7)
POTASSIUM SERPL-SCNC: 2.8 MMOL/L (ref 3.5–5.3)
POTASSIUM SERPL-SCNC: 3.8 MMOL/L (ref 3.5–5.3)
RBC # BLD AUTO: 4.13 MILLION/UL (ref 3.88–5.62)
SODIUM SERPL-SCNC: 135 MMOL/L (ref 136–145)
SODIUM SERPL-SCNC: 136 MMOL/L (ref 136–145)
WBC # BLD AUTO: 6.71 THOUSAND/UL (ref 4.31–10.16)

## 2021-02-21 PROCEDURE — 80048 BASIC METABOLIC PNL TOTAL CA: CPT | Performed by: STUDENT IN AN ORGANIZED HEALTH CARE EDUCATION/TRAINING PROGRAM

## 2021-02-21 PROCEDURE — 99232 SBSQ HOSP IP/OBS MODERATE 35: CPT | Performed by: INTERNAL MEDICINE

## 2021-02-21 PROCEDURE — 85027 COMPLETE CBC AUTOMATED: CPT | Performed by: STUDENT IN AN ORGANIZED HEALTH CARE EDUCATION/TRAINING PROGRAM

## 2021-02-21 PROCEDURE — 0DJ08ZZ INSPECTION OF UPPER INTESTINAL TRACT, VIA NATURAL OR ARTIFICIAL OPENING ENDOSCOPIC: ICD-10-PCS | Performed by: INTERNAL MEDICINE

## 2021-02-21 PROCEDURE — 83735 ASSAY OF MAGNESIUM: CPT | Performed by: STUDENT IN AN ORGANIZED HEALTH CARE EDUCATION/TRAINING PROGRAM

## 2021-02-21 PROCEDURE — 99232 SBSQ HOSP IP/OBS MODERATE 35: CPT | Performed by: STUDENT IN AN ORGANIZED HEALTH CARE EDUCATION/TRAINING PROGRAM

## 2021-02-21 PROCEDURE — 0DBK8ZX EXCISION OF ASCENDING COLON, VIA NATURAL OR ARTIFICIAL OPENING ENDOSCOPIC, DIAGNOSTIC: ICD-10-PCS | Performed by: INTERNAL MEDICINE

## 2021-02-21 RX ORDER — POLYETHYLENE GLYCOL 3350, SODIUM CHLORIDE, SODIUM BICARBONATE, POTASSIUM CHLORIDE 420; 11.2; 5.72; 1.48 G/4L; G/4L; G/4L; G/4L
4000 POWDER, FOR SOLUTION ORAL ONCE
Status: COMPLETED | OUTPATIENT
Start: 2021-02-21 | End: 2021-02-21

## 2021-02-21 RX ORDER — LOSARTAN POTASSIUM 25 MG/1
25 TABLET ORAL DAILY
Status: DISCONTINUED | OUTPATIENT
Start: 2021-02-21 | End: 2021-02-23 | Stop reason: HOSPADM

## 2021-02-21 RX ORDER — ALPRAZOLAM 0.25 MG/1
0.25 TABLET ORAL
Status: DISCONTINUED | OUTPATIENT
Start: 2021-02-21 | End: 2021-02-23 | Stop reason: HOSPADM

## 2021-02-21 RX ORDER — POTASSIUM CHLORIDE 14.9 MG/ML
20 INJECTION INTRAVENOUS ONCE
Status: COMPLETED | OUTPATIENT
Start: 2021-02-21 | End: 2021-02-21

## 2021-02-21 RX ORDER — POTASSIUM CHLORIDE 20 MEQ/1
40 TABLET, EXTENDED RELEASE ORAL 2 TIMES DAILY
Status: DISCONTINUED | OUTPATIENT
Start: 2021-02-21 | End: 2021-02-23 | Stop reason: HOSPADM

## 2021-02-21 RX ADMIN — PANTOPRAZOLE SODIUM 40 MG: 40 TABLET, DELAYED RELEASE ORAL at 06:01

## 2021-02-21 RX ADMIN — HEPARIN SODIUM 5000 UNITS: 5000 INJECTION INTRAVENOUS; SUBCUTANEOUS at 14:44

## 2021-02-21 RX ADMIN — POLYETHYLENE GLYCOL 3350, SODIUM CHLORIDE, SODIUM BICARBONATE AND POTASSIUM CHLORIDE WITH LEMON FLAVOR 4000 ML: 420; 11.2; 5.72; 1.48 POWDER, FOR SOLUTION ORAL at 16:58

## 2021-02-21 RX ADMIN — ASPIRIN 81 MG: 81 TABLET, COATED ORAL at 08:32

## 2021-02-21 RX ADMIN — DOCUSATE SODIUM 100 MG: 100 CAPSULE, LIQUID FILLED ORAL at 17:07

## 2021-02-21 RX ADMIN — SERTRALINE HYDROCHLORIDE 50 MG: 50 TABLET ORAL at 08:32

## 2021-02-21 RX ADMIN — HEPARIN SODIUM 5000 UNITS: 5000 INJECTION INTRAVENOUS; SUBCUTANEOUS at 05:56

## 2021-02-21 RX ADMIN — ALPRAZOLAM 0.25 MG: 0.25 TABLET ORAL at 01:27

## 2021-02-21 RX ADMIN — POTASSIUM CHLORIDE 40 MEQ: 1500 TABLET, EXTENDED RELEASE ORAL at 17:07

## 2021-02-21 RX ADMIN — HEPARIN SODIUM 5000 UNITS: 5000 INJECTION INTRAVENOUS; SUBCUTANEOUS at 21:27

## 2021-02-21 RX ADMIN — POTASSIUM CHLORIDE 20 MEQ: 14.9 INJECTION, SOLUTION INTRAVENOUS at 09:35

## 2021-02-21 RX ADMIN — LOSARTAN POTASSIUM 25 MG: 25 TABLET, FILM COATED ORAL at 08:32

## 2021-02-21 RX ADMIN — BISACODYL 10 MG: 5 TABLET, COATED ORAL at 14:44

## 2021-02-21 RX ADMIN — AMLODIPINE BESYLATE 10 MG: 10 TABLET ORAL at 08:32

## 2021-02-21 RX ADMIN — POTASSIUM CHLORIDE 40 MEQ: 1500 TABLET, EXTENDED RELEASE ORAL at 08:32

## 2021-02-21 RX ADMIN — IRON SUCROSE 200 MG: 20 INJECTION, SOLUTION INTRAVENOUS at 08:24

## 2021-02-21 RX ADMIN — MELATONIN 3 MG: at 21:27

## 2021-02-21 RX ADMIN — PRAVASTATIN SODIUM 40 MG: 40 TABLET ORAL at 08:32

## 2021-02-21 RX ADMIN — TAMSULOSIN HYDROCHLORIDE 0.4 MG: 0.4 CAPSULE ORAL at 21:27

## 2021-02-21 RX ADMIN — FINASTERIDE 5 MG: 5 TABLET, FILM COATED ORAL at 08:32

## 2021-02-21 NOTE — PROGRESS NOTES
Progress Note- Karen Patino 78 y o  male MRN: [id number]    Unit/Bed#: E5 -01 Encounter: [id number]      Assessment and Plan:    78year old male with a significant PMHx of hyperlipidemia, PVC, hypertension, aortic stenosis, and iron deficiency anemia who presented with a change in mental status        1  Chronic iron def anemia   He was initially diagnosed with anemia in 2018 and was managed by hem onc with iron supplementation  The patients current HGB is 8 8  He denies any nausea, vomiting, abdominal pain, dysphagia, or blood in his stool  He does report black stool x 2 a few months ago  Has a history of GERD for 10+ years  The patient is previously known by Dr Felton Esmer  He has never undergone an EGD  His last colonoscopy was 10+ years ago  - discussed EGD and colonoscopy with the patient and his family and they are agreeable  - given patient improvement will plan tentatively for scopes on Monday, continue clear liquid diet, will begin prep later today and patient will be NPO at midnight   - continue to trend H&H  - continue PPI     ______________________________________________________________________    Subjective:     Patient doing well  Mental status improving  No GI complaints       Medication Administration - last 24 hours from 02/20/2021 1002 to 02/21/2021 1002       Date/Time Order Dose Route Action Action by     02/20/2021 2127 melatonin tablet 3 mg 3 mg Oral Given Trey Santos RN     02/21/2021 0832 amLODIPine (NORVASC) tablet 10 mg 10 mg Oral Given [facility]     02/21/2021 4277 aspirin (ECOTRIN LOW STRENGTH) EC tablet 81 mg 81 mg Oral Given [facility], RN     02/21/2021 8966 finasteride (PROSCAR) tablet 5 mg 5 mg Oral Given [facility], RN     02/21/2021 2028 pravastatin (PRAVACHOL) tablet 40 mg 40 mg Oral Given [facility], RN     02/20/2021 2127 tamsulosin (FLOMAX) capsule 0 4 mg 0 4 mg Oral Given Trey Santos RN     02/21/2021 7742 sertraline (ZOLOFT) tablet 50 mg 50 mg Oral Given [place], RN     02/21/2021 6505 docusate sodium (COLACE) capsule 100 mg 100 mg Oral Not Given [place], RN     02/20/2021 1800 docusate sodium (COLACE) capsule 100 mg   Oral Canceled Entry [place] Pac, RN     02/20/2021 1622 docusate sodium (COLACE) capsule 100 mg 100 mg Oral Given [facility] Pac, RN     02/21/2021 0556 heparin (porcine) subcutaneous injection 5,000 Units 5,000 Units Subcutaneous Given Scarlet Sin, RN     02/20/2021 2127 heparin (porcine) subcutaneous injection 5,000 Units 5,000 Units Subcutaneous Given Scarlet Sin, RN     02/20/2021 1621 heparin (porcine) subcutaneous injection 5,000 Units 5,000 Units Subcutaneous Given [place] Pac, RN     02/21/2021 9665 iron sucrose (VENOFER) 200 mg in sodium chloride 0 9 % 100 mL IVPB 200 mg Intravenous Given [place], RN     02/20/2021 1216 iron sucrose (VENOFER) 200 mg in sodium chloride 0 9 % 100 mL IVPB 200 mg Intravenous Given [facility] Pac, RN     02/21/2021 0601 pantoprazole (PROTONIX) EC tablet 40 mg 40 mg Oral Given Scarlet Sin, RN     02/20/2021 1140 pantoprazole (PROTONIX) EC tablet 40 mg 40 mg Oral Given Man[place] Pac, RN     02/20/2021 1626 guaiFENesin (ROBITUSSIN) oral solution 200 mg 200 mg Oral Given Man[facility] Pac, RN     02/21/2021 0127 ALPRAZolam Arlina Shady) tablet 0 25 mg 0 25 mg Oral Given Scarlet Sin, RN     02/21/2021 2488 potassium chloride (K-DUR,KLOR-CON) CR tablet 40 mEq 40 mEq Oral Given Union, RN     02/21/2021 0935 potassium chloride 20 mEq IVPB (premix) 20 mEq Intravenous [facility]     02/21/2021 7254 losartan (COZAAR) tablet 25 mg 25 mg Oral Given Mabank RN          Objective:     Vitals: Blood pressure 122/69, pulse 61, temperature 97 7 °F (36 5 °C), temperature source Temporal, resp  rate 18, weight 76 1 kg (167 lb 12 3 oz), SpO2 95 %  ,Body mass index is 28 8 kg/m²        Intake/Output Summary (Last 24 hours) at 2/21/2021 1002  Last data filed at 2/21/2021 0601  Gross per 24 hour   Intake 510 ml   Output --   Net 510 ml       Physical Exam:   General Appearance: Awake and alert, in no acute distress  Abdomen: Soft, non-tender, non-distended; bowel sounds normal; no masses or no organomegaly    Invasive Devices     Peripheral Intravenous Line            Peripheral IV 02/19/21 1 day                Lab Results:  Admission on 02/19/2021   Component Date Value    WBC 02/19/2021 6 32     RBC 02/19/2021 4 17     Hemoglobin 02/19/2021 8 5*    Hematocrit 02/19/2021 29 0*    MCV 02/19/2021 70*    MCH 02/19/2021 20 4*    MCHC 02/19/2021 29 3*    RDW 02/19/2021 18 1*    MPV 02/19/2021 8 5*    Platelets 39/42/6703 253     nRBC 02/19/2021 0     Neutrophils Relative 02/19/2021 50     Immat GRANS % 02/19/2021 1     Lymphocytes Relative 02/19/2021 28     Monocytes Relative 02/19/2021 19*    Eosinophils Relative 02/19/2021 1     Basophils Relative 02/19/2021 1     Neutrophils Absolute 02/19/2021 3 20     Immature Grans Absolute 02/19/2021 0 04     Lymphocytes Absolute 02/19/2021 1 76     Monocytes Absolute 02/19/2021 1 18     Eosinophils Absolute 02/19/2021 0 06     Basophils Absolute 02/19/2021 0 08     Sodium 02/19/2021 142     Potassium 02/19/2021 3 2*    Chloride 02/19/2021 103     CO2 02/19/2021 27     ANION GAP 02/19/2021 12     BUN 02/19/2021 23     Creatinine 02/19/2021 0 83     Glucose 02/19/2021 95     Calcium 02/19/2021 8 7     AST 02/19/2021 80*    ALT 02/19/2021 58     Alkaline Phosphatase 02/19/2021 66     Total Protein 02/19/2021 7 5     Albumin 02/19/2021 3 9     Total Bilirubin 02/19/2021 0 34     eGFR 02/19/2021 81     Color, UA 02/19/2021 Yellow     Clarity, UA 02/19/2021 Clear     pH, UA 02/19/2021 6 0     Leukocytes, UA 02/19/2021 Negative     Nitrite, UA 02/19/2021 Negative     Protein, UA 02/19/2021 30 (1+)*    Glucose, UA 02/19/2021 Negative     Ketones, UA 02/19/2021 Negative     Urobilinogen, UA 02/19/2021 0 2  Bilirubin, UA 02/19/2021 Negative     Blood, UA 02/19/2021 Negative     Specific Gravity, UA 02/19/2021 1 025     RBC, UA 02/19/2021 None Seen     WBC, UA 02/19/2021 0-1*    Epithelial Cells 02/19/2021 Occasional     Bacteria, UA 02/19/2021 Occasional     MUCUS THREADS 02/19/2021 Occasional*    Iron Saturation 02/19/2021 4     TIBC 02/19/2021 451*    Iron 02/19/2021 20*    Ferritin 02/19/2021 14     TSH 3RD GENERATON 02/19/2021 0 520     Vitamin B-12 02/19/2021 567     Platelets 44/06/4781 250     MPV 02/20/2021 8 6*    Sodium 02/20/2021 137     Potassium 02/20/2021 4 0     Chloride 02/20/2021 101     CO2 02/20/2021 21     ANION GAP 02/20/2021 15*    BUN 02/20/2021 17     Creatinine 02/20/2021 0 61     Glucose 02/20/2021 107     Calcium 02/20/2021 8 8     eGFR 02/20/2021 92     Magnesium 02/20/2021 1 9     WBC 02/20/2021 7 67     RBC 02/20/2021 4 34     Hemoglobin 02/20/2021 8 8*    Hematocrit 02/20/2021 33 1*    MCV 02/20/2021 76*    MCH 02/20/2021 20 3*    MCHC 02/20/2021 26 6*    RDW 02/20/2021 18 5*    Platelets 17/27/1464 258     MPV 02/20/2021 10 0     WBC 02/21/2021 6 71     RBC 02/21/2021 4 13     Hemoglobin 02/21/2021 8 4*    Hematocrit 02/21/2021 29 3*    MCV 02/21/2021 71*    MCH 02/21/2021 20 3*    MCHC 02/21/2021 28 7*    RDW 02/21/2021 18 5*    Platelets 94/64/0801 237     MPV 02/21/2021 9 6     Sodium 02/21/2021 135*    Potassium 02/21/2021 2 8*    Chloride 02/21/2021 99*    CO2 02/21/2021 23     ANION GAP 02/21/2021 13     BUN 02/21/2021 16     Creatinine 02/21/2021 0 71     Glucose 02/21/2021 124     Calcium 02/21/2021 8 7     eGFR 02/21/2021 87        Imaging Studies: I have personally reviewed pertinent imaging studies

## 2021-02-21 NOTE — PROGRESS NOTES
Progress Note - Magui Morgan 1940, 78 y o  male MRN: [id number]    Unit/Bed#: E5 -01 Encounter: [id number]    Primary Care Provider: Yaquelin Romero DO   Date and time admitted to hospital: 2/19/2021  5:50 PM        * Encephalopathy  Assessment & Plan  Patient presenting with change in mental status per family members  Also had falls in the near past   Currently awake, alert, oriented x3  UA negative for UTI  CT head report noted negative for acute finding  CT cervical spine report noted negative for acute finding  He does have baseline mild cognitive impairment, and underlying depression    Encephalopathy have now resolved  Geriatrics consultation has been placed  Fall precaution  Frequent orientation by nurses, high risk for delirium  Also concern for seasonal affective disorder  Start melatonin to help regulate sleep cycle  Follow-up with PT OT eamon     Anemia  Assessment & Plan  History of chronic anemia followed in the outpatient setting by Hematology  Patient was previously on iron supplement but has been off of it for quite some time  Baseline hemoglobin is around 12  Hemoglobin has been stable around 8 4 range  Iron panel consistent with patient's anemia  Denies hematuria, melena, hematochezia, hematemesis     Hemodynamically stable  Continue IV Venofer for 3 days  Transition to oral iron supplement upon discharge  Plan for EGD and colonoscopy Monday per GI recommendation  Patient and significant other are agreeable with above plan  GI following  Hypokalemia  Assessment & Plan  Today again noted with potassium 2 8  Discontinue hydrochlorothiazide  Supplement oral and IV potassium  Continue telemetry monitoring for now  Monitor and supplement as needed  Mild cognitive impairment  Assessment & Plan  Previous history of mild cognitive impairment, likely with progression to dementia  UA negative UTI  CT head report noted negative for acute finding    TSH, B12 within normal limits  Currently patient is awake, alert, oriented x3  Follow-up with PT OT eamon     Moderate episode of recurrent major depressive disorder (HCC)  Assessment & Plan  Maintained on Zoloft 25 mg as an outpatient, increased to 50 mg here  Denies homicidal, suicidal ideation  Stable  Fatty liver disease, nonalcoholic  Assessment & Plan  Does have elevated transaminitis with ALT elevation, recommend weight loss  Monitor CMP tomorrow morning  Enlarged prostate without lower urinary tract symptoms (luts)  Assessment & Plan  Continue Flomax      Aortic stenosis, moderate  Assessment & Plan  History of moderate aortic stenosis    Essential hypertension  Assessment & Plan  Blood pressure currently controlled  Will continue home dose of amlodipine 10 mg daily  Discontinue hydrochlorothiazide due to hypokalemia  Started on Cozaar 25 mg daily  Patient is agreeable with above plan  Monitor vitals per unit protocol  VTE Pharmacologic Prophylaxis:   Pharmacologic: Heparin    Patient Centered Rounds: I have performed bedside rounds with nursing staff today  Discussions with Specialists or Other Care Team Provider:  GI recommendation appreciated  Education and Discussions with Family / Patient: spoke with significant other Arpita Reyes over the phone at length  Time Spent for Care: 30 minutes  More than 50% of total time spent on counseling and coordination of care as described above  Current Length of Stay: 1 day(s)    Current Patient Status: Inpatient   Certification Statement: The patient will continue to require additional inpatient hospital stay due to Anemia currently on IV Venofer, plan for EGD and colonoscopy on Monday  Discharge Plan:  Expected in 48 hours    Code Status: Level 3 - DNAR and DNI      Subjective:   Afebrile, hemodynamically stable  Tolerating IV Venofer well  Patient is in good spirit  Currently appears comfortable not in distress    Denies chest pain, dyspnea, fever, chills, nausea, vomiting, any other complaints  No other events reported  Objective:     Vitals:   Temp (24hrs), Av 5 °F (36 4 °C), Min:96 9 °F (36 1 °C), Max:97 8 °F (36 6 °C)    Temp:  [96 9 °F (36 1 °C)-97 8 °F (36 6 °C)] 97 7 °F (36 5 °C)  HR:  [61-89] 61  Resp:  [18] 18  BP: (122-169)/(66-77) 122/69  SpO2:  [95 %-98 %] 95 %  Body mass index is 28 8 kg/m²  Input and Output Summary (last 24 hours): Intake/Output Summary (Last 24 hours) at 2021 1177  Last data filed at 2021 0601  Gross per 24 hour   Intake 510 ml   Output --   Net 510 ml       Physical Exam:     Physical Exam  Constitutional:       General: He is not in acute distress  Appearance: Normal appearance  He is not ill-appearing  Comments: Elderly male patient, acutely nontoxic appearing  HENT:      Head: Normocephalic and atraumatic  Eyes:      Pupils: Pupils are equal, round, and reactive to light  Neck:      Musculoskeletal: Normal range of motion and neck supple  No neck rigidity  Cardiovascular:      Rate and Rhythm: Normal rate and regular rhythm  Pulses: Normal pulses  Heart sounds: Normal heart sounds  Pulmonary:      Effort: Pulmonary effort is normal  No respiratory distress  Breath sounds: Normal breath sounds  No stridor  No wheezing or rhonchi  Abdominal:      General: Bowel sounds are normal  There is no distension  Palpations: Abdomen is soft  Tenderness: There is no abdominal tenderness  Musculoskeletal: Normal range of motion  Neurological:      General: No focal deficit present  Mental Status: He is alert and oriented to person, place, and time  Mental status is at baseline     Psychiatric:         Mood and Affect: Mood normal          Behavior: Behavior normal          Additional Data:     Labs:    Results from last 7 days   Lab Units 21  0526  21  1852   WBC Thousand/uL 6 71   < > 6 32   HEMOGLOBIN g/dL 8 4*   < > 8 5* HEMATOCRIT % 29 3*   < > 29 0*   PLATELETS Thousands/uL 237   < > 253   NEUTROS PCT %  --   --  50   LYMPHS PCT %  --   --  28   MONOS PCT %  --   --  19*   EOS PCT %  --   --  1    < > = values in this interval not displayed  Results from last 7 days   Lab Units 02/21/21  0526  02/19/21  1852   SODIUM mmol/L 135*   < > 142   POTASSIUM mmol/L 2 8*   < > 3 2*   CHLORIDE mmol/L 99*   < > 103   CO2 mmol/L 23   < > 27   BUN mg/dL 16   < > 23   CREATININE mg/dL 0 71   < > 0 83   ANION GAP mmol/L 13   < > 12   CALCIUM mg/dL 8 7   < > 8 7   ALBUMIN g/dL  --   --  3 9   TOTAL BILIRUBIN mg/dL  --   --  0 34   ALK PHOS U/L  --   --  66   ALT U/L  --   --  58   AST U/L  --   --  80*   GLUCOSE RANDOM mg/dL 124   < > 95    < > = values in this interval not displayed  * I Have Reviewed All Lab Data Listed Above  * Additional Pertinent Lab Tests Reviewed:  All Labs Within Last 24 Hours Reviewed        Recent Cultures (last 7 days):           Last 24 Hours Medication List:   Current Facility-Administered Medications   Medication Dose Route Frequency Provider Last Rate    acetaminophen  650 mg Oral Q6H PRN Theola Gist, DO      ALPRAZolam  0 25 mg Oral HS PRN ANATOLY Beaver      aluminum-magnesium hydroxide-simethicone  30 mL Oral Q6H PRN Theola Gist, DO      amLODIPine  10 mg Oral Daily Nicolás Galaviz, DO      aspirin  81 mg Oral Daily Nicolás RASHEED [place], DO      calcium carbonate  1,000 mg Oral Daily PRN Theola Gist, DO      docusate sodium  100 mg Oral BID Nicolás Galaviz, DO      finasteride  5 mg Oral Daily Nicolás Galaviz, DO      guaiFENesin  200 mg Oral Q4H PRN Peri CESPEDES MD      heparin (porcine)  5,000 Units Subcutaneous Q8H [address] Nicolás Galaviz, DO      iron sucrose  200 mg Intravenous Daily Wilson CESPEDES MD      losartan  25 mg Oral Daily Wilson CESPEDES MD      melatonin  3 mg Oral HS Nicolás Galaviz, DO      ondansetron  4 mg Intravenous Q6H PRN Theola Gist, DO      pantoprazole  40 mg Oral Early Morning Wilson CESPEDES MD      potassium chloride  40 mEq Oral BID Wilson CESPEDES MD      potassium chloride  20 mEq Intravenous Once Wilson CESPEDES MD      pravastatin  40 mg Oral Daily Nicolás Galaviz,       sertraline  50 mg Oral Daily Nicolás Galaviz,       tamsulosin  0 4 mg Oral HS Nicolás Mendoza DO          Today, Patient Was Seen By: Lucinda Carrizales MD    ** Please Note: Dictation voice to text software may have been used in the creation of this document   **

## 2021-02-21 NOTE — ASSESSMENT & PLAN NOTE
Today again noted with potassium 2 8  Discontinue hydrochlorothiazide  Supplement oral and IV potassium  Continue telemetry monitoring for now  Monitor and supplement as needed

## 2021-02-21 NOTE — ASSESSMENT & PLAN NOTE
Blood pressure currently controlled  Will continue home dose of amlodipine 10 mg daily  Discontinue hydrochlorothiazide due to hypokalemia  Started on Cozaar 25 mg daily  Patient is agreeable with above plan  Monitor vitals per unit protocol

## 2021-02-21 NOTE — OCCUPATIONAL THERAPY NOTE
Occupational Therapy         Patient Name: Marta Ruiz  CIQNG'B Date: 2/21/2021          MD's orders received  Per P T 's report, pt currently independent with transfer/ambulation with a return to prior level of function  Acute OT tx not indicated at this time 2* pt's return to her prior level of function   Cindy Lazoist, OT

## 2021-02-21 NOTE — ASSESSMENT & PLAN NOTE
Does have elevated transaminitis with ALT elevation, recommend weight loss  Monitor CMP tomorrow morning

## 2021-02-21 NOTE — ASSESSMENT & PLAN NOTE
Previous history of mild cognitive impairment, likely with progression to dementia  UA negative UTI  CT head report noted negative for acute finding  TSH, B12 within normal limits  Currently patient is awake, alert, oriented x3    Follow-up with PT OT eval

## 2021-02-21 NOTE — ASSESSMENT & PLAN NOTE
History of chronic anemia followed in the outpatient setting by Hematology  Patient was previously on iron supplement but has been off of it for quite some time  Baseline hemoglobin is around 12  Hemoglobin has been stable around 8 4 range  Iron panel consistent with patient's anemia  Denies hematuria, melena, hematochezia, hematemesis     Hemodynamically stable  Continue IV Venofer for 3 days  Transition to oral iron supplement upon discharge  Plan for EGD and colonoscopy Monday per GI recommendation  Patient and significant other are agreeable with above plan  GI following

## 2021-02-22 ENCOUNTER — APPOINTMENT (INPATIENT)
Dept: GASTROENTEROLOGY | Facility: HOSPITAL | Age: 81
DRG: 057 | End: 2021-02-22
Payer: MEDICARE

## 2021-02-22 ENCOUNTER — ANESTHESIA (INPATIENT)
Dept: GASTROENTEROLOGY | Facility: HOSPITAL | Age: 81
DRG: 057 | End: 2021-02-22
Payer: MEDICARE

## 2021-02-22 ENCOUNTER — ANESTHESIA EVENT (INPATIENT)
Dept: GASTROENTEROLOGY | Facility: HOSPITAL | Age: 81
DRG: 057 | End: 2021-02-22
Payer: MEDICARE

## 2021-02-22 VITALS — HEART RATE: 86 BPM

## 2021-02-22 PROBLEM — D50.8 IRON DEFICIENCY ANEMIA SECONDARY TO INADEQUATE DIETARY IRON INTAKE: Status: ACTIVE | Noted: 2021-02-19

## 2021-02-22 LAB
ANION GAP SERPL CALCULATED.3IONS-SCNC: 10 MMOL/L (ref 4–13)
BUN SERPL-MCNC: 10 MG/DL (ref 5–25)
CALCIUM SERPL-MCNC: 8.6 MG/DL (ref 8.3–10.1)
CHLORIDE SERPL-SCNC: 103 MMOL/L (ref 100–108)
CO2 SERPL-SCNC: 22 MMOL/L (ref 21–32)
CREAT SERPL-MCNC: 0.58 MG/DL (ref 0.6–1.3)
ERYTHROCYTE [DISTWIDTH] IN BLOOD BY AUTOMATED COUNT: 18.5 % (ref 11.6–15.1)
GFR SERPL CREATININE-BSD FRML MDRD: 94 ML/MIN/1.73SQ M
GLUCOSE SERPL-MCNC: 109 MG/DL (ref 65–140)
HCT VFR BLD AUTO: 28.3 % (ref 36.5–49.3)
HGB BLD-MCNC: 7.9 G/DL (ref 12–17)
MCH RBC QN AUTO: 19.9 PG (ref 26.8–34.3)
MCHC RBC AUTO-ENTMCNC: 27.9 G/DL (ref 31.4–37.4)
MCV RBC AUTO: 72 FL (ref 82–98)
PLATELET # BLD AUTO: 215 THOUSANDS/UL (ref 149–390)
PMV BLD AUTO: 9.4 FL (ref 8.9–12.7)
POTASSIUM SERPL-SCNC: 3.4 MMOL/L (ref 3.5–5.3)
RBC # BLD AUTO: 3.96 MILLION/UL (ref 3.88–5.62)
SODIUM SERPL-SCNC: 135 MMOL/L (ref 136–145)
WBC # BLD AUTO: 7.24 THOUSAND/UL (ref 4.31–10.16)

## 2021-02-22 PROCEDURE — 43239 EGD BIOPSY SINGLE/MULTIPLE: CPT | Performed by: INTERNAL MEDICINE

## 2021-02-22 PROCEDURE — 85027 COMPLETE CBC AUTOMATED: CPT | Performed by: STUDENT IN AN ORGANIZED HEALTH CARE EDUCATION/TRAINING PROGRAM

## 2021-02-22 PROCEDURE — 99232 SBSQ HOSP IP/OBS MODERATE 35: CPT | Performed by: INTERNAL MEDICINE

## 2021-02-22 PROCEDURE — 99223 1ST HOSP IP/OBS HIGH 75: CPT | Performed by: NURSE PRACTITIONER

## 2021-02-22 PROCEDURE — 45385 COLONOSCOPY W/LESION REMOVAL: CPT | Performed by: INTERNAL MEDICINE

## 2021-02-22 PROCEDURE — 80048 BASIC METABOLIC PNL TOTAL CA: CPT | Performed by: STUDENT IN AN ORGANIZED HEALTH CARE EDUCATION/TRAINING PROGRAM

## 2021-02-22 PROCEDURE — 88305 TISSUE EXAM BY PATHOLOGIST: CPT | Performed by: PATHOLOGY

## 2021-02-22 RX ORDER — SODIUM CHLORIDE 9 MG/ML
125 INJECTION, SOLUTION INTRAVENOUS CONTINUOUS
Status: DISCONTINUED | OUTPATIENT
Start: 2021-02-22 | End: 2021-02-22

## 2021-02-22 RX ORDER — PROPOFOL 10 MG/ML
INJECTION, EMULSION INTRAVENOUS AS NEEDED
Status: DISCONTINUED | OUTPATIENT
Start: 2021-02-22 | End: 2021-02-22

## 2021-02-22 RX ORDER — SODIUM CHLORIDE 9 MG/ML
50 INJECTION, SOLUTION INTRAVENOUS ONCE
Status: COMPLETED | OUTPATIENT
Start: 2021-02-22 | End: 2021-02-22

## 2021-02-22 RX ORDER — LIDOCAINE HYDROCHLORIDE 20 MG/ML
INJECTION, SOLUTION EPIDURAL; INFILTRATION; INTRACAUDAL; PERINEURAL AS NEEDED
Status: DISCONTINUED | OUTPATIENT
Start: 2021-02-22 | End: 2021-02-22

## 2021-02-22 RX ADMIN — TAMSULOSIN HYDROCHLORIDE 0.4 MG: 0.4 CAPSULE ORAL at 22:01

## 2021-02-22 RX ADMIN — HEPARIN SODIUM 5000 UNITS: 5000 INJECTION INTRAVENOUS; SUBCUTANEOUS at 05:01

## 2021-02-22 RX ADMIN — PHENYLEPHRINE HYDROCHLORIDE 50 MCG: 10 INJECTION INTRAVENOUS at 13:34

## 2021-02-22 RX ADMIN — PHENYLEPHRINE HYDROCHLORIDE 100 MCG: 10 INJECTION INTRAVENOUS at 13:44

## 2021-02-22 RX ADMIN — PROPOFOL 50 MG: 10 INJECTION, EMULSION INTRAVENOUS at 13:44

## 2021-02-22 RX ADMIN — PROPOFOL 30 MG: 10 INJECTION, EMULSION INTRAVENOUS at 13:34

## 2021-02-22 RX ADMIN — LOSARTAN POTASSIUM 25 MG: 25 TABLET, FILM COATED ORAL at 08:51

## 2021-02-22 RX ADMIN — FINASTERIDE 5 MG: 5 TABLET, FILM COATED ORAL at 08:51

## 2021-02-22 RX ADMIN — SODIUM CHLORIDE 125 ML/HR: 0.9 INJECTION, SOLUTION INTRAVENOUS at 13:19

## 2021-02-22 RX ADMIN — PHENYLEPHRINE HYDROCHLORIDE 100 MCG: 10 INJECTION INTRAVENOUS at 13:28

## 2021-02-22 RX ADMIN — PROPOFOL 50 MG: 10 INJECTION, EMULSION INTRAVENOUS at 13:28

## 2021-02-22 RX ADMIN — PANTOPRAZOLE SODIUM 40 MG: 40 TABLET, DELAYED RELEASE ORAL at 05:01

## 2021-02-22 RX ADMIN — SODIUM CHLORIDE 50 ML/HR: 0.9 INJECTION, SOLUTION INTRAVENOUS at 12:41

## 2021-02-22 RX ADMIN — PHENYLEPHRINE HYDROCHLORIDE 100 MCG: 10 INJECTION INTRAVENOUS at 13:17

## 2021-02-22 RX ADMIN — PHENYLEPHRINE HYDROCHLORIDE 100 MCG: 10 INJECTION INTRAVENOUS at 13:31

## 2021-02-22 RX ADMIN — ASPIRIN 81 MG: 81 TABLET, COATED ORAL at 08:51

## 2021-02-22 RX ADMIN — AMLODIPINE BESYLATE 10 MG: 10 TABLET ORAL at 08:51

## 2021-02-22 RX ADMIN — POTASSIUM CHLORIDE 40 MEQ: 1500 TABLET, EXTENDED RELEASE ORAL at 08:51

## 2021-02-22 RX ADMIN — PHENYLEPHRINE HYDROCHLORIDE 100 MCG: 10 INJECTION INTRAVENOUS at 13:23

## 2021-02-22 RX ADMIN — PROPOFOL 50 MG: 10 INJECTION, EMULSION INTRAVENOUS at 13:36

## 2021-02-22 RX ADMIN — PROPOFOL 50 MG: 10 INJECTION, EMULSION INTRAVENOUS at 13:23

## 2021-02-22 RX ADMIN — PHENYLEPHRINE HYDROCHLORIDE 50 MCG: 10 INJECTION INTRAVENOUS at 13:38

## 2021-02-22 RX ADMIN — POTASSIUM CHLORIDE 40 MEQ: 1500 TABLET, EXTENDED RELEASE ORAL at 17:08

## 2021-02-22 RX ADMIN — PROPOFOL 100 MG: 10 INJECTION, EMULSION INTRAVENOUS at 13:17

## 2021-02-22 RX ADMIN — PRAVASTATIN SODIUM 40 MG: 40 TABLET ORAL at 08:52

## 2021-02-22 RX ADMIN — PHENYLEPHRINE HYDROCHLORIDE 200 MCG: 10 INJECTION INTRAVENOUS at 13:57

## 2021-02-22 RX ADMIN — PROPOFOL 50 MG: 10 INJECTION, EMULSION INTRAVENOUS at 13:31

## 2021-02-22 RX ADMIN — MELATONIN 3 MG: at 22:01

## 2021-02-22 RX ADMIN — LIDOCAINE HYDROCHLORIDE 100 MG: 20 INJECTION, SOLUTION EPIDURAL; INFILTRATION; INTRACAUDAL; PERINEURAL at 13:17

## 2021-02-22 RX ADMIN — ALPRAZOLAM 0.25 MG: 0.25 TABLET ORAL at 22:20

## 2021-02-22 RX ADMIN — IRON SUCROSE 200 MG: 20 INJECTION, SOLUTION INTRAVENOUS at 18:47

## 2021-02-22 RX ADMIN — PHENYLEPHRINE HYDROCHLORIDE 100 MCG: 10 INJECTION INTRAVENOUS at 13:41

## 2021-02-22 RX ADMIN — HEPARIN SODIUM 5000 UNITS: 5000 INJECTION INTRAVENOUS; SUBCUTANEOUS at 22:01

## 2021-02-22 RX ADMIN — SERTRALINE HYDROCHLORIDE 50 MG: 50 TABLET ORAL at 08:52

## 2021-02-22 RX ADMIN — PROPOFOL 20 MG: 10 INJECTION, EMULSION INTRAVENOUS at 13:38

## 2021-02-22 RX ADMIN — IRON SUCROSE 200 MG: 20 INJECTION, SOLUTION INTRAVENOUS at 08:52

## 2021-02-22 NOTE — PLAN OF CARE
Problem: Potential for Falls  Goal: Patient will remain free of falls  Description: INTERVENTIONS:  - Assess patient frequently for physical needs  -  Identify cognitive and physical deficits and behaviors that affect risk of falls    -  [place] fall precautions as indicated by assessment   - Educate patient/family on patient safety including physical limitations  - Instruct patient to call for assistance with activity based on assessment  - Modify environment to reduce risk of injury  - Consider OT/PT consult to assist with strengthening/mobility  Outcome: Progressing     Problem: PAIN - ADULT  Goal: Verbalizes/displays adequate comfort level or baseline comfort level  Description: Interventions:  - Encourage patient to monitor pain and request assistance  - Assess pain using appropriate pain scale  - Administer analgesics based on type and severity of pain and evaluate response  - Implement non-pharmacological measures as appropriate and evaluate response  - Consider cultural and social influences on pain and pain management  - Notify physician/advanced practitioner if interventions unsuccessful or patient reports new pain  Outcome: Progressing     Problem: INFECTION - ADULT  Goal: Absence or prevention of progression during hospitalization  Description: INTERVENTIONS:  - Assess and monitor for signs and symptoms of infection  - Monitor lab/diagnostic results  - Monitor all insertion sites, i e  indwelling lines, tubes, and drains  - Monitor endotracheal if appropriate and nasal secretions for changes in amount and color  - [place] appropriate cooling/warming therapies per order  - Administer medications as ordered  - Instruct and encourage patient and family to use good hand hygiene technique  - Identify and instruct in appropriate isolation precautions for identified infection/condition  Outcome: Progressing  Goal: Absence of fever/infection during neutropenic period  Description: INTERVENTIONS:  - Monitor WBC    Outcome: Progressing     Problem: SAFETY ADULT  Goal: Patient will remain free of falls  Description: INTERVENTIONS:  - Assess patient frequently for physical needs  -  Identify cognitive and physical deficits and behaviors that affect risk of falls    -  [place] fall precautions as indicated by assessment   - Educate patient/family on patient safety including physical limitations  - Instruct patient to call for assistance with activity based on assessment  - Modify environment to reduce risk of injury  - Consider OT/PT consult to assist with strengthening/mobility  Outcome: Progressing  Goal: Maintain or return to baseline ADL function  Description: INTERVENTIONS:  -  Assess patient's ability to carry out ADLs; assess patient's baseline for ADL function and identify physical deficits which impact ability to perform ADLs (bathing, care of mouth/teeth, toileting, grooming, dressing, etc )  - Assess/evaluate cause of self-care deficits   - Assess range of motion  - Assess patient's mobility; develop plan if impaired  - Assess patient's need for assistive devices and provide as appropriate  - Encourage maximum independence but intervene and supervise when necessary  - Involve family in performance of ADLs  - Assess for home care needs following discharge   - Consider OT consult to assist with ADL evaluation and planning for discharge  - Provide patient education as appropriate  Outcome: Progressing  Goal: Maintain or return mobility status to optimal level  Description: INTERVENTIONS:  - Assess patient's baseline mobility status (ambulation, transfers, stairs, etc )    - Identify cognitive and physical deficits and behaviors that affect mobility  - Identify mobility aids required to assist with transfers and/or ambulation (gait belt, sit-to-stand, lift, walker, cane, etc )  - [place] fall precautions as indicated by assessment  - Record patient progress and toleration of activity level on Mobility SBAR; progress patient to next Phase/Stage  - Instruct patient to call for assistance with activity based on assessment  - Consider rehabilitation consult to assist with strengthening/weightbearing, etc   Outcome: Progressing     Problem: DISCHARGE PLANNING  Goal: Discharge to home or other facility with appropriate resources  Description: INTERVENTIONS:  - Identify barriers to discharge w/patient and caregiver  - Arrange for needed discharge resources and transportation as appropriate  - Identify discharge learning needs (meds, wound care, etc )  - Arrange for interpretive services to assist at discharge as needed  - Refer to Case Management Department for coordinating discharge planning if the patient needs post-hospital services based on physician/advanced practitioner order or complex needs related to functional status, cognitive ability, or social support system  Outcome: Progressing     Problem: Knowledge Deficit  Goal: Patient/family/caregiver demonstrates understanding of disease process, treatment plan, medications, and discharge instructions  Description: Complete learning assessment and assess knowledge base  Interventions:  - Provide teaching at level of understanding  - Provide teaching via preferred learning methods  Outcome: Progressing     Problem: NEUROSENSORY - ADULT  Goal: Achieves stable or improved neurological status  Description: INTERVENTIONS  - Monitor and report changes in neurological status  - Monitor vital signs such as temperature, blood pressure, glucose, and any other labs ordered   - Initiate measures to prevent increased intracranial pressure  - Monitor for seizure activity and implement precautions if appropriate      Outcome: Progressing  Goal: Achieves maximal functionality and self care  Description: INTERVENTIONS  - Monitor swallowing and airway patency with patient fatigue and changes in neurological status  - Encourage and assist patient to increase activity and self care     - Encourage visually impaired, hearing impaired and aphasic patients to use assistive/communication devices  Outcome: Progressing     Problem: MUSCULOSKELETAL - ADULT  Goal: Maintain or return mobility to safest level of function  Description: INTERVENTIONS:  - Assess patient's ability to carry out ADLs; assess patient's baseline for ADL function and identify physical deficits which impact ability to perform ADLs (bathing, care of mouth/teeth, toileting, grooming, dressing, etc )  - Assess/evaluate cause of self-care deficits   - Assess range of motion  - Assess patient's mobility  - Assess patient's need for assistive devices and provide as appropriate  - Encourage maximum independence but intervene and supervise when necessary  - Involve family in performance of ADLs  - Assess for home care needs following discharge   - Consider OT consult to assist with ADL evaluation and planning for discharge  - Provide patient education as appropriate  Outcome: Progressing  Goal: Maintain proper alignment of affected body part  Description: INTERVENTIONS:  - Support, maintain and protect limb and body alignment  - Provide patient/ family with appropriate education  Outcome: Progressing     Problem: METABOLIC, FLUID AND ELECTROLYTES - ADULT  Goal: Electrolytes maintained within normal limits  Description: INTERVENTIONS:  - Monitor labs and assess patient for signs and symptoms of electrolyte imbalances  - Administer electrolyte replacement as ordered  - Monitor response to electrolyte replacements, including repeat lab results as appropriate  - Instruct patient on fluid and nutrition as appropriate  Outcome: Progressing  Goal: Fluid balance maintained  Description: INTERVENTIONS:  - Monitor labs   - Monitor I/O and WT  - Instruct patient on fluid and nutrition as appropriate  - Assess for signs & symptoms of volume excess or deficit  Outcome: Progressing     Problem: GENITOURINARY - ADULT  Goal: Maintains or returns to baseline urinary function  Description: INTERVENTIONS:  - Assess urinary function  - Encourage oral fluids to ensure adequate hydration if ordered  - Administer IV fluids as ordered to ensure adequate hydration  - Administer ordered medications as needed  - Offer frequent toileting  - Follow urinary retention protocol if ordered  Outcome: Progressing  Goal: Absence of urinary retention  Description: INTERVENTIONS:  - Assess patients ability to void and empty bladder  - Monitor I/O  - Bladder scan as needed  - Discuss with physician/AP medications to alleviate retention as needed  - Discuss catheterization for long term situations as appropriate  Outcome: Progressing     Problem: Prexisting or High Potential for Compromised Skin Integrity  Goal: Skin integrity is maintained or improved  Description: INTERVENTIONS:  - Identify patients at risk for skin breakdown  - Assess and monitor skin integrity  - Assess and monitor nutrition and hydration status  - Monitor labs   - Assess for incontinence   - Turn and reposition patient  - Assist with mobility/ambulation  - Relieve pressure over bony prominences  - Avoid friction and shearing  - Provide appropriate hygiene as needed including keeping skin clean and dry  - Evaluate need for skin moisturizer/barrier cream  - Collaborate with interdisciplinary team   - Patient/family teaching  - Consider wound care consult   Outcome: Progressing

## 2021-02-22 NOTE — ANESTHESIA PREPROCEDURE EVALUATION
Procedure:  EGD  COLONOSCOPY    Relevant Problems   CARDIO   (+) Aortic stenosis, moderate   (+) Essential hypertension   (+) Mixed hyperlipidemia   (+) PVC (premature ventricular contraction)      GI/HEPATIC   (+) Fatty liver disease, nonalcoholic      HEMATOLOGY   (+) Anemia      NEURO/PSYCH   (+) Moderate episode of recurrent major depressive disorder (HCC)        Physical Exam    Airway    Mallampati score: II  TM Distance: >3 FB  Neck ROM: full     Dental   No notable dental hx     Cardiovascular  Rhythm: regular,     Pulmonary  Pulmonary exam normal Breath sounds clear to auscultation,     Other Findings        Anesthesia Plan  ASA Score- 3 Emergent    Anesthesia Type- general and IV sedation with anesthesia with ASA Monitors  Additional Monitors:   Airway Plan:     Comment: 7 9=Hgb  Plan Factors-    Chart reviewed  EKG reviewed  Existing labs reviewed  Patient summary reviewed  Patient is not a current smoker  Patient instructed to abstain from smoking on day of procedure  Patient did not smoke on day of surgery  Induction- intravenous  Postoperative Plan-     Informed Consent- Anesthetic plan and risks discussed with patient  I personally reviewed this patient with the CRNA  Discussed and agreed on the Anesthesia Plan with the CRNA  James Carney

## 2021-02-22 NOTE — CONSULTS
Consultation - Geriatrics   Milagros Bridges 78 y o  male MRN: [id number]  Unit/Bed#: E5 -01 Encounter: [id number]      Assessment/Plan  1  Encephalopathy  Alert and oriented x4 at present  At risk for altered mental status secondary to age, underlying cognitive decline, hospitalization, insomnia  Provide frequent redirection, reorientation, distraction techniques  Avoid deliriogenic medications such as tramadol, benzodiazepines, anticholinergics,  Benadryl  Treat pain, See geriatric pain protocol  Monitor for constipation and urinary retention  Encourage early and frequent moblization, OOB  Encourage Hydration/ Nutrition  Implement sleep hygiene, limit night time interuptions, group activities      2  Mild cognitive impairment  Family hx of alzheimer's disease  At risk with family history, HTN  Pt hx of altered REM sleep, anxiety, new onset occasional tremor to right arm  MOCA 20/30- altered visual spatial  Seen 1/28/2019 by senior care  TSH 0 520 on 2/19/21  Vitamin B12 567 on 2/19/21  Recommend increased physical activity  Recommend engagement in cognitively challenging activities  Recommend follow up with [address] care as outpatient 1 month after discharge for evaluation of memory, increased dose of zoloft and possible start of aricept    3  Depression  zoloft 25 mg po daily increased to 50 mg po   Do not recommend prn xanax, pt not taking as outpatient   Recommend increase physical, mental and social activities  Consider zoom classes  Continue to monitor, follow up as outpatient    4  Insomnia  With hx of nightmares and altered REM sleep  States he has been having worsening sleep lately, states anxiety contributing, seen number 3  Consider addition of melatonin 3 mg po QHS  Maintain circadian rhythm, daytime activity    5  Frailty  Mild  Risk factors: hospitalization, cognitive decline, polypharmacy, ambulatory dysfunction  Albumin 3 9, maintain protein in diet  Increase physical activity    6  Ambulatory dysfunction  Fear of falling  Hx of 3 falls in past year  High fall risk: hx of falls, OA to knees, MCI, Visually impaired, age  Fall precautions  PT/OT  Recommend increase physical activity, balance exercises such as JOI chi   Recommend Home Safety evaluation for fall prevention strategies    7  Hearing impairment  Hearing impairment strongly correlated with depression, cognitive impairment, delirium and falls in the older adult  Speaking face to face  Use clear dictation, enunciation of words  Recommend follow up with audiology as outpatient    8  Visual impairment  Pt with visual impairment are at 50% higher risk of falling than non visually impairment peers  Maintain routine eye check up  Keep glasses in good condition, current prescriptions  Keep area well lit, clutter free    9  Anemia  Hg   IV venofer infusing  Scheduled for EGD/ Colonoscopy today  GI on consult  IM following    10  Home medication review  [facility] [id number]  finasteride 5 mg po daily  KCL 10 meq po daily  Pravastatin 40 mg po daily  Sertraline 25 mg po daily  tamsulosin 0 4 mg po daily  Amlodipine 10 mg po daily  Hydrochlorothiazide 25 mg po daily     aware check done, no medications found    History of Present Illness   Physician Requesting Consult: Nicole Marie MD  Reason for Consult / Principal Problem: altered mental status  Hx and PE limited by: altered mental status  HPI: Vinod Rider is a 78y o  year old male who presents with altered mental status  Significant other states that he has been more confused than normal  He called his SO by another name of a friend from years ago  Per SO he has been sundowning, more confused and agitated in the afternoon  He has anemia, mild cognitive decline, depression, BPH, HTN  Prior to arrival pt lives at home with his significant other  He drives  He takes care of IADLs, bills, medications  He is independent with ADLs   He ocassionally uses a cane to ambulates  He reports having 3 falls this past year  He wears glasses, he states he is overdue for an eye exam  He is hard of hearing, He does not have hearing aids  He states he has dentures, that fit well  He states he lost weight this past year, because he was trying to  He states he does not sleep well chronically, relates that lately it has been worse  He denies issues with bowel or bladder  He states he likes to garden but he is feeling cooped up with the snow  Upon exam pt is oob in chair  He is alert and oriented x4  He states he is not sleeping well    Spoke with significant other Ollie Cruz to review HPI  Inpatient consult to Gerontology  Consult performed by: ANATOLY Padilla  Consult ordered by: Memory Smoke, DO          Review of Systems   Constitutional: Negative for unexpected weight change  HENT: Positive for hearing loss  Negative for dental problem  Eyes: Negative for visual disturbance  Respiratory: Negative for shortness of breath  Cardiovascular: Negative for chest pain  Gastrointestinal: Negative for constipation  Genitourinary: Negative for difficulty urinating  Musculoskeletal: Positive for gait problem  C/o tremor to right hand, arthritis to bilateral knees   Skin: Negative for color change  Neurological: Negative for dizziness  Psychiatric/Behavioral: Positive for sleep disturbance         Historical Information   Past Medical History:   Diagnosis Date    Actinic keratosis     LAST ASSESSED: [id number]    Allergic rhinitis     LAST ASSESSED: [id number]    Anxiety     LAST ASSESSED: [id number]    Anxiety disorder     LAST ASSESSED: [id number]    Atelectasis of right lung     ABNORMAL CT SCAN OF THE [address] 12/2/2016 REPEAT NEEDED PER RADIOLOGY IN 3 MONTHS LAST ASSESSED: [id number]    Atypical chest pain     LAST ASSESSED: [id number]    Benign paroxysmal vertigo     LAST ASSESSED: [id number]    Chronic cough     LAST ASSESSED: 29URP9299  Chronic GERD     Degenerative arthritis of knee, bilateral     LAST ASSESSED: [id number]    Diverticulitis of colon     LAST ASSESSED: [id number]    Diverticulosis     LAST ASSESSED: [id number]    Foreign body, eye     LAST ASSESSED: 12MFT1528    Functional gait abnormality     LAST ASSESSED: [id number]    Hyperlipidemia     Hypertension     Hyponatremia     LAST ASSESSED: [id number]    Leukocytosis     LAST ASSESSED: 21QKF5103    Murmur     Newly recognized murmur     LAST ASSESSED: 90KVD9102    Olecranon bursitis, unspecified elbow     Presence of indwelling urethral catheter     RESOLVED: [id number]    Transient cerebral ischemia     LAST ASSESSED: [id number]    Urinary incontinence     LAST ASSESSED: 14ZKZ4445    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     LAST ASSESSED: 38MNV3887     Past Surgical History:   Procedure Laterality Date    ADENOIDECTOMY      APPENDECTOMY      COLONOSCOPY  10/2006    FIBEROPTIC    INGUINAL HERNIA REPAIR      JOINT REPLACEMENT      b/l TKR 2015    SINUS SURGERY      TONSILLECTOMY       Social History   Social History     Substance and Sexual Activity   Alcohol Use Yes    Comment: occ, SOCIAL     Social History     Substance and Sexual Activity   Drug Use No     Social History     Tobacco Use   Smoking Status Former Smoker    Quit date: 56    Years since quittin 1   Smokeless Tobacco Never Used   Tobacco Comment    2 ppd for 12 years          Family History:   Family History   Problem Relation Age of Onset    Alzheimer's disease Mother     Coronary artery disease Brother        Meds/Allergies   Current meds:   Current Facility-Administered Medications   Medication Dose Route Frequency    acetaminophen (TYLENOL) tablet 650 mg  650 mg Oral Q6H PRN    ALPRAZolam (XANAX) tablet 0 25 mg  0 25 mg Oral HS PRN    aluminum-magnesium hydroxide-simethicone (MYLANTA) oral suspension 30 mL  30 mL Oral Q6H PRN    amLODIPine (NORVASC) tablet 10 mg  10 mg Oral Daily    aspirin (ECOTRIN LOW STRENGTH) EC tablet 81 mg  81 mg Oral Daily    calcium carbonate (TUMS) chewable tablet 1,000 mg  1,000 mg Oral Daily PRN    docusate sodium (COLACE) capsule 100 mg  100 mg Oral BID    finasteride (PROSCAR) tablet 5 mg  5 mg Oral Daily    guaiFENesin (ROBITUSSIN) oral solution 200 mg  200 mg Oral Q4H PRN    heparin (porcine) subcutaneous injection 5,000 Units  5,000 Units Subcutaneous Q8H [address]    iron sucrose (VENOFER) 200 mg in sodium chloride 0 9 % 100 mL IVPB  200 mg Intravenous Daily    losartan (COZAAR) tablet 25 mg  25 mg Oral Daily    melatonin tablet 3 mg  3 mg Oral HS    ondansetron (ZOFRAN) injection 4 mg  4 mg Intravenous Q6H PRN    pantoprazole (PROTONIX) EC tablet 40 mg  40 mg Oral Early Morning    potassium chloride (K-DUR,KLOR-CON) CR tablet 40 mEq  40 mEq Oral BID    pravastatin (PRAVACHOL) tablet 40 mg  40 mg Oral Daily    sertraline (ZOLOFT) tablet 50 mg  50 mg Oral Daily    tamsulosin (FLOMAX) capsule 0 4 mg  0 4 mg Oral HS           Allergies   Allergen Reactions    Ace Inhibitors Cough     Pt denied any allergies         Objective   Vitals: Blood pressure 147/71, pulse 89, temperature 97 5 °F (36 4 °C), temperature source Temporal, resp  rate 18, weight 77 5 kg (170 lb 13 7 oz), SpO2 95 %  ,Body mass index is 29 33 kg/m²  Physical Exam  Vitals signs and nursing note reviewed  HENT:      Head: Normocephalic  Right Ear: There is no impacted cerumen  Left Ear: There is no impacted cerumen  Nose: Nose normal       Mouth/Throat:      Mouth: Mucous membranes are moist    Eyes:      General:         Right eye: No discharge  Left eye: No discharge  Neck:      Musculoskeletal: Normal range of motion  Cardiovascular:      Rate and Rhythm: Normal rate and regular rhythm  Heart sounds: No murmur  Pulmonary:      Effort: No respiratory distress  Breath sounds: Normal breath sounds     Abdominal: General: Bowel sounds are normal    Musculoskeletal: Normal range of motion  Skin:     General: Skin is warm and dry  Neurological:      Mental Status: He is alert and oriented to person, place, and time  Mental status is at baseline  Psychiatric:         Mood and Affect: Mood normal          Lab Results:   Results from last 7 days   Lab Units 02/22/21  0453   WBC Thousand/uL 7 24   HEMOGLOBIN g/dL 7 9*   HEMATOCRIT % 28 3*   PLATELETS Thousands/uL 215        Results from last 7 days   Lab Units 02/22/21  0453  02/19/21  1852   POTASSIUM mmol/L 3 4*   < > 3 2*   CHLORIDE mmol/L 103   < > 103   CO2 mmol/L 22   < > 27   BUN mg/dL 10   < > 23   CREATININE mg/dL 0 58*   < > 0 83   CALCIUM mg/dL 8 6   < > 8 7   ALK PHOS U/L  --   --  66   ALT U/L  --   --  58   AST U/L  --   --  80*    < > = values in this interval not displayed  Imaging Studies: I have personally reviewed pertinent reports  EKG, Pathology, and Other Studies: I have personally reviewed pertinent reports      VTE Prophylaxis: Sequential compression device (Venodyne)     Code Status: Level 3 - DNAR and DNI

## 2021-02-22 NOTE — ASSESSMENT & PLAN NOTE
Status post colonoscopy an EGD today with no source of bleeding  -continue Protonix for esophagitis and gastric ulcer  -outpatient follow-up with GI regarding biopsy results of his colon polyps  -will give additional Venofer 200 mg IV tonight  -recommend repeat CBC in 2 weeks  -if hemoglobin is still low, he will need a bone marrow biopsy

## 2021-02-22 NOTE — ASSESSMENT & PLAN NOTE
Blood pressure currently controlled  He remains on amlodipine 10 mg daily (home med)  He was started on Cozaar in place of HCTZ due to hypokalemia  He is tolerating Cozaar    Creatinine is stable

## 2021-02-22 NOTE — ASSESSMENT & PLAN NOTE
· Acute encephalopathy due to underlying age related cognitive decline and likely progression to mild dementia  · [place] score of 20/30  · He seems to be at baseline now  · His Zoloft was increased to 50 mg  · He will need to follow-up at the [facility] for formal evaluation and possible initiation of Aricept

## 2021-02-22 NOTE — CONSULTS
Visited with patient waiting for test today provided pastoral presence and listening support   Sister Patricia Garcia, [address] [place] [address] Road

## 2021-02-22 NOTE — PROGRESS NOTES
Progress Note - Emanuel Dk 1940, 78 y o  male MRN: [id number]    Unit/Bed#: E5 -01 Encounter: [id number]    Primary Care Provider: Marilu Ocampo DO   Date and time admitted to hospital: 2/19/2021  5:50 PM        * Encephalopathy  Assessment & Plan  · Acute encephalopathy due to underlying age related cognitive decline and likely progression to mild dementia  · Logan score of 20/30  · He seems to be at baseline now  · His Zoloft was increased to 50 mg  · He will need to follow-up at the [facility] for formal evaluation and possible initiation of Aricept      Iron deficiency anemia secondary to inadequate dietary iron intake  Assessment & Plan  Status post colonoscopy an EGD today with no source of bleeding  -continue Protonix for esophagitis and gastric ulcer  -outpatient follow-up with GI regarding biopsy results of his colon polyps  -will give additional Venofer 200 mg IV tonight  -recommend repeat CBC in 2 weeks  -if hemoglobin is still low, he will need a bone marrow biopsy    Moderate episode of recurrent major depressive disorder ([address] )  Assessment & Plan  Zoloft increased to 50 mg daily    Aortic stenosis, moderate  Assessment & Plan  Compensated with no sign of volume overload    Enlarged prostate without lower urinary tract symptoms (luts)  Assessment & Plan  Continue Flomax      Essential hypertension  Assessment & Plan  Blood pressure currently controlled  He remains on amlodipine 10 mg daily (home med)  He was started on Cozaar in place of HCTZ due to hypokalemia  He is tolerating Cozaar  Creatinine is stable      VTE Pharmacologic Prophylaxis:   Pharmacologic: Heparin  Mechanical VTE Prophylaxis in Place: No    Patient Centered Rounds: I have performed bedside rounds with nursing staff today  Discussions with Specialists or Other Care Team Provider: none    Education and Discussions with Family / Patient: niece    Time Spent for Care: 20 minutes    More than 50% of total time spent on counseling and coordination of care as described above  Current Length of Stay: 2 day(s)    Current Patient Status: Inpatient   Certification Statement: The patient will continue to require additional inpatient hospital stay due to s/p colonoscopy with anemia    Discharge Plan: in 24 hours    Code Status: Level 3 - DNAR and DNI    Subjective:   S/p colonoscopy  He feels well  He denies abdominal pain/nausea/dizziness  He said he remembers his episode of confusion  He remembered just feeling angry all of a sudden without any explanation  He feels back to normal now    Objective:     Vitals:   Temp (24hrs), Av 8 °F (36 6 °C), Min:97 2 °F (36 2 °C), Max:99 °F (37 2 °C)    Temp:  [97 2 °F (36 2 °C)-99 °F (37 2 °C)] 97 6 °F (36 4 °C)  HR:  [77-96] 82  Resp:  [16-27] 18  BP: ()/(48-72) 151/69  SpO2:  [94 %-98 %] 98 %  Body mass index is 29 33 kg/m²  Input and Output Summary (last 24 hours): Intake/Output Summary (Last 24 hours) at 2021 1802  Last data filed at 2021 1357  Gross per 24 hour   Intake 2140 ml   Output --   Net 2140 ml       Physical Exam:     Physical Exam  Constitutional:       Appearance: He is not ill-appearing or diaphoretic  HENT:      Head: Normocephalic and atraumatic  Nose: No rhinorrhea  Neck:      Musculoskeletal: Neck supple  Cardiovascular:      Rate and Rhythm: Regular rhythm  Heart sounds: Murmur present  Pulmonary:      Effort: Pulmonary effort is normal  No respiratory distress  Breath sounds: No wheezing or rales  Abdominal:      General: Abdomen is flat  There is no distension  Palpations: Abdomen is soft  Tenderness: There is no abdominal tenderness  There is no guarding  Musculoskeletal:      Right lower leg: No edema  Left lower leg: No edema  Skin:     General: Skin is warm and dry  Neurological:      Mental Status: He is alert  Mental status is at baseline     Psychiatric:         Mood and Affect: Mood normal          Behavior: Behavior normal        Additional Data:     Labs:    Results from last 7 days   Lab Units 02/22/21  0453  02/19/21  1852   WBC Thousand/uL 7 24   < > 6 32   HEMOGLOBIN g/dL 7 9*   < > 8 5*   HEMATOCRIT % 28 3*   < > 29 0*   PLATELETS Thousands/uL 215   < > 253   NEUTROS PCT %  --   --  50   LYMPHS PCT %  --   --  28   MONOS PCT %  --   --  19*   EOS PCT %  --   --  1    < > = values in this interval not displayed  Results from last 7 days   Lab Units 02/22/21  0453  02/19/21  1852   SODIUM mmol/L 135*   < > 142   POTASSIUM mmol/L 3 4*   < > 3 2*   CHLORIDE mmol/L 103   < > 103   CO2 mmol/L 22   < > 27   BUN mg/dL 10   < > 23   CREATININE mg/dL 0 58*   < > 0 83   ANION GAP mmol/L 10   < > 12   CALCIUM mg/dL 8 6   < > 8 7   ALBUMIN g/dL  --   --  3 9   TOTAL BILIRUBIN mg/dL  --   --  0 34   ALK PHOS U/L  --   --  66   ALT U/L  --   --  58   AST U/L  --   --  80*   GLUCOSE RANDOM mg/dL 109   < > 95    < > = values in this interval not displayed  * I Have Reviewed All Lab Data Listed Above  * Additional Pertinent Lab Tests Reviewed:  All Labs Within Last 24 Hours Reviewed    Imaging:    Imaging Reports Reviewed Today Include:  Colonoscopy an EGD      Recent Cultures (last 7 days):           Last 24 Hours Medication List:   Current Facility-Administered Medications   Medication Dose Route Frequency Provider Last Rate    acetaminophen  650 mg Oral Q6H PRN Merline Massing, DO      ALPRAZolam  0 25 mg Oral HS PRN Ashley Quiet, CRNP      aluminum-magnesium hydroxide-simethicone  30 mL Oral Q6H PRN Merline Massing, DO      amLODIPine  10 mg Oral Daily Nicolás Galaviz, DO      aspirin  81 mg Oral Daily Nicolás Conley, DO      calcium carbonate  1,000 mg Oral Daily PRN Merline Massing, DO      docusate sodium  100 mg Oral BID Nicolás Galaviz, DO      finasteride  5 mg Oral Daily Nicolás Galaviz, DO      guaiFENesin  200 mg Oral Q4H PRN Hilaria Valentin MD      heparin (porcine)  5,000 Units Subcutaneous Q8H [address] Nicolás Galaviz, DO      iron sucrose  200 mg Intravenous Once Anju Rg MD      losartan  25 mg Oral Daily Wilson CESPEDES MD      melatonin  3 mg Oral HS Nicolás Galaviz,       ondansetron  4 mg Intravenous Q6H PRN Nicolás Galaviz,       pantoprazole  40 mg Oral Early Morning Wilson CESPEDES MD      potassium chloride  40 mEq Oral BID Ricardo CESPEDES MD      pravastatin  40 mg Oral Daily Nicolás Galaviz,       sertraline  50 mg Oral Daily Nicolás Galaviz,       tamsulosin  0 4 mg Oral HS Merline Massing, DO          Today, Patient Was Seen By: Anju Rg MD    ** Please Note: Dictation voice to text software may have been used in the creation of this document   **

## 2021-02-22 NOTE — PROGRESS NOTES
Pastoral Care Progress Note    2021  Patient: Nadia Rios : 1940  Admission Date & Time: 2021 1750  MRN: [id number] CSN: [id number]                     Chaplaincy Interventions Utilized:   Empowerment: Encouraged focus on present    Exploration: Explored emotional needs & resources and Explored spiritual needs & resources    Collaboration: Encouraged adherence to treatment plan    Relationship Building: Cultivated a relationship of care and support and Listened empathically    Spiritual Coping Strategies Utilized:   Spiritual gratitude

## 2021-02-23 VITALS
OXYGEN SATURATION: 96 % | WEIGHT: 175.27 LBS | BODY MASS INDEX: 30.08 KG/M2 | TEMPERATURE: 96.7 F | DIASTOLIC BLOOD PRESSURE: 71 MMHG | RESPIRATION RATE: 16 BRPM | SYSTOLIC BLOOD PRESSURE: 150 MMHG | HEART RATE: 87 BPM

## 2021-02-23 LAB
BASOPHILS # BLD AUTO: 0.06 THOUSANDS/ΜL (ref 0–0.1)
BASOPHILS NFR BLD AUTO: 1 % (ref 0–1)
EOSINOPHIL # BLD AUTO: 0.13 THOUSAND/ΜL (ref 0–0.61)
EOSINOPHIL NFR BLD AUTO: 2 % (ref 0–6)
ERYTHROCYTE [DISTWIDTH] IN BLOOD BY AUTOMATED COUNT: 19.2 % (ref 11.6–15.1)
GLUCOSE SERPL-MCNC: 103 MG/DL (ref 65–140)
GLUCOSE SERPL-MCNC: 95 MG/DL (ref 65–140)
HCT VFR BLD AUTO: 28.6 % (ref 36.5–49.3)
HGB BLD-MCNC: 8 G/DL (ref 12–17)
IMM GRANULOCYTES # BLD AUTO: 0.09 THOUSAND/UL (ref 0–0.2)
IMM GRANULOCYTES NFR BLD AUTO: 1 % (ref 0–2)
LYMPHOCYTES # BLD AUTO: 1.48 THOUSANDS/ΜL (ref 0.6–4.47)
LYMPHOCYTES NFR BLD AUTO: 19 % (ref 14–44)
MCH RBC QN AUTO: 20.6 PG (ref 26.8–34.3)
MCHC RBC AUTO-ENTMCNC: 28 G/DL (ref 31.4–37.4)
MCV RBC AUTO: 74 FL (ref 82–98)
MONOCYTES # BLD AUTO: 1.14 THOUSAND/ΜL (ref 0.17–1.22)
MONOCYTES NFR BLD AUTO: 15 % (ref 4–12)
NEUTROPHILS # BLD AUTO: 4.94 THOUSANDS/ΜL (ref 1.85–7.62)
NEUTS SEG NFR BLD AUTO: 62 % (ref 43–75)
NRBC BLD AUTO-RTO: 0 /100 WBCS
PLATELET # BLD AUTO: 199 THOUSANDS/UL (ref 149–390)
PMV BLD AUTO: 9.4 FL (ref 8.9–12.7)
RBC # BLD AUTO: 3.89 MILLION/UL (ref 3.88–5.62)
WBC # BLD AUTO: 7.84 THOUSAND/UL (ref 4.31–10.16)

## 2021-02-23 PROCEDURE — 99232 SBSQ HOSP IP/OBS MODERATE 35: CPT | Performed by: NURSE PRACTITIONER

## 2021-02-23 PROCEDURE — 99239 HOSP IP/OBS DSCHRG MGMT >30: CPT | Performed by: INTERNAL MEDICINE

## 2021-02-23 PROCEDURE — 85025 COMPLETE CBC W/AUTO DIFF WBC: CPT | Performed by: INTERNAL MEDICINE

## 2021-02-23 PROCEDURE — 82948 REAGENT STRIP/BLOOD GLUCOSE: CPT

## 2021-02-23 RX ORDER — LOSARTAN POTASSIUM 25 MG/1
25 TABLET ORAL DAILY
Qty: 30 TABLET | Refills: 0 | Status: SHIPPED | OUTPATIENT
Start: 2021-02-24 | End: 2021-03-19 | Stop reason: SDUPTHER

## 2021-02-23 RX ORDER — PANTOPRAZOLE SODIUM 40 MG/1
40 TABLET, DELAYED RELEASE ORAL 2 TIMES DAILY
Qty: 60 TABLET | Refills: 0 | Status: SHIPPED | OUTPATIENT
Start: 2021-02-23

## 2021-02-23 RX ADMIN — FINASTERIDE 5 MG: 5 TABLET, FILM COATED ORAL at 08:38

## 2021-02-23 RX ADMIN — SERTRALINE HYDROCHLORIDE 50 MG: 50 TABLET ORAL at 08:38

## 2021-02-23 RX ADMIN — HEPARIN SODIUM 5000 UNITS: 5000 INJECTION INTRAVENOUS; SUBCUTANEOUS at 05:37

## 2021-02-23 RX ADMIN — PRAVASTATIN SODIUM 40 MG: 40 TABLET ORAL at 08:38

## 2021-02-23 RX ADMIN — AMLODIPINE BESYLATE 10 MG: 10 TABLET ORAL at 08:38

## 2021-02-23 RX ADMIN — ASPIRIN 81 MG: 81 TABLET, COATED ORAL at 08:38

## 2021-02-23 RX ADMIN — LOSARTAN POTASSIUM 25 MG: 25 TABLET, FILM COATED ORAL at 08:38

## 2021-02-23 RX ADMIN — POTASSIUM CHLORIDE 40 MEQ: 1500 TABLET, EXTENDED RELEASE ORAL at 08:37

## 2021-02-23 RX ADMIN — PANTOPRAZOLE SODIUM 40 MG: 40 TABLET, DELAYED RELEASE ORAL at 05:37

## 2021-02-23 NOTE — PROGRESS NOTES
Progress Note - Laron Chowdhury 78 y o  male MRN: [id number]    Unit/Bed#: E5 -01 Encounter: [id number]      Assessment/Plan:  1  Encephalopathy  Alert and oriented x4 at present  At risk for altered mental status secondary to age, underlying cognitive decline, hospitalization, insomnia  Provide frequent redirection, reorientation, distraction techniques  Avoid deliriogenic medications such as tramadol, benzodiazepines, anticholinergics,  Benadryl  Treat pain, See geriatric pain protocol  Monitor for constipation and urinary retention  Encourage early and frequent moblization, OOB  Encourage Hydration/ Nutrition  Implement sleep hygiene, limit night time interuptions, group activities        2  Mild cognitive impairment  Family hx of alzheimer's disease  At risk with family history, HTN  Pt hx of altered REM sleep, anxiety, new onset occasional tremor to right arm  MOCA 20/30- altered visual spatial  Seen 1/28/2019 by senior care  TSH 0 520 on 2/19/21  Vitamin B12 567 on 2/19/21  Recommend increased physical activity  Recommend engagement in cognitively challenging activities  Recommend follow up with [address] care as outpatient 1 month after discharge for evaluation of memory, increased dose of zoloft and possible start of aricept     3  Depression  zoloft 25 mg po daily increased to 50 mg po   Do not recommend prn xanax, pt not taking as outpatient   Recommend increase physical, mental and social activities  Consider zoom classes for JOI CHI, exercise, activities  Continue to monitor, follow up as outpatient     4  Insomnia  With hx of nightmares and altered REM sleep  States he has been having worsening sleep lately, states anxiety contributing, seen number 3  Consider addition of melatonin 3 mg po QHS  Maintain circadian rhythm, daytime activity     5   Frailty  Mild  Risk factors: hospitalization, cognitive decline, polypharmacy, ambulatory dysfunction  Albumin 3 9, maintain protein in diet  Increase physical activity     6  Ambulatory dysfunction  Fear of falling  Hx of 3 falls in past year  High fall risk: hx of falls, OA to knees, MCI, Visually impaired, age  Fall precautions  PT/OT  Recommend increase physical activity, balance exercises such as JOI chi   Recommend Home Safety evaluation for fall prevention strategies     7  Hearing impairment  Hearing impairment strongly correlated with depression, cognitive impairment, delirium and falls in the older adult  Speaking face to face  Use clear dictation, enunciation of words  Recommend follow up with audiology as outpatient     8  Visual impairment  Pt with visual impairment are at 50% higher risk of falling than non visually impairment peers  Maintain routine eye check up  Keep glasses in good condition, current prescriptions  Keep area well lit, clutter free     9  Anemia  Hg 8 0  IV venofer transfusion done  s/p EGD/ Colonoscopy - ascending colon polypectomy  GI on consult  IM following       Subjective:   Upon exam pt is lying in bed  He is alert and oriented x4  He states he feels much better he is hoping to go home today  He states he slept well, he is eating well  He denies issues with urination or constipation  He states the driveway is plowed so he will not need to walk in snow or ice  Objective:     Vitals: Blood pressure 150/71, pulse 87, temperature (!) 96 7 °F (35 9 °C), temperature source Temporal, resp  rate 16, weight 79 5 kg (175 lb 4 3 oz), SpO2 96 %  ,Body mass index is 30 08 kg/m²  Intake/Output Summary (Last 24 hours) at 2/23/2021 0910  Last data filed at 2/22/2021 1357  Gross per 24 hour   Intake 700 ml   Output --   Net 700 ml       Physical Exam:   General : NAD  HEENT : MMM  Heart : Normal rate, no murmur rub or gallop  Lungs : CTA no wheezes, rales or rhonchi  Abdomen : Soft, NT/ND, BS auscultated in all 4 quads     Ext :  no edema  Skin : Pink, warm, dry, age appropriate turgor and mobility  Neuro : Nonfocal  Psych : Alert and O x 4       Invasive Devices     Peripheral Intravenous Line            Peripheral IV 02/22/21 Left Antecubital less than 1 day                Lab, Imaging and other studies: I have personally reviewed pertinent reports      VTE Pharmacologic Prophylaxis: Sequential compression device (Venodyne)   VTE Mechanical Prophylaxis: sequential compression device

## 2021-02-23 NOTE — DISCHARGE SUMMARY
Discharge- Milagros Bridges 1940, 78 y o  male MRN: [id number]    Unit/Bed#: E5 -01 Encounter: [id number]    Primary Care Provider: Bang Gordon DO   Date and time admitted to hospital: 2/19/2021  5:50 PM        * Encephalopathy  Assessment & Plan  · Acute encephalopathy due to underlying age related cognitive decline and possible progression to mild dementia  · Coffey score of 20/30  · He seems to be at baseline now  · His Zoloft was increased to 50 mg  · He will need to follow-up at the [facility] for formal evaluation and possible initiation of Aricept      Iron deficiency anemia secondary to inadequate dietary iron intake  Assessment & Plan  Status post colonoscopy an EGD today with no source of bleeding  -continue Protonix for esophagitis and gastric ulcer  -outpatient follow-up with GI regarding biopsy results of his colon polyps  -Venofer 800 mg IV given in total  -OP repeat CBC   -discussed with daughter in law about hematology evaluation if he continues to have persistent anemia and inadequate response on outpatient testing    Moderate episode of recurrent major depressive disorder ([address] )  Assessment & Plan  Zoloft increased to 50 mg daily  Follow-up with geriatrics in 1 month    Aortic stenosis, moderate  Assessment & Plan  Compensated with no sign of volume overload    Enlarged prostate without lower urinary tract symptoms (luts)  Assessment & Plan  Continue Flomax      Essential hypertension  Assessment & Plan  Blood pressure currently controlled    Continue home medication amlodipine 10 mg daily   Resume cozaar on DC    Hypokalemia  Assessment & Plan  Improving  Hydrochlorothiazide was discontinued  Repeat BMP in 1 week through PCP        Discharging Physician / Practitioner: Jeet Latham MD  PCP: Bang Gordon DO  Admission Date:   Admission Orders (From admission, onward)     Ordered        02/20/21 1103  Inpatient Admission  Once         02/19/21 2116  Place in Observation Once                   Discharge Date: 02/23/21    Resolved Problems  Date Reviewed: 2/23/2021    None          Consultations During Hospital Stay:  · GI    Procedures Performed:   · EGD and colonoscopy    Significant Findings / Test Results:   · EGD-esophagitis, Salman Laure GE junction, type 2 hiatal hernia, small superficial leave near ulcer in the body of the stomach, antral, pre-pyloric region and pylorus with clean base, localized edematous and erythematous mucosa in the duodenum  · Colonoscopy-normal terminal ileum, 3 polyps removed (2 sessile adenomatous polyps in the ascending colon, 1 sessile adenomatous polyp in the transverse colon); multiple pancolonic diverticula, large internal hemorrhoids    Incidental Findings:   · None     Test Results Pending at Discharge (will require follow up): · None     Outpatient Tests Requested:  · None    Complications:  None    Reason for Admission:  Confusion    Hospital Course:     Amandeep Rehman is a 78 y o  male patient who originally presented to the hospital on 2/19/2021 due to transient confusion  This resolved immediately  The patient recalled that he felt angry and frustrated that he was unable to get out due to the snow  He is usually very active and likes to walk  He was seen by geriatrics and he scored 20/30 on the [address]  It was felt that he has underlying cognitive decline with possible progression to mild dementia  His Zoloft was increased from 25 to  50 mg daily  He was noted to have significant hypokalemia while on HCTZ and potassium supplementation  Due to this his blood pressure regimen was switched to Cozaar 25 mg daily which she tolerated without any cough  He was noted to have iron deficiency anemia  He underwent an EGD and colonoscopy which showed the above finding  There was no active bleeding  He received 800 mg of IV Venofer during this admission    He will need repeat CBC as an outpatient to determine response to the intravenous iron  Discussed with daughter-in-law that if he continues to have anemia he will need a hematology evaluation  Please see above list of diagnoses and related plan for additional information  Condition at Discharge: good     Discharge Day Visit / Exam:     Subjective:  [place] to [place]  No shortness of breath dizziness or lightheadedness when walking  Vitals: Blood Pressure: 150/71 (02/23/21 0700)  Pulse: 87 (02/23/21 0700)  Temperature: (!) 96 7 °F (35 9 °C) (02/23/21 0700)  Temp Source: Temporal (02/23/21 0700)  Respirations: 16 (02/23/21 0700)  Weight - Scale: 79 5 kg (175 lb 4 3 oz) (02/23/21 0600)  SpO2: 96 % (02/23/21 0700)  Exam:   Physical Exam  Constitutional:       Appearance: He is not ill-appearing or diaphoretic  HENT:      Head: Normocephalic and atraumatic  Left Ear: There is no impacted cerumen  Mouth/Throat:      Pharynx: No posterior oropharyngeal erythema  Eyes:      General: No scleral icterus  Neck:      Musculoskeletal: Neck supple  Cardiovascular:      Rate and Rhythm: Regular rhythm  Heart sounds: Murmur present  Pulmonary:      Effort: Pulmonary effort is normal  No respiratory distress  Breath sounds: No wheezing or rales  Abdominal:      General: Abdomen is flat  Palpations: Abdomen is soft  Musculoskeletal:      Right lower leg: No edema  Left lower leg: Edema present  Skin:     General: Skin is warm and dry  Neurological:      Mental Status: He is alert  Mental status is at baseline  Psychiatric:         Mood and Affect: Mood normal          Behavior: Behavior normal          Discussion with Family: spoke with [id number]    Discharge instructions/Information to patient and family:   See after visit summary for information provided to patient and family  Provisions for Follow-Up Care:  See after visit summary for information related to follow-up care and any pertinent home health orders        Disposition: Home    Planned Readmission: no     Discharge Statement:  I spent >30 minutes discharging the patient  This time was spent on the day of discharge  I had direct contact with the patient on the day of discharge  Greater than 50% of the total time was spent examining patient, answering all patient questions, arranging and discussing plan of care with patient as well as directly providing post-discharge instructions  Additional time then spent on discharge activities  Discharge Medications:  See after visit summary for reconciled discharge medications provided to patient and family        ** Please Note: This note has been constructed using a voice recognition system **

## 2021-02-23 NOTE — DISCHARGE INSTR - AVS FIRST PAGE
· Medication changes:  1  Zoloft was increased to 50 mg daily   2  HCTZ and potassium were stopped  Cozaar 25 mg daily was started  3  Protonix 40 mg b i d   Was started    Please follow-up with Dr Aren Jimenez in 1 week  Please obtain repeat CBC and BMP to check your hemoglobin and potassium levels

## 2021-02-23 NOTE — ASSESSMENT & PLAN NOTE
Status post colonoscopy an EGD today with no source of bleeding  -continue Protonix for esophagitis and gastric ulcer  -outpatient follow-up with GI regarding biopsy results of his colon polyps  -Venofer 800 mg IV given in total  -OP repeat CBC   -discussed with daughter in law about hematology evaluation if he continues to have persistent anemia and inadequate response on outpatient testing

## 2021-02-23 NOTE — ASSESSMENT & PLAN NOTE
Blood pressure currently controlled    Continue home medication amlodipine 10 mg daily   Resume cozaar on DC

## 2021-02-23 NOTE — SOCIAL WORK
SW met with pt at bedside to discuss discharge planning as he will be discharged today  Pt reports he has no needs and has a ride home  IMM reviewed with patient  patient agree with discharge determination    At this time there are no CM needs

## 2021-02-23 NOTE — ASSESSMENT & PLAN NOTE
· Acute encephalopathy due to underlying age related cognitive decline and possible progression to mild dementia  · Morehouse score of 20/30  · He seems to be at baseline now  · His Zoloft was increased to 50 mg  · He will need to follow-up at the [facility] for formal evaluation and possible initiation of Aricept

## 2021-02-23 NOTE — PLAN OF CARE
Problem: Potential for Falls  Goal: Patient will remain free of falls  Description: INTERVENTIONS:  - Assess patient frequently for physical needs  -  Identify cognitive and physical deficits and behaviors that affect risk of falls    -  [place] fall precautions as indicated by assessment   - Educate patient/family on patient safety including physical limitations  - Instruct patient to call for assistance with activity based on assessment  - Modify environment to reduce risk of injury  - Consider OT/PT consult to assist with strengthening/mobility  Outcome: Progressing     Problem: PAIN - ADULT  Goal: Verbalizes/displays adequate comfort level or baseline comfort level  Description: Interventions:  - Encourage patient to monitor pain and request assistance  - Assess pain using appropriate pain scale  - Administer analgesics based on type and severity of pain and evaluate response  - Implement non-pharmacological measures as appropriate and evaluate response  - Consider cultural and social influences on pain and pain management  - Notify physician/advanced practitioner if interventions unsuccessful or patient reports new pain  Outcome: Progressing     Problem: INFECTION - ADULT  Goal: Absence or prevention of progression during hospitalization  Description: INTERVENTIONS:  - Assess and monitor for signs and symptoms of infection  - Monitor lab/diagnostic results  - Monitor all insertion sites, i e  indwelling lines, tubes, and drains  - Monitor endotracheal if appropriate and nasal secretions for changes in amount and color  - [place] appropriate cooling/warming therapies per order  - Administer medications as ordered  - Instruct and encourage patient and family to use good hand hygiene technique  - Identify and instruct in appropriate isolation precautions for identified infection/condition  Outcome: Progressing  Goal: Absence of fever/infection during neutropenic period  Description: INTERVENTIONS:  - Monitor WBC    Outcome: Progressing     Problem: SAFETY ADULT  Goal: Patient will remain free of falls  Description: INTERVENTIONS:  - Assess patient frequently for physical needs  -  Identify cognitive and physical deficits and behaviors that affect risk of falls    -  [place] fall precautions as indicated by assessment   - Educate patient/family on patient safety including physical limitations  - Instruct patient to call for assistance with activity based on assessment  - Modify environment to reduce risk of injury  - Consider OT/PT consult to assist with strengthening/mobility  Outcome: Progressing  Goal: Maintain or return to baseline ADL function  Description: INTERVENTIONS:  -  Assess patient's ability to carry out ADLs; assess patient's baseline for ADL function and identify physical deficits which impact ability to perform ADLs (bathing, care of mouth/teeth, toileting, grooming, dressing, etc )  - Assess/evaluate cause of self-care deficits   - Assess range of motion  - Assess patient's mobility; develop plan if impaired  - Assess patient's need for assistive devices and provide as appropriate  - Encourage maximum independence but intervene and supervise when necessary  - Involve family in performance of ADLs  - Assess for home care needs following discharge   - Consider OT consult to assist with ADL evaluation and planning for discharge  - Provide patient education as appropriate  Outcome: Progressing  Goal: Maintain or return mobility status to optimal level  Description: INTERVENTIONS:  - Assess patient's baseline mobility status (ambulation, transfers, stairs, etc )    - Identify cognitive and physical deficits and behaviors that affect mobility  - Identify mobility aids required to assist with transfers and/or ambulation (gait belt, sit-to-stand, lift, walker, cane, etc )  - [place] fall precautions as indicated by assessment  - Record patient progress and toleration of activity level on Mobility SBAR; progress patient to next Phase/Stage  - Instruct patient to call for assistance with activity based on assessment  - Consider rehabilitation consult to assist with strengthening/weightbearing, etc   Outcome: Progressing     Problem: DISCHARGE PLANNING  Goal: Discharge to home or other facility with appropriate resources  Description: INTERVENTIONS:  - Identify barriers to discharge w/patient and caregiver  - Arrange for needed discharge resources and transportation as appropriate  - Identify discharge learning needs (meds, wound care, etc )  - Arrange for interpretive services to assist at discharge as needed  - Refer to Case Management Department for coordinating discharge planning if the patient needs post-hospital services based on physician/advanced practitioner order or complex needs related to functional status, cognitive ability, or social support system  Outcome: Progressing     Problem: Knowledge Deficit  Goal: Patient/family/caregiver demonstrates understanding of disease process, treatment plan, medications, and discharge instructions  Description: Complete learning assessment and assess knowledge base  Interventions:  - Provide teaching at level of understanding  - Provide teaching via preferred learning methods  Outcome: Progressing     Problem: NEUROSENSORY - ADULT  Goal: Achieves stable or improved neurological status  Description: INTERVENTIONS  - Monitor and report changes in neurological status  - Monitor vital signs such as temperature, blood pressure, glucose, and any other labs ordered   - Initiate measures to prevent increased intracranial pressure  - Monitor for seizure activity and implement precautions if appropriate      Outcome: Progressing  Goal: Achieves maximal functionality and self care  Description: INTERVENTIONS  - Monitor swallowing and airway patency with patient fatigue and changes in neurological status  - Encourage and assist patient to increase activity and self care     - Encourage visually impaired, hearing impaired and aphasic patients to use assistive/communication devices  Outcome: Progressing     Problem: METABOLIC, FLUID AND ELECTROLYTES - ADULT  Goal: Electrolytes maintained within normal limits  Description: INTERVENTIONS:  - Monitor labs and assess patient for signs and symptoms of electrolyte imbalances  - Administer electrolyte replacement as ordered  - Monitor response to electrolyte replacements, including repeat lab results as appropriate  - Instruct patient on fluid and nutrition as appropriate  Outcome: Progressing  Goal: Fluid balance maintained  Description: INTERVENTIONS:  - Monitor labs   - Monitor I/O and WT  - Instruct patient on fluid and nutrition as appropriate  - Assess for signs & symptoms of volume excess or deficit  Outcome: Progressing     Problem: MUSCULOSKELETAL - ADULT  Goal: Maintain or return mobility to safest level of function  Description: INTERVENTIONS:  - Assess patient's ability to carry out ADLs; assess patient's baseline for ADL function and identify physical deficits which impact ability to perform ADLs (bathing, care of mouth/teeth, toileting, grooming, dressing, etc )  - Assess/evaluate cause of self-care deficits   - Assess range of motion  - Assess patient's mobility  - Assess patient's need for assistive devices and provide as appropriate  - Encourage maximum independence but intervene and supervise when necessary  - Involve family in performance of ADLs  - Assess for home care needs following discharge   - Consider OT consult to assist with ADL evaluation and planning for discharge  - Provide patient education as appropriate  Outcome: Progressing  Goal: Maintain proper alignment of affected body part  Description: INTERVENTIONS:  - Support, maintain and protect limb and body alignment  - Provide patient/ family with appropriate education  Outcome: Progressing     Problem: GENITOURINARY - ADULT  Goal: Maintains or returns to baseline urinary function  Description: INTERVENTIONS:  - Assess urinary function  - Encourage oral fluids to ensure adequate hydration if ordered  - Administer IV fluids as ordered to ensure adequate hydration  - Administer ordered medications as needed  - Offer frequent toileting  - Follow urinary retention protocol if ordered  Outcome: Progressing  Goal: Absence of urinary retention  Description: INTERVENTIONS:  - Assess patients ability to void and empty bladder  - Monitor I/O  - Bladder scan as needed  - Discuss with physician/AP medications to alleviate retention as needed  - Discuss catheterization for long term situations as appropriate  Outcome: Progressing     Problem: Prexisting or High Potential for Compromised Skin Integrity  Goal: Skin integrity is maintained or improved  Description: INTERVENTIONS:  - Identify patients at risk for skin breakdown  - Assess and monitor skin integrity  - Assess and monitor nutrition and hydration status  - Monitor labs   - Assess for incontinence   - Turn and reposition patient  - Assist with mobility/ambulation  - Relieve pressure over bony prominences  - Avoid friction and shearing  - Provide appropriate hygiene as needed including keeping skin clean and dry  - Evaluate need for skin moisturizer/barrier cream  - Collaborate with interdisciplinary team   - Patient/family teaching  - Consider wound care consult   Outcome: Progressing

## 2021-02-23 NOTE — PLAN OF CARE
Problem: Potential for Falls  Goal: Patient will remain free of falls  Description: INTERVENTIONS:  - Assess patient frequently for physical needs  -  Identify cognitive and physical deficits and behaviors that affect risk of falls    -  [place] fall precautions as indicated by assessment   - Educate patient/family on patient safety including physical limitations  - Instruct patient to call for assistance with activity based on assessment  - Modify environment to reduce risk of injury  - Consider OT/PT consult to assist with strengthening/mobility  Outcome: Progressing     Problem: PAIN - ADULT  Goal: Verbalizes/displays adequate comfort level or baseline comfort level  Description: Interventions:  - Encourage patient to monitor pain and request assistance  - Assess pain using appropriate pain scale  - Administer analgesics based on type and severity of pain and evaluate response  - Implement non-pharmacological measures as appropriate and evaluate response  - Consider cultural and social influences on pain and pain management  - Notify physician/advanced practitioner if interventions unsuccessful or patient reports new pain  Outcome: Progressing     Problem: INFECTION - ADULT  Goal: Absence or prevention of progression during hospitalization  Description: INTERVENTIONS:  - Assess and monitor for signs and symptoms of infection  - Monitor lab/diagnostic results  - Monitor all insertion sites, i e  indwelling lines, tubes, and drains  - Monitor endotracheal if appropriate and nasal secretions for changes in amount and color  - [place] appropriate cooling/warming therapies per order  - Administer medications as ordered  - Instruct and encourage patient and family to use good hand hygiene technique  - Identify and instruct in appropriate isolation precautions for identified infection/condition  Outcome: Progressing  Goal: Absence of fever/infection during neutropenic period  Description: INTERVENTIONS:  - Monitor WBC    Outcome: Progressing     Problem: SAFETY ADULT  Goal: Patient will remain free of falls  Description: INTERVENTIONS:  - Assess patient frequently for physical needs  -  Identify cognitive and physical deficits and behaviors that affect risk of falls    -  [place] fall precautions as indicated by assessment   - Educate patient/family on patient safety including physical limitations  - Instruct patient to call for assistance with activity based on assessment  - Modify environment to reduce risk of injury  - Consider OT/PT consult to assist with strengthening/mobility  Outcome: Progressing  Goal: Maintain or return to baseline ADL function  Description: INTERVENTIONS:  -  Assess patient's ability to carry out ADLs; assess patient's baseline for ADL function and identify physical deficits which impact ability to perform ADLs (bathing, care of mouth/teeth, toileting, grooming, dressing, etc )  - Assess/evaluate cause of self-care deficits   - Assess range of motion  - Assess patient's mobility; develop plan if impaired  - Assess patient's need for assistive devices and provide as appropriate  - Encourage maximum independence but intervene and supervise when necessary  - Involve family in performance of ADLs  - Assess for home care needs following discharge   - Consider OT consult to assist with ADL evaluation and planning for discharge  - Provide patient education as appropriate  Outcome: Progressing  Goal: Maintain or return mobility status to optimal level  Description: INTERVENTIONS:  - Assess patient's baseline mobility status (ambulation, transfers, stairs, etc )    - Identify cognitive and physical deficits and behaviors that affect mobility  - Identify mobility aids required to assist with transfers and/or ambulation (gait belt, sit-to-stand, lift, walker, cane, etc )  - [place] fall precautions as indicated by assessment  - Record patient progress and toleration of activity level on Mobility SBAR; progress patient to next Phase/Stage  - Instruct patient to call for assistance with activity based on assessment  - Consider rehabilitation consult to assist with strengthening/weightbearing, etc   Outcome: Progressing     Problem: DISCHARGE PLANNING  Goal: Discharge to home or other facility with appropriate resources  Description: INTERVENTIONS:  - Identify barriers to discharge w/patient and caregiver  - Arrange for needed discharge resources and transportation as appropriate  - Identify discharge learning needs (meds, wound care, etc )  - Arrange for interpretive services to assist at discharge as needed  - Refer to Case Management Department for coordinating discharge planning if the patient needs post-hospital services based on physician/advanced practitioner order or complex needs related to functional status, cognitive ability, or social support system  Outcome: Progressing     Problem: Knowledge Deficit  Goal: Patient/family/caregiver demonstrates understanding of disease process, treatment plan, medications, and discharge instructions  Description: Complete learning assessment and assess knowledge base  Interventions:  - Provide teaching at level of understanding  - Provide teaching via preferred learning methods  Outcome: Progressing     Problem: NEUROSENSORY - ADULT  Goal: Achieves stable or improved neurological status  Description: INTERVENTIONS  - Monitor and report changes in neurological status  - Monitor vital signs such as temperature, blood pressure, glucose, and any other labs ordered   - Initiate measures to prevent increased intracranial pressure  - Monitor for seizure activity and implement precautions if appropriate      Outcome: Progressing  Goal: Achieves maximal functionality and self care  Description: INTERVENTIONS  - Monitor swallowing and airway patency with patient fatigue and changes in neurological status  - Encourage and assist patient to increase activity and self care     - Encourage visually impaired, hearing impaired and aphasic patients to use assistive/communication devices  Outcome: Progressing     Problem: METABOLIC, FLUID AND ELECTROLYTES - ADULT  Goal: Electrolytes maintained within normal limits  Description: INTERVENTIONS:  - Monitor labs and assess patient for signs and symptoms of electrolyte imbalances  - Administer electrolyte replacement as ordered  - Monitor response to electrolyte replacements, including repeat lab results as appropriate  - Instruct patient on fluid and nutrition as appropriate  Outcome: Progressing  Goal: Fluid balance maintained  Description: INTERVENTIONS:  - Monitor labs   - Monitor I/O and WT  - Instruct patient on fluid and nutrition as appropriate  - Assess for signs & symptoms of volume excess or deficit  Outcome: Progressing     Problem: MUSCULOSKELETAL - ADULT  Goal: Maintain or return mobility to safest level of function  Description: INTERVENTIONS:  - Assess patient's ability to carry out ADLs; assess patient's baseline for ADL function and identify physical deficits which impact ability to perform ADLs (bathing, care of mouth/teeth, toileting, grooming, dressing, etc )  - Assess/evaluate cause of self-care deficits   - Assess range of motion  - Assess patient's mobility  - Assess patient's need for assistive devices and provide as appropriate  - Encourage maximum independence but intervene and supervise when necessary  - Involve family in performance of ADLs  - Assess for home care needs following discharge   - Consider OT consult to assist with ADL evaluation and planning for discharge  - Provide patient education as appropriate  Outcome: Progressing  Goal: Maintain proper alignment of affected body part  Description: INTERVENTIONS:  - Support, maintain and protect limb and body alignment  - Provide patient/ family with appropriate education  Outcome: Progressing     Problem: GENITOURINARY - ADULT  Goal: Maintains or returns to baseline urinary function  Description: INTERVENTIONS:  - Assess urinary function  - Encourage oral fluids to ensure adequate hydration if ordered  - Administer IV fluids as ordered to ensure adequate hydration  - Administer ordered medications as needed  - Offer frequent toileting  - Follow urinary retention protocol if ordered  Outcome: Progressing  Goal: Absence of urinary retention  Description: INTERVENTIONS:  - Assess patients ability to void and empty bladder  - Monitor I/O  - Bladder scan as needed  - Discuss with physician/AP medications to alleviate retention as needed  - Discuss catheterization for long term situations as appropriate  Outcome: Progressing     Problem: Prexisting or High Potential for Compromised Skin Integrity  Goal: Skin integrity is maintained or improved  Description: INTERVENTIONS:  - Identify patients at risk for skin breakdown  - Assess and monitor skin integrity  - Assess and monitor nutrition and hydration status  - Monitor labs   - Assess for incontinence   - Turn and reposition patient  - Assist with mobility/ambulation  - Relieve pressure over bony prominences  - Avoid friction and shearing  - Provide appropriate hygiene as needed including keeping skin clean and dry  - Evaluate need for skin moisturizer/barrier cream  - Collaborate with interdisciplinary team   - Patient/family teaching  - Consider wound care consult   Outcome: Progressing

## 2021-02-24 ENCOUNTER — TRANSITIONAL CARE MANAGEMENT (OUTPATIENT)
Dept: FAMILY MEDICINE CLINIC | Facility: CLINIC | Age: 81
End: 2021-02-24

## 2021-02-25 ENCOUNTER — TELEPHONE (OUTPATIENT)
Dept: GASTROENTEROLOGY | Facility: CLINIC | Age: 81
End: 2021-02-25

## 2021-02-25 NOTE — TELEPHONE ENCOUNTER
----- Message from Alexsander Loza MD sent at 2/24/2021  3:38 PM EST -----  Please call patient with pathology results - biopsies negative for H pylori, should have repeat EGD in three months to confirm ulcer healing  Polyps are precancerous

## 2021-02-26 ENCOUNTER — TELEPHONE (OUTPATIENT)
Dept: GASTROENTEROLOGY | Facility: MEDICAL CENTER | Age: 81
End: 2021-02-26

## 2021-02-26 NOTE — TELEPHONE ENCOUNTER
----- Message from Roberto Martini PA-C sent at 2/23/2021 10:57 AM EST -----  Pt needs hospital follow-up in 2-3 weeks    Becki De

## 2021-03-02 NOTE — TELEPHONE ENCOUNTER
Spoke to patient and his wife and they are aware of his results and the recommendation of his repeat egd  Patient wife stated he will go see his GI doctor with [facility]

## 2021-03-03 ENCOUNTER — TELEPHONE (OUTPATIENT)
Dept: GASTROENTEROLOGY | Facility: MEDICAL CENTER | Age: 81
End: 2021-03-03

## 2021-03-04 NOTE — TELEPHONE ENCOUNTER
Spoke to patient family member who advised that Zuri Navarro sees a dr outside of [facility] that he is already following up with

## 2021-03-11 DIAGNOSIS — E78.2 MIXED HYPERLIPIDEMIA: ICD-10-CM

## 2021-03-11 RX ORDER — PRAVASTATIN SODIUM 40 MG
40 TABLET ORAL DAILY
Qty: 90 TABLET | Refills: 1 | Status: SHIPPED | OUTPATIENT
Start: 2021-03-11

## 2021-03-12 ENCOUNTER — APPOINTMENT (OUTPATIENT)
Dept: LAB | Facility: MEDICAL CENTER | Age: 81
End: 2021-03-12
Payer: MEDICARE

## 2021-03-12 DIAGNOSIS — I10 ESSENTIAL HYPERTENSION: ICD-10-CM

## 2021-03-12 DIAGNOSIS — D50.8 IRON DEFICIENCY ANEMIA SECONDARY TO INADEQUATE DIETARY IRON INTAKE: Primary | ICD-10-CM

## 2021-03-12 DIAGNOSIS — E78.2 MIXED HYPERLIPIDEMIA: ICD-10-CM

## 2021-03-12 DIAGNOSIS — E87.6 HYPOKALEMIA: ICD-10-CM

## 2021-03-12 DIAGNOSIS — D50.8 IRON DEFICIENCY ANEMIA SECONDARY TO INADEQUATE DIETARY IRON INTAKE: ICD-10-CM

## 2021-03-12 LAB
ALBUMIN SERPL BCP-MCNC: 4.2 G/DL (ref 3.5–5)
ALP SERPL-CCNC: 59 U/L (ref 46–116)
ALT SERPL W P-5'-P-CCNC: 34 U/L (ref 12–78)
ANION GAP SERPL CALCULATED.3IONS-SCNC: 8 MMOL/L (ref 4–13)
AST SERPL W P-5'-P-CCNC: 22 U/L (ref 5–45)
BASOPHILS # BLD AUTO: 0.11 THOUSANDS/ΜL (ref 0–0.1)
BASOPHILS NFR BLD AUTO: 2 % (ref 0–1)
BILIRUB SERPL-MCNC: 0.68 MG/DL (ref 0.2–1)
BUN SERPL-MCNC: 16 MG/DL (ref 5–25)
CALCIUM SERPL-MCNC: 9.3 MG/DL (ref 8.3–10.1)
CHLORIDE SERPL-SCNC: 104 MMOL/L (ref 100–108)
CHOLEST SERPL-MCNC: 138 MG/DL (ref 50–200)
CO2 SERPL-SCNC: 26 MMOL/L (ref 21–32)
CREAT SERPL-MCNC: 0.78 MG/DL (ref 0.6–1.3)
EOSINOPHIL # BLD AUTO: 0.1 THOUSAND/ΜL (ref 0–0.61)
EOSINOPHIL NFR BLD AUTO: 1 % (ref 0–6)
ERYTHROCYTE [DISTWIDTH] IN BLOOD BY AUTOMATED COUNT: 24.5 % (ref 11.6–15.1)
GFR SERPL CREATININE-BSD FRML MDRD: 83 ML/MIN/1.73SQ M
GLUCOSE P FAST SERPL-MCNC: 105 MG/DL (ref 65–99)
HCT VFR BLD AUTO: 38.1 % (ref 36.5–49.3)
HDLC SERPL-MCNC: 44 MG/DL
HGB BLD-MCNC: 11 G/DL (ref 12–17)
IMM GRANULOCYTES # BLD AUTO: 0.03 THOUSAND/UL (ref 0–0.2)
IMM GRANULOCYTES NFR BLD AUTO: 0 % (ref 0–2)
LDLC SERPL CALC-MCNC: 71 MG/DL (ref 0–100)
LYMPHOCYTES # BLD AUTO: 1.76 THOUSANDS/ΜL (ref 0.6–4.47)
LYMPHOCYTES NFR BLD AUTO: 24 % (ref 14–44)
MCH RBC QN AUTO: 22.3 PG (ref 26.8–34.3)
MCHC RBC AUTO-ENTMCNC: 28.9 G/DL (ref 31.4–37.4)
MCV RBC AUTO: 77 FL (ref 82–98)
MONOCYTES # BLD AUTO: 0.91 THOUSAND/ΜL (ref 0.17–1.22)
MONOCYTES NFR BLD AUTO: 12 % (ref 4–12)
NEUTROPHILS # BLD AUTO: 4.58 THOUSANDS/ΜL (ref 1.85–7.62)
NEUTS SEG NFR BLD AUTO: 61 % (ref 43–75)
NONHDLC SERPL-MCNC: 94 MG/DL
NRBC BLD AUTO-RTO: 0 /100 WBCS
PLATELET # BLD AUTO: 401 THOUSANDS/UL (ref 149–390)
PMV BLD AUTO: 10.1 FL (ref 8.9–12.7)
POTASSIUM SERPL-SCNC: 4 MMOL/L (ref 3.5–5.3)
PROT SERPL-MCNC: 7.5 G/DL (ref 6.4–8.2)
RBC # BLD AUTO: 4.94 MILLION/UL (ref 3.88–5.62)
SODIUM SERPL-SCNC: 138 MMOL/L (ref 136–145)
TRIGL SERPL-MCNC: 117 MG/DL
WBC # BLD AUTO: 7.49 THOUSAND/UL (ref 4.31–10.16)

## 2021-03-12 PROCEDURE — 36415 COLL VENOUS BLD VENIPUNCTURE: CPT

## 2021-03-12 PROCEDURE — 85025 COMPLETE CBC W/AUTO DIFF WBC: CPT

## 2021-03-12 PROCEDURE — 80061 LIPID PANEL: CPT

## 2021-03-12 PROCEDURE — 80053 COMPREHEN METABOLIC PANEL: CPT

## 2021-03-19 ENCOUNTER — OFFICE VISIT (OUTPATIENT)
Dept: FAMILY MEDICINE CLINIC | Facility: CLINIC | Age: 81
End: 2021-03-19
Payer: MEDICARE

## 2021-03-19 VITALS
DIASTOLIC BLOOD PRESSURE: 70 MMHG | WEIGHT: 182.6 LBS | BODY MASS INDEX: 31.18 KG/M2 | SYSTOLIC BLOOD PRESSURE: 134 MMHG | TEMPERATURE: 97.5 F | HEIGHT: 64 IN

## 2021-03-19 DIAGNOSIS — R41.0 DISORIENTATION: ICD-10-CM

## 2021-03-19 DIAGNOSIS — E87.6 HYPOKALEMIA: ICD-10-CM

## 2021-03-19 DIAGNOSIS — G93.40 ENCEPHALOPATHY: Primary | ICD-10-CM

## 2021-03-19 DIAGNOSIS — G31.84 MILD COGNITIVE IMPAIRMENT: Chronic | ICD-10-CM

## 2021-03-19 DIAGNOSIS — F33.1 MODERATE EPISODE OF RECURRENT MAJOR DEPRESSIVE DISORDER (HCC): ICD-10-CM

## 2021-03-19 DIAGNOSIS — F32.A DEPRESSION, UNSPECIFIED DEPRESSION TYPE: Chronic | ICD-10-CM

## 2021-03-19 DIAGNOSIS — D50.8 IRON DEFICIENCY ANEMIA SECONDARY TO INADEQUATE DIETARY IRON INTAKE: ICD-10-CM

## 2021-03-19 DIAGNOSIS — K92.2 UPPER GI BLEED: ICD-10-CM

## 2021-03-19 DIAGNOSIS — I10 ESSENTIAL HYPERTENSION: ICD-10-CM

## 2021-03-19 PROBLEM — R29.6 RECURRENT FALLS: Status: RESOLVED | Noted: 2018-04-26 | Resolved: 2021-03-19

## 2021-03-19 PROCEDURE — 99214 OFFICE O/P EST MOD 30 MIN: CPT | Performed by: FAMILY MEDICINE

## 2021-03-19 RX ORDER — LOSARTAN POTASSIUM 25 MG/1
25 TABLET ORAL DAILY
Qty: 90 TABLET | Refills: 1 | Status: SHIPPED | OUTPATIENT
Start: 2021-03-19

## 2021-03-19 RX ORDER — AMLODIPINE BESYLATE 10 MG/1
10 TABLET ORAL DAILY
Qty: 90 TABLET | Refills: 1 | Status: SHIPPED | OUTPATIENT
Start: 2021-03-19

## 2021-03-19 NOTE — PATIENT INSTRUCTIONS
Gastrointestinal Bleeding   WHAT YOU NEED TO KNOW:   Gastrointestinal (GI) bleeding may occur in any part of your digestive tract  This includes your esophagus, stomach, intestines, rectum, or anus  Bleeding may be mild to severe  Your bleeding may begin suddenly, or start slowly and last for a longer period of time  Bleeding that lasts for a longer period of time is called chronic GI bleeding  DISCHARGE INSTRUCTIONS:   Seek care immediately if:   · Your symptoms return  Contact your healthcare provider if:   · You have nausea or are vomiting  · You have heartburn  · You have questions or concerns about your condition or care  Activity:  Rest as directed  Ask when you can return to your usual activities, such as work  Slowly do more each day  Nutrition:  Ask if you need to be on a special diet  A special diet can help treat GI conditions and prevent problems such as GI bleeding  Eat small meals more often while your digestive system heals  Avoid or limit caffeine and spicy foods  Also avoid foods that cause heartburn, nausea, or diarrhea  Prevent GI bleeding:   · Manage GI conditions as directed  Examples of GI conditions include gastroesophageal reflux, peptic ulcer disease, and ulcerative colitis  Take all medicines for these conditions as directed  · Limit or do not take NSAIDs  Ask your healthcare provider if it is safe for you to take NSAIDs  NSAIDs can increase your risk for ulcers and GI bleeding  · Do not drink alcohol  Alcohol can cause ulcers and esophageal varices  Esophageal varices are swollen blood vessels in your esophagus  Over time the blood vessels become weak and may bleed  · Do not smoke  Nicotine and other chemicals in cigarettes and cigars can increase your risk for ulcers  Ask your healthcare provider for information if you currently smoke and need help to quit  E-cigarettes or smokeless tobacco still contain nicotine   Talk to your healthcare provider before you use these products  Follow up with your healthcare provider as directed: You may need to return for a colonoscopy, endoscopy, or other tests  These tests can make sure you do not have more bleeding  Write down your questions so you remember to ask them during your visits  © Copyright [address] Information is for End User's use only and may not be sold, redistributed or otherwise used for commercial purposes  All illustrations and images included in CareNotes® are the copyrighted property of A D A M , Inc  or [facility] Nathanael Alcala   The above information is an  only  It is not intended as medical advice for individual conditions or treatments  Talk to your doctor, nurse or pharmacist before following any medical regimen to see if it is safe and effective for you

## 2021-03-19 NOTE — PROGRESS NOTES
Assessment/Plan:    Disorientation  Resolved Combination of depression cognitive dysfunction and anemia  Continue current care    Encephalopathy  resolved    Essential hypertension  BP is controlled continue to hold water pill Rechekc in 4 months     Hypokalemia  Resolved Hold the water pill    Iron deficiency anemia secondary to inadequate dietary iron intake  Continue current care repeat CBC in one month    Mild cognitive impairment  Stable continue sertraline    Moderate episode of recurrent major depressive disorder (HCC)  Continue current care sertraline and followup in 4 months    Upper GI bleed  fololwup with  GI for repeat  EGD as scheduled Monitor labs continue protonix       Diagnoses and all orders for this visit:    Encephalopathy    Disorientation    Iron deficiency anemia secondary to inadequate dietary iron intake  -     CBC and differential; Future    Upper GI bleed  -     CBC and differential; Future    Essential hypertension  -     amLODIPine (NORVASC) 10 mg tablet; Take 1 tablet (10 mg total) by mouth daily    Depression, unspecified depression type  -     losartan (COZAAR) 25 mg tablet; Take 1 tablet (25 mg total) by mouth daily    Hypokalemia    Moderate episode of recurrent major depressive disorder (HCC)    Mild cognitive impairment          Subjective:   Chief Complaint   Patient presents with    Follow-up     hospital   refill losartan 90 day           Patient ID: Eliecer Navas is a 78 y o  male      Patient is here for followup from hospitalization colt was found to be encephalopathic and disoriented on 2/19/2021 Patient was admitted to [facility] Patient was found to be profoundly anemic with hemoglobin 7 9 Patient had iron deficiency anemia patient got venofer infusion Patient had EGD done showed ulcer Patient was placed on protonix 40 mg BID Patient has since seen GI and thye decreased it to daily Patient potassium was low and water pill was stopped Patient was placed on norvasc and cozaar Patient had his zoloft increase to 50 mg Patient appetite is better He is no longer confused Patient is still sometimes having sleeping issues and girlfriend is asking about melatonin Patient will try 2 mg  Patient last hemoglobin in 3/12/2021 was 11 Patient potassium and renal function was all stable too      The following portions of the patient's history were reviewed and updated as appropriate: allergies, current medications, past family history, past medical history, past social history, past surgical history and problem list     Review of Systems   Constitutional: Negative for fatigue, fever and unexpected weight change  HENT: Negative for congestion, sinus pain and trouble swallowing  Eyes: Negative for discharge and visual disturbance  Respiratory: Negative for cough, chest tightness, shortness of breath and wheezing  Cardiovascular: Negative for chest pain, palpitations and leg swelling  Gastrointestinal: Negative for abdominal pain, blood in stool, constipation, diarrhea, nausea and vomiting  Genitourinary: Negative for difficulty urinating, dysuria, frequency and hematuria  Musculoskeletal: Negative for arthralgias, gait problem and joint swelling  Skin: Negative for rash and wound  Allergic/Immunologic: Negative for environmental allergies and food allergies  Neurological: Negative for dizziness, syncope, weakness, numbness and headaches  Hematological: Negative for adenopathy  Does not bruise/bleed easily  Psychiatric/Behavioral: Positive for sleep disturbance  Negative for confusion and decreased concentration  The patient is not nervous/anxious  Sometmes witn insomnia         Objective:      /70   Temp 97 5 °F (36 4 °C)   Ht 5' 4" (1 626 m)   Wt 82 8 kg (182 lb 9 6 oz)   BMI 31 34 kg/m²          Physical Exam  Vitals signs and nursing note reviewed  Constitutional:       Appearance: He is well-developed  He is obese     HENT:      Head: Normocephalic and atraumatic  Right Ear: Hearing, tympanic membrane and external ear normal       Left Ear: Hearing, tympanic membrane and external ear normal    Eyes:      Conjunctiva/sclera: Conjunctivae normal       Pupils: Pupils are equal, round, and reactive to light  Neck:      Musculoskeletal: Neck supple  Thyroid: No thyromegaly  Cardiovascular:      Rate and Rhythm: Normal rate and regular rhythm  Pulses: Normal pulses  Heart sounds: Normal heart sounds  Pulmonary:      Effort: Pulmonary effort is normal       Breath sounds: Normal breath sounds  No wheezing or rales  Abdominal:      General: Bowel sounds are normal  There is no distension  Palpations: Abdomen is soft  Tenderness: There is no abdominal tenderness  Musculoskeletal:         General: No tenderness  Lymphadenopathy:      Cervical: No cervical adenopathy  Skin:     General: Skin is warm and dry  Findings: No rash  Neurological:      General: No focal deficit present  Mental Status: He is alert and oriented to person, place, and time  Cranial Nerves: No cranial nerve deficit  Coordination: Coordination normal    Psychiatric:         Mood and Affect: Mood normal          Behavior: Behavior normal          Thought Content:  Thought content normal          Judgment: Judgment normal

## 2021-04-12 ENCOUNTER — TELEPHONE (OUTPATIENT)
Dept: GASTROENTEROLOGY | Facility: MEDICAL CENTER | Age: 81
End: 2021-04-12

## 2021-04-12 DIAGNOSIS — F33.1 MODERATE EPISODE OF RECURRENT MAJOR DEPRESSIVE DISORDER (HCC): ICD-10-CM

## 2021-04-15 ENCOUNTER — APPOINTMENT (OUTPATIENT)
Dept: LAB | Facility: MEDICAL CENTER | Age: 81
End: 2021-04-15
Payer: MEDICARE

## 2021-04-15 DIAGNOSIS — D50.8 IRON DEFICIENCY ANEMIA SECONDARY TO INADEQUATE DIETARY IRON INTAKE: ICD-10-CM

## 2021-04-15 DIAGNOSIS — K92.2 UPPER GI BLEED: ICD-10-CM

## 2021-04-15 LAB
BASOPHILS # BLD AUTO: 0.05 THOUSANDS/ΜL (ref 0–0.1)
BASOPHILS NFR BLD AUTO: 1 % (ref 0–1)
EOSINOPHIL # BLD AUTO: 0.06 THOUSAND/ΜL (ref 0–0.61)
EOSINOPHIL NFR BLD AUTO: 1 % (ref 0–6)
ERYTHROCYTE [DISTWIDTH] IN BLOOD BY AUTOMATED COUNT: 21.1 % (ref 11.6–15.1)
HCT VFR BLD AUTO: 36.6 % (ref 36.5–49.3)
HGB BLD-MCNC: 10.8 G/DL (ref 12–17)
IMM GRANULOCYTES # BLD AUTO: 0.01 THOUSAND/UL (ref 0–0.2)
IMM GRANULOCYTES NFR BLD AUTO: 0 % (ref 0–2)
LYMPHOCYTES # BLD AUTO: 1.76 THOUSANDS/ΜL (ref 0.6–4.47)
LYMPHOCYTES NFR BLD AUTO: 38 % (ref 14–44)
MCH RBC QN AUTO: 23.5 PG (ref 26.8–34.3)
MCHC RBC AUTO-ENTMCNC: 29.5 G/DL (ref 31.4–37.4)
MCV RBC AUTO: 80 FL (ref 82–98)
MONOCYTES # BLD AUTO: 0.75 THOUSAND/ΜL (ref 0.17–1.22)
MONOCYTES NFR BLD AUTO: 16 % (ref 4–12)
NEUTROPHILS # BLD AUTO: 1.97 THOUSANDS/ΜL (ref 1.85–7.62)
NEUTS SEG NFR BLD AUTO: 44 % (ref 43–75)
NRBC BLD AUTO-RTO: 0 /100 WBCS
PLATELET # BLD AUTO: 275 THOUSANDS/UL (ref 149–390)
PMV BLD AUTO: 10.2 FL (ref 8.9–12.7)
RBC # BLD AUTO: 4.59 MILLION/UL (ref 3.88–5.62)
WBC # BLD AUTO: 4.6 THOUSAND/UL (ref 4.31–10.16)

## 2021-04-15 PROCEDURE — 36415 COLL VENOUS BLD VENIPUNCTURE: CPT

## 2021-04-15 PROCEDURE — 85025 COMPLETE CBC W/AUTO DIFF WBC: CPT

## 2021-04-16 ENCOUNTER — OFFICE VISIT (OUTPATIENT)
Dept: URGENT CARE | Facility: MEDICAL CENTER | Age: 81
End: 2021-04-16
Payer: MEDICARE

## 2021-04-16 ENCOUNTER — TELEPHONE (OUTPATIENT)
Dept: URGENT CARE | Facility: MEDICAL CENTER | Age: 81
End: 2021-04-16

## 2021-04-16 ENCOUNTER — APPOINTMENT (OUTPATIENT)
Dept: RADIOLOGY | Facility: MEDICAL CENTER | Age: 81
End: 2021-04-16
Payer: MEDICARE

## 2021-04-16 VITALS
HEART RATE: 76 BPM | DIASTOLIC BLOOD PRESSURE: 62 MMHG | RESPIRATION RATE: 16 BRPM | SYSTOLIC BLOOD PRESSURE: 148 MMHG | TEMPERATURE: 97.6 F

## 2021-04-16 DIAGNOSIS — S61.032A: ICD-10-CM

## 2021-04-16 DIAGNOSIS — L03.012 CELLULITIS OF LEFT THUMB: Primary | ICD-10-CM

## 2021-04-16 PROCEDURE — G0463 HOSPITAL OUTPT CLINIC VISIT: HCPCS | Performed by: PHYSICIAN ASSISTANT

## 2021-04-16 PROCEDURE — 99213 OFFICE O/P EST LOW 20 MIN: CPT | Performed by: PHYSICIAN ASSISTANT

## 2021-04-16 PROCEDURE — 73130 X-RAY EXAM OF HAND: CPT

## 2021-04-16 RX ORDER — CEPHALEXIN 500 MG/1
500 CAPSULE ORAL EVERY 6 HOURS SCHEDULED
Qty: 20 CAPSULE | Refills: 0 | Status: SHIPPED | OUTPATIENT
Start: 2021-04-16 | End: 2021-04-21

## 2021-04-16 NOTE — PROGRESS NOTES
[facility]        NAME: Travis Dillard is a 78 y o  male  : 1940    MRN: [id number]  DATE: 2021  TIME: 8:44 AM    Assessment and Plan   Cellulitis of left thumb [L03 012]  1  Cellulitis of left thumb     2  Puncture wound of thumb, left, initial encounter         Left hand x-rays ordered  No foreign bodies noted pending radiology report  Redness was marked with surgical pen  Patient Instructions       Patient was educated on puncture wound in right thumb  Patient was prescribed antibiotics and told to take as prescribed  Patient was told if redness in left hand gets worst go to ED  Patient was told to go to ED if he notices shortness of breath, chest pain or purulent discharge  Chief Complaint     Chief Complaint   Patient presents with    Hand Injury     Patient was using a stick with a nail and the nail went into the left hand palm          History of Present Illness       Patient presents today after having a josé miguel nail puncture of the thenar eminence of left hand  Patient reports mild pain in left thumb  Denies taking any pain medications  Patient reports his last Tetanus shot was 10/14/2018  Review of Systems   Review of Systems   Constitutional: Negative  Respiratory: Negative  Cardiovascular: Negative  Musculoskeletal:        Left thumb pain   Skin:        Puncture wound and redness noted on thenar eminence of left thumb  Psychiatric/Behavioral: Negative            Current Medications       Current Outpatient Medications:     amLODIPine (NORVASC) 10 mg tablet, Take 1 tablet (10 mg total) by mouth daily, Disp: 90 tablet, Rfl: 1    aspirin 81 MG tablet, Take 81 mg by mouth , Disp: , Rfl:     finasteride (PROSCAR) 5 mg tablet, Take 1 tablet (5 mg total) by mouth daily, Disp: 90 tablet, Rfl: 2    losartan (COZAAR) 25 mg tablet, Take 1 tablet (25 mg total) by mouth daily, Disp: 90 tablet, Rfl: 1    multivitamin (THERAGRAN) TABS, Take 1 tablet by mouth daily, Disp: , Rfl:     Omega-3 Fatty Acids (FISH OIL PO), Take by mouth 2 (two) times a day, Disp: , Rfl:     pantoprazole (PROTONIX) 40 mg tablet, Take 1 tablet (40 mg total) by mouth 2 (two) times a day, Disp: 60 tablet, Rfl: 0    polyethylene glycol (MIRALAX) 17 g packet, Take 17 g by mouth daily, Disp: , Rfl:     pravastatin (PRAVACHOL) 40 mg tablet, Take 1 tablet (40 mg total) by mouth daily, Disp: 90 tablet, Rfl: 1    Probiotic Product (PROBIOTIC DAILY PO), Take by mouth daily, Disp: , Rfl:     sertraline (ZOLOFT) 50 mg tablet, Take 1 tablet (50 mg total) by mouth daily, Disp: 90 tablet, Rfl: 1    tamsulosin (FLOMAX) 0 4 mg, Take 1 capsule (0 4 mg total) by mouth daily at bedtime, Disp: 90 capsule, Rfl: 3    VITAMIN D, ERGOCALCIFEROL, PO, Take by mouth, Disp: , Rfl:     Current Allergies     Allergies as of 04/16/2021 - Reviewed 04/16/2021   Allergen Reaction Noted    Ace inhibitors Cough 09/22/2017            The following portions of the patient's history were reviewed and updated as appropriate: allergies, current medications, past family history, past medical history, past social history, past surgical history and problem list      Past Medical History:   Diagnosis Date    Actinic keratosis     LAST ASSESSED: [id number]    Allergic rhinitis     LAST ASSESSED: [id number]    Anxiety     LAST ASSESSED: [id number]    Anxiety disorder     LAST ASSESSED: [id number]    Atelectasis of right lung     ABNORMAL  [address] 12/2/2016 REPEAT NEEDED PER RADIOLOGY IN 3 MONTHS LAST ASSESSED: [id number]    Atypical chest pain     LAST ASSESSED: [id number]    Benign paroxysmal vertigo     LAST ASSESSED: [id number]    Chronic cough     LAST ASSESSED: [id number]    Chronic GERD     Degenerative arthritis of knee, bilateral     LAST ASSESSED: [id number]    Diverticulitis of colon     LAST ASSESSED: [id number]    Diverticulosis     LAST ASSESSED: [id number]    Foreign body, eye     LAST ASSESSED: [id number]    Functional gait abnormality     LAST ASSESSED: [id number]    Hyperlipidemia     Hypertension     Hyponatremia     LAST ASSESSED: [id number]    Leukocytosis     LAST ASSESSED: 70CVA9916    Murmur     Newly recognized murmur     LAST ASSESSED: [id number]    Olecranon bursitis, unspecified elbow     Presence of indwelling urethral catheter     RESOLVED: [id number]    Transient cerebral ischemia     LAST ASSESSED: [id number]    Urinary incontinence     LAST ASSESSED: 80SRQ4916    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     LAST ASSESSED: [id number]       Past Surgical History:   Procedure Laterality Date    ADENOIDECTOMY      APPENDECTOMY      COLONOSCOPY  10/2006    FIBEROPTIC    INGUINAL HERNIA REPAIR      JOINT REPLACEMENT      b/l TKR Sept 2015    SINUS SURGERY      TONSILLECTOMY         Family History   Problem Relation Age of Onset    Alzheimer's disease Mother     Coronary artery disease Brother          Medications have been verified  Objective   /62   Pulse 76   Temp 97 6 °F (36 4 °C)   Resp 16   No LMP for male patient  Physical Exam     Physical Exam  Constitutional:       Appearance: He is normal weight  HENT:      Head: Normocephalic  Cardiovascular:      Rate and Rhythm: Normal rate and regular rhythm  Heart sounds: Normal heart sounds  Pulmonary:      Effort: Pulmonary effort is normal       Breath sounds: Normal breath sounds  Musculoskeletal:      Comments: Left wrist and left finger active flexion and extension intact  Capillary refill and radial pulse intact in left wrist  Left thumb opposition intact  Skin:     Comments: Redness and puncture wound noted on left thumb thenar eminence  Neurological:      Mental Status: He is alert and oriented to person, place, and time     Psychiatric:         Mood and Affect: Mood normal          Behavior: Behavior normal

## 2021-04-16 NOTE — PATIENT INSTRUCTIONS
Patient was educated on puncture wound in right thumb  Patient was prescribed antibiotics and told to take as prescribed  Patient was told if redness in left hand get worst go to ED  Patient was told to go to ED if he notices shortness of breath, chest pain or purulent discharge  Cellulitis   WHAT YOU NEED TO KNOW:   Cellulitis is a skin infection caused by bacteria  Cellulitis may go away on its own or you may need treatment  Your healthcare provider may draw a [place] around the outside edges of your cellulitis  If your cellulitis spreads, your healthcare provider will see it outside of the [place]  DISCHARGE INSTRUCTIONS:   Call 911 if:   · You have sudden trouble breathing or chest pain  Return to the emergency department if:   · Your wound gets larger and more painful  · You feel a crackling under your skin when you touch it  · You have purple dots or bumps on your skin, or you see bleeding under your skin  · You have new swelling and pain in your legs  · The red, warm, swollen area gets larger  · You see red streaks coming from the infected area  Contact your healthcare provider if:   · You have a fever  · Your fever or pain does not go away or gets worse  · The area does not get smaller after 2 days of antibiotics  · Your skin is flaking or peeling off  · You have questions or concerns about your condition or care  Medicines:   · Antibiotics  help treat the bacterial infection  · NSAIDs , such as ibuprofen, help decrease swelling, pain, and fever  NSAIDs can cause stomach bleeding or kidney problems in certain people  If you take blood thinner medicine, always ask if NSAIDs are safe for you  Always read the medicine label and follow directions  Do not give these medicines to children under 10months of age without direction from your child's healthcare provider  · Acetaminophen  decreases pain and fever  It is available without a doctor's order   Ask how much to take and how often to take it  Follow directions  Read the labels of all other medicines you are using to see if they also contain acetaminophen, or ask your doctor or pharmacist  Acetaminophen can cause liver damage if not taken correctly  Do not use more than 4 grams (4,000 milligrams) total of acetaminophen in one day  · Take your medicine as directed  Contact your healthcare provider if you think your medicine is not helping or if you have side effects  Tell him or her if you are allergic to any medicine  Keep a list of the medicines, vitamins, and herbs you take  Include the amounts, and when and why you take them  Bring the list or the pill bottles to follow-up visits  Carry your medicine list with you in case of an emergency  Self-care:   · Elevate the area above the level of your heart  as often as you can  This will help decrease swelling and pain  Prop the area on pillows or blankets to keep it elevated comfortably  · Clean the area daily until the wound scabs over  Gently wash the area with soap and water  Pat dry  Use dressings as directed  · Place cool or warm, wet cloths on the area as directed  Use clean cloths and clean water  Leave it on the area until the cloth is room temperature  Pat the area dry with a clean, dry cloth  The cloths may help decrease pain  Prevent cellulitis:   · Do not scratch bug bites or areas of injury  You increase your risk for cellulitis by scratching these areas  · Do not share personal items, such as towels, clothing, and razors  · Clean exercise equipment  with germ-killing  before and after you use it  · Wash your hands often  Use soap and water  Wash your hands after you use the bathroom, change a child's diapers, or sneeze  Wash your hands before you prepare or eat food  Use lotion to prevent dry, cracked skin  · Wear pressure stockings as directed    You may be told to wear the stockings if you have peripheral edema  The stockings improve blood flow and decrease swelling  · Treat athlete's foot  This can help prevent the spread of a bacterial skin infection  Follow up with your healthcare provider within 3 days, or as directed: Your healthcare provider will check if your cellulitis is getting better  You may need different medicine  Write down your questions so you remember to ask them during your visits  © Copyright [address] Information is for End User's use only and may not be sold, redistributed or otherwise used for commercial purposes  All illustrations and images included in CareNotes® are the copyrighted property of [employer]  or [address] [place], [place] [address]bennett [place]   The above information is an  only  It is not intended as medical advice for individual conditions or treatments  Talk to your doctor, nurse or pharmacist before following any medical regimen to see if it is safe and effective for you

## 2021-05-12 ENCOUNTER — TELEPHONE (OUTPATIENT)
Dept: GERIATRICS | Age: 81
End: 2021-05-12

## 2021-05-12 NOTE — TELEPHONE ENCOUNTER
Called patient as well as patient's daughter Lloyd Burnham regarding patient being referred to our office by ANATOLY Montgomery  Patient was diagnosed with patient with Mild cognitive impairment However, patient nor patient's daughter did not answer the phone so I left patient and patient's daughter a brief voice message explaining what Selvin Cole is about and ask them to give our office a call at her earliest convince to make an appointment with our office

## 2021-07-21 ENCOUNTER — TELEPHONE (OUTPATIENT)
Dept: UROLOGY | Facility: CLINIC | Age: 81
End: 2021-07-21

## 2021-07-26 ENCOUNTER — OFFICE VISIT (OUTPATIENT)
Dept: FAMILY MEDICINE CLINIC | Facility: CLINIC | Age: 81
End: 2021-07-26
Payer: MEDICARE

## 2021-07-26 VITALS
SYSTOLIC BLOOD PRESSURE: 140 MMHG | DIASTOLIC BLOOD PRESSURE: 60 MMHG | BODY MASS INDEX: 31.44 KG/M2 | TEMPERATURE: 97.1 F | HEIGHT: 64 IN | WEIGHT: 184.13 LBS

## 2021-07-26 DIAGNOSIS — G31.84 MILD COGNITIVE IMPAIRMENT: Chronic | ICD-10-CM

## 2021-07-26 DIAGNOSIS — K76.0 FATTY LIVER DISEASE, NONALCOHOLIC: ICD-10-CM

## 2021-07-26 DIAGNOSIS — F33.1 MODERATE EPISODE OF RECURRENT MAJOR DEPRESSIVE DISORDER (HCC): ICD-10-CM

## 2021-07-26 DIAGNOSIS — D50.8 IRON DEFICIENCY ANEMIA SECONDARY TO INADEQUATE DIETARY IRON INTAKE: ICD-10-CM

## 2021-07-26 DIAGNOSIS — I10 ESSENTIAL HYPERTENSION: Primary | ICD-10-CM

## 2021-07-26 DIAGNOSIS — I35.0 AORTIC STENOSIS, MODERATE: ICD-10-CM

## 2021-07-26 DIAGNOSIS — E78.2 MIXED HYPERLIPIDEMIA: ICD-10-CM

## 2021-07-26 PROBLEM — K92.2 UPPER GI BLEED: Status: RESOLVED | Noted: 2021-03-19 | Resolved: 2021-07-26

## 2021-07-26 PROBLEM — G93.40 ENCEPHALOPATHY: Status: RESOLVED | Noted: 2021-02-19 | Resolved: 2021-07-26

## 2021-07-26 PROBLEM — R41.0 DISORIENTATION: Status: RESOLVED | Noted: 2021-02-19 | Resolved: 2021-07-26

## 2021-07-26 PROBLEM — E87.6 HYPOKALEMIA: Status: RESOLVED | Noted: 2021-02-19 | Resolved: 2021-07-26

## 2021-07-26 PROCEDURE — 99214 OFFICE O/P EST MOD 30 MIN: CPT | Performed by: FAMILY MEDICINE

## 2021-07-26 NOTE — PROGRESS NOTES
Assessment/Plan:    Mild cognitive impairment  Stable at this time Patient declines further followup    Mixed hyperlipidemia  Last lipids stable Continue meds repeat lipids in 9/2021    Essential hypertension  BP is stable patient is off water pill Patient to continue current care and followup in 6 months    Aortic stenosis, moderate  followup cardiology    Fatty liver disease, nonalcoholic  Last LFt's stable continue to monitor Patient follows up with GI    Moderate episode of recurrent major depressive disorder ([address] )  Depression is stable continue current care and followup in 6 months    Obesity (BMI 30 0-34  9)  Discussed diet    Iron deficiency anemia secondary to inadequate dietary iron intake  Repeat CBC in September and followup EGD       Diagnoses and all orders for this visit:    Essential hypertension  -     Comprehensive metabolic panel; Future  -     Lipid panel; Future    Mixed hyperlipidemia  -     Comprehensive metabolic panel; Future  -     Lipid panel; Future    Aortic stenosis, moderate  -     Ambulatory referral to Cardiology; Future    Moderate episode of recurrent major depressive disorder (HCC)    Iron deficiency anemia secondary to inadequate dietary iron intake  -     CBC and differential; Future    Mild cognitive impairment    Fatty liver disease, nonalcoholic          Subjective:   Chief Complaint   Patient presents with    Hypertension     checkup    Hyperlipidemia     checkup        Patient ID: Eddie Ortez is a 78 y o  male      Patient is here for follwoup of hypertension hyperlipidemia iron deficicney anemia obesity and also his aortic stenosis depression and mild cognitive impairment Patient feels well he is taking all meds He is off water pill and BP is stable with no leg edema patien tmoods and memory stable Patient declines any other followup with geriatrics alaynan lipids were at goal in spring He needs labs in fall Patient has to see cardiology for followup of his heart Patient has no complaints His anemia is stable HB was 10 8 he has EGD in fall and also followup with urology for his BPH at that time       The following portions of the patient's history were reviewed and updated as appropriate: allergies, current medications, past family history, past medical history, past social history, past surgical history and problem list     Review of Systems   Constitutional: Negative for fatigue, fever and unexpected weight change  HENT: Negative for congestion, sinus pain and trouble swallowing  Eyes: Negative for discharge and visual disturbance  Respiratory: Negative for cough, chest tightness, shortness of breath and wheezing  Cardiovascular: Negative for chest pain, palpitations and leg swelling  Gastrointestinal: Negative for abdominal pain, blood in stool, constipation, diarrhea, nausea and vomiting  Genitourinary: Negative for difficulty urinating, dysuria, frequency and hematuria  Musculoskeletal: Negative for arthralgias, gait problem and joint swelling  Skin: Negative for rash and wound  Allergic/Immunologic: Negative for environmental allergies and food allergies  Neurological: Negative for dizziness, syncope, weakness, numbness and headaches  Hematological: Negative for adenopathy  Does not bruise/bleed easily  Psychiatric/Behavioral: Negative for confusion, decreased concentration and sleep disturbance  The patient is not nervous/anxious  Objective:      /60   Temp (!) 97 1 °F (36 2 °C)   Ht 5' 4" (1 626 m)   Wt 83 5 kg (184 lb 2 oz)   BMI 31 60 kg/m²          Physical Exam  Vitals and nursing note reviewed  Constitutional:       Appearance: He is well-developed  He is obese  HENT:      Head: Normocephalic and atraumatic  Right Ear: Hearing, tympanic membrane and external ear normal       Left Ear: Hearing, tympanic membrane and external ear normal    Eyes:      General:         Left eye: Left eye discharge: comp  Extraocular Movements: Extraocular movements intact  Conjunctiva/sclera: Conjunctivae normal       Pupils: Pupils are equal, round, and reactive to light  Neck:      Thyroid: No thyromegaly  Cardiovascular:      Rate and Rhythm: Normal rate and regular rhythm  Pulses: Normal pulses  Heart sounds: Normal heart sounds  Pulmonary:      Effort: Pulmonary effort is normal       Breath sounds: Normal breath sounds  No wheezing or rales  Abdominal:      General: Abdomen is flat  Bowel sounds are normal  There is no distension  Palpations: Abdomen is soft  Tenderness: There is no abdominal tenderness  Musculoskeletal:         General: No tenderness  Cervical back: Neck supple  Lymphadenopathy:      Cervical: No cervical adenopathy  Skin:     General: Skin is warm and dry  Findings: No rash  Neurological:      General: No focal deficit present  Mental Status: He is alert and oriented to person, place, and time  Cranial Nerves: No cranial nerve deficit  Coordination: Coordination normal    Psychiatric:         Mood and Affect: Mood normal          Behavior: Behavior normal          Thought Content: Thought content normal          Judgment: Judgment normal        BMI Counseling: Body mass index is 31 6 kg/m²  The BMI is above normal  Nutrition recommendations include reducing portion sizes, decreasing overall calorie intake, 3-5 servings of fruits/vegetables daily, moderation in carbohydrate intake and increasing intake of lean protein

## 2021-07-26 NOTE — PATIENT INSTRUCTIONS
Cholesterol and Your Health   WHAT YOU NEED TO KNOW:   What is cholesterol? Cholesterol is a waxy, fat-like substance  Your body uses cholesterol to make hormones and new cells, and to protect nerves  Cholesterol is made by your body  It also comes from certain foods you eat, such as meat and dairy products  Your healthcare provider can help you set goals for your cholesterol levels  He or she can help you create a plan to meet your goals  What are cholesterol level goals? Your cholesterol level goals depend on your risk for heart disease, your age, and your other health conditions  The following are general guidelines:  · Total cholesterol  includes low-density lipoprotein (LDL), high-density lipoprotein (HDL), and triglyceride levels  The total cholesterol level should be lower than 200 mg/dL and is best at about 150 mg/dL  · LDL cholesterol  is called bad cholesterol  because it forms plaque in your arteries  As plaque builds up, your arteries become narrow, and less blood flows through  When plaque decreases blood flow to your heart, you may have chest pain  If plaque completely blocks an artery that brings blood to your heart, you may have a heart attack  Plaque can break off and form blood clots  Blood clots may block arteries in your brain and cause a stroke  The level should be less than 130 mg/dL and is best at about 100 mg/dL  · HDL cholesterol  is called good cholesterol  because it helps remove LDL cholesterol from your arteries  It does this by attaching to LDL cholesterol and carrying it to your liver  Your liver breaks down LDL cholesterol so your body can get rid of it  High levels of HDL cholesterol can help prevent a heart attack and stroke  Low levels of HDL cholesterol can increase your risk for heart disease, heart attack, and stroke  The level should be 60 mg/dL or higher  · Triglycerides  are a type of fat that store energy from foods you eat   High levels of triglycerides also cause plaque buildup  This can increase your risk for a heart attack or stroke  If your triglyceride level is high, your LDL cholesterol level may also be high  The level should be less than 150 mg/dL  What increases my risk for high cholesterol? · Smoking cigarettes    · Being overweight or obese, or not getting enough exercise    · Drinking large amounts of alcohol    · A medical condition such as hypertension (high blood pressure) or diabetes    · Certain genes passed from your parents to you    · Age older than 72 years    What do I need to know about having my cholesterol levels checked? Adults 21to 39years of age should have their cholesterol levels checked every 4 to 6 years  Adults 45 years or older should have their cholesterol checked every 1 to 2 years  You may need your cholesterol checked more often, or at a younger age, if you have risk factors for heart disease  You may also need to have your cholesterol checked more often if you have other health conditions, such as diabetes  Blood tests are used to check cholesterol levels  Blood tests measure your levels of triglycerides, LDL cholesterol, and HDL cholesterol  How do healthy fats affect my cholesterol levels? Healthy fats, also called unsaturated fats, help lower LDL cholesterol and triglyceride levels  Healthy fats include the following:  · Monounsaturated fats  are found in foods such as olive oil, canola oil, avocado, nuts, and olives  · Polyunsaturated fats,  such as omega 3 fats, are found in fish, such as salmon, trout, and tuna  They can also be found in plant foods such as flaxseed, walnuts, and soybeans  How do unhealthy fats affect my cholesterol levels? Unhealthy fats increase LDL cholesterol and triglyceride levels  They are found in foods high in cholesterol, saturated fat, and trans fat:  · Cholesterol  is found in eggs, dairy, and meat      · Saturated fat  is found in butter, cheese, ice cream, whole milk, and coconut oil  Saturated fat is also found in meat, such as sausage, hot dogs, and bologna  · Trans fat  is found in liquid oils and is used in fried and baked foods  Foods that contain trans fats include chips, crackers, muffins, sweet rolls, microwave popcorn, and cookies  How is high cholesterol treated? Treatment for high cholesterol will also decrease your risk of heart disease, heart attack, and stroke  Treatment may include any of the following:  · Lifestyle changes  may include food, exercise, weight loss, and quitting smoking  You may also need to decrease the amount of alcohol you drink  Your healthcare provider will want you to start with lifestyle changes  Other treatment may be added if lifestyle changes are not enough  Your healthcare provider may recommend you work with a team to manage hyperlipidemia  The team may include medical experts such as a dietitian, an exercise or physical therapist, and a behavior therapist  Your family members may be included in helping you create lifestyle changes  · Medicines  may be given to lower your LDL cholesterol, triglyceride levels, or total cholesterol level  You may need medicines to lower your cholesterol if any of the following is true:    ? You have a history of stroke, TIA, unstable angina, or a heart attack  ? Your LDL cholesterol level is 190 mg/dL or higher  ? You are age 36 to 76 years, have diabetes or heart disease risk factors, and your LDL cholesterol is 70 mg/dL or higher  · Supplements  include fish oil, red yeast rice, and garlic  Fish oil may help lower your triglyceride and LDL cholesterol levels  It may also increase your HDL cholesterol level  Red yeast rice may help decrease your total cholesterol level and LDL cholesterol level  Garlic may help lower your total cholesterol level  Do not take any supplements without talking to your healthcare provider  What food changes can I make to lower my cholesterol levels?   A dietitian can help you create a healthy eating plan  He or she can show you how to read food labels and choose foods low in saturated fat, trans fats, and cholesterol  · Decrease the total amount of fat you eat  Choose lean meats, fat-free or 1% fat milk, and low-fat dairy products, such as yogurt and cheese  Try to limit or avoid red meats  Limit or do not eat fried foods or baked goods, such as cookies  · Replace unhealthy fats with healthy fats  Cook foods in olive oil or canola oil  Choose soft margarines that are low in saturated fat and trans fat  Seeds, nuts, and avocados are other examples of healthy fats  · Eat foods with omega-3 fats  Examples include salmon, tuna, mackerel, walnuts, and flaxseed  Eat fish 2 times per week  Pregnant women should not eat fish that have high levels of mercury, such as shark, swordfish, and brandan mackerel  · Increase the amount of high-fiber foods you eat  High-fiber foods can help lower your LDL cholesterol  Aim to get between 20 and 30 grams of fiber each day  Fruits and vegetables are high in fiber  Eat at least 5 servings each day  Other high-fiber foods are whole-grain or whole-wheat breads, pastas, or cereals, and brown rice  Eat 3 ounces of whole-grain foods each day  Increase fiber slowly  You may have abdominal discomfort, bloating, and gas if you add fiber to your diet too quickly  · Eat healthy protein foods  Examples include low-fat dairy products, skinless chicken and turkey, fish, and nuts  · Limit foods and drinks that are high in sugar  Your dietitian or healthcare provider can help you create daily limits for high-sugar foods and drinks  The limit may be lower if you have diabetes or another health condition  Limits can also help you lose weight if needed  What lifestyle changes can I make to lower my cholesterol levels? · Maintain a healthy weight  Ask your healthcare provider what a healthy weight is for you   Ask him or her to help you create a weight loss plan if needed  Weight loss can decrease your total cholesterol and triglyceride levels  Weight loss may also help keep your blood pressure at a healthy level  · Be physically active throughout the day  Physical activity, such as exercise, can help lower your total cholesterol level and maintain a healthy weight  Physical activity can also help increase your HDL cholesterol level  Work with your healthcare provider to create an program that is right for you  Get at least 30 to 40 minutes of moderate physical activity most days of the week  Examples of exercise include brisk walking, swimming, or biking  Also include strength training at least 2 times each week  Your healthcare providers can help you create a physical activity plan  · Do not smoke  Nicotine and other chemicals in cigarettes and cigars can raise your cholesterol levels  Ask your healthcare provider for information if you currently smoke and need help to quit  E-cigarettes or smokeless tobacco still contain nicotine  Talk to your healthcare provider before you use these products  · Limit or do not drink alcohol  Alcohol can increase your triglyceride levels  Ask your healthcare provider before you drink alcohol  Ask how much is okay for you to drink in 24 hours or 1 week  CARE AGREEMENT:   You have the right to help plan your care  Discuss treatment options with your healthcare provider to decide what care you want to receive  You always have the right to refuse treatment  The above information is an  only  It is not intended as medical advice for individual conditions or treatments  Talk to your doctor, nurse or pharmacist before following any medical regimen to see if it is safe and effective for you  © Copyright [employer] 2021 Information is for End User's use only and may not be sold, redistributed or otherwise used for commercial purposes   All illustrations and images included in [employer] are the copyrighted property of [employer]  or [facility] Nathanael Luong

## 2021-08-19 ENCOUNTER — APPOINTMENT (EMERGENCY)
Dept: RADIOLOGY | Facility: HOSPITAL | Age: 81
DRG: 870 | End: 2021-08-19
Payer: MEDICARE

## 2021-08-19 ENCOUNTER — HOSPITAL ENCOUNTER (INPATIENT)
Facility: HOSPITAL | Age: 81
LOS: 29 days | DRG: 870 | End: 2021-09-17
Attending: EMERGENCY MEDICINE | Admitting: INTERNAL MEDICINE
Payer: MEDICARE

## 2021-08-19 DIAGNOSIS — G31.84 MILD COGNITIVE IMPAIRMENT: ICD-10-CM

## 2021-08-19 DIAGNOSIS — J96.00 ACUTE RESPIRATORY FAILURE (HCC): Primary | ICD-10-CM

## 2021-08-19 DIAGNOSIS — A41.9 SEVERE SEPSIS (HCC): ICD-10-CM

## 2021-08-19 DIAGNOSIS — I73.9 PAD (PERIPHERAL ARTERY DISEASE) (HCC): ICD-10-CM

## 2021-08-19 DIAGNOSIS — J96.01 ACUTE RESPIRATORY FAILURE WITH HYPOXIA (HCC): ICD-10-CM

## 2021-08-19 DIAGNOSIS — G93.40 ACUTE ENCEPHALOPATHY: ICD-10-CM

## 2021-08-19 DIAGNOSIS — D72.829 LEUKOCYTOSIS: ICD-10-CM

## 2021-08-19 DIAGNOSIS — R65.20 SEVERE SEPSIS (HCC): ICD-10-CM

## 2021-08-19 DIAGNOSIS — R77.8 ELEVATED TROPONIN: ICD-10-CM

## 2021-08-19 DIAGNOSIS — N17.9 AKI (ACUTE KIDNEY INJURY) (HCC): ICD-10-CM

## 2021-08-19 PROBLEM — J18.9 MULTIFOCAL PNEUMONIA: Status: ACTIVE | Noted: 2021-08-19

## 2021-08-19 PROBLEM — E87.2 LACTIC ACIDOSIS: Status: ACTIVE | Noted: 2021-08-19

## 2021-08-19 PROBLEM — E87.2 HIGH ANION GAP METABOLIC ACIDOSIS: Status: ACTIVE | Noted: 2021-08-19

## 2021-08-19 PROBLEM — D50.9 HYPOCHROMIC MICROCYTIC ANEMIA: Status: ACTIVE | Noted: 2021-08-19

## 2021-08-19 PROBLEM — I50.9 ACUTE EXACERBATION OF CHF (CONGESTIVE HEART FAILURE) (HCC): Status: ACTIVE | Noted: 2021-08-19

## 2021-08-19 LAB
ALBUMIN SERPL BCP-MCNC: 4.1 G/DL (ref 3.5–5)
ALP SERPL-CCNC: 78 U/L (ref 46–116)
ALT SERPL W P-5'-P-CCNC: 62 U/L (ref 12–78)
ANION GAP SERPL CALCULATED.3IONS-SCNC: 19 MMOL/L (ref 4–13)
APTT PPP: 26 SECONDS (ref 23–37)
ARTERIAL PATENCY WRIST A: YES
AST SERPL W P-5'-P-CCNC: 53 U/L (ref 5–45)
BASE EXCESS BLDA CALC-SCNC: -8.8 MMOL/L
BASOPHILS # BLD AUTO: 0.1 THOUSANDS/ΜL (ref 0–0.1)
BASOPHILS NFR BLD AUTO: 1 % (ref 0–1)
BILIRUB SERPL-MCNC: 0.37 MG/DL (ref 0.2–1)
BUN SERPL-MCNC: 26 MG/DL (ref 5–25)
CALCIUM SERPL-MCNC: 8 MG/DL (ref 8.3–10.1)
CHLORIDE SERPL-SCNC: 105 MMOL/L (ref 100–108)
CO2 SERPL-SCNC: 18 MMOL/L (ref 21–32)
CREAT SERPL-MCNC: 0.97 MG/DL (ref 0.6–1.3)
EOSINOPHIL # BLD AUTO: 0.03 THOUSAND/ΜL (ref 0–0.61)
EOSINOPHIL NFR BLD AUTO: 0 % (ref 0–6)
ERYTHROCYTE [DISTWIDTH] IN BLOOD BY AUTOMATED COUNT: 18.6 % (ref 11.6–15.1)
GFR SERPL CREATININE-BSD FRML MDRD: 72 ML/MIN/1.73SQ M
GLUCOSE SERPL-MCNC: 174 MG/DL (ref 65–140)
HCO3 BLDA-SCNC: 16.9 MMOL/L (ref 22–28)
HCT VFR BLD AUTO: 34.7 % (ref 36.5–49.3)
HGB BLD-MCNC: 9.9 G/DL (ref 12–17)
IMM GRANULOCYTES # BLD AUTO: 0.11 THOUSAND/UL (ref 0–0.2)
IMM GRANULOCYTES NFR BLD AUTO: 1 % (ref 0–2)
INR PPP: 1.09 (ref 0.84–1.19)
IPAP: 14
LACTATE SERPL-SCNC: 5.6 MMOL/L (ref 0.5–2)
LACTATE SERPL-SCNC: 8.1 MMOL/L (ref 0.5–2)
LYMPHOCYTES # BLD AUTO: 1.65 THOUSANDS/ΜL (ref 0.6–4.47)
LYMPHOCYTES NFR BLD AUTO: 10 % (ref 14–44)
MCH RBC QN AUTO: 21.7 PG (ref 26.8–34.3)
MCHC RBC AUTO-ENTMCNC: 28.5 G/DL (ref 31.4–37.4)
MCV RBC AUTO: 76 FL (ref 82–98)
MONOCYTES # BLD AUTO: 1.01 THOUSAND/ΜL (ref 0.17–1.22)
MONOCYTES NFR BLD AUTO: 6 % (ref 4–12)
NEUTROPHILS # BLD AUTO: 14.21 THOUSANDS/ΜL (ref 1.85–7.62)
NEUTS SEG NFR BLD AUTO: 82 % (ref 43–75)
NON VENT- BIPAP: ABNORMAL
NRBC BLD AUTO-RTO: 0 /100 WBCS
NT-PROBNP SERPL-MCNC: 693 PG/ML
O2 CT BLDA-SCNC: 13.6 ML/DL (ref 16–23)
OXYHGB MFR BLDA: 96.1 % (ref 94–97)
PCO2 BLDA: 35.7 MM HG (ref 36–44)
PEEP MAX SETTING VENT: 7 CM[H2O]
PH BLDA: 7.29 [PH] (ref 7.35–7.45)
PLATELET # BLD AUTO: 378 THOUSANDS/UL (ref 149–390)
PMV BLD AUTO: 9.4 FL (ref 8.9–12.7)
PO2 BLDA: 107 MM HG (ref 75–129)
POTASSIUM SERPL-SCNC: 3.7 MMOL/L (ref 3.5–5.3)
PROCALCITONIN SERPL-MCNC: <0.05 NG/ML
PROT SERPL-MCNC: 7.8 G/DL (ref 6.4–8.2)
PROTHROMBIN TIME: 13.9 SECONDS (ref 11.6–14.5)
RBC # BLD AUTO: 4.57 MILLION/UL (ref 3.88–5.62)
SARS-COV-2 RNA RESP QL NAA+PROBE: NEGATIVE
SODIUM SERPL-SCNC: 142 MMOL/L (ref 136–145)
SPECIMEN SOURCE: ABNORMAL
TROPONIN I SERPL-MCNC: 0.08 NG/ML
VENT BIPAP FIO2: 60 %
WBC # BLD AUTO: 17.11 THOUSAND/UL (ref 4.31–10.16)

## 2021-08-19 PROCEDURE — 96375 TX/PRO/DX INJ NEW DRUG ADDON: CPT

## 2021-08-19 PROCEDURE — 84100 ASSAY OF PHOSPHORUS: CPT | Performed by: NURSE PRACTITIONER

## 2021-08-19 PROCEDURE — 36415 COLL VENOUS BLD VENIPUNCTURE: CPT

## 2021-08-19 PROCEDURE — 82805 BLOOD GASES W/O2 SATURATION: CPT

## 2021-08-19 PROCEDURE — U0005 INFEC AGEN DETEC AMPLI PROBE: HCPCS

## 2021-08-19 PROCEDURE — 96365 THER/PROPH/DIAG IV INF INIT: CPT

## 2021-08-19 PROCEDURE — 85730 THROMBOPLASTIN TIME PARTIAL: CPT

## 2021-08-19 PROCEDURE — 80053 COMPREHEN METABOLIC PANEL: CPT

## 2021-08-19 PROCEDURE — 85025 COMPLETE CBC W/AUTO DIFF WBC: CPT

## 2021-08-19 PROCEDURE — 94760 N-INVAS EAR/PLS OXIMETRY 1: CPT

## 2021-08-19 PROCEDURE — 93005 ELECTROCARDIOGRAM TRACING: CPT

## 2021-08-19 PROCEDURE — 84484 ASSAY OF TROPONIN QUANT: CPT

## 2021-08-19 PROCEDURE — 99291 CRITICAL CARE FIRST HOUR: CPT | Performed by: EMERGENCY MEDICINE

## 2021-08-19 PROCEDURE — 83605 ASSAY OF LACTIC ACID: CPT

## 2021-08-19 PROCEDURE — U0003 INFECTIOUS AGENT DETECTION BY NUCLEIC ACID (DNA OR RNA); SEVERE ACUTE RESPIRATORY SYNDROME CORONAVIRUS 2 (SARS-COV-2) (CORONAVIRUS DISEASE [COVID-19]), AMPLIFIED PROBE TECHNIQUE, MAKING USE OF HIGH THROUGHPUT TECHNOLOGIES AS DESCRIBED BY CMS-2020-01-R: HCPCS

## 2021-08-19 PROCEDURE — NC001 PR NO CHARGE: Performed by: EMERGENCY MEDICINE

## 2021-08-19 PROCEDURE — 84145 PROCALCITONIN (PCT): CPT

## 2021-08-19 PROCEDURE — 96374 THER/PROPH/DIAG INJ IV PUSH: CPT

## 2021-08-19 PROCEDURE — 87040 BLOOD CULTURE FOR BACTERIA: CPT

## 2021-08-19 PROCEDURE — 85610 PROTHROMBIN TIME: CPT

## 2021-08-19 PROCEDURE — 94002 VENT MGMT INPAT INIT DAY: CPT

## 2021-08-19 PROCEDURE — 71045 X-RAY EXAM CHEST 1 VIEW: CPT

## 2021-08-19 PROCEDURE — 99291 CRITICAL CARE FIRST HOUR: CPT

## 2021-08-19 PROCEDURE — 83735 ASSAY OF MAGNESIUM: CPT | Performed by: NURSE PRACTITIONER

## 2021-08-19 PROCEDURE — 83880 ASSAY OF NATRIURETIC PEPTIDE: CPT

## 2021-08-19 RX ORDER — ASPIRIN 300 MG/1
300 SUPPOSITORY RECTAL ONCE
Status: COMPLETED | OUTPATIENT
Start: 2021-08-19 | End: 2021-08-19

## 2021-08-19 RX ORDER — FUROSEMIDE 10 MG/ML
40 INJECTION INTRAMUSCULAR; INTRAVENOUS ONCE
Status: COMPLETED | OUTPATIENT
Start: 2021-08-19 | End: 2021-08-19

## 2021-08-19 RX ORDER — LORAZEPAM 2 MG/ML
0.5 INJECTION INTRAMUSCULAR ONCE
Status: COMPLETED | OUTPATIENT
Start: 2021-08-19 | End: 2021-08-19

## 2021-08-19 RX ADMIN — MORPHINE SULFATE 2 MG: 2 INJECTION, SOLUTION INTRAMUSCULAR; INTRAVENOUS at 20:53

## 2021-08-19 RX ADMIN — FUROSEMIDE 40 MG: 10 INJECTION, SOLUTION INTRAVENOUS at 21:02

## 2021-08-19 RX ADMIN — CEFTRIAXONE SODIUM 1000 MG: 10 INJECTION, POWDER, FOR SOLUTION INTRAVENOUS at 22:08

## 2021-08-19 RX ADMIN — LORAZEPAM 0.5 MG: 2 INJECTION INTRAMUSCULAR; INTRAVENOUS at 20:53

## 2021-08-19 RX ADMIN — ASPIRIN 300 MG: 300 SUPPOSITORY RECTAL at 20:49

## 2021-08-19 RX ADMIN — SODIUM CHLORIDE, SODIUM LACTATE, POTASSIUM CHLORIDE, AND CALCIUM CHLORIDE 1000 ML: .6; .31; .03; .02 INJECTION, SOLUTION INTRAVENOUS at 22:26

## 2021-08-20 ENCOUNTER — APPOINTMENT (INPATIENT)
Dept: NON INVASIVE DIAGNOSTICS | Facility: HOSPITAL | Age: 81
DRG: 870 | End: 2021-08-20
Payer: MEDICARE

## 2021-08-20 PROBLEM — A41.9 SEPSIS (HCC): Status: ACTIVE | Noted: 2021-08-20

## 2021-08-20 LAB
ANION GAP SERPL CALCULATED.3IONS-SCNC: 10 MMOL/L (ref 4–13)
ANION GAP SERPL CALCULATED.3IONS-SCNC: 16 MMOL/L (ref 4–13)
APTT PPP: 60 SECONDS (ref 23–37)
ARTERIAL PATENCY WRIST A: YES
ATRIAL RATE: 100 BPM
ATRIAL RATE: 104 BPM
ATRIAL RATE: 96 BPM
ATRIAL RATE: 97 BPM
BASE EXCESS BLDA CALC-SCNC: 3.1 MMOL/L
BASOPHILS # BLD AUTO: 0.03 THOUSANDS/ΜL (ref 0–0.1)
BASOPHILS NFR BLD AUTO: 0 % (ref 0–1)
BUN SERPL-MCNC: 19 MG/DL (ref 5–25)
BUN SERPL-MCNC: 21 MG/DL (ref 5–25)
CA-I BLD-SCNC: 0.99 MMOL/L (ref 1.12–1.32)
CALCIUM SERPL-MCNC: 7.7 MG/DL (ref 8.3–10.1)
CALCIUM SERPL-MCNC: 7.7 MG/DL (ref 8.3–10.1)
CHLORIDE SERPL-SCNC: 104 MMOL/L (ref 100–108)
CHLORIDE SERPL-SCNC: 105 MMOL/L (ref 100–108)
CO2 SERPL-SCNC: 21 MMOL/L (ref 21–32)
CO2 SERPL-SCNC: 31 MMOL/L (ref 21–32)
CREAT SERPL-MCNC: 0.82 MG/DL (ref 0.6–1.3)
CREAT SERPL-MCNC: 0.98 MG/DL (ref 0.6–1.3)
EOSINOPHIL # BLD AUTO: 0.01 THOUSAND/ΜL (ref 0–0.61)
EOSINOPHIL NFR BLD AUTO: 0 % (ref 0–6)
ERYTHROCYTE [DISTWIDTH] IN BLOOD BY AUTOMATED COUNT: 18.2 % (ref 11.6–15.1)
EST. AVERAGE GLUCOSE BLD GHB EST-MCNC: 111 MG/DL
FERRITIN SERPL-MCNC: 21 NG/ML (ref 8–388)
GFR SERPL CREATININE-BSD FRML MDRD: 71 ML/MIN/1.73SQ M
GFR SERPL CREATININE-BSD FRML MDRD: 82 ML/MIN/1.73SQ M
GLUCOSE SERPL-MCNC: 193 MG/DL (ref 65–140)
GLUCOSE SERPL-MCNC: 208 MG/DL (ref 65–140)
HBA1C MFR BLD: 5.5 %
HCO3 BLDA-SCNC: 25.8 MMOL/L (ref 22–28)
HCT VFR BLD AUTO: 30.6 % (ref 36.5–49.3)
HGB BLD-MCNC: 9 G/DL (ref 12–17)
IMM GRANULOCYTES # BLD AUTO: 0.14 THOUSAND/UL (ref 0–0.2)
IMM GRANULOCYTES NFR BLD AUTO: 1 % (ref 0–2)
IPAP: 12
IRON SATN MFR SERPL: 7 %
IRON SERPL-MCNC: 29 UG/DL (ref 65–175)
L PNEUMO1 AG UR QL IA.RAPID: NEGATIVE
LACTATE SERPL-SCNC: 3 MMOL/L (ref 0.5–2)
LYMPHOCYTES # BLD AUTO: 0.3 THOUSANDS/ΜL (ref 0.6–4.47)
LYMPHOCYTES NFR BLD AUTO: 2 % (ref 14–44)
MAGNESIUM SERPL-MCNC: 2 MG/DL (ref 1.6–2.6)
MCH RBC QN AUTO: 21.9 PG (ref 26.8–34.3)
MCHC RBC AUTO-ENTMCNC: 29.4 G/DL (ref 31.4–37.4)
MCV RBC AUTO: 75 FL (ref 82–98)
MONOCYTES # BLD AUTO: 1.51 THOUSAND/ΜL (ref 0.17–1.22)
MONOCYTES NFR BLD AUTO: 8 % (ref 4–12)
NEUTROPHILS # BLD AUTO: 16.09 THOUSANDS/ΜL (ref 1.85–7.62)
NEUTS SEG NFR BLD AUTO: 89 % (ref 43–75)
NON VENT- BIPAP: ABNORMAL
NRBC BLD AUTO-RTO: 0 /100 WBCS
O2 CT BLDA-SCNC: 12.1 ML/DL (ref 16–23)
OXYHGB MFR BLDA: 87.9 % (ref 94–97)
P AXIS: 38 DEGREES
P AXIS: 45 DEGREES
P AXIS: 53 DEGREES
P AXIS: 66 DEGREES
PCO2 BLDA: 32.3 MM HG (ref 36–44)
PEEP MAX SETTING VENT: 6 CM[H2O]
PH BLDA: 7.52 [PH] (ref 7.35–7.45)
PHOSPHATE SERPL-MCNC: 4.4 MG/DL (ref 2.3–4.1)
PLATELET # BLD AUTO: 277 THOUSANDS/UL (ref 149–390)
PMV BLD AUTO: 9.4 FL (ref 8.9–12.7)
PO2 BLDA: 55.6 MM HG (ref 75–129)
POTASSIUM SERPL-SCNC: 2.7 MMOL/L (ref 3.5–5.3)
POTASSIUM SERPL-SCNC: 3.7 MMOL/L (ref 3.5–5.3)
PR INTERVAL: 174 MS
PR INTERVAL: 174 MS
PR INTERVAL: 178 MS
PR INTERVAL: 179 MS
PROCALCITONIN SERPL-MCNC: 0.14 NG/ML
QRS AXIS: -30 DEGREES
QRS AXIS: -33 DEGREES
QRS AXIS: -35 DEGREES
QRS AXIS: -41 DEGREES
QRSD INTERVAL: 117 MS
QRSD INTERVAL: 120 MS
QRSD INTERVAL: 126 MS
QRSD INTERVAL: 128 MS
QT INTERVAL: 384 MS
QT INTERVAL: 388 MS
QT INTERVAL: 398 MS
QT INTERVAL: 404 MS
QTC INTERVAL: 501 MS
QTC INTERVAL: 504 MS
QTC INTERVAL: 505 MS
QTC INTERVAL: 510 MS
RBC # BLD AUTO: 4.11 MILLION/UL (ref 3.88–5.62)
S PNEUM AG UR QL: NEGATIVE
SARS-COV-2 RNA RESP QL NAA+PROBE: NEGATIVE
SODIUM SERPL-SCNC: 142 MMOL/L (ref 136–145)
SODIUM SERPL-SCNC: 145 MMOL/L (ref 136–145)
SPECIMEN SOURCE: ABNORMAL
T WAVE AXIS: 3 DEGREES
T WAVE AXIS: 94 DEGREES
T WAVE AXIS: 96 DEGREES
T WAVE AXIS: 98 DEGREES
TIBC SERPL-MCNC: 422 UG/DL (ref 250–450)
TROPONIN I SERPL-MCNC: 0.76 NG/ML
TROPONIN I SERPL-MCNC: 0.92 NG/ML
TROPONIN I SERPL-MCNC: 1.03 NG/ML
TROPONIN I SERPL-MCNC: 1.07 NG/ML
TROPONIN I SERPL-MCNC: 1.11 NG/ML
TROPONIN I SERPL-MCNC: 1.29 NG/ML
VENT BIPAP FIO2: 50 %
VENTRICULAR RATE: 100 BPM
VENTRICULAR RATE: 104 BPM
VENTRICULAR RATE: 96 BPM
VENTRICULAR RATE: 97 BPM
WBC # BLD AUTO: 18.08 THOUSAND/UL (ref 4.31–10.16)

## 2021-08-20 PROCEDURE — 84484 ASSAY OF TROPONIN QUANT: CPT | Performed by: INTERNAL MEDICINE

## 2021-08-20 PROCEDURE — 93005 ELECTROCARDIOGRAM TRACING: CPT

## 2021-08-20 PROCEDURE — 85025 COMPLETE CBC W/AUTO DIFF WBC: CPT | Performed by: NURSE PRACTITIONER

## 2021-08-20 PROCEDURE — 83550 IRON BINDING TEST: CPT | Performed by: INTERNAL MEDICINE

## 2021-08-20 PROCEDURE — 85730 THROMBOPLASTIN TIME PARTIAL: CPT | Performed by: NURSE PRACTITIONER

## 2021-08-20 PROCEDURE — 36600 WITHDRAWAL OF ARTERIAL BLOOD: CPT

## 2021-08-20 PROCEDURE — 99291 CRITICAL CARE FIRST HOUR: CPT | Performed by: INTERNAL MEDICINE

## 2021-08-20 PROCEDURE — 80048 BASIC METABOLIC PNL TOTAL CA: CPT | Performed by: INTERNAL MEDICINE

## 2021-08-20 PROCEDURE — 93010 ELECTROCARDIOGRAM REPORT: CPT | Performed by: INTERNAL MEDICINE

## 2021-08-20 PROCEDURE — C9113 INJ PANTOPRAZOLE SODIUM, VIA: HCPCS | Performed by: INTERNAL MEDICINE

## 2021-08-20 PROCEDURE — 93306 TTE W/DOPPLER COMPLETE: CPT

## 2021-08-20 PROCEDURE — U0003 INFECTIOUS AGENT DETECTION BY NUCLEIC ACID (DNA OR RNA); SEVERE ACUTE RESPIRATORY SYNDROME CORONAVIRUS 2 (SARS-COV-2) (CORONAVIRUS DISEASE [COVID-19]), AMPLIFIED PROBE TECHNIQUE, MAKING USE OF HIGH THROUGHPUT TECHNOLOGIES AS DESCRIBED BY CMS-2020-01-R: HCPCS | Performed by: NURSE PRACTITIONER

## 2021-08-20 PROCEDURE — 83036 HEMOGLOBIN GLYCOSYLATED A1C: CPT | Performed by: INTERNAL MEDICINE

## 2021-08-20 PROCEDURE — 87449 NOS EACH ORGANISM AG IA: CPT | Performed by: NURSE PRACTITIONER

## 2021-08-20 PROCEDURE — 94003 VENT MGMT INPAT SUBQ DAY: CPT

## 2021-08-20 PROCEDURE — 83540 ASSAY OF IRON: CPT | Performed by: INTERNAL MEDICINE

## 2021-08-20 PROCEDURE — 99223 1ST HOSP IP/OBS HIGH 75: CPT | Performed by: INTERNAL MEDICINE

## 2021-08-20 PROCEDURE — 84484 ASSAY OF TROPONIN QUANT: CPT | Performed by: NURSE PRACTITIONER

## 2021-08-20 PROCEDURE — 82805 BLOOD GASES W/O2 SATURATION: CPT

## 2021-08-20 PROCEDURE — 82330 ASSAY OF CALCIUM: CPT | Performed by: NURSE PRACTITIONER

## 2021-08-20 PROCEDURE — U0005 INFEC AGEN DETEC AMPLI PROBE: HCPCS | Performed by: NURSE PRACTITIONER

## 2021-08-20 PROCEDURE — 93306 TTE W/DOPPLER COMPLETE: CPT | Performed by: INTERNAL MEDICINE

## 2021-08-20 PROCEDURE — 80048 BASIC METABOLIC PNL TOTAL CA: CPT | Performed by: NURSE PRACTITIONER

## 2021-08-20 PROCEDURE — 83605 ASSAY OF LACTIC ACID: CPT | Performed by: NURSE PRACTITIONER

## 2021-08-20 PROCEDURE — 82728 ASSAY OF FERRITIN: CPT | Performed by: INTERNAL MEDICINE

## 2021-08-20 PROCEDURE — 84145 PROCALCITONIN (PCT): CPT

## 2021-08-20 RX ORDER — TAMSULOSIN HYDROCHLORIDE 0.4 MG/1
0.4 CAPSULE ORAL
Status: DISCONTINUED | OUTPATIENT
Start: 2021-08-21 | End: 2021-08-21

## 2021-08-20 RX ORDER — LORAZEPAM 2 MG/ML
0.5 INJECTION INTRAMUSCULAR ONCE
Status: COMPLETED | OUTPATIENT
Start: 2021-08-20 | End: 2021-08-20

## 2021-08-20 RX ORDER — HEPARIN SODIUM 1000 [USP'U]/ML
2000 INJECTION, SOLUTION INTRAVENOUS; SUBCUTANEOUS
Status: DISCONTINUED | OUTPATIENT
Start: 2021-08-20 | End: 2021-08-24

## 2021-08-20 RX ORDER — HEPARIN SODIUM 1000 [USP'U]/ML
4000 INJECTION, SOLUTION INTRAVENOUS; SUBCUTANEOUS ONCE
Status: COMPLETED | OUTPATIENT
Start: 2021-08-20 | End: 2021-08-20

## 2021-08-20 RX ORDER — PANTOPRAZOLE SODIUM 40 MG/1
40 INJECTION, POWDER, FOR SOLUTION INTRAVENOUS DAILY
Status: DISCONTINUED | OUTPATIENT
Start: 2021-08-20 | End: 2021-08-22

## 2021-08-20 RX ORDER — FUROSEMIDE 10 MG/ML
40 INJECTION INTRAMUSCULAR; INTRAVENOUS ONCE
Status: COMPLETED | OUTPATIENT
Start: 2021-08-20 | End: 2021-08-20

## 2021-08-20 RX ORDER — POLYETHYLENE GLYCOL 3350 17 G/17G
17 POWDER, FOR SOLUTION ORAL DAILY
Status: DISCONTINUED | OUTPATIENT
Start: 2021-08-20 | End: 2021-08-21

## 2021-08-20 RX ORDER — ONDANSETRON 2 MG/ML
INJECTION INTRAMUSCULAR; INTRAVENOUS
Status: DISPENSED
Start: 2021-08-20 | End: 2021-08-20

## 2021-08-20 RX ORDER — HEPARIN SODIUM 1000 [USP'U]/ML
4000 INJECTION, SOLUTION INTRAVENOUS; SUBCUTANEOUS
Status: DISCONTINUED | OUTPATIENT
Start: 2021-08-20 | End: 2021-08-24

## 2021-08-20 RX ORDER — FUROSEMIDE 10 MG/ML
40 INJECTION INTRAMUSCULAR; INTRAVENOUS
Status: DISCONTINUED | OUTPATIENT
Start: 2021-08-20 | End: 2021-08-20

## 2021-08-20 RX ORDER — LORAZEPAM 2 MG/ML
0.5 INJECTION INTRAMUSCULAR EVERY 6 HOURS PRN
Status: DISCONTINUED | OUTPATIENT
Start: 2021-08-20 | End: 2021-08-23

## 2021-08-20 RX ORDER — FUROSEMIDE 10 MG/ML
40 INJECTION INTRAMUSCULAR; INTRAVENOUS 4 TIMES DAILY
Status: DISCONTINUED | OUTPATIENT
Start: 2021-08-20 | End: 2021-08-22

## 2021-08-20 RX ORDER — ASPIRIN 81 MG/1
81 TABLET ORAL DAILY
Status: DISCONTINUED | OUTPATIENT
Start: 2021-08-20 | End: 2021-08-20

## 2021-08-20 RX ORDER — PANTOPRAZOLE SODIUM 40 MG/1
40 TABLET, DELAYED RELEASE ORAL
Status: DISCONTINUED | OUTPATIENT
Start: 2021-08-20 | End: 2021-08-20

## 2021-08-20 RX ORDER — ASPIRIN 300 MG/1
300 SUPPOSITORY RECTAL DAILY
Status: DISCONTINUED | OUTPATIENT
Start: 2021-08-20 | End: 2021-08-22

## 2021-08-20 RX ORDER — PRAVASTATIN SODIUM 40 MG
40 TABLET ORAL DAILY
Status: DISCONTINUED | OUTPATIENT
Start: 2021-08-20 | End: 2021-08-21

## 2021-08-20 RX ORDER — OLANZAPINE 10 MG/1
5 INJECTION, POWDER, LYOPHILIZED, FOR SOLUTION INTRAMUSCULAR ONCE
Status: COMPLETED | OUTPATIENT
Start: 2021-08-20 | End: 2021-08-20

## 2021-08-20 RX ORDER — POTASSIUM CHLORIDE 14.9 MG/ML
20 INJECTION INTRAVENOUS ONCE
Status: COMPLETED | OUTPATIENT
Start: 2021-08-20 | End: 2021-08-20

## 2021-08-20 RX ORDER — ONDANSETRON 2 MG/ML
4 INJECTION INTRAMUSCULAR; INTRAVENOUS ONCE
Status: COMPLETED | OUTPATIENT
Start: 2021-08-20 | End: 2021-08-26

## 2021-08-20 RX ORDER — HEPARIN SODIUM 10000 [USP'U]/100ML
3-20 INJECTION, SOLUTION INTRAVENOUS
Status: DISCONTINUED | OUTPATIENT
Start: 2021-08-20 | End: 2021-08-24

## 2021-08-20 RX ORDER — ACETAMINOPHEN 650 MG/1
325 SUPPOSITORY RECTAL EVERY 4 HOURS PRN
Status: DISCONTINUED | OUTPATIENT
Start: 2021-08-20 | End: 2021-08-21

## 2021-08-20 RX ORDER — TAMSULOSIN HYDROCHLORIDE 0.4 MG/1
0.4 CAPSULE ORAL
Status: DISCONTINUED | OUTPATIENT
Start: 2021-08-20 | End: 2021-08-20

## 2021-08-20 RX ADMIN — ACETAMINOPHEN 325 MG: 650 SUPPOSITORY RECTAL at 21:50

## 2021-08-20 RX ADMIN — ACETAMINOPHEN 325 MG: 650 SUPPOSITORY RECTAL at 13:50

## 2021-08-20 RX ADMIN — ASPIRIN 300 MG: 300 SUPPOSITORY RECTAL at 12:06

## 2021-08-20 RX ADMIN — HEPARIN SODIUM 4000 UNITS: 1000 INJECTION INTRAVENOUS; SUBCUTANEOUS at 12:09

## 2021-08-20 RX ADMIN — PANTOPRAZOLE SODIUM 40 MG: 40 INJECTION, POWDER, FOR SOLUTION INTRAVENOUS at 11:41

## 2021-08-20 RX ADMIN — CEFTRIAXONE SODIUM 1000 MG: 10 INJECTION, POWDER, FOR SOLUTION INTRAVENOUS at 21:49

## 2021-08-20 RX ADMIN — FUROSEMIDE 40 MG: 10 INJECTION, SOLUTION INTRAVENOUS at 23:34

## 2021-08-20 RX ADMIN — ENOXAPARIN SODIUM 40 MG: 40 INJECTION SUBCUTANEOUS at 08:14

## 2021-08-20 RX ADMIN — SODIUM CHLORIDE 0.2 MCG/KG/HR: 9 INJECTION, SOLUTION INTRAVENOUS at 05:58

## 2021-08-20 RX ADMIN — WATER 10 ML: 1 INJECTION INTRAMUSCULAR; INTRAVENOUS; SUBCUTANEOUS at 22:40

## 2021-08-20 RX ADMIN — HEPARIN SODIUM 11.8 UNITS/KG/HR: 10000 INJECTION, SOLUTION INTRAVENOUS at 12:11

## 2021-08-20 RX ADMIN — LORAZEPAM 0.5 MG: 2 INJECTION INTRAMUSCULAR; INTRAVENOUS at 03:17

## 2021-08-20 RX ADMIN — MORPHINE SULFATE 2 MG: 2 INJECTION, SOLUTION INTRAMUSCULAR; INTRAVENOUS at 03:35

## 2021-08-20 RX ADMIN — OLANZAPINE 5 MG: 10 INJECTION, POWDER, LYOPHILIZED, FOR SOLUTION INTRAMUSCULAR at 22:34

## 2021-08-20 RX ADMIN — FUROSEMIDE 40 MG: 10 INJECTION, SOLUTION INTRAVENOUS at 19:00

## 2021-08-20 RX ADMIN — AZITHROMYCIN MONOHYDRATE 500 MG: 500 INJECTION, POWDER, LYOPHILIZED, FOR SOLUTION INTRAVENOUS at 01:16

## 2021-08-20 RX ADMIN — SODIUM CHLORIDE 500 ML: 0.9 INJECTION, SOLUTION INTRAVENOUS at 16:05

## 2021-08-20 RX ADMIN — LORAZEPAM 0.5 MG: 2 INJECTION INTRAMUSCULAR; INTRAVENOUS at 04:28

## 2021-08-20 RX ADMIN — FUROSEMIDE 40 MG: 10 INJECTION, SOLUTION INTRAVENOUS at 08:02

## 2021-08-20 RX ADMIN — MORPHINE SULFATE 2 MG: 2 INJECTION, SOLUTION INTRAMUSCULAR; INTRAVENOUS at 19:38

## 2021-08-20 RX ADMIN — SODIUM CHLORIDE 0.7 MCG/KG/HR: 9 INJECTION, SOLUTION INTRAVENOUS at 13:09

## 2021-08-20 RX ADMIN — POTASSIUM CHLORIDE 20 MEQ: 14.9 INJECTION, SOLUTION INTRAVENOUS at 13:13

## 2021-08-20 RX ADMIN — FUROSEMIDE 40 MG: 10 INJECTION, SOLUTION INTRAVENOUS at 15:19

## 2021-08-20 NOTE — PLAN OF CARE
Problem: Potential for Falls  Goal: Patient will remain free of falls  Description: INTERVENTIONS:  - Educate patient/family on patient safety including physical limitations  - Instruct patient to call for assistance with activity   - Consult OT/PT to assist with strengthening/mobility   - Keep Call bell within reach  - Keep bed low and locked with side rails adjusted as appropriate  - Keep care items and personal belongings within reach  - Initiate and maintain comfort rounds  - Make Fall Risk Sign visible to staff  - Apply yellow socks and bracelet for high fall risk patients  - Consider moving patient to room near nurses station  Outcome: Progressing     Problem: Prexisting or High Potential for Compromised Skin Integrity  Goal: Skin integrity is maintained or improved  Description: INTERVENTIONS:  - Identify patients at risk for skin breakdown  - Assess and monitor skin integrity  - Assess and monitor nutrition and hydration status  - Monitor labs   - Assess for incontinence   - Turn and reposition patient  - Assist with mobility/ambulation  - Relieve pressure over bony prominences  - Avoid friction and shearing  - Provide appropriate hygiene as needed including keeping skin clean and dry  - Evaluate need for skin moisturizer/barrier cream  - Collaborate with interdisciplinary team   - Patient/family teaching  - Consider wound care consult   Outcome: Progressing     Problem: CARDIOVASCULAR - ADULT  Goal: Maintains optimal cardiac output and hemodynamic stability  Description: INTERVENTIONS:  - Monitor I/O, vital signs and rhythm  - Monitor for S/S and trends of decreased cardiac output  - Administer and titrate ordered vasoactive medications to optimize hemodynamic stability  - Assess quality of pulses, skin color and temperature  - Assess for signs of decreased coronary artery perfusion  - Instruct patient to report change in severity of symptoms  Outcome: Progressing  Goal: Absence of cardiac dysrhythmias or at baseline rhythm  Description: INTERVENTIONS:  - Continuous cardiac monitoring, vital signs, obtain 12 lead EKG if ordered  - Administer antiarrhythmic and heart rate control medications as ordered  - Monitor electrolytes and administer replacement therapy as ordered  Outcome: Progressing     Problem: RESPIRATORY - ADULT  Goal: Achieves optimal ventilation and oxygenation  Description: INTERVENTIONS:  - Assess for changes in respiratory status  - Assess for changes in mentation and behavior  - Position to facilitate oxygenation and minimize respiratory effort  - Oxygen administered by appropriate delivery if ordered  - Initiate smoking cessation education as indicated  - Encourage broncho-pulmonary hygiene including cough, deep breathe, Incentive Spirometry  - Assess the need for suctioning and aspirate as needed  - Assess and instruct to report SOB or any respiratory difficulty  - Respiratory Therapy support as indicated  Outcome: Progressing     Problem: GASTROINTESTINAL - ADULT  Goal: Minimal or absence of nausea and/or vomiting  Description: INTERVENTIONS:  - Administer IV fluids if ordered to ensure adequate hydration  - Maintain NPO status until nausea and vomiting are resolved  - Nasogastric tube if ordered  - Administer ordered antiemetic medications as needed  - Provide nonpharmacologic comfort measures as appropriate  - Advance diet as tolerated, if ordered  - Consider nutrition services referral to assist patient with adequate nutrition and appropriate food choices  Outcome: Progressing  Goal: Maintains or returns to baseline bowel function  Description: INTERVENTIONS:  - Assess bowel function  - Encourage oral fluids to ensure adequate hydration  - Administer IV fluids if ordered to ensure adequate hydration  - Administer ordered medications as needed  - Encourage mobilization and activity  - Consider nutritional services referral to assist patient with adequate nutrition and appropriate food choices  Outcome: Progressing  Goal: Maintains adequate nutritional intake  Description: INTERVENTIONS:  - Monitor percentage of each meal consumed  - Identify factors contributing to decreased intake, treat as appropriate  - Assist with meals as needed  - Monitor I&O, weight, and lab values if indicated  - Obtain nutrition services referral as needed  Outcome: Progressing  Goal: Oral mucous membranes remain intact  Description: INTERVENTIONS  - Assess oral mucosa and hygiene practices  - Implement preventative oral hygiene regimen  - Implement oral medicated treatments as ordered  - Initiate Nutrition services referral as needed  Outcome: Progressing     Problem: GENITOURINARY - ADULT  Goal: Maintains or returns to baseline urinary function  Description: INTERVENTIONS:  - Assess urinary function  - Encourage oral fluids to ensure adequate hydration if ordered  - Administer IV fluids as ordered to ensure adequate hydration  - Administer ordered medications as needed  - Offer frequent toileting  - Follow urinary retention protocol if ordered  Outcome: Progressing  Goal: Absence of urinary retention  Description: INTERVENTIONS:  - Assess patients ability to void and empty bladder  - Monitor I/O  - Bladder scan as needed  - Discuss with physician/AP medications to alleviate retention as needed  - Discuss catheterization for long term situations as appropriate  Outcome: Progressing     Problem: METABOLIC, FLUID AND ELECTROLYTES - ADULT  Goal: Electrolytes maintained within normal limits  Description: INTERVENTIONS:  - Monitor labs and assess patient for signs and symptoms of electrolyte imbalances  - Administer electrolyte replacement as ordered  - Monitor response to electrolyte replacements, including repeat lab results as appropriate  - Instruct patient on fluid and nutrition as appropriate  Outcome: Progressing  Goal: Fluid balance maintained  Description: INTERVENTIONS:  - Monitor labs   - Monitor I/O and WT  - Instruct patient on fluid and nutrition as appropriate  - Assess for signs & symptoms of volume excess or deficit  Outcome: Progressing  Goal: Glucose maintained within target range  Description: INTERVENTIONS:  - Monitor Blood Glucose as ordered  - Assess for signs and symptoms of hyperglycemia and hypoglycemia  - Administer ordered medications to maintain glucose within target range  - Assess nutritional intake and initiate nutrition service referral as needed  Outcome: Progressing     Problem: SKIN/TISSUE INTEGRITY - ADULT  Goal: Skin Integrity remains intact(Skin Breakdown Prevention)  Description: Assess:  -Assess extremities for adequate circulation and sensation     Bed Management:  -Have minimal linens on bed & keep smooth, unwrinkled  -Change linens as needed when moist or perspiring  Toileting:  -Offer bedside commode    Skin Care:  -Avoid use of baby powder, tape, friction and shearing, hot water or constrictive clothing  -Do not massage red bony areas       Problem: HEMATOLOGIC - ADULT  Goal: Maintains hematologic stability  Description: INTERVENTIONS  - Assess for signs and symptoms of bleeding or hemorrhage  - Monitor labs  - Administer supportive blood products/factors as ordered and appropriate  Outcome: Progressing     Problem: MUSCULOSKELETAL - ADULT  Goal: Maintain or return mobility to safest level of function  Description: INTERVENTIONS:  - Assess patient's ability to carry out ADLs; assess patient's baseline for ADL function and identify physical deficits which impact ability to perform ADLs (bathing, care of mouth/teeth, toileting, grooming, dressing, etc )  - Assess/evaluate cause of self-care deficits   - Assess range of motion  - Assess patient's mobility  - Assess patient's need for assistive devices and provide as appropriate  - Encourage maximum independence but intervene and supervise when necessary  - Involve family in performance of ADLs  - Assess for home care needs following discharge   - Consider OT consult to assist with ADL evaluation and planning for discharge  - Provide patient education as appropriate  Outcome: Progressing     Problem: Nutrition/Hydration-ADULT  Goal: Nutrient/Hydration intake appropriate for improving, restoring or maintaining nutritional needs  Description: Monitor and assess patient's nutrition/hydration status for malnutrition  Collaborate with interdisciplinary team and initiate plan and interventions as ordered  Monitor patient's weight and dietary intake as ordered or per policy  Utilize nutrition screening tool and intervene as necessary  Determine patient's food preferences and provide high-protein, high-caloric foods as appropriate       INTERVENTIONS:  - Monitor oral intake, urinary output, labs, and treatment plans  - Assess nutrition and hydration status and recommend course of action  - Evaluate amount of meals eaten  - Assist patient with eating if necessary   - Allow adequate time for meals  - Recommend/ encourage appropriate diets, oral nutritional supplements, and vitamin/mineral supplements  - Order, calculate, and assess calorie counts as needed  - Recommend, monitor, and adjust tube feedings and TPN/PPN based on assessed needs  - Assess need for intravenous fluids  - Provide specific nutrition/hydration education as appropriate  - Include patient/family/caregiver in decisions related to nutrition  Outcome: Progressing

## 2021-08-20 NOTE — ASSESSMENT & PLAN NOTE
· POA :  Meets sepsis criteria on presentation tachycardia, tachypnea, lactic acidosis, leukocytosis and suspecting pneumonia  · Rest of plan as outlined above

## 2021-08-20 NOTE — ASSESSMENT & PLAN NOTE
Wt Readings from Last 3 Encounters:   08/19/21 85 4 kg (188 lb 4 4 oz)   07/26/21 83 5 kg (184 lb 2 oz)   03/19/21 82 8 kg (182 lb 9 6 oz)     [id number]  Given lasix 40 mg IV in ED  Will dose lasix as needed  Concern for worsening aortic disease, will obtain Echocardiogram  Last echo-in 2018 revealed EF-70%, G1DD, mild to mod left atrial dialtion, AV with moderate increased thickness of leaflets, moderated to marked calcification and mildly reduced cuspal separation   Mild to moderate stenosis

## 2021-08-20 NOTE — ASSESSMENT & PLAN NOTE
Now with concern of progression of disease secondary to signs of volume overload  Will obtain echo  Monitor I/Os closely

## 2021-08-20 NOTE — ASSESSMENT & PLAN NOTE
Has history of iron deficiency anemia due to inadequate dietary iron intake  His hg is up to 9 9 from 8 0 in february  He received an EGD in 2/2021 with no source of bleeding  Continue protonix BID for esophagitis and gastric ulcer  Received venofer as OP  May need OP hematology follow up  Trend hg, transfuse for <7 0

## 2021-08-20 NOTE — ASSESSMENT & PLAN NOTE
Record review reveals stable memory issues with outpatient followup with geriatrics  TSH, B12 nl in past  Family reports no progression at this point  Continue outpatient sertraline

## 2021-08-20 NOTE — CONSULTS
Consultation - Cardiology   Dian Mccord 78 y o  male MRN: [id number]  Unit/Bed#: ICU 07 Encounter: [id number]    Physician Requesting Consult: Pamella Lindsey MD  Reason for Consult / Principal Problem:     Acute pulmonary edema  Consulting physician:  Tj Smalls MD      Assessment/Plan     Assessment:  1  Acute pulmonary edema, probably cardiac, but will need more time to be certain  2  Non myocardial infarction troponin elevation related to pulmonary edema  3  Moderate possibly severe aortic stenosis  4  Mild-to-moderate mitral regurgitation   5  At least mildly decreased LV systolic function  6  Severe type 2 left ventricular diastolic dysfunction  7  Moderate pulmonary hypertension     Plan:  1  Decrease IV fluid intake to as minimal as possible  2  Increase furosemide to 40 mg IV q 6h  3  Check EKG and troponin levels tomorrow morning, do not need to continue to check them frequently  4  Continue antibiotics  5  Continue to monitor electrolytes and renal function, avoid hypokalemia with goal of keeping potassium between 3 8 and 4 8  6  Replace Ionized calcium as needed, myocardial contractility decreases significantly as ionized calcium level drops    History of Present Illness   HPI: Dian Mccord is a 78y o  year old male who presents with with a history of moderate aortic stenosis who was admitted last night in severe respiratory distress requiring immediate BiPAP  Patient wife states that he had been getting agitated for the last several days which she thought was emotional/anxiety related to his grandchildren leaving for college  However, it is possible that his anxiety was more related to hypoxia than the emotional situation  Last night he suddenly stated I can not breathe and my chest hurts  He was brought to the emergency department where he was in severe respiratory distress      Chest x-ray demonstrates fluffy whiteout of both lungs and higher early representing pulmonary edema, cardiac versus noncardiac (pneumonia)  Troponin levels have gradually gone up with the last reported 1 at 11:31 a m  A m  At 1 29  This value suggests that patient has not had a massive myocardial infarction which would account for acute pulmonary edema as a results of the infarction    Patient has a history of moderate aortic stenosis which on today's echocardiogram remains moderate although the valve appearance suggests that the aortic stenosis could be much more severe  It is possible that he has "low-flow severe aortic stenosis" there is also mild to moderate mitral regurgitation which would also be resulting in an underestimation of the severity of the aortic stenosis  Left ventricular systolic function was estimated at 40-50%  There is mild-to-moderate hypokinesis of the mid apical septum which could relate to the patient's intraventricular conduction delay rather than an ischemic problem  Overall the, the patient's wall motion appears to be more globally down than regionally down  Patient's diastolic parameters demonstrated in normal 80 ratio with an elevated E to E prime ratio consistent with type 2 diastolic dysfunction (pseudonormalization)  The ED prime ratio of 32 is consistent with a left atrial pressure of 40 mmHg or greater  A pressure of this severity in the left atrium could easily results and pulmonary edema  E prime was 4-6 cm/S which is consistent with markedly decreased left ventricular relaxation in diastole  Patient's pulmonary artery pressures were 88-52 mmHg systolic  Patient is afebrile  WBCs are significantly elevated but this can occur in both myocardial infarctions and severe pulmonary edema so it does not help to distinguish cardiac pulmonary edema from an infectious process        Patient Active Problem List    Diagnosis Date Noted    Multifocal pneumonia 08/19/2021    Acute respiratory failure with hypoxia ([facility] [address] ) 08/19/2021    Acute exacerbation of CHF (congestive heart failure) ([facility] [address] ) 08/19/2021    High anion gap metabolic acidosis 96/32/0007    Lactic acidosis 08/19/2021    Hypochromic microcytic anemia 08/19/2021    Iron deficiency anemia secondary to inadequate dietary iron intake 02/19/2021    Do not resuscitate status 01/25/2021    Chronic constipation 01/25/2021    Obesity (BMI 30 0-34 9) 07/19/2019    Mild cognitive impairment 01/07/2019    Medicare annual wellness visit, subsequent 04/26/2018    Moderate episode of recurrent major depressive disorder ([facility] [address] ) 03/23/2018    PVC (premature ventricular contraction) 03/23/2018    Chest pain 09/04/2017    Mixed hyperlipidemia     Essential hypertension     Aortic stenosis, moderate     Enlarged prostate without lower urinary tract symptoms (luts) 03/01/2012    Fatty liver disease, nonalcoholic 38/05/4734       Vitals: /73 (BP Location: Left arm)   Pulse 90   Temp 97 9 °F (36 6 °C) (Temporal)   Resp (!) 36   Ht 5' 4 02" (1 626 m)   Wt 85 5 kg (188 lb 7 9 oz)   SpO2 94%   BMI 32 34 kg/m²   PREVIOUS WEIGHTS:   Wt Readings from Last 5 Encounters:   08/20/21 85 5 kg (188 lb 7 9 oz)   07/26/21 83 5 kg (184 lb 2 oz)   03/19/21 82 8 kg (182 lb 9 6 oz)   02/23/21 79 5 kg (175 lb 4 3 oz)   01/25/21 81 6 kg (180 lb)        Labs/Data:        Results from last 7 days   Lab Units 08/20/21 0429 08/19/21 2057   WBC Thousand/uL 18 08* 17 11*   HEMOGLOBIN g/dL 9 0* 9 9*   HEMATOCRIT % 30 6* 34 7*   MCV fL 75* 76*   MCH pg 21 9* 21 7*   MCHC g/dL 29 4* 28 5*   PLATELETS Thousands/uL 277 378     Results from last 7 days   Lab Units 08/20/21 0429 08/19/21 2057   EGFR ml/min/1 73sq m 82 72   SODIUM mmol/L 142 142   POTASSIUM mmol/L 3 7 3 7   CHLORIDE mmol/L 105 105   CO2 mmol/L 21 18*   BUN mg/dL 21 26*   CREATININE mg/dL 0 82 0 97     Results from last 7 days   Lab Units 08/20/21 0429 08/19/21  2057   CALCIUM mg/dL 7 7* 8 0*   AST U/L  --  53*   ALT U/L  --  62   ALK PHOS U/L  --  78   MAGNESIUM mg/dL  --  2 0 Results from last 7 days   Lab Units 08/20/21  1131 08/20/21  0832 08/20/21  0440   TROPONIN I ng/mL 1 29* 1 11* 1 03*     Lab Results   Component Value Date    NTBNP 693 (H) 08/19/2021     Lab Results   Component Value Date    CHOLESTEROL 138 03/12/2021    TRIG 117 03/12/2021    HDL 44 03/12/2021    LDLCALC 71 03/12/2021    HGBA1C 5 3 04/19/2017       Results from last 7 days   Lab Units 08/19/21 2057   INR  1 09   PROTIME seconds 13 9          Current Facility-Administered Medications:     acetaminophen (TYLENOL) rectal suppository 325 mg, 325 mg, Rectal, Q4H PRN, Nico Up MD, 325 mg at 08/20/21 1350    aspirin rectal suppository 300 mg, 300 mg, Rectal, Daily, Denae Aiken MD, 300 mg at 08/20/21 1206    cefTRIAXone (ROCEPHIN) 1,000 mg in dextrose 5 % 50 mL IVPB, 1,000 mg, Intravenous, Q24H, Denae Aiken MD    dexmedetomidine (PRECEDEX) 400 mcg in sodium chloride 0 9 % 100 mL infusion, 0 1-0 7 mcg/kg/hr, Intravenous, Titrated, ANATOLY Tubbs, Last Rate: 15 mL/hr at 08/20/21 1309, 0 7 mcg/kg/hr at 08/20/21 1309    [START ON 8/21/2021] doxycycline (VIBRAMYCIN) 100 mg in sodium chloride 0 9 % 100 mL IVPB, 100 mg, Intravenous, Q12H, Denae Aiken MD    furosemide (LASIX) injection 40 mg, 40 mg, Intravenous, 4x Daily, Nico Up MD    heparin (porcine) 25,000 units in 0 45% NaCl 250 mL infusion (premix), 3-20 Units/kg/hr (Order-Specific), Intravenous, Titrated, Denae Aiken MD, Last Rate: 10 mL/hr at 08/20/21 1211, 11 8 Units/kg/hr at 08/20/21 1211    heparin (porcine) injection 2,000 Units, 2,000 Units, Intravenous, Q1H PRN, Denae Aiken MD    heparin (porcine) injection 4,000 Units, 4,000 Units, Intravenous, Q1H PRN, Denae Aiken MD    LORazepam (ATIVAN) injection 0 5 mg, 0 5 mg, Intravenous, Q6H PRN, ANATOLY Tubbs, 0 5 mg at 08/20/21 0317    ondansetron (ZOFRAN) injection 4 mg, 4 mg, Intravenous, Once, Diamond B ANATOLY Santiago    pantoprazole (PROTONIX) injection 40 mg, 40 mg, Intravenous, Daily, Parish Alexander MD, 40 mg at 08/20/21 1141    polyethylene glycol (MIRALAX) packet 17 g, 17 g, Oral, Daily, ANATOLY Ariza    pravastatin (PRAVACHOL) tablet 40 mg, 40 mg, Oral, Daily, ANATOLY Ariza    sertraline (ZOLOFT) tablet 50 mg, 50 mg, Oral, Daily, ANATOLY Ariza    [START ON 8/21/2021] tamsulosin (FLOMAX) capsule 0 4 mg, 0 4 mg, Oral, HS, ANATOLY Ariza  Allergies   Allergen Reactions    Ace Inhibitors Cough     Pt denied any allergies         Historical Information   Past Medical History:   Diagnosis Date    Actinic keratosis     LAST ASSESSED: [id number]    Allergic rhinitis     LAST ASSESSED: 66CLZ3229    Anxiety     LAST ASSESSED: 66TFZ0282    Anxiety disorder     LAST ASSESSED: 66DGY8428    Atelectasis of right lung     ABNORMAL CT SCAN OF [facility] 12/2/2016 REPEAT NEEDED PER RADIOLOGY IN 3 MONTHS LAST ASSESSED: [id number]    Atypical chest pain     LAST ASSESSED: 64SFI5945    Benign paroxysmal vertigo     LAST ASSESSED: 60KWX0487    Chronic cough     LAST ASSESSED: 04CSE7686    Chronic GERD     Degenerative arthritis of knee, bilateral     LAST ASSESSED: 73OQJ5499    Diverticulitis of colon     LAST ASSESSED: 99MNZ6920    Diverticulosis     LAST ASSESSED: 95QDW0691    Foreign body, eye     LAST ASSESSED: 35EEL5006    Functional gait abnormality     LAST ASSESSED: 31CYE0444    Hyperlipidemia     Hypertension     Hyponatremia     LAST ASSESSED: 18JZZ9340    Leukocytosis     LAST ASSESSED: 24IYH2348    Murmur     Newly recognized murmur     LAST ASSESSED: 09SAQ5377    Olecranon bursitis, unspecified elbow     Presence of indwelling urethral catheter     RESOLVED: 20STQ1852    Transient cerebral ischemia     LAST ASSESSED: 77NMR2805    Urinary incontinence     LAST ASSESSED: 70FID9087    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     LAST ASSESSED: 46ZIP4933     Past Surgical History:   Procedure Laterality Date    ADENOIDECTOMY      APPENDECTOMY      COLONOSCOPY  10/2006    FIBEROPTIC    INGUINAL HERNIA REPAIR      JOINT REPLACEMENT      b/l TKR 2015   Jacqueline Weiner SINUS SURGERY      TONSILLECTOMY       Past Surgical History:   Procedure Laterality Date    ADENOIDECTOMY      APPENDECTOMY      COLONOSCOPY  10/2006    FIBEROPTIC    INGUINAL HERNIA REPAIR      JOINT REPLACEMENT      b/l TKR 2015    SINUS SURGERY      TONSILLECTOMY       Social History:  Social History     Substance and Sexual Activity   Alcohol Use Yes    Comment: occ, SOCIAL     Social History     Substance and Sexual Activity   Drug Use No     Social History     Tobacco Use   Smoking Status Former Smoker    Quit date: 56    Years since quittin 6   Smokeless Tobacco Never Used   Tobacco Comment    2 ppd for 12 years      Social History     Socioeconomic History    Marital status:      Spouse name: Not on file    Number of children: Not on file    Years of education: Not on file    Highest education level: Not on file   Occupational History    Occupation: tailor, 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   Tobacco Use    Smoking status: Former Smoker     Quit date: 1960     Years since quittin 6    Smokeless tobacco: Never Used    Tobacco comment: 2 ppd for 12 years    Vaping Use    Vaping Use: Never used   Substance and Sexual Activity    Alcohol use: Yes     Comment: occ, SOCIAL    Drug use: No    Sexual activity: Not on file   Other Topics Concern    Not on file   Social History Narrative    USES SAFETY EQUIPMENT - [employer]     Social Determinants of Health     Financial Resource Strain:     Difficulty of Paying Living Expenses:    Food Insecurity:     Worried About Running Out of Food in the Last Year:     [address] in the Last Year:    Transportation Needs:     Lack of Transportation (Medical):      Lack of Transportation (Non-Medical):    Physical Activity:     Days of Exercise per Week:     Minutes of Exercise per Session:    Stress:     Feeling of Stress :    Social Connections:     Frequency of Communication with Friends and Family:     Frequency of Social Gatherings with Friends and Family:     Attends Rastafari Services:     Active Member of Clubs or Organizations:     Attends Club or Organization Meetings:     Marital Status:    Intimate Partner Violence:     Fear of Current or Ex-Partner:     Emotionally Abused:     Physically Abused:     Sexually Abused:       Family History:   family history includes Alzheimer's disease in his mother; Coronary artery disease in his brother  Intake/Output Summary (Last 24 hours) at 8/20/2021 1515  Last data filed at 8/20/2021 1349  Gross per 24 hour   Intake 1135 16 ml   Output 3330 ml   Net -2194 84 ml       Invasive Devices     Peripheral Intravenous Line            Peripheral IV 08/20/21 Dorsal (posterior); Proximal;Right Forearm <1 day    Peripheral IV 08/20/21 Left;Ventral (anterior) Forearm <1 day    Peripheral IV 08/20/21 Right;Upper;Ventral (anterior) Arm <1 day          Drain            Urethral Catheter Temperature probe <1 day                Review of Systems   Unable to perform ROS: Acuity of condition   Constitutional:        Only limited review of systems could be obtained  Patient is still on BiPAP  Patient states that he still has chest discomfort but it is better than when he 1st came to the hospital   Complains of shortness of breath       Physical Exam  Vitals reviewed  Constitutional:       General: He is in acute distress  Appearance: He is well-developed  He is ill-appearing and toxic-appearing  HENT:      Head: Normocephalic and atraumatic  Neck:      Thyroid: No thyromegaly  Vascular: No carotid bruit or JVD  Trachea: No tracheal deviation  Cardiovascular:      Rate and Rhythm: Regular rhythm  Tachycardia present  Pulses: Normal pulses        Heart sounds: No murmur heard  No friction rub  No gallop  Comments: Cannot hear heart sounds well over respiratory noises  Patient known to have aortic stenosis but could only hear a slight ejection murmur in the supraclavicular fossa  Pulmonary:      Effort: No respiratory distress  Breath sounds: Rales present  No wheezing or rhonchi  Comments: Patient tachypneic  Coarse rales throughout in all lung fields  Most noise are from fluid/secretions in larger bronchi which overwhelm noises from pulmonary parenchyma  Chest:      Chest wall: No tenderness  Abdominal:      General: There is no distension  Palpations: Abdomen is soft  Tenderness: There is no abdominal tenderness  Musculoskeletal:      Cervical back: Normal range of motion and neck supple  No rigidity  Right lower leg: No edema  Left lower leg: No edema  Skin:     General: Skin is warm and dry  Neurological:      General: No focal deficit present  Mental Status: He is alert  Gait: Gait normal    Psychiatric:      Comments: Agitated at times       ----------------------------------------------------------------------------------------------  NUCLEAR STRESS TEST: No results found for this or any previous visit  Results for orders placed during the hospital encounter of 17    NM myocardial perfusion spect (rx stress and/or rest)    Narrative  [address] [place] [address], [address] [facility]  [phone]    Rest/Stress Gated SPECT Myocardial Perfusion Imaging After Regadenoson    Patient: Antonio tSuart  MR number: [id number]  Account number: [de-identified]  : 1940  Age: 76 years  Gender: Male  Status: Outpatient  Location: Stress lab  Height: 63 in  Weight: 192 lb  BP: 156/ 78 mmHg    Allergies: NO KNOWN ALLERGIES    Diagnosis: R07 9 - Chest pain, unspecified    Primary Physician:  Priscilla Giang DO  Technician:  Kayy Simms  RN:  Paulette Ziegler, RN BSN  Group:  [facility]  Report Prepared By[de-identified]  Louise Bliss RN BSN  Interpreting Physician:  Mariluz Cordero DO    INDICATIONS: Detection of coronary artery disease  HISTORY: The patient is a 76year old  male  Chest pain status: chest pain, radiation to neck and jaw area  Coronary artery disease risk factors: dyslipidemia, hypertension, and former smoking  Cardiovascular history: none  significant  Medications: a calcium channel blocker, a diuretic, and aspirin  PHYSICAL EXAM: Baseline physical exam screening: normal lung exam     REST ECG: Normal sinus rhythm  The ECG showed occasional premature ventricular contractions  PROCEDURE: The study was performed in the stress lab  A regadenoson infusion pharmacologic stress test was performed  Gated SPECT myocardial perfusion imaging was performed after stress and at rest  Systolic blood pressure was 156 mmHg, at  the start of the study  Diastolic blood pressure was 78 mmHg, at the start of the study  The heart rate was 85 bpm, at the start of the study  IV double checked  Regadenoson protocol:  HR bpm SBP mmHg DBP mmHg Symptoms  Baseline 85 156 78 none  Immediate 108 153 75 mild dyspnea  5 min 92 156 68 none  No medications or fluids given  The patient also performed low level exercise  STRESS SUMMARY: Duration of pharmacologic stress was 3 min  Maximal heart rate during stress was 108 bpm  The rate-pressure product for the peak heart rate and blood pressure was 62461  There was no chest pain during stress  The stress  test was terminated due to protocol completion  Pre oxygen saturation: 97 %  Peak oxygen saturation: 98 %  The stress ECG was negative for ischemia  Arrhythmia during stress: isolated premature ventricular beats and pairs of premature  ventricular beats      ISOTOPE ADMINISTRATION:  Resting isotope administration Stress isotope administration  Agent Tetrofosmin Tetrofosmin  Dose 10 mCi 33 mCi  Date -- 2017  Injection time 08:58 10:30  Imaging time 09:40 11:00  Injection-image interval 42 min 30 min    The radiopharmaceutical was injected at the peak effect of pharmacologic stress  MYOCARDIAL PERFUSION IMAGING:  The image quality was good  Left ventricular size was normal  The TID ratio was 0 96  PERFUSION DEFECTS:  -  There were no perfusion defects  GATED SPECT:  The calculated left ventricular ejection fraction was 59 %  Left ventricular ejection fraction was within normal limits by visual estimate  There was no left ventricular regional abnormality  SUMMARY:  -  Stress results: There was no chest pain during stress  -  ECG conclusions: The stress ECG was negative for ischemia  -  Perfusion imaging: There were no perfusion defects   -  Gated SPECT: The calculated left ventricular ejection fraction was 59 %  Left ventricular ejection fraction was within normal limits by visual estimate  There was no left ventricular regional abnormality  IMPRESSIONS: Normal study after pharmacologic vasodilation  Myocardial perfusion imaging was normal at rest and with stress  Left ventricular systolic function was normal     Prepared and signed by    DO Jimi Jay 2017 17:03:37    --------------------------------------------------------------------------------  ECHO:   Results for orders placed during the hospital encounter of 21    Echo complete with contrast if indicated    Narrative  [address] [place] [address], [address]  [phone]    Transthoracic Echocardiogram  2D, M-mode, Doppler, and Color Doppler    Study date:  20-Aug-2021    Patient: Zay De La Rosa  MR number: [id number]  Account number: [de-identified]  : 1940  Age: 78 years  Gender: Male  Status: Inpatient  Location: Bedside  Height: 64 in  Weight: 187 7 lb  BP: 160/ 73 mmHg    Indications: Heart failure    Diagnoses: I50 9 - Heart failure, unspecified    Sonographer:  Segun Carvalho RDCS  Primary Physician:  Markos Urena DO  Referring Physician:  ANATOLY Pruett  Group:  [facility]  Interpreting Physician:  Lydia Tolliver DO    SUMMARY    LEFT VENTRICLE:  Systolic function was at the lower limits of normal  Ejection fraction was estimated to be 50 %  There was mild to moderate hypokinesis of the mid-apical septal myocardial wall segments  Wall thickness was mildly increased  Features were likely suggestive of a pseudonormal left ventricular filling pattern, with concomitant abnormal relaxation and increased filling pressure (grade 2 diastolic dysfunction)  LEFT ATRIUM:  The atrium was mildly dilated  MITRAL VALVE:  There was moderate annular calcification  There was mild stenosis (mean diastolic transvalvular gradient ~ 3 3 mmHg at HR 86 BPM)  There was mild regurgitation  AORTIC VALVE:  The valve was trileaflet  Leaflets exhibited markedly increased thickness, marked calcification, markedly reduced cuspal separation, reduced mobility, and sclerosis  There was moderate stenosis  There was mild regurgitation  The peak valve velocity was 3 3 cm/s  Valve mean gradient was 22 mmHg  The aortic valve obstructive index (by VTI) was 0 29  Difficult to accurately estimate aortic valve area due to suboptimal LVOT visualization and measurements  TRICUSPID VALVE:  There was mild regurgitation  AORTA:  The root exhibited mild dilatation (4 1 cm [2 1 cm/m2] at the sinuses of valsalva), and mild fibrocalcific change  SUMMARY MEASUREMENTS  2D measurements:  Unspecified Anatomy:   %FS was 23 9 %  Ao Diam was 3 9 cm  Ao asc was 3 4 cm   EDV(Teich) was 110 5 ml   EF(Teich) was 47 6 %  ESV(Teich) was 57 9 ml  IVSd was 1 2 cm  LA Diam was 4 5 cm  LAAs A2C was 27 1 cm2  LAAs A4C was 26 2  cm2  LAESV A-L A2C was 101 1 ml  LAESV A-L A4C was 92 8 ml  LAESV Index (A-L) was 51 1 ml/m2    LAESV MOD A2C was 98 2 ml   LAESV MOD A4C was 86 6 ml  LAESV(A-L) was 97 5 ml  LAESV(MOD BP) was 92 3 ml  LAESVInd MOD BP was 48 4 ml/m2  LALs A2C was 6 2 cm  LALs A4C was 6 3 cm  LVEDV MOD A4C was 195 6 ml  LVEF MOD A4C was 42 1 %  LVESV MOD A4C was 113 2 ml  LVIDd was 4 9 cm  LVIDs was 3 7 cm  LVLd A4C was 10 6 cm  LVLs A4C was 9 6 cm  LVOT Diam was 2 6 cm  LVPWd  was 0 9 cm  RAAs A4C was 21 1 cm2  RAESV A-L was 66 8 ml  RAESV MOD was 65 4 ml  RALs was 5 7 cm  RVIDd was 4 7 cm  RWT was 0 4   SV MOD A4C was 82 4 ml   SV(Teich) was 52 6 ml   CW measurements:  Unspecified Anatomy:   AV Env  Ti was 267 9 ms   AV VTI was 59 6 cm   AV Vmax was 2 9 m/s  AV Vmean was 2 2 m/s  AV maxPG was 34 5 mmHg  AV meanPG was 21 6 mmHg  MV VTI was 35 cm  MV Vmax was 1 2 m/s  MV Vmean was 0 9 m/s  MV maxPG  was 6 mmHg  MV meanPG was 3 4 mmHg  TR Vmax was 3 2 m/s   TR maxPG was 40 mmHg  MM measurements:  Unspecified Anatomy:   TAPSE was 2 4 cm  PW measurements:  Unspecified Anatomy:   GREGORY (VTI) was 1 7 cm2  GREGORY Vmax was 1 8 cm2  AVAI (VTI) was 0 cm2/m2  AVAI Vmax was 0 cm2/m2  DVI was 0 3   E' Sept was 0 m/s  E/E' Sept was 31 6   LVOT Env  Ti was 308 3 ms  LVOT VTI was 20 3 cm  LVOT Vmax  was 1 m/s  LVOT Vmean was 0 7 m/s  LVOT maxPG was 4 2 mmHg  LVOT meanPG was 2 1 mmHg  LVSI Dopp was 54 5 ml/m2  LVSV Dopp was 104 1 ml   MV A David was 1 2 m/s  MV Dec Mills was 7 1 m/s2  MV DecT was 204 7 ms   MV E David was 1 4 m/s  MV E/A Ratio was 1 2   MV PHT was 59 4 ms  MVA (VTI) was 3 cm2  MVA By PHT was 3 7 cm2  HISTORY: PRIOR HISTORY: HTN; AS; CP; HLD; PVC; Acute respiratory failure with hypoxia; CHF; Obesity; Depression    PROCEDURE: The procedure was performed at the bedside  This was a routine study  The transthoracic approach was used  The study included complete 2D imaging, M-mode, complete spectral Doppler, and color Doppler  The heart rate was 98 bpm,  at the start of the study   Images were obtained from the parasternal, apical, subcostal, and suprasternal notch acoustic windows  Echocardiographic views were limited due to poor acoustic window availability  This was a technically  difficult study  LEFT VENTRICLE: Size was normal  Systolic function was at the lower limits of normal  Ejection fraction was estimated to be 50 %  There was mild to moderate hypokinesis of the mid-apical septal myocardial wall segments  Wall thickness was  mildly increased  DOPPLER: Features were likely suggestive of a pseudonormal left ventricular filling pattern, with concomitant abnormal relaxation and increased filling pressure (grade 2 diastolic dysfunction)  RIGHT VENTRICLE: The size was normal  Systolic function was normal  Wall thickness was normal     LEFT ATRIUM: The atrium was mildly dilated  RIGHT ATRIUM: Size was normal     MITRAL VALVE: There was moderate annular calcification  DOPPLER: There was mild stenosis (mean diastolic transvalvular gradient ~ 3 3 mmHg at HR 86 BPM)  There was mild regurgitation  AORTIC VALVE: The valve was trileaflet  Leaflets exhibited markedly increased thickness, marked calcification, markedly reduced cuspal separation, reduced mobility, and sclerosis  DOPPLER: There was moderate stenosis  There was mild  regurgitation  TRICUSPID VALVE: Not well visualized  DOPPLER: There was mild regurgitation  PULMONIC VALVE: Not well visualized  PERICARDIUM: There was no pericardial effusion  The pericardium was normal in appearance  AORTA: The root exhibited mild dilatation (4 1 cm [2 1 cm/m2] at the sinuses of valsalva), and mild fibrocalcific change  SYSTEMIC VEINS: IVC: The inferior vena cava was not well visualized      MEASUREMENT TABLES    DOPPLER MEASUREMENTS  Aortic valve   (Reference normals)  Peak brandi   3 3 cm/s   (--)  Mean gradient   22 mmHg   (--)  Obstr index, VTI   0 29    (--)    SYSTEM MEASUREMENT TABLES    2D  %FS: 23 9 %  Ao Diam: 3 9 cm  Ao asc: 3 4 cm  EDV(Teich): 110 5 ml  EF(Teich): 47 6 %  ESV(Teich): 57 9 ml  IVSd: 1 2 cm  LA Diam: 4 5 cm  LAAs A2C: 27 1 cm2  LAAs A4C: 26 2 cm2  LAESV A-L A2C: 101 1 ml  LAESV A-L A4C: 92 8 ml  LAESV Index (A-L): 51 1 ml/m2  LAESV MOD A2C: 98 2 ml  LAESV MOD A4C: 86 6 ml  LAESV(A-L): 97 5 ml  LAESV(MOD BP): 92 3 ml  LAESVInd MOD BP: 48 4 ml/m2  LALs A2C: 6 2 cm  LALs A4C: 6 3 cm  LVEDV MOD A4C: 195 6 ml  LVEF MOD A4C: 42 1 %  LVESV MOD A4C: 113 2 ml  LVIDd: 4 9 cm  LVIDs: 3 7 cm  LVLd A4C: 10 6 cm  LVLs A4C: 9 6 cm  LVOT Diam: 2 6 cm  LVPWd: 0 9 cm  RAAs A4C: 21 1 cm2  RAESV A-L: 66 8 ml  RAESV MOD: 65 4 ml  RALs: 5 7 cm  RVIDd: 4 7 cm  RWT: 0 4  SV MOD A4C: 82 4 ml  SV(Teich): 52 6 ml    CW  AV Env  Ti: 267 9 ms  AV VTI: 59 6 cm  AV Vmax: 2 9 m/s  AV Vmean: 2 2 m/s  AV maxP 5 mmHg  AV meanP 6 mmHg  MV VTI: 35 cm  MV Vmax: 1 2 m/s  MV Vmean: 0 9 m/s  MV maxP mmHg  MV meanPG: 3 4 mmHg  TR Vmax: 3 2 m/s  TR maxP mmHg    MM  TAPSE: 2 4 cm    PW  GREGORY (VTI): 1 7 cm2  GREGORY Vmax: 1 8 cm2  AVAI (VTI): 0 cm2/m2  AVAI Vmax: 0 cm2/m2  DVI: 0 3  E' Sept: 0 m/s  E/E' Sept: 31 6  LVOT Env  Ti: 308 3 ms  LVOT VTI: 20 3 cm  LVOT Vmax: 1 m/s  LVOT Vmean: 0 7 m/s  LVOT maxP 2 mmHg  LVOT meanP 1 mmHg  LVSI Dopp: 54 5 ml/m2  LVSV Dopp: 104 1 ml  MV A David: 1 2 m/s  MV Dec [place]: 7 1 m/s2  MV DecT: 204 7 ms  MV E David: 1 4 m/s  MV E/A Ratio: 1 2  MV PHT: 59 4 ms  MVA (VTI): 3 cm2  MVA By PHT: 3 7 cm2    [facility]    Prepared and electronically signed by    DO Jimi Greenfield 20-Aug-2021 14:28:06    --------------------------------------------------------------------------------  HOLTER  Results for orders placed during the hospital encounter of 18    Holter monitor - 24 hour    Narrative  PT NAME: Andreia Simms  : 1940  AGE: [address] y o    GENDER: male  MRN: [id number]   PROCEDURE: Holter monitor - 24 hour        INDICATIONS: Palpitations      FINDINGS:    Holter monitoring revealed predominant sinus rhythm with an average heart rate of 85 BPM, a minimum heart rate of 67 BPM, and a maximum heart rate of 117 BPM     There were about 301 ventricular ectopic beats, all occurring as single PVC's with no evidence of ventricular tachycardia  There were about 1825 supraventricular ectopic beats, mostly occurring as single PAC's and a 6-beat atrial run, with no evidence of sustained supraventricular tachycardia, or any atrial fibrillation/ flutter  There was no evidence of significant bradyarrhythmia or advanced heart block  The longest R-R interval was 1 2 seconds  The patient's symptom diary reported no symptoms  No results found for this or any previous visit       ======================================================     Imaging:   I have personally reviewed pertinent reports  EKG:  Sinus tachycardia, interventricular conduction delay, nonspecific ST T-wave changes, no definite acute changes a myocardial infarction, in comparison to prior EKG of November 2018 there are minor ST T-wave changes but nothing to indicate a massive myocardial infarction    Portions of the record may have been created with voice recognition software  Occasional wrong word or "sound a like" substitutions may have occurred due to the inherent limitations of voice recognition software  Read the chart carefully and recognize, using context, where substitutions have occurred      Aurelia Hermosillo MD

## 2021-08-20 NOTE — SPEECH THERAPY NOTE
Speech/Language Pathology Progress Note    Patient Name: Zandra Andrade  [id number] Date: 8/20/2021     ST orders received  Pt is on BiPap, unable to assess swallow function at this time  ST will f/u as able/appropriate

## 2021-08-20 NOTE — ED NOTES
Per Link Cutting from ICU, patient only need 1800 ml of fluids based on ideal body weight        Slime Champagne RN  08/19/21 6845

## 2021-08-20 NOTE — H&P
[address] 1940, 78 y o  male MRN: [id number]  Unit/Bed#: ICU 09 Encounter: [id number]  Primary Care Provider: Parth Escalante DO   Date and time admitted to hospital: 8/19/2021  8:25 PM    Acute exacerbation of CHF (congestive heart failure) ([facility] [address] )  Assessment & Plan  Wt Readings from Last 3 Encounters:   08/19/21 85 4 kg (188 lb 4 4 oz)   07/26/21 83 5 kg (184 lb 2 oz)   03/19/21 82 8 kg (182 lb 9 6 oz)     ProBNP-693  Given lasix 40 mg IV in ED  Will dose lasix as needed  Concern for worsening aortic disease, will obtain Echocardiogram  Last echo-in 2018 revealed EF-70%, G1DD, mild to mod left atrial dialtion, AV with moderate increased thickness of leaflets, moderated to marked calcification and mildly reduced cuspal separation  Mild to moderate stenosis        Multifocal pneumonia  Assessment & Plan  Multifocal infiltrates on CXR this admission  Concern for COVID-19 given his exposure to family and friends who recently traveled from out of state  Strep, legionella pending  Ceftriaxone, azithromycin initiated  Pulmonary hygiene    * Acute respiratory failure with hypoxia [facility])  Assessment & Plan  Patient admitted with history of anxiety, generalized fatigue since Tuesday (9/17)  His wife noted that on 8/19 he was more short of breath with associated cough and complaints of chest pain which prompted his visit to the ED  In the ED patient was noted to be hypoxic, in respiratory distress  Patient improved with lasix, bipap, morphine and ativan  Clinically patient appears as though he is fluid overloaded, his lung sounds are coarse with scattered rales  He has trace to +1 left lower extremity edema that he reports as new  His COVID-19 is negative, however, there is still suspicion so will re-test  Continue bipap for comfort  Patient reports he is breathing much easier than he was before    Will wean to NC in the morning with goal to maintain oxygen saturations >88%      Hypochromic microcytic anemia  Assessment & Plan  Has history of iron deficiency anemia due to inadequate dietary iron intake  His hg is up to 9 9 from 8 0 in february  He received an EGD in 2/2021 with no source of bleeding  Continue protonix BID for esophagitis and gastric ulcer  Received venofer as OP  May need OP hematology follow up  Trend hg, transfuse for <7 0    Lactic acidosis  Assessment & Plan  Secondary to sepsis    High anion gap metabolic acidosis  Assessment & Plan  Likely secondary to infection, poor nutrition in past 2-3 days, high lactic acidosis    Mild cognitive impairment  Assessment & Plan  Record review reveals stable memory issues with outpatient followup with geriatrics  TSH, B12 nl in past  Family reports no progression at this point  Continue outpatient sertraline    Chest pain  Assessment & Plan  Patient complained of chest pain in midsternal/epigastric area that started on 8/19  He reports it was consistent throughout the day  EKGs in ED revealed no STEMI with mild troponin elevation  Will monitor overnight with troponins and EKG 3 hours  He was given rectal aspirin 300 mg in ED  Continue daily aspirin      Aortic stenosis, moderate  Assessment & Plan  Now with concern of progression of disease secondary to signs of volume overload  Will obtain echo  Monitor I/Os closely     Essential hypertension  Assessment & Plan  Antihypertensives on hold for now  Will restart if BP remains stable       -------------------------------------------------------------------------------------------------------------  Chief Complaint: SOB, chest pain    History of Present Illness   HX and PE limited by: ability to understand patient with bipap and thick accent  Yanna Osorio is a 78 y o  male with a PMH of MCI, HTN, HLD, GERD, Anxiety, Mod Aortic stenosis, who presents with to the ED via EMS with a one day history of SOB and chest pain   According to his wife, patient was increasingly anxious and not acting himself since Tuesday (8/17) when his 2 grandsons went back to college  He wanted to be left alone, he had a poor appetite and he was not moving around much  On Thursday (8/19) patient had worsening shortness of breath with a cough and complained of chest pain in the evening which prompted his wife to call EMS  He recently had visitors who had been traveling from out of [facility], no one has been tested for COVID that his family knows of  According to ED staff, at time of my assessment, patient was looking improved following treatment with ativan, morphine, lasix, bipap and aspirin  An EKG revealed no STEMI, troponin is mildly elevated  His CXR shows significant disease with multifocal infiltrates  History obtained from spouse, chart review and the patient   -------------------------------------------------------------------------------------------------------------  Dispo: Admit to Stepdown Level 1    Code Status: Level 1 - Full Code  --------------------------------------------------------------------------------------------------------------  Review of Systems   Constitutional: Positive for activity change, appetite change and fatigue  HENT: Positive for sore throat and trouble swallowing  Eyes: Negative  Respiratory: Positive for shortness of breath  Cardiovascular: Positive for chest pain and leg swelling  Gastrointestinal: Positive for constipation  Endocrine: Negative  Musculoskeletal: Negative  Skin: Negative  Allergic/Immunologic: Negative  Hematological: Negative  Psychiatric/Behavioral: Positive for agitation  The patient is nervous/anxious  A 12-point, complete review of systems was reviewed and negative except as stated above     Physical Exam  Vitals and nursing note reviewed  Constitutional:       General: He is not in acute distress  Appearance: Normal appearance        Comments: Pleasant, thick Min accent, difficult to understand   HENT:      Head: Normocephalic and atraumatic  Right Ear: External ear normal       Left Ear: External ear normal       Nose: Nose normal       Mouth/Throat:      Mouth: Mucous membranes are moist       Pharynx: Oropharynx is clear  Eyes:      Extraocular Movements: Extraocular movements intact  Conjunctiva/sclera: Conjunctivae normal       Pupils: Pupils are equal, round, and reactive to light  Cardiovascular:      Rate and Rhythm: Regular rhythm  Tachycardia present  Pulses: Normal pulses  Heart sounds: Murmur heard  Pulmonary:      Comments: Coarse breath sounds with scattered rales throughout lungs  Non productive cough  Abdominal:      General: Bowel sounds are normal       Palpations: Abdomen is soft  Musculoskeletal:         General: Normal range of motion  Cervical back: Normal range of motion and neck supple  Left lower leg: Edema present  Comments: Trace to +1 pitting edema in LLE   Skin:     General: Skin is warm and dry  Capillary Refill: Capillary refill takes less than 2 seconds  Neurological:      General: No focal deficit present  Mental Status: He is alert and oriented to person, place, and time  Psychiatric:         Mood and Affect: Mood normal          Behavior: Behavior normal          Thought Content: Thought content normal          Judgment: Judgment normal        --------------------------------------------------------------------------------------------------------------  Vitals:   Vitals:    08/19/21 2145 08/19/21 2159 08/19/21 2245 08/20/21 0011   BP: 128/60 134/63 131/64    BP Location: Left arm  Left arm    Pulse: 92 101 94    Resp: (!) 24 (!) 24 (!) 24    Temp:       TempSrc:       SpO2: 98% 94% 99% 98%   Weight:         Temp  Min: 97 9 °F (36 6 °C)  Max: 97 9 °F (36 6 °C)        Body mass index is 32 32 kg/m²      Laboratory and Diagnostics:  Results from last 7 days   Lab Units 08/19/21 2057   WBC Thousand/uL 17 11*   HEMOGLOBIN g/dL 9 9*   HEMATOCRIT % 34 7*   PLATELETS Thousands/uL 378   NEUTROS PCT % 82*   MONOS PCT % 6     Results from last 7 days   Lab Units 08/19/21  2057   SODIUM mmol/L 142   POTASSIUM mmol/L 3 7   CHLORIDE mmol/L 105   CO2 mmol/L 18*   ANION GAP mmol/L 19*   BUN mg/dL 26*   CREATININE mg/dL 0 97   CALCIUM mg/dL 8 0*   GLUCOSE RANDOM mg/dL 174*   ALT U/L 62   AST U/L 53*   ALK PHOS U/L 78   ALBUMIN g/dL 4 1   TOTAL BILIRUBIN mg/dL 0 37          Results from last 7 days   Lab Units 08/19/21 2057   INR  1 09   PTT seconds 26      Results from last 7 days   Lab Units 08/19/21 2057   TROPONIN I ng/mL 0 08*     Results from last 7 days   Lab Units 08/19/21  2300 08/19/21  2102   LACTIC ACID mmol/L 5 6* 8 1*     ABG:  Results from last 7 days   Lab Units 08/19/21 2207   PH ART  7 294*   PCO2 ART mm Hg 35 7*   PO2 ART mm Hg 107 0   HCO3 ART mmol/L 16 9*   BASE EXC ART mmol/L -8 8   ABG SOURCE  Radial, Right     VBG:  Results from last 7 days   Lab Units 08/19/21 2207   ABG SOURCE  Radial, Right     Results from last 7 days   Lab Units 08/19/21 2057   PROCALCITONIN ng/ml <0 05       Micro:        EKG: ST  Imaging: I have personally reviewed pertinent films in PACS      Historical Information   Past Medical History:   Diagnosis Date    Actinic keratosis     LAST ASSESSED: [id number]    Allergic rhinitis     LAST ASSESSED: [id number]    Anxiety     LAST ASSESSED: [id number]    Anxiety disorder     LAST ASSESSED: [id number]    Atelectasis of right lung     ABNORMAL CT SCAN OF 14 [facility] 12/2/2016 REPEAT NEEDED PER RADIOLOGY IN 3 MONTHS LAST ASSESSED: [id number]    Atypical chest pain     LAST ASSESSED: [id number]    Benign paroxysmal vertigo     LAST ASSESSED: [id number]    Chronic cough     LAST ASSESSED: [id number]    Chronic GERD     Degenerative arthritis of knee, bilateral     LAST ASSESSED: [id number]    Diverticulitis of colon     LAST ASSESSED: [id number]    Diverticulosis     LAST ASSESSED: [id number]    Foreign body, eye     LAST ASSESSED: [id number]    Functional gait abnormality     LAST ASSESSED: 24WSS0003    Hyperlipidemia     Hypertension     Hyponatremia     LAST ASSESSED: 71TFW3197    Leukocytosis     LAST ASSESSED: [id number]    Murmur     Newly recognized murmur     LAST ASSESSED: [id number]    Olecranon bursitis, unspecified elbow     Presence of indwelling urethral catheter     RESOLVED: [id number]    Transient cerebral ischemia     LAST ASSESSED: 51NTQ1787    Urinary incontinence     LAST ASSESSED: [id number]    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     LAST ASSESSED: [id number]     Past Surgical History:   Procedure Laterality Date    ADENOIDECTOMY      APPENDECTOMY      COLONOSCOPY  10/2006    FIBEROPTIC    INGUINAL HERNIA REPAIR      JOINT REPLACEMENT      b/l TKR 2015    SINUS SURGERY      TONSILLECTOMY       Social History   Social History     Substance and Sexual Activity   Alcohol Use Yes    Comment: occ, SOCIAL     Social History     Substance and Sexual Activity   Drug Use No     Social History     Tobacco Use   Smoking Status Former Smoker    Quit date: 56    Years since quittin 6   Smokeless Tobacco Never Used   Tobacco Comment    2 ppd for 12 years      Exercise History: n/a  Family History:   Family History   Problem Relation Age of Onset    Alzheimer's disease Mother     Coronary artery disease Brother      I have reviewed this patient's family history and commented on sigificant items within the HPI      Medications:  Current Facility-Administered Medications   Medication Dose Route Frequency    aspirin (ECOTRIN LOW STRENGTH) EC tablet 81 mg  81 mg Oral Daily    azithromycin (ZITHROMAX) 500 mg in sodium chloride 0 9 % 250 mL IVPB  500 mg Intravenous Q24H    enoxaparin (LOVENOX) subcutaneous injection 40 mg  40 mg Subcutaneous Daily    pantoprazole (PROTONIX) EC tablet 40 mg  40 mg Oral BID    polyethylene glycol (MIRALAX) packet 17 g  17 g Oral Daily    pravastatin (PRAVACHOL) tablet 40 mg  40 mg Oral Daily    sertraline (ZOLOFT) tablet 50 mg  50 mg Oral Daily    tamsulosin (FLOMAX) capsule 0 4 mg  0 4 mg Oral HS     Home medications:  Prior to Admission Medications   Prescriptions Last Dose Informant Patient Reported? Taking? Omega-3 Fatty Acids (FISH OIL PO)  Self Yes No   Sig: Take by mouth 2 (two) times a day   Probiotic Product (PROBIOTIC DAILY PO)  Self Yes No   Sig: Take by mouth daily   VITAMIN D, ERGOCALCIFEROL, PO  Self Yes No   Sig: Take by mouth   amLODIPine (NORVASC) 10 mg tablet   No No   Sig: Take 1 tablet (10 mg total) by mouth daily   aspirin 81 MG tablet  Self Yes No   Sig: Take 81 mg by mouth    finasteride (PROSCAR) 5 mg tablet   No No   Sig: Take 1 tablet (5 mg total) by mouth daily   losartan (COZAAR) 25 mg tablet   No No   Sig: Take 1 tablet (25 mg total) by mouth daily   multivitamin (THERAGRAN) TABS  Self Yes No   Sig: Take 1 tablet by mouth daily   pantoprazole (PROTONIX) 40 mg tablet   No No   Sig: Take 1 tablet (40 mg total) by mouth 2 (two) times a day   polyethylene glycol (MIRALAX) 17 g packet  Self Yes No   Sig: Take 17 g by mouth daily   pravastatin (PRAVACHOL) 40 mg tablet   No No   Sig: Take 1 tablet (40 mg total) by mouth daily   sertraline (ZOLOFT) 50 mg tablet   No No   Sig: Take 1 tablet (50 mg total) by mouth daily   tamsulosin (FLOMAX) 0 4 mg   No No   Sig: Take 1 capsule (0 4 mg total) by mouth daily at bedtime      Facility-Administered Medications: None     Allergies:   Allergies   Allergen Reactions    Ace Inhibitors Cough     Pt denied any allergies         ------------------------------------------------------------------------------------------------------------  Advance Directive and Living Will: Yes    Power of :    POLST:    ------------------------------------------------------------------------------------------------------------  Anticipated Length of Stay is > 2 midnights    Care Time Delivered:   No Critical Care time spent       ANATOLY Aceves        Portions of the record may have been created with voice recognition software  Occasional wrong word or "sound a like" substitutions may have occurred due to the inherent limitations of voice recognition software    Read the chart carefully and recognize, using context, where substitutions have occurred

## 2021-08-20 NOTE — SEPSIS NOTE
Sepsis Note   Balaji Shallow 78 y o  male MRN: [id number]  Unit/Bed#: ED 20 Encounter: [id number]      qSOFA     Row Name 08/19/21 2159 08/19/21 2145 08/19/21 2059 08/19/21 2037 08/19/21 2031    Altered mental status GCS < 15  --  --  --  0  --    Respiratory Rate > / =22  1  1  1  --  1    Systolic BP < / =819  0  0  0  --  --    Q Sofa Score  1  1  1  1  1    Row Name 08/19/21 2029 08/19/21 2028             Altered mental status GCS < 15  --  --       Respiratory Rate > / =22  1  --       Systolic BP < / =880  --  0       Q Sofa Score  1  --           Initial Sepsis Screening     Row Name 08/19/21 2240 08/19/21 2228             Is the patient's history suggestive of a new or worsening infection?   No  -SG  (!) Yes (Proceed)  -       Suspected source of infection  --  pneumonia  -MC       Are two or more of the following signs & symptoms of infection both present and new to the patient?  --  (!) Yes (Proceed)  -       Indicate SIRS criteria  Tachypnea > 20 resp per min;Leukocytosis (WBC > 25488 IJL)  -  Tachycardia > 90 bpm;Tachypnea > 20 resp per min;Leukocytosis (WBC > 03030 IJL)  -       If the answer is yes to both questions, suspicion of sepsis is present  --  --       If severe sepsis is present AND tissue hypoperfusion perists in the hour after fluid resuscitation or lactate > 4, the patient meets criteria for SEPTIC SHOCK  --  --       Are any of the following organ dysfunction criteria present within 6 hours of suspected infection and SIRS criteria that are NOT considered to be chronic conditions?  --  (!) Yes  -       Organ dysfunction  --  Lactate >/equal 4 0 mmol/L;Lactate > 2 0 mmol/L;Acute respiratory failure (new need for invasive or non-invasive mechanical ventilation)  -       Date of presentation of severe sepsis  --  08/19/21  -       Time of presentation of severe sepsis  --  2230  -       Tissue hypoperfusion persists in the hour after crystalloid fluid administration, evidenced, by either:  --  * OR * Lactate level is greater to or equal 4 mmol/dL ( ___ mmol/dL in comment field)  -MC       Was hypotension present within one hour of the conclusion of crystalloid fluid administration?  --  No  -MC       Date of presentation of septic shock  --  --       Time of presentation of septic shock  --  --         User Key  (r) = Recorded By, (t) = Taken By, (c) = Cosigned By    [address] Name Provider Type    TERRENCE Barrera DO Physician    Evelyn Gusman MD Resident

## 2021-08-20 NOTE — ED PROVIDER NOTES
History  Chief Complaint   Patient presents with    Respiratory Distress     EMS called due to patient feeling CP and SOB x2 days  68% on RA upon EMS arrival  patient arriving on bipap      79 y/o male patient BIB EMS for SOB and chest pain onset yesterday  Patient has been coughing and SOB yesterday  Today patient developed chest pain at 7:30 PM  Patient is vaccinated for COVID however, has recently been in contact with people from [place] and [place]  No hx of CHF  Per EMS, patient O2 sat was 65 on room air  Was placed on CPAP and O2 went up to 85%  Prior to Admission Medications   Prescriptions Last Dose Informant Patient Reported? Taking?    Omega-3 Fatty Acids (FISH OIL PO)  Self Yes No   Sig: Take by mouth 2 (two) times a day   Probiotic Product (PROBIOTIC DAILY PO)  Self Yes No   Sig: Take by mouth daily   VITAMIN D, ERGOCALCIFEROL, PO  Self Yes No   Sig: Take by mouth   amLODIPine (NORVASC) 10 mg tablet   No No   Sig: Take 1 tablet (10 mg total) by mouth daily   aspirin 81 MG tablet  Self Yes No   Sig: Take 81 mg by mouth    finasteride (PROSCAR) 5 mg tablet   No No   Sig: Take 1 tablet (5 mg total) by mouth daily   losartan (COZAAR) 25 mg tablet   No No   Sig: Take 1 tablet (25 mg total) by mouth daily   multivitamin (THERAGRAN) TABS  Self Yes No   Sig: Take 1 tablet by mouth daily   pantoprazole (PROTONIX) 40 mg tablet   No No   Sig: Take 1 tablet (40 mg total) by mouth 2 (two) times a day   polyethylene glycol (MIRALAX) 17 g packet  Self Yes No   Sig: Take 17 g by mouth daily   pravastatin (PRAVACHOL) 40 mg tablet   No No   Sig: Take 1 tablet (40 mg total) by mouth daily   sertraline (ZOLOFT) 50 mg tablet   No No   Sig: Take 1 tablet (50 mg total) by mouth daily   tamsulosin (FLOMAX) 0 4 mg   No No   Sig: Take 1 capsule (0 4 mg total) by mouth daily at bedtime      Facility-Administered Medications: None       Past Medical History:   Diagnosis Date    Actinic keratosis     LAST ASSESSED: [id number]    Allergic rhinitis     LAST ASSESSED: [id number]    Anxiety     LAST ASSESSED: [id number]    Anxiety disorder     LAST ASSESSED: [id number]    Atelectasis of right lung     ABNORMAL CT SCAN OF [address] 2016 REPEAT NEEDED PER RADIOLOGY IN 3 MONTHS LAST ASSESSED: [id number]    Atypical chest pain     LAST ASSESSED: 41QOG1148    Benign paroxysmal vertigo     LAST ASSESSED: [id number]    Chronic cough     LAST ASSESSED: [id number]    Chronic GERD     Degenerative arthritis of knee, bilateral     LAST ASSESSED: [id number]    Diverticulitis of colon     LAST ASSESSED: 41NHW6911    Diverticulosis     LAST ASSESSED: 39AQG9672    Foreign body, eye     LAST ASSESSED: 16FPV4641    Functional gait abnormality     LAST ASSESSED: [id number]    Hyperlipidemia     Hypertension     Hyponatremia     LAST ASSESSED: 39HQJ0353    Leukocytosis     LAST ASSESSED: 14SPB7467    Murmur     Newly recognized murmur     LAST ASSESSED: 70STJ4554    Olecranon bursitis, unspecified elbow     Presence of indwelling urethral catheter     RESOLVED: 64LAQ5657    Transient cerebral ischemia     LAST ASSESSED: 20SVL0154    Urinary incontinence     LAST ASSESSED: 08ZOL5230    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     LAST ASSESSED: 91KRF3674       Past Surgical History:   Procedure Laterality Date    ADENOIDECTOMY      APPENDECTOMY      COLONOSCOPY  10/2006    FIBEROPTIC    INGUINAL HERNIA REPAIR      JOINT REPLACEMENT      b/l TKR 2015    SINUS SURGERY      TONSILLECTOMY         Family History   Problem Relation Age of Onset    Alzheimer's disease Mother     Coronary artery disease Brother      I have reviewed and agree with the history as documented      E-Cigarette/Vaping    E-Cigarette Use Never User      E-Cigarette/Vaping Substances     Social History     Tobacco Use    Smoking status: Former Smoker     Quit date: 1960     Years since quittin 6    Smokeless tobacco: Never Used    Tobacco comment: 2 ppd for 12 years    Vaping Use    Vaping Use: Never used   Substance Use Topics    Alcohol use: Yes     Comment: occ, SOCIAL    Drug use: No        Review of Systems   Unable to perform ROS: Severe respiratory distress   Respiratory: Positive for cough and shortness of breath  Cardiovascular: Positive for chest pain  Physical Exam  ED Triage Vitals   Temperature Pulse Respirations Blood Pressure SpO2   08/19/21 2051 08/19/21 2029 08/19/21 2029 08/19/21 2028 08/19/21 2028   97 9 °F (36 6 °C) 98 (!) 24 134/63 91 %      Temp Source Heart Rate Source Patient Position - Orthostatic VS BP Location FiO2 (%)   08/19/21 2051 08/19/21 2029 08/19/21 2028 08/19/21 2028 08/19/21 2029   Rectal Monitor Lying Right arm 100      Pain Score       08/19/21 2053       Worst Possible Pain             Orthostatic Vital Signs  Vitals:    08/21/21 1800 08/21/21 2200 08/22/21 0100 08/22/21 0157   BP: 108/50 100/51 134/55 136/62   Pulse: 78 60 60 60   Patient Position - Orthostatic VS:           Physical Exam  Constitutional:       General: He is in acute distress  HENT:      Head: Normocephalic and atraumatic  Nose: Nose normal    Eyes:      Extraocular Movements: Extraocular movements intact  Pupils: Pupils are equal, round, and reactive to light  Cardiovascular:      Rate and Rhythm: Regular rhythm  Tachycardia present  Pulses: Normal pulses  Pulmonary:      Effort: Respiratory distress present  Breath sounds: Rales present  Abdominal:      General: Bowel sounds are normal       Palpations: Abdomen is soft  Musculoskeletal:         General: No tenderness  Right lower leg: No edema  Left lower leg: No edema  Skin:     General: Skin is warm and dry  Neurological:      Mental Status: He is alert           ED Medications  Medications   ondansetron (ZOFRAN) injection 4 mg (4 mg Intravenous Not Given 8/20/21 0300)   ondansetron (ZOFRAN) 4 mg/2 mL injection **ADS Override Pull** (  Not Given 8/20/21 0301)   LORazepam (ATIVAN) injection 0 5 mg (0 5 mg Intravenous Given 8/20/21 0317)   pantoprazole (PROTONIX) injection 40 mg (40 mg Intravenous Given 8/21/21 0909)   aspirin rectal suppository 300 mg (300 mg Rectal Given 8/21/21 0915)   heparin (porcine) 25,000 units in 0 45% NaCl 250 mL infusion (premix) (11 8 Units/kg/hr × 85 kg (Order-Specific) Intravenous Rate/Dose Verify 8/22/21 0200)   heparin (porcine) injection 4,000 Units (has no administration in time range)   heparin (porcine) injection 2,000 Units (has no administration in time range)   furosemide (LASIX) injection 40 mg (40 mg Intravenous Given 8/21/21 2311)   morphine injection 2 mg (2 mg Intravenous Given 8/21/21 0210)   dexmedetomidine (PRECEDEX) 400 mcg in sodium chloride 0 9 % 100 mL infusion (1 216 mcg/kg/hr × 85 5 kg Intravenous New Bag 8/22/21 0233)   calcium gluconate 1 g in sodium chloride 0 9% 50 mL (premix) (1 g Intravenous New Bag 8/21/21 2316)   cefepime (MAXIPIME) 2,000 mg in dextrose 5 % 50 mL IVPB (2,000 mg Intravenous New Bag 8/21/21 2336)   vancomycin (VANCOCIN) IVPB (premix in dextrose) 750 mg 150 mL (0 mg Intravenous Stopped 8/22/21 0202)   metroNIDAZOLE (FLAGYL) IVPB (premix) 500 mg 100 mL (500 mg Intravenous New Bag 8/21/21 1954)   iron sucrose (VENOFER) 300 mg in sodium chloride 0 9 % 250 mL IVPB (0 mg Intravenous Stopped 8/21/21 1826)   chlorhexidine (PERIDEX) 0 12 % oral rinse 15 mL (15 mL Mouth/Throat Given 8/21/21 2100)   propofol (DIPRIVAN) 1000 mg in 100 mL infusion (premix) (50 mcg/kg/min × 80 kg Intravenous Rate/Dose Verify 8/22/21 0200)   LORazepam (ATIVAN) injection 1 mg (1 mg Intravenous Given 8/21/21 1831)   Acetaminophen (TYLENOL) oral suspension 650 mg (has no administration in time range)   morphine injection 2 mg (2 mg Intravenous Given 8/19/21 2053)   LORazepam (ATIVAN) injection 0 5 mg (0 5 mg Intravenous Given 8/19/21 2053)   aspirin rectal suppository 300 mg (300 mg Rectal Given 8/19/21 2049)   furosemide (LASIX) injection 40 mg (40 mg Intravenous Given 8/19/21 2102)   ceftriaxone (ROCEPHIN) 1 g/50 mL in dextrose IVPB (0 mg Intravenous Stopped 8/19/21 2247)   lactated ringers bolus 1,000 mL (0 mL Intravenous Stopped 8/19/21 2328)     Followed by   lactated ringers bolus 1,000 mL (0 mL Intravenous Stopped 8/20/21 1430)   morphine injection 2 mg (2 mg Intravenous Given 8/20/21 0335)   LORazepam (ATIVAN) injection 0 5 mg (0 5 mg Intravenous Given 8/20/21 0428)   furosemide (LASIX) injection 40 mg (40 mg Intravenous Given 8/20/21 0802)   heparin (porcine) injection 4,000 Units (4,000 Units Intravenous Given 8/20/21 1209)   potassium chloride 20 mEq IVPB (premix) (0 mEq Intravenous Stopped 8/20/21 1605)   sodium chloride 0 9 % bolus 500 mL (0 mL Intravenous Stopped 8/20/21 1805)   OLANZapine (ZyPREXA) IM injection 5 mg (5 mg Intramuscular Given 8/20/21 2234)   sterile water injection **ADS Override Pull** (10 mL  Given 8/20/21 2240)   potassium chloride 20 mEq IVPB (premix) (0 mEq Intravenous Stopped 8/21/21 0359)   potassium chloride 20 mEq IVPB (premix) (0 mEq Intravenous Stopped 8/21/21 1038)   OLANZapine (ZyPREXA) IM injection 5 mg (5 mg Intramuscular Given 8/21/21 0515)   sterile water injection **ADS Override Pull** (10 mL Injection Given 8/21/21 0523)   magnesium sulfate 2 g/50 mL IVPB (premix) 2 g (0 g Intravenous Stopped 8/21/21 0911)   potassium chloride 20 mEq IVPB (premix) (0 mEq Intravenous Stopped 8/21/21 1801)   haloperidol lactate (HALDOL) injection 2 mg (2 mg Intramuscular Given 8/21/21 1036)   sodium chloride 0 9 % bolus 500 mL (0 mL Intravenous Stopped 8/21/21 1813)   potassium chloride oral solution 40 mEq (40 mEq Oral Given 8/21/21 2000)   potassium chloride 20 mEq IVPB (premix) (0 mEq Intravenous Stopped 8/21/21 8606)       Diagnostic Studies  Results Reviewed     Procedure Component Value Units Date/Time    Blood culture #1 [[id number]] Collected: 08/19/21 2030    Lab Status: Preliminary result Specimen: Blood from Arm, Left Updated: 08/21/21 1201     Blood Culture No Growth at 24 hrs  Blood culture #2 [[id number]] Collected: 08/19/21 2102    Lab Status: Preliminary result Specimen: Blood from Arm, Right Updated: 08/21/21 1201     Blood Culture No Growth at 24 hrs  Procalcitonin Reflex [[id number]]  (Normal) Collected: 08/20/21 0429    Lab Status: Final result Specimen: Blood from Arm, Left Updated: 08/20/21 1142     Procalcitonin 0 14 ng/ml     Magnesium [[id number]]  (Normal) Collected: 08/19/21 2057    Lab Status: Final result Specimen: Blood from Arm, Left Updated: 08/20/21 0108     Magnesium 2 0 mg/dL     Phosphorus [[id number]]  (Abnormal) Collected: 08/19/21 2057    Lab Status: Final result Specimen: Blood from Arm, Left Updated: 08/20/21 0108     Phosphorus 4 4 mg/dL     Lactic acid 2 Hours [[id number]]  (Abnormal) Collected: 08/19/21 2300    Lab Status: Final result Specimen: Blood from Arm, Left Updated: 08/19/21 2335     LACTIC ACID 5 6 mmol/L     Narrative:      Result may be elevated if tourniquet was used during collection  Procalcitonin with AM Reflex [[id number]]  (Normal) Collected: 08/19/21 2057    Lab Status: Final result Specimen: Blood from Arm, Left Updated: 08/19/21 2305     Procalcitonin <0 05 ng/ml     Novel Coronavirus (Covid-19),PCR [facility] - 2 Hour Stat [[id number]]  (Normal) Collected: 08/19/21 2102    Lab Status: Final result Specimen: Nares from Nasopharyngeal Swab Updated: 08/19/21 2221     SARS-CoV-2 Negative    Narrative: The specimen collection materials, transport medium, and/or testing methodology utilized in the production of these test results have been proven to be reliable in a limited validation with an abbreviated program under the Emergency Utilization Authorization provided by the FDA  Testing reported as "Presumptive positive" will be confirmed with secondary testing to ensure result accuracy    Clinical caution and judgement should be used with the interpretation of these results with consideration of the clinical impression and other laboratory testing  Testing reported as "Positive" or "Negative" has been proven to be accurate according to standard laboratory validation requirements  All testing is performed with control materials showing appropriate reactivity at standard intervals  Blood gas, arterial [[id number]]  (Abnormal) Collected: 08/19/21 2207    Lab Status: Final result Specimen: Blood, Arterial from Radial, Right Updated: 08/19/21 2217     pH, Arterial 7 294     pCO2, Arterial 35 7 mm Hg      pO2, Arterial 107 0 mm Hg      HCO3, Arterial 16 9 mmol/L      Base Excess, Arterial -8 8 mmol/L      O2 Content, Arterial 13 6 mL/dL      O2 HGB,Arterial  96 1 %      SOURCE Radial, Right     DARÍO TEST Yes     Non Vent type BIPAP BIPAP     IPAP 14     EPAP 7     BIPAP fio2 60 %     Lactic acid [[id number]]  (Abnormal) Collected: 08/19/21 2102    Lab Status: Final result Specimen: Blood from Arm, Right Updated: 08/19/21 2137     LACTIC ACID 8 1 mmol/L     Narrative:      Result may be elevated if tourniquet was used during collection      Protime-INR [[id number]]  (Normal) Collected: 08/19/21 2057    Lab Status: Final result Specimen: Blood from Arm, Left Updated: 08/19/21 2136     Protime 13 9 seconds      INR 1 09    APTT [[id number]]  (Normal) Collected: 08/19/21 2057    Lab Status: Final result Specimen: Blood from Arm, Left Updated: 08/19/21 2136     PTT 26 seconds     NT-BNP PRO [[id number]]  (Abnormal) Collected: 08/19/21 2057    Lab Status: Final result Specimen: Blood from Arm, Left Updated: 08/19/21 2135     NT-proBNP 693 pg/mL     Troponin I [[id number]]  (Abnormal) Collected: 08/19/21 2057    Lab Status: Final result Specimen: Blood from Arm, Left Updated: 08/19/21 2131     Troponin I 0 08 ng/mL     Comprehensive metabolic panel [[id number]]  (Abnormal) Collected: 08/19/21 2057    Lab Status: Final result Specimen: Blood from Arm, Left Updated: 08/19/21 2129     Sodium 142 mmol/L      Potassium 3 7 mmol/L      Chloride 105 mmol/L      CO2 18 mmol/L      ANION GAP 19 mmol/L      BUN 26 mg/dL      Creatinine 0 97 mg/dL      Glucose 174 mg/dL      Calcium 8 0 mg/dL      AST 53 U/L      ALT 62 U/L      Alkaline Phosphatase 78 U/L      Total Protein 7 8 g/dL      Albumin 4 1 g/dL      Total Bilirubin 0 37 mg/dL      eGFR 72 ml/min/1 73sq m     Narrative:      [place] guidelines for Chronic Kidney Disease (CKD):     Stage 1 with normal or high GFR (GFR > 90 mL/min/1 73 square meters)    Stage 2 Mild CKD (GFR = 60-89 mL/min/1 73 square meters)    Stage 3A Moderate CKD (GFR = 45-59 mL/min/1 73 square meters)    Stage 3B Moderate CKD (GFR = 30-44 mL/min/1 73 square meters)    Stage 4 Severe CKD (GFR = 15-29 mL/min/1 73 square meters)    Stage 5 End Stage CKD (GFR <15 mL/min/1 73 square meters)  Note: GFR calculation is accurate only with a steady state creatinine    CBC and differential [[id number]]  (Abnormal) Collected: 08/19/21 2057    Lab Status: Final result Specimen: Blood from Arm, Left Updated: 08/19/21 2111     WBC 17 11 Thousand/uL      RBC 4 57 Million/uL      Hemoglobin 9 9 g/dL      Hematocrit 34 7 %      MCV 76 fL      MCH 21 7 pg      MCHC 28 5 g/dL      RDW 18 6 %      MPV 9 4 fL      Platelets 724 Thousands/uL      nRBC 0 /100 WBCs      Neutrophils Relative 82 %      Immat GRANS % 1 %      Lymphocytes Relative 10 %      Monocytes Relative 6 %      Eosinophils Relative 0 %      Basophils Relative 1 %      Neutrophils Absolute 14 21 Thousands/µL      Immature Grans Absolute 0 11 Thousand/uL      Lymphocytes Absolute 1 65 Thousands/µL      Monocytes Absolute 1 01 Thousand/µL      Eosinophils Absolute 0 03 Thousand/µL      Basophils Absolute 0 10 Thousands/µL                  XR chest 1 view portable   ED Interpretation by Gilberto Pelayo MD (08/19 2123)   Bilateral diffuse infiltrates      Final Result by Eden Cuellar MD (08/20 7809)      Diffuse patchy airspace disease bilaterally  Consider CHF versus multifocal infiltrate  Findings concur with the preliminary ER interpretation  Workstation performed: [id number]         XR chest portable ICU    (Results Pending)   XR chest portable ICU    (Results Pending)         Procedures  Procedures      ED Course  ED Course as of Aug 22 0258   u Aug 19, 2021   2131 Patient initially coming in with Chest pain and SOB  On Bipap and o2 sat 95  Given morphine and ativan  Improved chest pain  2329 Patient given LR per Sepsis protocol  IBW used in fluid calculation at 30 ml/kg as patient's BMI over 30                            Initial Sepsis Screening     Row Name 08/19/21 2240 08/19/21 2228             Is the patient's history suggestive of a new or worsening infection?   No  -SG  (!) Yes (Proceed)  -MC       Suspected source of infection  --  pneumonia  -MC       Are two or more of the following signs & symptoms of infection both present and new to the patient?  --  (!) Yes (Proceed)  -MC       Indicate SIRS criteria  Tachypnea > 20 resp per min;Leukocytosis (WBC > 56626 IJL)  -SG  Tachycardia > 90 bpm;Tachypnea > 20 resp per min;Leukocytosis (WBC > 05204 IJL)  -MC       If the answer is yes to both questions, suspicion of sepsis is present  --  --       If severe sepsis is present AND tissue hypoperfusion perists in the hour after fluid resuscitation or lactate > 4, the patient meets criteria for SEPTIC SHOCK  --  --       Are any of the following organ dysfunction criteria present within 6 hours of suspected infection and SIRS criteria that are NOT considered to be chronic conditions?  --  (!) Yes  -MC       Organ dysfunction  --  Lactate >/equal 4 0 mmol/L;Lactate > 2 0 mmol/L;Acute respiratory failure (new need for invasive or non-invasive mechanical ventilation)  -MC       Date of presentation of severe sepsis  -- 08/19/21  -       Time of presentation of severe sepsis  --  2230  -MC       Tissue hypoperfusion persists in the hour after crystalloid fluid administration, evidenced, by either:  --  * OR * Lactate level is greater to or equal 4 mmol/dL ( ___ mmol/dL in comment field)  -       Was hypotension present within one hour of the conclusion of crystalloid fluid administration?  --  No  -MC       Date of presentation of septic shock  --  --       Time of presentation of septic shock  --  --         User Key  (r) = Recorded By, (t) = Taken By, (c) = Cosigned By    [address] Name Provider Type    TERRENCE Whitaker DO Physician    Janay Lombardi MD Resident          SBIRT 20yo+      Most Recent Value   SBIRT (23 yo +)   In order to provide better care to our patients, we are screening all of our patients for alcohol and drug use  Would it be okay to ask you these screening questions? Unable to answer at this time Filed at: 08/19/2021 2030                [facility]  Number of Diagnoses or Management Options  Acute respiratory failure ([facility] [address] )  Leukocytosis  Severe sepsis [facility])  Diagnosis management comments: 77 y/o male patient presenting with Respiratory distress, 68 percent at home  Patient placed on Bipap and improved to O2 sat 95  Patient also c/o chest pain  Lactic acid elevated, 8 1, BNP elevated to 600s  Troponin 0 8  Lashay Lavender shows pulmonary edema vs multifocal infiltrate  No hx of CHF  Patient is vaccinated for Roselee Settles negative  Will admit to level 1 stepdown for Respiratory failure, on BIpap          Amount and/or Complexity of Data Reviewed  Clinical lab tests: ordered and reviewed  Tests in the radiology section of CPT®: ordered and reviewed        Disposition  Final diagnoses:   Severe sepsis ([facility] [address] )   Acute respiratory failure (HCC)   Leukocytosis     Time reflects when diagnosis was documented in both MDM as applicable and the Disposition within this note     Time User Action Codes Description Comment 8/19/2021 10:34 PM Kathalene Pen Add [R06 03] Respiratory distress     8/19/2021 10:34 PM Dareen [place] Seok Add [A41 9,  R65 20] Severe sepsis ([facility] [address] )     8/19/2021 10:34 PM Kathalene Pen Modify [A41 9,  R65 20] Severe sepsis ([facility] [address] )     8/19/2021 10:34 PM Dareen [place] [facility]AM[facility] Remove [R06 03] Respiratory distress     8/19/2021 10:34 PM Dareen [place] Seok Add [J96 00] Acute respiratory failure ([facility] [address] )     8/19/2021 10:34 PM Kathalene Pen Modify [A41 9,  R65 20] Severe sepsis ([facility] [address] )     8/19/2021 10:34 PM Dareen [place] [facility] Modify [J96 00] Acute respiratory failure ([facility] [address] )     8/19/2021 10:34 PM Kathalene Pen Add [B33 290] Leukocytosis     8/20/2021 11:33 AM Ronen Lau Add [R77 8] Elevated troponin       ED Disposition     ED Disposition Condition Date/Time Comment    Admit Stable Thu Aug 19, 2021 10:34 PM Case was discussed with Dr Analilia العراقي and the patient's admission status was agreed to be Stepdown 1 to the service of Dr Analilia العراقي   Follow-up Information    None         Current Discharge Medication List      CONTINUE these medications which have NOT CHANGED    Details   amLODIPine (NORVASC) 10 mg tablet Take 1 tablet (10 mg total) by mouth daily  Qty: 90 tablet, Refills: 1    Associated Diagnoses: Essential hypertension      aspirin 81 MG tablet Take 81 mg by mouth       finasteride (PROSCAR) 5 mg tablet Take 1 tablet (5 mg total) by mouth daily  Qty: 90 tablet, Refills: 2    Associated Diagnoses: BPH with obstruction/lower urinary tract symptoms      losartan (COZAAR) 25 mg tablet Take 1 tablet (25 mg total) by mouth daily  Qty: 90 tablet, Refills: 1    Comments:  Tolerated cozaar in the hospital without cough  Associated Diagnoses: Depression, unspecified depression type      multivitamin (THERAGRAN) TABS Take 1 tablet by mouth daily      Omega-3 Fatty Acids (FISH OIL PO) Take by mouth 2 (two) times a day      pantoprazole (PROTONIX) 40 mg tablet Take 1 tablet (40 mg total) by mouth 2 (two) times a day  Qty: 60 tablet, Refills: 0    Associated Diagnoses: Esophagitis      polyethylene glycol (MIRALAX) 17 g packet Take 17 g by mouth daily      pravastatin (PRAVACHOL) 40 mg tablet Take 1 tablet (40 mg total) by mouth daily  Qty: 90 tablet, Refills: 1    Associated Diagnoses: Mixed hyperlipidemia      Probiotic Product (PROBIOTIC DAILY PO) Take by mouth daily      sertraline (ZOLOFT) 50 mg tablet Take 1 tablet (50 mg total) by mouth daily  Qty: 90 tablet, Refills: 1    Associated Diagnoses: Moderate episode of recurrent major depressive disorder (HCC)      tamsulosin (FLOMAX) 0 4 mg Take 1 capsule (0 4 mg total) by mouth daily at bedtime  Qty: 90 capsule, Refills: 3    Associated Diagnoses: Benign localized prostatic hyperplasia with lower urinary tract symptoms (LUTS)      VITAMIN D, ERGOCALCIFEROL, PO Take by mouth           No discharge procedures on file  PDMP Review       Value Time User    PDMP Reviewed  Yes 2/22/2021  9:10 AM Tuesday Bere Ziegler, [address]           ED Provider  Attending physically available and evaluated [facility]  I managed the patient along with the ED Attending      Electronically Signed by         Kyree Lockett MD  08/22/21 5507

## 2021-08-20 NOTE — QUICK NOTE
Patient was given sips of fluid with difficulty swallowing  Noted to be coughing, struggling to breathe  He had an episode of hypoxia on bipap which is when I was called to bedside  He required repositioning with pulmonary hygiene  Attempt to place on HFNC without successful increase in oxygen  Bipap was re-placed with increase in oxygen once he was given ativan and morphine  I was directly at bedside facilitating the above  Upon my evaluation the patient had a high probability of imminent or life-threatening deterioration due to acute respiratory failure with hypoxia, due to aspiration  I have personally provided 20 minutes of critical care time excluding procedures, teaching and family meetings  Total time spent 20 minutes of critical care time, minutes (0200 to 0220)

## 2021-08-20 NOTE — ASSESSMENT & PLAN NOTE
Multifocal infiltrates on CXR this admission  Concern for COVID-19 given his exposure to family and friends who recently traveled from out of state  Strep, legionella pending  Ceftriaxone, azithromycin initiated  Pulmonary hygiene

## 2021-08-20 NOTE — ASSESSMENT & PLAN NOTE
Patient admitted with history of anxiety, generalized fatigue since Tuesday (9/17)  His wife noted that on 8/19 he was more short of breath with associated cough and complaints of chest pain which prompted his visit to the ED  In the ED patient was noted to be hypoxic, in respiratory distress  Patient improved with lasix, bipap, morphine and ativan  Clinically patient appears as though he is fluid overloaded, his lung sounds are coarse with scattered rales  He has trace to +1 left lower extremity edema that he reports as new  His COVID-19 is negative, however, there is still suspicion so will re-test  Continue bipap for comfort  Patient reports he is breathing much easier than he was before    Will wean to NC in the morning with goal to maintain oxygen saturations >88%

## 2021-08-20 NOTE — SEPSIS NOTE
Sepsis Note   Avis Florez 78 y o  male MRN: [id number]  Unit/Bed#: ED 20 Encounter: [id number]      qSOFA     Row Name 08/19/21 2159 08/19/21 2145 08/19/21 2059 08/19/21 2037 08/19/21 2031    Altered mental status GCS < 15  --  --  --  0  --    Respiratory Rate > / =22  1  1  1  --  1    Systolic BP < / =339  0  0  0  --  --    Q Sofa Score  1  1  1  1  1    Row Name 08/19/21 2029 08/19/21 2028             Altered mental status GCS < 15  --  --       Respiratory Rate > / =22  1  --       Systolic BP < / =482  --  0       Q Sofa Score  1  --           Initial Sepsis Screening     Row Name 08/19/21 2240 08/19/21 2228             Is the patient's history suggestive of a new or worsening infection?   No  -SG  (!) Yes (Proceed)  -       Suspected source of infection  --  pneumonia  -MC       Are two or more of the following signs & symptoms of infection both present and new to the patient?  --  (!) Yes (Proceed)  -       Indicate SIRS criteria  Tachypnea > 20 resp per min;Leukocytosis (WBC > 10386 IJL)  -  Tachycardia > 90 bpm;Tachypnea > 20 resp per min;Leukocytosis (WBC > 57471 IJL)  -       If the answer is yes to both questions, suspicion of sepsis is present  --  --       If severe sepsis is present AND tissue hypoperfusion perists in the hour after fluid resuscitation or lactate > 4, the patient meets criteria for SEPTIC SHOCK  --  --       Are any of the following organ dysfunction criteria present within 6 hours of suspected infection and SIRS criteria that are NOT considered to be chronic conditions?  --  (!) Yes  -       Organ dysfunction  --  Lactate >/equal 4 0 mmol/L;Lactate > 2 0 mmol/L;Acute respiratory failure (new need for invasive or non-invasive mechanical ventilation)  -       Date of presentation of severe sepsis  --  08/19/21  -       Time of presentation of severe sepsis  --  2230  -       Tissue hypoperfusion persists in the hour after crystalloid fluid administration, evidenced, by either:  --  * OR * Lactate level is greater to or equal 4 mmol/dL ( ___ mmol/dL in comment field)  -MC       Was hypotension present within one hour of the conclusion of crystalloid fluid administration?  --  No  -MC       Date of presentation of septic shock  --  --       Time of presentation of septic shock  --  --         User Key  (r) = Recorded By, (t) = Taken By, (c) = Cosigned By    [address] Name Provider Type    TERRENCE Torres DO Physician    Bernadette Victoria MD Resident

## 2021-08-20 NOTE — ASSESSMENT & PLAN NOTE
Patient complained of chest pain in midsternal/epigastric area that started on 8/19  He reports it was consistent throughout the day   EKGs in ED revealed no STEMI with mild troponin elevation  Will monitor overnight with troponins and EKG 3 hours  He was given rectal aspirin 300 mg in ED  Continue daily aspirin

## 2021-08-21 ENCOUNTER — APPOINTMENT (INPATIENT)
Dept: RADIOLOGY | Facility: HOSPITAL | Age: 81
DRG: 870 | End: 2021-08-21
Payer: MEDICARE

## 2021-08-21 PROBLEM — E87.2 HIGH ANION GAP METABOLIC ACIDOSIS: Status: RESOLVED | Noted: 2021-08-19 | Resolved: 2021-08-21

## 2021-08-21 PROBLEM — I50.33 ACUTE ON CHRONIC DIASTOLIC CHF (CONGESTIVE HEART FAILURE) (HCC): Status: ACTIVE | Noted: 2021-08-19

## 2021-08-21 LAB
ANION GAP SERPL CALCULATED.3IONS-SCNC: 11 MMOL/L (ref 4–13)
ANION GAP SERPL CALCULATED.3IONS-SCNC: 11 MMOL/L (ref 4–13)
APTT PPP: 61 SECONDS (ref 23–37)
APTT PPP: 61 SECONDS (ref 23–37)
ATRIAL RATE: 84 BPM
BACTERIA UR QL AUTO: ABNORMAL /HPF
BASOPHILS # BLD AUTO: 0.03 THOUSANDS/ΜL (ref 0–0.1)
BASOPHILS NFR BLD AUTO: 0 % (ref 0–1)
BILIRUB UR QL STRIP: NEGATIVE
BUN SERPL-MCNC: 20 MG/DL (ref 5–25)
BUN SERPL-MCNC: 25 MG/DL (ref 5–25)
CA-I BLD-SCNC: 0.99 MMOL/L (ref 1.12–1.32)
CALCIUM SERPL-MCNC: 8.1 MG/DL (ref 8.3–10.1)
CALCIUM SERPL-MCNC: 8.8 MG/DL (ref 8.3–10.1)
CHLORIDE SERPL-SCNC: 104 MMOL/L (ref 100–108)
CHLORIDE SERPL-SCNC: 108 MMOL/L (ref 100–108)
CLARITY UR: CLEAR
CO2 SERPL-SCNC: 28 MMOL/L (ref 21–32)
CO2 SERPL-SCNC: 30 MMOL/L (ref 21–32)
COLOR UR: YELLOW
CREAT SERPL-MCNC: 0.93 MG/DL (ref 0.6–1.3)
CREAT SERPL-MCNC: 1.08 MG/DL (ref 0.6–1.3)
EOSINOPHIL # BLD AUTO: 0 THOUSAND/ΜL (ref 0–0.61)
EOSINOPHIL NFR BLD AUTO: 0 % (ref 0–6)
ERYTHROCYTE [DISTWIDTH] IN BLOOD BY AUTOMATED COUNT: 18.2 % (ref 11.6–15.1)
GFR SERPL CREATININE-BSD FRML MDRD: 63 ML/MIN/1.73SQ M
GFR SERPL CREATININE-BSD FRML MDRD: 76 ML/MIN/1.73SQ M
GLUCOSE SERPL-MCNC: 159 MG/DL (ref 65–140)
GLUCOSE SERPL-MCNC: 194 MG/DL (ref 65–140)
GLUCOSE UR STRIP-MCNC: NEGATIVE MG/DL
HCT VFR BLD AUTO: 28.9 % (ref 36.5–49.3)
HGB BLD-MCNC: 8.5 G/DL (ref 12–17)
HGB UR QL STRIP.AUTO: ABNORMAL
IMM GRANULOCYTES # BLD AUTO: 0.08 THOUSAND/UL (ref 0–0.2)
IMM GRANULOCYTES NFR BLD AUTO: 1 % (ref 0–2)
KETONES UR STRIP-MCNC: NEGATIVE MG/DL
LEUKOCYTE ESTERASE UR QL STRIP: NEGATIVE
LYMPHOCYTES # BLD AUTO: 0.73 THOUSANDS/ΜL (ref 0.6–4.47)
LYMPHOCYTES NFR BLD AUTO: 5 % (ref 14–44)
MAGNESIUM SERPL-MCNC: 1.7 MG/DL (ref 1.6–2.6)
MCH RBC QN AUTO: 21.5 PG (ref 26.8–34.3)
MCHC RBC AUTO-ENTMCNC: 29.4 G/DL (ref 31.4–37.4)
MCV RBC AUTO: 73 FL (ref 82–98)
MONOCYTES # BLD AUTO: 1.22 THOUSAND/ΜL (ref 0.17–1.22)
MONOCYTES NFR BLD AUTO: 8 % (ref 4–12)
NEUTROPHILS # BLD AUTO: 13.57 THOUSANDS/ΜL (ref 1.85–7.62)
NEUTS SEG NFR BLD AUTO: 86 % (ref 43–75)
NITRITE UR QL STRIP: NEGATIVE
NON-SQ EPI CELLS URNS QL MICRO: ABNORMAL /HPF
NRBC BLD AUTO-RTO: 0 /100 WBCS
P AXIS: 5 DEGREES
PH UR STRIP.AUTO: 6 [PH]
PLATELET # BLD AUTO: 240 THOUSANDS/UL (ref 149–390)
PMV BLD AUTO: 9.4 FL (ref 8.9–12.7)
POTASSIUM SERPL-SCNC: 2.8 MMOL/L (ref 3.5–5.3)
POTASSIUM SERPL-SCNC: 3.2 MMOL/L (ref 3.5–5.3)
PR INTERVAL: 175 MS
PROT UR STRIP-MCNC: NEGATIVE MG/DL
QRS AXIS: -41 DEGREES
QRSD INTERVAL: 121 MS
QT INTERVAL: 475 MS
QTC INTERVAL: 562 MS
RBC # BLD AUTO: 3.96 MILLION/UL (ref 3.88–5.62)
RBC #/AREA URNS AUTO: ABNORMAL /HPF
SODIUM SERPL-SCNC: 145 MMOL/L (ref 136–145)
SODIUM SERPL-SCNC: 147 MMOL/L (ref 136–145)
SP GR UR STRIP.AUTO: 1.01 (ref 1–1.03)
T WAVE AXIS: 107 DEGREES
UROBILINOGEN UR QL STRIP.AUTO: 0.2 E.U./DL
VENTRICULAR RATE: 84 BPM
WBC # BLD AUTO: 15.63 THOUSAND/UL (ref 4.31–10.16)
WBC #/AREA URNS AUTO: ABNORMAL /HPF

## 2021-08-21 PROCEDURE — 0BH17EZ INSERTION OF ENDOTRACHEAL AIRWAY INTO TRACHEA, VIA NATURAL OR ARTIFICIAL OPENING: ICD-10-PCS | Performed by: INTERNAL MEDICINE

## 2021-08-21 PROCEDURE — 93010 ELECTROCARDIOGRAM REPORT: CPT | Performed by: INTERNAL MEDICINE

## 2021-08-21 PROCEDURE — 5A1955Z RESPIRATORY VENTILATION, GREATER THAN 96 CONSECUTIVE HOURS: ICD-10-PCS | Performed by: INTERNAL MEDICINE

## 2021-08-21 PROCEDURE — 94003 VENT MGMT INPAT SUBQ DAY: CPT

## 2021-08-21 PROCEDURE — 80048 BASIC METABOLIC PNL TOTAL CA: CPT | Performed by: INTERNAL MEDICINE

## 2021-08-21 PROCEDURE — 93005 ELECTROCARDIOGRAM TRACING: CPT

## 2021-08-21 PROCEDURE — 31500 INSERT EMERGENCY AIRWAY: CPT | Performed by: INTERNAL MEDICINE

## 2021-08-21 PROCEDURE — 82330 ASSAY OF CALCIUM: CPT | Performed by: NURSE PRACTITIONER

## 2021-08-21 PROCEDURE — C9113 INJ PANTOPRAZOLE SODIUM, VIA: HCPCS | Performed by: INTERNAL MEDICINE

## 2021-08-21 PROCEDURE — 85730 THROMBOPLASTIN TIME PARTIAL: CPT | Performed by: INTERNAL MEDICINE

## 2021-08-21 PROCEDURE — NC001 PR NO CHARGE: Performed by: INTERNAL MEDICINE

## 2021-08-21 PROCEDURE — 99291 CRITICAL CARE FIRST HOUR: CPT | Performed by: INTERNAL MEDICINE

## 2021-08-21 PROCEDURE — 94002 VENT MGMT INPAT INIT DAY: CPT

## 2021-08-21 PROCEDURE — 71045 X-RAY EXAM CHEST 1 VIEW: CPT

## 2021-08-21 PROCEDURE — 83735 ASSAY OF MAGNESIUM: CPT | Performed by: NURSE PRACTITIONER

## 2021-08-21 PROCEDURE — 99233 SBSQ HOSP IP/OBS HIGH 50: CPT | Performed by: INTERNAL MEDICINE

## 2021-08-21 PROCEDURE — 81001 URINALYSIS AUTO W/SCOPE: CPT | Performed by: NURSE PRACTITIONER

## 2021-08-21 PROCEDURE — 85025 COMPLETE CBC W/AUTO DIFF WBC: CPT | Performed by: INTERNAL MEDICINE

## 2021-08-21 RX ORDER — ACETAMINOPHEN 650 MG/20.3ML
650 SUSPENSION ORAL
Status: DISCONTINUED | OUTPATIENT
Start: 2021-08-21 | End: 2021-09-08

## 2021-08-21 RX ORDER — POTASSIUM CHLORIDE 14.9 MG/ML
20 INJECTION INTRAVENOUS
Status: COMPLETED | OUTPATIENT
Start: 2021-08-21 | End: 2021-08-21

## 2021-08-21 RX ORDER — CALCIUM GLUCONATE 20 MG/ML
1 INJECTION, SOLUTION INTRAVENOUS EVERY 6 HOURS
Status: DISCONTINUED | OUTPATIENT
Start: 2021-08-21 | End: 2021-08-22

## 2021-08-21 RX ORDER — LORAZEPAM 2 MG/ML
1 INJECTION INTRAMUSCULAR EVERY 6 HOURS PRN
Status: DISCONTINUED | OUTPATIENT
Start: 2021-08-21 | End: 2021-08-23

## 2021-08-21 RX ORDER — HALOPERIDOL 5 MG/ML
2 INJECTION INTRAMUSCULAR ONCE
Status: COMPLETED | OUTPATIENT
Start: 2021-08-21 | End: 2021-08-21

## 2021-08-21 RX ORDER — POTASSIUM CHLORIDE 20MEQ/15ML
40 LIQUID (ML) ORAL ONCE
Status: COMPLETED | OUTPATIENT
Start: 2021-08-21 | End: 2021-08-21

## 2021-08-21 RX ORDER — POTASSIUM CHLORIDE 14.9 MG/ML
20 INJECTION INTRAVENOUS ONCE
Status: COMPLETED | OUTPATIENT
Start: 2021-08-21 | End: 2021-08-21

## 2021-08-21 RX ORDER — MAGNESIUM SULFATE HEPTAHYDRATE 40 MG/ML
2 INJECTION, SOLUTION INTRAVENOUS ONCE
Status: COMPLETED | OUTPATIENT
Start: 2021-08-21 | End: 2021-08-21

## 2021-08-21 RX ORDER — OLANZAPINE 10 MG/1
5 INJECTION, POWDER, LYOPHILIZED, FOR SOLUTION INTRAMUSCULAR ONCE
Status: COMPLETED | OUTPATIENT
Start: 2021-08-21 | End: 2021-08-21

## 2021-08-21 RX ORDER — PROPOFOL 10 MG/ML
5-50 INJECTION, EMULSION INTRAVENOUS
Status: DISCONTINUED | OUTPATIENT
Start: 2021-08-21 | End: 2021-08-24

## 2021-08-21 RX ORDER — CHLORHEXIDINE GLUCONATE 0.12 MG/ML
15 RINSE ORAL EVERY 12 HOURS SCHEDULED
Status: DISCONTINUED | OUTPATIENT
Start: 2021-08-21 | End: 2021-08-28

## 2021-08-21 RX ADMIN — POTASSIUM CHLORIDE 20 MEQ: 14.9 INJECTION, SOLUTION INTRAVENOUS at 09:00

## 2021-08-21 RX ADMIN — POTASSIUM CHLORIDE 20 MEQ: 14.9 INJECTION, SOLUTION INTRAVENOUS at 19:54

## 2021-08-21 RX ADMIN — PROPOFOL 50 MCG/KG/MIN: 10 INJECTION, EMULSION INTRAVENOUS at 19:52

## 2021-08-21 RX ADMIN — SODIUM CHLORIDE 500 ML: 0.9 INJECTION, SOLUTION INTRAVENOUS at 17:30

## 2021-08-21 RX ADMIN — LORAZEPAM 1 MG: 2 INJECTION INTRAMUSCULAR; INTRAVENOUS at 18:31

## 2021-08-21 RX ADMIN — POTASSIUM CHLORIDE 20 MEQ: 14.9 INJECTION, SOLUTION INTRAVENOUS at 00:20

## 2021-08-21 RX ADMIN — FUROSEMIDE 40 MG: 10 INJECTION, SOLUTION INTRAVENOUS at 05:19

## 2021-08-21 RX ADMIN — SODIUM CHLORIDE 0.75 MCG/KG/HR: 9 INJECTION, SOLUTION INTRAVENOUS at 17:29

## 2021-08-21 RX ADMIN — FUROSEMIDE 40 MG: 10 INJECTION, SOLUTION INTRAVENOUS at 17:53

## 2021-08-21 RX ADMIN — CALCIUM GLUCONATE 1 G: 20 INJECTION, SOLUTION INTRAVENOUS at 17:53

## 2021-08-21 RX ADMIN — ASPIRIN 300 MG: 300 SUPPOSITORY RECTAL at 09:15

## 2021-08-21 RX ADMIN — CALCIUM GLUCONATE 1 G: 20 INJECTION, SOLUTION INTRAVENOUS at 06:44

## 2021-08-21 RX ADMIN — SODIUM CHLORIDE 1.25 MCG/KG/HR: 9 INJECTION, SOLUTION INTRAVENOUS at 12:51

## 2021-08-21 RX ADMIN — METRONIDAZOLE 500 MG: 500 INJECTION, SOLUTION INTRAVENOUS at 12:09

## 2021-08-21 RX ADMIN — ACETAMINOPHEN 650 MG: 650 SUPPOSITORY RECTAL at 12:02

## 2021-08-21 RX ADMIN — POTASSIUM CHLORIDE 20 MEQ: 14.9 INJECTION, SOLUTION INTRAVENOUS at 06:00

## 2021-08-21 RX ADMIN — SODIUM CHLORIDE 1 MCG/KG/HR: 9 INJECTION, SOLUTION INTRAVENOUS at 22:11

## 2021-08-21 RX ADMIN — FUROSEMIDE 40 MG: 10 INJECTION, SOLUTION INTRAVENOUS at 23:11

## 2021-08-21 RX ADMIN — POTASSIUM CHLORIDE 20 MEQ: 14.9 INJECTION, SOLUTION INTRAVENOUS at 02:25

## 2021-08-21 RX ADMIN — SODIUM CHLORIDE 1 MCG/KG/HR: 9 INJECTION, SOLUTION INTRAVENOUS at 07:54

## 2021-08-21 RX ADMIN — CHLORHEXIDINE GLUCONATE 0.12% ORAL RINSE 15 ML: 1.2 LIQUID ORAL at 21:00

## 2021-08-21 RX ADMIN — MORPHINE SULFATE 2 MG: 2 INJECTION, SOLUTION INTRAMUSCULAR; INTRAVENOUS at 02:10

## 2021-08-21 RX ADMIN — METRONIDAZOLE 500 MG: 500 INJECTION, SOLUTION INTRAVENOUS at 19:54

## 2021-08-21 RX ADMIN — SODIUM CHLORIDE 0.7 MCG/KG/HR: 9 INJECTION, SOLUTION INTRAVENOUS at 01:54

## 2021-08-21 RX ADMIN — HEPARIN SODIUM 11.8 UNITS/KG/HR: 10000 INJECTION, SOLUTION INTRAVENOUS at 11:47

## 2021-08-21 RX ADMIN — FUROSEMIDE 40 MG: 10 INJECTION, SOLUTION INTRAVENOUS at 12:24

## 2021-08-21 RX ADMIN — PANTOPRAZOLE SODIUM 40 MG: 40 INJECTION, POWDER, FOR SOLUTION INTRAVENOUS at 09:09

## 2021-08-21 RX ADMIN — HALOPERIDOL LACTATE 2 MG: 5 INJECTION, SOLUTION INTRAMUSCULAR at 10:36

## 2021-08-21 RX ADMIN — IRON SUCROSE 300 MG: 20 INJECTION, SOLUTION INTRAVENOUS at 15:19

## 2021-08-21 RX ADMIN — POTASSIUM CHLORIDE 40 MEQ: 20 SOLUTION ORAL at 20:00

## 2021-08-21 RX ADMIN — PROPOFOL 20 MCG/KG/MIN: 10 INJECTION, EMULSION INTRAVENOUS at 17:50

## 2021-08-21 RX ADMIN — POTASSIUM CHLORIDE 20 MEQ: 14.9 INJECTION, SOLUTION INTRAVENOUS at 13:37

## 2021-08-21 RX ADMIN — CALCIUM GLUCONATE 1 G: 20 INJECTION, SOLUTION INTRAVENOUS at 23:16

## 2021-08-21 RX ADMIN — OLANZAPINE 5 MG: 10 INJECTION, POWDER, FOR SOLUTION INTRAMUSCULAR at 05:15

## 2021-08-21 RX ADMIN — PROPOFOL 50 MCG/KG/MIN: 10 INJECTION, EMULSION INTRAVENOUS at 23:32

## 2021-08-21 RX ADMIN — DOXYCYCLINE 100 MG: 100 INJECTION, POWDER, LYOPHILIZED, FOR SOLUTION INTRAVENOUS at 09:09

## 2021-08-21 RX ADMIN — CEFEPIME HYDROCHLORIDE 2000 MG: 2 INJECTION, POWDER, FOR SOLUTION INTRAVENOUS at 14:00

## 2021-08-21 RX ADMIN — POTASSIUM CHLORIDE 20 MEQ: 14.9 INJECTION, SOLUTION INTRAVENOUS at 11:51

## 2021-08-21 RX ADMIN — CALCIUM GLUCONATE 1 G: 20 INJECTION, SOLUTION INTRAVENOUS at 12:08

## 2021-08-21 RX ADMIN — MAGNESIUM SULFATE HEPTAHYDRATE 2 G: 40 INJECTION, SOLUTION INTRAVENOUS at 07:54

## 2021-08-21 RX ADMIN — WATER 10 ML: 1 INJECTION INTRAMUSCULAR; INTRAVENOUS; SUBCUTANEOUS at 05:23

## 2021-08-21 RX ADMIN — VANCOMYCIN HYDROCHLORIDE 750 MG: 750 INJECTION, SOLUTION INTRAVENOUS at 13:13

## 2021-08-21 RX ADMIN — POTASSIUM CHLORIDE 20 MEQ: 14.9 INJECTION, SOLUTION INTRAVENOUS at 04:03

## 2021-08-21 RX ADMIN — CEFEPIME HYDROCHLORIDE 2000 MG: 2 INJECTION, POWDER, FOR SOLUTION INTRAVENOUS at 23:36

## 2021-08-21 NOTE — ASSESSMENT & PLAN NOTE
· POA: anxiety, generalized fatigue since Tuesday (9/17)  His wife noted that on 8/19 he was more short of breath with associated cough and complaints of chest pain which prompted his visit to the ED  · In the ED patient was noted to be hypoxic, in respiratory distress  Patient improved with lasix, bipap, morphine and ativan  · Clinically patient appears  fluid overloaded, his lung sounds are coarse with scattered rales  He has trace to +1 left lower extremity edema that he reports as new     · COVID-19 negative x2  · Chest x-ray concerning for vascular congestion  · Repeated echocardiogram with grade 2 diastolic dysfunction  · Cardiology on board  · Continue current Lasix regimen 40 mg q 6  · Closely monitor intake/output currently negative 3 4L  · Closely monitor weight   · Cardiac diet, salt restriction when appropriate  · Will continue trying to wean his oxygen demand with goal to maintain oxygen saturations >88%

## 2021-08-21 NOTE — ASSESSMENT & PLAN NOTE
· POA:  Reported midsternal/epigastric chest pain, persistent, 8/10  · Troponin initially elevated  0 08 continue to trend up in peaked 1 11 currently plateaued 4 60  · EKGs in ED normal sinus rhythm, left axis deviation with nonspecific conduction abnormalities, repeated EKG  normal sinus rhythm, significant ST abnormalities   · Currently on heparin drip will continue given the EKG changes  · Continue daily aspirin  · Continue monitor on danny  · Cardiology on board, appreciate the input

## 2021-08-21 NOTE — PROCEDURES
Intubation    Date/Time: 8/21/2021 4:45 PM  Performed by: Jacquie Larry MD  Authorized by: Jacquie Larry MD     Patient location: ICU  Consent:     Consent obtained:  Written    Consent given by: peng     Risks discussed:  Aspiration, dental trauma, laryngeal injury, bleeding, hypoxia and pneumothorax    Alternatives discussed:  No treatment, alternative treatment and observation  Universal protocol:     Procedure explained and questions answered to patient or proxy's satisfaction: yes      Relevant documents present and verified: yes      Test results available and properly labeled: yes      Radiology Images displayed and confirmed  If images not available, report reviewed: yes      Required blood products, implants, devices, and special equipment available: yes      Site/side marked: yes      Immediately prior to procedure, a time out was called: yes      Patient identity confirmed:  Arm band and hospital-assigned identification number  Pre-procedure details:     Patient status:  Altered mental status    Pretreatment medications:  Etomidate  Indications:     Indications for intubation: respiratory distress, respiratory failure and hypoxemia    Procedure details:     Preoxygenation:  Bag valve mask    Intubation method:  Oral    Oral intubation technique:  Direct    Laryngoscope blade: Mac 3    Tube size (mm):  8 0    Tube type:  Cuffed    Number of attempts:  1    Cricoid pressure: no      Tube visualized through cords: yes    Placement assessment:     Tube secured with:  ETT lima    Breath sounds:  Equal    Placement verification: CXR verification    Post-procedure details:     Patient tolerance of procedure:   Tolerated well, no immediate complications

## 2021-08-21 NOTE — ASSESSMENT & PLAN NOTE
· On losartan 25 mg daily, amlodipine 10 mg daily  · Currently on Lasix 40 mg Q 6  · Continue hold antihypertensive meds, resume when  appropriate

## 2021-08-21 NOTE — PROGRESS NOTES
Progress Note - Cardiology   [facility] 78 y o  male MRN: [id number]  Unit/Bed#: ICU 07 Encounter: [id number]    Assessment:  1  Acute pulmonary edema, cardiac versus noncardiac, the lack of improvement with diuresis suggest that pulmonary edema may being noncardiac in etiology (change from my impression yesterday)  2  Acute respiratory failure  3  Non myocardial troponin elevation secondary to acute respiratory failure  4  Moderate possibly severe aortic stenosis  5  Mild-to-moderate mitral regurgitation  6  At least mildly decreased LV systolic function, possibly more severe  7  Severe type 2 left ventricular diastolic dysfunction  8  Moderate pulmonary hypertension as per echocardiogram  9  Hypocalcemia  10  Hypokalemia  11  Renal function stable  12  Agitation and confusion most likely secondary to hypoxemia    Plan:  1  Replace potassium very aggressively  2  Calcium also being replaced  3  Consider increasing furosemide to 80 mg IV q 6h  Would not make this change in till potassium up to 3 8  4  Consider endotracheal intubation       Interval history:  Patient had a very restless night and is agitated  Recurrence hypoxia on BiPAP  No significant improvement since admission with BiPAP  Presently temperature up to 103 °      PROBLEM LIST:    Patient Active Problem List    Diagnosis Date Noted    Sepsis ([facility] [address] ) 08/20/2021    Multifocal pneumonia 08/19/2021    Acute respiratory failure with hypoxia ([facility] [address] ) 08/19/2021    Acute on chronic diastolic CHF (congestive heart failure) ([facility] [address] ) 08/19/2021    Lactic acidosis 08/19/2021    Hypochromic microcytic anemia 08/19/2021    Hypokalemia 02/19/2021    Iron deficiency anemia secondary to inadequate dietary iron intake 02/19/2021    Do not resuscitate status 01/25/2021    Chronic constipation 01/25/2021    Obesity (BMI 30 0-34 9) 07/19/2019    Mild cognitive impairment 01/07/2019    Medicare annual wellness visit, subsequent 04/26/2018    Moderate episode of recurrent major depressive disorder ([facility] [address] ) 03/23/2018    PVC (premature ventricular contraction) 03/23/2018    Chest pain 09/04/2017    Mixed hyperlipidemia     Essential hypertension     Aortic stenosis, moderate     Enlarged prostate without lower urinary tract symptoms (luts) 03/01/2012    Fatty liver disease, nonalcoholic 95/71/6219       Vitals: BP (!) 176/67   Pulse 88   Temp (!) 101 1 °F (38 4 °C)   Resp (!) 39   Ht 5' 4 02" (1 626 m)   Wt 80 kg (176 lb 5 9 oz)   SpO2 95%   BMI 30 26 kg/m²   PREVIOUS WEIGHTS:   Wt Readings from Last 5 Encounters:   08/21/21 80 kg (176 lb 5 9 oz)   07/26/21 83 5 kg (184 lb 2 oz)   03/19/21 82 8 kg (182 lb 9 6 oz)   02/23/21 79 5 kg (175 lb 4 3 oz)   01/25/21 81 6 kg (180 lb)        Labs/Data:        Results from last 7 days   Lab Units 08/21/21  0415 08/20/21 0429 08/19/21 2057   WBC Thousand/uL 15 63* 18 08* 17 11*   HEMOGLOBIN g/dL 8 5* 9 0* 9 9*   HEMATOCRIT % 28 9* 30 6* 34 7*   MCV fL 73* 75* 76*   MCH pg 21 5* 21 9* 21 7*   MCHC g/dL 29 4* 29 4* 28 5*   PLATELETS Thousands/uL 240 277 378     Results from last 7 days   Lab Units 08/21/21  0533 08/20/21 2319 08/20/21  0429   EGFR ml/min/1 73sq m 76 71 82   SODIUM mmol/L 145 145 142   POTASSIUM mmol/L 2 8* 2 7* 3 7   CHLORIDE mmol/L 104 104 105   CO2 mmol/L 30 31 21   BUN mg/dL 20 19 21   CREATININE mg/dL 0 93 0 98 0 82     Results from last 7 days   Lab Units 08/21/21  0533 08/21/21  0031 08/20/21 2319 08/20/21 0429 08/19/21 2057   CALCIUM mg/dL 8 1*  --  7 7* 7 7* 8 0*   AST U/L  --   --   --   --  53*   ALT U/L  --   --   --   --  62   ALK PHOS U/L  --   --   --   --  78   MAGNESIUM mg/dL  --  1 7  --   --  2 0     Results from last 7 days   Lab Units 08/20/21  1834 08/20/21  1446 08/20/21  1131   TROPONIN I ng/mL 0 92* 1 07* 1 29*     Lab Results   Component Value Date    NTBNP 693 (H) 08/19/2021     Lab Results   Component Value Date    CHOLESTEROL 138 03/12/2021    TRIG 117 03/12/2021 HDL 44 03/12/2021    LDLCALC 71 03/12/2021    HGBA1C 5 5 08/20/2021       Results from last 7 days   Lab Units 08/19/21 2057   INR  1 09   PROTIME seconds 13 9          Current Facility-Administered Medications:     acetaminophen (TYLENOL) rectal suppository 325 mg, 325 mg, Rectal, Q4H PRN, Belén Grider MD, 650 mg at 08/21/21 1202    aspirin rectal suppository 300 mg, 300 mg, Rectal, Daily, Elaine Menchaca MD, 300 mg at 08/21/21 0915    calcium gluconate 1 g in sodium chloride 0 9% 50 mL (premix), 1 g, Intravenous, Q6H, Melba Balderas MD, Stopped at 08/21/21 1314    cefepime (MAXIPIME) 2,000 mg in dextrose 5 % 50 mL IVPB, 2,000 mg, Intravenous, Q12H, Belén Grider MD    dexmedetomidine (PRECEDEX) 400 mcg in sodium chloride 0 9 % 100 mL infusion, 0 1-1 mcg/kg/hr, Intravenous, Titrated, ANATOLY Carrasco, Last Rate: 26 7 mL/hr at 08/21/21 1251, 1 25 mcg/kg/hr at 08/21/21 1251    furosemide (LASIX) injection 40 mg, 40 mg, Intravenous, 4x Daily, Belén Grider MD, 40 mg at 08/21/21 1224    heparin (porcine) 25,000 units in 0 45% NaCl 250 mL infusion (premix), 3-20 Units/kg/hr (Order-Specific), Intravenous, Titrated, Elaine Menchaca MD, Last Rate: 10 mL/hr at 08/21/21 1147, 11 8 Units/kg/hr at 08/21/21 1147    heparin (porcine) injection 2,000 Units, 2,000 Units, Intravenous, Q1H PRN, Elaine Menchaca MD    heparin (porcine) injection 4,000 Units, 4,000 Units, Intravenous, Q1H PRN, Elaine Menchaca MD    iron sucrose (VENOFER) 300 mg in sodium chloride 0 9 % 250 mL IVPB, 300 mg, Intravenous, Daily, Belén Grider MD    LORazepam (ATIVAN) injection 0 5 mg, 0 5 mg, Intravenous, Q6H PRN, ANATOLY Carrasco, 0 5 mg at 08/20/21 0317    metroNIDAZOLE (FLAGYL) IVPB (premix) 500 mg 100 mL, 500 mg, Intravenous, Q8H, Belén Grider MD, Stopped at 08/21/21 1314    morphine injection 2 mg, 2 mg, Intravenous, Q4H PRN, Curt Grant MD, 2 mg at 08/21/21 0210    ondansetron [facility]) injection 4 mg, 4 mg, Intravenous, Once, Sharl Schilder, CRNP    pantoprazole (PROTONIX) injection 40 mg, 40 mg, Intravenous, Daily, Robinson Elise MD, 40 mg at 08/21/21 0909    potassium chloride 20 mEq IVPB (premix), 20 mEq, Intravenous, Q2H, Alice Sousa MD, Last Rate: 50 mL/hr at 08/21/21 1337, 20 mEq at 08/21/21 1337    vancomycin (VANCOCIN) IVPB (premix in dextrose) 750 mg 150 mL, 750 mg, Intravenous, Q12H, Alice Sousa MD, Last Rate: 150 mL/hr at 08/21/21 1313, 750 mg at 08/21/21 1313  Allergies   Allergen Reactions    Ace Inhibitors Cough     Pt denied any allergies           Intake/Output Summary (Last 24 hours) at 8/21/2021 1339  Last data filed at 8/21/2021 1314  Gross per 24 hour   Intake 2169 75 ml   Output 3520 ml   Net -1350 25 ml       Invasive Devices     Peripheral Intravenous Line            Peripheral IV 08/20/21 Dorsal (posterior); Proximal;Right Forearm 1 day    Peripheral IV 08/20/21 Left;Ventral (anterior) Forearm 1 day    Peripheral IV 08/20/21 Right;Upper;Ventral (anterior) Arm 1 day          Drain            Urethral Catheter Temperature probe 1 day                Review of Systems   Unable to perform ROS: Acuity of condition       Physical Exam  Vitals reviewed  Constitutional:       General: He is not in acute distress  Appearance: He is well-developed  HENT:      Head: Normocephalic and atraumatic  Neck:      Thyroid: No thyromegaly  Vascular: No carotid bruit or JVD  Trachea: No tracheal deviation  Cardiovascular:      Rate and Rhythm: Normal rate and regular rhythm  Pulses: Normal pulses  Heart sounds: Murmur heard  No friction rub  No gallop  Comments: Grade 3/6 systolic ejection murmur  Pulmonary:      Effort: Pulmonary effort is normal  No respiratory distress  Breath sounds: Rhonchi present  No wheezing or rales        Comments: Most noise are from fluid/secretions in larger bronchi which overwhelm noises from pulmonary parenchyma  Chest:      Chest wall: No tenderness  Abdominal:      General: There is no distension  Palpations: Abdomen is soft  Tenderness: There is no abdominal tenderness  Musculoskeletal:      Cervical back: Normal range of motion and neck supple  Right lower leg: No edema  Left lower leg: No edema  Skin:     General: Skin is warm and dry  Neurological:      General: No focal deficit present  Gait: Gait normal    Psychiatric:      Comments: Agitated         ----------------------------------------------------------------------------------------------  NUCLEAR STRESS TEST: No results found for this or any previous visit  Results for orders placed during the hospital encounter of 17    NM myocardial perfusion spect (rx stress and/or rest)    [facility]  [address]  [facility] [address]  [facility], [address]  [phone]    Rest/Stress Gated SPECT Myocardial Perfusion Imaging After Regadenoson    Patient: Atilio Mir  MR number: [id number]  Account number: [de-identified]  : 1940  Age: 76 years  Gender: Male  Status: Outpatient  Location: Stress lab  Height: 63 in  Weight: 192 lb  BP: 156/ 78 mmHg    Allergies: NO KNOWN ALLERGIES    Diagnosis: R07 9 - Chest pain, unspecified    Primary Physician:  Rolando Helm DO  Technician:  Juan Palma  RN:  Abdulaziz Rascon RN BSN  Group:  [facility]  Report Prepared By[de-identified]  Abdulaziz Rascon RN BSN  Interpreting Physician:  Jose R Adam DO    INDICATIONS: Detection of coronary artery disease  HISTORY: The patient is a 76year old  male  Chest pain status: chest pain, radiation to neck and jaw area  Coronary artery disease risk factors: dyslipidemia, hypertension, and former smoking  Cardiovascular history: none  significant  Medications: a calcium channel blocker, a diuretic, and aspirin      PHYSICAL EXAM: Baseline physical exam screening: normal lung exam     REST ECG: Normal sinus rhythm  The ECG showed occasional premature ventricular contractions  PROCEDURE: The study was performed in the stress lab  A regadenoson infusion pharmacologic stress test was performed  Gated SPECT myocardial perfusion imaging was performed after stress and at rest  Systolic blood pressure was 156 mmHg, at  the start of the study  Diastolic blood pressure was 78 mmHg, at the start of the study  The heart rate was 85 bpm, at the start of the study  IV double checked  Regadenoson protocol:  HR bpm SBP mmHg DBP mmHg Symptoms  Baseline 85 156 78 none  Immediate 108 153 75 mild dyspnea  5 min 92 156 68 none  No medications or fluids given  The patient also performed low level exercise  STRESS SUMMARY: Duration of pharmacologic stress was 3 min  Maximal heart rate during stress was 108 bpm  The rate-pressure product for the peak heart rate and blood pressure was 27226  There was no chest pain during stress  The stress  test was terminated due to protocol completion  Pre oxygen saturation: 97 %  Peak oxygen saturation: 98 %  The stress ECG was negative for ischemia  Arrhythmia during stress: isolated premature ventricular beats and pairs of premature  ventricular beats  ISOTOPE ADMINISTRATION:  Resting isotope administration Stress isotope administration  Agent Tetrofosmin Tetrofosmin  Dose 10 mCi 33 mCi  Date -- 09/05/2017  Injection time 08:58 10:30  Imaging time 09:40 11:00  Injection-image interval 42 min 30 min    The radiopharmaceutical was injected at the peak effect of pharmacologic stress  MYOCARDIAL PERFUSION IMAGING:  The image quality was good  Left ventricular size was normal  The TID ratio was 0 96  PERFUSION DEFECTS:  -  There were no perfusion defects  GATED SPECT:  The calculated left ventricular ejection fraction was 59 %  Left ventricular ejection fraction was within normal limits by visual estimate   There was no left ventricular regional abnormality  SUMMARY:  -  Stress results: There was no chest pain during stress  -  ECG conclusions: The stress ECG was negative for ischemia  -  Perfusion imaging: There were no perfusion defects   -  Gated SPECT: The calculated left ventricular ejection fraction was 59 %  Left ventricular ejection fraction was within normal limits by visual estimate  There was no left ventricular regional abnormality  IMPRESSIONS: Normal study after pharmacologic vasodilation  Myocardial perfusion imaging was normal at rest and with stress  Left ventricular systolic function was normal     Prepared and signed by    Abigail Nuno DO  Signed 2017 17:03:37        --------------------------------------------------------------------------------  ECHO:   Results for orders placed during the hospital encounter of 21    Echo complete with contrast if indicated    Narrative  [address]  [place], [address]  [phone]    Transthoracic Echocardiogram  2D, M-mode, Doppler, and Color Doppler    Study date:  20-Aug-2021    Patient: Consuelo Terrazas  MR number: [id number]  Account number: [de-identified]  : 1940  Age: 78 years  Gender: Male  Status: Inpatient  Location: Bedside  Height: 64 in  Weight: 187 7 lb  BP: 160/ 73 mmHg    Indications: Heart failure    Diagnoses: I50 9 - Heart failure, unspecified    Sonographer:  Brianna Phillips RDCS  Primary Physician:  Samira Middleton DO  Referring Physician:  ANATOLY Henderson  Group:  [facility]  Interpreting Physician:  Abigail Nuno DO    SUMMARY    LEFT VENTRICLE:  Systolic function was at the lower limits of normal  Ejection fraction was estimated to be 50 %  There was mild to moderate hypokinesis of the mid-apical septal myocardial wall segments  Wall thickness was mildly increased    Features were likely suggestive of a pseudonormal left ventricular filling pattern, with concomitant abnormal relaxation and increased filling pressure (grade 2 diastolic dysfunction)  LEFT ATRIUM:  The atrium was mildly dilated  MITRAL VALVE:  There was moderate annular calcification  There was mild stenosis (mean diastolic transvalvular gradient ~ 3 3 mmHg at HR 86 BPM)  There was mild regurgitation  AORTIC VALVE:  The valve was trileaflet  Leaflets exhibited markedly increased thickness, marked calcification, markedly reduced cuspal separation, reduced mobility, and sclerosis  There was moderate stenosis  There was mild regurgitation  The peak valve velocity was 3 3 cm/s  Valve mean gradient was 22 mmHg  The aortic valve obstructive index (by VTI) was 0 29  Difficult to accurately estimate aortic valve area due to suboptimal LVOT visualization and measurements  TRICUSPID VALVE:  There was mild regurgitation  AORTA:  The root exhibited mild dilatation (4 1 cm [2 1 cm/m2] at the sinuses of valsalva), and mild fibrocalcific change  SUMMARY MEASUREMENTS  2D measurements:  Unspecified Anatomy:   %FS was 23 9 %  Ao Diam was 3 9 cm  Ao asc was 3 4 cm   EDV(Teich) was 110 5 ml   EF(Teich) was 47 6 %  ESV(Teich) was 57 9 ml  IVSd was 1 2 cm  LA Diam was 4 5 cm  LAAs A2C was 27 1 cm2  LAAs A4C was 26 2  cm2  LAESV A-L A2C was 101 1 ml  LAESV A-L A4C was 92 8 ml  LAESV Index (A-L) was 51 1 ml/m2  LAESV MOD A2C was 98 2 ml  LAESV MOD A4C was 86 6 ml  LAESV(A-L) was 97 5 ml  LAESV(MOD BP) was 92 3 ml  LAESVInd MOD BP was 48 4 ml/m2  LALs A2C was 6 2 cm  LALs A4C was 6 3 cm  LVEDV MOD A4C was 195 6 ml  LVEF MOD A4C was 42 1 %  LVESV MOD A4C was 113 2 ml  LVIDd was 4 9 cm  LVIDs was 3 7 cm  LVLd A4C was 10 6 cm  LVLs A4C was 9 6 cm  LVOT Diam was 2 6 cm  LVPWd  was 0 9 cm  RAAs A4C was 21 1 cm2  RAESV A-L was 66 8 ml  RAESV MOD was 65 4 ml  RALs was 5 7 cm  RVIDd was 4 7 cm  RWT was 0 4     SV MOD A4C was 82 4 ml   SV(Teich) was 52 6 ml   CW measurements:  Unspecified Anatomy:   AV Env  Ti was 267 9 ms   AV VTI was 59 6 cm   AV Vmax was 2 9 m/s  AV Vmean was 2 2 m/s  AV maxPG was 34 5 mmHg  AV meanPG was 21 6 mmHg  MV VTI was 35 cm  MV Vmax was 1 2 m/s  MV Vmean was 0 9 m/s  MV maxPG  was 6 mmHg  MV meanPG was 3 4 mmHg  TR Vmax was 3 2 m/s   TR maxPG was 40 mmHg  MM measurements:  Unspecified Anatomy:   TAPSE was 2 4 cm  PW measurements:  Unspecified Anatomy:   GREGORY (VTI) was 1 7 cm2  GREGORY Vmax was 1 8 cm2  AVAI (VTI) was 0 cm2/m2  AVAI Vmax was 0 cm2/m2  DVI was 0 3   E' Sept was 0 m/s  E/E' Sept was 31 6   LVOT Env  Ti was 308 3 ms  LVOT VTI was 20 3 cm  LVOT Vmax  was 1 m/s  LVOT Vmean was 0 7 m/s  LVOT maxPG was 4 2 mmHg  LVOT meanPG was 2 1 mmHg  LVSI Dopp was 54 5 ml/m2  LVSV Dopp was 104 1 ml   MV A David was 1 2 m/s  MV Dec Chatham was 7 1 m/s2  MV DecT was 204 7 ms   MV E David was 1 4 m/s  MV E/A Ratio was 1 2   MV PHT was 59 4 ms  MVA (VTI) was 3 cm2  MVA By PHT was 3 7 cm2  HISTORY: PRIOR HISTORY: HTN; AS; CP; HLD; PVC; Acute respiratory failure with hypoxia; CHF; Obesity; Depression    PROCEDURE: The procedure was performed at the bedside  This was a routine study  The transthoracic approach was used  The study included complete 2D imaging, M-mode, complete spectral Doppler, and color Doppler  The heart rate was 98 bpm,  at the start of the study  Images were obtained from the parasternal, apical, subcostal, and suprasternal notch acoustic windows  Echocardiographic views were limited due to poor acoustic window availability  This was a technically  difficult study  LEFT VENTRICLE: Size was normal  Systolic function was at the lower limits of normal  Ejection fraction was estimated to be 50 %  There was mild to moderate hypokinesis of the mid-apical septal myocardial wall segments  Wall thickness was  mildly increased   DOPPLER: Features were likely suggestive of a pseudonormal left ventricular filling pattern, with concomitant abnormal relaxation and increased filling pressure (grade 2 diastolic dysfunction)  RIGHT VENTRICLE: The size was normal  Systolic function was normal  Wall thickness was normal     LEFT ATRIUM: The atrium was mildly dilated  RIGHT ATRIUM: Size was normal     MITRAL VALVE: There was moderate annular calcification  DOPPLER: There was mild stenosis (mean diastolic transvalvular gradient ~ 3 3 mmHg at HR 86 BPM)  There was mild regurgitation  AORTIC VALVE: The valve was trileaflet  Leaflets exhibited markedly increased thickness, marked calcification, markedly reduced cuspal separation, reduced mobility, and sclerosis  DOPPLER: There was moderate stenosis  There was mild  regurgitation  TRICUSPID VALVE: Not well visualized  DOPPLER: There was mild regurgitation  PULMONIC VALVE: Not well visualized  PERICARDIUM: There was no pericardial effusion  The pericardium was normal in appearance  AORTA: The root exhibited mild dilatation (4 1 cm [2 1 cm/m2] at the sinuses of valsalva), and mild fibrocalcific change  SYSTEMIC VEINS: IVC: The inferior vena cava was not well visualized      MEASUREMENT TABLES    DOPPLER MEASUREMENTS  Aortic valve   (Reference normals)  Peak brandi   3 3 cm/s   (--)  Mean gradient   22 mmHg   (--)  Obstr index, VTI   0 29    (--)    SYSTEM MEASUREMENT TABLES    2D  %FS: 23 9 %  Ao Diam: 3 9 cm  Ao asc: 3 4 cm  EDV(Teich): 110 5 ml  EF(Teich): 47 6 %  ESV(Teich): 57 9 ml  IVSd: 1 2 cm  LA Diam: 4 5 cm  LAAs A2C: 27 1 cm2  LAAs A4C: 26 2 cm2  LAESV A-L A2C: 101 1 ml  LAESV A-L A4C: 92 8 ml  LAESV Index (A-L): 51 1 ml/m2  LAESV MOD A2C: 98 2 ml  LAESV MOD A4C: 86 6 ml  LAESV(A-L): 97 5 ml  LAESV(MOD BP): 92 3 ml  LAESVInd MOD BP: 48 4 ml/m2  LALs A2C: 6 2 cm  LALs A4C: 6 3 cm  LVEDV MOD A4C: 195 6 ml  LVEF MOD A4C: 42 1 %  LVESV MOD A4C: 113 2 ml  LVIDd: 4 9 cm  LVIDs: 3 7 cm  LVLd A4C: 10 6 cm  LVLs A4C: 9 6 cm  LVOT Diam: 2 6 cm  LVPWd: 0 9 cm  RAAs A4C: 21 1 cm2  RAESV A-L: 66 8 ml  RAESV MOD: 65 4 ml  RALs: 5 7 cm  RVIDd: 4 7 cm  RWT: 0 4  SV MOD A4C: 82 4 ml  SV(Teich): 52 6 ml    CW  AV Env  Ti: 267 9 ms  AV VTI: 59 6 cm  AV Vmax: 2 9 m/s  AV Vmean: 2 2 m/s  AV maxP 5 mmHg  AV meanP 6 mmHg  MV VTI: 35 cm  MV Vmax: 1 2 m/s  MV Vmean: 0 9 m/s  MV maxP mmHg  MV meanPG: 3 4 mmHg  TR Vmax: 3 2 m/s  TR maxP mmHg    MM  TAPSE: 2 4 cm    PW  GREGORY (VTI): 1 7 cm2  GREOGRY Vmax: 1 8 cm2  AVAI (VTI): 0 cm2/m2  AVAI Vmax: 0 cm2/m2  DVI: 0 3  E' Sept: 0 m/s  E/E' Sept: 31 6  LVOT Env  Ti: 308 3 ms  LVOT VTI: 20 3 cm  LVOT Vmax: 1 m/s  LVOT Vmean: 0 7 m/s  LVOT maxP 2 mmHg  LVOT meanP 1 mmHg  LVSI Dopp: 54 5 ml/m2  LVSV Dopp: 104 1 ml  MV A David: 1 2 m/s  MV Dec Laurens: 7 1 m/s2  MV DecT: 204 7 ms  MV E David: 1 4 m/s  MV E/A Ratio: 1 2  MV PHT: 59 4 ms  MVA (VTI): 3 cm2  MVA By PHT: 3 7 cm2    [facility]    Prepared and electronically signed by    DO Jimi Dacosta 20-Aug-2021 14:28:06    No results found for this or any previous visit     --------------------------------------------------------------------------------  HOLTER  Results for orders placed during the hospital encounter of 18    Holter monitor - 24 hour    Narrative  PT NAME: Ever Wise  : 1940  AGE: [de-identified] y o  GENDER: male  MRN: [id number]   PROCEDURE: Holter monitor - 24 hour        INDICATIONS: Palpitations      FINDINGS:    Holter monitoring revealed predominant sinus rhythm with an average heart rate of 85 BPM, a minimum heart rate of 67 BPM, and a maximum heart rate of 117 BPM     There were about 301 ventricular ectopic beats, all occurring as single PVC's with no evidence of ventricular tachycardia  There were about 1825 supraventricular ectopic beats, mostly occurring as single PAC's and a 6-beat atrial run, with no evidence of sustained supraventricular tachycardia, or any atrial fibrillation/ flutter      There was no evidence of significant bradyarrhythmia or advanced heart block  The longest R-R interval was 1 2 seconds  The patient's symptom diary reported no symptoms       ======================================================     Imaging:   I have personally reviewed pertinent reports     and I have personally reviewed pertinent films in PACS      EKG:  Sinus rhythm in the 80s

## 2021-08-21 NOTE — ASSESSMENT & PLAN NOTE
· Has history of iron deficiency anemia due to inadequate dietary iron intake  · Hemoglobin currently stable, there is no signs of active bleeding  · EGD in February 2021 with no source upper GI bleed  · Iron panel:  Iron 29, ferritin 21, patient will require Venofer  · Continue iron supplement when appropriate  · May need OP hematology follow up  · Trend hg, transfuse for <7 0

## 2021-08-21 NOTE — ASSESSMENT & PLAN NOTE
· Record review reveals stable memory issues with outpatient followup with geriatrics  · TSH, B12 nl in past  · Family reports no progression at this point  · Continue outpatient sertraline when appropriate

## 2021-08-21 NOTE — ASSESSMENT & PLAN NOTE
· Potassium level this morning 2 8 the setting of aggressive diuresis with Lasix  · Replete as needed  · Monitor BMP

## 2021-08-21 NOTE — ASSESSMENT & PLAN NOTE
· POA:  Lactic acid on presentation 8 1 in the setting of sepsis and suspected pneumonia  · Rest of plan as outlined above

## 2021-08-21 NOTE — ASSESSMENT & PLAN NOTE
· POA:  With signs of volume overload, initial imaging chest x-ray concerning for vascular congestion consistent with CHF  · Most recent echo in 2018 showed grade 1 diastolic dysfunction  · [id number]  · Repeated echo 8/20 with significant worsening of his diastolic dysfunction  · Continue aggressive diuresis  · Cardiology on board  · plan as outlined above

## 2021-08-21 NOTE — ASSESSMENT & PLAN NOTE
· Chest x-ray 8/20:  Diffuse patchy airspace disease bilaterally concerning multifocal infiltrates  · CAP versus aspiration  · Tested negative for COVID-19 x2  · Legionella and strep pneumo antigens negative  · Initially started on Rocephin and azithromycin, currently on Rocephin and doxycycline day 2   · Procalcitonin negative x2  · Blood cultures pending  · Patient is febrile T-max 101 5 leukocytosis improving 15 6  · Will consider discontinue doxycycline, continue with Rocephin for total 7-10 days  · Continue monitor fever curve, WBCs

## 2021-08-21 NOTE — PROGRESS NOTES
Letty 46  Progress Note Blaine Ormond 1940, 78 y o  male MRN: [id number]  Unit/Bed#: ICU 07 Encounter: [id number]  Primary Care Provider: Genet Goldman DO   Date and time admitted to hospital: 8/19/2021  8:25 PM    * Acute respiratory failure with hypoxia [facility])  Assessment & Plan  · POA: anxiety, generalized fatigue since Tuesday (9/17)  His wife noted that on 8/19 he was more short of breath with associated cough and complaints of chest pain which prompted his visit to the ED  · In the ED patient was noted to be hypoxic, in respiratory distress  Patient improved with lasix, bipap, morphine and ativan  · Clinically patient appears  fluid overloaded, his lung sounds are coarse with scattered rales  He has trace to +1 left lower extremity edema that he reports as new     · COVID-19 negative x2  · Chest x-ray concerning for vascular congestion  · Repeated echocardiogram with grade 2 diastolic dysfunction  · Cardiology on board  · Continue current Lasix regimen 40 mg q 6  · Closely monitor intake/output currently negative 3 4L  · Closely monitor weight   · Cardiac diet, salt restriction when appropriate  · Will continue trying to wean his oxygen demand with goal to maintain oxygen saturations >88%      Acute on chronic diastolic CHF (congestive heart failure) (HCC)  Assessment & Plan  · POA:  With signs of volume overload, initial imaging chest x-ray concerning for vascular congestion consistent with CHF  · Most recent echo in 2018 showed grade 1 diastolic dysfunction  · [id number]  · Repeated echo 8/20 with significant worsening of his diastolic dysfunction  · Continue aggressive diuresis  · Cardiology on board  · plan as outlined above      Multifocal pneumonia  Assessment & Plan  · Chest x-ray 8/20:  Diffuse patchy airspace disease bilaterally concerning multifocal infiltrates  · CAP versus aspiration  · Tested negative for COVID-19 x2  · Legionella and strep pneumo antigens negative  · Initially started on Rocephin and azithromycin, currently on Rocephin and doxycycline day 2   · Procalcitonin negative x2  · Blood cultures pending  · Patient is febrile T-max 101 5 leukocytosis improving 15 6  · Will consider discontinue doxycycline, continue with Rocephin for total 7-10 days  · Continue monitor fever curve, WBCs    Chest pain  Assessment & Plan  · POA:  Reported midsternal/epigastric chest pain, persistent, 8/10  · Troponin initially elevated  0 08 continue to trend up in peaked 1 11 currently plateaued 9 70  · EKGs in ED normal sinus rhythm, left axis deviation with nonspecific conduction abnormalities, repeated EKG  normal sinus rhythm, significant ST abnormalities   · Currently on heparin drip will continue given the EKG changes  · Continue daily aspirin  · Continue monitor on danny  · Cardiology on board, appreciate the input      Sepsis [facility])  Assessment & Plan  · POA :  Meets sepsis criteria on presentation tachycardia, tachypnea, lactic acidosis, leukocytosis and suspecting pneumonia  · Rest of plan as outlined above    Hypochromic microcytic anemia  Assessment & Plan  · Has history of iron deficiency anemia due to inadequate dietary iron intake  · Hemoglobin currently stable, there is no signs of active bleeding  · EGD in February 2021 with no source upper GI bleed  · Iron panel:  Iron 29, ferritin 21, patient will require Venofer  · Continue iron supplement when appropriate  · May need OP hematology follow up  · Trend hg, transfuse for <7 0    Lactic acidosis  Assessment & Plan  · POA:  Lactic acid on presentation 8 1 in the setting of sepsis and suspected pneumonia  · Rest of plan as outlined above    Hypokalemia  Assessment & Plan  · Potassium level this morning 2 8 the setting of aggressive diuresis with Lasix  · Replete as needed  · Monitor BMP    Mild cognitive impairment  Assessment & Plan  · Record review reveals stable memory issues with outpatient followup with geriatrics  · TSH, B12 nl in past  · Family reports no progression at this point  · Continue outpatient sertraline when appropriate    Aortic stenosis, moderate  Assessment & Plan  · History of mild to moderate aortic stenosis on echo 5/17/2018   · Repeated on 8/20: Showed markedly increased thickness, marked calcification, markedly reduced cuspal separation, reduced mobility, and sclerosis  And moderate stenosis  · Cardiology on board      Essential hypertension  Assessment & Plan  · On losartan 25 mg daily, amlodipine 10 mg daily  · Currently on Lasix 40 mg Q 6  · Continue hold antihypertensive meds, resume when  appropriate    High anion gap metabolic acidosis-resolved as of 8/21/2021  Assessment & Plan  · Likely secondary to infection, poor nutrition in past 2-3 days, high lactic acidosis      ----------------------------------------------------------------------------------------  HPI/24hr events:  56-year-old male, day 2 hospital/ICU, level 1 full code, patient continued to be in respiratory distress requiring BiPAP was slightly delirious and agitated    Disposition: Continue Critical Care   Code Status: Level 1 - Full Code  ---------------------------------------------------------------------------------------  SUBJECTIVE  Patient is lying in bed in respiratory distress, he is delirious, disoriented, slightly agitated trying to climb out bed    Patient is currently wearing BiPAP, he is talking but it is unclear what the exactly saying      Review of systems was reviewed and negative unless stated above in HPI/24-hour events   ---------------------------------------------------------------------------------------  OBJECTIVE    Vitals   Vitals:    08/21/21 0400 08/21/21 0411 08/21/21 0555 08/21/21 0700   BP: 129/60   149/70   Pulse: 76   80   Resp: (!) 29   (!) 31   Temp: (!) 100 8 °F (38 2 °C)   (!) 102 2 °F (39 °C)   TempSrc: Core      SpO2: 97% 99%     Weight:   80 kg (176 lb 5 9 oz)    Height:         Temp (24hrs), Av 4 °F (38 °C), Min:96 4 °F (35 8 °C), Max:102 2 °F (39 °C)  Current: Temperature: (!) 102 2 °F (39 °C)          Respiratory:  SpO2: SpO2: 99 %       Invasive/non-invasive ventilation settings   Respiratory    Lab Data (Last 4 hours)    None         O2/Vent Data (Last 4 hours)       0728          Non-Invasive Ventilation Mode BiPAP AVAP                   Physical Exam  Vitals and nursing note reviewed  Constitutional:       General: He is not in acute distress  Appearance: Normal appearance  HENT:      Head: Normocephalic and atraumatic  Right Ear: External ear normal       Left Ear: External ear normal       Nose: Nose normal       Mouth/Throat:      Mouth: Mucous membranes are moist       Pharynx: Oropharynx is clear  Eyes:      Extraocular Movements: Extraocular movements intact  Conjunctiva/sclera: Conjunctivae normal       Pupils: Pupils are equal, round, and reactive to light  Cardiovascular:      Rate and Rhythm: Regular rhythm  Tachycardia present  Pulses: Normal pulses  Heart sounds: Murmur heard  Pulmonary:      Effort: Respiratory distress present  Breath sounds: Rales present  Comments: Coarse breath sounds with scattered rales throughout lungs  Non productive cough  Abdominal:      General: Bowel sounds are normal       Palpations: Abdomen is soft  Musculoskeletal:         General: Normal range of motion  Cervical back: Normal range of motion and neck supple  Right lower leg: No edema  Left lower leg: No edema  Skin:     General: Skin is warm and dry  Capillary Refill: Capillary refill takes less than 2 seconds  Findings: Bruising present  Neurological:      General: No focal deficit present  Mental Status: He is alert and oriented to person, place, and time     Psychiatric:      Comments: Patient is really delirious and mildly agitated         Laboratory and Diagnostics:  Results from last 7 days   Lab Units 08/21/21  0415 08/20/21  0429 08/19/21  2057   WBC Thousand/uL 15 63* 18 08* 17 11*   HEMOGLOBIN g/dL 8 5* 9 0* 9 9*   HEMATOCRIT % 28 9* 30 6* 34 7*   PLATELETS Thousands/uL 240 277 378   NEUTROS PCT % 86* 89* 82*   MONOS PCT % 8 8 6     Results from last 7 days   Lab Units 08/21/21  0533 08/20/21  2319 08/20/21  0429 08/19/21 2057   SODIUM mmol/L 145 145 142 142   POTASSIUM mmol/L 2 8* 2 7* 3 7 3 7   CHLORIDE mmol/L 104 104 105 105   CO2 mmol/L 30 31 21 18*   ANION GAP mmol/L 11 10 16* 19*   BUN mg/dL 20 19 21 26*   CREATININE mg/dL 0 93 0 98 0 82 0 97   CALCIUM mg/dL 8 1* 7 7* 7 7* 8 0*   GLUCOSE RANDOM mg/dL 159* 193* 208* 174*   ALT U/L  --   --   --  62   AST U/L  --   --   --  53*   ALK PHOS U/L  --   --   --  78   ALBUMIN g/dL  --   --   --  4 1   TOTAL BILIRUBIN mg/dL  --   --   --  0 37     Results from last 7 days   Lab Units 08/21/21  0031 08/19/21  2057   MAGNESIUM mg/dL 1 7 2 0   PHOSPHORUS mg/dL  --  4 4*      Results from last 7 days   Lab Units 08/21/21  0533 08/21/21  0015 08/20/21  1834 08/19/21 2057   INR   --   --   --  1 09   PTT seconds 61* 61* 60* 26      Results from last 7 days   Lab Units 08/20/21  1834 08/20/21  1446 08/20/21  1131 08/20/21  0832 08/20/21  0440 08/20/21  0103 08/19/21 2057   TROPONIN I ng/mL 0 92* 1 07* 1 29* 1 11* 1 03* 0 76* 0 08*     Results from last 7 days   Lab Units 08/20/21  0103 08/19/21  2300 08/19/21  2102   LACTIC ACID mmol/L 3 0* 5 6* 8 1*     ABG:  Results from last 7 days   Lab Units 08/20/21  1444   PH ART  7 520*   PCO2 ART mm Hg 32 3*   PO2 ART mm Hg 55 6*   HCO3 ART mmol/L 25 8   BASE EXC ART mmol/L 3 1   ABG SOURCE  Radial, Right     VBG:  Results from last 7 days   Lab Units 08/20/21  1444   ABG SOURCE  Radial, Right     Results from last 7 days   Lab Units 08/20/21  0429 08/19/21  2057   PROCALCITONIN ng/ml 0 14 <0 05       Micro  Results from last 7 days   Lab Units 08/20/21  0104 08/19/21  2102 08/19/21  2030   BLOOD CULTURE   --  Received in Microbiology Lab  Culture in Progress  Received in Microbiology Lab  Culture in Progress  LEGIONELLA URINARY ANTIGEN  Negative  --   --    STREP PNEUMONIAE ANTIGEN, URINE  Negative  --   --        EKG:  Normal sinus rhythm, left axis deviation,  ST segment abnormalities  Imaging: I have personally reviewed pertinent reports  Intake and Output  I/O       08/19 0701 - 08/20 0700 08/20 0701 - 08/21 0700 08/21 0701 - 08/22 0700    I V  (mL/kg)  712 4 (8 9)     IV Piggyback 1065 1025     Total Intake(mL/kg) 1065 (12 5) 1737 4 (21 7)     Urine (mL/kg/hr) 1175 4960 (2 6)     Total Output 1175 4960     Net -110 -3222 6            Unmeasured Urine Occurrence 1 x            Height and Weights   Height: 5' 4 02" (162 6 cm)  IBW (Ideal Body Weight): 59 24 kg  Body mass index is 30 26 kg/m²  Weight (last 2 days)     Date/Time   Weight    08/21/21 0555   80 (176 37)    08/21/21 0210   80 (176 37)    08/20/21 0600   85 5 (188 49)    08/20/21 0300   85 5 (188 49)    08/20/21 0013   85 5 (188 49)    08/19/21 2029   85 4 (188 27)                Nutrition       Diet Orders   (From admission, onward)             Start     Ordered    08/20/21 0316  Diet NPO  Diet effective now     Question Answer Comment   Diet Type NPO    RD to adjust diet per protocol?  Yes        08/20/21 0316                  Active Medications  Scheduled Meds:  Current Facility-Administered Medications   Medication Dose Route Frequency Provider Last Rate    acetaminophen  325 mg Rectal Q4H PRN Tal Hoffmann MD      aspirin  300 mg Rectal Daily Yoselin Keating MD      calcium gluconate  1 g Intravenous Q6H Eric Lopez MD Stopped (08/21/21 7936)    cefTRIAXone  1,000 mg Intravenous Q24H Yoselin Keating MD 1,000 mg (08/20/21 2148)    dexmedetomidine  0 1-1 mcg/kg/hr Intravenous Titrated Kalee MARIANA JensenNP 1 mcg/kg/hr (08/21/21 0600)    doxycycline  100 mg Intravenous Q12H Yoselin Keating MD      furosemide  40 mg Intravenous 4x Daily Boston Favre, MD      heparin (porcine)  3-20 Units/kg/hr (Order-Specific) Intravenous Titrated Josiane Bowden MD 11 8 Units/kg/hr (08/21/21 0600)    heparin (porcine)  2,000 Units Intravenous Q1H PRN Josiane Bowden MD      heparin (porcine)  4,000 Units Intravenous Q1H PRN Josiane Bowden MD      LORazepam  0 5 mg Intravenous Q6H PRN Harriet Saldivar, CRNP      magnesium sulfate  2 g Intravenous Once Derrill Somers, CRNP      morphine injection  2 mg Intravenous Q4H PRN Curt Stone MD      ondansetron  4 mg Intravenous Once Derribridget Somers, CRNP      pantoprazole  40 mg Intravenous Daily Josiane Bowden MD      potassium chloride  20 mEq Intravenous Q2H Willieribridget Antonyard, CRNP 20 mEq (08/21/21 0600)    potassium chloride  20 mEq Intravenous Q2H Boston Favre, MD       Continuous Infusions:  dexmedetomidine, 0 1-1 mcg/kg/hr, Last Rate: 1 mcg/kg/hr (08/21/21 0600)  heparin (porcine), 3-20 Units/kg/hr (Order-Specific), Last Rate: 11 8 Units/kg/hr (08/21/21 0600)      PRN Meds:   acetaminophen, 325 mg, Q4H PRN  heparin (porcine), 2,000 Units, Q1H PRN  heparin (porcine), 4,000 Units, Q1H PRN  LORazepam, 0 5 mg, Q6H PRN  morphine injection, 2 mg, Q4H PRN        Invasive Devices Review  Invasive Devices     Peripheral Intravenous Line            Peripheral IV 08/20/21 Dorsal (posterior); Proximal;Right Forearm 1 day    Peripheral IV 08/20/21 Left;Ventral (anterior) Forearm <1 day    Peripheral IV 08/20/21 Right;Upper;Ventral (anterior) Arm <1 day          Drain            Urethral Catheter Temperature probe <1 day                Rationale for remaining devices:   Accurate intake/output  ---------------------------------------------------------------------------------------  Advance Directive and Living Will: Yes    Power of :    POLST:    ---------------------------------------------------------------------------------------  Care Time Delivered:   No Critical Care time spent       Sharon Stuart MD      Portions of the record may have been created with voice recognition software  Occasional wrong word or "sound a like" substitutions may have occurred due to the inherent limitations of voice recognition software    Read the chart carefully and recognize, using context, where substitutions have occurred

## 2021-08-21 NOTE — ASSESSMENT & PLAN NOTE
· History of mild to moderate aortic stenosis on echo 5/17/2018   · Repeated on 8/20: Showed markedly increased thickness, marked calcification, markedly reduced cuspal separation, reduced mobility, and sclerosis    And moderate stenosis  · Cardiology on board

## 2021-08-21 NOTE — PROGRESS NOTES
Vancomycin Assessment    Fanny Iverson is a 78 y o  male who is currently receiving vancomycin   for  pneumonia     Relevant clinical data and objective history reviewed:  Creatinine   Date Value Ref Range Status   08/21/2021 0 93 0 60 - 1 30 mg/dL Final     Comment:     Standardized to IDMS reference method   08/20/2021 0 98 0 60 - 1 30 mg/dL Final     Comment:     Standardized to IDMS reference method   08/20/2021 0 82 0 60 - 1 30 mg/dL Final     Comment:     Standardized to IDMS reference method   07/25/2015 0 77 0 60 - 1 30 mg/dL Final     Comment:     Standardized to IDMS reference method   07/24/2015 0 83 0 60 - 1 30 mg/dL Final     Comment:     Standardized to IDMS reference method   06/18/2015 0 69 0 60 - 1 30 mg/dL Final     Comment:     Standardized to IDMS reference method     BP (!) 176/67   Pulse 88   Temp (!) 101 1 °F (38 4 °C)   Resp (!) 39   Ht 5' 4 02" (1 626 m)   Wt 80 kg (176 lb 5 9 oz)   SpO2 95%   BMI 30 26 kg/m²   I/O last 3 completed shifts: In: 2802 4 [I V :712 4; IV [id number]:5971]  Out: 1565 [Urine:6135]  Lab Results   Component Value Date/Time    BUN 20 08/21/2021 05:33 AM    BUN 14 07/25/2015 05:45 AM    WBC 15 63 (H) 08/21/2021 04:15 AM    WBC 6 71 07/25/2015 05:45 AM    HGB 8 5 (L) 08/21/2021 04:15 AM    HGB 14 2 07/25/2015 05:45 AM    HCT 28 9 (L) 08/21/2021 04:15 AM    HCT 40 2 07/25/2015 05:45 AM    MCV 73 (L) 08/21/2021 04:15 AM    MCV 90 07/25/2015 05:45 AM     08/21/2021 04:15 AM     07/25/2015 05:45 AM     Temp Readings from Last 3 Encounters:   08/21/21 (!) 101 1 °F (38 4 °C)   07/26/21 (!) 97 1 °F (36 2 °C)   04/16/21 97 6 °F (36 4 °C)     Vancomycin Days of Therapy: 1  Concomitant antibiotics: cefepime and flagyl  Assessment/Plan   Baseline risks associated with therapy include: advanced age  The patient will be initiated on 750 mg every 12 hours with plan for a trough prior to 5th dose on 8/23 at 1245  BMP and CBC will be ordered per protocol  Pharmacy will continue to follow the patients culture results and clinical progress daily      Samantha Smith, Pharmacist

## 2021-08-21 NOTE — SPEECH THERAPY NOTE
Speech/Language Pathology Note    Patient Name: Pina BERGERRADNWICHO Date: 8/21/2021     Unable to complete swallow evaluation as pt is still requiring BiPap  ST will f/u as able/appropriate

## 2021-08-22 ENCOUNTER — APPOINTMENT (INPATIENT)
Dept: GASTROENTEROLOGY | Facility: HOSPITAL | Age: 81
DRG: 870 | End: 2021-08-22
Attending: INTERNAL MEDICINE
Payer: MEDICARE

## 2021-08-22 LAB
ANION GAP SERPL CALCULATED.3IONS-SCNC: 8 MMOL/L (ref 4–13)
APTT PPP: 55 SECONDS (ref 23–37)
APTT PPP: 61 SECONDS (ref 23–37)
APTT PPP: 80 SECONDS (ref 23–37)
ARTERIAL PATENCY WRIST A: YES
ATRIAL RATE: 59 BPM
BASE EXCESS BLDA CALC-SCNC: 2.4 MMOL/L
BASOPHILS # BLD AUTO: 0.03 THOUSANDS/ΜL (ref 0–0.1)
BASOPHILS NFR BLD AUTO: 0 % (ref 0–1)
BODY TEMPERATURE: 99.9 DEGREES FEHRENHEIT
BUN SERPL-MCNC: 29 MG/DL (ref 5–25)
CA-I BLD-SCNC: 1.12 MMOL/L (ref 1.12–1.32)
CALCIUM SERPL-MCNC: 8.3 MG/DL (ref 8.3–10.1)
CHLORIDE SERPL-SCNC: 108 MMOL/L (ref 100–108)
CO2 SERPL-SCNC: 28 MMOL/L (ref 21–32)
CREAT SERPL-MCNC: 1.11 MG/DL (ref 0.6–1.3)
EOSINOPHIL # BLD AUTO: 0 THOUSAND/ΜL (ref 0–0.61)
EOSINOPHIL NFR BLD AUTO: 0 % (ref 0–6)
ERYTHROCYTE [DISTWIDTH] IN BLOOD BY AUTOMATED COUNT: 18.3 % (ref 11.6–15.1)
GFR SERPL CREATININE-BSD FRML MDRD: 61 ML/MIN/1.73SQ M
GLUCOSE SERPL-MCNC: 138 MG/DL (ref 65–140)
HCO3 BLDA-SCNC: 26.1 MMOL/L (ref 22–28)
HCT VFR BLD AUTO: 28.4 % (ref 36.5–49.3)
HGB BLD-MCNC: 8.2 G/DL (ref 12–17)
HOROWITZ INDEX BLDA+IHG-RTO: 80 MM[HG]
IMM GRANULOCYTES # BLD AUTO: 0.11 THOUSAND/UL (ref 0–0.2)
IMM GRANULOCYTES NFR BLD AUTO: 1 % (ref 0–2)
LYMPHOCYTES # BLD AUTO: 1.18 THOUSANDS/ΜL (ref 0.6–4.47)
LYMPHOCYTES NFR BLD AUTO: 9 % (ref 14–44)
MAGNESIUM SERPL-MCNC: 2.3 MG/DL (ref 1.6–2.6)
MCH RBC QN AUTO: 22.2 PG (ref 26.8–34.3)
MCHC RBC AUTO-ENTMCNC: 28.9 G/DL (ref 31.4–37.4)
MCV RBC AUTO: 77 FL (ref 82–98)
MONOCYTES # BLD AUTO: 0.83 THOUSAND/ΜL (ref 0.17–1.22)
MONOCYTES NFR BLD AUTO: 7 % (ref 4–12)
NEUTROPHILS # BLD AUTO: 10.57 THOUSANDS/ΜL (ref 1.85–7.62)
NEUTS SEG NFR BLD AUTO: 83 % (ref 43–75)
NRBC BLD AUTO-RTO: 0 /100 WBCS
O2 CT BLDA-SCNC: 11.7 ML/DL (ref 16–23)
OXYHGB MFR BLDA: 96.6 % (ref 94–97)
P AXIS: 40 DEGREES
PCO2 BLDA: 36.8 MM HG (ref 36–44)
PCO2 TEMP ADJ BLDA: 37.9 MM HG (ref 36–44)
PEEP RESPIRATORY: 10 CM[H2O]
PH BLD: 7.46 [PH] (ref 7.35–7.45)
PH BLDA: 7.47 [PH] (ref 7.35–7.45)
PLATELET # BLD AUTO: 225 THOUSANDS/UL (ref 149–390)
PMV BLD AUTO: 10.1 FL (ref 8.9–12.7)
PO2 BLD: 103.7 MM HG (ref 75–129)
PO2 BLDA: 99.4 MM HG (ref 75–129)
POTASSIUM SERPL-SCNC: 3.2 MMOL/L (ref 3.5–5.3)
PR INTERVAL: 163 MS
QRS AXIS: -25 DEGREES
QRSD INTERVAL: 125 MS
QT INTERVAL: 558 MS
QTC INTERVAL: 553 MS
RBC # BLD AUTO: 3.7 MILLION/UL (ref 3.88–5.62)
SODIUM SERPL-SCNC: 144 MMOL/L (ref 136–145)
SPECIMEN SOURCE: ABNORMAL
T WAVE AXIS: 217 DEGREES
TROPONIN I SERPL-MCNC: 0.55 NG/ML
VANCOMYCIN TROUGH SERPL-MCNC: 9.6 UG/ML (ref 10–20)
VENT AC: 18
VENT- AC: AC
VENTRICULAR RATE: 59 BPM
VT SETTING VENT: 480 ML
WBC # BLD AUTO: 12.72 THOUSAND/UL (ref 4.31–10.16)

## 2021-08-22 PROCEDURE — 87281 PNEUMOCYSTIS CARINII AG IF: CPT | Performed by: INTERNAL MEDICINE

## 2021-08-22 PROCEDURE — 82330 ASSAY OF CALCIUM: CPT | Performed by: NURSE PRACTITIONER

## 2021-08-22 PROCEDURE — 87116 MYCOBACTERIA CULTURE: CPT | Performed by: INTERNAL MEDICINE

## 2021-08-22 PROCEDURE — 80048 BASIC METABOLIC PNL TOTAL CA: CPT | Performed by: NURSE PRACTITIONER

## 2021-08-22 PROCEDURE — 99233 SBSQ HOSP IP/OBS HIGH 50: CPT | Performed by: INTERNAL MEDICINE

## 2021-08-22 PROCEDURE — 80202 ASSAY OF VANCOMYCIN: CPT | Performed by: INTERNAL MEDICINE

## 2021-08-22 PROCEDURE — 36600 WITHDRAWAL OF ARTERIAL BLOOD: CPT

## 2021-08-22 PROCEDURE — 93010 ELECTROCARDIOGRAM REPORT: CPT | Performed by: INTERNAL MEDICINE

## 2021-08-22 PROCEDURE — 0B918ZX DRAINAGE OF TRACHEA, VIA NATURAL OR ARTIFICIAL OPENING ENDOSCOPIC, DIAGNOSTIC: ICD-10-PCS | Performed by: INTERNAL MEDICINE

## 2021-08-22 PROCEDURE — 82805 BLOOD GASES W/O2 SATURATION: CPT | Performed by: NURSE PRACTITIONER

## 2021-08-22 PROCEDURE — 88112 CYTOPATH CELL ENHANCE TECH: CPT | Performed by: PATHOLOGY

## 2021-08-22 PROCEDURE — 85730 THROMBOPLASTIN TIME PARTIAL: CPT | Performed by: INTERNAL MEDICINE

## 2021-08-22 PROCEDURE — 83735 ASSAY OF MAGNESIUM: CPT | Performed by: INTERNAL MEDICINE

## 2021-08-22 PROCEDURE — 93005 ELECTROCARDIOGRAM TRACING: CPT

## 2021-08-22 PROCEDURE — 94003 VENT MGMT INPAT SUBQ DAY: CPT

## 2021-08-22 PROCEDURE — 87015 SPECIMEN INFECT AGNT CONCNTJ: CPT | Performed by: INTERNAL MEDICINE

## 2021-08-22 PROCEDURE — 85025 COMPLETE CBC W/AUTO DIFF WBC: CPT | Performed by: NURSE PRACTITIONER

## 2021-08-22 PROCEDURE — 87070 CULTURE OTHR SPECIMN AEROBIC: CPT | Performed by: INTERNAL MEDICINE

## 2021-08-22 PROCEDURE — 87206 SMEAR FLUORESCENT/ACID STAI: CPT | Performed by: INTERNAL MEDICINE

## 2021-08-22 PROCEDURE — 0B958ZX DRAINAGE OF RIGHT MIDDLE LOBE BRONCHUS, VIA NATURAL OR ARTIFICIAL OPENING ENDOSCOPIC, DIAGNOSTIC: ICD-10-PCS | Performed by: INTERNAL MEDICINE

## 2021-08-22 PROCEDURE — 84484 ASSAY OF TROPONIN QUANT: CPT | Performed by: NURSE PRACTITIONER

## 2021-08-22 PROCEDURE — 0B9G8ZX DRAINAGE OF LEFT UPPER LUNG LOBE, VIA NATURAL OR ARTIFICIAL OPENING ENDOSCOPIC, DIAGNOSTIC: ICD-10-PCS | Performed by: INTERNAL MEDICINE

## 2021-08-22 PROCEDURE — C9113 INJ PANTOPRAZOLE SODIUM, VIA: HCPCS | Performed by: INTERNAL MEDICINE

## 2021-08-22 PROCEDURE — 89051 BODY FLUID CELL COUNT: CPT | Performed by: PATHOLOGY

## 2021-08-22 PROCEDURE — 99291 CRITICAL CARE FIRST HOUR: CPT | Performed by: INTERNAL MEDICINE

## 2021-08-22 PROCEDURE — 87252 VIRUS INOCULATION TISSUE: CPT | Performed by: INTERNAL MEDICINE

## 2021-08-22 PROCEDURE — 31624 DX BRONCHOSCOPE/LAVAGE: CPT | Performed by: INTERNAL MEDICINE

## 2021-08-22 RX ORDER — FUROSEMIDE 10 MG/ML
80 INJECTION INTRAMUSCULAR; INTRAVENOUS
Status: DISCONTINUED | OUTPATIENT
Start: 2021-08-22 | End: 2021-08-23

## 2021-08-22 RX ORDER — CALCIUM GLUCONATE 20 MG/ML
1 INJECTION, SOLUTION INTRAVENOUS EVERY 12 HOURS
Status: DISCONTINUED | OUTPATIENT
Start: 2021-08-22 | End: 2021-08-23

## 2021-08-22 RX ORDER — ASPIRIN 81 MG/1
81 TABLET, CHEWABLE ORAL DAILY
Status: DISCONTINUED | OUTPATIENT
Start: 2021-08-22 | End: 2021-08-30

## 2021-08-22 RX ORDER — POTASSIUM CHLORIDE 20MEQ/15ML
40 LIQUID (ML) ORAL ONCE
Status: COMPLETED | OUTPATIENT
Start: 2021-08-22 | End: 2021-08-22

## 2021-08-22 RX ADMIN — HEPARIN SODIUM 13.8 UNITS/KG/HR: 10000 INJECTION, SOLUTION INTRAVENOUS at 12:14

## 2021-08-22 RX ADMIN — PANTOPRAZOLE SODIUM 40 MG: 40 INJECTION, POWDER, FOR SOLUTION INTRAVENOUS at 08:00

## 2021-08-22 RX ADMIN — METRONIDAZOLE 500 MG: 500 INJECTION, SOLUTION INTRAVENOUS at 03:48

## 2021-08-22 RX ADMIN — VANCOMYCIN HYDROCHLORIDE 750 MG: 750 INJECTION, SOLUTION INTRAVENOUS at 00:47

## 2021-08-22 RX ADMIN — LORAZEPAM 1 MG: 2 INJECTION INTRAMUSCULAR; INTRAVENOUS at 13:34

## 2021-08-22 RX ADMIN — CHLORHEXIDINE GLUCONATE 0.12% ORAL RINSE 15 ML: 1.2 LIQUID ORAL at 20:09

## 2021-08-22 RX ADMIN — PROPOFOL 50 MCG/KG/MIN: 10 INJECTION, EMULSION INTRAVENOUS at 03:44

## 2021-08-22 RX ADMIN — LORAZEPAM 1 MG: 2 INJECTION INTRAMUSCULAR; INTRAVENOUS at 19:37

## 2021-08-22 RX ADMIN — HEPARIN SODIUM 2000 UNITS: 1000 INJECTION INTRAVENOUS; SUBCUTANEOUS at 04:44

## 2021-08-22 RX ADMIN — METRONIDAZOLE 500 MG: 500 INJECTION, SOLUTION INTRAVENOUS at 11:34

## 2021-08-22 RX ADMIN — IRON SUCROSE 300 MG: 20 INJECTION, SOLUTION INTRAVENOUS at 08:00

## 2021-08-22 RX ADMIN — LORAZEPAM 1 MG: 2 INJECTION INTRAMUSCULAR; INTRAVENOUS at 05:39

## 2021-08-22 RX ADMIN — VANCOMYCIN HYDROCHLORIDE 1250 MG: 10 INJECTION, POWDER, LYOPHILIZED, FOR SOLUTION INTRAVENOUS at 12:55

## 2021-08-22 RX ADMIN — METRONIDAZOLE 500 MG: 500 INJECTION, SOLUTION INTRAVENOUS at 19:38

## 2021-08-22 RX ADMIN — PROPOFOL 50 MCG/KG/MIN: 10 INJECTION, EMULSION INTRAVENOUS at 19:37

## 2021-08-22 RX ADMIN — PROPOFOL 50 MCG/KG/MIN: 10 INJECTION, EMULSION INTRAVENOUS at 06:47

## 2021-08-22 RX ADMIN — PROPOFOL 50 MCG/KG/MIN: 10 INJECTION, EMULSION INTRAVENOUS at 07:54

## 2021-08-22 RX ADMIN — FUROSEMIDE 40 MG: 10 INJECTION, SOLUTION INTRAVENOUS at 11:34

## 2021-08-22 RX ADMIN — CALCIUM GLUCONATE 1 G: 20 INJECTION, SOLUTION INTRAVENOUS at 11:35

## 2021-08-22 RX ADMIN — LORAZEPAM 1 MG: 2 INJECTION INTRAMUSCULAR; INTRAVENOUS at 10:48

## 2021-08-22 RX ADMIN — FUROSEMIDE 80 MG: 10 INJECTION, SOLUTION INTRAVENOUS at 15:58

## 2021-08-22 RX ADMIN — SODIUM CHLORIDE 1.22 MCG/KG/HR: 9 INJECTION, SOLUTION INTRAVENOUS at 02:33

## 2021-08-22 RX ADMIN — POTASSIUM CHLORIDE 40 MEQ: 20 SOLUTION ORAL at 06:26

## 2021-08-22 RX ADMIN — PROPOFOL 50 MCG/KG/MIN: 10 INJECTION, EMULSION INTRAVENOUS at 18:17

## 2021-08-22 RX ADMIN — LORAZEPAM 1 MG: 2 INJECTION INTRAMUSCULAR; INTRAVENOUS at 06:11

## 2021-08-22 RX ADMIN — FUROSEMIDE 40 MG: 10 INJECTION, SOLUTION INTRAVENOUS at 05:15

## 2021-08-22 RX ADMIN — CHLORHEXIDINE GLUCONATE 0.12% ORAL RINSE 15 ML: 1.2 LIQUID ORAL at 08:00

## 2021-08-22 RX ADMIN — CEFEPIME HYDROCHLORIDE 2000 MG: 2 INJECTION, POWDER, FOR SOLUTION INTRAVENOUS at 12:49

## 2021-08-22 RX ADMIN — CALCIUM GLUCONATE 1 G: 20 INJECTION, SOLUTION INTRAVENOUS at 05:15

## 2021-08-22 RX ADMIN — PROPOFOL 50 MCG/KG/MIN: 10 INJECTION, EMULSION INTRAVENOUS at 11:40

## 2021-08-22 RX ADMIN — ASPIRIN 300 MG: 300 SUPPOSITORY RECTAL at 08:00

## 2021-08-22 RX ADMIN — CALCIUM GLUCONATE 1 G: 20 INJECTION, SOLUTION INTRAVENOUS at 22:45

## 2021-08-22 RX ADMIN — MORPHINE SULFATE 2 MG: 2 INJECTION, SOLUTION INTRAMUSCULAR; INTRAVENOUS at 20:26

## 2021-08-22 RX ADMIN — POTASSIUM CHLORIDE 40 MEQ: 20 SOLUTION ORAL at 06:48

## 2021-08-22 RX ADMIN — PROPOFOL 50 MCG/KG/MIN: 10 INJECTION, EMULSION INTRAVENOUS at 15:54

## 2021-08-22 NOTE — ASSESSMENT & PLAN NOTE
· POA: anxiety, generalized fatigue since Tuesday (9/17)  His wife noted that on 8/19 he was more short of breath with associated cough and complaints of chest pain which prompted his visit to the ED  · In the ED patient was noted to be hypoxic, in respiratory distress  Patient improved with lasix, bipap, morphine and ativan  · Clinically patient appears  fluid overloaded, his lung sounds are coarse with scattered rales  He has trace to +1 left lower extremity edema that he reports as new     · COVID-19 negative x2  · Chest x-ray concerning for vascular congestion, multifocal pneumonia    · Failed conservative treatment and was intubated on 8/21Will continue vent support for now  · Our goal will be to wean his FIO2 as able with a goal to maintain his O2 saturation > 90%

## 2021-08-22 NOTE — PROGRESS NOTES
Progress Note - Cardiology   Zandra Andrade 78 y o  male MRN: [id number]  Unit/Bed#: ICU 07 Encounter: [id number]    Assessment:  1  Acute pulmonary edema, cardiac versus noncardiac, no improvement with diuresis  2  Acute respiratory failure  3  Non myocardial troponin elevation secondary to acute respiratory failure  4  Moderate possibly severe aortic stenosis  5  Mild-to-moderate mitral regurgitation by echocardiogram  6  Decrease in aortic stenosis murmur suggests a decrease in cardiac output  7  Severe type 2 left ventricular diastolic function  8  Moderate pulmonary hypertension as per echocardiogram   9  Net I&O is positive 1 L  10  Hypocalcemia improved  11  Hypokalemia    Plan:  1  Continue aggressive potassium replacement  2  Change calcium replacement to q 12h  3  Continue aggressive diuresis, GFR still greater than 60 and blood pressure well maintained  Interval history:  Patient has subsequently been intubated in had bronchoscopy today  On examination, patient's murmur is much softer than yesterday suggesting a decrease in cardiac output      PROBLEM LIST:    Patient Active Problem List    Diagnosis Date Noted    Sepsis ([facility] [address] ) 08/20/2021    Multifocal pneumonia 08/19/2021    Acute respiratory failure with hypoxia ([facility] [address] ) 08/19/2021    Acute on chronic diastolic CHF (congestive heart failure) ([facility] [address] ) 08/19/2021    Lactic acidosis 08/19/2021    Hypochromic microcytic anemia 08/19/2021    Hypokalemia 02/19/2021    Iron deficiency anemia secondary to inadequate dietary iron intake 02/19/2021    Do not resuscitate status 01/25/2021    Chronic constipation 01/25/2021    Obesity (BMI 30 0-34 9) 07/19/2019    Mild cognitive impairment 01/07/2019    Medicare annual wellness visit, subsequent 04/26/2018    Moderate episode of recurrent major depressive disorder ([address] ) 03/23/2018    PVC (premature ventricular contraction) 03/23/2018    Chest pain 09/04/2017    Mixed hyperlipidemia     Essential hypertension     Aortic stenosis, moderate     Enlarged prostate without lower urinary tract symptoms (luts) 03/01/2012    Fatty liver disease, nonalcoholic 52/54/8851       Vitals: /56   Pulse 60   Temp 99 °F (37 2 °C)   Resp 20   Ht 5' 4 02" (1 626 m)   Wt 79 5 kg (175 lb 4 3 oz)   SpO2 94%   BMI 30 07 kg/m²   PREVIOUS WEIGHTS:   Wt Readings from Last 5 Encounters:   08/22/21 79 5 kg (175 lb 4 3 oz)   07/26/21 83 5 kg (184 lb 2 oz)   03/19/21 82 8 kg (182 lb 9 6 oz)   02/23/21 79 5 kg (175 lb 4 3 oz)   01/25/21 81 6 kg (180 lb)        Labs/Data:        Results from last 7 days   Lab Units 08/22/21  0350 08/21/21  0415 08/20/21  0429   WBC Thousand/uL 12 72* 15 63* 18 08*   HEMOGLOBIN g/dL 8 2* 8 5* 9 0*   HEMATOCRIT % 28 4* 28 9* 30 6*   MCV fL 77* 73* 75*   MCH pg 22 2* 21 5* 21 9*   MCHC g/dL 28 9* 29 4* 29 4*   PLATELETS Thousands/uL 225 240 277     Results from last 7 days   Lab Units 08/22/21  0408 08/21/21  1811 08/21/21  0533   EGFR ml/min/1 73sq m 61 63 76   SODIUM mmol/L 144 147* 145   POTASSIUM mmol/L 3 2* 3 2* 2 8*   CHLORIDE mmol/L 108 108 104   CO2 mmol/L 28 28 30   BUN mg/dL 29* 25 20   CREATININE mg/dL 1 11 1 08 0 93     Results from last 7 days   Lab Units 08/22/21  0408 08/21/21  1811 08/21/21  0533 08/21/21  0031 08/19/21  2057   CALCIUM mg/dL 8 3 8 8 8 1*  --  8 0*   AST U/L  --   --   --   --  53*   ALT U/L  --   --   --   --  62   ALK PHOS U/L  --   --   --   --  78   MAGNESIUM mg/dL 2 3  --   --  1 7 2 0     Results from last 7 days   Lab Units 08/22/21  0352 08/20/21  1834 08/20/21  1446   TROPONIN I ng/mL 0 55* 0 92* 1 07*     Lab Results   Component Value Date    NTBNP 693 (H) 08/19/2021     Lab Results   Component Value Date    CHOLESTEROL 138 03/12/2021    TRIG 117 03/12/2021    HDL 44 03/12/2021    LDLCALC 71 03/12/2021    HGBA1C 5 5 08/20/2021     No results found for: TSH  No results found for: DIGOXIN  Results from last 7 days   Lab Units 08/19/21 2057 INR  1 09   PROTIME seconds 13 9          Current Facility-Administered Medications:     Acetaminophen (TYLENOL) oral suspension 650 mg, 650 mg, Per NG Tube, Q6H PRN Max 4/day, Houston Franks MD    aspirin chewable tablet 81 mg, 81 mg, Oral, Daily, Lidya Steve DO    calcium gluconate 1 g in sodium chloride 0 9% 50 mL (premix), 1 g, Intravenous, Q6H, Houston Franks MD, Last Rate: 100 mL/hr at 08/22/21 1135, 1 g at 08/22/21 1135    cefepime (MAXIPIME) 2,000 mg in dextrose 5 % 50 mL IVPB, 2,000 mg, Intravenous, Q12H, Eleuterio Hunter MD, Last Rate: 100 mL/hr at 08/22/21 1249, 2,000 mg at 08/22/21 1249    chlorhexidine (PERIDEX) 0 12 % oral rinse 15 mL, 15 mL, Mouth/Throat, Q12H [address], Eleuterio Hunter MD, 15 mL at 08/22/21 0800    furosemide (LASIX) injection 80 mg, 80 mg, Intravenous, BID (diuretic), Lidya Steve DO    heparin (porcine) 25,000 units in 0 45% NaCl 250 mL infusion (premix), 3-20 Units/kg/hr (Order-Specific), Intravenous, Titrated, Asha Segovia MD, Last Rate: 11 7 mL/hr at 08/22/21 1214, 13 8 Units/kg/hr at 08/22/21 1214    heparin (porcine) injection 2,000 Units, 2,000 Units, Intravenous, Q1H PRN, Asha Segovia MD, 2,000 Units at 08/22/21 0444    heparin (porcine) injection 4,000 Units, 4,000 Units, Intravenous, Q1H PRN, Asha Segovia MD    iron sucrose (VENOFER) 300 mg in sodium chloride 0 9 % 250 mL IVPB, 300 mg, Intravenous, Daily, Eleuterio Hunter MD, Stopped at 08/22/21 1047    LORazepam (ATIVAN) injection 0 5 mg, 0 5 mg, Intravenous, Q6H PRN, ANATOLY Alvarado, 1 mg at 08/22/21 3270    LORazepam (ATIVAN) injection 1 mg, 1 mg, Intravenous, Q6H PRN, ANATOLY Griffin, 1 mg at 08/22/21 1334    metroNIDAZOLE (FLAGYL) IVPB (premix) 500 mg 100 mL, 500 mg, Intravenous, Q8H, Eleuterio Hunter MD, Stopped at 08/22/21 1249    morphine injection 2 mg, 2 mg, Intravenous, Q4H PRN, Curt Fagan MD, 2 mg at 08/21/21 0210    [START ON 8/23/2021] omeprazole (PRILOSEC) suspension 2 mg/mL, 20 mg, Oral, Daily, Lidya Steve DO    ondansetron (ZOFRAN) injection 4 mg, 4 mg, Intravenous, Once, ANATOLY Bueno    propofol (DIPRIVAN) 1000 mg in 100 mL infusion (premix), 5-50 mcg/kg/min, Intravenous, Titrated, Lissette Bowers MD, Last Rate: 24 mL/hr at 08/22/21 1140, 50 mcg/kg/min at 08/22/21 1140    vancomycin (VANCOCIN) 1,250 mg in sodium chloride 0 9 % 250 mL IVPB, 20 mg/kg (Adjusted), Intravenous, Q24H, Lissette Bowers MD, Last Rate: 166 7 mL/hr at 08/22/21 1255, 1,250 mg at 08/22/21 1255  Allergies   Allergen Reactions    Ace Inhibitors Cough     Pt denied any allergies           Intake/Output Summary (Last 24 hours) at 8/22/2021 1411  Last data filed at 8/22/2021 1249  Gross per 24 hour   Intake 3113 71 ml   Output 2050 ml   Net 1063 71 ml       Invasive Devices     Peripheral Intravenous Line            Peripheral IV 08/20/21 Left;Ventral (anterior) Forearm 2 days    Peripheral IV 08/21/21 Left;Upper;Ventral (anterior) Arm <1 day    Peripheral IV 08/22/21 Right Hand <1 day          Drain            Urethral Catheter Temperature probe 2 days    NG/OG/Enteral Tube 16 Fr Center mouth <1 day          Airway            ETT  Cuffed 8 mm <1 day                Review of Systems   Unable to perform ROS: Intubated       Physical Exam  Vitals reviewed  Constitutional:       General: He is not in acute distress  Appearance: He is well-developed  HENT:      Head: Normocephalic and atraumatic  Neck:      Thyroid: No thyromegaly  Vascular: No carotid bruit or JVD  Trachea: No tracheal deviation  Cardiovascular:      Rate and Rhythm: Regular rhythm  Tachycardia present  Pulses: Normal pulses  Heart sounds: Murmur heard  No friction rub  No gallop  Comments: Grade 2/6 systolic ejection murmur  Murmur is not as loud as yesterday suggesting a decrease in cardiac output  Pulmonary:      Effort: No respiratory distress  Breath sounds: Rhonchi present  No wheezing or rales  Comments: Most noise are from fluid/secretions in larger bronchi which overwhelm noises from pulmonary parenchyma  Chest:      Chest wall: No tenderness  Abdominal:      General: There is no distension  Tenderness: There is no abdominal tenderness  Musculoskeletal:      Cervical back: Normal range of motion and neck supple  Right lower leg: No edema  Left lower leg: No edema  Skin:     General: Skin is warm and dry  Neurological:      General: No focal deficit present  Mental Status: He is oriented to person, place, and time  Gait: Gait normal    Psychiatric:         Mood and Affect: Mood normal          Behavior: Behavior normal          Thought Content: Thought content normal          Judgment: Judgment normal          ----------------------------------------------------------------------------------------------  NUCLEAR STRESS TEST: No results found for this or any previous visit  Results for orders placed during the hospital encounter of 17    NM myocardial perfusion spect (rx stress and/or rest)    Narrative  [address] [place] [address]  [phone]    Rest/Stress Gated SPECT Myocardial Perfusion Imaging After Regadenoson    Patient: Zay De La Rosa  MR number: [id number]  Account number: [de-identified]  : 1940  Age: 76 years  Gender: Male  Status: Outpatient  Location: Stress lab  Height: 63 in  Weight: 192 lb  BP: 156/ 78 mmHg    Allergies: NO KNOWN ALLERGIES    Diagnosis: R07 9 - Chest pain, unspecified    Primary Physician:  Nevelyn Holstein, DO  Technician:  Amish Kurtz  RN:  Tab Mensah RN BSN  Group:  [facility]  Report Prepared By[de-identified]  Tab Mensah RN BSN  Interpreting Physician:  Kendra Ellis DO    INDICATIONS: Detection of coronary artery disease  HISTORY: The patient is a 76year old  male  Chest pain status: chest pain, radiation to neck and jaw area  Coronary artery disease risk factors: dyslipidemia, hypertension, and former smoking  Cardiovascular history: none  significant  Medications: a calcium channel blocker, a diuretic, and aspirin  PHYSICAL EXAM: Baseline physical exam screening: normal lung exam     REST ECG: Normal sinus rhythm  The ECG showed occasional premature ventricular contractions  PROCEDURE: The study was performed in the stress lab  A regadenoson infusion pharmacologic stress test was performed  Gated SPECT myocardial perfusion imaging was performed after stress and at rest  Systolic blood pressure was 156 mmHg, at  the start of the study  Diastolic blood pressure was 78 mmHg, at the start of the study  The heart rate was 85 bpm, at the start of the study  IV double checked  Regadenoson protocol:  HR bpm SBP mmHg DBP mmHg Symptoms  Baseline 85 156 78 none  Immediate 108 153 75 mild dyspnea  5 min 92 156 68 none  No medications or fluids given  The patient also performed low level exercise  STRESS SUMMARY: Duration of pharmacologic stress was 3 min  Maximal heart rate during stress was 108 bpm  The rate-pressure product for the peak heart rate and blood pressure was 42037  There was no chest pain during stress  The stress  test was terminated due to protocol completion  Pre oxygen saturation: 97 %  Peak oxygen saturation: 98 %  The stress ECG was negative for ischemia  Arrhythmia during stress: isolated premature ventricular beats and pairs of premature  ventricular beats  ISOTOPE ADMINISTRATION:  Resting isotope administration Stress isotope administration  Agent Tetrofosmin Tetrofosmin  Dose 10 mCi 33 mCi  Date -- 09/05/2017  Injection time 08:58 10:30  Imaging time 09:40 11:00  Injection-image interval 42 min 30 min    The radiopharmaceutical was injected at the peak effect of pharmacologic stress  MYOCARDIAL PERFUSION IMAGING:  The image quality was good   Left ventricular size was normal  The TID ratio was 0 96  PERFUSION DEFECTS:  -  There were no perfusion defects  GATED SPECT:  The calculated left ventricular ejection fraction was 59 %  Left ventricular ejection fraction was within normal limits by visual estimate  There was no left ventricular regional abnormality  SUMMARY:  -  Stress results: There was no chest pain during stress  -  ECG conclusions: The stress ECG was negative for ischemia  -  Perfusion imaging: There were no perfusion defects   -  Gated SPECT: The calculated left ventricular ejection fraction was 59 %  Left ventricular ejection fraction was within normal limits by visual estimate  There was no left ventricular regional abnormality  IMPRESSIONS: Normal study after pharmacologic vasodilation  Myocardial perfusion imaging was normal at rest and with stress  Left ventricular systolic function was normal     Prepared and signed by    Teo Vieira DO  Signed 2017 17:03:37      --------------------------------------------------------------------------------  ECHO:   Results for orders placed during the hospital encounter of 21    Echo complete with contrast if indicated    Narrative  [address] [place] [address], [address]  [phone]    Transthoracic Echocardiogram  2D, M-mode, Doppler, and Color Doppler    Study date:  20-Aug-2021    Patient: Hira Murphy  MR number: [id number]  Account number: [de-identified]  : 1940  Age: 78 years  Gender: Male  Status: Inpatient  Location: Bedside  Height: 64 in  Weight: 187 7 lb  BP: 160/ 73 mmHg    Indications: Heart failure    Diagnoses: I50 9 - Heart failure, unspecified    Sonographer:  Kevin James RDCS  Primary Physician:  Flavio Vieyra DO  Referring Physician:  ANATOLY Badillo  Group:  [facility]  Interpreting Physician:  Teo Vieira DO    SUMMARY    LEFT VENTRICLE:  Systolic function was at the lower limits of normal  Ejection fraction was estimated to be 50 %  There was mild to moderate hypokinesis of the mid-apical septal myocardial wall segments  Wall thickness was mildly increased  Features were likely suggestive of a pseudonormal left ventricular filling pattern, with concomitant abnormal relaxation and increased filling pressure (grade 2 diastolic dysfunction)  LEFT ATRIUM:  The atrium was mildly dilated  MITRAL VALVE:  There was moderate annular calcification  There was mild stenosis (mean diastolic transvalvular gradient ~ 3 3 mmHg at HR 86 BPM)  There was mild regurgitation  AORTIC VALVE:  The valve was trileaflet  Leaflets exhibited markedly increased thickness, marked calcification, markedly reduced cuspal separation, reduced mobility, and sclerosis  There was moderate stenosis  There was mild regurgitation  The peak valve velocity was 3 3 cm/s  Valve mean gradient was 22 mmHg  The aortic valve obstructive index (by VTI) was 0 29  Difficult to accurately estimate aortic valve area due to suboptimal LVOT visualization and measurements  TRICUSPID VALVE:  There was mild regurgitation  AORTA:  The root exhibited mild dilatation (4 1 cm [2 1 cm/m2] at the sinuses of valsalva), and mild fibrocalcific change  SUMMARY MEASUREMENTS  2D measurements:  Unspecified Anatomy:   %FS was 23 9 %  Ao Diam was 3 9 cm  Ao asc was 3 4 cm   EDV(Teich) was 110 5 ml   EF(Teich) was 47 6 %  ESV(Teich) was 57 9 ml  IVSd was 1 2 cm  LA Diam was 4 5 cm  LAAs A2C was 27 1 cm2  LAAs A4C was 26 2  cm2  LAESV A-L A2C was 101 1 ml  LAESV A-L A4C was 92 8 ml  LAESV Index (A-L) was 51 1 ml/m2  LAESV MOD A2C was 98 2 ml  LAESV MOD A4C was 86 6 ml  LAESV(A-L) was 97 5 ml  LAESV(MOD BP) was 92 3 ml  LAESVInd MOD BP was 48 4 ml/m2  LALs A2C was 6 2 cm  LALs A4C was 6 3 cm  LVEDV MOD A4C was 195 6 ml  LVEF MOD A4C was 42 1 %  LVESV MOD A4C was 113 2 ml  LVIDd was 4 9 cm    LVIDs was 3  7 cm  LVLd A4C was 10 6 cm  LVLs A4C was 9 6 cm  LVOT Diam was 2 6 cm  LVPWd  was 0 9 cm  RAAs A4C was 21 1 cm2  RAESV A-L was 66 8 ml  RAESV MOD was 65 4 ml  RALs was 5 7 cm  RVIDd was 4 7 cm  RWT was 0 4   SV MOD A4C was 82 4 ml   SV(Teich) was 52 6 ml   CW measurements:  Unspecified Anatomy:   AV Env  Ti was 267 9 ms   AV VTI was 59 6 cm   AV Vmax was 2 9 m/s  AV Vmean was 2 2 m/s  AV maxPG was 34 5 mmHg  AV meanPG was 21 6 mmHg  MV VTI was 35 cm  MV Vmax was 1 2 m/s  MV Vmean was 0 9 m/s  MV maxPG  was 6 mmHg  MV meanPG was 3 4 mmHg  TR Vmax was 3 2 m/s   TR maxPG was 40 mmHg  MM measurements:  Unspecified Anatomy:   TAPSE was 2 4 cm  PW measurements:  Unspecified Anatomy:   GREGORY (VTI) was 1 7 cm2  GREGORY Vmax was 1 8 cm2  AVAI (VTI) was 0 cm2/m2  AVAI Vmax was 0 cm2/m2  DVI was 0 3   E' Sept was 0 m/s  E/E' Sept was 31 6   LVOT Env  Ti was 308 3 ms  LVOT VTI was 20 3 cm  LVOT Vmax  was 1 m/s  LVOT Vmean was 0 7 m/s  LVOT maxPG was 4 2 mmHg  LVOT meanPG was 2 1 mmHg  LVSI Dopp was 54 5 ml/m2  LVSV Dopp was 104 1 ml   MV A David was 1 2 m/s  MV Dec Schuyler was 7 1 m/s2  MV DecT was 204 7 ms   MV E David was 1 4 m/s  MV E/A Ratio was 1 2   MV PHT was 59 4 ms  MVA (VTI) was 3 cm2  MVA By PHT was 3 7 cm2  HISTORY: PRIOR HISTORY: HTN; AS; CP; HLD; PVC; Acute respiratory failure with hypoxia; CHF; Obesity; Depression    PROCEDURE: The procedure was performed at the bedside  This was a routine study  The transthoracic approach was used  The study included complete 2D imaging, M-mode, complete spectral Doppler, and color Doppler  The heart rate was 98 bpm,  at the start of the study  Images were obtained from the parasternal, apical, subcostal, and suprasternal notch acoustic windows  Echocardiographic views were limited due to poor acoustic window availability  This was a technically  difficult study      LEFT VENTRICLE: Size was normal  Systolic function was at the lower limits of normal  Ejection fraction was estimated to be 50 %  There was mild to moderate hypokinesis of the mid-apical septal myocardial wall segments  Wall thickness was  mildly increased  DOPPLER: Features were likely suggestive of a pseudonormal left ventricular filling pattern, with concomitant abnormal relaxation and increased filling pressure (grade 2 diastolic dysfunction)  RIGHT VENTRICLE: The size was normal  Systolic function was normal  Wall thickness was normal     LEFT ATRIUM: The atrium was mildly dilated  RIGHT ATRIUM: Size was normal     MITRAL VALVE: There was moderate annular calcification  DOPPLER: There was mild stenosis (mean diastolic transvalvular gradient ~ 3 3 mmHg at HR 86 BPM)  There was mild regurgitation  AORTIC VALVE: The valve was trileaflet  Leaflets exhibited markedly increased thickness, marked calcification, markedly reduced cuspal separation, reduced mobility, and sclerosis  DOPPLER: There was moderate stenosis  There was mild  regurgitation  TRICUSPID VALVE: Not well visualized  DOPPLER: There was mild regurgitation  PULMONIC VALVE: Not well visualized  PERICARDIUM: There was no pericardial effusion  The pericardium was normal in appearance  AORTA: The root exhibited mild dilatation (4 1 cm [2 1 cm/m2] at the sinuses of valsalva), and mild fibrocalcific change  SYSTEMIC VEINS: IVC: The inferior vena cava was not well visualized      MEASUREMENT TABLES    DOPPLER MEASUREMENTS  Aortic valve   (Reference normals)  Peak brandi   3 3 cm/s   (--)  Mean gradient   22 mmHg   (--)  Obstr index, VTI   0 29    (--)    SYSTEM MEASUREMENT TABLES    2D  %FS: 23 9 %  Ao Diam: 3 9 cm  Ao asc: 3 4 cm  EDV(Teich): 110 5 ml  EF(Teich): 47 6 %  ESV(Teich): 57 9 ml  IVSd: 1 2 cm  LA Diam: 4 5 cm  LAAs A2C: 27 1 cm2  LAAs A4C: 26 2 cm2  LAESV A-L A2C: 101 1 ml  LAESV A-L A4C: 92 8 ml  LAESV Index (A-L): 51 1 ml/m2  LAESV MOD A2C: 98 2 ml  LAESV MOD A4C: 86 6 ml  LAESV(A-L): 97 5 ml  LAESV(MOD BP): 92 3 ml  LAESVInd MOD BP: 48 4 ml/m2  LALs A2C: 6 2 cm  LALs A4C: 6 3 cm  LVEDV MOD A4C: 195 6 ml  LVEF MOD A4C: 42 1 %  LVESV MOD A4C: 113 2 ml  LVIDd: 4 9 cm  LVIDs: 3 7 cm  LVLd A4C: 10 6 cm  LVLs A4C: 9 6 cm  LVOT Diam: 2 6 cm  LVPWd: 0 9 cm  RAAs A4C: 21 1 cm2  RAESV A-L: 66 8 ml  RAESV MOD: 65 4 ml  RALs: 5 7 cm  RVIDd: 4 7 cm  RWT: 0 4  SV MOD A4C: 82 4 ml  SV(Teich): 52 6 ml    CW  AV Env  Ti: 267 9 ms  AV VTI: 59 6 cm  AV Vmax: 2 9 m/s  AV Vmean: 2 2 m/s  AV maxP 5 mmHg  AV meanP 6 mmHg  MV VTI: 35 cm  MV Vmax: 1 2 m/s  MV Vmean: 0 9 m/s  MV maxP mmHg  MV meanPG: 3 4 mmHg  TR Vmax: 3 2 m/s  TR maxP mmHg    MM  TAPSE: 2 4 cm    PW  GREGORY (VTI): 1 7 cm2  GREGORY Vmax: 1 8 cm2  AVAI (VTI): 0 cm2/m2  AVAI Vmax: 0 cm2/m2  DVI: 0 3  E' Sept: 0 m/s  E/E' Sept: 31 6  LVOT Env  Ti: 308 3 ms  LVOT VTI: 20 3 cm  LVOT Vmax: 1 m/s  LVOT Vmean: 0 7 m/s  LVOT maxP 2 mmHg  LVOT meanP 1 mmHg  LVSI Dopp: 54 5 ml/m2  LVSV Dopp: 104 1 ml  MV A David: 1 2 m/s  MV Dec Angelina: 7 1 m/s2  MV DecT: 204 7 ms  MV E David: 1 4 m/s  MV E/A Ratio: 1 2  MV PHT: 59 4 ms  MVA (VTI): 3 cm2  MVA By PHT: 3 7 cm2    [facility] Accredited [facility]    Prepared and electronically signed by    Srinivas Alan DO  Signed 20-Aug-2021 14:28:06    No results found for this or any previous visit     --------------------------------------------------------------------------------  HOLTER  Results for orders placed during the hospital encounter of 18    Holter monitor - 24 hour    Narrative  PT NAME: Ivonne Smalls  : 1940  AGE: [de-identified] y o    GENDER: male  MRN: [id number]   PROCEDURE: Holter monitor - 24 hour        INDICATIONS: Palpitations      FINDINGS:    Holter monitoring revealed predominant sinus rhythm with an average heart rate of 85 BPM, a minimum heart rate of 67 BPM, and a maximum heart rate of 117 BPM     There were about 301 ventricular ectopic beats, all occurring as single PVC's with no evidence of ventricular tachycardia  There were about 1825 supraventricular ectopic beats, mostly occurring as single PAC's and a 6-beat atrial run, with no evidence of sustained supraventricular tachycardia, or any atrial fibrillation/ flutter  There was no evidence of significant bradyarrhythmia or advanced heart block  The longest R-R interval was 1 2 seconds  The patient's symptom diary reported no symptoms       ======================================================     Imaging:   I have personally reviewed pertinent reports          EKG:  Sinus rhythm

## 2021-08-22 NOTE — ASSESSMENT & PLAN NOTE
HPI





- History of Present Illness


HPI Comment/Other: 








Patient is here for excision of recurrent pilonidal cyst.








Past Medical History:


   Reviewed history and no changes required:


      Frequent indigestion


      Heartburn


      Aicd Reflux





Past Surgical History:


   Reviewed history and no changes required:


      Pylonidal cyst, removal 2009-10





Family History Summary: 


   Reviewed history and no changes required: 04/19/2017


Mother (gage) - Has Family History of Other Medical Problems - asthma - 

Entered On: 4/19/2017








Social History:


   Reviewed history and no changes required:








Physical Exam





General:


    well developed, well nourished, in no acute distress


Lungs:


    clear bilaterally to A & P


Heart:


    regular rate and rhythm, S1, S2 without murmurs, rubs, gallops, or clicks


Abdomen:


    bowel sounds positive; abdomen soft and non-tender without masses, 

organomegaly, or hernias noted


Rectal:


    Small pilonidal cyst located in the gluteal fold. Inferiorly is a 2 cm 

incisional scar. There is no fluctuance or erythema.


Pulses:


    pulses normal in all 4 extremities


Extremities:


    no clubbing, cyanosis, edema, or deformity noted with normal full range of 

motion of all joints











Impression & Recommendations:





Problem # 1:  Recurrent pilonidal cyst


plan to proceed with excision of pilonidal cyst








Meds/Allgy





- Home Medications


Home Medications: 


 Ambulatory Orders











 Medication  Instructions  Recorded  Confirmed


 


raNITIdine [Zantac] 150 mg PO PRN PRN 07/24/17 07/24/17














Exam





- Vital Signs


Vital Signs: 





 Vital Signs x48h











  Temp Pulse Resp BP Pulse Ox


 


 07/24/17 09:59  36.2 C L  62  16  124/76  98 · POA:  Reported midsternal/epigastric chest pain, persistent, 8/10  · Troponin initially elevated  0 08 continue to trend up in peaked 1 11 currently plateaued 8 91  · EKGs in ED normal sinus rhythm, left axis deviation with nonspecific conduction abnormalities, repeated EKG  normal sinus rhythm, significant ST abnormalities   · Currently on heparin drip will continue given the EKG changes  · Continue daily aspirin  · Continue monitor on danny  · Cardiology on board, appreciate the input

## 2021-08-22 NOTE — ASSESSMENT & PLAN NOTE
· Chest x-ray 8/20:  Diffuse patchy airspace disease bilaterally concerning multifocal infiltrates  · CAP versus aspiration  · Tested negative for COVID-19 x2  · Legionella and strep pneumo antigens negative  · Initially started on Rocephin and azithromycin, currently on cefepime and flagyl  · Procalcitonin negative x2  · Blood cultures pending  · Patient is febrile T-max 101 5 leukocytosis improving 15 6  · Continue monitor fever curve, WBCs

## 2021-08-22 NOTE — PROGRESS NOTES
[facility]  Progress Note Desiree Zapata 1940, 78 y o  male MRN: [id number]  Unit/Bed#: ICU 07 Encounter: [id number]  Primary Care Provider: Eliot Richardson DO   Date and time admitted to hospital: 8/19/2021  8:25 PM    Acute on chronic diastolic CHF (congestive heart failure) ([facility] [address] )  Assessment & Plan  · POA:  With signs of volume overload, initial imaging chest x-ray concerning for vascular congestion consistent with CHF  · Most recent echo in 2018 showed grade 1 diastolic dysfunction  · [id number]  · Repeated echo 8/20 with significant worsening of his diastolic dysfunction  · Continue aggressive diuresis  · Cardiology on board  · plan as outlined above      Multifocal pneumonia  Assessment & Plan  · Chest x-ray 8/20:  Diffuse patchy airspace disease bilaterally concerning multifocal infiltrates  · CAP versus aspiration  · Tested negative for COVID-19 x2  · Legionella and strep pneumo antigens negative  · Initially started on Rocephin and azithromycin, currently on cefepime and flagyl  · Procalcitonin negative x2  · Blood cultures pending  · Patient is febrile T-max 101 5 leukocytosis improving 15 6  · Continue monitor fever curve, WBCs    * Acute respiratory failure with hypoxia (HCC)  Assessment & Plan  · POA: anxiety, generalized fatigue since Tuesday (9/17)  His wife noted that on 8/19 he was more short of breath with associated cough and complaints of chest pain which prompted his visit to the ED  · In the ED patient was noted to be hypoxic, in respiratory distress  Patient improved with lasix, bipap, morphine and ativan  · Clinically patient appears  fluid overloaded, his lung sounds are coarse with scattered rales  He has trace to +1 left lower extremity edema that he reports as new     · COVID-19 negative x2  · Chest x-ray concerning for vascular congestion, multifocal pneumonia    · Failed conservative treatment and was intubated on 8/21Will continue vent support for now  · Our goal will be to wean his FIO2 as able with a goal to maintain his O2 saturation > 90%      Sepsis ([address] )  Assessment & Plan  · POA :  Meets sepsis criteria on presentation tachycardia, tachypnea, lactic acidosis, leukocytosis and suspecting pneumonia  · Rest of plan as outlined above    Hypochromic microcytic anemia  Assessment & Plan  · Has history of iron deficiency anemia due to inadequate dietary iron intake  · Hemoglobin currently stable, there is no signs of active bleeding  · EGD in February 2021 with no source upper GI bleed  · Iron panel:  Iron 29, ferritin 21, patient will require Venofer  · Continue iron supplement when appropriate  · May need OP hematology follow up  · Trend hg, transfuse for <7 0    Lactic acidosis  Assessment & Plan  · POA:  Lactic acid on presentation 8 1 in the setting of sepsis and suspected pneumonia  · Rest of plan as outlined above    Hypokalemia  Assessment & Plan  · Improved with repletion  · Our goal will be to maintain his potassium level > 3 8  · Monitor BMP    Mild cognitive impairment  Assessment & Plan  · Record review reveals stable memory issues with outpatient followup with geriatrics  · TSH, B12 nl in past  · Family reports no progression at this point  · Continue outpatient sertraline when appropriate    Chest pain  Assessment & Plan  · POA:  Reported midsternal/epigastric chest pain, persistent, 8/10  · Troponin initially elevated  0 08 continue to trend up in peaked 1 11 currently plateaued 0 54  · EKGs in ED normal sinus rhythm, left axis deviation with nonspecific conduction abnormalities, repeated EKG  normal sinus rhythm, significant ST abnormalities   · Currently on heparin drip will continue given the EKG changes  · Continue daily aspirin  · Continue monitor on danny  · Cardiology on board, appreciate the input      Aortic stenosis, moderate  Assessment & Plan  · History of mild to moderate aortic stenosis on echo 5/17/2018   · Repeated on :  Showed markedly increased thickness, marked calcification, markedly reduced cuspal separation, reduced mobility, and sclerosis and moderate stenosis  · Cardiology on board  · Currently diuresing  · Will need to monitor fluid volume status closely      Essential hypertension  Assessment & Plan  · On losartan 25 mg daily, amlodipine 10 mg daily  · Currently on Lasix 40 mg Q 6  · Continue hold antihypertensive meds, resume when  appropriate    ----------------------------------------------------------------------------------------  HPI/24hr events: Events of yesterday are noted- was intubated for respiratory failure    Patient appropriate for transfer out of the ICU today?: No  Disposition: Continue Critical Care   Code Status: Level 1 - Full Code  ---------------------------------------------------------------------------------------  SUBJECTIVE  Intubated and sedated    Review of Systems  Review of systems was unable to be performed secondary to intubated and sedated  ---------------------------------------------------------------------------------------  OBJECTIVE    Vitals   Vitals:    21 0020 21 0100 21 0157 21 0300   BP:  134/55 136/62 130/61   Pulse:  60 60 60   Resp:  (!) 23 18 18   Temp:  100 4 °F (38 °C) 100 4 °F (38 °C) 100 °F (37 8 °C)   TempSrc:       SpO2: 98% 98% 93% 93%   Weight:    79 5 kg (175 lb 4 3 oz)   Height:         Temp (24hrs), Av 8 °F (38 8 °C), Min:100 °F (37 8 °C), Max:102 9 °F (39 4 °C)  Current: Temperature: 100 °F (37 8 °C)          Respiratory:  SpO2: SpO2: 93 %, SpO2 Activity: SpO2 Activity: At Rest, SpO2 Device: O2 Device: Ventilator       Invasive/non-invasive ventilation settings   Respiratory    Lab Data (Last 4 hours)       0429            pH, Arterial       7 469           pCO2, Arterial       36 8           pO2, Arterial       99 4           HCO3, Arterial       26 1           Base Excess, Arterial       2 4                O2/Vent Data None                Physical Exam  Constitutional:       Appearance: He is obese  He is ill-appearing  HENT:      Head: Normocephalic  Mouth/Throat:      Mouth: Mucous membranes are moist    Eyes:      Pupils: Pupils are equal, round, and reactive to light  Cardiovascular:      Rate and Rhythm: Bradycardia present  Pulmonary:      Effort: Pulmonary effort is normal       Breath sounds: Wheezing and rales present  Abdominal:      General: There is distension  Tenderness: There is no abdominal tenderness  Musculoskeletal:         General: Swelling present  Cervical back: Neck supple  Lymphadenopathy:      Cervical: No cervical adenopathy  Skin:     General: Skin is warm and dry     Neurological:      Comments: Intubated and sedated         Laboratory and Diagnostics:  Results from last 7 days   Lab Units 08/22/21  0350 08/21/21  0415 08/20/21  0429 08/19/21  2057   WBC Thousand/uL 12 72* 15 63* 18 08* 17 11*   HEMOGLOBIN g/dL 8 2* 8 5* 9 0* 9 9*   HEMATOCRIT % 28 4* 28 9* 30 6* 34 7*   PLATELETS Thousands/uL 225 240 277 378   NEUTROS PCT % 83* 86* 89* 82*   MONOS PCT % 7 8 8 6     Results from last 7 days   Lab Units 08/22/21  0408 08/21/21  1811 08/21/21  0533 08/20/21  2319 08/20/21  0429 08/19/21  2057   SODIUM mmol/L 144 147* 145 145 142 142   POTASSIUM mmol/L 3 2* 3 2* 2 8* 2 7* 3 7 3 7   CHLORIDE mmol/L 108 108 104 104 105 105   CO2 mmol/L 28 28 30 31 21 18*   ANION GAP mmol/L 8 11 11 10 16* 19*   BUN mg/dL 29* 25 20 19 21 26*   CREATININE mg/dL 1 11 1 08 0 93 0 98 0 82 0 97   CALCIUM mg/dL 8 3 8 8 8 1* 7 7* 7 7* 8 0*   GLUCOSE RANDOM mg/dL 138 194* 159* 193* 208* 174*   ALT U/L  --   --   --   --   --  62   AST U/L  --   --   --   --   --  53*   ALK PHOS U/L  --   --   --   --   --  78   ALBUMIN g/dL  --   --   --   --   --  4 1   TOTAL BILIRUBIN mg/dL  --   --   --   --   --  0 37     Results from last 7 days   Lab Units 08/22/21  0408 08/21/21  0031 08/19/21  8415   MAGNESIUM mg/dL 2 3 1 7 2 0   PHOSPHORUS mg/dL  --   --  4 4*      Results from last 7 days   Lab Units 08/22/21  0352 08/21/21  0533 08/21/21  0015 08/20/21  1834 08/19/21  2057   INR   --   --   --   --  1 09   PTT seconds 55* 61* 61* 60* 26      Results from last 7 days   Lab Units 08/22/21  0352 08/20/21  1834 08/20/21  1446 08/20/21  1131 08/20/21  0832 08/20/21  0440 08/20/21  0103   TROPONIN I ng/mL 0 55* 0 92* 1 07* 1 29* 1 11* 1 03* 0 76*     Results from last 7 days   Lab Units 08/20/21  0103 08/19/21  2300 08/19/21  2102   LACTIC ACID mmol/L 3 0* 5 6* 8 1*     ABG:  Results from last 7 days   Lab Units 08/22/21  0429   PH ART  7 469*   PCO2 ART mm Hg 36 8   PO2 ART mm Hg 99 4   HCO3 ART mmol/L 26 1   BASE EXC ART mmol/L 2 4   ABG SOURCE  Radial, Right     VBG:  Results from last 7 days   Lab Units 08/22/21  0429   ABG SOURCE  Radial, Right     Results from last 7 days   Lab Units 08/20/21  0429 08/19/21  2057   PROCALCITONIN ng/ml 0 14 <0 05       Micro  Results from last 7 days   Lab Units 08/20/21  0104 08/19/21  2102 08/19/21  2030   BLOOD CULTURE   --  No Growth at 24 hrs  No Growth at 24 hrs  LEGIONELLA URINARY ANTIGEN  Negative  --   --    STREP PNEUMONIAE ANTIGEN, URINE  Negative  --   --        EKG:   Imaging: I have personally reviewed pertinent reports  and I have personally reviewed pertinent films in PACS    Intake and Output  I/O       08/20 0701 - 08/21 0700 08/21 0701 - 08/22 0700    I V  (mL/kg) 712 4 (8 9) 415 1 (5 2)    IV Piggyback 1025 1220    Total Intake(mL/kg) 1737 4 (21 7) 1635 1 (20 4)    Urine (mL/kg/hr) 4960 (2 6) 1180 (1)    Total Output 4960 1180    Net -3222 6 +455  1                Height and Weights   Height: 5' 4 02" (162 6 cm)  IBW (Ideal Body Weight): 59 24 kg  Body mass index is 30 07 kg/m²    Weight (last 2 days)     Date/Time   Weight    08/22/21 0300   79 5 (175 27)    08/21/21 0555   80 (176 37)    08/21/21 0210   80 (176 37)    08/20/21 0600   85 5 (188 49)    08/20/21 0300   85 5 (188 49)    08/20/21 0013   85 5 (188 49)                Nutrition       Diet Orders   (From admission, onward)             Start     Ordered    08/20/21 0316  Diet NPO  Diet effective now     Question Answer Comment   Diet Type NPO    RD to adjust diet per protocol?  Yes        08/20/21 0316                  Active Medications  Scheduled Meds:  Current Facility-Administered Medications   Medication Dose Route Frequency Provider Last Rate    Acetaminophen  650 mg Per NG Tube Q6H PRN Max 4/day Moi Richardson MD      aspirin  300 mg Rectal Daily Eric Pardo MD      calcium gluconate  1 g Intravenous Q6H Moi Richardson MD 1 g (08/22/21 0515)    cefepime  2,000 mg Intravenous Q12H Mery Hope MD 2,000 mg (08/21/21 2336)    chlorhexidine  15 mL Mouth/Throat Q12H [address], MD      dexmedetomidine  0 1-1 5 mcg/kg/hr Intravenous Titrated ANATOLY Foreman Stopped (08/22/21 0535)    furosemide  40 mg Intravenous 4x Daily Mery Hope MD      heparin (porcine)  3-20 Units/kg/hr (Order-Specific) Intravenous Titrated Eric Pardo MD 13 8 Units/kg/hr (08/22/21 0501)    heparin (porcine)  2,000 Units Intravenous Q1H PRN Eric Pardo MD      heparin (porcine)  4,000 Units Intravenous Q1H PRN Eric Pardo MD      iron sucrose  300 mg Intravenous Daily Mery Hope MD Stopped (08/21/21 1826)    LORazepam  0 5 mg Intravenous Q6H PRN ANATOLY Aceves      LORazepam  1 mg Intravenous Q6H PRN ANATOLY Foreman      metroNIDAZOLE  500 mg Intravenous Q8H Mery Hope  mg (08/22/21 0348)    morphine injection  2 mg Intravenous Q4H PRN Curt Artis MD      ondansetron  4 mg Intravenous Once ANATOLY Aceves      pantoprazole  40 mg Intravenous Daily Eric Pardo MD      propofol  5-50 mcg/kg/min Intravenous Titrated Mery Hope MD 50 mcg/kg/min (08/22/21 0501)    vancomycin  750 mg Intravenous Q12H Len Simran Campuzaon MD Stopped (08/22/21 0202)     Continuous Infusions:  dexmedetomidine, 0 1-1 5 mcg/kg/hr, Last Rate: Stopped (08/22/21 0588)  heparin (porcine), 3-20 Units/kg/hr (Order-Specific), Last Rate: 13 8 Units/kg/hr (08/22/21 0501)  propofol, 5-50 mcg/kg/min, Last Rate: 50 mcg/kg/min (08/22/21 0501)      PRN Meds:   Acetaminophen, 650 mg, Q6H PRN Max 4/day  heparin (porcine), 2,000 Units, Q1H PRN  heparin (porcine), 4,000 Units, Q1H PRN  LORazepam, 0 5 mg, Q6H PRN  LORazepam, 1 mg, Q6H PRN  morphine injection, 2 mg, Q4H PRN        Invasive Devices Review  Invasive Devices     Peripheral Intravenous Line            Peripheral IV 08/20/21 Left;Ventral (anterior) Forearm 1 day    Peripheral IV 08/21/21 Left;Upper;Ventral (anterior) Arm <1 day          Drain            Urethral Catheter Temperature probe 1 day    NG/OG/Enteral Tube 16 Fr Center mouth <1 day          Airway            ETT  Cuffed 8 mm <1 day                Rationale for remaining devices:   ---------------------------------------------------------------------------------------  Advance Directive and Living Will: Yes    Power of :    POLST:    ---------------------------------------------------------------------------------------  Care Time Delivered:         ANATOLY Colón      Portions of the record may have been created with voice recognition software  Occasional wrong word or "sound a like" substitutions may have occurred due to the inherent limitations of voice recognition software    Read the chart carefully and recognize, using context, where substitutions have occurred

## 2021-08-22 NOTE — SPEECH THERAPY NOTE
Dysphagia consult received  Patient currently intubated and sedated  Will sign off  Please reconsult when appropriate

## 2021-08-22 NOTE — ASSESSMENT & PLAN NOTE
· POA:  With signs of volume overload, initial imaging chest x-ray concerning for vascular congestion consistent with CHF  · Most recent echo in 2018 showed grade 1 diastolic dysfunction  · [id number]  · Repeated echo 8/20 with significant worsening of his diastolic dysfunction  · Continue aggressive diuresis  · Cardiology on board  · plan as outlined above

## 2021-08-22 NOTE — PROGRESS NOTES
Vancomycin IV Pharmacy-to-Dose Consultation    Brooklyn Delarosa is a 78 y o  male who is currently receiving Vancomycin IV with management by the Pharmacy Consult service for pneumonia  Concomitant antimicrobials: cefepime and flagyl  Assessment/Plan:  The patient was reviewed  Renal function is fluctuating (0 93 yesterday, 1 11 today)  and no signs or symptoms of nephrotoxicity and/or infusion reactions were documented in the chart  Based on todays assessment, we will be adjusting him to vancomycin 1250 mg every 24 hours  Plan for a trough 8/25 @ 1230 pm     We will continue to follow the patients culture results and clinical progress daily      Osmel Khan, Pharmacist

## 2021-08-22 NOTE — ASSESSMENT & PLAN NOTE
· History of mild to moderate aortic stenosis on echo 5/17/2018   · Repeated on 8/20:   Showed markedly increased thickness, marked calcification, markedly reduced cuspal separation, reduced mobility, and sclerosis and moderate stenosis  · Cardiology on board  · Currently diuresing  · Will need to monitor fluid volume status closely

## 2021-08-23 ENCOUNTER — APPOINTMENT (INPATIENT)
Dept: RADIOLOGY | Facility: HOSPITAL | Age: 81
DRG: 870 | End: 2021-08-23
Payer: MEDICARE

## 2021-08-23 LAB
ANION GAP SERPL CALCULATED.3IONS-SCNC: 7 MMOL/L (ref 4–13)
ANION GAP SERPL CALCULATED.3IONS-SCNC: 8 MMOL/L (ref 4–13)
APTT PPP: 110 SECONDS (ref 23–37)
APTT PPP: 53 SECONDS (ref 23–37)
APTT PPP: 60 SECONDS (ref 23–37)
ARTERIAL PATENCY WRIST A: YES
ATRIAL RATE: 80 BPM
BASE EXCESS BLDA CALC-SCNC: -0.7 MMOL/L
BUN SERPL-MCNC: 32 MG/DL (ref 5–25)
BUN SERPL-MCNC: 32 MG/DL (ref 5–25)
CA-I BLD-SCNC: 1.15 MMOL/L (ref 1.12–1.32)
CALCIUM SERPL-MCNC: 8.5 MG/DL (ref 8.3–10.1)
CALCIUM SERPL-MCNC: 9 MG/DL (ref 8.3–10.1)
CHLORIDE SERPL-SCNC: 108 MMOL/L (ref 100–108)
CHLORIDE SERPL-SCNC: 111 MMOL/L (ref 100–108)
CO2 SERPL-SCNC: 25 MMOL/L (ref 21–32)
CO2 SERPL-SCNC: 28 MMOL/L (ref 21–32)
CREAT SERPL-MCNC: 1.18 MG/DL (ref 0.6–1.3)
CREAT SERPL-MCNC: 1.25 MG/DL (ref 0.6–1.3)
ERYTHROCYTE [DISTWIDTH] IN BLOOD BY AUTOMATED COUNT: 18.7 % (ref 11.6–15.1)
GFR SERPL CREATININE-BSD FRML MDRD: 53 ML/MIN/1.73SQ M
GFR SERPL CREATININE-BSD FRML MDRD: 57 ML/MIN/1.73SQ M
GLUCOSE SERPL-MCNC: 155 MG/DL (ref 65–140)
GLUCOSE SERPL-MCNC: 164 MG/DL (ref 65–140)
HCO3 BLDA-SCNC: 24 MMOL/L (ref 22–28)
HCT VFR BLD AUTO: 29.8 % (ref 36.5–49.3)
HGB BLD-MCNC: 8.6 G/DL (ref 12–17)
HOROWITZ INDEX BLDA+IHG-RTO: 60 MM[HG]
MAGNESIUM SERPL-MCNC: 1.9 MG/DL (ref 1.6–2.6)
MCH RBC QN AUTO: 22.6 PG (ref 26.8–34.3)
MCHC RBC AUTO-ENTMCNC: 28.9 G/DL (ref 31.4–37.4)
MCV RBC AUTO: 78 FL (ref 82–98)
O2 CT BLDA-SCNC: 13.1 ML/DL (ref 16–23)
OXYHGB MFR BLDA: 94.9 % (ref 94–97)
P AXIS: 32 DEGREES
PCO2 BLDA: 39.5 MM HG (ref 36–44)
PEEP RESPIRATORY: 8 CM[H2O]
PH BLDA: 7.4 [PH] (ref 7.35–7.45)
PLATELET # BLD AUTO: 232 THOUSANDS/UL (ref 149–390)
PMV BLD AUTO: 10.2 FL (ref 8.9–12.7)
PO2 BLDA: 86.2 MM HG (ref 75–129)
POTASSIUM SERPL-SCNC: 2.8 MMOL/L (ref 3.5–5.3)
POTASSIUM SERPL-SCNC: 5.4 MMOL/L (ref 3.5–5.3)
PR INTERVAL: 158 MS
QRS AXIS: -32 DEGREES
QRSD INTERVAL: 121 MS
QT INTERVAL: 417 MS
QTC INTERVAL: 482 MS
RBC # BLD AUTO: 3.8 MILLION/UL (ref 3.88–5.62)
SODIUM SERPL-SCNC: 143 MMOL/L (ref 136–145)
SODIUM SERPL-SCNC: 144 MMOL/L (ref 136–145)
SPECIMEN SOURCE: ABNORMAL
T WAVE AXIS: 128 DEGREES
VENT AC: 18
VENT- AC: AC
VENTRICULAR RATE: 80 BPM
VT SETTING VENT: 420 ML
WBC # BLD AUTO: 14.45 THOUSAND/UL (ref 4.31–10.16)

## 2021-08-23 PROCEDURE — 82805 BLOOD GASES W/O2 SATURATION: CPT | Performed by: INTERNAL MEDICINE

## 2021-08-23 PROCEDURE — 94003 VENT MGMT INPAT SUBQ DAY: CPT

## 2021-08-23 PROCEDURE — 82330 ASSAY OF CALCIUM: CPT | Performed by: NURSE PRACTITIONER

## 2021-08-23 PROCEDURE — 80048 BASIC METABOLIC PNL TOTAL CA: CPT | Performed by: NURSE PRACTITIONER

## 2021-08-23 PROCEDURE — 93010 ELECTROCARDIOGRAM REPORT: CPT | Performed by: INTERNAL MEDICINE

## 2021-08-23 PROCEDURE — 85730 THROMBOPLASTIN TIME PARTIAL: CPT | Performed by: NURSE PRACTITIONER

## 2021-08-23 PROCEDURE — 99291 CRITICAL CARE FIRST HOUR: CPT | Performed by: INTERNAL MEDICINE

## 2021-08-23 PROCEDURE — 85730 THROMBOPLASTIN TIME PARTIAL: CPT | Performed by: INTERNAL MEDICINE

## 2021-08-23 PROCEDURE — 99233 SBSQ HOSP IP/OBS HIGH 50: CPT | Performed by: INTERNAL MEDICINE

## 2021-08-23 PROCEDURE — 83735 ASSAY OF MAGNESIUM: CPT | Performed by: NURSE PRACTITIONER

## 2021-08-23 PROCEDURE — 85027 COMPLETE CBC AUTOMATED: CPT | Performed by: INTERNAL MEDICINE

## 2021-08-23 PROCEDURE — 71045 X-RAY EXAM CHEST 1 VIEW: CPT

## 2021-08-23 PROCEDURE — 94760 N-INVAS EAR/PLS OXIMETRY 1: CPT

## 2021-08-23 PROCEDURE — 36600 WITHDRAWAL OF ARTERIAL BLOOD: CPT

## 2021-08-23 PROCEDURE — 93005 ELECTROCARDIOGRAM TRACING: CPT

## 2021-08-23 RX ORDER — POTASSIUM CHLORIDE 14.9 MG/ML
20 INJECTION INTRAVENOUS
Status: COMPLETED | OUTPATIENT
Start: 2021-08-23 | End: 2021-08-23

## 2021-08-23 RX ORDER — POTASSIUM CHLORIDE 20MEQ/15ML
40 LIQUID (ML) ORAL EVERY 6 HOURS
Status: DISCONTINUED | OUTPATIENT
Start: 2021-08-23 | End: 2021-08-23

## 2021-08-23 RX ORDER — FUROSEMIDE 10 MG/ML
40 INJECTION INTRAMUSCULAR; INTRAVENOUS
Status: DISCONTINUED | OUTPATIENT
Start: 2021-08-23 | End: 2021-08-23

## 2021-08-23 RX ORDER — FENTANYL CITRATE/PF 50 MCG/ML
50 SYRINGE (ML) INJECTION ONCE
Status: COMPLETED | OUTPATIENT
Start: 2021-08-23 | End: 2021-08-23

## 2021-08-23 RX ORDER — CALCIUM GLUCONATE 20 MG/ML
1 INJECTION, SOLUTION INTRAVENOUS DAILY
Status: DISCONTINUED | OUTPATIENT
Start: 2021-08-24 | End: 2021-08-30

## 2021-08-23 RX ORDER — FUROSEMIDE 10 MG/ML
80 INJECTION INTRAMUSCULAR; INTRAVENOUS
Status: DISCONTINUED | OUTPATIENT
Start: 2021-08-23 | End: 2021-08-24

## 2021-08-23 RX ORDER — POTASSIUM CHLORIDE 20MEQ/15ML
40 LIQUID (ML) ORAL
Status: COMPLETED | OUTPATIENT
Start: 2021-08-23 | End: 2021-08-23

## 2021-08-23 RX ORDER — MIDAZOLAM HYDROCHLORIDE 2 MG/2ML
2 INJECTION, SOLUTION INTRAMUSCULAR; INTRAVENOUS ONCE
Status: COMPLETED | OUTPATIENT
Start: 2021-08-23 | End: 2021-08-23

## 2021-08-23 RX ADMIN — LORAZEPAM 1 MG: 2 INJECTION INTRAMUSCULAR; INTRAVENOUS at 12:11

## 2021-08-23 RX ADMIN — METRONIDAZOLE 500 MG: 500 INJECTION, SOLUTION INTRAVENOUS at 12:18

## 2021-08-23 RX ADMIN — POTASSIUM CHLORIDE 40 MEQ: 20 SOLUTION ORAL at 12:53

## 2021-08-23 RX ADMIN — CHLORHEXIDINE GLUCONATE 0.12% ORAL RINSE 15 ML: 1.2 LIQUID ORAL at 08:56

## 2021-08-23 RX ADMIN — METRONIDAZOLE 500 MG: 500 INJECTION, SOLUTION INTRAVENOUS at 03:20

## 2021-08-23 RX ADMIN — POTASSIUM CHLORIDE 20 MEQ: 14.9 INJECTION, SOLUTION INTRAVENOUS at 07:18

## 2021-08-23 RX ADMIN — FUROSEMIDE 80 MG: 10 INJECTION, SOLUTION INTRAVENOUS at 15:33

## 2021-08-23 RX ADMIN — PROPOFOL 50 MCG/KG/MIN: 10 INJECTION, EMULSION INTRAVENOUS at 02:06

## 2021-08-23 RX ADMIN — ASPIRIN 81 MG: 81 TABLET, CHEWABLE ORAL at 08:56

## 2021-08-23 RX ADMIN — PROPOFOL 50 MCG/KG/MIN: 10 INJECTION, EMULSION INTRAVENOUS at 09:01

## 2021-08-23 RX ADMIN — IRON SUCROSE 300 MG: 20 INJECTION, SOLUTION INTRAVENOUS at 09:00

## 2021-08-23 RX ADMIN — METRONIDAZOLE 500 MG: 500 INJECTION, SOLUTION INTRAVENOUS at 20:59

## 2021-08-23 RX ADMIN — POTASSIUM CHLORIDE 40 MEQ: 20 SOLUTION ORAL at 07:18

## 2021-08-23 RX ADMIN — POTASSIUM CHLORIDE 40 MEQ: 20 SOLUTION ORAL at 17:11

## 2021-08-23 RX ADMIN — MORPHINE SULFATE 2 MG: 2 INJECTION, SOLUTION INTRAMUSCULAR; INTRAVENOUS at 05:41

## 2021-08-23 RX ADMIN — VANCOMYCIN HYDROCHLORIDE 1250 MG: 10 INJECTION, POWDER, LYOPHILIZED, FOR SOLUTION INTRAVENOUS at 12:49

## 2021-08-23 RX ADMIN — Medication 20 MG: at 08:57

## 2021-08-23 RX ADMIN — MORPHINE SULFATE 2 MG: 2 INJECTION, SOLUTION INTRAMUSCULAR; INTRAVENOUS at 00:50

## 2021-08-23 RX ADMIN — PROPOFOL 50 MCG/KG/MIN: 10 INJECTION, EMULSION INTRAVENOUS at 13:06

## 2021-08-23 RX ADMIN — FUROSEMIDE 80 MG: 10 INJECTION, SOLUTION INTRAVENOUS at 08:56

## 2021-08-23 RX ADMIN — PROPOFOL 50 MCG/KG/MIN: 10 INJECTION, EMULSION INTRAVENOUS at 20:10

## 2021-08-23 RX ADMIN — MORPHINE SULFATE 2 MG: 2 INJECTION, SOLUTION INTRAMUSCULAR; INTRAVENOUS at 12:48

## 2021-08-23 RX ADMIN — CHLORHEXIDINE GLUCONATE 0.12% ORAL RINSE 15 ML: 1.2 LIQUID ORAL at 20:55

## 2021-08-23 RX ADMIN — HEPARIN SODIUM 13.8 UNITS/KG/HR: 10000 INJECTION, SOLUTION INTRAVENOUS at 03:16

## 2021-08-23 RX ADMIN — POTASSIUM CHLORIDE 40 MEQ: 20 SOLUTION ORAL at 11:48

## 2021-08-23 RX ADMIN — CALCIUM GLUCONATE 1 G: 20 INJECTION, SOLUTION INTRAVENOUS at 11:46

## 2021-08-23 RX ADMIN — MIDAZOLAM 2 MG: 1 INJECTION INTRAMUSCULAR; INTRAVENOUS at 14:12

## 2021-08-23 RX ADMIN — PROPOFOL 50 MCG/KG/MIN: 10 INJECTION, EMULSION INTRAVENOUS at 05:08

## 2021-08-23 RX ADMIN — CEFEPIME HYDROCHLORIDE 2000 MG: 2 INJECTION, POWDER, FOR SOLUTION INTRAVENOUS at 00:08

## 2021-08-23 RX ADMIN — POTASSIUM CHLORIDE 40 MEQ: 20 SOLUTION ORAL at 15:33

## 2021-08-23 RX ADMIN — FENTANYL CITRATE 50 MCG: 50 INJECTION INTRAMUSCULAR; INTRAVENOUS at 14:12

## 2021-08-23 RX ADMIN — CEFEPIME HYDROCHLORIDE 2000 MG: 2 INJECTION, POWDER, FOR SOLUTION INTRAVENOUS at 11:48

## 2021-08-23 RX ADMIN — POTASSIUM CHLORIDE 20 MEQ: 14.9 INJECTION, SOLUTION INTRAVENOUS at 09:18

## 2021-08-23 RX ADMIN — LORAZEPAM 1 MG: 2 INJECTION INTRAMUSCULAR; INTRAVENOUS at 01:53

## 2021-08-23 RX ADMIN — HEPARIN SODIUM 2000 UNITS: 1000 INJECTION INTRAVENOUS; SUBCUTANEOUS at 05:38

## 2021-08-23 RX ADMIN — PROPOFOL 50 MCG/KG/MIN: 10 INJECTION, EMULSION INTRAVENOUS at 15:58

## 2021-08-23 NOTE — ASSESSMENT & PLAN NOTE
· POA: anxiety, generalized fatigue since Tuesday (9/17)  His wife noted that on 8/19 he was more short of breath with associated cough and complaints of chest pain which prompted his visit to the ED  · In the ED patient was noted to be hypoxic, in respiratory distress  Patient improved with lasix, bipap, morphine and ativan  · Clinically patient appears  fluid overloaded, his lung sounds are coarse with scattered rales  He has trace to +1 left lower extremity edema that he reports as new  · COVID-19 negative x2  · Chest x-ray concerning for vascular congestion, multifocal pneumonia  · Continue antibiotics: vanc cefepime and flagyl day 3  Total antibiotics day 5  · Monitor CBC and fever curve  · Failed conservative treatment and was intubated on 8/21  Continue ventilator support     · Wean his FIO2 as able with a goal to maintain O2 saturation > 90%  ·

## 2021-08-23 NOTE — PROGRESS NOTES
Progress Note - Cardiology   Indra Jackson 78 y o  male MRN: [id number]  Unit/Bed#: ICU 07 Encounter: [id number]    Assessment:  1  Hypokalemia  2  Acute pulmonary edema, cardiac versus noncardiac  3  Acute respiratory failure  4  Non myocardial troponin elevation secondary to acute respiratory failure  5  Mild-to-moderate mitral regurgitation by echocardiogram  6  Aortic stenosis, murmur louder today suggesting an improvement in cardiac output  7  Severe type 2 left ventricular diastolic dysfunction  8  Moderate pulmonary hypertension by echocardiogram  9  Net I&O was positive by 3 L most likely secondary to tube feeding  Will need to either increase diuretics or reduced tube feeding to control fluid balance  10  Hypocalcemia improved    Plan:  1  Recommend replacing potassium more aggressively  2  Reduce calcium gluconate intravenously to once daily        Interval history:  Patient sedated with propofol on ventilator  Status not significantly changed      PROBLEM LIST:    Patient Active Problem List    Diagnosis Date Noted    Sepsis ([facility] ) 08/20/2021    Multifocal pneumonia 08/19/2021    Acute [facility] failure with hypoxia ([facility] ) 08/19/2021    Acute on chronic diastolic CHF (congestive heart failure) ([facility] ) 08/19/2021    Lactic acidosis 08/19/2021    Hypochromic microcytic anemia 08/19/2021    Hypokalemia 02/19/2021    Iron deficiency anemia secondary to inadequate dietary iron intake 02/19/2021    Do not resuscitate status 01/25/2021    Chronic constipation 01/25/2021    Obesity (BMI 30 0-34 9) 07/19/2019    Mild cognitive impairment 01/07/2019    Medicare annual wellness visit, subsequent 04/26/2018    Moderate episode of recurrent major depressive disorder ([facility] ) 03/23/2018    PVC (premature ventricular contraction) 03/23/2018    Chest pain 09/04/2017    Mixed hyperlipidemia     Essential hypertension     Aortic stenosis, moderate     Enlarged prostate without lower urinary tract symptoms (luts) 03/01/2012    Fatty liver disease, nonalcoholic 27/75/9231       Vitals: /53   Pulse 84   Temp 99 7 °F (37 6 °C)   Resp (!) 28   Ht 5' 4 02" (1 626 m)   Wt 82 7 kg (182 lb 5 1 oz)   SpO2 92%   BMI 31 28 kg/m²   PREVIOUS WEIGHTS:   Wt Readings from Last 5 Encounters:   08/23/21 82 7 kg (182 lb 5 1 oz)   07/26/21 83 5 kg (184 lb 2 oz)   03/19/21 82 8 kg (182 lb 9 6 oz)   02/23/21 79 5 kg (175 lb 4 3 oz)   01/25/21 81 6 kg (180 lb)        Labs/Data:        Results from last 7 days   Lab Units 08/23/21  0504 08/22/21  0350 08/21/21  0415   WBC Thousand/uL 14 45* 12 72* 15 63*   HEMOGLOBIN g/dL 8 6* 8 2* 8 5*   HEMATOCRIT % 29 8* 28 4* 28 9*   MCV fL 78* 77* 73*   MCH pg 22 6* 22 2* 21 5*   MCHC g/dL 28 9* 28 9* 29 4*   PLATELETS Thousands/uL 232 225 240     Results from last 7 days   Lab Units 08/23/21  0443 08/22/21  0408 08/21/21  1811   EGFR ml/min/1 73sq m 57 61 63   SODIUM mmol/L 143 144 147*   POTASSIUM mmol/L 2 8* 3 2* 3 2*   CHLORIDE mmol/L 108 108 108   CO2 mmol/L 28 28 28   BUN mg/dL 32* 29* 25   CREATININE mg/dL 1 18 1 11 1 08     Results from last 7 days   Lab Units 08/23/21  0443 08/22/21  0408 08/21/21  1811 08/21/21  0031 08/19/21  2057   CALCIUM mg/dL 8 5 8 3 8 8  --  8 0*   AST U/L  --   --   --   --  53*   ALT U/L  --   --   --   --  62   ALK PHOS U/L  --   --   --   --  78   MAGNESIUM mg/dL 1 9 2 3  --  1 7 2 0     Results from last 7 days   Lab Units 08/22/21  0352 08/20/21  1834 08/20/21  1446   TROPONIN I ng/mL 0 55* 0 92* 1 07*     Lab Results   Component Value Date    NTBNP 693 (H) 08/19/2021     Lab Results   Component Value Date    CHOLESTEROL 138 03/12/2021    TRIG 117 03/12/2021    HDL 44 03/12/2021    LDLCALC 71 03/12/2021    HGBA1C 5 5 08/20/2021     No results found for: TSH  No results found for: DIGOXIN  Results from last 7 days   Lab Units 08/19/21 2057   INR  1 09   PROTIME seconds 13 9          Current Facility-Administered Medications:    Acetaminophen (TYLENOL) oral suspension 650 mg, 650 mg, Per NG Tube, Q6H PRN Max 4/day, Nikolay Miles MD    aspirin chewable tablet 81 mg, 81 mg, Oral, Daily, Lidya Lamarrachel, , 81 mg at 08/23/21 0856    [START ON 8/24/2021] calcium gluconate 1 g in sodium chloride 0 9% 50 mL (premix), 1 g, Intravenous, Daily, Curt Knight MD, Last Rate: 100 mL/hr at 08/23/21 1146, 1 g at 08/23/21 1146    cefepime (MAXIPIME) 2,000 mg in dextrose 5 % 50 mL IVPB, 2,000 mg, Intravenous, Q12H, Delmy Hollins MD, Stopped at 08/23/21 1219    chlorhexidine (PERIDEX) 0 12 % oral rinse 15 mL, 15 mL, Mouth/Throat, Q12H [address], Delmy Hollins MD, 15 mL at 08/23/21 0856    furosemide (LASIX) injection 80 mg, 80 mg, Intravenous, BID (diuretic), Curt Knight MD    heparin (porcine) 25,000 units in 0 45% NaCl 250 mL infusion (premix), 3-20 Units/kg/hr (Order-Specific), Intravenous, Titrated, Edwardo Ojeda MD, Last Rate: 11 7 mL/hr at 08/23/21 1204, 13 8 Units/kg/hr at 08/23/21 1204    heparin (porcine) injection 2,000 Units, 2,000 Units, Intravenous, Q1H PRN, Edwardo Ojeda MD, 2,000 Units at 08/23/21 0538    heparin (porcine) injection 4,000 Units, 4,000 Units, Intravenous, Q1H PRN, Edwardo Ojeda MD    LORazepam (ATIVAN) injection 0 5 mg, 0 5 mg, Intravenous, Q6H PRN, ANATOLY Borden, 1 mg at 08/22/21 8758    LORazepam (ATIVAN) injection 1 mg, 1 mg, Intravenous, Q6H PRN, ANATOLY Maria, 1 mg at 08/23/21 1211    metroNIDAZOLE (FLAGYL) IVPB (premix) 500 mg 100 mL, 500 mg, Intravenous, Q8H, Delmy Hollins MD, Last Rate: 200 mL/hr at 08/23/21 1218, 500 mg at 08/23/21 1218    morphine injection 2 mg, 2 mg, Intravenous, Q4H PRN, Curt Knight MD, 2 mg at 08/23/21 1248    omeprazole (PRILOSEC) suspension 2 mg/mL, 20 mg, Oral, Daily, Lidya Steve DO, 20 mg at 08/23/21 0857    ondansetron (ZOFRAN) injection 4 mg, 4 mg, Intravenous, Once, ANATOLY Borden potassium chloride oral solution 40 mEq, 40 mEq, Oral, Q2H, Curt Hutchins MD, 40 mEq at 08/23/21 1253    propofol (DIPRIVAN) 1000 mg in 100 mL infusion (premix), 5-50 mcg/kg/min, Intravenous, Titrated, Janiya Brumfield MD, Last Rate: 24 mL/hr at 08/23/21 0901, 50 mcg/kg/min at 08/23/21 0901    vancomycin (VANCOCIN) 1,250 mg in sodium chloride 0 9 % 250 mL IVPB, 20 mg/kg (Adjusted), Intravenous, Q24H, Janiya Brumfield MD, Last Rate: 166 7 mL/hr at 08/23/21 1249, 1,250 mg at 08/23/21 1249  Allergies   Allergen Reactions    Ace Inhibitors Cough     Pt denied any allergies           Intake/Output Summary (Last 24 hours) at 8/23/2021 1300  Last data filed at 8/23/2021 1218  Gross per 24 hour   Intake 5223 16 ml   Output 2120 ml   Net 3103 16 ml       Invasive Devices     Peripheral Intravenous Line            Peripheral IV 08/20/21 Left;Ventral (anterior) Forearm 3 days    Peripheral IV 08/21/21 Left;Upper;Ventral (anterior) Arm 1 day    Peripheral IV 08/22/21 Right Hand 1 day          Drain            Urethral Catheter Temperature probe 3 days    NG/OG/Enteral Tube 16 Fr Center mouth 1 day          Airway            ETT  Cuffed 8 mm 1 day                Review of Systems   Unable to perform ROS: Intubated       Physical Exam  Vitals reviewed  Constitutional:       General: He is not in acute distress  Appearance: He is well-developed  HENT:      Head: Normocephalic and atraumatic  Neck:      Thyroid: No thyromegaly  Vascular: No carotid bruit or JVD  Trachea: No tracheal deviation  Cardiovascular:      Rate and Rhythm: Normal rate and regular rhythm  Pulses: Normal pulses  Heart sounds: Murmur heard  No friction rub  No gallop  Comments: Grade 3/6 mid to late peaking systolic ejection murmur  Pulmonary:      Effort: Pulmonary effort is normal  No respiratory distress  Breath sounds: Normal breath sounds  No wheezing, rhonchi or rales     Chest:      Chest wall: No tenderness  Abdominal:      General: Bowel sounds are normal  There is no distension  Palpations: Abdomen is soft  Tenderness: There is no abdominal tenderness  Musculoskeletal:      Cervical back: Normal range of motion and neck supple  Right lower leg: No edema  Left lower leg: No edema  Skin:     General: Skin is warm and dry            ----------------------------------------------------------------------------------------------  NUCLEAR STRESS TEST: No results found for this or any previous visit  Results for orders placed during the hospital encounter of 17    NM myocardial perfusion spect (rx stress and/or rest)    Letser Saenz 48  Jared Lilian 35  [facility], [address]  [phone]    Rest/Stress Gated SPECT Myocardial Perfusion Imaging After Regadenoson    Patient: Atilio Mir  MR number: [id number]  Account number: [de-identified]  : 1940  Age: 76 years  Gender: Male  Status: Outpatient  Location: Stress lab  Height: 63 in  Weight: 192 lb  BP: 156/ 78 mmHg    Allergies: NO KNOWN ALLERGIES    Diagnosis: R07 9 - Chest pain, unspecified    Primary Physician:  Rolando Helm DO  Technician:  Juan Palma  RN:  Abdulaziz Rascon RN BSN  Group:  [facility]  Report Prepared By[de-identified]  Abdulaziz Rascon RN BSN  Interpreting Physician:  Jose R Adam DO    INDICATIONS: Detection of coronary artery disease  HISTORY: The patient is a 76year old  male  Chest pain status: chest pain, radiation to neck and jaw area  Coronary artery disease risk factors: dyslipidemia, hypertension, and former smoking  Cardiovascular history: none  significant  Medications: a calcium channel blocker, a diuretic, and aspirin  PHYSICAL EXAM: Baseline physical exam screening: normal lung exam     REST ECG: Normal sinus rhythm  The ECG showed occasional premature ventricular contractions      PROCEDURE: The study was performed in the stress lab  A regadenoson infusion pharmacologic stress test was performed  Gated SPECT myocardial perfusion imaging was performed after stress and at rest  Systolic blood pressure was 156 mmHg, at  the start of the study  Diastolic blood pressure was 78 mmHg, at the start of the study  The heart rate was 85 bpm, at the start of the study  IV double checked  Regadenoson protocol:  HR bpm SBP mmHg DBP mmHg Symptoms  Baseline 85 156 78 none  Immediate 108 153 75 mild dyspnea  5 min 92 156 68 none  No medications or fluids given  The patient also performed low level exercise  STRESS SUMMARY: Duration of pharmacologic stress was 3 min  Maximal heart rate during stress was 108 bpm  The rate-pressure product for the peak heart rate and blood pressure was 67799  There was no chest pain during stress  The stress  test was terminated due to protocol completion  Pre oxygen saturation: 97 %  Peak oxygen saturation: 98 %  The stress ECG was negative for ischemia  Arrhythmia during stress: isolated premature ventricular beats and pairs of premature  ventricular beats  ISOTOPE ADMINISTRATION:  Resting isotope administration Stress isotope administration  Agent Tetrofosmin Tetrofosmin  Dose 10 mCi 33 mCi  Date -- 09/05/2017  Injection time 08:58 10:30  Imaging time 09:40 11:00  Injection-image interval 42 min 30 min    The radiopharmaceutical was injected at the peak effect of pharmacologic stress  MYOCARDIAL PERFUSION IMAGING:  The image quality was good  Left ventricular size was normal  The TID ratio was 0 96  PERFUSION DEFECTS:  -  There were no perfusion defects  GATED SPECT:  The calculated left ventricular ejection fraction was 59 %  Left ventricular ejection fraction was within normal limits by visual estimate  There was no left ventricular regional abnormality  SUMMARY:  -  Stress results: There was no chest pain during stress  -  ECG conclusions:  The stress ECG was negative for ischemia  -  Perfusion imaging: There were no perfusion defects   -  Gated SPECT: The calculated left ventricular ejection fraction was 59 %  Left ventricular ejection fraction was within normal limits by visual estimate  There was no left ventricular regional abnormality  IMPRESSIONS: Normal study after pharmacologic vasodilation  Myocardial perfusion imaging was normal at rest and with stress  Left ventricular systolic function was normal     Prepared and signed by    Shey Arroyo DO  Signed 2017 17:03:37      --------------------------------------------------------------------------------  ECHO:   Results for orders placed during the hospital encounter of 21    Echo complete with contrast if indicated    Narrative  [address] [place] [address], [address]  [phone]    Transthoracic Echocardiogram  2D, M-mode, Doppler, and Color Doppler    Study date:  20-Aug-2021    Patient: Gena Voss  MR number: [id number]  Account number: [de-identified]  : 1940  Age: 78 years  Gender: Male  Status: Inpatient  Location: Bedside  Height: 64 in  Weight: 187 7 lb  BP: 160/ 73 mmHg    Indications: Heart failure    Diagnoses: I50 9 - Heart failure, unspecified    Sonographer:  Deysi Ortiz RDCS  Primary Physician:  Tobias Rowland DO  Referring Physician:  ANATOLY Villegas  Group:  [facility]  Interpreting Physician:  Shey Arroyo DO    SUMMARY    LEFT VENTRICLE:  Systolic function was at the lower limits of normal  Ejection fraction was estimated to be 50 %  There was mild to moderate hypokinesis of the mid-apical septal myocardial wall segments  Wall thickness was mildly increased  Features were likely suggestive of a pseudonormal left ventricular filling pattern, with concomitant abnormal relaxation and increased filling pressure (grade 2 diastolic dysfunction)  LEFT ATRIUM:  The atrium was mildly dilated      MITRAL VALVE:  There was moderate annular calcification  There was mild stenosis (mean diastolic transvalvular gradient ~ 3 3 mmHg at HR 86 BPM)  There was mild regurgitation  AORTIC VALVE:  The valve was trileaflet  Leaflets exhibited markedly increased thickness, marked calcification, markedly reduced cuspal separation, reduced mobility, and sclerosis  There was moderate stenosis  There was mild regurgitation  The peak valve velocity was 3 3 cm/s  Valve mean gradient was 22 mmHg  The aortic valve obstructive index (by VTI) was 0 29  Difficult to accurately estimate aortic valve area due to suboptimal LVOT visualization and measurements  TRICUSPID VALVE:  There was mild regurgitation  AORTA:  The root exhibited mild dilatation (4 1 cm [2 1 cm/m2] at the sinuses of valsalva), and mild fibrocalcific change  SUMMARY MEASUREMENTS  2D measurements:  Unspecified Anatomy:   %FS was 23 9 %  Ao Diam was 3 9 cm  Ao asc was 3 4 cm   EDV(Teich) was 110 5 ml   EF(Teich) was 47 6 %  ESV(Teich) was 57 9 ml  IVSd was 1 2 cm  LA Diam was 4 5 cm  LAAs A2C was 27 1 cm2  LAAs A4C was 26 2  cm2  LAESV A-L A2C was 101 1 ml  LAESV A-L A4C was 92 8 ml  LAESV Index (A-L) was 51 1 ml/m2  LAESV MOD A2C was 98 2 ml  LAESV MOD A4C was 86 6 ml  LAESV(A-L) was 97 5 ml  LAESV(MOD BP) was 92 3 ml  LAESVInd MOD BP was 48 4 ml/m2  LALs A2C was 6 2 cm  LALs A4C was 6 3 cm  LVEDV MOD A4C was 195 6 ml  LVEF MOD A4C was 42 1 %  LVESV MOD A4C was 113 2 ml  LVIDd was 4 9 cm  LVIDs was 3 7 cm  LVLd A4C was 10 6 cm  LVLs A4C was 9 6 cm  LVOT Diam was 2 6 cm  LVPWd  was 0 9 cm  RAAs A4C was 21 1 cm2  RAESV A-L was 66 8 ml  RAESV MOD was 65 4 ml  RALs was 5 7 cm  RVIDd was 4 7 cm  RWT was 0 4   SV MOD A4C was 82 4 ml   SV(Teich) was 52 6 ml   CW measurements:  Unspecified Anatomy:   AV Env  Ti was 267 9 ms   AV VTI was 59 6 cm   AV Vmax was 2 9 m/s  AV Vmean was 2 2 m/s  AV maxPG was 34 5 mmHg    AV meanPG was 21 6 mmHg   MV VTI was 35 cm  MV Vmax was 1 2 m/s  MV Vmean was 0 9 m/s  MV maxPG  was 6 mmHg  MV meanPG was 3 4 mmHg  TR Vmax was 3 2 m/s   TR maxPG was 40 mmHg  MM measurements:  Unspecified Anatomy:   TAPSE was 2 4 cm  PW measurements:  Unspecified Anatomy:   GREGORY (VTI) was 1 7 cm2  GREGORY Vmax was 1 8 cm2  AVAI (VTI) was 0 cm2/m2  AVAI Vmax was 0 cm2/m2  DVI was 0 3   E' Sept was 0 m/s  E/E' Sept was 31 6   LVOT Env  Ti was 308 3 ms  LVOT VTI was 20 3 cm  LVOT Vmax  was 1 m/s  LVOT Vmean was 0 7 m/s  LVOT maxPG was 4 2 mmHg  LVOT meanPG was 2 1 mmHg  LVSI Dopp was 54 5 ml/m2  LVSV Dopp was 104 1 ml   MV A David was 1 2 m/s  MV Dec Hopewell was 7 1 m/s2  MV DecT was 204 7 ms   MV E David was 1 4 m/s  MV E/A Ratio was 1 2   MV PHT was 59 4 ms  MVA (VTI) was 3 cm2  MVA By PHT was 3 7 cm2  HISTORY: PRIOR HISTORY: HTN; AS; CP; HLD; PVC; Acute respiratory failure with hypoxia; CHF; Obesity; Depression    PROCEDURE: The procedure was performed at the bedside  This was a routine study  The transthoracic approach was used  The study included complete 2D imaging, M-mode, complete spectral Doppler, and color Doppler  The heart rate was 98 bpm,  at the start of the study  Images were obtained from the parasternal, apical, subcostal, and suprasternal notch acoustic windows  Echocardiographic views were limited due to poor acoustic window availability  This was a technically  difficult study  LEFT VENTRICLE: Size was normal  Systolic function was at the lower limits of normal  Ejection fraction was estimated to be 50 %  There was mild to moderate hypokinesis of the mid-apical septal myocardial wall segments  Wall thickness was  mildly increased  DOPPLER: Features were likely suggestive of a pseudonormal left ventricular filling pattern, with concomitant abnormal relaxation and increased filling pressure (grade 2 diastolic dysfunction)      RIGHT VENTRICLE: The size was normal  Systolic function was normal  Wall thickness was normal     LEFT ATRIUM: The atrium was mildly dilated  RIGHT ATRIUM: Size was normal     MITRAL VALVE: There was moderate annular calcification  DOPPLER: There was mild stenosis (mean diastolic transvalvular gradient ~ 3 3 mmHg at HR 86 BPM)  There was mild regurgitation  AORTIC VALVE: The valve was trileaflet  Leaflets exhibited markedly increased thickness, marked calcification, markedly reduced cuspal separation, reduced mobility, and sclerosis  DOPPLER: There was moderate stenosis  There was mild  regurgitation  TRICUSPID VALVE: Not well visualized  DOPPLER: There was mild regurgitation  PULMONIC VALVE: Not well visualized  PERICARDIUM: There was no pericardial effusion  The pericardium was normal in appearance  AORTA: The root exhibited mild dilatation (4 1 cm [2 1 cm/m2] at the sinuses of valsalva), and mild fibrocalcific change  SYSTEMIC VEINS: IVC: The inferior vena cava was not well visualized  MEASUREMENT TABLES    DOPPLER MEASUREMENTS  Aortic valve   (Reference normals)  Peak brandi   3 3 cm/s   (--)  Mean gradient   22 mmHg   (--)  Obstr index, VTI   0 29    (--)    SYSTEM MEASUREMENT TABLES    2D  %FS: 23 9 %  Ao Diam: 3 9 cm  Ao asc: 3 4 cm  EDV(Teich): 110 5 ml  EF(Teich): 47 6 %  ESV(Teich): 57 9 ml  IVSd: 1 2 cm  LA Diam: 4 5 cm  LAAs A2C: 27 1 cm2  LAAs A4C: 26 2 cm2  LAESV A-L A2C: 101 1 ml  LAESV A-L A4C: 92 8 ml  LAESV Index (A-L): 51 1 ml/m2  LAESV MOD A2C: 98 2 ml  LAESV MOD A4C: 86 6 ml  LAESV(A-L): 97 5 ml  LAESV(MOD BP): 92 3 ml  LAESVInd MOD BP: 48 4 ml/m2  LALs A2C: 6 2 cm  LALs A4C: 6 3 cm  LVEDV MOD A4C: 195 6 ml  LVEF MOD A4C: 42 1 %  LVESV MOD A4C: 113 2 ml  LVIDd: 4 9 cm  LVIDs: 3 7 cm  LVLd A4C: 10 6 cm  LVLs A4C: 9 6 cm  LVOT Diam: 2 6 cm  LVPWd: 0 9 cm  RAAs A4C: 21 1 cm2  RAESV A-L: 66 8 ml  RAESV MOD: 65 4 ml  RALs: 5 7 cm  RVIDd: 4 7 cm  RWT: 0 4  SV MOD A4C: 82 4 ml  SV(Teich): 52 6 ml    CW  AV Env  Ti: 267 9 ms  AV VTI: 59 6 cm  AV Vmax: 2 9 m/s  AV Vmean: 2 2 m/s  AV maxP 5 mmHg  AV meanP 6 mmHg  MV VTI: 35 cm  MV Vmax: 1 2 m/s  MV Vmean: 0 9 m/s  MV maxP mmHg  MV meanPG: 3 4 mmHg  TR Vmax: 3 2 m/s  TR maxP mmHg    MM  TAPSE: 2 4 cm    PW  GREGORY (VTI): 1 7 cm2  GREGORY Vmax: 1 8 cm2  AVAI (VTI): 0 cm2/m2  AVAI Vmax: 0 cm2/m2  DVI: 0 3  E' Sept: 0 m/s  E/E' Sept: 31 6  LVOT Env  Ti: 308 3 ms  LVOT VTI: 20 3 cm  LVOT Vmax: 1 m/s  LVOT Vmean: 0 7 m/s  LVOT maxP 2 mmHg  LVOT meanP 1 mmHg  LVSI Dopp: 54 5 ml/m2  LVSV Dopp: 104 1 ml  MV A David: 1 2 m/s  MV Dec Jennings: 7 1 m/s2  MV DecT: 204 7 ms  MV E David: 1 4 m/s  MV E/A Ratio: 1 2  MV PHT: 59 4 ms  MVA (VTI): 3 cm2  MVA By PHT: 3 7 cm2    [facility]    Prepared and electronically signed by    Wahoo DO Jimi Slaughter 20-Aug-2021 14:28:06    No results found for this or any previous visit     --------------------------------------------------------------------------------  HOLTER  Results for orders placed during the hospital encounter of 18    Holter monitor - 24 hour    Narrative  PT NAME: Valeriy Shin  : 1940  AGE: [de-identified] y o  GENDER: male  MRN: [id number]   PROCEDURE: Holter monitor - 24 hour        INDICATIONS: Palpitations      FINDINGS:    Holter monitoring revealed predominant sinus rhythm with an average heart rate of 85 BPM, a minimum heart rate of 67 BPM, and a maximum heart rate of 117 BPM     There were about 301 ventricular ectopic beats, all occurring as single PVC's with no evidence of ventricular tachycardia  There were about 1825 supraventricular ectopic beats, mostly occurring as single PAC's and a 6-beat atrial run, with no evidence of sustained supraventricular tachycardia, or any atrial fibrillation/ flutter  There was no evidence of significant bradyarrhythmia or advanced heart block  The longest R-R interval was 1 2 seconds  The patient's symptom diary reported no symptoms      No results found for this or any previous visit     ==================================     Imaging:   I have personally reviewed pertinent reports          EKG:  Sinus rhythm

## 2021-08-23 NOTE — QUICK NOTE
I contacted the patient's significant other , Ms Nadege Rosas, and gave her an update  All questions were answered

## 2021-08-23 NOTE — PROGRESS NOTES
Vancomycin IV Pharmacy-to-Dose Consultation    Magnus Ho is a 78 y o  male who is currently receiving Vancomycin IV with management by the Pharmacy Consult service  Assessment/Plan:  The patient was reviewed  Renal function is stable and no signs or symptoms of nephrotoxicity and/or infusion reactions were documented in the chart  Potential Nephrotoxicity Factors:  Medications: diuretics  Patient Factors: elderly    Based on todays assessment, continue current vancomycin (day # 3) dosing of 1,250 mg q24h, with a plan for trough to be drawn at 1245 on 8/24  We will continue to follow the patients culture results and clinical progress daily      Sabi Collado, PharmD, [address] [facility] and Internal Medicine Clinical Pharmacist  [phone] or via [employer]

## 2021-08-23 NOTE — SOCIAL WORK
CM met with the pts s/o at bedside to do a general SW assessment  The patient is LOS day 3  He is not a bundle  He is not a re-admission  He is currently itubated  The patient lives with his s/o of 10 years, Shelbie Bonilla [phone] in a ranch style home, 1 anil  He is independent with adls and ambulation  He drives  He uses a cane during the day, a RW at night  She reports they have a RW, cane, and BSC at home  He is active in gardening, cooking and enjoys watching soccer  No hx of VNA  No hx of STR  His PCP is Dr Leeann Parker  He uses the [facility] in [place] for rx needs  He is retired  No D&A use  No MH history  He has an AD/LW  His POA is Prakash, his son [phone]  CM explained role, will continue to follow for discharge needs

## 2021-08-23 NOTE — ASSESSMENT & PLAN NOTE
· History of mild to moderate aortic stenosis on echo 5/17/2018   · Repeated on 8/20:   Showed markedly increased thickness, marked calcification, markedly reduced cuspal separation, reduced mobility, and sclerosis and moderate stenosis  · Cardiology on board  · Currently diuresing  · Will need to monitor fluid volume status closely  · Strict I's and O's

## 2021-08-23 NOTE — ASSESSMENT & PLAN NOTE
· Chest x-ray 8/20:  Diffuse patchy airspace disease bilaterally concerning multifocal infiltrates  · CAP versus aspiration  · Tested negative for COVID-19 x2  · Legionella and strep pneumo antigens negative  · Initially started on Rocephin and azithromycin, currently on cefepime and flagyl day 3  Total antibiotics day 5   · Procalcitonin negative x2  Recent Labs     08/21/21  0415 08/22/21  0350 08/23/21  0504   WBC 15 63* 12 72* 14 45*   ·   · Blood cultures shows no growth  · Patient is febrile T-max 99 9    · Continue monitor fever curve, WBCs

## 2021-08-23 NOTE — PROGRESS NOTES
[address] [facility]  Progress Note Dai Rader 1940, 78 y o  male MRN: [id number]  Unit/Bed#: ICU 07 Encounter: [id number]  Primary Care Provider: Grabiel Sherman DO   Date and time admitted to hospital: 8/19/2021  8:25 PM    * Acute respiratory failure with hypoxia [facility])  Assessment & Plan  · POA: anxiety, generalized fatigue since Tuesday (9/17)  His wife noted that on 8/19 he was more short of breath with associated cough and complaints of chest pain which prompted his visit to the ED  · In the ED patient was noted to be hypoxic, in respiratory distress  Patient improved with lasix, bipap, morphine and ativan  · Clinically patient appears  fluid overloaded, his lung sounds are coarse with scattered rales  He has trace to +1 left lower extremity edema that he reports as new  · COVID-19 negative x2  · Chest x-ray concerning for vascular congestion, multifocal pneumonia  · Continue antibiotics: vanc cefepime and flagyl day 3  Total antibiotics day 5  · Monitor CBC and fever curve  · Failed conservative treatment and was intubated on 8/21  Continue ventilator support  · Wean his FIO2 as able with a goal to maintain O2 saturation > 90%  ·       Acute on chronic diastolic CHF (congestive heart failure) (HCC)  Assessment & Plan  · POA:  With signs of volume overload, initial imaging chest x-ray concerning for vascular congestion consistent with CHF  · Most recent echo in 2018 showed grade 1 diastolic dysfunction  · [id number]  · Repeated echo 8/20 with significant worsening of his diastolic dysfunction  · Aggressive diuresis with lasix 80 mg BID     · Cardiology on board  · plan as outlined above      Chest pain  Assessment & Plan  · POA:  Reported midsternal/epigastric chest pain, persistent, 8/10  · Troponin initially elevated  0 08 continue to trend up in peaked 1 11 currently plateaued 4 15  · EKGs in ED normal sinus rhythm, left axis deviation with nonspecific conduction abnormalities, repeated EKG  normal sinus rhythm, significant ST abnormalities   · Currently on heparin drip will continue given the EKG changes  · Continue daily aspirin  · Continue monitor on danny  · Cardiology on board, appreciate the input      Sepsis [facility])  Assessment & Plan  · POA :  Meets sepsis criteria on presentation tachycardia, tachypnea, lactic acidosis, leukocytosis and suspecting pneumonia  · Rest of plan as outlined above    Hypochromic microcytic anemia  Assessment & Plan  · Has history of iron deficiency anemia due to inadequate dietary iron intake  · Hemoglobin currently stable, there is no signs of active bleeding  · EGD in February 2021 with no source upper GI bleed  · Iron panel:  Iron 29, ferritin 21, patient will require Venofer  · Continue iron supplement when appropriate  · May need OP hematology follow up  · Trend hg, transfuse for <7 0    Lactic acidosis  Assessment & Plan  · POA:  Lactic acid on presentation 8 1 in the setting of sepsis and suspected pneumonia  · Rest of plan as outlined above    Multifocal pneumonia  Assessment & Plan  · Chest x-ray 8/20:  Diffuse patchy airspace disease bilaterally concerning multifocal infiltrates  · CAP versus aspiration  · Tested negative for COVID-19 x2  · Legionella and strep pneumo antigens negative  · Initially started on Rocephin and azithromycin, currently on cefepime and flagyl day 3  Total antibiotics day 5   · Procalcitonin negative x2  Recent Labs     08/21/21  0415 08/22/21  0350 08/23/21  0504   WBC 15 63* 12 72* 14 45*   ·   · Blood cultures shows no growth  · Patient is febrile T-max 99 9    · Continue monitor fever curve, WBCs    Hypokalemia  Assessment & Plan  Recent Labs     08/21/21  1811 08/22/21  0408 08/23/21  0443   K 3 2* 3 2* 2 8*     · Keep potassium level > 3 8  · Monitor BMP  · Replete as needed    Mild cognitive impairment  Assessment & Plan  · Record review reveals stable memory issues with outpatient followup with geriatrics  · TSH, B12 nl in past  · Family reports no progression at this point  · Continue outpatient sertraline when appropriate    Aortic stenosis, moderate  Assessment & Plan  · History of mild to moderate aortic stenosis on echo 2018   · Repeated on : Showed markedly increased thickness, marked calcification, markedly reduced cuspal separation, reduced mobility, and sclerosis and moderate stenosis  · Cardiology on board  · Currently diuresing  · Will need to monitor fluid volume status closely  · Strict I's and O's      Essential hypertension  Assessment & Plan  · On losartan 25 mg daily, amlodipine 10 mg daily  · Currently on Lasix 40 mg Q 6  · Continue hold antihypertensive meds, resume when  appropriate      ----------------------------------------------------------------------------------------  HPI/24hr events: no significant ON events       Patient appropriate for transfer out of the ICU today?: No  Disposition: Continue Critical Care   Code Status: Level 1 - Full Code  ---------------------------------------------------------------------------------------  SUBJECTIVE  Unable to provide subjective due to intubation    Review of Systems   Unable to perform ROS: Intubated     Review of systems was unable to be performed secondary to Intubation  ---------------------------------------------------------------------------------------  OBJECTIVE    Vitals   Vitals:    21 0300 21 0309 21 0500 21 0541   BP: 140/59  136/54    Pulse: 82  80    Resp: 19  22    Temp: 98 6 °F (37 °C)  99 3 °F (37 4 °C)    TempSrc:       SpO2: 94% 97% 90%    Weight:   82 7 kg (182 lb 5 1 oz) 82 7 kg (182 lb 5 1 oz)   Height:         Temp (24hrs), Av 1 °F (37 3 °C), Min:98 2 °F (36 8 °C), Max:100 °F (37 8 °C)  Current: Temperature: 99 3 °F (37 4 °C)          Respiratory:  SpO2: SpO2: 90 %, SpO2 Activity: SpO2 Activity: At Rest, SpO2 Device: O2 Device: Ventilator, Capnography: Invasive/non-invasive ventilation settings   Respiratory    Lab Data (Last 4 hours)    None         O2/Vent Data (Last 4 hours)    None                Physical Exam  Vitals and nursing note reviewed  Constitutional:       General: He is not in acute distress  Appearance: Normal appearance  HENT:      Head: Normocephalic and atraumatic  Right Ear: External ear normal       Left Ear: External ear normal       Nose: Nose normal       Mouth/Throat:      Mouth: Mucous membranes are moist       Pharynx: Oropharynx is clear  Eyes:      Extraocular Movements: Extraocular movements intact  Conjunctiva/sclera: Conjunctivae normal       Pupils: Pupils are equal, round, and reactive to light  Cardiovascular:      Rate and Rhythm: Regular rhythm  Tachycardia present  Pulses: Normal pulses  Heart sounds: Murmur heard  Pulmonary:      Effort: No respiratory distress  Breath sounds: No rales  Abdominal:      General: Bowel sounds are decreased  Palpations: Abdomen is soft  Musculoskeletal:         General: Normal range of motion  Cervical back: Normal range of motion and neck supple  Right lower leg: No edema  Left lower leg: No edema  Skin:     General: Skin is warm and dry  Capillary Refill: Capillary refill takes less than 2 seconds  Findings: Bruising present  Neurological:      General: No focal deficit present  Mental Status: He is alert and oriented to person, place, and time     Psychiatric:      Comments: sedated         Laboratory and Diagnostics:  Results from last 7 days   Lab Units 08/23/21  0504 08/22/21  0350 08/21/21  0415 08/20/21  0429 08/19/21  2057   WBC Thousand/uL 14 45* 12 72* 15 63* 18 08* 17 11*   HEMOGLOBIN g/dL 8 6* 8 2* 8 5* 9 0* 9 9*   HEMATOCRIT % 29 8* 28 4* 28 9* 30 6* 34 7*   PLATELETS Thousands/uL 232 225 240 277 378   NEUTROS PCT %  --  83* 86* 89* 82*   MONOS PCT %  --  7 8 8 6     Results from last 7 days Lab Units 08/23/21  0443 08/22/21  0408 08/21/21  1811 08/21/21  0533 08/20/21  2319 08/20/21  0429 08/19/21 2057   SODIUM mmol/L 143 144 147* 145 145 142 142   POTASSIUM mmol/L 2 8* 3 2* 3 2* 2 8* 2 7* 3 7 3 7   CHLORIDE mmol/L 108 108 108 104 104 105 105   CO2 mmol/L 28 28 28 30 31 21 18*   ANION GAP mmol/L 7 8 11 11 10 16* 19*   BUN mg/dL 32* 29* 25 20 19 21 26*   CREATININE mg/dL 1 18 1 11 1 08 0 93 0 98 0 82 0 97   CALCIUM mg/dL 8 5 8 3 8 8 8 1* 7 7* 7 7* 8 0*   GLUCOSE RANDOM mg/dL 164* 138 194* 159* 193* 208* 174*   ALT U/L  --   --   --   --   --   --  62   AST U/L  --   --   --   --   --   --  53*   ALK PHOS U/L  --   --   --   --   --   --  78   ALBUMIN g/dL  --   --   --   --   --   --  4 1   TOTAL BILIRUBIN mg/dL  --   --   --   --   --   --  0 37     Results from last 7 days   Lab Units 08/23/21  0443 08/22/21  0408 08/21/21  0031 08/19/21 2057   MAGNESIUM mg/dL 1 9 2 3 1 7 2 0   PHOSPHORUS mg/dL  --   --   --  4 4*      Results from last 7 days   Lab Units 08/23/21  0443 08/22/21  1706 08/22/21  1132 08/22/21  0352 08/21/21  0533 08/21/21  0015 08/20/21  1834 08/19/21 2057   INR   --   --   --   --   --   --   --  1 09   PTT seconds 53* 61* 80* 55* 61* 61* 60* 26      Results from last 7 days   Lab Units 08/22/21  0352 08/20/21  1834 08/20/21  1446 08/20/21  1131 08/20/21  0832 08/20/21  0440 08/20/21  0103   TROPONIN I ng/mL 0 55* 0 92* 1 07* 1 29* 1 11* 1 03* 0 76*     Results from last 7 days   Lab Units 08/20/21  0103 08/19/21  2300 08/19/21  2102   LACTIC ACID mmol/L 3 0* 5 6* 8 1*     ABG:  Results from last 7 days   Lab Units 08/22/21  0429   PH ART  7 469*   PCO2 ART mm Hg 36 8   PO2 ART mm Hg 99 4   HCO3 ART mmol/L 26 1   BASE EXC ART mmol/L 2 4   ABG SOURCE  Radial, Right     VBG:  Results from last 7 days   Lab Units 08/22/21  0429   ABG SOURCE  Radial, Right     Results from last 7 days   Lab Units 08/20/21  0429 08/19/21  2057   PROCALCITONIN ng/ml 0 14 <0 05       Micro  Results from last 7 days   Lab Units 08/20/21  0104 08/19/21  2102 08/19/21  2030   BLOOD CULTURE   --  No Growth at 48 hrs  No Growth at 48 hrs  LEGIONELLA URINARY ANTIGEN  Negative  --   --    STREP PNEUMONIAE ANTIGEN, URINE  Negative  --   --        EKG: NSR  Imaging: I have personally reviewed pertinent reports  and I have personally reviewed pertinent films in PACS    Intake and Output  I/O       08/21 0701 - 08/22 0700 08/22 0701 - 08/23 0700 08/23 0701 - 08/24 0700    I V  (mL/kg) 1113 5 (14) 952 5 (11 5) 38 (0 5)    NG/ 1818 30    IV Piggyback 1765 1180     Feedings  566     Total Intake(mL/kg) 2998 5 (37 7) 4516 5 (54 6) 68 (0 8)    Urine (mL/kg/hr) 2010 (1 1) 1905 (1) 55 (1)    Emesis/NG output 150      Total Output 2160 1905 55    Net +838 5 +2611 5 +13                 Height and Weights   Height: 5' 4 02" (162 6 cm)  IBW (Ideal Body Weight): 59 24 kg  Body mass index is 31 28 kg/m²  Weight (last 2 days)     Date/Time   Weight    08/23/21 0541   82 7 (182 32)    08/23/21 0500   82 7 (182 32)    08/23/21 0200   82 7 (182 32)    08/22/21 0600   79 5 (175 27)    08/22/21 0300   79 5 (175 27)    08/21/21 0555   80 (176 37)    08/21/21 0210   80 (176 37)                Nutrition       Diet Orders   (From admission, onward)             Start     Ordered    08/22/21 1213  Diet Enteral/Parenteral; Tube Feeding No Oral Diet; Jevity 1 2 Lg; Continuous; 45; 200; Water; Every 4 hours  Diet effective now     Question Answer Comment   Diet Type Enteral/Parenteral    Enteral/Parenteral Tube Feeding No Oral Diet    Tube Feeding Formula: Jevity 1 2 Lg    Bolus/Cyclic/Continuous Continuous    Tube Feeding Goal Rate (mL/hr): 45    Tube Feeding flush (mL): 200    Water Flush type: Water    Water flush frequency: Every 4 hours    RD to adjust diet per protocol?  Yes        08/22/21 1212                  Active Medications  Scheduled Meds:  Current Facility-Administered Medications   Medication Dose Route Frequency Provider Last Rate    Acetaminophen  650 mg Per NG Tube Q6H PRN Max 4/day Delilah Morton MD      aspirin  81 mg Oral Daily Liliana Gonsalez DO      calcium gluconate  1 g Intravenous Q12H Delilah Morton MD 1 g (08/22/21 2245)    cefepime  2,000 mg Intravenous Q12H Nico Up MD 2,000 mg (08/23/21 0008)    chlorhexidine  15 mL Mouth/Throat Q12H [address] N Brnet Bettencourt MD      furosemide  80 mg Intravenous BID (diuretic) Liliana Gonsalez DO      heparin (porcine)  3-20 Units/kg/hr (Order-Specific) Intravenous Titrated Denae Aiken MD 15 8 Units/kg/hr (08/23/21 0600)    heparin (porcine)  2,000 Units Intravenous Q1H PRN Denae Aiken MD      heparin (porcine)  4,000 Units Intravenous Q1H PRN Denae Aiken MD      iron sucrose  300 mg Intravenous Daily Nico Up MD Stopped (08/22/21 1047)    LORazepam  0 5 mg Intravenous Q6H PRN ANATOLY Tubbs      LORazepam  1 mg Intravenous Q6H PRN ANATOLY Shepherd      metroNIDAZOLE  500 mg Intravenous Q8H Nico Up  mg (08/23/21 0320)    morphine injection  2 mg Intravenous Q4H PRN Curt Santos MD      omeprazole (PRILOSEC) suspension 2 mg/mL  20 mg Oral Daily Lidya Steve DO      ondansetron  4 mg Intravenous Once ANATOLY Tubbs      potassium chloride  20 mEq Intravenous Q2H ANATOLY Tubbs 20 mEq (08/23/21 0718)    potassium chloride  40 mEq Oral Q6H ANATOLY Tubbs      propofol  5-50 mcg/kg/min Intravenous Titrated Nico Up MD 50 mcg/kg/min (08/23/21 0600)    vancomycin  20 mg/kg (Adjusted) Intravenous Q24H Nico Up MD Stopped (08/22/21 1601)     Continuous Infusions:  heparin (porcine), 3-20 Units/kg/hr (Order-Specific), Last Rate: 15 8 Units/kg/hr (08/23/21 0600)  propofol, 5-50 mcg/kg/min, Last Rate: 50 mcg/kg/min (08/23/21 0600)      PRN Meds:   Acetaminophen, 650 mg, Q6H PRN Max 4/day  heparin (porcine), 2,000 Units, Q1H PRN  heparin (porcine), 4,000 Units, Q1H PRN  LORazepam, 0 5 mg, Q6H PRN  LORazepam, 1 mg, Q6H PRN  morphine injection, 2 mg, Q4H PRN        Invasive Devices Review  Invasive Devices     Peripheral Intravenous Line            Peripheral IV 08/20/21 Left;Ventral (anterior) Forearm 2 days    Peripheral IV 08/21/21 Left;Upper;Ventral (anterior) Arm 1 day    Peripheral IV 08/22/21 Right Hand <1 day          Drain            Urethral Catheter Temperature probe 2 days    NG/OG/Enteral Tube 16 Fr Center mouth 1 day          Airway            ETT  Cuffed 8 mm 1 day                Rationale for remaining devices:  Acute respiratory failure  ---------------------------------------------------------------------------------------  Advance Directive and Living Will: Yes    Power of :    POLST:    ---------------------------------------------------------------------------------------  Care Time Delivered:   Upon my evaluation, this patient had a high probability of imminent or life-threatening deterioration due to Acute respiratory failure, which required my direct attention, intervention, and personal management  I have personally provided 30 minutes (0630 to 0700) of critical care time, exclusive of procedures, teaching, family meetings, and any prior time recorded by providers other than myself  Viola Mccarthy MD      Portions of the record may have been created with voice recognition software  Occasional wrong word or "sound a like" substitutions may have occurred due to the inherent limitations of voice recognition software    Read the chart carefully and recognize, using context, where substitutions have occurred

## 2021-08-23 NOTE — ASSESSMENT & PLAN NOTE
· POA:  Reported midsternal/epigastric chest pain, persistent, 8/10  · Troponin initially elevated  0 08 continue to trend up in peaked 1 11 currently plateaued 0 01  · EKGs in ED normal sinus rhythm, left axis deviation with nonspecific conduction abnormalities, repeated EKG  normal sinus rhythm, significant ST abnormalities   · Currently on heparin drip will continue given the EKG changes  · Continue daily aspirin  · Continue monitor on danny  · Cardiology on board, appreciate the input

## 2021-08-23 NOTE — CONSULTS
Consulted for TF  PT currently intubated, on propofol at 24mL/hr (provides 633cal), TF initiated currently on [email or web address], water flushes decreased to 100mL every 4hrs provides with current propofol total of 1929cal, 60g pro, 1471mL  To better meet protein needs would recommend [email or web address], add 2 packs of prosource, water flushes 125mL every 4hrs provides with propofol and additional prosource total of 1905cal, 83g pro, 1525mL  Will continue to monitor TF tolerance, any changes with propofol rate, labs and provide TF recommenation adjustments as needed

## 2021-08-23 NOTE — ASSESSMENT & PLAN NOTE
· POA:  With signs of volume overload, initial imaging chest x-ray concerning for vascular congestion consistent with CHF  · Most recent echo in 2018 showed grade 1 diastolic dysfunction  · [id number]  · Repeated echo 8/20 with significant worsening of his diastolic dysfunction  · Aggressive diuresis with lasix 80 mg BID     · Cardiology on board  · plan as outlined above

## 2021-08-23 NOTE — ASSESSMENT & PLAN NOTE
Recent Labs     08/21/21  1811 08/22/21  0408 08/23/21  0443   K 3 2* 3 2* 2 8*     · Keep potassium level > 3 8  · Monitor BMP  · Replete as needed

## 2021-08-24 LAB
ANION GAP SERPL CALCULATED.3IONS-SCNC: 10 MMOL/L (ref 4–13)
ANISOCYTOSIS BLD QL SMEAR: PRESENT
APTT PPP: 50 SECONDS (ref 23–37)
APTT PPP: 70 SECONDS (ref 23–37)
ATRIAL RATE: 92 BPM
ATRIAL RATE: 93 BPM
BACTERIA BRONCH AEROBE CULT: NO GROWTH
BASOPHILS # BLD MANUAL: 0 THOUSAND/UL (ref 0–0.1)
BASOPHILS NFR MAR MANUAL: 0 % (ref 0–1)
BUN SERPL-MCNC: 34 MG/DL (ref 5–25)
CA-I BLD-SCNC: 1.1 MMOL/L (ref 1.12–1.32)
CALCIUM SERPL-MCNC: 7.7 MG/DL (ref 8.3–10.1)
CHLORIDE SERPL-SCNC: 112 MMOL/L (ref 100–108)
CO2 SERPL-SCNC: 23 MMOL/L (ref 21–32)
CREAT SERPL-MCNC: 1.36 MG/DL (ref 0.6–1.3)
EOSINOPHIL # BLD MANUAL: 0.26 THOUSAND/UL (ref 0–0.4)
EOSINOPHIL NFR BLD MANUAL: 2 % (ref 0–6)
ERYTHROCYTE [DISTWIDTH] IN BLOOD BY AUTOMATED COUNT: 18.9 % (ref 11.6–15.1)
GFR SERPL CREATININE-BSD FRML MDRD: 48 ML/MIN/1.73SQ M
GLUCOSE SERPL-MCNC: 204 MG/DL (ref 65–140)
GRAM STN SPEC: NORMAL
HCT VFR BLD AUTO: 27.1 % (ref 36.5–49.3)
HGB BLD-MCNC: 7.6 G/DL (ref 12–17)
LYMPHOCYTES # BLD AUTO: 0.91 THOUSAND/UL (ref 0.6–4.47)
LYMPHOCYTES # BLD AUTO: 7 % (ref 14–44)
MCH RBC QN AUTO: 22.3 PG (ref 26.8–34.3)
MCHC RBC AUTO-ENTMCNC: 28 G/DL (ref 31.4–37.4)
MCV RBC AUTO: 80 FL (ref 82–98)
METAMYELOCYTES NFR BLD MANUAL: 2 % (ref 0–1)
MONOCYTES # BLD AUTO: 1.16 THOUSAND/UL (ref 0–1.22)
MONOCYTES NFR BLD: 9 % (ref 4–12)
MYELOCYTES NFR BLD MANUAL: 1 % (ref 0–1)
NEUTROPHILS # BLD MANUAL: 10.22 THOUSAND/UL (ref 1.85–7.62)
NEUTS BAND NFR BLD MANUAL: 1 % (ref 0–8)
NEUTS SEG NFR BLD AUTO: 78 % (ref 43–75)
P AXIS: 30 DEGREES
P AXIS: 38 DEGREES
PLATELET # BLD AUTO: 208 THOUSANDS/UL (ref 149–390)
PLATELET BLD QL SMEAR: ADEQUATE
PMV BLD AUTO: 9.5 FL (ref 8.9–12.7)
POLYCHROMASIA BLD QL SMEAR: PRESENT
POTASSIUM SERPL-SCNC: 4 MMOL/L (ref 3.5–5.3)
PR INTERVAL: 163 MS
PR INTERVAL: 163 MS
QRS AXIS: -31 DEGREES
QRS AXIS: -31 DEGREES
QRSD INTERVAL: 113 MS
QRSD INTERVAL: 113 MS
QT INTERVAL: 354 MS
QT INTERVAL: 371 MS
QTC INTERVAL: 441 MS
QTC INTERVAL: 459 MS
RBC # BLD AUTO: 3.41 MILLION/UL (ref 3.88–5.62)
SODIUM SERPL-SCNC: 145 MMOL/L (ref 136–145)
T WAVE AXIS: 107 DEGREES
T WAVE AXIS: 99 DEGREES
VENTRICULAR RATE: 92 BPM
VENTRICULAR RATE: 93 BPM
WBC # BLD AUTO: 12.94 THOUSAND/UL (ref 4.31–10.16)

## 2021-08-24 PROCEDURE — 99233 SBSQ HOSP IP/OBS HIGH 50: CPT | Performed by: INTERNAL MEDICINE

## 2021-08-24 PROCEDURE — 93005 ELECTROCARDIOGRAM TRACING: CPT

## 2021-08-24 PROCEDURE — 80048 BASIC METABOLIC PNL TOTAL CA: CPT | Performed by: INTERNAL MEDICINE

## 2021-08-24 PROCEDURE — 93010 ELECTROCARDIOGRAM REPORT: CPT

## 2021-08-24 PROCEDURE — 94003 VENT MGMT INPAT SUBQ DAY: CPT

## 2021-08-24 PROCEDURE — 85007 BL SMEAR W/DIFF WBC COUNT: CPT | Performed by: INTERNAL MEDICINE

## 2021-08-24 PROCEDURE — 85730 THROMBOPLASTIN TIME PARTIAL: CPT | Performed by: INTERNAL MEDICINE

## 2021-08-24 PROCEDURE — 99291 CRITICAL CARE FIRST HOUR: CPT | Performed by: INTERNAL MEDICINE

## 2021-08-24 PROCEDURE — 85027 COMPLETE CBC AUTOMATED: CPT | Performed by: INTERNAL MEDICINE

## 2021-08-24 PROCEDURE — 82330 ASSAY OF CALCIUM: CPT | Performed by: INTERNAL MEDICINE

## 2021-08-24 RX ORDER — METOPROLOL TARTRATE 5 MG/5ML
2.5 INJECTION INTRAVENOUS EVERY 6 HOURS SCHEDULED
Status: DISCONTINUED | OUTPATIENT
Start: 2021-08-24 | End: 2021-08-28

## 2021-08-24 RX ORDER — SPIRONOLACTONE 25 MG/1
25 TABLET ORAL DAILY
Status: DISCONTINUED | OUTPATIENT
Start: 2021-08-24 | End: 2021-08-27

## 2021-08-24 RX ORDER — HEPARIN SODIUM 5000 [USP'U]/ML
5000 INJECTION, SOLUTION INTRAVENOUS; SUBCUTANEOUS EVERY 8 HOURS SCHEDULED
Status: DISCONTINUED | OUTPATIENT
Start: 2021-08-24 | End: 2021-08-30

## 2021-08-24 RX ORDER — PRAVASTATIN SODIUM 40 MG
40 TABLET ORAL
Status: DISCONTINUED | OUTPATIENT
Start: 2021-08-24 | End: 2021-09-17 | Stop reason: HOSPADM

## 2021-08-24 RX ORDER — OLANZAPINE 5 MG/1
5 TABLET, ORALLY DISINTEGRATING ORAL
Status: DISCONTINUED | OUTPATIENT
Start: 2021-08-24 | End: 2021-08-29

## 2021-08-24 RX ORDER — POLYETHYLENE GLYCOL 3350 17 G/17G
17 POWDER, FOR SOLUTION ORAL DAILY
Status: DISCONTINUED | OUTPATIENT
Start: 2021-08-24 | End: 2021-08-30

## 2021-08-24 RX ORDER — FUROSEMIDE 10 MG/ML
40 INJECTION INTRAMUSCULAR; INTRAVENOUS
Status: DISCONTINUED | OUTPATIENT
Start: 2021-08-24 | End: 2021-08-26

## 2021-08-24 RX ORDER — FENTANYL CITRATE 50 UG/ML
50 INJECTION, SOLUTION INTRAMUSCULAR; INTRAVENOUS
Status: DISCONTINUED | OUTPATIENT
Start: 2021-08-24 | End: 2021-08-28

## 2021-08-24 RX ADMIN — HEPARIN SODIUM 5000 UNITS: 5000 INJECTION INTRAVENOUS; SUBCUTANEOUS at 21:17

## 2021-08-24 RX ADMIN — POLYETHYLENE GLYCOL 3350 17 G: 17 POWDER, FOR SOLUTION ORAL at 11:25

## 2021-08-24 RX ADMIN — CHLORHEXIDINE GLUCONATE 0.12% ORAL RINSE 15 ML: 1.2 LIQUID ORAL at 08:05

## 2021-08-24 RX ADMIN — SODIUM CHLORIDE 1 MCG/KG/HR: 9 INJECTION, SOLUTION INTRAVENOUS at 17:31

## 2021-08-24 RX ADMIN — PRAVASTATIN SODIUM 40 MG: 40 TABLET ORAL at 15:38

## 2021-08-24 RX ADMIN — PROPOFOL 50 MCG/KG/MIN: 10 INJECTION, EMULSION INTRAVENOUS at 05:05

## 2021-08-24 RX ADMIN — HEPARIN SODIUM 2000 UNITS: 1000 INJECTION INTRAVENOUS; SUBCUTANEOUS at 01:48

## 2021-08-24 RX ADMIN — ASPIRIN 81 MG: 81 TABLET, CHEWABLE ORAL at 08:05

## 2021-08-24 RX ADMIN — FENTANYL CITRATE 50 MCG: 50 INJECTION INTRAMUSCULAR; INTRAVENOUS at 12:51

## 2021-08-24 RX ADMIN — CEFEPIME HYDROCHLORIDE 2000 MG: 2 INJECTION, POWDER, FOR SOLUTION INTRAVENOUS at 00:38

## 2021-08-24 RX ADMIN — CALCIUM GLUCONATE 1 G: 20 INJECTION, SOLUTION INTRAVENOUS at 08:05

## 2021-08-24 RX ADMIN — SODIUM CHLORIDE 1 MCG/KG/HR: 9 INJECTION, SOLUTION INTRAVENOUS at 23:16

## 2021-08-24 RX ADMIN — METOROPROLOL TARTRATE 2.5 MG: 5 INJECTION, SOLUTION INTRAVENOUS at 11:26

## 2021-08-24 RX ADMIN — ACETAMINOPHEN 650 MG: 650 SUSPENSION ORAL at 02:35

## 2021-08-24 RX ADMIN — FENTANYL CITRATE 50 MCG: 50 INJECTION INTRAMUSCULAR; INTRAVENOUS at 23:13

## 2021-08-24 RX ADMIN — MORPHINE SULFATE 2 MG: 2 INJECTION, SOLUTION INTRAMUSCULAR; INTRAVENOUS at 23:07

## 2021-08-24 RX ADMIN — FUROSEMIDE 40 MG: 10 INJECTION, SOLUTION INTRAVENOUS at 15:38

## 2021-08-24 RX ADMIN — SODIUM CHLORIDE 0.2 MCG/KG/HR: 9 INJECTION, SOLUTION INTRAVENOUS at 11:41

## 2021-08-24 RX ADMIN — FENTANYL CITRATE 50 MCG: 50 INJECTION INTRAMUSCULAR; INTRAVENOUS at 15:36

## 2021-08-24 RX ADMIN — OLANZAPINE 5 MG: 5 TABLET, ORALLY DISINTEGRATING ORAL at 21:17

## 2021-08-24 RX ADMIN — FUROSEMIDE 80 MG: 10 INJECTION, SOLUTION INTRAVENOUS at 08:05

## 2021-08-24 RX ADMIN — FENTANYL CITRATE 50 MCG: 50 INJECTION INTRAMUSCULAR; INTRAVENOUS at 20:54

## 2021-08-24 RX ADMIN — CHLORHEXIDINE GLUCONATE 0.12% ORAL RINSE 15 ML: 1.2 LIQUID ORAL at 21:17

## 2021-08-24 RX ADMIN — SPIRONOLACTONE 25 MG: 25 TABLET, FILM COATED ORAL at 10:10

## 2021-08-24 RX ADMIN — HEPARIN SODIUM 13.8 UNITS/KG/HR: 10000 INJECTION, SOLUTION INTRAVENOUS at 00:14

## 2021-08-24 RX ADMIN — FENTANYL CITRATE 50 MCG: 50 INJECTION INTRAMUSCULAR; INTRAVENOUS at 22:00

## 2021-08-24 RX ADMIN — Medication 20 MG: at 09:00

## 2021-08-24 RX ADMIN — METOROPROLOL TARTRATE 2.5 MG: 5 INJECTION, SOLUTION INTRAVENOUS at 17:15

## 2021-08-24 RX ADMIN — PROPOFOL 50 MCG/KG/MIN: 10 INJECTION, EMULSION INTRAVENOUS at 08:06

## 2021-08-24 RX ADMIN — ACETAMINOPHEN 650 MG: 650 SUSPENSION ORAL at 13:55

## 2021-08-24 RX ADMIN — METRONIDAZOLE 500 MG: 500 INJECTION, SOLUTION INTRAVENOUS at 04:45

## 2021-08-24 RX ADMIN — FENTANYL CITRATE 50 MCG: 50 INJECTION INTRAMUSCULAR; INTRAVENOUS at 17:17

## 2021-08-24 RX ADMIN — PROPOFOL 50 MCG/KG/MIN: 10 INJECTION, EMULSION INTRAVENOUS at 00:36

## 2021-08-24 NOTE — ASSESSMENT & PLAN NOTE
· Chest x-ray 8/20:  Diffuse patchy airspace disease bilaterally concerning multifocal infiltrates  · CAP versus aspiration  · Tested negative for COVID-19 x2  · Legionella and strep pneumo antigens negative  · Initially started on Rocephin and azithromycin, currently on cefepime and flagyl day 3  Total antibiotics day 5   · Procalcitonin negative x2  Recent Labs     08/22/21  0350 08/23/21  0504 08/24/21  0454   WBC 12 72* 14 45* 12 94*     · Blood cultures shows no growth  · Patient had fever up to 101 overnight  However bronchial culture currently shows no growth    · Continue monitor fever curve, WBCs

## 2021-08-24 NOTE — ASSESSMENT & PLAN NOTE
· POA:  With signs of volume overload, initial imaging chest x-ray concerning for vascular congestion consistent with CHF  · Most recent echo in 2018 showed grade 1 diastolic dysfunction  · [id number]  · Repeated echo 8/20 with significant worsening of his diastolic dysfunction  · Repeat CXr showed improvement on volume status  · Consider decrease diuresis with lasix 40 mg BID     · Cardiology on board  · plan as outlined above

## 2021-08-24 NOTE — ASSESSMENT & PLAN NOTE
· POA: anxiety, generalized fatigue since Tuesday (9/17)  His wife noted that on 8/19 he was more short of breath with associated cough and complaints of chest pain which prompted his visit to the ED  · In the ED patient was noted to be hypoxic, in respiratory distress  Patient improved with lasix, bipap, morphine and ativan  · Clinically patient appears  fluid overloaded, his lung sounds are coarse with scattered rales  He has trace to +1 left lower extremity edema that he reports as new  · COVID-19 negative x2  · Chest x-ray concerning for vascular congestion, multifocal pneumonia  · Continue antibiotics: vanc cefepime and flagyl day 4  Total antibiotics day 6  Consider discontinue antibiotics since bronchial culture currently shows no growth and negative gram stain  Viral culture and cytology still pending  · Monitor CBC and fever curve  · Failed conservative treatment and was intubated on 8/21  Continue ventilator support  Consider SBT today    · Wean FIO2 and PEEP as able with a goal to maintain O2 saturation > 90%  ·

## 2021-08-24 NOTE — ASSESSMENT & PLAN NOTE
Recent Labs     08/23/21  0443 08/23/21  1808 08/24/21  0454   K 2 8* 5 4* 4 0     · Keep potassium level > 3 8  · Monitor BMP  · Replete as needed

## 2021-08-24 NOTE — PROGRESS NOTES
[address]  Progress Note Lashay Chow 1940, 78 y o  male MRN: [id number]  Unit/Bed#: ICU 07 Encounter: [id number]  Primary Care Provider: Lev Knight DO   Date and time admitted to hospital: 8/19/2021  8:25 PM    * Acute respiratory failure with hypoxia [facility])  Assessment & Plan  · POA: anxiety, generalized fatigue since Tuesday (9/17)  His wife noted that on 8/19 he was more short of breath with associated cough and complaints of chest pain which prompted his visit to the ED  · In the ED patient was noted to be hypoxic, in respiratory distress  Patient improved with lasix, bipap, morphine and ativan  · Clinically patient appears  fluid overloaded, his lung sounds are coarse with scattered rales  He has trace to +1 left lower extremity edema that he reports as new  · COVID-19 negative x2  · Chest x-ray concerning for vascular congestion, multifocal pneumonia  · Continue antibiotics: vanc cefepime and flagyl day 4  Total antibiotics day 6  Consider discontinue antibiotics since bronchial culture currently shows no growth and negative gram stain  Viral culture and cytology still pending  · Monitor CBC and fever curve  · Failed conservative treatment and was intubated on 8/21  Continue ventilator support  Consider SBT today  · Wean FIO2 and PEEP as able with a goal to maintain O2 saturation > 90%  ·       Acute on chronic diastolic CHF (congestive heart failure) (HCC)  Assessment & Plan  · POA:  With signs of volume overload, initial imaging chest x-ray concerning for vascular congestion consistent with CHF  · Most recent echo in 2018 showed grade 1 diastolic dysfunction  · [id number]  · Repeated echo 8/20 with significant worsening of his diastolic dysfunction  · Repeat CXr showed improvement on volume status  · Consider decrease diuresis with lasix 40 mg BID     · Cardiology on board  · plan as outlined above      Chest pain  Assessment & Plan  · POA:  Reported midsternal/epigastric chest pain, persistent, 8/10  · Troponin initially elevated  0 08 continue to trend up in peaked 1 11 currently plateaued 7 47  · EKGs in ED normal sinus rhythm, left axis deviation with nonspecific conduction abnormalities, repeated EKG  normal sinus rhythm, significant ST abnormalities   · Currently on heparin drip will continue given the EKG changes, until further evaluation from cardiology  · Continue daily aspirin  · Continue monitor on tele  · Cardiology on board, appreciate the input      Sepsis [facility])  Assessment & Plan  · POA :  Meets sepsis criteria on presentation tachycardia, tachypnea, lactic acidosis, leukocytosis and suspecting pneumonia  · Rest of plan as outlined above    Hypochromic microcytic anemia  Assessment & Plan  · Has history of iron deficiency anemia due to inadequate dietary iron intake  · Hemoglobin currently stable, there is no signs of active bleeding  · EGD in February 2021 with no source upper GI bleed  · Iron panel:  Iron 29, ferritin 21, patient will require Venofer  · Continue iron supplement when appropriate  · May need OP hematology follow up  · Trend hg, transfuse for <7 0    Lactic acidosis  Assessment & Plan  · POA:  Lactic acid on presentation 8 1 in the setting of sepsis and suspected pneumonia  · Rest of plan as outlined above    Multifocal pneumonia  Assessment & Plan  · Chest x-ray 8/20:  Diffuse patchy airspace disease bilaterally concerning multifocal infiltrates  · CAP versus aspiration  · Tested negative for COVID-19 x2  · Legionella and strep pneumo antigens negative  · Initially started on Rocephin and azithromycin, currently on cefepime and flagyl day 3  Total antibiotics day 5   · Procalcitonin negative x2  Recent Labs     08/22/21  0350 08/23/21  0504 08/24/21  0454   WBC 12 72* 14 45* 12 94*     · Blood cultures shows no growth  · Patient had fever up to 101 overnight  However bronchial culture currently shows no growth    · Continue monitor fever curve, WBCs    Hypokalemia  Assessment & Plan  Recent Labs     08/23/21  0443 08/23/21  1808 08/24/21  0454   K 2 8* 5 4* 4 0     · Keep potassium level > 3 8  · Monitor BMP  · Replete as needed    Mild cognitive impairment  Assessment & Plan  · Record review reveals stable memory issues with outpatient followup with geriatrics  · TSH, B12 nl in past  · Family reports no progression at this point  · Continue outpatient sertraline when appropriate    Aortic stenosis, moderate  Assessment & Plan  · History of mild to moderate aortic stenosis on echo 5/17/2018   · Repeated on 8/20:   Showed markedly increased thickness, marked calcification, markedly reduced cuspal separation, reduced mobility, and sclerosis and moderate stenosis  · Cardiology on board  · Currently diuresing  · Monitor fluid volume status closely  · Strict I's and O's      Essential hypertension  Assessment & Plan  · On losartan 25 mg daily, amlodipine 10 mg daily  · Currently on Lasix 80 mg BID   · Continue hold antihypertensive meds, resume when  appropriate      ----------------------------------------------------------------------------------------  HPI/24hr events: No significant overnight events reported    Patient appropriate for transfer out of the ICU today?: No  Disposition: Continue Critical Care   Code Status: Level 1 - Full Code  ---------------------------------------------------------------------------------------  SUBJECTIVE  Unable to provide subjective due to intubation    Review of Systems   Unable to perform ROS: Intubated     Review of systems was unable to be performed secondary to intubation  ---------------------------------------------------------------------------------------  OBJECTIVE    Vitals   Vitals:    08/24/21 0100 08/24/21 0200 08/24/21 0418 08/24/21 0550   BP: 161/68 155/66     Pulse: 88 86     Resp: 22 22     Temp: (!) 100 8 °F (38 2 °C) (!) 101 1 °F (38 4 °C)     TempSrc:       SpO2: 99% 99% 100%    Weight: 81 8 kg (180 lb 5 4 oz)   Height:         Temp (24hrs), Av 2 °F (37 9 °C), Min:99 3 °F (37 4 °C), Max:101 1 °F (38 4 °C)  Current: Temperature: (!) 101 1 °F (38 4 °C)          Respiratory:  SpO2: SpO2: 100 %, SpO2 Activity: SpO2 Activity: At Rest, SpO2 Device: O2 Device: Ventilator, Capnography:         Invasive/non-invasive ventilation settings   Respiratory    Lab Data (Last 4 hours)    None         O2/Vent Data (Last 4 hours)    None                Physical Exam  Vitals and nursing note reviewed  Constitutional:       General: He is not in acute distress  Appearance: Normal appearance  HENT:      Head: Normocephalic and atraumatic  Right Ear: External ear normal       Left Ear: External ear normal       Nose: Nose normal       Mouth/Throat:      Mouth: Mucous membranes are moist       Pharynx: Oropharynx is clear  Eyes:      Extraocular Movements: Extraocular movements intact  Conjunctiva/sclera: Conjunctivae normal       Pupils: Pupils are equal, round, and reactive to light  Cardiovascular:      Rate and Rhythm: Regular rhythm  Tachycardia present  Pulses: Normal pulses  Heart sounds: Murmur heard  Pulmonary:      Effort: No respiratory distress  Breath sounds: No rales  Abdominal:      General: Bowel sounds are decreased  Palpations: Abdomen is soft  Musculoskeletal:         General: Normal range of motion  Cervical back: Normal range of motion and neck supple  Right lower leg: No edema  Left lower leg: No edema  Skin:     General: Skin is warm and dry  Capillary Refill: Capillary refill takes less than 2 seconds  Findings: Bruising present  Neurological:      General: No focal deficit present  Mental Status: He is alert and oriented to person, place, and time     Psychiatric:      Comments: sedated         Laboratory and Diagnostics:  Results from last 7 days   Lab Units 21  0454 21  0504 21  0350 08/21/21  0415 08/20/21  0429 08/19/21 2057   WBC Thousand/uL 12 94* 14 45* 12 72* 15 63* 18 08* 17 11*   HEMOGLOBIN g/dL 7 6* 8 6* 8 2* 8 5* 9 0* 9 9*   HEMATOCRIT % 27 1* 29 8* 28 4* 28 9* 30 6* 34 7*   PLATELETS Thousands/uL 208 232 225 240 277 378   NEUTROS PCT %  --   --  83* 86* 89* 82*   MONOS PCT %  --   --  7 8 8 6     Results from last 7 days   Lab Units 08/24/21  0454 08/23/21  1808 08/23/21  0443 08/22/21  0408 08/21/21  1811 08/21/21  0533 08/20/21 2319 08/19/21 2057   SODIUM mmol/L 145 144 143 144 147* 145 145 142   POTASSIUM mmol/L 4 0 5 4* 2 8* 3 2* 3 2* 2 8* 2 7* 3 7   CHLORIDE mmol/L 112* 111* 108 108 108 104 104 105   CO2 mmol/L 23 25 28 28 28 30 31 18*   ANION GAP mmol/L 10 8 7 8 11 11 10 19*   BUN mg/dL 34* 32* 32* 29* 25 20 19 26*   CREATININE mg/dL 1 36* 1 25 1 18 1 11 1 08 0 93 0 98 0 97   CALCIUM mg/dL 7 7* 9 0 8 5 8 3 8 8 8 1* 7 7* 8 0*   GLUCOSE RANDOM mg/dL 204* 155* 164* 138 194* 159* 193* 174*   ALT U/L  --   --   --   --   --   --   --  62   AST U/L  --   --   --   --   --   --   --  53*   ALK PHOS U/L  --   --   --   --   --   --   --  78   ALBUMIN g/dL  --   --   --   --   --   --   --  4 1   TOTAL BILIRUBIN mg/dL  --   --   --   --   --   --   --  0 37     Results from last 7 days   Lab Units 08/23/21  0443 08/22/21  0408 08/21/21  0031 08/19/21 2057   MAGNESIUM mg/dL 1 9 2 3 1 7 2 0   PHOSPHORUS mg/dL  --   --   --  4 4*      Results from last 7 days   Lab Units 08/24/21  0013 08/23/21  1808 08/23/21  1057 08/23/21  0443 08/22/21  1706 08/22/21  1132 08/22/21  0352 08/19/21  2057   INR   --   --   --   --   --   --   --  1 09   PTT seconds 50* 60* 110* 53* 61* 80* 55* 26      Results from last 7 days   Lab Units 08/22/21  0352 08/20/21  1834 08/20/21  1446 08/20/21  1131 08/20/21  0832 08/20/21  0440 08/20/21  0103   TROPONIN I ng/mL 0 55* 0 92* 1 07* 1 29* 1 11* 1 03* 0 76*     Results from last 7 days   Lab Units 08/20/21  0103 08/19/21  2300 08/19/21  2102   LACTIC ACID mmol/L 3 0* 5 6* 8 1*     ABG:  Results from last 7 days   Lab Units 08/23/21 2014   [address] ART  7 401   PCO2 ART mm Hg 39 5   PO2 ART mm Hg 86 2   HCO3 ART mmol/L 24 0   BASE EXC ART mmol/L -0 7   ABG SOURCE  Radial, Right     VBG:  Results from last 7 days   Lab Units 08/23/21 2014   ABG SOURCE  Radial, Right     Results from last 7 days   Lab Units 08/20/21  0429 08/19/21  2057   PROCALCITONIN ng/ml 0 14 <0 05       Micro  Results from last 7 days   Lab Units 08/22/21  1506 08/22/21  1503 08/22/21  1501 08/20/21  0104 08/19/21  2102 08/19/21 2030   BLOOD CULTURE   --   --   --   --  No Growth at 72 hrs  No Growth at 72 hrs  GRAM STAIN RESULT  Rare Polys  No bacteria seen No Polys or Bacteria seen No Polys or Bacteria seen  --   --   --    LEGIONELLA URINARY ANTIGEN   --   --   --  Negative  --   --    STREP PNEUMONIAE ANTIGEN, URINE   --   --   --  Negative  --   --        EKG: NSR  Imaging: I have personally reviewed pertinent reports  and I have personally reviewed pertinent films in PACS    Intake and Output  I/O       08/22 0701 - 08/23 0700 08/23 0701 - 08/24 0700    I V  (mL/kg) 952 5 (11 5) 762 9 (9 3)    NG/GT 1818 455    IV Piggyback 1180 885    Feedings 566 485    Total Intake(mL/kg) 4516 5 (54 6) 2587 9 (31 6)    Urine (mL/kg/hr) 1905 (1) 3285 (1 7)    Total Output 1905 3285    Net +2611 5 -697 1                Height and Weights   Height: 5' 4 02" (162 6 cm)  IBW (Ideal Body Weight): 59 24 kg  Body mass index is 30 94 kg/m²    Weight (last 2 days)     Date/Time   Weight    08/24/21 0550   81 8 (180 34)    08/23/21 0541   82 7 (182 32)    08/23/21 0500   82 7 (182 32)    08/23/21 0200   82 7 (182 32)    08/22/21 0600   79 5 (175 27)    08/22/21 0300   79 5 (175 27)                Nutrition       Diet Orders   (From admission, onward)             Start     Ordered    08/24/21 0607  Diet Enteral/Parenteral; Tube Feeding No Oral Diet; Jevity 1 2 Lg; Continuous; 40; Prosource Protein Liquid - Two Packets; 125; Water; Every 4 hours  Diet effective now     Question Answer Comment   Diet Type Enteral/Parenteral    Enteral/Parenteral Tube Feeding No Oral Diet    Tube Feeding Formula: Jevity 1 2 Lg    Bolus/Cyclic/Continuous Continuous    Tube Feeding Goal Rate (mL/hr): 40    Prosource Protein Liquid - No Carb Prosource Protein Liquid - Two Packets    Tube Feeding flush (mL): 125    Water Flush type: Water    Water flush frequency: Every 4 hours    RD to adjust diet per protocol?  Yes        08/24/21 5685                  Active Medications  Scheduled Meds:  Current Facility-Administered Medications   Medication Dose Route Frequency Provider Last Rate    Acetaminophen  650 mg Per NG Tube Q6H PRN Max 4/day Prince Atkins MD      aspirin  81 mg Oral Daily Lidya Steve DO      calcium gluconate  1 g Intravenous Daily Curt Grant MD 1 g (08/23/21 1146)    cefepime  2,000 mg Intravenous Q12H Belén Grider MD 2,000 mg (08/24/21 0038)    chlorhexidine  15 mL Mouth/Throat Q12H [address] [facility] MD Sg      furosemide  80 mg Intravenous BID (diuretic) Curt Grant MD      heparin (porcine)  3-20 Units/kg/hr (Order-Specific) Intravenous Titrated Elaine Menchaca MD 15 8 Units/kg/hr (08/24/21 0142)    heparin (porcine)  2,000 Units Intravenous Q1H PRN Elaine Menchaca MD      heparin (porcine)  4,000 Units Intravenous Q1H PRN Elaine Menchaca MD      metroNIDAZOLE  500 mg Intravenous Q8H Belén Grider  mg (08/24/21 0445)    morphine injection  2 mg Intravenous Q4H PRN Curt Grant MD      omeprazole (PRILOSEC) suspension 2 mg/mL  20 mg Oral Daily Lidya Steve DO      ondansetron  4 mg Intravenous Once ANATOLY Carrasco      propofol  5-50 mcg/kg/min Intravenous Titrated Belén Grider MD 50 mcg/kg/min (08/24/21 0036)    vancomycin  20 mg/kg (Adjusted) Intravenous Q24H Belén Grider MD Stopped (08/23/21 1501)     Continuous Infusions:  heparin (porcine), 3-20 Units/kg/hr (Order-Specific), Last Rate: 15 8 Units/kg/hr (08/24/21 0142)  propofol, 5-50 mcg/kg/min, Last Rate: 50 mcg/kg/min (08/24/21 0036)      PRN Meds:   Acetaminophen, 650 mg, Q6H PRN Max 4/day  heparin (porcine), 2,000 Units, Q1H PRN  heparin (porcine), 4,000 Units, Q1H PRN  morphine injection, 2 mg, Q4H PRN        Invasive Devices Review  Invasive Devices     Peripheral Intravenous Line            Peripheral IV 08/20/21 Left;Ventral (anterior) Forearm 3 days    Peripheral IV 08/21/21 Left;Upper;Ventral (anterior) Arm 2 days    Peripheral IV 08/22/21 Right Hand 1 day          Drain            Urethral Catheter Temperature probe 3 days    NG/OG/Enteral Tube 16 Fr Center mouth 2 days          Airway            ETT  Cuffed 8 mm 2 days                Rationale for remaining devices: acute respiratory failure  ---------------------------------------------------------------------------------------  Advance Directive and Living Will: Yes    Power of :    POLST:    ---------------------------------------------------------------------------------------  Care Time Delivered:   Upon my evaluation, this patient had a high probability of imminent or life-threatening deterioration due to acute resp failure, which required my direct attention, intervention, and personal management  I have personally provided 30 minutes (0630 to 0700) of critical care time, exclusive of procedures, teaching, family meetings, and any prior time recorded by providers other than myself  Camden Garza MD      Portions of the record may have been created with voice recognition software  Occasional wrong word or "sound a like" substitutions may have occurred due to the inherent limitations of voice recognition software    Read the chart carefully and recognize, using context, where substitutions have occurred

## 2021-08-24 NOTE — PROGRESS NOTES
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  2  Moderate to severe aortic valve stenosis   3  Hypoxemic respiratory failure   4  Metabolic encephalopathy   5  Multifocal pneumonia   6  Mild cognitive impairment   7  Essential hypertension  8   pulmonary hypertension   9  Iron deficiency anemia      Patient hemodynamically stable but still requiring mechanical ventilation  Noted to be hypertensive  Patient Active Problem List   Diagnosis    Mixed hyperlipidemia    Essential hypertension    Aortic stenosis, moderate    Chest pain    Enlarged prostate without lower urinary tract symptoms (luts)    Fatty liver disease, nonalcoholic    Moderate episode of recurrent major depressive disorder ([address] )    PVC (premature ventricular contraction)    Medicare annual wellness visit, subsequent    Mild cognitive impairment    Obesity (BMI 30 0-34  9)    Do not resuscitate status    Chronic constipation    Hypokalemia    Iron deficiency anemia secondary to inadequate dietary iron intake    Multifocal pneumonia    Acute respiratory failure with hypoxia (HCC)    Acute on chronic diastolic CHF (congestive heart failure) (HCC)    Lactic acidosis    Hypochromic microcytic anemia    Sepsis ([address] )        PLAN:  --   Would continue with current IV diuresis for 1 more day  --   Adding metoprolol tartrate 2 5 mg IV q 6 hours    May increase to 5 mg q 6 hours if tolerated  Adding Aldactone 25 mg through NG tube daily  --   Continued antibiotic therapy  For suspected pneumonia  --  Continued respiratory care and attempts at weaning   --  Patient's aortic valve stenosis  Clinically appears to be severe  Will need reassessment once  He is extubated and is better with respect to his breathing  *-*-*-*-*-*-*-*-*-*-*-*-*-*-*-*-*-*-*-*-*-*-*-*-*-*-*-*-*-*-*-*-*-*-*-*-*-*-*-*-*-*-*-*-*-*-*-*-*-*-*-*-*-*-  INTERVAL CHANGES / HISTORY OF PRESENT ILLNESS:  No acute events overnight  Patient remains intubated and obtunded  Becomes hypoxemic during breathing trial   Currently Sedated  *-*-*-*-*-*-*-*-*-*-*-*-*-*-*-*-*-*-*-*-*-*-*-*-*-*-*-*-*-*-*-*-*-*-*-*-*-*-*-*-*-*-*-*-*-*-*-*-*-*-*-*-*-*  REVIEW OF SYMPTOMS:    Positive for:  Cannot be obtained  Negative for: All remaining as reviewed below and in HPI   SYSTEM SYMPTOMS REVIEWED:  General--weight change, fever, night sweats  Respiratoryl-- Wheezing, shortness of breath, cough, URI symptoms, sputum, blood  Cardiovascular--chest pain, syncope, dyspnea on exertion, edema, decline in exercise tolerance, claudication   Gastrointestinal--persistent vomiting, diarrhea, abdominal distention, blood in stool   Muscular or skeletal--joint pain or swelling   Neurologic--headaches, syncope, abnormal movement  Hematologic--history of easy bruising and bleeding   Endocrine--thyroid enlargement, heat or cold intolerance, polyuria   Psychiatric--anxiety, depression      *-*-*-*-*-*-*-*-*-*-*-*-*-*-*-*-*-*-*-*-*-*-*-*-*-*-*-*-*-*-*-*-*-*-*-*-*-*-*-*-*-*-*-*-*-*-*-*-*-*-*-*-*-*-  VITAL SIGNS:  Vitals:    21 0550 21 0600 21 0700 21 0800   BP:  161/68 152/64 169/70   Pulse:  88 84 86   Resp:  20 18 18   Temp:  100 4 °F (38 °C) 100 °F (37 8 °C) 100 °F (37 8 °C)   TempSrc:  Bladder     SpO2:  99% 100% 99%   Weight: 81 8 kg (180 lb 5 4 oz)      Height:          Temp (24hrs), Av 4 °F (38 °C), Min:99 3 °F (37 4 °C), Max:101 1 °F (38 4 °C)  Current: Temperature: 100 °F (37 8 °C)    Weight (last 2 days)     Date/Time   Weight    08/24/21 0550   81 8 (180 34)    08/23/21 0541   82 7 (182 32)    08/23/21 0500   82 7 (182 32)    08/23/21 0200   82 7 (182 32)    08/22/21 0600   79 5 (175 27)    08/22/21 0300   79 5 (175 27)            Body mass index is 30 94 kg/m²  Wt Readings from Last 3 Encounters:   08/24/21 81 8 kg (180 lb 5 4 oz)   07/26/21 83 5 kg (184 lb 2 oz)   03/19/21 82 8 kg (182 lb 9 6 oz)      Intake/Output Summary (Last 24 hours) at 8/24/2021 0910  Last data filed at 8/24/2021 0901  Gross per 24 hour   Intake 3764 43 ml   Output 3805 ml   Net -40 57 ml        *-*-*-*-*-*-*-*-*-*-*-*-*-*-*-*-*-*-*-*-*-*-*-*-*-*-*-*-*-*-*-*-*-*-*-*-*-*-*-*-*-*-*-*-*-*-*-*-*-*-*-*-*-*-  PHYSICAL EXAM:  General Appearance:   sedated and obtunded, appears comfortable in no respiratory distress while on mechanical ventilation  Head, Eyes, ENT:    No obvious abnormality, moist mucous mebranes  Neck:   Supple,   Unable to assess for jugular venous distension or carotid bruit   Back:     Symmetric, no curvature  Lungs:     Respirations unlabored  Decreased and coarse breath sounds without obvious crackles,    Chest wall:    No tenderness or deformity   Heart:    Regular rate and rhythm, slightly tachycardic, harsh, mid to late-peaking systolic murmur along sternal border consistent with aortic valve stenosis, displaced cardiac impulse    Abdomen:     Soft, non-tender, No obvious masses, or organomegaly   Extremities:   Extremities warm, no cyanosis  Trace edema   Skin:   Skin color, texture, turgor normal, no rashes or lesions     *-*-*-*-*-*-*-*-*-*-*-*-*-*-*-*-*-*-*-*-*-*-*-*-*-*-*-*-*-*-*-*-*-*-*-*-*-*-*-*-*-*-*-*-*-*-*-*-*-*-*-*-*-*-  TELEMETRY, LAST ECG:  Telemetry reviewed    Sinus rhythm with sinus tachycardia  Single occurrence of ventricular triplet   Results for orders placed or performed during the hospital encounter of 08/19/21   ECG 12 lead   Result Value    Ventricular Rate 80    Atrial Rate 80    HI Interval 158    QRSD Interval 121    QT Interval 417    QTC Interval 482    P Axis 32    QRS Axis -32    T Wave Axis 128    Narrative    Normal sinus rhythm with occasional Premature ectopic complexes  Left axis deviation  Non-specific intra-ventricular conduction block  Marked ST abnormality, possible lateral subendocardial injury  Abnormal ECG  When compared with ECG of 22-AUG-2021 05:27,  Significant changes have occurred  Confirmed by Ross Givens ([id number]) on 8/23/2021 8:08:10 AM      *-*-*-*-*-*-*-*-*-*-*-*-*-*-*-*-*-*-*-*-*-*-*-*-*-*-*-*-*-*-*-*-*-*-*-*-*-*-*-*-*-*-*-*-*-*-*-*-*-*-*-*-*-*-    CURRENT SCHEDULED MEDICATIONS:    Current Facility-Administered Medications:     Acetaminophen (TYLENOL) oral suspension 650 mg, 650 mg, Per NG Tube, Q6H PRN Max 4/day, Brittany Allen MD, 650 mg at 08/24/21 0235    aspirin chewable tablet 81 mg, 81 mg, Oral, Daily, Lidya Steve DO, 81 mg at 08/24/21 0805    calcium gluconate 1 g in sodium chloride 0 9% 50 mL (premix), 1 g, Intravenous, Daily, Curt Quick MD, Last Rate: 100 mL/hr at 08/24/21 0805, 1 g at 08/24/21 0805    cefepime (MAXIPIME) 2,000 mg in dextrose 5 % 50 mL IVPB, 2,000 mg, Intravenous, Q12H, Irasema Galeano MD, Last Rate: 100 mL/hr at 08/24/21 0038, 2,000 mg at 08/24/21 0038    chlorhexidine (PERIDEX) 0 12 % oral rinse 15 mL, 15 mL, Mouth/Throat, Q12H [facility], Irasema Galeano MD, 15 mL at 08/24/21 0805    furosemide (LASIX) injection 80 mg, 80 mg, Intravenous, BID (diuretic), Curt Quick MD, 80 mg at 08/24/21 0805    heparin (porcine) 25,000 units in 0 45% NaCl 250 mL infusion (premix), 3-20 Units/kg/hr (Order-Specific), Intravenous, Titrated, Darren Desir MD, Last Rate: 13 4 mL/hr at 08/24/21 0142, 15 8 Units/kg/hr at 08/24/21 0142    heparin (porcine) injection 2,000 Units, 2,000 Units, Intravenous, Q1H PRN, Darren Desir, MD, 2,000 Units at 08/24/21 0148    heparin (porcine) injection 4,000 Units, 4,000 Units, Intravenous, Q1H PRN, Eric Pardo MD    metroNIDAZOLE (FLAGYL) IVPB (premix) 500 mg 100 mL, 500 mg, Intravenous, Q8H, Mery Hope MD, Last Rate: 200 mL/hr at 08/24/21 0445, 500 mg at 08/24/21 0445    morphine injection 2 mg, 2 mg, Intravenous, Q4H PRN, Curt Artis MD, 2 mg at 08/23/21 1248    omeprazole (PRILOSEC) suspension 2 mg/mL, 20 mg, Oral, Daily, Lidya Steve DO, 20 mg at 08/24/21 0900    ondansetron (ZOFRAN) injection 4 mg, 4 mg, Intravenous, Once, ANATOLY Aceves    propofol (DIPRIVAN) 1000 mg in 100 mL infusion (premix), 5-50 mcg/kg/min, Intravenous, Titrated, Mery Hope MD, Last Rate: 24 mL/hr at 08/24/21 0806, 50 mcg/kg/min at 08/24/21 0806    vancomycin (VANCOCIN) 1,250 mg in sodium chloride 0 9 % 250 mL IVPB, 20 mg/kg (Adjusted), Intravenous, Q24H, Mery Hope MD, Stopped at 08/23/21 1501 ALLERGIES:  Allergies   Allergen Reactions    Ace Inhibitors Cough     Pt denied any allergies          *-*-*-*-*-*-*-*-*-*-*-*-*-*-*-*-*-*-*-*-*-*-*-*-*-*-*-*-*-*-*-*-*-*-*-*-*-*-*-*-*-*-*-*-*-*-*-*-*-*-*-*-*-*  LABORATORY DATA:  I have personally reviewed pertinent labs      CMP:  Results from last 7 days   Lab Units 08/24/21  0454 08/23/21  1808 08/23/21  0443 08/19/21  2057   SODIUM mmol/L 145 144 143 142   POTASSIUM mmol/L 4 0 5 4* 2 8* 3 7   CHLORIDE mmol/L 112* 111* 108 105   CO2 mmol/L 23 25 28 18*   BUN mg/dL 34* 32* 32* 26*   CREATININE mg/dL 1 36* 1 25 1 18 0 97   CALCIUM mg/dL 7 7* 9 0 8 5 8 0*   ALK PHOS U/L  --   --   --  78   ALT U/L  --   --   --  62   AST U/L  --   --   --  53*        Results from last 7 days   Lab Units 08/23/21  0443 08/22/21  0408 08/21/21  0031   MAGNESIUM mg/dL 1 9 2 3 1 7     Results from last 7 days   Lab Units 08/19/21  2057   PHOSPHORUS mg/dL 4 4*    Cardiac Profile:   Results from last 7 days   Lab Units 08/22/21  0352 08/20/21  1834 21  1446 21  2057   TROPONIN I ng/mL 0 55* 0 92* 1 07* 0 08*   NT-PRO BNP pg/mL  --   --   --  693*     Results from last 7 days   Lab Units 21  0429   HEMOGLOBIN A1C % 5 5            CBC:   Results from last 7 days   Lab Units 21  0454 21  0504 21  0350   WBC Thousand/uL 12 94* 14 45* 12 72*   HEMOGLOBIN g/dL 7 6* 8 6* 8 2*   HEMATOCRIT % 27 1* 29 8* 28 4*   PLATELETS Thousands/uL 208 232 225     PT/INR: No results found for: PT, INR, Microbiology:   Results from last 7 days   Lab Units 21  1506 21  1503 21  1501 21  0104 21  2102 21  2030   BLOOD CULTURE   --   --   --   --  No Growth at 72 hrs  No Growth at 72 hrs  GRAM STAIN RESULT  Rare Polys  No bacteria seen No Polys or Bacteria seen No Polys or Bacteria seen  --   --   --    STREP PNEUMONIAE ANTIGEN, URINE   --   --   --  Negative  --   --    LEGIONELLA URINARY ANTIGEN   --   --   --  Negative  --   --           *-*-*-*-*-*-*-*-*-*-*-*-*-*-*-*-*-*-*-*-*-*-*-*-*-*-*-*-*-*-*-*-*-*-*-*-*-*-*-*-*-*-*-*-*-*-*-*-*-*-*-*-*-*-  IMAGING STUDIES REPORTS: Imaging studies results reviewed    No valid procedures specified  No Chest XR results available for this patient  *-*-*-*-*-*-*-*-*-*-*-*-*-*-*-*-*-*-*-*-*-*-*-*-*-*-*-*-*-*-*-*-*-*-*-*-*-*-*-*-*-*-*-*-*-*-*-*-*-*-*-*-*-*-  ECHOCARDIOGRAM AND OTHER CARDIAC TESTS RESULTS:  Results for orders placed during the hospital encounter of 21    Echo complete with contrast if indicated    Narrative  [address] [place] [address], [address]   [phone]    Transthoracic Echocardiogram  2D, M-mode, Doppler, and Color Doppler    Study date:  20-Aug-2021    Patient: Katie Dumont  MR number: [id number]  Account number: [de-identified]  : 1940  Age: 78 years  Gender: Male  Status: Inpatient  Location: Bedside  Height: 64 in  Weight: 187 7 lb  BP: 160/ 73 mmHg    Indications: Heart failure    Diagnoses: I50 9 - Heart failure, unspecified    Sonographer:  Jose Esposito RDCS  Primary Physician:  Chrissy Waller DO  Referring Physician:  ANATOLY Ibanez  Group:  [facility]  Interpreting Physician:  Marybeth Chatterjee DO    SUMMARY    LEFT VENTRICLE:  Systolic function was at the lower limits of normal  Ejection fraction was estimated to be 50 %  There was mild to moderate hypokinesis of the mid-apical septal myocardial wall segments  Wall thickness was mildly increased  Features were likely suggestive of a pseudonormal left ventricular filling pattern, with concomitant abnormal relaxation and increased filling pressure (grade 2 diastolic dysfunction)  LEFT ATRIUM:  The atrium was mildly dilated  MITRAL VALVE:  There was moderate annular calcification  There was mild stenosis (mean diastolic transvalvular gradient ~ 3 3 mmHg at HR 86 BPM)  There was mild regurgitation  AORTIC VALVE:  The valve was trileaflet  Leaflets exhibited markedly increased thickness, marked calcification, markedly reduced cuspal separation, reduced mobility, and sclerosis  There was moderate stenosis  There was mild regurgitation  The peak valve velocity was 3 3 cm/s  Valve mean gradient was 22 mmHg  The aortic valve obstructive index (by VTI) was 0 29  Difficult to accurately estimate aortic valve area due to suboptimal LVOT visualization and measurements  TRICUSPID VALVE:  There was mild regurgitation  AORTA:  The root exhibited mild dilatation (4 1 cm [2 1 cm/m2] at the sinuses of valsalva), and mild fibrocalcific change  SUMMARY MEASUREMENTS  2D measurements:  Unspecified Anatomy:   %FS was 23 9 %  Ao Diam was 3 9 cm  Ao asc was 3 4 cm   EDV(Teich) was 110 5 ml   EF(Teich) was 47 6 %  ESV(Teich) was 57 9 ml  IVSd was 1 2 cm  LA Diam was 4 5 cm  LAAs A2C was 27 1 cm2  LAAs A4C was 26 2  cm2  LAESV A-L A2C was 101 1 ml  LAESV A-L A4C was 92 8 ml    LAESV Index (A-L) was 51 1 ml/m2  LAESV MOD A2C was 98 2 ml  LAESV MOD A4C was 86 6 ml  LAESV(A-L) was 97 5 ml  LAESV(MOD BP) was 92 3 ml  LAESVInd MOD BP was 48 4 ml/m2  LALs A2C was 6 2 cm  LALs A4C was 6 3 cm  LVEDV MOD A4C was 195 6 ml  LVEF MOD A4C was 42 1 %  LVESV MOD A4C was 113 2 ml  LVIDd was 4 9 cm  LVIDs was 3 7 cm  LVLd A4C was 10 6 cm  LVLs A4C was 9 6 cm  LVOT Diam was 2 6 cm  LVPWd  was 0 9 cm  RAAs A4C was 21 1 cm2  RAESV A-L was 66 8 ml  RAESV MOD was 65 4 ml  RALs was 5 7 cm  RVIDd was 4 7 cm  RWT was 0 4   SV MOD A4C was 82 4 ml   SV(Teich) was 52 6 ml   CW measurements:  Unspecified Anatomy:   AV Env  Ti was 267 9 ms   AV VTI was 59 6 cm   AV Vmax was 2 9 m/s  AV Vmean was 2 2 m/s  AV maxPG was 34 5 mmHg  AV meanPG was 21 6 mmHg  MV VTI was 35 cm  MV Vmax was 1 2 m/s  MV Vmean was 0 9 m/s  MV maxPG  was 6 mmHg  MV meanPG was 3 4 mmHg  TR Vmax was 3 2 m/s   TR maxPG was 40 mmHg  MM measurements:  Unspecified Anatomy:   TAPSE was 2 4 cm  PW measurements:  Unspecified Anatomy:   GREGORY (VTI) was 1 7 cm2  GREGORY Vmax was 1 8 cm2  AVAI (VTI) was 0 cm2/m2  AVAI Vmax was 0 cm2/m2  DVI was 0 3   E' Sept was 0 m/s  E/E' Sept was 31 6   LVOT Env  Ti was 308 3 ms  LVOT VTI was 20 3 cm  LVOT Vmax  was 1 m/s  LVOT Vmean was 0 7 m/s  LVOT maxPG was 4 2 mmHg  LVOT meanPG was 2 1 mmHg  LVSI Dopp was 54 5 ml/m2  LVSV Dopp was 104 1 ml   MV A David was 1 2 m/s  MV Dec [place] was 7 1 m/s2  MV DecT was 204 7 ms   MV E David was 1 4 m/s  MV E/A Ratio was 1 2   MV PHT was 59 4 ms  MVA (VTI) was 3 cm2  MVA By PHT was 3 7 cm2  HISTORY: PRIOR HISTORY: HTN; AS; CP; HLD; PVC; Acute respiratory failure with hypoxia; CHF; Obesity; Depression    PROCEDURE: The procedure was performed at the bedside  This was a routine study  The transthoracic approach was used  The study included complete 2D imaging, M-mode, complete spectral Doppler, and color Doppler   The heart rate was 98 bpm,  at the start of the study  Images were obtained from the parasternal, apical, subcostal, and suprasternal notch acoustic windows  Echocardiographic views were limited due to poor acoustic window availability  This was a technically  difficult study  LEFT VENTRICLE: Size was normal  Systolic function was at the lower limits of normal  Ejection fraction was estimated to be 50 %  There was mild to moderate hypokinesis of the mid-apical septal myocardial wall segments  Wall thickness was  mildly increased  DOPPLER: Features were likely suggestive of a pseudonormal left ventricular filling pattern, with concomitant abnormal relaxation and increased filling pressure (grade 2 diastolic dysfunction)  RIGHT VENTRICLE: The size was normal  Systolic function was normal  Wall thickness was normal     LEFT ATRIUM: The atrium was mildly dilated  RIGHT ATRIUM: Size was normal     MITRAL VALVE: There was moderate annular calcification  DOPPLER: There was mild stenosis (mean diastolic transvalvular gradient ~ 3 3 mmHg at HR 86 BPM)  There was mild regurgitation  AORTIC VALVE: The valve was trileaflet  Leaflets exhibited markedly increased thickness, marked calcification, markedly reduced cuspal separation, reduced mobility, and sclerosis  DOPPLER: There was moderate stenosis  There was mild  regurgitation  TRICUSPID VALVE: Not well visualized  DOPPLER: There was mild regurgitation  PULMONIC VALVE: Not well visualized  PERICARDIUM: There was no pericardial effusion  The pericardium was normal in appearance  AORTA: The root exhibited mild dilatation (4 1 cm [2 1 cm/m2] at the sinuses of valsalva), and mild fibrocalcific change  SYSTEMIC VEINS: IVC: The inferior vena cava was not well visualized      MEASUREMENT TABLES    DOPPLER MEASUREMENTS  Aortic valve   (Reference normals)  Peak brandi   3 3 cm/s   (--)  Mean gradient   22 mmHg   (--)  Obstr index, VTI   0 29    (--)    SYSTEM MEASUREMENT TABLES    2D  %FS: 23 9 %  Ao Diam: 3 9 cm  Ao asc: 3 4 cm  EDV(Teich): 110 5 ml  EF(Teich): 47 6 %  ESV(Teich): 57 9 ml  IVSd: 1 2 cm  LA Diam: 4 5 cm  LAAs A2C: 27 1 cm2  LAAs A4C: 26 2 cm2  LAESV A-L A2C: 101 1 ml  LAESV A-L A4C: 92 8 ml  LAESV Index (A-L): 51 1 ml/m2  LAESV MOD A2C: 98 2 ml  LAESV MOD A4C: 86 6 ml  LAESV(A-L): 97 5 ml  LAESV(MOD BP): 92 3 ml  LAESVInd MOD BP: 48 4 ml/m2  LALs A2C: 6 2 cm  LALs A4C: 6 3 cm  LVEDV MOD A4C: 195 6 ml  LVEF MOD A4C: 42 1 %  LVESV MOD A4C: 113 2 ml  LVIDd: 4 9 cm  LVIDs: 3 7 cm  LVLd A4C: 10 6 cm  LVLs A4C: 9 6 cm  LVOT Diam: 2 6 cm  LVPWd: 0 9 cm  RAAs A4C: 21 1 cm2  RAESV A-L: 66 8 ml  RAESV MOD: 65 4 ml  RALs: 5 7 cm  RVIDd: 4 7 cm  RWT: 0 4  SV MOD A4C: 82 4 ml  SV(Teich): 52 6 ml    CW  AV Env  Ti: 267 9 ms  AV VTI: 59 6 cm  AV Vmax: 2 9 m/s  AV Vmean: 2 2 m/s  AV maxP 5 mmHg  AV meanP 6 mmHg  MV VTI: 35 cm  MV Vmax: 1 2 m/s  MV Vmean: 0 9 m/s  MV maxP mmHg  MV meanPG: 3 4 mmHg  TR Vmax: 3 2 m/s  TR maxP mmHg    MM  TAPSE: 2 4 cm    PW  GREGORY (VTI): 1 7 cm2  GREGORY Vmax: 1 8 cm2  AVAI (VTI): 0 cm2/m2  AVAI Vmax: 0 cm2/m2  DVI: 0 3  E' Sept: 0 m/s  E/E' Sept: 31 6  LVOT Env  Ti: 308 3 ms  LVOT VTI: 20 3 cm  LVOT Vmax: 1 m/s  LVOT Vmean: 0 7 m/s  LVOT maxP 2 mmHg  LVOT meanP 1 mmHg  LVSI Dopp: 54 5 ml/m2  LVSV Dopp: 104 1 ml  MV A David: 1 2 m/s  MV Dec [place]: 7 1 m/s2  MV DecT: 204 7 ms  MV E David: 1 4 m/s  MV E/A Ratio: 1 2  MV PHT: 59 4 ms  MVA (VTI): 3 cm2  MVA By PHT: 3 7 cm2    [employer] Accredited [facility]    Prepared and electronically signed by    Bessy Wallace DO  Signed 20-Aug-2021 14:28:06    No results found for this or any previous visit  No results found for this or any previous visit  No results found for this or any previous visit         *-*-*-*-*-*-*-*-*-*-*-*-*-*-*-*-*-*-*-*-*-*-*-*-*-*-*-*-*-*-*-*-*-*-*-*-*-*-*-*-*-*-*-*-*-*-*-*-*-*-*-*-*-*-  SIGNATURES:   @OVW@   Helga Ortega MD

## 2021-08-24 NOTE — CONSULTS
Vancomycin therapy has been discontinued  Pharmacy will sign off  Thank you for this consult  Please do not hesitate to call us with questions or re-consult us if the need arises       Brianna Yarbrough, PharmD, [address] and Internal Medicine Clinical Pharmacist  [phone] or via [place]

## 2021-08-24 NOTE — ASSESSMENT & PLAN NOTE
· On losartan 25 mg daily, amlodipine 10 mg daily  · Currently on Lasix 80 mg BID   · Continue hold antihypertensive meds, resume when  appropriate

## 2021-08-24 NOTE — ASSESSMENT & PLAN NOTE
· History of mild to moderate aortic stenosis on echo 5/17/2018   · Repeated on 8/20:   Showed markedly increased thickness, marked calcification, markedly reduced cuspal separation, reduced mobility, and sclerosis and moderate stenosis  · Cardiology on board  · Currently diuresing  · Monitor fluid volume status closely  · Strict I's and O's

## 2021-08-24 NOTE — ASSESSMENT & PLAN NOTE
· POA:  Reported midsternal/epigastric chest pain, persistent, 8/10  · Troponin initially elevated  0 08 continue to trend up in peaked 1 11 currently plateaued 0 82  · EKGs in ED normal sinus rhythm, left axis deviation with nonspecific conduction abnormalities, repeated EKG  normal sinus rhythm, significant ST abnormalities   · Currently on heparin drip will continue given the EKG changes, until further evaluation from cardiology  · Continue daily aspirin  · Continue monitor on tele  · Cardiology on board, appreciate the input

## 2021-08-24 NOTE — PLAN OF CARE
Problem: Potential for Falls  Goal: Patient will remain free of falls  Description: INTERVENTIONS:  - Educate patient/family on patient safety including physical limitations  - Instruct patient to call for assistance with activity   - Consult OT/PT to assist with strengthening/mobility   - Keep Call bell within reach  - Keep bed low and locked with side rails adjusted as appropriate  - Keep care items and personal belongings within reach  - Initiate and maintain comfort rounds  - Make Fall Risk Sign visible to staff  - Apply yellow socks and bracelet for high fall risk patients  - Consider moving patient to room near nurses station  Outcome: Progressing     Problem: MOBILITY - ADULT  Goal: Maintain or return to baseline ADL function  Description: INTERVENTIONS:  -  Assess patient's ability to carry out ADLs; assess patient's baseline for ADL function and identify physical deficits which impact ability to perform ADLs (bathing, care of mouth/teeth, toileting, grooming, dressing, etc )  - Assess/evaluate cause of self-care deficits   - Assess range of motion  - Assess patient's mobility; develop plan if impaired  - Assess patient's need for assistive devices and provide as appropriate  - Encourage maximum independence but intervene and supervise when necessary  - Involve family in performance of ADLs  - Assess for home care needs following discharge   - Consider OT consult to assist with ADL evaluation and planning for discharge  - Provide patient education as appropriate  Outcome: Progressing  Goal: Maintains/Returns to pre admission functional level  Description: INTERVENTIONS:  - Perform BMAT or MOVE assessment daily    - Set and communicate daily mobility goal to care team and patient/family/caregiver     - Collaborate with rehabilitation services on mobility goals if consulted  - Out of bed for toileting  - Record patient progress and toleration of activity level   Outcome: Progressing     Problem: Prexisting or High Potential for Compromised Skin Integrity  Goal: Skin integrity is maintained or improved  Description: INTERVENTIONS:  - Identify patients at risk for skin breakdown  - Assess and monitor skin integrity  - Assess and monitor nutrition and hydration status  - Monitor labs   - Assess for incontinence   - Turn and reposition patient  - Assist with mobility/ambulation  - Relieve pressure over bony prominences  - Avoid friction and shearing  - Provide appropriate hygiene as needed including keeping skin clean and dry  - Evaluate need for skin moisturizer/barrier cream  - Collaborate with interdisciplinary team   - Patient/family teaching  - Consider wound care consult   Outcome: Progressing     Problem: NEUROSENSORY - ADULT  Goal: Achieves stable or improved neurological status  Description: INTERVENTIONS  - Monitor and report changes in neurological status  - Monitor vital signs such as temperature, blood pressure, glucose, and any other labs ordered   - Initiate measures to prevent increased intracranial pressure  - Monitor for seizure activity and implement precautions if appropriate      Outcome: Progressing  Goal: Achieves maximal functionality and self care  Description: INTERVENTIONS  - Monitor swallowing and airway patency with patient fatigue and changes in neurological status  - Encourage and assist patient to increase activity and self care     - Encourage visually impaired, hearing impaired and aphasic patients to use assistive/communication devices  Outcome: Progressing     Problem: CARDIOVASCULAR - ADULT  Goal: Maintains optimal cardiac output and hemodynamic stability  Description: INTERVENTIONS:  - Monitor I/O, vital signs and rhythm  - Monitor for S/S and trends of decreased cardiac output  - Administer and titrate ordered vasoactive medications to optimize hemodynamic stability  - Assess quality of pulses, skin color and temperature  - Assess for signs of decreased coronary artery perfusion  - Instruct patient to report change in severity of symptoms  Outcome: Progressing  Goal: Absence of cardiac dysrhythmias or at baseline rhythm  Description: INTERVENTIONS:  - Continuous cardiac monitoring, vital signs, obtain 12 lead EKG if ordered  - Administer antiarrhythmic and heart rate control medications as ordered  - Monitor electrolytes and administer replacement therapy as ordered  Outcome: Progressing     Problem: RESPIRATORY - ADULT  Goal: Achieves optimal ventilation and oxygenation  Description: INTERVENTIONS:  - Assess for changes in respiratory status  - Assess for changes in mentation and behavior  - Position to facilitate oxygenation and minimize respiratory effort  - Oxygen administered by appropriate delivery if ordered  - Initiate smoking cessation education as indicated  - Encourage broncho-pulmonary hygiene including cough, deep breathe, Incentive Spirometry  - Assess the need for suctioning and aspirate as needed  - Assess and instruct to report SOB or any respiratory difficulty  - Respiratory Therapy support as indicated  Outcome: Progressing     Problem: GASTROINTESTINAL - ADULT  Goal: Minimal or absence of nausea and/or vomiting  Description: INTERVENTIONS:  - Administer IV fluids if ordered to ensure adequate hydration  - Maintain NPO status until nausea and vomiting are resolved  - Nasogastric tube if ordered  - Administer ordered antiemetic medications as needed  - Provide nonpharmacologic comfort measures as appropriate  - Advance diet as tolerated, if ordered  - Consider nutrition services referral to assist patient with adequate nutrition and appropriate food choices  Outcome: Progressing  Goal: Maintains or returns to baseline bowel function  Description: INTERVENTIONS:  - Assess bowel function  - Encourage oral fluids to ensure adequate hydration  - Administer IV fluids if ordered to ensure adequate hydration  - Administer ordered medications as needed  - Encourage mobilization and activity  - Consider nutritional services referral to assist patient with adequate nutrition and appropriate food choices  Outcome: Progressing  Goal: Maintains adequate nutritional intake  Description: INTERVENTIONS:  - Monitor percentage of each meal consumed  - Identify factors contributing to decreased intake, treat as appropriate  - Assist with meals as needed  - Monitor I&O, weight, and lab values if indicated  - Obtain nutrition services referral as needed  Outcome: Progressing  Goal: Oral mucous membranes remain intact  Description: INTERVENTIONS  - Assess oral mucosa and hygiene practices  - Implement preventative oral hygiene regimen  - Implement oral medicated treatments as ordered  - Initiate Nutrition services referral as needed  Outcome: Progressing     Problem: GENITOURINARY - ADULT  Goal: Maintains or returns to baseline urinary function  Description: INTERVENTIONS:  - Assess urinary function  - Encourage oral fluids to ensure adequate hydration if ordered  - Administer IV fluids as ordered to ensure adequate hydration  - Administer ordered medications as needed  - Offer frequent toileting  - Follow urinary retention protocol if ordered  Outcome: Progressing  Goal: Absence of urinary retention  Description: INTERVENTIONS:  - Assess patients ability to void and empty bladder  - Monitor I/O  - Bladder scan as needed  - Discuss with physician/AP medications to alleviate retention as needed  - Discuss catheterization for long term situations as appropriate  Outcome: Progressing     Problem: METABOLIC, FLUID AND ELECTROLYTES - ADULT  Goal: Electrolytes maintained within normal limits  Description: INTERVENTIONS:  - Monitor labs and assess patient for signs and symptoms of electrolyte imbalances  - Administer electrolyte replacement as ordered  - Monitor response to electrolyte replacements, including repeat lab results as appropriate  - Instruct patient on fluid and nutrition as appropriate  Outcome: Progressing  Goal: Fluid balance maintained  Description: INTERVENTIONS:  - Monitor labs   - Monitor I/O and WT  - Instruct patient on fluid and nutrition as appropriate  - Assess for signs & symptoms of volume excess or deficit  Outcome: Progressing  Goal: Glucose maintained within target range  Description: INTERVENTIONS:  - Monitor Blood Glucose as ordered  - Assess for signs and symptoms of hyperglycemia and hypoglycemia  - Administer ordered medications to maintain glucose within target range  - Assess nutritional intake and initiate nutrition service referral as needed  Outcome: Progressing     Problem: SKIN/TISSUE INTEGRITY - ADULT  Goal: Skin Integrity remains intact(Skin Breakdown Prevention)  Description: Assess:  -Inspect skin when repositioning, toileting, and assisting with ADLS  -Assess extremities for adequate circulation and sensation     Bed Management:  -Have minimal linens on bed & keep smooth, unwrinkled  -Change linens as needed when moist or perspiring    Toileting:  -Offer bedside commode    Activity:    -Encourage activity and walks on unit  -Encourage or provide ROM exercises   -Use appropriate equipment to lift or move patient in bed    Skin Care:  -Avoid use of baby powder, tape, friction and shearing, hot water or constrictive clothing  -Do not massage red bony areas  Outcome: Progressing     Problem: HEMATOLOGIC - ADULT  Goal: Maintains hematologic stability  Description: INTERVENTIONS  - Assess for signs and symptoms of bleeding or hemorrhage  - Monitor labs  - Administer supportive blood products/factors as ordered and appropriate  Outcome: Progressing     Problem: MUSCULOSKELETAL - ADULT  Goal: Maintain or return mobility to safest level of function  Description: INTERVENTIONS:  - Assess patient's ability to carry out ADLs; assess patient's baseline for ADL function and identify physical deficits which impact ability to perform ADLs (bathing, care of mouth/teeth, toileting, grooming, dressing, etc )  - Assess/evaluate cause of self-care deficits   - Assess range of motion  - Assess patient's mobility  - Assess patient's need for assistive devices and provide as appropriate  - Encourage maximum independence but intervene and supervise when necessary  - Involve family in performance of ADLs  - Assess for home care needs following discharge   - Consider OT consult to assist with ADL evaluation and planning for discharge  - Provide patient education as appropriate  Outcome: Progressing     Problem: Nutrition/Hydration-ADULT  Goal: Nutrient/Hydration intake appropriate for improving, restoring or maintaining nutritional needs  Description: Monitor and assess patient's nutrition/hydration status for malnutrition  Collaborate with interdisciplinary team and initiate plan and interventions as ordered  Monitor patient's weight and dietary intake as ordered or per policy  Utilize nutrition screening tool and intervene as necessary  Determine patient's food preferences and provide high-protein, high-caloric foods as appropriate       INTERVENTIONS:  - Monitor oral intake, urinary output, labs, and treatment plans  - Assess nutrition and hydration status and recommend course of action  - Evaluate amount of meals eaten  - Assist patient with eating if necessary   - Allow adequate time for meals  - Recommend/ encourage appropriate diets, oral nutritional supplements, and vitamin/mineral supplements  - Order, calculate, and assess calorie counts as needed  - Recommend, monitor, and adjust tube feedings and TPN/PPN based on assessed needs  - Assess need for intravenous fluids  - Provide specific nutrition/hydration education as appropriate  - Include patient/family/caregiver in decisions related to nutrition  Outcome: Progressing

## 2021-08-25 ENCOUNTER — APPOINTMENT (INPATIENT)
Dept: RADIOLOGY | Facility: HOSPITAL | Age: 81
DRG: 870 | End: 2021-08-25
Payer: MEDICARE

## 2021-08-25 LAB
ANION GAP SERPL CALCULATED.3IONS-SCNC: 10 MMOL/L (ref 4–13)
ATRIAL RATE: 112 BPM
BACTERIA BLD CULT: NORMAL
BACTERIA BLD CULT: NORMAL
BASE EXCESS BLDA CALC-SCNC: 0.4 MMOL/L
BUN SERPL-MCNC: 53 MG/DL (ref 5–25)
CALCIUM SERPL-MCNC: 8.5 MG/DL (ref 8.3–10.1)
CHLORIDE SERPL-SCNC: 107 MMOL/L (ref 100–108)
CO2 SERPL-SCNC: 26 MMOL/L (ref 21–32)
CREAT SERPL-MCNC: 1.44 MG/DL (ref 0.6–1.3)
ERYTHROCYTE [DISTWIDTH] IN BLOOD BY AUTOMATED COUNT: 19.9 % (ref 11.6–15.1)
GFR SERPL CREATININE-BSD FRML MDRD: 45 ML/MIN/1.73SQ M
GLUCOSE SERPL-MCNC: 169 MG/DL (ref 65–140)
HCO3 BLDA-SCNC: 24.7 MMOL/L (ref 22–28)
HCT VFR BLD AUTO: 31 % (ref 36.5–49.3)
HGB BLD-MCNC: 8.7 G/DL (ref 12–17)
MCH RBC QN AUTO: 22.4 PG (ref 26.8–34.3)
MCHC RBC AUTO-ENTMCNC: 28.1 G/DL (ref 31.4–37.4)
MCV RBC AUTO: 80 FL (ref 82–98)
O2 CT BLDA-SCNC: 12.4 ML/DL (ref 16–23)
OXYHGB MFR BLDA: 78.2 % (ref 94–97)
P AXIS: 31 DEGREES
PCO2 BLDA: 38.6 MM HG (ref 36–44)
PH BLDA: 7.42 [PH] (ref 7.35–7.45)
PLATELET # BLD AUTO: 238 THOUSANDS/UL (ref 149–390)
PMV BLD AUTO: 10.2 FL (ref 8.9–12.7)
PO2 BLDA: 44.7 MM HG (ref 75–129)
POTASSIUM SERPL-SCNC: 4 MMOL/L (ref 3.5–5.3)
PR INTERVAL: 158 MS
PS CM H2O: 5
PS VENT FIO2: 50
PS VENT PEEP: 6
QRS AXIS: -32 DEGREES
QRSD INTERVAL: 108 MS
QT INTERVAL: 346 MS
QTC INTERVAL: 473 MS
RBC # BLD AUTO: 3.89 MILLION/UL (ref 3.88–5.62)
SODIUM SERPL-SCNC: 143 MMOL/L (ref 136–145)
SPECIMEN SOURCE: ABNORMAL
T WAVE AXIS: 75 DEGREES
VENT - PS: ABNORMAL
VENTRICULAR RATE: 112 BPM
WBC # BLD AUTO: 12.71 THOUSAND/UL (ref 4.31–10.16)

## 2021-08-25 PROCEDURE — 80048 BASIC METABOLIC PNL TOTAL CA: CPT | Performed by: INTERNAL MEDICINE

## 2021-08-25 PROCEDURE — 93010 ELECTROCARDIOGRAM REPORT: CPT | Performed by: INTERNAL MEDICINE

## 2021-08-25 PROCEDURE — 82805 BLOOD GASES W/O2 SATURATION: CPT | Performed by: INTERNAL MEDICINE

## 2021-08-25 PROCEDURE — 93005 ELECTROCARDIOGRAM TRACING: CPT

## 2021-08-25 PROCEDURE — 99291 CRITICAL CARE FIRST HOUR: CPT | Performed by: INTERNAL MEDICINE

## 2021-08-25 PROCEDURE — 99231 SBSQ HOSP IP/OBS SF/LOW 25: CPT | Performed by: INTERNAL MEDICINE

## 2021-08-25 PROCEDURE — 94003 VENT MGMT INPAT SUBQ DAY: CPT

## 2021-08-25 PROCEDURE — 85027 COMPLETE CBC AUTOMATED: CPT | Performed by: INTERNAL MEDICINE

## 2021-08-25 PROCEDURE — 71045 X-RAY EXAM CHEST 1 VIEW: CPT

## 2021-08-25 RX ORDER — ISOSORBIDE DINITRATE 10 MG/1
10 TABLET ORAL
Status: DISCONTINUED | OUTPATIENT
Start: 2021-08-25 | End: 2021-08-27

## 2021-08-25 RX ORDER — SENNOSIDES 8.6 MG
2 TABLET ORAL
Status: DISCONTINUED | OUTPATIENT
Start: 2021-08-25 | End: 2021-08-25

## 2021-08-25 RX ORDER — DOCUSATE SODIUM 100 MG/1
100 CAPSULE, LIQUID FILLED ORAL 2 TIMES DAILY
Status: DISCONTINUED | OUTPATIENT
Start: 2021-08-25 | End: 2021-08-25

## 2021-08-25 RX ORDER — AMOXICILLIN 250 MG
2 CAPSULE ORAL 2 TIMES DAILY
Status: DISCONTINUED | OUTPATIENT
Start: 2021-08-25 | End: 2021-09-02

## 2021-08-25 RX ORDER — AMLODIPINE BESYLATE 5 MG/1
10 TABLET ORAL DAILY
Status: DISCONTINUED | OUTPATIENT
Start: 2021-08-25 | End: 2021-08-27

## 2021-08-25 RX ORDER — HYDRALAZINE HYDROCHLORIDE 25 MG/1
25 TABLET, FILM COATED ORAL EVERY 8 HOURS SCHEDULED
Status: DISCONTINUED | OUTPATIENT
Start: 2021-08-25 | End: 2021-08-27

## 2021-08-25 RX ORDER — SENNOSIDES 8.6 MG
2 TABLET ORAL
Status: CANCELLED | OUTPATIENT
Start: 2021-08-25

## 2021-08-25 RX ORDER — AMLODIPINE BESYLATE 5 MG/1
5 TABLET ORAL DAILY
Status: DISCONTINUED | OUTPATIENT
Start: 2021-08-25 | End: 2021-08-25

## 2021-08-25 RX ORDER — PROPOFOL 10 MG/ML
5-50 INJECTION, EMULSION INTRAVENOUS
Status: DISCONTINUED | OUTPATIENT
Start: 2021-08-25 | End: 2021-08-26

## 2021-08-25 RX ORDER — ISOSORBIDE MONONITRATE 30 MG/1
30 TABLET, EXTENDED RELEASE ORAL DAILY
Status: DISCONTINUED | OUTPATIENT
Start: 2021-08-25 | End: 2021-08-25

## 2021-08-25 RX ORDER — HYDRALAZINE HYDROCHLORIDE 20 MG/ML
10 INJECTION INTRAMUSCULAR; INTRAVENOUS EVERY 6 HOURS PRN
Status: DISCONTINUED | OUTPATIENT
Start: 2021-08-25 | End: 2021-09-10

## 2021-08-25 RX ADMIN — FENTANYL CITRATE 50 MCG: 50 INJECTION INTRAMUSCULAR; INTRAVENOUS at 04:56

## 2021-08-25 RX ADMIN — SODIUM CHLORIDE 1 MCG/KG/HR: 9 INJECTION, SOLUTION INTRAVENOUS at 05:10

## 2021-08-25 RX ADMIN — HEPARIN SODIUM 5000 UNITS: 5000 INJECTION INTRAVENOUS; SUBCUTANEOUS at 20:47

## 2021-08-25 RX ADMIN — ASPIRIN 81 MG: 81 TABLET, CHEWABLE ORAL at 08:06

## 2021-08-25 RX ADMIN — CHLORHEXIDINE GLUCONATE 0.12% ORAL RINSE 15 ML: 1.2 LIQUID ORAL at 20:46

## 2021-08-25 RX ADMIN — HEPARIN SODIUM 5000 UNITS: 5000 INJECTION INTRAVENOUS; SUBCUTANEOUS at 05:21

## 2021-08-25 RX ADMIN — METOROPROLOL TARTRATE 2.5 MG: 5 INJECTION, SOLUTION INTRAVENOUS at 12:10

## 2021-08-25 RX ADMIN — ACETAMINOPHEN 650 MG: 650 SUSPENSION ORAL at 03:19

## 2021-08-25 RX ADMIN — FENTANYL CITRATE 50 MCG: 50 INJECTION INTRAMUSCULAR; INTRAVENOUS at 09:44

## 2021-08-25 RX ADMIN — CHLORHEXIDINE GLUCONATE 0.12% ORAL RINSE 15 ML: 1.2 LIQUID ORAL at 21:27

## 2021-08-25 RX ADMIN — PROPOFOL 50 MCG/KG/MIN: 10 INJECTION, EMULSION INTRAVENOUS at 20:44

## 2021-08-25 RX ADMIN — Medication 20 MG: at 08:06

## 2021-08-25 RX ADMIN — ISOSORBIDE DINITRATE 10 MG: 10 TABLET ORAL at 14:47

## 2021-08-25 RX ADMIN — HYDRALAZINE HYDROCHLORIDE 10 MG: 20 INJECTION INTRAMUSCULAR; INTRAVENOUS at 20:25

## 2021-08-25 RX ADMIN — BISACODYL 5 MG: 5 TABLET ORAL at 08:06

## 2021-08-25 RX ADMIN — OLANZAPINE 5 MG: 5 TABLET, ORALLY DISINTEGRATING ORAL at 21:26

## 2021-08-25 RX ADMIN — CHLORHEXIDINE GLUCONATE 0.12% ORAL RINSE 15 ML: 1.2 LIQUID ORAL at 08:06

## 2021-08-25 RX ADMIN — FUROSEMIDE 40 MG: 10 INJECTION, SOLUTION INTRAVENOUS at 08:06

## 2021-08-25 RX ADMIN — FENTANYL CITRATE 50 MCG: 50 INJECTION INTRAMUSCULAR; INTRAVENOUS at 00:05

## 2021-08-25 RX ADMIN — METOROPROLOL TARTRATE 2.5 MG: 5 INJECTION, SOLUTION INTRAVENOUS at 17:31

## 2021-08-25 RX ADMIN — CALCIUM GLUCONATE 1 G: 20 INJECTION, SOLUTION INTRAVENOUS at 08:06

## 2021-08-25 RX ADMIN — PROPOFOL 30 MCG/KG/MIN: 10 INJECTION, EMULSION INTRAVENOUS at 12:04

## 2021-08-25 RX ADMIN — DOCUSATE SODIUM AND SENNOSIDES 2 TABLET: 8.6; 5 TABLET, FILM COATED ORAL at 12:09

## 2021-08-25 RX ADMIN — HEPARIN SODIUM 5000 UNITS: 5000 INJECTION INTRAVENOUS; SUBCUTANEOUS at 14:47

## 2021-08-25 RX ADMIN — FENTANYL CITRATE 50 MCG: 50 INJECTION INTRAMUSCULAR; INTRAVENOUS at 20:24

## 2021-08-25 RX ADMIN — PROPOFOL 40 MCG/KG/MIN: 10 INJECTION, EMULSION INTRAVENOUS at 15:51

## 2021-08-25 RX ADMIN — FENTANYL CITRATE 50 MCG: 50 INJECTION INTRAMUSCULAR; INTRAVENOUS at 23:02

## 2021-08-25 RX ADMIN — SPIRONOLACTONE 25 MG: 25 TABLET, FILM COATED ORAL at 08:06

## 2021-08-25 RX ADMIN — HEPARIN SODIUM 5000 UNITS: 5000 INJECTION INTRAVENOUS; SUBCUTANEOUS at 22:04

## 2021-08-25 RX ADMIN — FENTANYL CITRATE 50 MCG: 50 INJECTION INTRAMUSCULAR; INTRAVENOUS at 06:14

## 2021-08-25 RX ADMIN — METOROPROLOL TARTRATE 2.5 MG: 5 INJECTION, SOLUTION INTRAVENOUS at 03:20

## 2021-08-25 RX ADMIN — PRAVASTATIN SODIUM 40 MG: 40 TABLET ORAL at 15:51

## 2021-08-25 RX ADMIN — HYDRALAZINE HYDROCHLORIDE 25 MG: 25 TABLET, FILM COATED ORAL at 22:04

## 2021-08-25 RX ADMIN — AMLODIPINE BESYLATE 10 MG: 5 TABLET ORAL at 12:09

## 2021-08-25 RX ADMIN — FUROSEMIDE 40 MG: 10 INJECTION, SOLUTION INTRAVENOUS at 15:51

## 2021-08-25 RX ADMIN — HYDRALAZINE HYDROCHLORIDE 25 MG: 25 TABLET, FILM COATED ORAL at 14:47

## 2021-08-25 RX ADMIN — METOROPROLOL TARTRATE 2.5 MG: 5 INJECTION, SOLUTION INTRAVENOUS at 06:14

## 2021-08-25 RX ADMIN — FENTANYL CITRATE 50 MCG: 50 INJECTION INTRAMUSCULAR; INTRAVENOUS at 10:37

## 2021-08-25 RX ADMIN — POLYETHYLENE GLYCOL 3350 17 G: 17 POWDER, FOR SOLUTION ORAL at 08:06

## 2021-08-25 RX ADMIN — HYDRALAZINE HYDROCHLORIDE 25 MG: 25 TABLET, FILM COATED ORAL at 20:47

## 2021-08-25 NOTE — ASSESSMENT & PLAN NOTE
· POA :  Meets sepsis criteria on presentation tachycardia, tachypnea, lactic acidosis, leukocytosis and suspecting pneumonia    · Monitor off antibiotics for now due to negative culture result  · Rest of plan as outlined above

## 2021-08-25 NOTE — ASSESSMENT & PLAN NOTE
· POA:  Reported midsternal/epigastric chest pain, persistent, 8/10  · Troponin initially elevated  0 08 continue to trend up in peaked 1 11 currently plateaued 1 75  · EKGs in ED normal sinus rhythm, left axis deviation with nonspecific conduction abnormalities, repeated EKG  normal sinus rhythm, significant ST abnormalities Heparin was discontinued on 8/24  · Continue daily aspirin   Continue metoprolol  · Continue monitor on tele  · Cardiology on board, appreciate the input

## 2021-08-25 NOTE — PROGRESS NOTES
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  2  Moderate to severe aortic valve stenosis   3  Hypoxemic respiratory failure   4  Metabolic encephalopathy   5  Sepsis 2/2 Multifocal pneumonia   6  Mild cognitive impairment   7  Essential Hypertension  8  Pulmonary Hypertension   9  Iron deficiency anemia  10  Acute Kidney Injuy    8/25/21: Patient is extremely hypertensive with systolic's in the [id number] and would benefit from afterload reduction given his aortic stenosis  Remains intubated and ill-appearing  On going [address] discussion with family  As per recent documentation, family wants to pursue all life saving measures  Patient Active Problem List   Diagnosis    Mixed hyperlipidemia    Essential hypertension    Aortic stenosis, moderate    Chest pain    Enlarged prostate without lower urinary tract symptoms (luts)    Fatty liver disease, nonalcoholic    Moderate episode of recurrent major depressive disorder ([address] )    PVC (premature ventricular contraction)    Medicare annual wellness visit, subsequent    Mild cognitive impairment    Obesity (BMI 30 0-34  9)    Do not resuscitate status    Chronic constipation    Hypokalemia    Iron deficiency anemia secondary to inadequate dietary iron intake    Multifocal pneumonia    Acute respiratory failure with hypoxia (HCC)    Acute on chronic diastolic CHF (congestive heart failure) ([facility] )  Lactic acidosis    Hypochromic microcytic anemia    Sepsis (HCC)        PLAN:  -- c/w Lasix 40mg IV BID  -- c/w Amlodipine 10mg PO Daily  -- c/w Aspirin 81mg PO Daily  -- c/w Metoprolol 2 5mg IV Q6H  Can increase to 5mg if needed and tolerating  -- c/w Aldactone 25mg PO Daily  -- Start Isordil 10mg PO TID  -- Start Hydralazine 25mg PO Q8H   -- Monitoring off antibiotics for now given negative cultures to date  -- Continued respiratory care and attempts at weaning as per [facility]  -- Patient will need to have his Aortic Stenosis re-evaluated once he is extubated and a bit more stabilized  He clinically appears to have severe AS as opposed to moderate as per last echocardiogram     *-*-*-*-*-*-*-*-*-*-*-*-*-*-*-*-*-*-*-*-*-*-*-*-*-*-*-*-*-*-*-*-*-*-*-*-*-*-*-*-*-*-*-*-*-*-*-*-*-*-*-*-*-*-  INTERVAL CHANGES / HISTORY OF PRESENT ILLNESS:  No acute events overnight  Patient remains intubated and dedated  *-*-*-*-*-*-*-*-*-*-*-*-*-*-*-*-*-*-*-*-*-*-*-*-*-*-*-*-*-*-*-*-*-*-*-*-*-*-*-*-*-*-*-*-*-*-*-*-*-*-*-*-*-*  REVIEW OF SYMPTOMS:    Positive for: Cannot be obtained  Negative for: All remaining as reviewed below and in HPI   SYSTEM SYMPTOMS REVIEWED:  General--weight change, fever, night sweats  Respiratoryl-- Wheezing, shortness of breath, cough, URI symptoms, sputum, blood  Cardiovascular--chest pain, syncope, dyspnea on exertion, edema, decline in exercise tolerance, claudication   Gastrointestinal--persistent vomiting, diarrhea, abdominal distention, blood in stool   Muscular or skeletal--joint pain or swelling   Neurologic--headaches, syncope, abnormal movement  Hematologic--history of easy bruising and bleeding   Endocrine--thyroid enlargement, heat or cold intolerance, polyuria   Psychiatric--anxiety, depression      *-*-*-*-*-*-*-*-*-*-*-*-*-*-*-*-*-*-*-*-*-*-*-*-*-*-*-*-*-*-*-*-*-*-*-*-*-*-*-*-*-*-*-*-*-*-*-*-*-*-*-*-*-*-  VITAL SIGNS:  Vitals:    08/25/21 1000 08/25/21 1030 08/25/21 1100 08/25/21 1200   BP: 166/58  (!) 183/74 (!) 182/74   Pulse: 72  90 (!) 106   Resp: (!) 23  (!) 29 (!) 37   Temp: 100 °F (37 8 °C) 100 °F (37 8 °C)  100 °F (37 8 °C)   TempSrc:       SpO2: 96%  97% (!) 80%   Weight:       Height:          Temp (24hrs), Av 6 °F (38 7 °C), Min:100 °F (37 8 °C), Max:102 9 °F (39 4 °C)  Current: Temperature: 100 °F (37 8 °C)    Weight (last 2 days)     Date/Time   Weight    21 0600   83 1 (183 2)    21 0550   81 8 (180 34)    21 0541   82 7 (182 32)    21 0500   82 7 (182 32)    21 0200   82 7 (182 32)            Body mass index is 31 43 kg/m²  Wt Readings from Last 3 Encounters:   21 83 1 kg (183 lb 3 2 oz)   21 83 5 kg (184 lb 2 oz)   21 82 8 kg (182 lb 9 6 oz)      Intake/Output Summary (Last 24 hours) at 2021 1326  Last data filed at 2021 1204  Gross per 24 hour   Intake 2098 18 ml   Output 2340 ml   Net -241 82 ml        *-*-*-*-*-*-*-*-*-*-*-*-*-*-*-*-*-*-*-*-*-*-*-*-*-*-*-*-*-*-*-*-*-*-*-*-*-*-*-*-*-*-*-*-*-*-*-*-*-*-*-*-*-*-  PHYSICAL EXAM:  General Appearance:   sedated and obtunded, appears comfortable in no respiratory distress while on mechanical ventilation  Head, Eyes, ENT:    No obvious abnormality, moist mucous mebranes  Neck:   Supple,   Unable to assess for jugular venous distension or carotid bruit   Back:     Symmetric, no curvature  Lungs:     Respirations unlabored  Decreased and coarse breath sounds without obvious crackles,    Chest wall:    No tenderness or deformity   Heart:    Regular rate and rhythm, slightly tachycardic, harsh, mid to late-peaking systolic murmur along sternal border consistent with aortic valve stenosis, displaced cardiac impulse    Abdomen:     Soft, non-tender, No obvious masses, or organomegaly   Extremities:   Extremities warm, no cyanosis    Trace edema   Skin:   Skin color, texture, turgor normal, no rashes or lesions *-*-*-*-*-*-*-*-*-*-*-*-*-*-*-*-*-*-*-*-*-*-*-*-*-*-*-*-*-*-*-*-*-*-*-*-*-*-*-*-*-*-*-*-*-*-*-*-*-*-*-*-*-*-  TELEMETRY, LAST ECG:  Telemetry reviewed    Sinus rhythm with sinus tachycardia  Single occurrence of ventricular triplet   Results for orders placed or performed during the hospital encounter of 08/19/21   ECG 12 lead   Result Value    Ventricular Rate 92    Atrial Rate 92    MS Interval 163    QRSD Interval 113    QT Interval 371    QTC Interval 459    P Axis 30    QRS Axis -31    T Wave Axis 99    Narrative    Normal sinus rhythm with occasional Premature ectopic complexes  Left axis deviation  Voltage criteria for left ventricular hypertrophy  ST elevation, consider early repolarization, pericarditis, or injury  Marked ST abnormality, possible lateral subendocardial injury  Abnormal ECG  When compared with ECG of 23-AUG-2021 05:34,  No significant change was found  Confirmed by Jt Arriola ([id number]) on 8/24/2021 8:40:53 PM      *-*-*-*-*-*-*-*-*-*-*-*-*-*-*-*-*-*-*-*-*-*-*-*-*-*-*-*-*-*-*-*-*-*-*-*-*-*-*-*-*-*-*-*-*-*-*-*-*-*-*-*-*-*-    CURRENT SCHEDULED MEDICATIONS:    Current Facility-Administered Medications:     Acetaminophen (TYLENOL) oral suspension 650 mg, 650 mg, Per NG Tube, Q6H PRN Max 4/day, Rick Bravo MD, 650 mg at 08/25/21 0319    amLODIPine (NORVASC) tablet 10 mg, 10 mg, Oral, Daily, ANATOLY Chaudhry, 10 mg at 08/25/21 1209    aspirin chewable tablet 81 mg, 81 mg, Oral, Daily, Lidya Steve DO, 81 mg at 08/25/21 0806    calcium gluconate 1 g in sodium chloride 0 9% 50 mL (premix), 1 g, Intravenous, Daily, Curt Gu MD, Stopped at 08/25/21 0901    chlorhexidine (PERIDEX) 0 12 % oral rinse 15 mL, 15 mL, Mouth/Throat, Q12H [address], aKylan Jones MD, 15 mL at 08/25/21 0806    fentanyl citrate (PF) 100 MCG/2ML 50 mcg, 50 mcg, Intravenous, Q1H PRN, Laurence Pitts PA-C, 50 mcg at 08/25/21 1037    furosemide (LASIX) injection 40 mg, 40 mg, Intravenous, BID (diuretic), Grady Robertson PA-C, 40 mg at 08/25/21 0806    heparin (porcine) subcutaneous injection 5,000 Units, 5,000 Units, Subcutaneous, Q8H [address], Laurence Pitts PA-C, 5,000 Units at 08/25/21 0521    hydrALAZINE (APRESOLINE) injection 10 mg, 10 mg, Intravenous, Q6H PRN, ANATOLY Frederick    metoprolol (LOPRESSOR) injection 2 5 mg, 2 5 mg, Intravenous, Q6H [address], Nicolasa Joshi MD, 2 5 mg at 08/25/21 1210    morphine injection 2 mg, 2 mg, Intravenous, Q4H PRN, uCrt Alexander MD, 2 mg at 08/24/21 2307    OLANZapine (ZyPREXA ZYDIS) dispersible tablet 5 mg, 5 mg, Oral, HS, Laurence Pitts PA-C, 5 mg at 08/24/21 2117    omeprazole (PRILOSEC) suspension 2 mg/mL, 20 mg, Oral, Daily, Lidya Steve DO, 20 mg at 08/25/21 0806    ondansetron (ZOFRAN) injection 4 mg, 4 mg, Intravenous, Once, Sharl Schilder, CRNP    polyethylene glycol (MIRALAX) packet 17 g, 17 g, Oral, Daily, Curt Alexander MD, 17 g at 08/25/21 0806    pravastatin (PRAVACHOL) tablet 40 mg, 40 mg, Oral, Daily With Lissette South PA-C, 40 mg at 08/24/21 1538    propofol (DIPRIVAN) 1000 mg in 100 mL infusion (premix), 5-50 mcg/kg/min, Intravenous, Titrated, ANATOLY Correa, Last Rate: 15 mL/hr at 08/25/21 1204, 30 mcg/kg/min at 08/25/21 1204    senna-docusate sodium (SENOKOT S) 8 6-50 mg per tablet 2 tablet, 2 tablet, Oral, BID, Curt Alexander MD, 2 tablet at 08/25/21 1209    spironolactone (ALDACTONE) tablet 25 mg, 25 mg, Oral, Daily, Nicolasa Joshi MD, 25 mg at 08/25/21 0806 ALLERGIES:  Allergies   Allergen Reactions    Ace Inhibitors Cough     Pt denied any allergies          *-*-*-*-*-*-*-*-*-*-*-*-*-*-*-*-*-*-*-*-*-*-*-*-*-*-*-*-*-*-*-*-*-*-*-*-*-*-*-*-*-*-*-*-*-*-*-*-*-*-*-*-*-*  LABORATORY DATA:  I have personally reviewed pertinent labs      CMP:  Results from last 7 days   Lab Units 08/25/21  0518 08/24/21  0454 08/23/21  1808 08/19/21  2057   SODIUM mmol/L 143 145 144 142   POTASSIUM mmol/L 4 0 4 0 5 4* 3 7   CHLORIDE mmol/L 107 112* 111* 105   CO2 mmol/L 26 23 25 18*   BUN mg/dL 53* 34* 32* 26*   CREATININE mg/dL 1 44* 1 36* 1 25 0 97   CALCIUM mg/dL 8 5 7 7* 9 0 8 0*   ALK PHOS U/L  --   --   --  78   ALT U/L  --   --   --  62   AST U/L  --   --   --  53*        Results from last 7 days   Lab Units 08/23/21  0443 08/22/21  0408 08/21/21  0031   MAGNESIUM mg/dL 1 9 2 3 1 7     Results from last 7 days   Lab Units 08/19/21  2057   PHOSPHORUS mg/dL 4 4*    Cardiac Profile:   Results from last 7 days   Lab Units 08/22/21  0352 08/20/21  1834 08/20/21  1446 08/19/21  2057   TROPONIN I ng/mL 0 55* 0 92* 1 07* 0 08*   NT-PRO BNP pg/mL  --   --   --  693*     Results from last 7 days   Lab Units 08/20/21  0429   HEMOGLOBIN A1C % 5 5            CBC:   Results from last 7 days   Lab Units 08/25/21  0518 08/24/21  0454 08/23/21  0504   WBC Thousand/uL 12 71* 12 94* 14 45*   HEMOGLOBIN g/dL 8 7* 7 6* 8 6*   HEMATOCRIT % 31 0* 27 1* 29 8*   PLATELETS Thousands/uL 238 208 232     PT/INR: No results found for: PT, INR, Microbiology:   Results from last 7 days   Lab Units 08/22/21  1506 08/22/21  1503 08/22/21  1501 08/20/21  0104 08/19/21  2102 08/19/21  2030   BLOOD CULTURE   --   --   --   --  No Growth After 5 Days  No Growth After 5 Days  GRAM STAIN RESULT  Rare Polys  No bacteria seen No Polys or Bacteria seen No Polys or Bacteria seen  --   --   --    STREP PNEUMONIAE ANTIGEN, URINE   --   --   --  Negative  --   --    LEGIONELLA URINARY ANTIGEN   --   --   --  Negative  --   --           *-*-*-*-*-*-*-*-*-*-*-*-*-*-*-*-*-*-*-*-*-*-*-*-*-*-*-*-*-*-*-*-*-*-*-*-*-*-*-*-*-*-*-*-*-*-*-*-*-*-*-*-*-*-  IMAGING STUDIES REPORTS: Imaging studies results reviewed    No valid procedures specified  No Chest XR results available for this patient      *-*-*-*-*-*-*-*-*-*-*-*-*-*-*-*-*-*-*-*-*-*-*-*-*-*-*-*-*-*-*-*-*-*-*-*-*-*-*-*-*-*-*-*-*-*-*-*-*-*-*-*-*-*-  ECHOCARDIOGRAM AND OTHER CARDIAC TESTS RESULTS:  Results for orders placed during the hospital encounter of 21    Echo complete with contrast if indicated    Narrative  [address]  [place], [address]  [phone]    Transthoracic Echocardiogram  2D, M-mode, Doppler, and Color Doppler    Study date:  20-Aug-2021    Patient: Consuelo Terrazas  MR number: [id number]  Account number: [de-identified]  : 1940  Age: 78 years  Gender: Male  Status: Inpatient  Location: Bedside  Height: 64 in  Weight: 187 7 lb  BP: 160/ 73 mmHg    Indications: Heart failure    Diagnoses: I50 9 - Heart failure, unspecified    Sonographer:  Brianna Phillips RDCS  Primary Physician:  Samira Middleton DO  Referring Physician:  ANATOLY Henderson  Group:  [facility]  Interpreting Physician:  Abigail Nuno DO    SUMMARY    LEFT VENTRICLE:  Systolic function was at the lower limits of normal  Ejection fraction was estimated to be 50 %  There was mild to moderate hypokinesis of the mid-apical septal myocardial wall segments  Wall thickness was mildly increased  Features were likely suggestive of a pseudonormal left ventricular filling pattern, with concomitant abnormal relaxation and increased filling pressure (grade 2 diastolic dysfunction)  LEFT ATRIUM:  The atrium was mildly dilated  MITRAL VALVE:  There was moderate annular calcification  There was mild stenosis (mean diastolic transvalvular gradient ~ 3 3 mmHg at HR 86 BPM)  There was mild regurgitation  AORTIC VALVE:  The valve was trileaflet  Leaflets exhibited markedly increased thickness, marked calcification, markedly reduced cuspal separation, reduced mobility, and sclerosis  There was moderate stenosis  There was mild regurgitation  The peak valve velocity was 3 3 cm/s  Valve mean gradient was 22 mmHg  The aortic valve obstructive index (by VTI) was 0 29   Difficult to accurately estimate aortic valve area due to suboptimal LVOT visualization and measurements  TRICUSPID VALVE:  There was mild regurgitation  AORTA:  The root exhibited mild dilatation (4 1 cm [2 1 cm/m2] at the sinuses of valsalva), and mild fibrocalcific change  SUMMARY MEASUREMENTS  2D measurements:  Unspecified Anatomy:   %FS was 23 9 %  Ao Diam was 3 9 cm  Ao asc was 3 4 cm   EDV(Teich) was 110 5 ml   EF(Teich) was 47 6 %  ESV(Teich) was 57 9 ml  IVSd was 1 2 cm  LA Diam was 4 5 cm  LAAs A2C was 27 1 cm2  LAAs A4C was 26 2  cm2  LAESV A-L A2C was 101 1 ml  LAESV A-L A4C was 92 8 ml  LAESV Index (A-L) was 51 1 ml/m2  LAESV MOD A2C was 98 2 ml  LAESV MOD A4C was 86 6 ml  LAESV(A-L) was 97 5 ml  LAESV(MOD BP) was 92 3 ml  LAESVInd MOD BP was 48 4 ml/m2  LALs A2C was 6 2 cm  LALs A4C was 6 3 cm  LVEDV MOD A4C was 195 6 ml  LVEF MOD A4C was 42 1 %  LVESV MOD A4C was 113 2 ml  LVIDd was 4 9 cm  LVIDs was 3 7 cm  LVLd A4C was 10 6 cm  LVLs A4C was 9 6 cm  LVOT Diam was 2 6 cm  LVPWd  was 0 9 cm  RAAs A4C was 21 1 cm2  RAESV A-L was 66 8 ml  RAESV MOD was 65 4 ml  RALs was 5 7 cm  RVIDd was 4 7 cm  RWT was 0 4   SV MOD A4C was 82 4 ml   SV(Teich) was 52 6 ml   CW measurements:  Unspecified Anatomy:   AV Env  Ti was 267 9 ms   AV VTI was 59 6 cm   AV Vmax was 2 9 m/s  AV Vmean was 2 2 m/s  AV maxPG was 34 5 mmHg  AV meanPG was 21 6 mmHg  MV VTI was 35 cm  MV Vmax was 1 2 m/s  MV Vmean was 0 9 m/s  MV maxPG  was 6 mmHg  MV meanPG was 3 4 mmHg  TR Vmax was 3 2 m/s   TR maxPG was 40 mmHg  MM measurements:  Unspecified Anatomy:   TAPSE was 2 4 cm  PW measurements:  Unspecified Anatomy:   GREGORY (VTI) was 1 7 cm2  GREGORY Vmax was 1 8 cm2  AVAI (VTI) was 0 cm2/m2  AVAI Vmax was 0 cm2/m2  DVI was 0 3   E' Sept was 0 m/s  E/E' Sept was 31 6   LVOT Env  Ti was 308 3 ms  LVOT VTI was 20 3 cm  LVOT Vmax  was 1 m/s  LVOT Vmean was 0 7 m/s  LVOT maxPG was 4 2 mmHg  LVOT meanPG was 2 1 mmHg  LVSI Dopp was 54 5 ml/m2    LVSV Dopp was 104 1 ml   MV A David was 1 2 m/s  MV Dec [place] was 7 1 m/s2  MV DecT was 204 7 ms   MV E David was 1 4 m/s  MV E/A Ratio was 1 2   MV PHT was 59 4 ms  MVA (VTI) was 3 cm2  MVA By PHT was 3 7 cm2  HISTORY: PRIOR HISTORY: HTN; AS; CP; HLD; PVC; Acute respiratory failure with hypoxia; CHF; Obesity; Depression    PROCEDURE: The procedure was performed at the bedside  This was a routine study  The transthoracic approach was used  The study included complete 2D imaging, M-mode, complete spectral Doppler, and color Doppler  The heart rate was 98 bpm,  at the start of the study  Images were obtained from the parasternal, apical, subcostal, and suprasternal notch acoustic windows  Echocardiographic views were limited due to poor acoustic window availability  This was a technically  difficult study  LEFT VENTRICLE: Size was normal  Systolic function was at the lower limits of normal  Ejection fraction was estimated to be 50 %  There was mild to moderate hypokinesis of the mid-apical septal myocardial wall segments  Wall thickness was  mildly increased  DOPPLER: Features were likely suggestive of a pseudonormal left ventricular filling pattern, with concomitant abnormal relaxation and increased filling pressure (grade 2 diastolic dysfunction)  RIGHT VENTRICLE: The size was normal  Systolic function was normal  Wall thickness was normal     LEFT ATRIUM: The atrium was mildly dilated  RIGHT ATRIUM: Size was normal     MITRAL VALVE: There was moderate annular calcification  DOPPLER: There was mild stenosis (mean diastolic transvalvular gradient ~ 3 3 mmHg at HR 86 BPM)  There was mild regurgitation  AORTIC VALVE: The valve was trileaflet  Leaflets exhibited markedly increased thickness, marked calcification, markedly reduced cuspal separation, reduced mobility, and sclerosis  DOPPLER: There was moderate stenosis  There was mild  regurgitation  TRICUSPID VALVE: Not well visualized   DOPPLER: There was mild regurgitation  PULMONIC VALVE: Not well visualized  PERICARDIUM: There was no pericardial effusion  The pericardium was normal in appearance  AORTA: The root exhibited mild dilatation (4 1 cm [2 1 cm/m2] at the sinuses of valsalva), and mild fibrocalcific change  SYSTEMIC VEINS: IVC: The inferior vena cava was not well visualized  MEASUREMENT TABLES    DOPPLER MEASUREMENTS  Aortic valve   (Reference normals)  Peak david   3 3 cm/s   (--)  Mean gradient   22 mmHg   (--)  Obstr index, VTI   0 29    (--)    SYSTEM MEASUREMENT TABLES    2D  %FS: 23 9 %  Ao Diam: 3 9 cm  Ao asc: 3 4 cm  EDV(Teich): 110 5 ml  EF(Teich): 47 6 %  ESV(Teich): 57 9 ml  IVSd: 1 2 cm  LA Diam: 4 5 cm  LAAs A2C: 27 1 cm2  LAAs A4C: 26 2 cm2  LAESV A-L A2C: 101 1 ml  LAESV A-L A4C: 92 8 ml  LAESV Index (A-L): 51 1 ml/m2  LAESV MOD A2C: 98 2 ml  LAESV MOD A4C: 86 6 ml  LAESV(A-L): 97 5 ml  LAESV(MOD BP): 92 3 ml  LAESVInd MOD BP: 48 4 ml/m2  LALs A2C: 6 2 cm  LALs A4C: 6 3 cm  LVEDV MOD A4C: 195 6 ml  LVEF MOD A4C: 42 1 %  LVESV MOD A4C: 113 2 ml  LVIDd: 4 9 cm  LVIDs: 3 7 cm  LVLd A4C: 10 6 cm  LVLs A4C: 9 6 cm  LVOT Diam: 2 6 cm  LVPWd: 0 9 cm  RAAs A4C: 21 1 cm2  RAESV A-L: 66 8 ml  RAESV MOD: 65 4 ml  RALs: 5 7 cm  RVIDd: 4 7 cm  RWT: 0 4  SV MOD A4C: 82 4 ml  SV(Teich): 52 6 ml    CW  AV Env  Ti: 267 9 ms  AV VTI: 59 6 cm  AV Vmax: 2 9 m/s  AV Vmean: 2 2 m/s  AV maxP 5 mmHg  AV meanP 6 mmHg  MV VTI: 35 cm  MV Vmax: 1 2 m/s  MV Vmean: 0 9 m/s  MV maxP mmHg  MV meanPG: 3 4 mmHg  TR Vmax: 3 2 m/s  TR maxP mmHg    MM  TAPSE: 2 4 cm    PW  GREGORY (VTI): 1 7 cm2  GREGORY Vmax: 1 8 cm2  AVAI (VTI): 0 cm2/m2  AVAI Vmax: 0 cm2/m2  DVI: 0 3  E' Sept: 0 m/s  E/E' Sept: 31 6  LVOT Env  Ti: 308 3 ms  LVOT VTI: 20 3 cm  LVOT Vmax: 1 m/s  LVOT Vmean: 0 7 m/s  LVOT maxP 2 mmHg  LVOT meanP 1 mmHg  LVSI Dopp: 54 5 ml/m2  LVSV Dopp: 104 1 ml  MV A David: 1 2 m/s  MV Dec [place]: 7 1 m/s2  MV DecT: 204 7 ms  MV E David: 1 4 m/s  MV E/A Ratio: 1 2  MV PHT: 59 4 ms  MVA (VTI): 3 cm2  MVA By PHT: 3 7 cm2    [employer] [facility] [facility]    Prepared and electronically signed by    Jagdeep Villafana DO  Signed 20-Aug-2021 14:28:06    No results found for this or any previous visit  No results found for this or any previous visit  No results found for this or any previous visit         *-*-*-*-*-*-*-*-*-*-*-*-*-*-*-*-*-*-*-*-*-*-*-*-*-*-*-*-*-*-*-*-*-*-*-*-*-*-*-*-*-*-*-*-*-*-*-*-*-*-*-*-*-*-  SIGNATURES:   [unfilled]   Hira Alves MD

## 2021-08-25 NOTE — QUICK NOTE
I reached out to the patient's son, Lindsey, to confirm that the POA is his brother Aruna Phan  He stated that his father would like to have everything done to save his life  He will call his brother Aruna Phan and make a decision about whether to reintubate the patient if needs be  All questions were answered

## 2021-08-25 NOTE — PROGRESS NOTES
[address] [facility]  Progress Note Gwen De La Fuente 1940, 78 y o  male MRN: [id number]  Unit/Bed#: ICU 07 Encounter: [id number]  Primary Care Provider: Neville Mitchell DO   Date and time admitted to hospital: 8/19/2021  8:25 PM    * Acute respiratory failure with hypoxia [facility])  Assessment & Plan  · POA: anxiety, generalized fatigue since Tuesday (9/17)  His wife noted that on 8/19 he was more short of breath with associated cough and complaints of chest pain which prompted his visit to the ED  · In the ED patient was noted to be hypoxic, in respiratory distress  Patient improved with lasix, bipap, morphine and ativan  · Clinically patient appears  fluid overloaded, his lung sounds are coarse with scattered rales  He has trace to +1 left lower extremity edema that he reports as new  · COVID-19 negative x2  · Chest x-ray concerning for vascular congestion, multifocal pneumonia  · Continue antibiotics: vanc cefepime and flagyl day 4  Total antibiotics day 6  Consider discontinue antibiotics since bronchial culture currently shows no growth and negative gram stain  Viral culture and cytology still pending  · Monitor off antibiotics  Continue lasix 40 mg BID   · Monitor CBC and fever curve  · Failed conservative treatment and was intubated on 8/21  SBT today  · Wean FIO2 and PEEP as able with a goal to maintain O2 saturation > 90%  ·       Acute on chronic diastolic CHF (congestive heart failure) (HCC)  Assessment & Plan  · POA:  With signs of volume overload, initial imaging chest x-ray concerning for vascular congestion consistent with CHF  · Most recent echo in 2018 showed grade 1 diastolic dysfunction  · [id number]  · Repeated echo 8/20 with significant worsening of his diastolic dysfunction  · Repeat CXR showed improvement on volume status  · Continue diuresis with lasix 40 mg BID     · Cardiology on board  · plan as outlined above      Chest pain  Assessment & Plan  · POA: Reported midsternal/epigastric chest pain, persistent, 8/10  · Troponin initially elevated  0 08 continue to trend up in peaked 1 11 currently plateaued 4 93  · EKGs in ED normal sinus rhythm, left axis deviation with nonspecific conduction abnormalities, repeated EKG  normal sinus rhythm, significant ST abnormalities Heparin was discontinued on 8/24  · Continue daily aspirin  Continue metoprolol  · Continue monitor on tele  · Cardiology on board, appreciate the input      Sepsis [facility])  Assessment & Plan  · POA :  Meets sepsis criteria on presentation tachycardia, tachypnea, lactic acidosis, leukocytosis and suspecting pneumonia  · Monitor off antibiotics for now due to negative culture result  · Rest of plan as outlined above    Hypochromic microcytic anemia  Assessment & Plan  · Has history of iron deficiency anemia due to inadequate dietary iron intake  · Hemoglobin currently stable, there is no signs of active bleeding  · EGD in February 2021 with no source upper GI bleed  · Iron panel:  Iron 29, ferritin 21, patient will require Venofer  · Continue iron supplement when appropriate  · May need OP hematology follow up  · Trend hg, transfuse for <7 0    Lactic acidosis  Assessment & Plan  · POA:  Lactic acid on presentation 8 1 in the setting of sepsis and suspected pneumonia  · Rest of plan as outlined above    Multifocal pneumonia  Assessment & Plan  · Chest x-ray 8/20:  Diffuse patchy airspace disease bilaterally concerning multifocal infiltrates  · CAP versus aspiration  · Tested negative for COVID-19 x2  · Legionella and strep pneumo antigens negative  · Initially started on Rocephin and azithromycin, currently on cefepime and flagyl day 3  Total antibiotics day 5   · Procalcitonin negative x2  Recent Labs     08/23/21  0504 08/24/21  0454 08/25/21  0518   WBC 14 45* 12 94* 12 71*     · Blood cultures shows no growth  · Patient had fever up to 103 overnight   However bronchial culture currently shows no growth  · Continue monitor fever curve, WBCs    Hypokalemia  Assessment & Plan  Recent Labs     08/23/21  1808 08/24/21  0454 08/25/21  0518   K 5 4* 4 0 4 0     · Keep potassium level > 3 8  · Monitor BMP  · Replete as needed    Mild cognitive impairment  Assessment & Plan  · Record review reveals stable memory issues with outpatient followup with geriatrics  · TSH, B12 nl in past  · Family reports no progression at this point  · Continue outpatient sertraline when appropriate    Aortic stenosis, moderate  Assessment & Plan  · History of mild to moderate aortic stenosis on echo 5/17/2018   · Repeated on 8/20: Showed markedly increased thickness, marked calcification, markedly reduced cuspal separation, reduced mobility, and sclerosis and moderate stenosis  · Cardiology on board  · Currently diuresing  · Monitor fluid volume status closely  · Strict I's and O's      Essential hypertension  Assessment & Plan  · On losartan 25 mg daily, amlodipine 10 mg daily  · Currently diuresis   Continue metoprolol 2 5 Q6H  · Continue hold antihypertensive meds, resume when  appropriate      ----------------------------------------------------------------------------------------  HPI/24hr events:  No significant overnight events was reported    Patient appropriate for transfer out of the ICU today?: No  Disposition: Continue Critical Care   Code Status: Level 1 - Full Code  ---------------------------------------------------------------------------------------  SUBJECTIVE  Unable to provide subjective due to intubation    Review of Systems   Unable to perform ROS: Intubated     Review of systems was unable to be performed secondary to intubation  ---------------------------------------------------------------------------------------  OBJECTIVE    Vitals   Vitals:    08/25/21 0300 08/25/21 0400 08/25/21 0427 08/25/21 0450   BP: 150/59 160/66     Pulse: 70 72  92   Resp: 22 21  (!) 36   Temp: (!) 101 5 °F (38 6 °C) (!) 102 2 °F (39 °C)  (!) 102 6 °F (39 2 °C)   TempSrc:       SpO2: 100% 100% 100% (!) 78%   Weight:       Height:         Temp (24hrs), Av 9 °F (38 3 °C), Min:100 °F (37 8 °C), Max:102 6 °F (39 2 °C)  Current: Temperature: (!) 102 6 °F (39 2 °C)          Respiratory:  SpO2: SpO2: 93 %, SpO2 Activity: SpO2 Activity: At Rest, SpO2 Device: O2 Device: Ventilator, Capnography:         Invasive/non-invasive ventilation settings   Respiratory    Lab Data (Last 4 hours)    None         O2/Vent Data (Last 4 hours)    None                Physical Exam  Vitals and nursing note reviewed  Constitutional:       General: He is not in acute distress  Appearance: Normal appearance  HENT:      Head: Normocephalic and atraumatic  Right Ear: External ear normal       Left Ear: External ear normal       Nose: Nose normal       Mouth/Throat:      Mouth: Mucous membranes are moist       Pharynx: Oropharynx is clear  Eyes:      Extraocular Movements: Extraocular movements intact  Conjunctiva/sclera: Conjunctivae normal       Pupils: Pupils are equal, round, and reactive to light  Cardiovascular:      Rate and Rhythm: Normal rate and regular rhythm  Pulses: Normal pulses  Heart sounds: Murmur heard  Pulmonary:      Effort: No respiratory distress  Breath sounds: No rales  Abdominal:      General: Bowel sounds are decreased  Palpations: Abdomen is soft  Musculoskeletal:         General: Normal range of motion  Cervical back: Normal range of motion and neck supple  Right lower leg: No edema  Left lower leg: No edema  Skin:     General: Skin is warm and dry  Capillary Refill: Capillary refill takes less than 2 seconds  Findings: Bruising present  Neurological:      General: No focal deficit present  Mental Status: He is alert and oriented to person, place, and time     Psychiatric:      Comments: sedated         Laboratory and Diagnostics:  Results from last 7 days   Lab Units 08/25/21  0518 08/24/21  0454 08/23/21  0504 08/22/21  0350 08/21/21  0415 08/20/21  0429 08/19/21  2057   WBC Thousand/uL 12 71* 12 94* 14 45* 12 72* 15 63* 18 08* 17 11*   HEMOGLOBIN g/dL 8 7* 7 6* 8 6* 8 2* 8 5* 9 0* 9 9*   HEMATOCRIT % 31 0* 27 1* 29 8* 28 4* 28 9* 30 6* 34 7*   PLATELETS Thousands/uL 238 208 232 225 240 277 378   NEUTROS PCT %  --   --   --  83* 86* 89* 82*   BANDS PCT %  --  1  --   --   --   --   --    MONOS PCT %  --   --   --  7 8 8 6   MONO PCT %  --  9  --   --   --   --   --      Results from last 7 days   Lab Units 08/25/21  0518 08/24/21  0454 08/23/21  1808 08/23/21  0443 08/22/21  0408 08/21/21  1811 08/21/21  0533 08/19/21 2057   SODIUM mmol/L 143 145 144 143 144 147* 145 142   POTASSIUM mmol/L 4 0 4 0 5 4* 2 8* 3 2* 3 2* 2 8* 3 7   CHLORIDE mmol/L 107 112* 111* 108 108 108 104 105   CO2 mmol/L 26 23 25 28 28 28 30 18*   ANION GAP mmol/L 10 10 8 7 8 11 11 19*   BUN mg/dL 53* 34* 32* 32* 29* 25 20 26*   CREATININE mg/dL 1 44* 1 36* 1 25 1 18 1 11 1 08 0 93 0 97   CALCIUM mg/dL 8 5 7 7* 9 0 8 5 8 3 8 8 8 1* 8 0*   GLUCOSE RANDOM mg/dL 169* 204* 155* 164* 138 194* 159* 174*   ALT U/L  --   --   --   --   --   --   --  62   AST U/L  --   --   --   --   --   --   --  53*   ALK PHOS U/L  --   --   --   --   --   --   --  78   ALBUMIN g/dL  --   --   --   --   --   --   --  4 1   TOTAL BILIRUBIN mg/dL  --   --   --   --   --   --   --  0 37     Results from last 7 days   Lab Units 08/23/21  0443 08/22/21  0408 08/21/21  0031 08/19/21 2057   MAGNESIUM mg/dL 1 9 2 3 1 7 2 0   PHOSPHORUS mg/dL  --   --   --  4 4*      Results from last 7 days   Lab Units 08/24/21  0756 08/24/21  0013 08/23/21  1808 08/23/21  1057 08/23/21  0443 08/22/21  1706 08/22/21  1132 08/19/21 2057   INR   --   --   --   --   --   --   --  1 09   PTT seconds 70* 50* 60* 110* 53* 61* 80* 26      Results from last 7 days   Lab Units 08/22/21  0352 08/20/21  1834 08/20/21  1446 08/20/21  1131 08/20/21  1102 08/20/21  0440 08/20/21  0103   TROPONIN I ng/mL 0 55* 0 92* 1 07* 1 29* 1 11* 1 03* 0 76*     Results from last 7 days   Lab Units 08/20/21  0103 08/19/21  2300 08/19/21  2102   LACTIC ACID mmol/L 3 0* 5 6* 8 1*     ABG:  Results from last 7 days   Lab Units 08/23/21 2014   [address] ART  7 401   PCO2 ART mm Hg 39 5   PO2 ART mm Hg 86 2   HCO3 ART mmol/L 24 0   BASE EXC ART mmol/L -0 7   ABG SOURCE  Radial, Right     VBG:  Results from last 7 days   Lab Units 08/23/21 2014   ABG SOURCE  Radial, Right     Results from last 7 days   Lab Units 08/20/21  0429 08/19/21  2057   PROCALCITONIN ng/ml 0 14 <0 05       Micro  Results from last 7 days   Lab Units 08/22/21  1506 08/22/21  1503 08/22/21  1501 08/20/21  0104 08/19/21  2102 08/19/21  2030   BLOOD CULTURE   --   --   --   --  No Growth After 4 Days  No Growth After 4 Days  GRAM STAIN RESULT  Rare Polys  No bacteria seen No Polys or Bacteria seen No Polys or Bacteria seen  --   --   --    LEGIONELLA URINARY ANTIGEN   --   --   --  Negative  --   --    STREP PNEUMONIAE ANTIGEN, URINE   --   --   --  Negative  --   --        EKG: NSR  Imaging: I have personally reviewed pertinent reports  and I have personally reviewed pertinent films in PACS    Intake and Output  I/O       08/23 0701 - 08/24 0700 08/24 0701 - 08/25 0700    I V  (mL/kg) 984 4 (12) 335 (4 1)    NG/ 935    IV Piggyback 885     Feedings 1086 427    Total Intake(mL/kg) 3711 4 (45 4) 1697 (20 7)    Urine (mL/kg/hr) 3285 (1 7) 3510 (1 8)    Total Output 3285 3510    Net +426 4 -1813                Height and Weights   Height: 5' 4 02" (162 6 cm)  IBW (Ideal Body Weight): 59 24 kg  Body mass index is 30 94 kg/m²    Weight (last 2 days)     Date/Time   Weight    08/24/21 0550   81 8 (180 34)    08/23/21 0541   82 7 (182 32)    08/23/21 0500   82 7 (182 32)    08/23/21 0200   82 7 (182 32)                Nutrition       Diet Orders   (From admission, onward)             Start     Ordered    08/24/21 0607 Diet Enteral/Parenteral; Tube Feeding No Oral Diet; Jevity 1 2 Lg; Continuous; 40; Prosource Protein Liquid - Two Packets; 125; Water; Every 4 hours  Diet effective now     Question Answer Comment   Diet Type Enteral/Parenteral    Enteral/Parenteral Tube Feeding No Oral Diet    Tube Feeding Formula: Jevity 1 2 Lg    Bolus/Cyclic/Continuous Continuous    Tube Feeding Goal Rate (mL/hr): 40    Prosource Protein Liquid - No Carb Prosource Protein Liquid - Two Packets    Tube Feeding flush (mL): 125    Water Flush type: Water    Water flush frequency: Every 4 hours    RD to adjust diet per protocol?  Yes        08/24/21 9679                  Active Medications  Scheduled Meds:  Current Facility-Administered Medications   Medication Dose Route Frequency Provider Last Rate    Acetaminophen  650 mg Per NG Tube Q6H PRN Max 4/day Mg Trivedi MD      aspirin  81 mg Oral Daily Jose A Barrios DO      calcium gluconate  1 g Intravenous Daily Chen-Ping Daryle Quest, MD 1 g (08/24/21 0805)    chlorhexidine  15 mL Mouth/Throat Q12H [address] shasha Bettencourt MD      dexmedetomidine  0 1-0 7 mcg/kg/hr Intravenous Titrated Pati Starfouzia PA-C 1 mcg/kg/hr (08/25/21 0510)    fentanyl citrate (PF)  50 mcg Intravenous Q1H PRN Pati Starfouzia PA-C      furosemide  40 mg Intravenous BID (diuretic) Pati Starfouzia PA-C      heparin (porcine)  5,000 Units Subcutaneous Q8H [address], PA-C      metoprolol  2 5 mg Intravenous Q6H [address] Nicolasa Joshi MD      morphine injection  2 mg Intravenous Q4H PRN Chen-Ping Daryle Quest, MD      OLANZapine  5 mg Oral HS Laurence Pitts PA-C      omeprazole (PRILOSEC) suspension 2 mg/mL  20 mg Oral Daily Lidya Steve DO      ondansetron  4 mg Intravenous Once ANATOLY Landry      polyethylene glycol  17 g Oral Daily Chen-Ping Daryle Quest, MD      pravastatin  40 mg Oral Daily [facility]      spironolactone  25 mg Oral Daily Nicolasa Joshi MD Continuous Infusions:  dexmedetomidine, 0 1-0 7 mcg/kg/hr, Last Rate: 1 mcg/kg/hr (08/25/21 0510)      PRN Meds:   Acetaminophen, 650 mg, Q6H PRN Max 4/day  fentanyl citrate (PF), 50 mcg, Q1H PRN  morphine injection, 2 mg, Q4H PRN        Invasive Devices Review  Invasive Devices     Peripheral Intravenous Line            Peripheral IV 08/20/21 Left;Ventral (anterior) Forearm 4 days    Peripheral IV 08/21/21 Left;Upper;Ventral (anterior) Arm 3 days    Peripheral IV 08/22/21 Right Hand 2 days          Drain            Urethral Catheter Temperature probe 4 days    NG/OG/Enteral Tube 16 Fr Center mouth 3 days          Airway            ETT  Cuffed 8 mm 3 days                Rationale for remaining devices: acute resp failure  ---------------------------------------------------------------------------------------  Advance Directive and Living Will: Yes    Power of :    POLST:    ---------------------------------------------------------------------------------------  Care Time Delivered:   Upon my evaluation, this patient had a high probability of imminent or life-threatening deterioration due to acute resp failure, which required my direct attention, intervention, and personal management  I have personally provided 30 minutes (0600 to 0630) of critical care time, exclusive of procedures, teaching, family meetings, and any prior time recorded by providers other than myself  Jonathan Levin MD      Portions of the record may have been created with voice recognition software  Occasional wrong word or "sound a like" substitutions may have occurred due to the inherent limitations of voice recognition software    Read the chart carefully and recognize, using context, where substitutions have occurred

## 2021-08-25 NOTE — ASSESSMENT & PLAN NOTE
· On losartan 25 mg daily, amlodipine 10 mg daily  · Currently diuresis   Continue metoprolol 2 5 Q6H  · Continue hold antihypertensive meds, resume when  appropriate

## 2021-08-25 NOTE — ASSESSMENT & PLAN NOTE
· POA:  With signs of volume overload, initial imaging chest x-ray concerning for vascular congestion consistent with CHF  · Most recent echo in 2018 showed grade 1 diastolic dysfunction  · [id number]  · Repeated echo 8/20 with significant worsening of his diastolic dysfunction  · Repeat CXR showed improvement on volume status  · Continue diuresis with lasix 40 mg BID     · Cardiology on board  · plan as outlined above

## 2021-08-25 NOTE — ASSESSMENT & PLAN NOTE
· POA: anxiety, generalized fatigue since Tuesday (9/17)  His wife noted that on 8/19 he was more short of breath with associated cough and complaints of chest pain which prompted his visit to the ED  · In the ED patient was noted to be hypoxic, in respiratory distress  Patient improved with lasix, bipap, morphine and ativan  · Clinically patient appears  fluid overloaded, his lung sounds are coarse with scattered rales  He has trace to +1 left lower extremity edema that he reports as new  · COVID-19 negative x2  · Chest x-ray concerning for vascular congestion, multifocal pneumonia  · Continue antibiotics: vanc cefepime and flagyl day 4  Total antibiotics day 6  Consider discontinue antibiotics since bronchial culture currently shows no growth and negative gram stain  Viral culture and cytology still pending  · Monitor off antibiotics  Continue lasix 40 mg BID   · Monitor CBC and fever curve  · Failed conservative treatment and was intubated on 8/21  SBT today    · Wean FIO2 and PEEP as able with a goal to maintain O2 saturation > 90%  ·

## 2021-08-25 NOTE — ASSESSMENT & PLAN NOTE
Recent Labs     08/23/21  1808 08/24/21  0454 08/25/21  0518   K 5 4* 4 0 4 0     · Keep potassium level > 3 8  · Monitor BMP  · Replete as needed

## 2021-08-25 NOTE — ASSESSMENT & PLAN NOTE
· Chest x-ray 8/20:  Diffuse patchy airspace disease bilaterally concerning multifocal infiltrates  · CAP versus aspiration  · Tested negative for COVID-19 x2  · Legionella and strep pneumo antigens negative  · Initially started on Rocephin and azithromycin, currently on cefepime and flagyl day 3  Total antibiotics day 5   · Procalcitonin negative x2  Recent Labs     08/23/21  0504 08/24/21  0454 08/25/21  0518   WBC 14 45* 12 94* 12 71*     · Blood cultures shows no growth  · Patient had fever up to 103 overnight  However bronchial culture currently shows no growth    · Continue monitor fever curve, WBCs

## 2021-08-26 ENCOUNTER — APPOINTMENT (INPATIENT)
Dept: RADIOLOGY | Facility: HOSPITAL | Age: 81
DRG: 870 | End: 2021-08-26
Payer: MEDICARE

## 2021-08-26 PROBLEM — N17.9 AKI (ACUTE KIDNEY INJURY) (HCC): Status: ACTIVE | Noted: 2021-08-26

## 2021-08-26 LAB
ANION GAP SERPL CALCULATED.3IONS-SCNC: 10 MMOL/L (ref 4–13)
ANION GAP SERPL CALCULATED.3IONS-SCNC: 10 MMOL/L (ref 4–13)
ANISOCYTOSIS BLD QL SMEAR: PRESENT
BUN SERPL-MCNC: 80 MG/DL (ref 5–25)
BUN SERPL-MCNC: 96 MG/DL (ref 5–25)
CALCIUM SERPL-MCNC: 8.7 MG/DL (ref 8.3–10.1)
CALCIUM SERPL-MCNC: 9.3 MG/DL (ref 8.3–10.1)
CHLORIDE SERPL-SCNC: 102 MMOL/L (ref 100–108)
CHLORIDE SERPL-SCNC: 103 MMOL/L (ref 100–108)
CO2 SERPL-SCNC: 28 MMOL/L (ref 21–32)
CO2 SERPL-SCNC: 29 MMOL/L (ref 21–32)
CREAT SERPL-MCNC: 1.47 MG/DL (ref 0.6–1.3)
CREAT SERPL-MCNC: 2.21 MG/DL (ref 0.6–1.3)
ERYTHROCYTE [DISTWIDTH] IN BLOOD BY AUTOMATED COUNT: 20.7 % (ref 11.6–15.1)
GFR SERPL CREATININE-BSD FRML MDRD: 27 ML/MIN/1.73SQ M
GFR SERPL CREATININE-BSD FRML MDRD: 43 ML/MIN/1.73SQ M
GLUCOSE SERPL-MCNC: 168 MG/DL (ref 65–140)
GLUCOSE SERPL-MCNC: 191 MG/DL (ref 65–140)
HCT VFR BLD AUTO: 33.4 % (ref 36.5–49.3)
HGB BLD-MCNC: 9.3 G/DL (ref 12–17)
LYMPHOCYTES # BLD AUTO: 1.23 THOUSAND/UL (ref 0.6–4.47)
LYMPHOCYTES # BLD AUTO: 5 %
MAGNESIUM SERPL-MCNC: 3 MG/DL (ref 1.6–2.6)
MCH RBC QN AUTO: 22.2 PG (ref 26.8–34.3)
MCHC RBC AUTO-ENTMCNC: 27.8 G/DL (ref 31.4–37.4)
MCV RBC AUTO: 80 FL (ref 82–98)
METAMYELOCYTES NFR BLD MANUAL: 2 % (ref 0–1)
MONOCYTES # BLD AUTO: 2.22 THOUSAND/UL (ref 0–1.22)
MONOCYTES NFR BLD AUTO: 9 % (ref 4–12)
MYELOCYTES NFR BLD MANUAL: 3 % (ref 0–1)
NEUTS BAND NFR BLD MANUAL: 2 % (ref 0–8)
NEUTS SEG # BLD: 19.97 THOUSAND/UL (ref 1.81–6.82)
NEUTS SEG NFR BLD AUTO: 79 %
PLATELET # BLD AUTO: 296 THOUSANDS/UL (ref 149–390)
PLATELET BLD QL SMEAR: ADEQUATE
PMV BLD AUTO: 10.1 FL (ref 8.9–12.7)
POLYCHROMASIA BLD QL SMEAR: PRESENT
POTASSIUM SERPL-SCNC: 4.3 MMOL/L (ref 3.5–5.3)
POTASSIUM SERPL-SCNC: 4.5 MMOL/L (ref 3.5–5.3)
PROCALCITONIN SERPL-MCNC: 0.7 NG/ML
RBC # BLD AUTO: 4.18 MILLION/UL (ref 3.88–5.62)
SARS-COV-2 RNA RESP QL NAA+PROBE: NEGATIVE
SODIUM SERPL-SCNC: 140 MMOL/L (ref 136–145)
SODIUM SERPL-SCNC: 142 MMOL/L (ref 136–145)
TOTAL CELLS COUNTED SPEC: 100
WBC # BLD AUTO: 24.65 THOUSAND/UL (ref 4.31–10.16)

## 2021-08-26 PROCEDURE — 83735 ASSAY OF MAGNESIUM: CPT | Performed by: NURSE PRACTITIONER

## 2021-08-26 PROCEDURE — 85027 COMPLETE CBC AUTOMATED: CPT | Performed by: INTERNAL MEDICINE

## 2021-08-26 PROCEDURE — 71045 X-RAY EXAM CHEST 1 VIEW: CPT

## 2021-08-26 PROCEDURE — U0003 INFECTIOUS AGENT DETECTION BY NUCLEIC ACID (DNA OR RNA); SEVERE ACUTE RESPIRATORY SYNDROME CORONAVIRUS 2 (SARS-COV-2) (CORONAVIRUS DISEASE [COVID-19]), AMPLIFIED PROBE TECHNIQUE, MAKING USE OF HIGH THROUGHPUT TECHNOLOGIES AS DESCRIBED BY CMS-2020-01-R: HCPCS

## 2021-08-26 PROCEDURE — 94760 N-INVAS EAR/PLS OXIMETRY 1: CPT

## 2021-08-26 PROCEDURE — 84145 PROCALCITONIN (PCT): CPT | Performed by: INTERNAL MEDICINE

## 2021-08-26 PROCEDURE — 80048 BASIC METABOLIC PNL TOTAL CA: CPT | Performed by: NURSE PRACTITIONER

## 2021-08-26 PROCEDURE — 99231 SBSQ HOSP IP/OBS SF/LOW 25: CPT | Performed by: INTERNAL MEDICINE

## 2021-08-26 PROCEDURE — 99291 CRITICAL CARE FIRST HOUR: CPT | Performed by: INTERNAL MEDICINE

## 2021-08-26 PROCEDURE — U0005 INFEC AGEN DETEC AMPLI PROBE: HCPCS

## 2021-08-26 PROCEDURE — 85007 BL SMEAR W/DIFF WBC COUNT: CPT | Performed by: INTERNAL MEDICINE

## 2021-08-26 PROCEDURE — 94003 VENT MGMT INPAT SUBQ DAY: CPT

## 2021-08-26 PROCEDURE — 80048 BASIC METABOLIC PNL TOTAL CA: CPT | Performed by: INTERNAL MEDICINE

## 2021-08-26 RX ORDER — METOCLOPRAMIDE HYDROCHLORIDE 5 MG/ML
10 INJECTION INTRAMUSCULAR; INTRAVENOUS EVERY 6 HOURS SCHEDULED
Status: COMPLETED | OUTPATIENT
Start: 2021-08-26 | End: 2021-08-27

## 2021-08-26 RX ORDER — PROPOFOL 10 MG/ML
5-50 INJECTION, EMULSION INTRAVENOUS
Status: DISCONTINUED | OUTPATIENT
Start: 2021-08-26 | End: 2021-08-28

## 2021-08-26 RX ORDER — METOLAZONE 5 MG/1
5 TABLET ORAL DAILY
Status: DISCONTINUED | OUTPATIENT
Start: 2021-08-26 | End: 2021-08-30

## 2021-08-26 RX ORDER — FUROSEMIDE 10 MG/ML
10 SYRINGE (ML) INJECTION CONTINUOUS
Status: DISCONTINUED | OUTPATIENT
Start: 2021-08-26 | End: 2021-08-30

## 2021-08-26 RX ADMIN — OLANZAPINE 5 MG: 5 TABLET, ORALLY DISINTEGRATING ORAL at 21:58

## 2021-08-26 RX ADMIN — PROPOFOL 35 MCG/KG/MIN: 10 INJECTION, EMULSION INTRAVENOUS at 23:41

## 2021-08-26 RX ADMIN — CALCIUM GLUCONATE 1 G: 20 INJECTION, SOLUTION INTRAVENOUS at 08:08

## 2021-08-26 RX ADMIN — CHLORHEXIDINE GLUCONATE 0.12% ORAL RINSE 15 ML: 1.2 LIQUID ORAL at 08:31

## 2021-08-26 RX ADMIN — METOCLOPRAMIDE 10 MG: 5 INJECTION, SOLUTION INTRAMUSCULAR; INTRAVENOUS at 23:58

## 2021-08-26 RX ADMIN — DOCUSATE SODIUM AND SENNOSIDES 2 TABLET: 8.6; 5 TABLET, FILM COATED ORAL at 08:29

## 2021-08-26 RX ADMIN — METOROPROLOL TARTRATE 2.5 MG: 5 INJECTION, SOLUTION INTRAVENOUS at 17:00

## 2021-08-26 RX ADMIN — METOLAZONE 5 MG: 5 TABLET ORAL at 13:25

## 2021-08-26 RX ADMIN — METOROPROLOL TARTRATE 2.5 MG: 5 INJECTION, SOLUTION INTRAVENOUS at 23:58

## 2021-08-26 RX ADMIN — ACETAMINOPHEN 650 MG: 650 SUSPENSION ORAL at 06:07

## 2021-08-26 RX ADMIN — ASPIRIN 81 MG: 81 TABLET, CHEWABLE ORAL at 08:27

## 2021-08-26 RX ADMIN — SPIRONOLACTONE 25 MG: 25 TABLET, FILM COATED ORAL at 08:28

## 2021-08-26 RX ADMIN — POLYETHYLENE GLYCOL 3350 17 G: 17 POWDER, FOR SOLUTION ORAL at 08:47

## 2021-08-26 RX ADMIN — Medication 20 MG: at 08:40

## 2021-08-26 RX ADMIN — ONDANSETRON 4 MG: 2 INJECTION INTRAMUSCULAR; INTRAVENOUS at 17:27

## 2021-08-26 RX ADMIN — PROPOFOL 50 MCG/KG/MIN: 10 INJECTION, EMULSION INTRAVENOUS at 05:24

## 2021-08-26 RX ADMIN — ISOSORBIDE DINITRATE 10 MG: 10 TABLET ORAL at 13:24

## 2021-08-26 RX ADMIN — CHLORHEXIDINE GLUCONATE 0.12% ORAL RINSE 15 ML: 1.2 LIQUID ORAL at 21:56

## 2021-08-26 RX ADMIN — HEPARIN SODIUM 5000 UNITS: 5000 INJECTION INTRAVENOUS; SUBCUTANEOUS at 21:56

## 2021-08-26 RX ADMIN — PROPOFOL 50 MCG/KG/MIN: 10 INJECTION, EMULSION INTRAVENOUS at 01:45

## 2021-08-26 RX ADMIN — METOCLOPRAMIDE 10 MG: 5 INJECTION, SOLUTION INTRAMUSCULAR; INTRAVENOUS at 17:40

## 2021-08-26 RX ADMIN — METOROPROLOL TARTRATE 2.5 MG: 5 INJECTION, SOLUTION INTRAVENOUS at 04:05

## 2021-08-26 RX ADMIN — PROPOFOL 50 MCG/KG/MIN: 10 INJECTION, EMULSION INTRAVENOUS at 17:39

## 2021-08-26 RX ADMIN — AMLODIPINE BESYLATE 10 MG: 5 TABLET ORAL at 08:30

## 2021-08-26 RX ADMIN — HYDRALAZINE HYDROCHLORIDE 25 MG: 25 TABLET, FILM COATED ORAL at 21:56

## 2021-08-26 RX ADMIN — HEPARIN SODIUM 5000 UNITS: 5000 INJECTION INTRAVENOUS; SUBCUTANEOUS at 05:36

## 2021-08-26 RX ADMIN — Medication 10 MG/HR: at 13:24

## 2021-08-26 RX ADMIN — ISOSORBIDE DINITRATE 10 MG: 10 TABLET ORAL at 16:59

## 2021-08-26 RX ADMIN — PROPOFOL 50 MCG/KG/MIN: 10 INJECTION, EMULSION INTRAVENOUS at 08:33

## 2021-08-26 RX ADMIN — SODIUM CHLORIDE 0.2 MCG/KG/HR: 9 INJECTION, SOLUTION INTRAVENOUS at 13:26

## 2021-08-26 RX ADMIN — HEPARIN SODIUM 5000 UNITS: 5000 INJECTION INTRAVENOUS; SUBCUTANEOUS at 13:25

## 2021-08-26 RX ADMIN — HYDRALAZINE HYDROCHLORIDE 25 MG: 25 TABLET, FILM COATED ORAL at 13:25

## 2021-08-26 RX ADMIN — FUROSEMIDE 40 MG: 10 INJECTION, SOLUTION INTRAVENOUS at 08:32

## 2021-08-26 RX ADMIN — METOROPROLOL TARTRATE 2.5 MG: 5 INJECTION, SOLUTION INTRAVENOUS at 14:50

## 2021-08-26 RX ADMIN — ISOSORBIDE DINITRATE 10 MG: 10 TABLET ORAL at 08:29

## 2021-08-26 RX ADMIN — PRAVASTATIN SODIUM 40 MG: 40 TABLET ORAL at 16:05

## 2021-08-26 RX ADMIN — METOCLOPRAMIDE 10 MG: 5 INJECTION, SOLUTION INTRAMUSCULAR; INTRAVENOUS at 13:25

## 2021-08-26 RX ADMIN — HYDRALAZINE HYDROCHLORIDE 25 MG: 25 TABLET, FILM COATED ORAL at 05:38

## 2021-08-26 RX ADMIN — ACETAMINOPHEN 649.6 MG: 650 SUSPENSION ORAL at 14:50

## 2021-08-26 RX ADMIN — DOCUSATE SODIUM AND SENNOSIDES 1 TABLET: 8.6; 5 TABLET, FILM COATED ORAL at 17:00

## 2021-08-26 NOTE — ED PROVIDER NOTES
Pt Name: Kelton John  MRN: [id number]  [place] 1940  Age/Sex: 78 y o  male  Date of evaluation: 8/19/2021  PCP: Micah Hunter, [address] [place] [address]    Chief Complaint   Patient presents with    Respiratory Distress     EMS called due to patient feeling CP and SOB x2 days  68% on RA upon EMS arrival  patient arriving on bipap  HPI    Nakul Gallo presents to the Emergency Department complaining of acute respiratory distress  Patient had been complaining of cough and SOB  Today he was acutely worse  He presented with chest pain and SOB on CPAP  EMS found him to be profoundly hypoxic on their arrival   He has been vaccinated against COVID but has had a several visitors that had travelled to [facility] and other [facility]              HPI      Past Medical and Surgical History    Past Medical History:   Diagnosis Date    Actinic keratosis     LAST ASSESSED: [id number]    Allergic rhinitis     LAST ASSESSED: [id number]    Anxiety     LAST ASSESSED: [id number]    Anxiety disorder     LAST ASSESSED: [id number]    Atelectasis of right lung     ABNORMAL CT SCAN OF [address] 12/2/2016 REPEAT NEEDED PER RADIOLOGY IN 3 MONTHS LAST ASSESSED: [id number]    Atypical chest pain     LAST ASSESSED: [id number]    Benign paroxysmal vertigo     LAST ASSESSED: [id number]    Chronic cough     LAST ASSESSED: [id number]    Chronic GERD     Degenerative arthritis of knee, bilateral     LAST ASSESSED: [id number]    Diverticulitis of colon     LAST ASSESSED: [id number]    Diverticulosis     LAST ASSESSED: [id number]    Foreign body, eye     LAST ASSESSED: [id number]    Functional gait abnormality     LAST ASSESSED: [id number]    Hyperlipidemia     Hypertension     Hyponatremia     LAST ASSESSED: [id number]    Leukocytosis     LAST ASSESSED: [id number]    Murmur     Newly recognized murmur     LAST ASSESSED: [id number]    Olecranon bursitis, unspecified elbow     Presence of indwelling urethral catheter     RESOLVED: [id number]    Transient cerebral ischemia     LAST ASSESSED: [id number]    Urinary incontinence     LAST ASSESSED: [id number]    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     LAST ASSESSED: [id number]       Past Surgical History:   Procedure Laterality Date    ADENOIDECTOMY      APPENDECTOMY      COLONOSCOPY  10/2006    FIBEROPTIC    INGUINAL HERNIA REPAIR      JOINT REPLACEMENT      b/l TKR 2015    SINUS SURGERY      TONSILLECTOMY         Family History   Problem Relation Age of Onset    Alzheimer's disease Mother     Coronary artery disease Brother        Social History     Tobacco Use    Smoking status: Former Smoker     Quit date:      Years since quittin 6    Smokeless tobacco: Never Used    Tobacco comment: 2 ppd for 12 years    Vaping Use    Vaping Use: Former   Substance Use Topics    Alcohol use: Yes     Comment: occ, SOCIAL    Drug use: No              Allergies    Allergies   Allergen Reactions    Ace Inhibitors Cough     Pt denied any allergies         Home Medications    Prior to Admission medications    Medication Sig Start Date End Date Taking?  Authorizing Provider   amLODIPine (NORVASC) 10 mg tablet Take 1 tablet (10 mg total) by mouth daily 3/19/21   Elle Stokes, DO   aspirin 81 MG tablet Take 81 mg by mouth     Historical Provider, MD   finasteride (PROSCAR) 5 mg tablet Take 1 tablet (5 mg total) by mouth daily 21   ANATOLY Gilbert   losartan (COZAAR) 25 mg tablet Take 1 tablet (25 mg total) by mouth daily 3/19/21   [facility], DO   multivitamin (THERAGRAN) TABS Take 1 tablet by mouth daily    Historical Provider, MD   Omega-3 Fatty Acids (FISH OIL PO) Take by mouth 2 (two) times a day    Historical Provider, MD   pantoprazole (PROTONIX) 40 mg tablet Take 1 tablet (40 mg total) by mouth 2 (two) times a day 21   Tawnya Crocker MD   polyethylene glycol (MIRALAX) 17 g packet Take 17 g by mouth daily    Historical Provider, MD   pravastatin (PRAVACHOL) 40 mg tablet Take 1 tablet (40 mg total) by mouth daily 3/11/21   Ritesh Esteves DO   Probiotic Product (PROBIOTIC DAILY PO) Take by mouth daily    Historical Provider, MD   sertraline (ZOLOFT) 50 mg tablet Take 1 tablet (50 mg total) by mouth daily 4/12/21   Riteshrafael Esteves DO   tamsulosin [facility]) 0 4 mg Take 1 capsule (0 4 mg total) by mouth daily at bedtime 10/20/20   Marilynn Wall PA-C   VITAMIN D, ERGOCALCIFEROL, PO Take by mouth    Historical Provider, MD           Review of Systems    Review of Systems   Unable to perform ROS: Acuity of condition           All other systems reviewed and negative  Physical Exam      ED Triage Vitals   Temperature Pulse Respirations Blood Pressure SpO2   08/19/21 2051 08/19/21 2029 08/19/21 2029 08/19/21 2028 08/19/21 2028   97 9 °F (36 6 °C) 98 (!) 24 134/63 91 %      Temp Source Heart Rate Source Patient Position - Orthostatic VS BP Location FiO2 (%)   08/19/21 2051 08/19/21 2029 08/19/21 2028 08/19/21 2028 08/19/21 2029   Rectal Monitor Lying Right arm 100      Pain Score       08/19/21 2053       Worst Possible Pain               Physical Exam  Constitutional:       General: He is in acute distress  Appearance: He is ill-appearing, toxic-appearing and diaphoretic  HENT:      Head: Normocephalic  Nose: Nose normal       Mouth/Throat:      Mouth: Mucous membranes are moist    Eyes:      Pupils: Pupils are equal, round, and reactive to light  Cardiovascular:      Rate and Rhythm: Tachycardia present  Heart sounds: Murmur heard  Pulmonary:      Effort: Respiratory distress present  Breath sounds: Rhonchi and rales present  Musculoskeletal:         General: Normal range of motion  Skin:     General: Skin is warm  Neurological:      Mental Status: He is alert  Assessment and Plan    Magnus Ho is a 78 y o  male who presents with acute respiratory distress   Physical examination remarkable for same  Differential diagnosis (not completely inclusive) includes cardiopulmonary vs infectious causes of illness  Plan will be to perform diagnostic testing and treat symptomatically as needed  MDM    Diagnostic Results        EKG INTERPRETATION  EKG Interpretation      EKG interpreted by me  Interpretation by Vicente Fisher DO  EKG reviewed and interpreted independently  Labs:    Results for orders placed or performed during the hospital encounter of 08/19/21   Blood culture #1    Specimen: Arm, Left; Blood   Result Value Ref Range    Blood Culture No Growth After 5 Days  Blood culture #2    Specimen: Arm, Right; Blood   Result Value Ref Range    Blood Culture No Growth After 5 Days      Novel Coronavirus (Covid-19),PCR [facility] - 2 Hour Stat    Specimen: Nasopharyngeal Swab; Nares   Result Value Ref Range    SARS-CoV-2 Negative Negative   Legionella antigen, urine    Specimen: Urine, Other   Result Value Ref Range    Legionella Urinary Antigen Negative Negative   Strep Pneumoniae, Urine    Specimen: Urine, Other   Result Value Ref Range    Strep pneumoniae antigen, urine Negative Negative   NOVEL CORONAVIRUS (COVID-19), PCR [facility]    Specimen: Nose; Nares   Result Value Ref Range    SARS-CoV-2 Negative Negative   Bronchial culture and Gram stain    Specimen: Lung, Right Middle Lobe Bronchoalveolar Lavage; Bronchial   Result Value Ref Range    Bronchial Culture No growth     Gram Stain Result No Polys or Bacteria seen    Bronchial culture and Gram stain    Specimen: Lung, Left Upper Lobe Bronchoalveolar Lavage; Bronchial   Result Value Ref Range    Bronchial Culture No growth     Gram Stain Result No Polys or Bacteria seen    Bronchial culture and Gram stain    Specimen: Tracheal, Washing; Bronchial   Result Value Ref Range    Bronchial Culture No growth     Gram Stain Result Rare Polys     Gram Stain Result No bacteria seen    Pneumocystis smear by DFA    Specimen: Lung, Right Middle Lobe Bronchoalveolar Lavage; Bronchial   Result Value Ref Range    Pneumocystis Smear, DFA  Direct FA negative for Pneumocystis jiroveci (carinii)     Direct FA negative for Pneumocystis jiroveci (carinii)   AFB Culture with Stain    Specimen: Lung, Right Middle Lobe Bronchoalveolar Lavage; Bronchial   Result Value Ref Range    AFB Stain No acid fast bacilli seen    CBC and differential   Result Value Ref Range    WBC 17 11 (H) 4 31 - 10 16 Thousand/uL    RBC 4 57 3 88 - 5 62 Million/uL    Hemoglobin 9 9 (L) 12 0 - 17 0 g/dL    Hematocrit 34 7 (L) 36 5 - 49 3 %    MCV 76 (L) 82 - 98 fL    MCH 21 7 (L) 26 8 - 34 3 pg    MCHC 28 5 (L) 31 4 - 37 4 g/dL    RDW 18 6 (H) 11 6 - 15 1 %    MPV 9 4 8 9 - 12 7 fL    Platelets 438 749 - 079 Thousands/uL    nRBC 0 /100 WBCs    Neutrophils Relative 82 (H) 43 - 75 %    Immat GRANS % 1 0 - 2 %    Lymphocytes Relative 10 (L) 14 - 44 %    Monocytes Relative 6 4 - 12 %    Eosinophils Relative 0 0 - 6 %    Basophils Relative 1 0 - 1 %    Neutrophils Absolute 14 21 (H) 1 85 - 7 62 Thousands/µL    Immature Grans Absolute 0 11 0 00 - 0 20 Thousand/uL    Lymphocytes Absolute 1 65 0 60 - 4 47 Thousands/µL    Monocytes Absolute 1 01 0 17 - 1 22 Thousand/µL    Eosinophils Absolute 0 03 0 00 - 0 61 Thousand/µL    Basophils Absolute 0 10 0 00 - 0 10 Thousands/µL   Comprehensive metabolic panel   Result Value Ref Range    Sodium 142 136 - 145 mmol/L    Potassium 3 7 3 5 - 5 3 mmol/L    Chloride 105 100 - 108 mmol/L    CO2 18 (L) 21 - 32 mmol/L    ANION GAP 19 (H) 4 - 13 mmol/L    BUN 26 (H) 5 - 25 mg/dL    Creatinine 0 97 0 60 - 1 30 mg/dL    Glucose 174 (H) 65 - 140 mg/dL    Calcium 8 0 (L) 8 3 - 10 1 mg/dL    AST 53 (H) 5 - 45 U/L    ALT 62 12 - 78 U/L    Alkaline Phosphatase 78 46 - 116 U/L    Total Protein 7 8 6 4 - 8 2 g/dL    Albumin 4 1 3 5 - 5 0 g/dL    Total Bilirubin 0 37 0 20 - 1 00 mg/dL    eGFR 72 ml/min/1 73sq m   Troponin I   Result Value Ref Range    Troponin I 0  08 (H) <=0 04 ng/mL   NT-BNP PRO   Result Value Ref Range    NT-proBNP 693 (H) <450 pg/mL   Lactic acid   Result Value Ref Range    LACTIC ACID 8 1 (HH) 0 5 - 2 0 mmol/L   Procalcitonin with AM Reflex   Result Value Ref Range    Procalcitonin <0 05 <=0 25 ng/ml   Protime-INR   Result Value Ref Range    Protime 13 9 11 6 - 14 5 seconds    INR 1 09 0 84 - 1 19   APTT   Result Value Ref Range    PTT 26 23 - 37 seconds   Blood gas, arterial   Result Value Ref Range    pH, Arterial 7 294 (L) 7 350 - 7 450    pCO2, Arterial 35 7 (L) 36 0 - 44 0 mm Hg    pO2, Arterial 107 0 75 0 - 129 0 mm Hg    HCO3, Arterial 16 9 (L) 22 0 - 28 0 mmol/L    Base Excess, Arterial -8 8 mmol/L    O2 Content, Arterial 13 6 (L) 16 0 - 23 0 mL/dL    O2 HGB,Arterial  96 1 94 0 - 97 0 %    SOURCE Radial, Right     DARÍO TEST Yes     Non Vent type BIPAP BIPAP     IPAP 14     EPAP 7     BIPAP fio2 60 %   Lactic acid 2 Hours   Result Value Ref Range    LACTIC ACID 5 6 (HH) 0 5 - 2 0 mmol/L   Procalcitonin Reflex   Result Value Ref Range    Procalcitonin 0 14 <=0 25 ng/ml   Magnesium   Result Value Ref Range    Magnesium 2 0 1 6 - 2 6 mg/dL   Phosphorus   Result Value Ref Range    Phosphorus 4 4 (H) 2 3 - 4 1 mg/dL   Calcium, ionized   Result Value Ref Range    Calcium, Ionized 0 99 (L) 1 12 - 1 32 mmol/L   Troponin I   Result Value Ref Range    Troponin I 0 76 (H) <=0 04 ng/mL   Lactic acid, plasma   Result Value Ref Range    LACTIC ACID 3 0 (HH) 0 5 - 2 0 mmol/L   Basic metabolic panel   Result Value Ref Range    Sodium 142 136 - 145 mmol/L    Potassium 3 7 3 5 - 5 3 mmol/L    Chloride 105 100 - 108 mmol/L    CO2 21 21 - 32 mmol/L    ANION GAP 16 (H) 4 - 13 mmol/L    BUN 21 5 - 25 mg/dL    Creatinine 0 82 0 60 - 1 30 mg/dL    Glucose 208 (H) 65 - 140 mg/dL    Calcium 7 7 (L) 8 3 - 10 1 mg/dL    eGFR 82 ml/min/1 73sq m   CBC and differential   Result Value Ref Range    WBC 18 08 (H) 4 31 - 10 16 Thousand/uL    RBC 4 11 3 88 - 5 62 Million/uL Hemoglobin 9 0 (L) 12 0 - 17 0 g/dL    Hematocrit 30 6 (L) 36 5 - 49 3 %    MCV 75 (L) 82 - 98 fL    MCH 21 9 (L) 26 8 - 34 3 pg    MCHC 29 4 (L) 31 4 - 37 4 g/dL    RDW 18 2 (H) 11 6 - 15 1 %    MPV 9 4 8 9 - 12 7 fL    Platelets 418 263 - 371 Thousands/uL    nRBC 0 /100 WBCs    Neutrophils Relative 89 (H) 43 - 75 %    Immat GRANS % 1 0 - 2 %    Lymphocytes Relative 2 (L) 14 - 44 %    Monocytes Relative 8 4 - 12 %    Eosinophils Relative 0 0 - 6 %    Basophils Relative 0 0 - 1 %    Neutrophils Absolute 16 09 (H) 1 85 - 7 62 Thousands/µL    Immature Grans Absolute 0 14 0 00 - 0 20 Thousand/uL    Lymphocytes Absolute 0 30 (L) 0 60 - 4 47 Thousands/µL    Monocytes Absolute 1 51 (H) 0 17 - 1 22 Thousand/µL    Eosinophils Absolute 0 01 0 00 - 0 61 Thousand/µL    Basophils Absolute 0 03 0 00 - 0 10 Thousands/µL   Troponin I   Result Value Ref Range    Troponin I 1 03 (H) <=0 04 ng/mL   Troponin I   Result Value Ref Range    Troponin I 1 11 (H) <=0 04 ng/mL   Hemoglobin A1C w/ EAG Estimation   Result Value Ref Range    Hemoglobin A1C 5 5 Normal 3 8-5 6%; PreDiabetic 5 7-6 4%;  Diabetic >=6 5%; Glycemic control for adults with diabetes <7 0% %     mg/dl   Troponin I   Result Value Ref Range    Troponin I 1 29 (H) <=0 04 ng/mL   APTT six (6) hours after Heparin bolus/drip initiation or dosing change   Result Value Ref Range    PTT 61 (H) 23 - 37 seconds   Troponin I   Result Value Ref Range    Troponin I 1 07 (H) <=0 04 ng/mL   Blood gas, arterial   Result Value Ref Range    pH, Arterial 7 520 (H) 7 350 - 7 450    pCO2, Arterial 32 3 (L) 36 0 - 44 0 mm Hg    pO2, Arterial 55 6 (LL) 75 0 - 129 0 mm Hg    HCO3, Arterial 25 8 22 0 - 28 0 mmol/L    Base Excess, Arterial 3 1 mmol/L    O2 Content, Arterial 12 1 (L) 16 0 - 23 0 mL/dL    O2 HGB,Arterial  87 9 (L) 94 0 - 97 0 %    SOURCE Radial, Right     DARÍO TEST Yes     Non Vent type BIPAP BIPAP     IPAP 12     EPAP 6     BIPAP fio2 50 %   Troponin I   Result Value Ref Range    Troponin I 0 92 (H) <=0 04 ng/mL   Iron Saturation %   Result Value Ref Range    Iron Saturation 7 %    TIBC 422 250 - 450 ug/dL    Iron 29 (L) 65 - 175 ug/dL   Ferritin   Result Value Ref Range    Ferritin 21 8 - 388 ng/mL   APTT   Result Value Ref Range    PTT 60 (H) 23 - 37 seconds   APTT   Result Value Ref Range    PTT 61 (H) 23 - 37 seconds   Basic metabolic panel   Result Value Ref Range    Sodium 145 136 - 145 mmol/L    Potassium 2 7 (LL) 3 5 - 5 3 mmol/L    Chloride 104 100 - 108 mmol/L    CO2 31 21 - 32 mmol/L    ANION GAP 10 4 - 13 mmol/L    BUN 19 5 - 25 mg/dL    Creatinine 0 98 0 60 - 1 30 mg/dL    Glucose 193 (H) 65 - 140 mg/dL    Calcium 7 7 (L) 8 3 - 10 1 mg/dL    eGFR 71 ml/min/1 73sq m   Magnesium   Result Value Ref Range    Magnesium 1 7 1 6 - 2 6 mg/dL   Basic metabolic panel   Result Value Ref Range    Sodium 145 136 - 145 mmol/L    Potassium 2 8 (L) 3 5 - 5 3 mmol/L    Chloride 104 100 - 108 mmol/L    CO2 30 21 - 32 mmol/L    ANION GAP 11 4 - 13 mmol/L    BUN 20 5 - 25 mg/dL    Creatinine 0 93 0 60 - 1 30 mg/dL    Glucose 159 (H) 65 - 140 mg/dL    Calcium 8 1 (L) 8 3 - 10 1 mg/dL    eGFR 76 ml/min/1 73sq m   CBC and differential   Result Value Ref Range    WBC 15 63 (H) 4 31 - 10 16 Thousand/uL    RBC 3 96 3 88 - 5 62 Million/uL    Hemoglobin 8 5 (L) 12 0 - 17 0 g/dL    Hematocrit 28 9 (L) 36 5 - 49 3 %    MCV 73 (L) 82 - 98 fL    MCH 21 5 (L) 26 8 - 34 3 pg    MCHC 29 4 (L) 31 4 - 37 4 g/dL    RDW 18 2 (H) 11 6 - 15 1 %    MPV 9 4 8 9 - 12 7 fL    Platelets 651 952 - 783 Thousands/uL    nRBC 0 /100 WBCs    Neutrophils Relative 86 (H) 43 - 75 %    Immat GRANS % 1 0 - 2 %    Lymphocytes Relative 5 (L) 14 - 44 %    Monocytes Relative 8 4 - 12 %    Eosinophils Relative 0 0 - 6 %    Basophils Relative 0 0 - 1 %    Neutrophils Absolute 13 57 (H) 1 85 - 7 62 Thousands/µL    Immature Grans Absolute 0 08 0 00 - 0 20 Thousand/uL    Lymphocytes Absolute 0 73 0 60 - 4 47 Thousands/µL    Monocytes Absolute 1 22 0 17 - 1 22 Thousand/µL    Eosinophils Absolute 0 00 0 00 - 0 61 Thousand/µL    Basophils Absolute 0 03 0 00 - 0 10 Thousands/µL   Calcium, ionized   Result Value Ref Range    Calcium, Ionized 0 99 (L) 1 12 - 1 32 mmol/L   UA w Reflex to Microscopic w Reflex to Culture    Specimen: Urine, Other   Result Value Ref Range    Color, UA Yellow     Clarity, UA Clear     Specific [place], UA 1 015 1 003 - 1 030    pH, UA 6 0 4 5, 5 0, 5 5, 6 0, 6 5, 7 0, 7 5, 8 0    Leukocytes, UA Negative Negative    Nitrite, UA Negative Negative    Protein, UA Negative Negative mg/dl    Glucose, UA Negative Negative mg/dl    Ketones, UA Negative Negative mg/dl    Urobilinogen, UA 0 2 0 2, 1 0 E U /dl E U /dl    Bilirubin, UA Negative Negative    Blood, UA Small (A) Negative   Urine Microscopic   Result Value Ref Range    RBC, UA 2-4 None Seen, 2-4 /hpf    WBC, UA 0-1 (A) None Seen, 2-4, 5-60 /hpf    Epithelial Cells Occasional None Seen, Occasional /hpf    Bacteria, UA None Seen None Seen, Occasional /hpf   Basic metabolic panel   Result Value Ref Range    Sodium 147 (H) 136 - 145 mmol/L    Potassium 3 2 (L) 3 5 - 5 3 mmol/L    Chloride 108 100 - 108 mmol/L    CO2 28 21 - 32 mmol/L    ANION GAP 11 4 - 13 mmol/L    BUN 25 5 - 25 mg/dL    Creatinine 1 08 0 60 - 1 30 mg/dL    Glucose 194 (H) 65 - 140 mg/dL    Calcium 8 8 8 3 - 10 1 mg/dL    eGFR 63 ml/min/1 73sq m   APTT   Result Value Ref Range    PTT 55 (H) 23 - 37 seconds   Basic metabolic panel   Result Value Ref Range    Sodium 144 136 - 145 mmol/L    Potassium 3 2 (L) 3 5 - 5 3 mmol/L    Chloride 108 100 - 108 mmol/L    CO2 28 21 - 32 mmol/L    ANION GAP 8 4 - 13 mmol/L    BUN 29 (H) 5 - 25 mg/dL    Creatinine 1 11 0 60 - 1 30 mg/dL    Glucose 138 65 - 140 mg/dL    Calcium 8 3 8 3 - 10 1 mg/dL    eGFR 61 ml/min/1 73sq m   Calcium, ionized   Result Value Ref Range    Calcium, Ionized 1 12 1 12 - 1 32 mmol/L   CBC and differential   Result Value Ref Range    WBC 12 72 (H) 4 31 - 10 16 Thousand/uL    RBC 3 70 (L) 3 88 - 5 62 Million/uL    Hemoglobin 8 2 (L) 12 0 - 17 0 g/dL    Hematocrit 28 4 (L) 36 5 - 49 3 %    MCV 77 (L) 82 - 98 fL    MCH 22 2 (L) 26 8 - 34 3 pg    MCHC 28 9 (L) 31 4 - 37 4 g/dL    RDW 18 3 (H) 11 6 - 15 1 %    MPV 10 1 8 9 - 12 7 fL    Platelets 527 978 - 670 Thousands/uL    nRBC 0 /100 WBCs    Neutrophils Relative 83 (H) 43 - 75 %    Immat GRANS % 1 0 - 2 %    Lymphocytes Relative 9 (L) 14 - 44 %    Monocytes Relative 7 4 - 12 %    Eosinophils Relative 0 0 - 6 %    Basophils Relative 0 0 - 1 %    Neutrophils Absolute 10 57 (H) 1 85 - 7 62 Thousands/µL    Immature Grans Absolute 0 11 0 00 - 0 20 Thousand/uL    Lymphocytes Absolute 1 18 0 60 - 4 47 Thousands/µL    Monocytes Absolute 0 83 0 17 - 1 22 Thousand/µL    Eosinophils Absolute 0 00 0 00 - 0 61 Thousand/µL    Basophils Absolute 0 03 0 00 - 0 10 Thousands/µL   Troponin I   Result Value Ref Range    Troponin I 0 55 (H) <=0 04 ng/mL   Magnesium   Result Value Ref Range    Magnesium 2 3 1 6 - 2 6 mg/dL   Blood gas, arterial   Result Value Ref Range    pH, Arterial 7 469 (H) 7 350 - 7 450    PH ART TC 7 458 (H) 7 350 - 7 450    pCO2, Arterial 36 8 36 0 - 44 0 mm Hg    PCO2 (TC) Arterial 37 9 36 0 - 44 0 mm Hg    pO2, Arterial 99 4 75 0 - 129 0 mm Hg    PO2 (TC) Arterial 103 7 75 0 - 129 0 mm Hg    HCO3, Arterial 26 1 22 0 - 28 0 mmol/L    Base Excess, Arterial 2 4 mmol/L    O2 Content, Arterial 11 7 (L) 16 0 - 23 0 mL/dL    O2 HGB,Arterial  96 6 94 0 - 97 0 %    SOURCE Radial, Right     DARÍO TEST Yes     Temperature 99 9 Degrees Fehrenheit    Vent Type- AC AC     AC Rate 18     Tidal Volume 480 ml    Inspired Air (FIO2) 80     PEEP 10    APTT   Result Value Ref Range    PTT 80 (H) 23 - 37 seconds   Vancomycin, trough   Result Value Ref Range    Vancomycin Tr 9 6 (L) 10 0 - 20 0 ug/mL   APTT   Result Value Ref Range    PTT 61 (H) 23 - 37 seconds   Basic metabolic panel   Result Value Ref Range    Sodium 143 136 - 145 mmol/L    Potassium 2 8 (L) 3 5 - 5 3 mmol/L    Chloride 108 100 - 108 mmol/L    CO2 28 21 - 32 mmol/L    ANION GAP 7 4 - 13 mmol/L    BUN 32 (H) 5 - 25 mg/dL    Creatinine 1 18 0 60 - 1 30 mg/dL    Glucose 164 (H) 65 - 140 mg/dL    Calcium 8 5 8 3 - 10 1 mg/dL    eGFR 57 ml/min/1 73sq m   Magnesium   Result Value Ref Range    Magnesium 1 9 1 6 - 2 6 mg/dL   APTT   Result Value Ref Range    PTT 53 (H) 23 - 37 seconds   Calcium, ionized   Result Value Ref Range    Calcium, Ionized 1 15 1 12 - 1 32 mmol/L   CBC and Platelet   Result Value Ref Range    WBC 14 45 (H) 4 31 - 10 16 Thousand/uL    RBC 3 80 (L) 3 88 - 5 62 Million/uL    Hemoglobin 8 6 (L) 12 0 - 17 0 g/dL    Hematocrit 29 8 (L) 36 5 - 49 3 %    MCV 78 (L) 82 - 98 fL    MCH 22 6 (L) 26 8 - 34 3 pg    MCHC 28 9 (L) 31 4 - 37 4 g/dL    RDW 18 7 (H) 11 6 - 15 1 %    Platelets 178 214 - 251 Thousands/uL    MPV 10 2 8 9 - 12 7 fL   APTT   Result Value Ref Range     (H) 23 - 37 seconds   APTT   Result Value Ref Range    PTT 60 (H) 23 - 37 seconds   Basic metabolic panel   Result Value Ref Range    Sodium 144 136 - 145 mmol/L    Potassium 5 4 (H) 3 5 - 5 3 mmol/L    Chloride 111 (H) 100 - 108 mmol/L    CO2 25 21 - 32 mmol/L    ANION GAP 8 4 - 13 mmol/L    BUN 32 (H) 5 - 25 mg/dL    Creatinine 1 25 0 60 - 1 30 mg/dL    Glucose 155 (H) 65 - 140 mg/dL    Calcium 9 0 8 3 - 10 1 mg/dL    eGFR 53 ml/min/1 73sq m   Blood gas, arterial   Result Value Ref Range    pH, Arterial 7 401 7 350 - 7 450    pCO2, Arterial 39 5 36 0 - 44 0 mm Hg    pO2, Arterial 86 2 75 0 - 129 0 mm Hg    HCO3, Arterial 24 0 22 0 - 28 0 mmol/L    Base Excess, Arterial -0 7 mmol/L    O2 Content, Arterial 13 1 (L) 16 0 - 23 0 mL/dL    O2 HGB,Arterial  94 9 94 0 - 97 0 %    SOURCE Radial, Right     DARÍO TEST Yes     Vent Type- AC AC     AC Rate 18     Tidal Volume 420 ml    Inspired Air (FIO2) 60     PEEP 8    APTT   Result Value Ref Range    PTT 50 (H) 23 - 37 seconds   Calcium, ionized Result Value Ref Range    Calcium, Ionized 1 10 (L) 1 12 - 1 32 mmol/L   CBC and differential   Result Value Ref Range    WBC 12 94 (H) 4 31 - 10 16 Thousand/uL    RBC 3 41 (L) 3 88 - 5 62 Million/uL    Hemoglobin 7 6 (L) 12 0 - 17 0 g/dL    Hematocrit 27 1 (L) 36 5 - 49 3 %    MCV 80 (L) 82 - 98 fL    MCH 22 3 (L) 26 8 - 34 3 pg    MCHC 28 0 (L) 31 4 - 37 4 g/dL    RDW 18 9 (H) 11 6 - 15 1 %    MPV 9 5 8 9 - 12 7 fL    Platelets 088 103 - 588 Thousands/uL   Basic metabolic panel   Result Value Ref Range    Sodium 145 136 - 145 mmol/L    Potassium 4 0 3 5 - 5 3 mmol/L    Chloride 112 (H) 100 - 108 mmol/L    CO2 23 21 - 32 mmol/L    ANION GAP 10 4 - 13 mmol/L    BUN 34 (H) 5 - 25 mg/dL    Creatinine 1 36 (H) 0 60 - 1 30 mg/dL    Glucose 204 (H) 65 - 140 mg/dL    Calcium 7 7 (L) 8 3 - 10 1 mg/dL    eGFR 48 ml/min/1 73sq m   APTT   Result Value Ref Range    PTT 70 (H) 23 - 37 seconds   CBC and differential   Result Value Ref Range    WBC 12 71 (H) 4 31 - 10 16 Thousand/uL    RBC 3 89 3 88 - 5 62 Million/uL    Hemoglobin 8 7 (L) 12 0 - 17 0 g/dL    Hematocrit 31 0 (L) 36 5 - 49 3 %    MCV 80 (L) 82 - 98 fL    MCH 22 4 (L) 26 8 - 34 3 pg    MCHC 28 1 (L) 31 4 - 37 4 g/dL    RDW 19 9 (H) 11 6 - 15 1 %    MPV 10 2 8 9 - 12 7 fL    Platelets 829 776 - 754 Thousands/uL   Basic metabolic panel   Result Value Ref Range    Sodium 143 136 - 145 mmol/L    Potassium 4 0 3 5 - 5 3 mmol/L    Chloride 107 100 - 108 mmol/L    CO2 26 21 - 32 mmol/L    ANION GAP 10 4 - 13 mmol/L    BUN 53 (H) 5 - 25 mg/dL    Creatinine 1 44 (H) 0 60 - 1 30 mg/dL    Glucose 169 (H) 65 - 140 mg/dL    Calcium 8 5 8 3 - 10 1 mg/dL    eGFR 45 ml/min/1 73sq m   Blood gas, arterial   Result Value Ref Range    pH, Arterial 7 424 7 350 - 7 450    pCO2, Arterial 38 6 36 0 - 44 0 mm Hg    pO2, Arterial 44 7 (LL) 75 0 - 129 0 mm Hg    HCO3, Arterial 24 7 22 0 - 28 0 mmol/L    Base Excess, Arterial 0 4 mmol/L    O2 Content, Arterial 12 4 (L) 16 0 - 23 0 mL/dL O2 HGB,Arterial  78 2 (L) 94 0 - 97 0 %    SOURCE Radial, Right     VENT- PS PS     PS PEEP 6     PS VENT FIO2 50     PS CM H2O 5    Basic metabolic panel   Result Value Ref Range    Sodium 140 136 - 145 mmol/L    Potassium 4 3 3 5 - 5 3 mmol/L    Chloride 102 100 - 108 mmol/L    CO2 28 21 - 32 mmol/L    ANION GAP 10 4 - 13 mmol/L    BUN 80 (H) 5 - 25 mg/dL    Creatinine 1 47 (H) 0 60 - 1 30 mg/dL    Glucose 191 (H) 65 - 140 mg/dL    Calcium 8 7 8 3 - 10 1 mg/dL    eGFR 43 ml/min/1 73sq m   CBC   Result Value Ref Range    WBC 24 65 (H) 4 31 - 10 16 Thousand/uL    RBC 4 18 3 88 - 5 62 Million/uL    Hemoglobin 9 3 (L) 12 0 - 17 0 g/dL    Hematocrit 33 4 (L) 36 5 - 49 3 %    MCV 80 (L) 82 - 98 fL    MCH 22 2 (L) 26 8 - 34 3 pg    MCHC 27 8 (L) 31 4 - 37 4 g/dL    RDW 20 7 (H) 11 6 - 15 1 %    Platelets 076 395 - 595 Thousands/uL    MPV 10 1 8 9 - 12 7 fL   ECG 12 lead   Result Value Ref Range    Ventricular Rate 96 BPM    Atrial Rate 96 BPM    CA Interval 178 ms    QRSD Interval 128 ms    QT Interval 404 ms    QTC Interval 510 ms    P Axis 45 degrees    QRS Axis -30 degrees    T Wave Axis 94 degrees   ECG 12 lead   Result Value Ref Range    Ventricular Rate 104 BPM    Atrial Rate 104 BPM    CA Interval 174 ms    QRSD Interval 120 ms    QT Interval 384 ms    QTC Interval 504 ms    P Axis 66 degrees    QRS Axis -35 degrees    T Wave Axis 98 degrees   ECG 12 lead   Result Value Ref Range    Ventricular Rate 97 BPM    Atrial Rate 97 BPM    CA Interval 174 ms    QRSD Interval 126 ms    QT Interval 398 ms    QTC Interval 505 ms    P [place] 53 degrees    QRS Axis -33 degrees    T Wave Axis 96 degrees   ECG 12 lead   Result Value Ref Range    Ventricular Rate 100 BPM    Atrial Rate 100 BPM    CA Interval 179 ms    QRSD Interval 117 ms    QT Interval 388 ms    QTC Interval 501 ms    P [place] 38 degrees    QRS Axis -41 degrees    T Wave Axis 3 degrees   ECG 12 lead   Result Value Ref Range    Ventricular Rate 84 BPM    Atrial Rate 84 BPM    ND Interval 175 ms    QRSD Interval 121 ms    QT Interval 475 ms    QTC Interval 562 ms    P Axis 5 degrees    QRS Axis -41 degrees    T Wave Axis 107 degrees   ECG 12 lead   Result Value Ref Range    Ventricular Rate 59 BPM    Atrial Rate 59 BPM    ND Interval 163 ms    QRSD Interval 125 ms    QT Interval 558 ms    QTC Interval 553 ms    P Axis 40 degrees    QRS Axis -25 degrees    T Wave Axis 217 degrees   ECG 12 lead   Result Value Ref Range    Ventricular Rate 80 BPM    Atrial Rate 80 BPM    ND Interval 158 ms    QRSD Interval 121 ms    QT Interval 417 ms    QTC Interval 482 ms    P [place] 32 degrees    QRS Axis -32 degrees    T Wave Axis 128 degrees   ECG 12 lead   Result Value Ref Range    Ventricular Rate 93 BPM    Atrial Rate 93 BPM    ND Interval 163 ms    QRSD Interval 113 ms    QT Interval 354 ms    QTC Interval 441 ms    P Axis 38 degrees    QRS Axis -31 degrees    T Wave Axis 107 degrees   ECG 12 lead   Result Value Ref Range    Ventricular Rate 92 BPM    Atrial Rate 92 BPM    ND Interval 163 ms    QRSD Interval 113 ms    QT Interval 371 ms    QTC Interval 459 ms    P Axis 30 degrees    QRS Axis -31 degrees    T Wave Axis 99 degrees   ECG 12 lead   Result Value Ref Range    Ventricular Rate 112 BPM    Atrial Rate 112 BPM    ND Interval 158 ms    QRSD Interval 108 ms    QT Interval 346 ms    QTC Interval 473 ms    P Axis 31 degrees    QRS Axis -32 degrees    T Wave Axis 75 degrees   Pulmonary cytology   Result Value Ref Range    Case Report       Non-gynecologic Cytology                          Case: [id number]                                  Authorizing Provider:  Earline Guerrero DO      Collected:           08/22/2021 1504              Ordering Location:     [facility]        Received:            08/22/2021 [facility]                                                                            Unit Pathologist:           India Melendez MD                                                          Specimen:    Lung, Right Middle Lobe Bronchoalveolar Lavage, CELL COUNT                                 Final Diagnosis       A  Lung, Right Middle Lobe Bronchoalveolar Lavage, CELL COUNT:  Negative for malignancy  Benign bronchial cells, macrophages, and mixed inflammatory cells  Satisfactory for evaluation  Cell Count    Macrophages 29%  Neutrophil 24%  Lymphocyte 47%  Eosinophil 0%  Basophil 0%        Note       Interpretation performed at [address] [address]        Gross Description          A  4 mL of clear, colorless fluid, received in [facility]           Additional Information       [employer]'s FDA approved ,  and ThinPrep Imaging Duo System are utilized with strict adherence to the 's instruction manual to prepare gynecologic and non-gynecologic cytology specimens for the production of ThinPrep slides as well as for gynecologic ThinPrep imaging  These processes have been validated by our laboratory and/or by the   These tests were developed and their performance characteristics determined by [facility] [address] [place] [address] Laboratory or [address] [place] [address]  They may not be cleared or approved by the [place]  Food and Drug Administration  The FDA has determined that such clearance or approval is not necessary  These tests are used for clinical purposes  They should not be regarded as investigational or for research  This laboratory has been approved by CLIA 88, designated as a high-complexity laboratory and is qualified to perform these tests         Manual Differential(PHLEBS Do Not Order)   Result Value Ref Range    Segmented % 78 (H) 43 - 75 %    Bands % 1 0 - 8 %    Lymphocytes % 7 (L) 14 - 44 %    Monocytes % 9 4 - 12 %    Eosinophils, % 2 0 - 6 %    Basophils % 0 0 - 1 %    Metamyelocytes% 2 (H) 0 - 1 %    Myelocytes % 1 0 - 1 %    Absolute Neutrophils 10 22 (H) 1 85 - 7 62 Thousand/uL    Lymphocytes Absolute 0 91 0 60 - 4 47 Thousand/uL    Monocytes Absolute 1 16 0 00 - 1 22 Thousand/uL    Eosinophils Absolute 0 26 0 00 - 0 40 Thousand/uL    Basophils Absolute 0 00 0 00 - 0 10 Thousand/uL    Total Counted      Anisocytosis Present     Polychromasia Present     Platelet Estimate Adequate Adequate       All labs reviewed and utilized in the medical decision making process    Radiology:    XR chest portable   Final Result   Persistent bilateral infiltrates suspicious for multifocal pneumonia      Typical endotracheal tube and enteric tube      Increased gastric distention               Workstation performed: [id number]         XR chest portable ICU   Final Result      Slight improvement in bilateral parenchymal infiltrative changes  ET tube now at the brent  Workstation performed: [id number]         XR chest portable ICU   Final Result   Worsening bilateral parenchymal changes  Endotracheal tube in the right main bronchus to be pulled back  If this has been pulled back a repeat x-ray for confirmation as indicated clinically  The study was marked in [facility] for immediate notification  Workstation performed: [id number]         XR chest portable ICU   Final Result      Persistent pulmonary edema  Workstation performed: [id number]         XR chest 1 view portable   ED Interpretation   Bilateral diffuse infiltrates      Final Result      Diffuse patchy airspace disease bilaterally  Consider CHF versus multifocal infiltrate  Findings concur with the preliminary ER interpretation        Workstation performed: [id number]         XR chest portable ICU    (Results Pending)       All radiology studies independently viewed by me and interpreted by the radiologist     Procedure    CriticalCare Time  Performed by: Eugene Roland DO  Authorized by: Saloni Vasquez DO     Critical care provider statement:     Critical care time (minutes):  60    Critical care time was exclusive of:  Separately billable procedures and treating other patients and teaching time    Critical care was necessary to treat or prevent imminent or life-threatening deterioration of the following conditions:  Cardiac failure, sepsis and respiratory failure    Critical care was time spent personally by me on the following activities:  Blood draw for specimens, obtaining history from patient or surrogate, development of treatment plan with patient or surrogate, discussions with consultants, evaluation of patient's response to treatment, examination of patient, interpretation of cardiac output measurements, ordering and performing treatments and interventions, ordering and review of laboratory studies, ordering and review of radiographic studies, re-evaluation of patient's condition and review of old charts    I assumed direction of critical care for this patient from another provider in my specialty: no            ED Course of Care and Re-Assessments    Patient met sepsis criteria but clinically looked to be in CHF on arrival   He improved significantly with Bipap, lasix, morphine and ativan  It was decided to wait for some diagnostic testing before initiating aggressive IVF hydration  CXR does look like vascular congestion/CHF but could also represent diffuse multifocal pneumonia or developing ARDS  COVID negative  Chart reviewed and most recent echo with adequate EF and no hx of CHF  BNP elevated but not as significantly as I would have expected for this amount of respiratory distress  Lactic acid significantly elevated  It was decided at this time to initiate sepsis fluid protocol and monitor closely for decompensation  This was discussed with critical care who will assume care of the patient  Fluids calculated by IBW to limit volume       Patient wishes to be full code  Family is at bedside and is aware           Medications   ondansetron (ZOFRAN) injection 4 mg (4 mg Intravenous Not Given 8/20/21 0300)   ondansetron (ZOFRAN) 4 mg/2 mL injection **ADS Override Pull** (  Not Given 8/20/21 0301)   morphine injection 2 mg (2 mg Intravenous Given 8/24/21 2307)   chlorhexidine (PERIDEX) 0 12 % oral rinse 15 mL (15 mL Mouth/Throat Given 8/25/21 2127)   Acetaminophen (TYLENOL) oral suspension 650 mg (650 mg Per NG Tube Given 8/26/21 0607)   aspirin chewable tablet 81 mg (81 mg Oral Given 8/25/21 0806)   omeprazole (PRILOSEC) suspension 2 mg/mL (20 mg Oral Given 8/25/21 0806)   calcium gluconate 1 g in sodium chloride 0 9% 50 mL (premix) (1 g Intravenous New Bag 8/26/21 0808)   metoprolol (LOPRESSOR) injection 2 5 mg (2 5 mg Intravenous Not Given 8/26/21 0500)   spironolactone (ALDACTONE) tablet 25 mg (25 mg Oral Given 8/25/21 0806)   furosemide (LASIX) injection 40 mg (40 mg Intravenous Given 8/25/21 1551)   fentanyl citrate (PF) 100 MCG/2ML 50 mcg (50 mcg Intravenous Given 8/25/21 2302)   polyethylene glycol (MIRALAX) packet 17 g (17 g Oral Given 8/25/21 0806)   heparin (porcine) subcutaneous injection 5,000 Units (5,000 Units Subcutaneous Given 8/26/21 0536)   pravastatin (PRAVACHOL) tablet 40 mg (40 mg Oral Given 8/25/21 1551)   OLANZapine (ZyPREXA ZYDIS) dispersible tablet 5 mg (5 mg Oral Given 8/25/21 2126)   amLODIPine (NORVASC) tablet 10 mg (10 mg Oral Given 8/25/21 1209)   hydrALAZINE (APRESOLINE) injection 10 mg (10 mg Intravenous Given 8/25/21 2025)   senna-docusate sodium (SENOKOT S) 8 6-50 mg per tablet 2 tablet (2 tablets Oral Not Given 8/25/21 1700)   propofol (DIPRIVAN) 1000 mg in 100 mL infusion (premix) (50 mcg/kg/min × 83 1 kg Intravenous New Bag 8/26/21 0079)   hydrALAZINE (APRESOLINE) tablet 25 mg (25 mg Oral Given 8/26/21 0833)   isosorbide dinitrate (ISORDIL) tablet 10 mg (10 mg Oral Given 8/25/21 0537)   morphine injection 2 mg (2 mg Intravenous Given 8/19/21 2053)   LORazepam (ATIVAN) injection 0 5 mg (0 5 mg Intravenous Given 8/19/21 2053)   aspirin rectal suppository 300 mg (300 mg Rectal Given 8/19/21 2049)   furosemide (LASIX) injection 40 mg (40 mg Intravenous Given 8/19/21 2102)   ceftriaxone (ROCEPHIN) 1 g/50 mL in dextrose IVPB (0 mg Intravenous Stopped 8/19/21 2247)   lactated ringers bolus 1,000 mL (0 mL Intravenous Stopped 8/19/21 2328)     Followed by   lactated ringers bolus 1,000 mL (0 mL Intravenous Stopped 8/20/21 1430)   morphine injection 2 mg (2 mg Intravenous Given 8/20/21 0335)   LORazepam (ATIVAN) injection 0 5 mg (0 5 mg Intravenous Given 8/20/21 0428)   furosemide (LASIX) injection 40 mg (40 mg Intravenous Given 8/20/21 0802)   heparin (porcine) injection 4,000 Units (4,000 Units Intravenous Given 8/20/21 1209)   potassium chloride 20 mEq IVPB (premix) (0 mEq Intravenous Stopped 8/20/21 1605)   sodium chloride 0 9 % bolus 500 mL (0 mL Intravenous Stopped 8/20/21 1805)   OLANZapine (ZyPREXA) IM injection 5 mg (5 mg Intramuscular Given 8/20/21 2234)   sterile water injection **ADS Override Pull** (10 mL  Given 8/20/21 2240)   potassium chloride 20 mEq IVPB (premix) (0 mEq Intravenous Stopped 8/21/21 0359)   potassium chloride 20 mEq IVPB (premix) (0 mEq Intravenous Stopped 8/21/21 1038)   OLANZapine (ZyPREXA) IM injection 5 mg (5 mg Intramuscular Given 8/21/21 0515)   sterile water injection **ADS Override Pull** (10 mL Injection Given 8/21/21 0523)   magnesium sulfate 2 g/50 mL IVPB (premix) 2 g (0 g Intravenous Stopped 8/21/21 0911)   potassium chloride 20 mEq IVPB (premix) (0 mEq Intravenous Stopped 8/21/21 1801)   haloperidol lactate (HALDOL) injection 2 mg (2 mg Intramuscular Given 8/21/21 1036)   iron sucrose (VENOFER) 300 mg in sodium chloride 0 9 % 250 mL IVPB (0 mg Intravenous Stopped 8/23/21 1055)   sodium chloride 0 9 % bolus 500 mL (0 mL Intravenous Stopped 8/21/21 4623)   potassium chloride oral solution 40 mEq (40 mEq Oral Given 8/21/21 2000)   potassium chloride 20 mEq IVPB (premix) (0 mEq Intravenous Stopped 8/21/21 2235)   potassium chloride oral solution 40 mEq (40 mEq Oral Given 8/22/21 0626)   potassium chloride oral solution 40 mEq (40 mEq Oral Given 8/22/21 0648)   potassium chloride 20 mEq IVPB (premix) (0 mEq Intravenous Stopped 8/23/21 1145)   potassium chloride oral solution 40 mEq (40 mEq Oral Given 8/23/21 1711)   midazolam (VERSED) injection 2 mg (2 mg Intravenous Given 8/23/21 1412)   fentaNYL (SUBLIMAZE) injection 50 mcg (50 mcg Intravenous Given 8/23/21 1412)           FINAL IMPRESSION    Final diagnoses:   Severe sepsis ([facility] [place] [address] )   Acute respiratory failure (HCC)   Leukocytosis         DISPOSITION/PLAN      Time reflects when diagnosis was documented in both MDM as applicable and the Disposition within this note     Time User Action Codes Description Comment    8/19/2021 10:34 PM Glorious Blander Seok Add [R06 03] Respiratory distress     8/19/2021 10:34 PM Glorious Blander Seok Add [A41 9,  R65 20] Severe sepsis ([facility] [place] [address] )     8/19/2021 10:34 PM Haig [place] Modify [A41 9,  R65 20] Severe sepsis ([facility] [place] [address] )     8/19/2021 10:34 PM Haig [place] Remove [R06 03] Respiratory distress     8/19/2021 10:34 PM Glorious Blander Seok Add [J96 00] Acute respiratory failure ([facility] [place] [address] )     8/19/2021 10:34 PM Haig [place] Modify [A41 9,  R65 20] Severe sepsis ([facility] [place] [address] )     8/19/2021 10:34 PM Haig [place] Modify [J96 00] Acute respiratory failure ([facility] [place] [address] )     8/19/2021 10:34 PM Haig [place] Add [D72 829] Leukocytosis     8/20/2021 11:33 AM Vidal Lau Add [R77 8] Elevated troponin     8/22/2021  3:07 PM Anabela Shukla Modify [A41 9,  R65 20] Severe sepsis ([facility] [place] [address] )     8/22/2021  3:07 PM Anabela Shukla Modify [J96 00] Acute respiratory failure ([address] )     8/22/2021  3:07 PM Anabela Shukla Modify [D72 829] Leukocytosis     8/22/2021  3:07 PM Anabela Shukla Modify [R77 8] Elevated troponin     8/23/2021 11:16 AM Curt Valentine Modify [A41 9,  R60 20] Severe sepsis ([facility] )     8/23/2021 11:16 AM Curt Romero Modify [J96 00] Acute respiratory failure ([facility] )     8/23/2021 11:16 AM Curt Romero Modify [D72 829] Leukocytosis     8/23/2021 11:16 AM Curt Romero Modify [R77 8] Elevated troponin       ED Disposition     ED Disposition Condition Date/Time Comment    Admit Stable u Aug 19, 2021 10:34 PM Case was discussed with Dr Francesca Slaughter and the patient's admission status was agreed to be Stepdown 1 to the service of Dr Francesca Slaughter   Follow-up Information    None           PATIENT REFERRED TO:    No follow-up provider specified  DISCHARGE MEDICATIONS:    Current Discharge Medication List      CONTINUE these medications which have NOT CHANGED    Details   amLODIPine (NORVASC) 10 mg tablet Take 1 tablet (10 mg total) by mouth daily  Qty: 90 tablet, Refills: 1    Associated Diagnoses: Essential hypertension      aspirin 81 MG tablet Take 81 mg by mouth       finasteride (PROSCAR) 5 mg tablet Take 1 tablet (5 mg total) by mouth daily  Qty: 90 tablet, Refills: 2    Associated Diagnoses: BPH with obstruction/lower urinary tract symptoms      losartan (COZAAR) 25 mg tablet Take 1 tablet (25 mg total) by mouth daily  Qty: 90 tablet, Refills: 1    Comments:  Tolerated cozaar in the hospital without cough  Associated Diagnoses: Depression, unspecified depression type      multivitamin (THERAGRAN) TABS Take 1 tablet by mouth daily      Omega-3 Fatty Acids (FISH OIL PO) Take by mouth 2 (two) times a day      pantoprazole (PROTONIX) 40 mg tablet Take 1 tablet (40 mg total) by mouth 2 (two) times a day  Qty: 60 tablet, Refills: 0    Associated Diagnoses: Esophagitis      polyethylene glycol (MIRALAX) 17 g packet Take 17 g by mouth daily      pravastatin (PRAVACHOL) 40 mg tablet Take 1 tablet (40 mg total) by mouth daily  Qty: 90 tablet, Refills: 1    Associated Diagnoses: Mixed hyperlipidemia      Probiotic Product (PROBIOTIC DAILY PO) Take by mouth daily      sertraline (ZOLOFT) 50 mg tablet Take 1 tablet (50 mg total) by mouth daily  Qty: 90 tablet, Refills: 1    Associated Diagnoses: Moderate episode of recurrent major depressive disorder (HCC)      tamsulosin (FLOMAX) 0 4 mg Take 1 capsule (0 4 mg total) by mouth daily at bedtime  Qty: 90 capsule, Refills: 3    Associated Diagnoses: Benign localized prostatic hyperplasia with lower urinary tract symptoms (LUTS)      VITAMIN D, ERGOCALCIFEROL, PO Take by mouth             No discharge procedures on file           Johan Tai, [address] [place] [address], DO  08/26/21 4366

## 2021-08-26 NOTE — PROGRESS NOTES
[facility] [facility]  Progress Note [place] 1940, 78 y o  male MRN: [id number]  Unit/Bed#: ICU 07 Encounter: [id number]  Primary Care Provider: Brandon Bliss DO   Date and time admitted to hospital: 8/19/2021  8:25 PM    * Acute respiratory failure with hypoxia [facility])  Assessment & Plan  · POA: anxiety, generalized fatigue since Tuesday (9/17)  His wife noted that on 8/19 he was more short of breath with associated cough and complaints of chest pain which prompted his visit to the ED  · In the ED patient was noted to be hypoxic, in respiratory distress  Patient improved with lasix, bipap, morphine and ativan  · Clinically patient appears  fluid overloaded, his lung sounds are coarse with scattered rales  He has trace to +1 left lower extremity edema that he reports as new  · COVID-19 negative x2  · Chest x-ray concerning for vascular congestion, multifocal pneumonia  · Continue antibiotics: vanc cefepime and flagyl day 4  Total antibiotics day 6  Consider discontinue antibiotics since bronchial culture currently shows no growth and negative gram stain  Viral culture and cytology still pending  · Monitor off antibiotics  Continue lasix 40 mg BID   · Monitor CBC and fever curve  · Failed conservative treatment and was intubated on 8/21  Continue to try to extubate when appropriate  · Wean FIO2 and PEEP as able with a goal to maintain O2 saturation > 90%  ·       Acute on chronic diastolic CHF (congestive heart failure) (HCC)  Assessment & Plan  · POA:  With signs of volume overload, initial imaging chest x-ray concerning for vascular congestion consistent with CHF  · Most recent echo in 2018 showed grade 1 diastolic dysfunction  · [id number]  · Repeated echo 8/20 with significant worsening of his diastolic dysfunction  · Continue diuresis with lasix 40 mg BID     · Cardiology on board  · plan as outlined above      Chest pain  Assessment & Plan  · POA:  Reported midsternal/epigastric chest pain, persistent, 8/10  · Troponin initially elevated  0 08 continue to trend up in peaked 1 11 currently plateaued 3 85  · EKGs in ED normal sinus rhythm, left axis deviation with nonspecific conduction abnormalities, repeated EKG  normal sinus rhythm, significant ST abnormalities Heparin was discontinued on 8/24  · Continue daily aspirin  Continue metoprolol  · Continue monitor on tele  · Cardiology on board, appreciate the input      CANDIE (acute kidney injury) ([facility] )  Assessment & Plan  Baseline cr appeared to be 0 7-0 9  Recent Labs     08/24/21  0454 08/25/21  0518 08/26/21  0447   CREATININE 1 36* 1 44* 1 47*   EGFR 48 45 43     Estimated Creatinine Clearance: 37 4 mL/min (A) (by C-G formula based on SCr of 1 47 mg/dL (H))  Patient currently needs diuresis for volume overload  Will continue monitor BMP  DC diurese when appropriate  Strict I and O    Sepsis ([facility] )  Assessment & Plan  · POA :  Meets sepsis criteria on presentation tachycardia, tachypnea, lactic acidosis, leukocytosis and suspecting pneumonia    · Monitor off antibiotics for now due to negative culture result  · Rest of plan as outlined above    Hypochromic microcytic anemia  Assessment & Plan  · Has history of iron deficiency anemia due to inadequate dietary iron intake  · Hemoglobin currently stable, there is no signs of active bleeding  · EGD in February 2021 with no source upper GI bleed  · Iron panel:  Iron 29, ferritin 21, patient will require Venofer  · Continue iron supplement when appropriate  · May need OP hematology follow up  · Trend hg, transfuse for <7 0    Lactic acidosis  Assessment & Plan  · POA:  Lactic acid on presentation 8 1 in the setting of sepsis and suspected pneumonia  · Rest of plan as outlined above    Multifocal pneumonia  Assessment & Plan  · Chest x-ray 8/20:  Diffuse patchy airspace disease bilaterally concerning multifocal infiltrates  · CAP versus aspiration  · Tested negative for COVID-19 x2  · Legionella and strep pneumo antigens negative  · Initially started on Rocephin and azithromycin, currently on cefepime and flagyl day 3  Total antibiotics day 5   · Procalcitonin negative x2  Recent Labs     08/24/21  0454 08/25/21  0518 08/26/21  0447   WBC 12 94* 12 71* 24 65*     · Blood cultures shows no growth  · Bronchial lavage culture also shows no growth  · Continue monitor fever curve, WBCs    Hypokalemia  Assessment & Plan  Recent Labs     08/24/21  0454 08/25/21  0518 08/26/21  0447   K 4 0 4 0 4 3     · Keep potassium level > 3 8  · Monitor BMP  · Replete as needed  · Resolved    Mild cognitive impairment  Assessment & Plan  · Record review reveals stable memory issues with outpatient followup with geriatrics  · TSH, B12 nl in past  · Family reports no progression at this point  · Continue outpatient sertraline when appropriate    Aortic stenosis, moderate  Assessment & Plan  · History of mild to moderate aortic stenosis on echo 5/17/2018   · Repeated on 8/20: Showed markedly increased thickness, marked calcification, markedly reduced cuspal separation, reduced mobility, and sclerosis and moderate stenosis  · Cardiology on board  · Currently diuresing  · Monitor fluid volume status closely  · Strict I's and O's      Essential hypertension  Assessment & Plan  · On losartan 25 mg daily, amlodipine 10 mg daily  Hold losartan due to CANDIE  · Currently diuresis   Continue metoprolol 2 5 Q6H  · Continue hold antihypertensive meds, resume when  appropriate      ----------------------------------------------------------------------------------------  HPI/24hr events: no significant overnight events reported    Patient appropriate for transfer out of the ICU today?: No  Disposition: Continue Critical Care   Code Status: Level 1 - Full Code  ---------------------------------------------------------------------------------------  SUBJECTIVE  Unable to provide subjective due to intubation    Review of Systems   Unable to perform ROS: Intubated     Review of systems was unable to be performed secondary to intubation  ---------------------------------------------------------------------------------------  OBJECTIVE    Vitals   Vitals:    21 0359 21 0400 21 0538 21 0545   BP:  140/64 114/57    Pulse:  102     Resp:  (!) 26     Temp:  (!) 100 8 °F (38 2 °C)     TempSrc:  Bladder     SpO2: 98% 98%     Weight:    84 8 kg (186 lb 15 2 oz)   Height:         Temp (24hrs), Av 5 °F (38 6 °C), Min:98 9 °F (37 2 °C), Max:102 9 °F (39 4 °C)  Current: Temperature: (!) 100 8 °F (38 2 °C)          Respiratory:  SpO2: SpO2: 98 %, SpO2 Activity: SpO2 Activity: At Rest, SpO2 Device: O2 Device: Ventilator, Capnography:         Invasive/non-invasive ventilation settings   Respiratory    Lab Data (Last 4 hours)    None         O2/Vent Data (Last 4 hours)       0359           Vent Mode AC/VC       Resp Rate (BPM) (BPM) 20       Vt (mL) (mL) 400       FIO2 (%) (%) 80       PEEP (cmH2O) (cmH2O) 6       MV 12                   Physical Exam  Vitals and nursing note reviewed  Constitutional:       General: He is not in acute distress  Appearance: Normal appearance  He is ill-appearing  HENT:      Head: Normocephalic and atraumatic  Right Ear: External ear normal       Left Ear: External ear normal       Nose: Nose normal       Mouth/Throat:      Mouth: Mucous membranes are moist       Pharynx: Oropharynx is clear  Eyes:      Extraocular Movements: Extraocular movements intact  Conjunctiva/sclera: Conjunctivae normal       Pupils: Pupils are equal, round, and reactive to light  Cardiovascular:      Rate and Rhythm: Normal rate and regular rhythm  Pulses: Normal pulses  Heart sounds: Murmur heard  Pulmonary:      Effort: No respiratory distress  Breath sounds: No rales  Comments: intubated  Abdominal:      General: Bowel sounds are decreased  Palpations: Abdomen is soft  Musculoskeletal:         General: Normal range of motion  Cervical back: Normal range of motion and neck supple  Right lower leg: No edema  Left lower leg: No edema  Skin:     General: Skin is warm and dry  Capillary Refill: Capillary refill takes less than 2 seconds  Findings: Bruising present  Neurological:      General: No focal deficit present  Mental Status: He is alert     Psychiatric:      Comments: sedated         Laboratory and Diagnostics:  Results from last 7 days   Lab Units 08/26/21  0447 08/25/21  0518 08/24/21  0454 08/23/21  0504 08/22/21  0350 08/21/21  0415 08/20/21  0429 08/19/21  2057   WBC Thousand/uL 24 65* 12 71* 12 94* 14 45* 12 72* 15 63* 18 08* 17 11*   HEMOGLOBIN g/dL 9 3* 8 7* 7 6* 8 6* 8 2* 8 5* 9 0* 9 9*   HEMATOCRIT % 33 4* 31 0* 27 1* 29 8* 28 4* 28 9* 30 6* 34 7*   PLATELETS Thousands/uL 296 238 208 232 225 240 277 378   NEUTROS PCT %  --   --   --   --  83* 86* 89* 82*   BANDS PCT %  --   --  1  --   --   --   --   --    MONOS PCT %  --   --   --   --  7 8 8 6   MONO PCT %  --   --  9  --   --   --   --   --      Results from last 7 days   Lab Units 08/26/21  0447 08/25/21  0518 08/24/21  0454 08/23/21  1808 08/23/21  0443 08/22/21  0408 08/21/21  1811 08/19/21  2057   SODIUM mmol/L 140 143 145 144 143 144 147* 142   POTASSIUM mmol/L 4 3 4 0 4 0 5 4* 2 8* 3 2* 3 2* 3 7   CHLORIDE mmol/L 102 107 112* 111* 108 108 108 105   CO2 mmol/L 28 26 23 25 28 28 28 18*   ANION GAP mmol/L 10 10 10 8 7 8 11 19*   BUN mg/dL 80* 53* 34* 32* 32* 29* 25 26*   CREATININE mg/dL 1 47* 1 44* 1 36* 1 25 1 18 1 11 1 08 0 97   CALCIUM mg/dL 8 7 8 5 7 7* 9 0 8 5 8 3 8 8 8 0*   GLUCOSE RANDOM mg/dL 191* 169* 204* 155* 164* 138 194* 174*   ALT U/L  --   --   --   --   --   --   --  62   AST U/L  --   --   --   --   --   --   --  53*   ALK PHOS U/L  --   --   --   --   --   --   --  78   ALBUMIN g/dL  --   --   --   --   --   --   --  4 1   TOTAL BILIRUBIN mg/dL  --   --   --   --   --   --   --  0 37     Results from last 7 days   Lab Units 08/23/21  0443 08/22/21  0408 08/21/21  0031 08/19/21 2057   MAGNESIUM mg/dL 1 9 2 3 1 7 2 0   PHOSPHORUS mg/dL  --   --   --  4 4*      Results from last 7 days   Lab Units 08/24/21  0756 08/24/21  0013 08/23/21  1808 08/23/21  1057 08/23/21  0443 08/22/21  1706 08/22/21  1132 08/19/21 2057   INR   --   --   --   --   --   --   --  1 09   PTT seconds 70* 50* 60* 110* 53* 61* 80* 26      Results from last 7 days   Lab Units 08/22/21  0352 08/20/21  1834 08/20/21  1446 08/20/21  1131 08/20/21  0832 08/20/21  0440 08/20/21  0103   TROPONIN I ng/mL 0 55* 0 92* 1 07* 1 29* 1 11* 1 03* 0 76*     Results from last 7 days   Lab Units 08/20/21  0103 08/19/21  2300 08/19/21 2102   LACTIC ACID mmol/L 3 0* 5 6* 8 1*     ABG:  Results from last 7 days   Lab Units 08/25/21  1148   PH ART  7 424   PCO2 ART mm Hg 38 6   PO2 ART mm Hg 44 7*   HCO3 ART mmol/L 24 7   BASE EXC ART mmol/L 0 4   ABG SOURCE  Radial, Right     VBG:  Results from last 7 days   Lab Units 08/25/21  1148   ABG SOURCE  Radial, Right     Results from last 7 days   Lab Units 08/20/21  0429 08/19/21 2057   PROCALCITONIN ng/ml 0 14 <0 05       Micro  Results from last 7 days   Lab Units 08/22/21  1506 08/22/21  1503 08/22/21  1501 08/20/21  0104 08/19/21  2102 08/19/21  2030   BLOOD CULTURE   --   --   --   --  No Growth After 5 Days  No Growth After 5 Days  GRAM STAIN RESULT  Rare Polys  No bacteria seen No Polys or Bacteria seen No Polys or Bacteria seen  --   --   --    LEGIONELLA URINARY ANTIGEN   --   --   --  Negative  --   --    STREP PNEUMONIAE ANTIGEN, URINE   --   --   --  Negative  --   --        EKG: NSr  Imaging: I have personally reviewed pertinent reports     and I have personally reviewed pertinent films in PACS    Intake and Output  I/O       08/24 0701 - 08/25 0700 08/25 0701 - 08/26 0700    I V  (mL/kg) 605 6 (7 3) 163 1 (1 9)    NG/GT 1310 465 IV Piggyback  80    Feedings 910 160    Total Intake(mL/kg) 2825 6 (34) 868 1 (10 2)    Urine (mL/kg/hr) 3635 (1 8) 1010 (0 5)    Stool 0 0    Total Output 3635 1010    Net -809 4 -141 9          Unmeasured Stool Occurrence 2 x 1 x          Height and Weights   Height: 5' 4 02" (162 6 cm)  IBW (Ideal Body Weight): 59 24 kg  Body mass index is 32 07 kg/m²  Weight (last 2 days)     Date/Time   Weight    08/26/21 0545   84 8 (186 95)    08/25/21 0600   83 1 (183 2)    08/24/21 0550   81 8 (180 34)                Nutrition       Diet Orders   (From admission, onward)             Start     Ordered    08/24/21 0607  Diet Enteral/Parenteral; Tube Feeding No Oral Diet; Jevity 1 2 Lg; Continuous; 40; Prosource Protein Liquid - Two Packets; 125; Water; Every 4 hours  Diet effective now     Question Answer Comment   Diet Type Enteral/Parenteral    Enteral/Parenteral Tube Feeding No Oral Diet    Tube Feeding Formula: Jevity 1 2 Lg    Bolus/Cyclic/Continuous Continuous    Tube Feeding Goal Rate (mL/hr): 40    Prosource Protein Liquid - No Carb Prosource Protein Liquid - Two Packets    Tube Feeding flush (mL): 125    Water Flush type: Water    Water flush frequency: Every 4 hours    RD to adjust diet per protocol?  Yes        08/24/21 4840                  Active Medications  Scheduled Meds:  Current Facility-Administered Medications   Medication Dose Route Frequency Provider Last Rate    Acetaminophen  650 mg Per NG Tube Q6H PRN Max 4/day Flynn Clay MD      amLODIPine  10 mg Oral Daily ANATLOY Weiss      aspirin  81 mg Oral Daily Lidya Steve DO      calcium gluconate  1 g Intravenous Daily Curt Alexander MD Stopped (08/25/21 0901)    chlorhexidine  15 mL Mouth/Throat Q12H [address] Brent Bettencourt MD      fentanyl citrate (PF)  50 mcg Intravenous Q1H PRN Grady Robertson PA-C      furosemide  40 mg Intravenous BID (diuretic) Grady Robertson PA-C      heparin (porcine)  5,000 Units Subcutaneous Q8H [address] , [place]      hydrALAZINE  10 mg Intravenous Q6H PRN ANATOLY Nguyen      hydrALAZINE  25 mg Oral [facility] Lisa Silver MD      isosorbide dinitrate  10 mg Oral TID after meals Lisa Silver MD      metoprolol  2 5 mg Intravenous Q6H [facility] Nicolasa Joshi MD      morphine injection  2 mg Intravenous Q4H PRN Curt Mcclure MD      OLANZapine  5 mg Oral HS Laurence Pitts PA-C      omeprazole (PRILOSEC) suspension 2 mg/mL  20 mg Oral Daily Lidya Steve DO      ondansetron  4 mg Intravenous Once ANATOLY [place]      polyethylene glycol  17 g Oral Daily Curt Mcclure MD      pravastatin  40 mg Oral Daily [facility]      propofol  5-50 mcg/kg/min Intravenous Titrated ANATOLY Nguyen 50 mcg/kg/min (08/26/21 0524)    senna-docusate sodium  2 tablet Oral BID Curt Mccluer MD      spironolactone  25 mg Oral Daily Nicolasa Joshi MD       Continuous Infusions:  propofol, 5-50 mcg/kg/min, Last Rate: 50 mcg/kg/min (08/26/21 0524)      PRN Meds:   Acetaminophen, 650 mg, Q6H PRN Max 4/day  fentanyl citrate (PF), 50 mcg, Q1H PRN  hydrALAZINE, 10 mg, Q6H PRN  morphine injection, 2 mg, Q4H PRN        Invasive Devices Review  Invasive Devices     Peripheral Intravenous Line            Peripheral IV 08/20/21 Left;Ventral (anterior) Forearm 5 days    Peripheral IV 08/21/21 Left;Upper;Ventral (anterior) Arm 4 days    Peripheral IV 08/22/21 Right Hand 3 days          Drain            Urethral Catheter Temperature probe 5 days    NG/OG/Enteral Tube 16 Fr Center mouth 4 days          Airway            ETT  Cuffed 8 mm 4 days                Rationale for remaining devices: acute resp failure  ---------------------------------------------------------------------------------------  Advance Directive and Living Will: Yes    Power of :    POLST: ---------------------------------------------------------------------------------------  Care Time Delivered:   Upon my evaluation, this patient had a high probability of imminent or life-threatening deterioration due to acute resp failure, which required my direct attention, intervention, and personal management  I have personally provided 30 minutes (0630 to 0700) of critical care time, exclusive of procedures, teaching, family meetings, and any prior time recorded by providers other than myself  Leonor Martínez MD      Portions of the record may have been created with voice recognition software  Occasional wrong word or "sound a like" substitutions may have occurred due to the inherent limitations of voice recognition software    Read the chart carefully and recognize, using context, where substitutions have occurred

## 2021-08-26 NOTE — ASSESSMENT & PLAN NOTE
· Chest x-ray 8/20:  Diffuse patchy airspace disease bilaterally concerning multifocal infiltrates  · CAP versus aspiration  · Tested negative for COVID-19 x2  · Legionella and strep pneumo antigens negative  · Initially started on Rocephin and azithromycin, currently on cefepime and flagyl day 3  Total antibiotics day 5   · Procalcitonin negative x2  Recent Labs     08/24/21  0454 08/25/21  0518 08/26/21  0447   WBC 12 94* 12 71* 24 65*     · Blood cultures shows no growth  · Bronchial lavage culture also shows no growth     · Continue monitor fever curve, WBCs

## 2021-08-26 NOTE — PROGRESS NOTES
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  2  Moderate to severe aortic valve stenosis   3  Hypoxemic respiratory failure   4  Metabolic encephalopathy   5  Sepsis 2/2 Multifocal pneumonia   6  Mild cognitive impairment   7  Essential Hypertension  8  Pulmonary Hypertension   9  Iron deficiency anemia  10  Acute Kidney Injuy, creatinine continues to up trend (currently 1 47 and baseline around 0 6-0 8)    8/26/21: Patients BP improved today from previous systolic's of 613  Currently ranging from 130-160  Lasix IV transitioned to GGT  Ongoing GOC discussion with family, still full code for now  Had lengthy discussion with family at bedside  Answered all questions  Patient Active Problem List   Diagnosis    Mixed hyperlipidemia    Essential hypertension    Aortic stenosis, moderate    Chest pain    Enlarged prostate without lower urinary tract symptoms (luts)    Fatty liver disease, nonalcoholic    Moderate episode of recurrent major depressive disorder ([address] )    PVC (premature ventricular contraction)    Medicare annual wellness visit, subsequent    Mild cognitive impairment    Obesity (BMI 30 0-34  9)    Do not resuscitate status    Chronic constipation    Hypokalemia    Iron deficiency anemia secondary to inadequate dietary iron intake    Multifocal pneumonia    Acute respiratory failure with hypoxia (HCC)    Acute on chronic diastolic CH[facility]congestive [facility] failure) [facility]    Lactic acidosis    Hypochromic microcytic anemia    Sepsis ([facility] )    CANDIE (acute kidney injury) ([facility] )        PLAN:  -- c/w Lasix ggt and additional Metolazone 5mg as per [facility]  -- c/w Amlodipine 10mg PO Daily  -- c/w Aspirin 81mg PO Daily  -- c/w Metoprolol 2 5mg IV Q6H  Can increase to 5mg if needed and tolerating   -- c/w Aldactone 25mg PO Daily  -- c/w Isordil 10mg PO TID  -- c/w Hydralazine 25mg PO Q8H   -- Monitoring off antibiotics for now given negative cultures to date  -- Continued respiratory care and attempts at weaning as per [facility]  -- Patient will need to have his Aortic Stenosis re-evaluated once he is extubated and a bit more stabilized  He clinically appears to have severe AS as opposed to moderate as per last echocardiogram  Will repeat TTE when able  *-*-*-*-*-*-*-*-*-*-*-*-*-*-*-*-*-*-*-*-*-*-*-*-*-*-*-*-*-*-*-*-*-*-*-*-*-*-*-*-*-*-*-*-*-*-*-*-*-*-*-*-*-*-  INTERVAL CHANGES / HISTORY OF PRESENT ILLNESS:  No acute events overnight  Patient remains intubated and sedated  *-*-*-*-*-*-*-*-*-*-*-*-*-*-*-*-*-*-*-*-*-*-*-*-*-*-*-*-*-*-*-*-*-*-*-*-*-*-*-*-*-*-*-*-*-*-*-*-*-*-*-*-*-*  REVIEW OF SYMPTOMS:    Positive for: Cannot be obtained  Negative for: All remaining as reviewed below and in HPI   SYSTEM SYMPTOMS REVIEWED:  General--weight change, fever, night sweats  Respiratoryl-- Wheezing, shortness of breath, cough, URI symptoms, sputum, blood  Cardiovascular--chest pain, syncope, dyspnea on exertion, edema, decline in exercise tolerance, claudication   Gastrointestinal--persistent vomiting, diarrhea, abdominal distention, blood in stool   Muscular or skeletal--joint pain or swelling   Neurologic--headaches, syncope, abnormal movement  Hematologic--history of easy bruising and bleeding   Endocrine--thyroid enlargement, heat or cold intolerance, polyuria   Psychiatric--anxiety, depression *-*-*-*-*-*-*-*-*-*-*-*-*-*-*-*-*-*-*-*-*-*-*-*-*-*-*-*-*-*-*-*-*-*-*-*-*-*-*-*-*-*-*-*-*-*-*-*-*-*-*-*-*-*-  VITAL SIGNS:  Vitals:    21 0917 21 1001 21 1201 21 1409   BP:  137/61 164/74 155/69   Pulse:  96 102 (!) 114   Resp:  (!) 26 (!) 31 (!) 36   Temp: 100 °F (37 8 °C) 99 7 °F (37 6 °C) 100 °F (37 8 °C) (!) 100 8 °F (38 2 °C)   TempSrc:       SpO2:  96% 96% 93%   Weight:       Height: 5' 4" (1 626 m)         Temp (24hrs), Av 2 °F (37 9 °C), Min:98 9 °F (37 2 °C), Max:100 8 °F (38 2 °C)  Current: Temperature: (!) 100 8 °F (38 2 °C)    Weight (last 2 days)     Date/Time   Weight    21 0545   84 8 (186 95)    21 0600   83 1 (183 2)    21 0550   81 8 (180 34)            Body mass index is 32 09 kg/m²  Wt Readings from Last 3 Encounters:   21 84 8 kg (186 lb 15 2 oz)   21 83 5 kg (184 lb 2 oz)   21 82 8 kg (182 lb 9 6 oz)      Intake/Output Summary (Last 24 hours) at 2021 1442  Last data filed at 2021 0745  Gross per 24 hour   Intake 719 4 ml   Output 365 ml   Net 354 4 ml        *-*-*-*-*-*-*-*-*-*-*-*-*-*-*-*-*-*-*-*-*-*-*-*-*-*-*-*-*-*-*-*-*-*-*-*-*-*-*-*-*-*-*-*-*-*-*-*-*-*-*-*-*-*-  PHYSICAL EXAM:  General Appearance:   sedated and obtunded, appears comfortable in no respiratory distress while on mechanical ventilation  Head, Eyes, ENT:    No obvious abnormality, moist mucous mebranes  Neck:   Supple,   Unable to assess for jugular venous distension or carotid bruit   Back:     Symmetric, no curvature  Lungs:     Respirations unlabored   Decreased and coarse breath sounds without obvious crackles,    Chest wall:    No tenderness or deformity   Heart:    Regular rate and rhythm, slightly tachycardic, harsh, mid to late-peaking systolic murmur along sternal border consistent with aortic valve stenosis, displaced cardiac impulse    Abdomen:     Soft, non-tender, No obvious masses, or organomegaly   Extremities:   Extremities warm, no cyanosis  Trace edema   Skin:   Skin color, texture, turgor normal, no rashes or lesions     *-*-*-*-*-*-*-*-*-*-*-*-*-*-*-*-*-*-*-*-*-*-*-*-*-*-*-*-*-*-*-*-*-*-*-*-*-*-*-*-*-*-*-*-*-*-*-*-*-*-*-*-*-*-  TELEMETRY, LAST ECG:  Telemetry reviewed    Sinus rhythm with sinus tachycardia  Single occurrence of ventricular triplet   Results for orders placed or performed during the hospital encounter of 08/19/21   ECG 12 lead   Result Value    Ventricular Rate 112    Atrial Rate 112    ID Interval 158    QRSD Interval 108    QT Interval 346    QTC Interval 473    P Axis 31    QRS Axis -32    T Wave Axis 75    Narrative    Sinus tachycardia  Right atrial enlargement  Left axis deviation  Pulmonary disease pattern  Voltage criteria for left ventricular hypertrophy  ST elevation, consider early repolarization, pericarditis, or injury  Marked ST abnormality, possible lateral subendocardial injury  Abnormal ECG  When compared with ECG of 24-AUG-2021 16:56,  Premature ectopic complexes are no longer Present  Confirmed by Nicolasa Joshi ([id number]) on 8/25/2021 11:11:23 PM      *-*-*-*-*-*-*-*-*-*-*-*-*-*-*-*-*-*-*-*-*-*-*-*-*-*-*-*-*-*-*-*-*-*-*-*-*-*-*-*-*-*-*-*-*-*-*-*-*-*-*-*-*-*-    CURRENT SCHEDULED MEDICATIONS:    Current Facility-Administered Medications:     Acetaminophen (TYLENOL) oral suspension 650 mg, 650 mg, Per NG Tube, Q6H PRN Max 4/day, Israel Piña MD, 650 mg at 08/26/21 0607    amLODIPine (NORVASC) tablet 10 mg, 10 mg, Oral, Daily, ANATOLY Sanabria, 10 mg at 08/26/21 0830    aspirin chewable tablet 81 mg, 81 mg, Oral, Daily, Lidya Steve DO, 81 mg at 08/26/21 0827    calcium gluconate 1 g in sodium chloride 0 9% 50 mL (premix), 1 g, Intravenous, Daily, Curt Carvajal MD, Last Rate: 100 mL/hr at 08/26/21 0808, 1 g at 08/26/21 0808    chlorhexidine (PERIDEX) 0 12 % oral rinse 15 mL, 15 mL, Mouth/Throat, Q12H [address], Christina Reinoso MD, 15 mL at 08/26/21 0831    dexmedetomidine (PRECEDEX) 400 mcg in sodium chloride 0 9 % 100 mL infusion, 0 1-0 7 mcg/kg/hr, Intravenous, Titrated, Carina Angelo MD, Last Rate: 4 2 mL/hr at 08/26/21 1326, 0 2 mcg/kg/hr at 08/26/21 1326    fentanyl citrate (PF) 100 MCG/2ML 50 mcg, 50 mcg, Intravenous, Q1H PRN, Enzo Horton PA-C, 50 mcg at 08/25/21 2302    furosemide (LASIX) 500 mg infusion 50 mL, 10 mg/hr, Intravenous, Continuous, Carina Angelo MD, Last Rate: 1 mL/hr at 08/26/21 1324, 10 mg/hr at 08/26/21 1324    heparin (porcine) subcutaneous injection 5,000 Units, 5,000 Units, Subcutaneous, Q8H [address], Laurence Pitts PA-C, 5,000 Units at 08/26/21 1325    hydrALAZINE (APRESOLINE) injection 10 mg, 10 mg, Intravenous, Q6H PRN, ANATOLY Sanabria, 10 mg at 08/25/21 2025    hydrALAZINE (APRESOLINE) tablet 25 mg, 25 mg, Oral, Q8H [address], Teodoro Neves MD, 25 mg at 08/26/21 1325    isosorbide dinitrate (ISORDIL) tablet 10 mg, 10 mg, Oral, TID after meals, Teodoro Neves MD, 10 mg at 08/26/21 1324    metoclopramide (REGLAN) injection 10 mg, 10 mg, Intravenous, Q6H [address], Curt Carvajal MD, 10 mg at 08/26/21 1325    metolazone (ZAROXOLYN) tablet 5 mg, 5 mg, Oral, Daily, Carina Angelo MD, 5 mg at 08/26/21 1325    metoprolol (LOPRESSOR) injection 2 5 mg, 2 5 mg, Intravenous, Q6H [address], Nicolasa Joshi MD, 2 5 mg at 08/26/21 0405    morphine injection 2 mg, 2 mg, Intravenous, Q4H PRN, Curt Carvajal MD, 2 mg at 08/24/21 2307    OLANZapine (ZyPREXA ZYDIS) dispersible tablet 5 mg, 5 mg, Oral, HS, Laurence Pitts PA-C, 5 mg at 08/25/21 2126    omeprazole (PRILOSEC) suspension 2 mg/mL, 20 mg, Oral, Daily, Lidya Steve DO, 20 mg at 08/26/21 0840    ondansetron (ZOFRAN) injection 4 mg, 4 mg, Intravenous, Once, ANATOLY White    polyethylene glycol (MIRALAX) packet 17 g, 17 g, Oral, Daily, Curt Carvajal MD, 17 g at 08/26/21 0847    pravastatin (PRAVACHOL) tablet 40 mg, 40 mg, Oral, Daily With Dinner, Enzo Horton PA-C, 40 mg at 08/25/21 1551    senna-docusate sodium (SENOKOT S) 8 6-50 mg per tablet 2 tablet, 2 tablet, Oral, BID, Curt White MD, 2 tablet at 08/26/21 5136    spironolactone (ALDACTONE) tablet 25 mg, 25 mg, Oral, Daily, Nicolasa Joshi MD, 25 mg at 08/26/21 9495 ALLERGIES:  Allergies   Allergen Reactions    Ace Inhibitors Cough     Pt denied any allergies          *-*-*-*-*-*-*-*-*-*-*-*-*-*-*-*-*-*-*-*-*-*-*-*-*-*-*-*-*-*-*-*-*-*-*-*-*-*-*-*-*-*-*-*-*-*-*-*-*-*-*-*-*-*  LABORATORY DATA:  I have personally reviewed pertinent labs  CMP:  Results from last 7 days   Lab Units 08/26/21  0447 08/25/21  0518 08/24/21  0454 08/19/21 2057   SODIUM mmol/L 140 143 145 142   POTASSIUM mmol/L 4 3 4 0 4 0 3 7   CHLORIDE mmol/L 102 107 112* 105   CO2 mmol/L 28 26 23 18*   BUN mg/dL 80* 53* 34* 26*   CREATININE mg/dL 1 47* 1 44* 1 36* 0 97   CALCIUM mg/dL 8 7 8 5 7 7* 8 0*   ALK PHOS U/L  --   --   --  78   ALT U/L  --   --   --  62   AST U/L  --   --   --  53*        Results from last 7 days   Lab Units 08/23/21  0443 08/22/21  0408 08/21/21  0031   MAGNESIUM mg/dL 1 9 2 3 1 7     Results from last 7 days   Lab Units 08/19/21  2057   PHOSPHORUS mg/dL 4 4*    Cardiac Profile:   Results from last 7 days   Lab Units 08/22/21  0352 08/20/21  1834 08/20/21  1446 08/19/21  2057   TROPONIN I ng/mL 0 55* 0 92* 1 07* 0 08*   NT-PRO BNP pg/mL  --   --   --  693*     Results from last 7 days   Lab Units 08/20/21  0429   HEMOGLOBIN A1C % 5 5            CBC:   Results from last 7 days   Lab Units 08/26/21  0447 08/25/21  0518 08/24/21  0454   WBC Thousand/uL 24 65* 12 71* 12 94*   HEMOGLOBIN g/dL 9 3* 8 7* 7 6*   HEMATOCRIT % 33 4* 31 0* 27 1*   PLATELETS Thousands/uL 296 238 208     PT/INR: No results found for: PT, INR, Microbiology:   Results from last 7 days   Lab Units 08/22/21  1506 08/22/21  1503 08/22/21  1501 08/20/21  0104 08/19/21  2102 08/19/21  2030   BLOOD CULTURE   --   --   --   --  No Growth After 5 Days   No Growth After 5 Days  GRAM STAIN RESULT  Rare Polys  No bacteria seen No Polys or Bacteria seen No Polys or Bacteria seen  --   --   --    STREP PNEUMONIAE ANTIGEN, URINE   --   --   --  Negative  --   --    LEGIONELLA URINARY ANTIGEN   --   --   --  Negative  --   --           *-*-*-*-*-*-*-*-*-*-*-*-*-*-*-*-*-*-*-*-*-*-*-*-*-*-*-*-*-*-*-*-*-*-*-*-*-*-*-*-*-*-*-*-*-*-*-*-*-*-*-*-*-*-  IMAGING STUDIES REPORTS: Imaging studies results reviewed    No valid procedures specified  No Chest XR results available for this patient  *-*-*-*-*-*-*-*-*-*-*-*-*-*-*-*-*-*-*-*-*-*-*-*-*-*-*-*-*-*-*-*-*-*-*-*-*-*-*-*-*-*-*-*-*-*-*-*-*-*-*-*-*-*-  ECHOCARDIOGRAM AND OTHER CARDIAC TESTS RESULTS:  Results for orders placed during the hospital encounter of 21    Echo complete with contrast if indicated    Narrative  [address] [place] [address], [address] [facility]  [phone]    Transthoracic Echocardiogram  2D, M-mode, Doppler, and Color Doppler    Study date:  20-Aug-2021    Patient: Maritza Cadena  MR number: [id number]  Account number: [de-identified]  : 1940  Age: 78 years  Gender: Male  Status: Inpatient  Location: Bedside  Height: 64 in  Weight: 187 7 lb  BP: 160/ 73 mmHg    Indications: Heart failure    Diagnoses: I50 9 - Heart failure, unspecified    Sonographer:  Nick Shay RDCS  Primary Physician:  Bola Pablo DO  Referring Physician:  ANATOLY Miranda  Group:  [facility]  Interpreting Physician:  Terrea Rodes, DO    SUMMARY    LEFT VENTRICLE:  Systolic function was at the lower limits of normal  Ejection fraction was estimated to be 50 %  There was mild to moderate hypokinesis of the mid-apical septal myocardial wall segments  Wall thickness was mildly increased    Features were likely suggestive of a pseudonormal left ventricular filling pattern, with concomitant abnormal relaxation and increased filling pressure (grade 2 diastolic dysfunction)  LEFT ATRIUM:  The atrium was mildly dilated  MITRAL VALVE:  There was moderate annular calcification  There was mild stenosis (mean diastolic transvalvular gradient ~ 3 3 mmHg at HR 86 BPM)  There was mild regurgitation  AORTIC VALVE:  The valve was trileaflet  Leaflets exhibited markedly increased thickness, marked calcification, markedly reduced cuspal separation, reduced mobility, and sclerosis  There was moderate stenosis  There was mild regurgitation  The peak valve velocity was 3 3 cm/s  Valve mean gradient was 22 mmHg  The aortic valve obstructive index (by VTI) was 0 29  Difficult to accurately estimate aortic valve area due to suboptimal LVOT visualization and measurements  TRICUSPID VALVE:  There was mild regurgitation  AORTA:  The root exhibited mild dilatation (4 1 cm [2 1 cm/m2] at the sinuses of valsalva), and mild fibrocalcific change  SUMMARY MEASUREMENTS  2D measurements:  Unspecified Anatomy:   %FS was 23 9 %  Ao Diam was 3 9 cm  Ao asc was 3 4 cm   EDV(Teich) was 110 5 ml   EF(Teich) was 47 6 %  ESV(Teich) was 57 9 ml  IVSd was 1 2 cm  LA Diam was 4 5 cm  LAAs A2C was 27 1 cm2  LAAs A4C was 26 2  cm2  LAESV A-L A2C was 101 1 ml  LAESV A-L A4C was 92 8 ml  LAESV Index (A-L) was 51 1 ml/m2  LAESV MOD A2C was 98 2 ml  LAESV MOD A4C was 86 6 ml  LAESV(A-L) was 97 5 ml  LAESV(MOD BP) was 92 3 ml  LAESVInd MOD BP was 48 4 ml/m2  LALs A2C was 6 2 cm  LALs A4C was 6 3 cm  LVEDV MOD A4C was 195 6 ml  LVEF MOD A4C was 42 1 %  LVESV MOD A4C was 113 2 ml  LVIDd was 4 9 cm  LVIDs was 3 7 cm  LVLd A4C was 10 6 cm  LVLs A4C was 9 6 cm  LVOT Diam was 2 6 cm  LVPWd  was 0 9 cm  RAAs A4C was 21 1 cm2  RAESV A-L was 66 8 ml  RAESV MOD was 65 4 ml  RALs was 5 7 cm  RVIDd was 4 7 cm  RWT was 0 4   SV MOD A4C was 82 4 ml   SV(Teich) was 52 6 ml   CW measurements:  Unspecified Anatomy:   AV Env  Ti was 267 9 ms   AV VTI was 59 6 cm    AV Vmax was 2 9 m/s  AV Vmean was 2 2 m/s  AV maxPG was 34 5 mmHg  AV meanPG was 21 6 mmHg  MV VTI was 35 cm  MV Vmax was 1 2 m/s  MV Vmean was 0 9 m/s  MV maxPG  was 6 mmHg  MV meanPG was 3 4 mmHg  TR Vmax was 3 2 m/s   TR maxPG was 40 mmHg  MM measurements:  Unspecified Anatomy:   TAPSE was 2 4 cm  PW measurements:  Unspecified Anatomy:   GREGORY (VTI) was 1 7 cm2  GREGORY Vmax was 1 8 cm2  AVAI (VTI) was 0 cm2/m2  AVAI Vmax was 0 cm2/m2  DVI was 0 3   E' Sept was 0 m/s  E/E' Sept was 31 6   LVOT Env  Ti was 308 3 ms  LVOT VTI was 20 3 cm  LVOT Vmax  was 1 m/s  LVOT Vmean was 0 7 m/s  LVOT maxPG was 4 2 mmHg  LVOT meanPG was 2 1 mmHg  LVSI Dopp was 54 5 ml/m2  LVSV Dopp was 104 1 ml   MV A David was 1 2 m/s  MV Dec [place] was 7 1 m/s2  MV DecT was 204 7 ms   MV E David was 1 4 m/s  MV E/A Ratio was 1 2   MV PHT was 59 4 ms  MVA (VTI) was 3 cm2  MVA By PHT was 3 7 cm2  HISTORY: PRIOR HISTORY: HTN; AS; CP; HLD; PVC; Acute respiratory failure with hypoxia; CHF; Obesity; Depression    PROCEDURE: The procedure was performed at the bedside  This was a routine study  The transthoracic approach was used  The study included complete 2D imaging, M-mode, complete spectral Doppler, and color Doppler  The heart rate was 98 bpm,  at the start of the study  Images were obtained from the parasternal, apical, subcostal, and suprasternal notch acoustic windows  Echocardiographic views were limited due to poor acoustic window availability  This was a technically  difficult study  LEFT VENTRICLE: Size was normal  Systolic function was at the lower limits of normal  Ejection fraction was estimated to be 50 %  There was mild to moderate hypokinesis of the mid-apical septal myocardial wall segments  Wall thickness was  mildly increased   DOPPLER: Features were likely suggestive of a pseudonormal left ventricular filling pattern, with concomitant abnormal relaxation and increased filling pressure (grade 2 diastolic dysfunction)  RIGHT VENTRICLE: The size was normal  Systolic function was normal  Wall thickness was normal     LEFT ATRIUM: The atrium was mildly dilated  RIGHT ATRIUM: Size was normal     MITRAL VALVE: There was moderate annular calcification  DOPPLER: There was mild stenosis (mean diastolic transvalvular gradient ~ 3 3 mmHg at HR 86 BPM)  There was mild regurgitation  AORTIC VALVE: The valve was trileaflet  Leaflets exhibited markedly increased thickness, marked calcification, markedly reduced cuspal separation, reduced mobility, and sclerosis  DOPPLER: There was moderate stenosis  There was mild  regurgitation  TRICUSPID VALVE: Not well visualized  DOPPLER: There was mild regurgitation  PULMONIC VALVE: Not well visualized  PERICARDIUM: There was no pericardial effusion  The pericardium was normal in appearance  AORTA: The root exhibited mild dilatation (4 1 cm [2 1 cm/m2] at the sinuses of valsalva), and mild fibrocalcific change  SYSTEMIC VEINS: IVC: The inferior vena cava was not well visualized      MEASUREMENT TABLES    DOPPLER MEASUREMENTS  Aortic valve   (Reference normals)  Peak brandi   3 3 cm/s   (--)  Mean gradient   22 mmHg   (--)  Obstr index, VTI   0 29    (--)    SYSTEM MEASUREMENT TABLES    2D  %FS: 23 9 %  Ao Diam: 3 9 cm  Ao asc: 3 4 cm  EDV(Teich): 110 5 ml  EF(Teich): 47 6 %  ESV(Teich): 57 9 ml  IVSd: 1 2 cm  LA Diam: 4 5 cm  LAAs A2C: 27 1 cm2  LAAs A4C: 26 2 cm2  LAESV A-L A2C: 101 1 ml  LAESV A-L A4C: 92 8 ml  LAESV Index (A-L): 51 1 ml/m2  LAESV MOD A2C: 98 2 ml  LAESV MOD A4C: 86 6 ml  LAESV(A-L): 97 5 ml  LAESV(MOD BP): 92 3 ml  LAESVInd MOD BP: 48 4 ml/m2  LALs A2C: 6 2 cm  LALs A4C: 6 3 cm  LVEDV MOD A4C: 195 6 ml  LVEF MOD A4C: 42 1 %  LVESV MOD A4C: 113 2 ml  LVIDd: 4 9 cm  LVIDs: 3 7 cm  LVLd A4C: 10 6 cm  LVLs A4C: 9 6 cm  LVOT Diam: 2 6 cm  LVPWd: 0 9 cm  RAAs A4C: 21 1 cm2  RAESV A-L: 66 8 ml  RAESV MOD: 65 4 ml  RALs: 5 7 cm  RVIDd: 4 7 cm  RWT: 0 4  SV MOD A4C: 82 4 ml  SV(Teich): 52 6 ml    CW  AV Env  Ti: 267 9 ms  AV VTI: 59 6 cm  AV Vmax: 2 9 m/s  AV Vmean: 2 2 m/s  AV maxP 5 mmHg  AV meanP 6 mmHg  MV VTI: 35 cm  MV Vmax: 1 2 m/s  MV Vmean: 0 9 m/s  MV maxP mmHg  MV meanPG: 3 4 mmHg  TR Vmax: 3 2 m/s  TR maxP mmHg    MM  TAPSE: 2 4 cm    PW  GREGORY (VTI): 1 7 cm2  GREGORY Vmax: 1 8 cm2  AVAI (VTI): 0 cm2/m2  AVAI Vmax: 0 cm2/m2  DVI: 0 3  E' Sept: 0 m/s  E/E' Sept: 31 6  LVOT Env  Ti: 308 3 ms  LVOT VTI: 20 3 cm  LVOT Vmax: 1 m/s  LVOT Vmean: 0 7 m/s  LVOT maxP 2 mmHg  LVOT meanP 1 mmHg  LVSI Dopp: 54 5 ml/m2  LVSV Dopp: 104 1 ml  MV A David: 1 2 m/s  MV Dec [place]: 7 1 m/s2  MV DecT: 204 7 ms  MV E David: 1 4 m/s  MV E/A Ratio: 1 2  MV PHT: 59 4 ms  MVA (VTI): 3 cm2  MVA By PHT: 3 7 cm2    [employer] [facility] [facility]    Prepared and electronically signed by    Bessy Wallace DO  Signed 20-Aug-2021 14:28:06    No results found for this or any previous visit  No results found for this or any previous visit  No results found for this or any previous visit         *-*-*-*-*-*-*-*-*-*-*-*-*-*-*-*-*-*-*-*-*-*-*-*-*-*-*-*-*-*-*-*-*-*-*-*-*-*-*-*-*-*-*-*-*-*-*-*-*-*-*-*-*-*-  SIGNATURES:   [unfilled]   Rozell Litten, MD

## 2021-08-26 NOTE — ASSESSMENT & PLAN NOTE
· POA:  Reported midsternal/epigastric chest pain, persistent, 8/10  · Troponin initially elevated  0 08 continue to trend up in peaked 1 11 currently plateaued 0 39  · EKGs in ED normal sinus rhythm, left axis deviation with nonspecific conduction abnormalities, repeated EKG  normal sinus rhythm, significant ST abnormalities Heparin was discontinued on 8/24  · Continue daily aspirin   Continue metoprolol  · Continue monitor on tele  · Cardiology on board, appreciate the input

## 2021-08-26 NOTE — ASSESSMENT & PLAN NOTE
· POA: anxiety, generalized fatigue since Tuesday (9/17)  His wife noted that on 8/19 he was more short of breath with associated cough and complaints of chest pain which prompted his visit to the ED  · In the ED patient was noted to be hypoxic, in respiratory distress  Patient improved with lasix, bipap, morphine and ativan  · Clinically patient appears  fluid overloaded, his lung sounds are coarse with scattered rales  He has trace to +1 left lower extremity edema that he reports as new  · COVID-19 negative x2  · Chest x-ray concerning for vascular congestion, multifocal pneumonia  · Continue antibiotics: vanc cefepime and flagyl day 4  Total antibiotics day 6  Consider discontinue antibiotics since bronchial culture currently shows no growth and negative gram stain  Viral culture and cytology still pending  · Monitor off antibiotics  Continue lasix 40 mg BID   · Monitor CBC and fever curve  · Failed conservative treatment and was intubated on 8/21  Continue to try to extubate when appropriate    · Wean FIO2 and PEEP as able with a goal to maintain O2 saturation > 90%  ·

## 2021-08-26 NOTE — ASSESSMENT & PLAN NOTE
· On losartan 25 mg daily, amlodipine 10 mg daily  Hold losartan due to CANDIE  · Currently diuresis   Continue metoprolol 2 5 Q6H  · Continue hold antihypertensive meds, resume when  appropriate

## 2021-08-26 NOTE — ASSESSMENT & PLAN NOTE
Recent Labs     08/24/21  0454 08/25/21  0518 08/26/21  0447   K 4 0 4 0 4 3     · Keep potassium level > 3 8  · Monitor BMP  · Replete as needed  · Resolved

## 2021-08-26 NOTE — ASSESSMENT & PLAN NOTE
Baseline cr appeared to be 0 7-0 9  Recent Labs     08/24/21  0454 08/25/21  0518 08/26/21  0447   CREATININE 1 36* 1 44* 1 47*   EGFR 48 45 43     Estimated Creatinine Clearance: 37 4 mL/min (A) (by C-G formula based on SCr of 1 47 mg/dL (H))  Patient currently needs diuresis for volume overload  Will continue monitor BMP  DC diurese when appropriate    Strict I and O

## 2021-08-26 NOTE — ASSESSMENT & PLAN NOTE
· POA:  With signs of volume overload, initial imaging chest x-ray concerning for vascular congestion consistent with CHF  · Most recent echo in 2018 showed grade 1 diastolic dysfunction  · [id number]  · Repeated echo 8/20 with significant worsening of his diastolic dysfunction  · Continue diuresis with lasix 40 mg BID     · Cardiology on board  · plan as outlined above

## 2021-08-26 NOTE — QUICK NOTE
Family, two sons and the girlfreind , were updated by the bedside  They are still indecisive about the reintubation if he was to extubated  Risks and benefits were explained  They all understood  All questions were answered

## 2021-08-27 ENCOUNTER — APPOINTMENT (INPATIENT)
Dept: RADIOLOGY | Facility: HOSPITAL | Age: 81
DRG: 870 | End: 2021-08-27
Payer: MEDICARE

## 2021-08-27 ENCOUNTER — APPOINTMENT (INPATIENT)
Dept: NON INVASIVE DIAGNOSTICS | Facility: HOSPITAL | Age: 81
DRG: 870 | End: 2021-08-27
Payer: MEDICARE

## 2021-08-27 LAB
ANION GAP SERPL CALCULATED.3IONS-SCNC: 8 MMOL/L (ref 4–13)
BUN SERPL-MCNC: 109 MG/DL (ref 5–25)
CALCIUM SERPL-MCNC: 9.2 MG/DL (ref 8.3–10.1)
CHLORIDE SERPL-SCNC: 103 MMOL/L (ref 100–108)
CO2 SERPL-SCNC: 32 MMOL/L (ref 21–32)
CREAT SERPL-MCNC: 2.25 MG/DL (ref 0.6–1.3)
ERYTHROCYTE [DISTWIDTH] IN BLOOD BY AUTOMATED COUNT: 21.3 % (ref 11.6–15.1)
GFR SERPL CREATININE-BSD FRML MDRD: 26 ML/MIN/1.73SQ M
GLUCOSE SERPL-MCNC: 145 MG/DL (ref 65–140)
HCT VFR BLD AUTO: 32 % (ref 36.5–49.3)
HGB BLD-MCNC: 9.2 G/DL (ref 12–17)
MCH RBC QN AUTO: 22.5 PG (ref 26.8–34.3)
MCHC RBC AUTO-ENTMCNC: 28.8 G/DL (ref 31.4–37.4)
MCV RBC AUTO: 78 FL (ref 82–98)
PLATELET # BLD AUTO: 281 THOUSANDS/UL (ref 149–390)
PMV BLD AUTO: 10.3 FL (ref 8.9–12.7)
POTASSIUM SERPL-SCNC: 3.7 MMOL/L (ref 3.5–5.3)
PROCALCITONIN SERPL-MCNC: 0.77 NG/ML
RBC # BLD AUTO: 4.08 MILLION/UL (ref 3.88–5.62)
SODIUM SERPL-SCNC: 143 MMOL/L (ref 136–145)
WBC # BLD AUTO: 17.96 THOUSAND/UL (ref 4.31–10.16)

## 2021-08-27 PROCEDURE — 80048 BASIC METABOLIC PNL TOTAL CA: CPT | Performed by: INTERNAL MEDICINE

## 2021-08-27 PROCEDURE — 99291 CRITICAL CARE FIRST HOUR: CPT | Performed by: INTERNAL MEDICINE

## 2021-08-27 PROCEDURE — 93325 DOPPLER ECHO COLOR FLOW MAPG: CPT | Performed by: INTERNAL MEDICINE

## 2021-08-27 PROCEDURE — 93308 TTE F-UP OR LMTD: CPT

## 2021-08-27 PROCEDURE — 93321 DOPPLER ECHO F-UP/LMTD STD: CPT | Performed by: INTERNAL MEDICINE

## 2021-08-27 PROCEDURE — 71045 X-RAY EXAM CHEST 1 VIEW: CPT

## 2021-08-27 PROCEDURE — 93308 TTE F-UP OR LMTD: CPT | Performed by: INTERNAL MEDICINE

## 2021-08-27 PROCEDURE — 84145 PROCALCITONIN (PCT): CPT | Performed by: INTERNAL MEDICINE

## 2021-08-27 PROCEDURE — 85027 COMPLETE CBC AUTOMATED: CPT | Performed by: INTERNAL MEDICINE

## 2021-08-27 PROCEDURE — 94003 VENT MGMT INPAT SUBQ DAY: CPT

## 2021-08-27 PROCEDURE — 99232 SBSQ HOSP IP/OBS MODERATE 35: CPT | Performed by: INTERNAL MEDICINE

## 2021-08-27 PROCEDURE — 94760 N-INVAS EAR/PLS OXIMETRY 1: CPT

## 2021-08-27 RX ORDER — AMLODIPINE BESYLATE 5 MG/1
10 TABLET ORAL DAILY
Status: DISCONTINUED | OUTPATIENT
Start: 2021-08-27 | End: 2021-08-27

## 2021-08-27 RX ORDER — HYDRALAZINE HYDROCHLORIDE 25 MG/1
25 TABLET, FILM COATED ORAL EVERY 8 HOURS SCHEDULED
Status: DISCONTINUED | OUTPATIENT
Start: 2021-08-27 | End: 2021-09-14

## 2021-08-27 RX ORDER — ISOSORBIDE DINITRATE 10 MG/1
10 TABLET ORAL
Status: DISCONTINUED | OUTPATIENT
Start: 2021-08-27 | End: 2021-09-17 | Stop reason: HOSPADM

## 2021-08-27 RX ADMIN — PROPOFOL 35 MCG/KG/MIN: 10 INJECTION, EMULSION INTRAVENOUS at 07:44

## 2021-08-27 RX ADMIN — ASPIRIN 81 MG: 81 TABLET, CHEWABLE ORAL at 09:28

## 2021-08-27 RX ADMIN — CHLORHEXIDINE GLUCONATE 0.12% ORAL RINSE 15 ML: 1.2 LIQUID ORAL at 22:00

## 2021-08-27 RX ADMIN — DOCUSATE SODIUM AND SENNOSIDES 2 TABLET: 8.6; 5 TABLET, FILM COATED ORAL at 09:28

## 2021-08-27 RX ADMIN — HEPARIN SODIUM 5000 UNITS: 5000 INJECTION INTRAVENOUS; SUBCUTANEOUS at 06:04

## 2021-08-27 RX ADMIN — Medication 10 MG/HR: at 14:57

## 2021-08-27 RX ADMIN — PROPOFOL 35 MCG/KG/MIN: 10 INJECTION, EMULSION INTRAVENOUS at 22:59

## 2021-08-27 RX ADMIN — SPIRONOLACTONE 25 MG: 25 TABLET, FILM COATED ORAL at 09:28

## 2021-08-27 RX ADMIN — OLANZAPINE 5 MG: 5 TABLET, ORALLY DISINTEGRATING ORAL at 23:00

## 2021-08-27 RX ADMIN — POLYETHYLENE GLYCOL 3350 17 G: 17 POWDER, FOR SOLUTION ORAL at 09:27

## 2021-08-27 RX ADMIN — HEPARIN SODIUM 5000 UNITS: 5000 INJECTION INTRAVENOUS; SUBCUTANEOUS at 14:53

## 2021-08-27 RX ADMIN — PROPOFOL 35 MCG/KG/MIN: 10 INJECTION, EMULSION INTRAVENOUS at 16:46

## 2021-08-27 RX ADMIN — CALCIUM GLUCONATE 1 G: 20 INJECTION, SOLUTION INTRAVENOUS at 08:42

## 2021-08-27 RX ADMIN — METOLAZONE 5 MG: 5 TABLET ORAL at 09:28

## 2021-08-27 RX ADMIN — HEPARIN SODIUM 5000 UNITS: 5000 INJECTION INTRAVENOUS; SUBCUTANEOUS at 23:00

## 2021-08-27 RX ADMIN — DOCUSATE SODIUM AND SENNOSIDES 2 TABLET: 8.6; 5 TABLET, FILM COATED ORAL at 17:52

## 2021-08-27 RX ADMIN — PRAVASTATIN SODIUM 40 MG: 40 TABLET ORAL at 16:38

## 2021-08-27 RX ADMIN — METOCLOPRAMIDE 10 MG: 5 INJECTION, SOLUTION INTRAMUSCULAR; INTRAVENOUS at 06:04

## 2021-08-27 RX ADMIN — PROPOFOL 35 MCG/KG/MIN: 10 INJECTION, EMULSION INTRAVENOUS at 12:42

## 2021-08-27 RX ADMIN — Medication 20 MG: at 11:54

## 2021-08-27 RX ADMIN — CHLORHEXIDINE GLUCONATE 0.12% ORAL RINSE 15 ML: 1.2 LIQUID ORAL at 09:27

## 2021-08-27 RX ADMIN — METOROPROLOL TARTRATE 2.5 MG: 5 INJECTION, SOLUTION INTRAVENOUS at 12:58

## 2021-08-27 RX ADMIN — PROPOFOL 35 MCG/KG/MIN: 10 INJECTION, EMULSION INTRAVENOUS at 03:44

## 2021-08-27 NOTE — ASSESSMENT & PLAN NOTE
· POA:  Reported midsternal/epigastric chest pain, persistent, 8/10  · Troponin initially elevated  0 08 continue to trend up in peaked 1 11 currently plateaued 3 02  · EKGs in ED normal sinus rhythm, left axis deviation with nonspecific conduction abnormalities, repeated EKG  normal sinus rhythm, significant ST abnormalities Heparin was discontinued on 8/24  · Continue daily aspirin  Continue metoprolol   Metoprolol was held due to hypotension  · Continue monitor on tele  · Cardiology on board, appreciate the input

## 2021-08-27 NOTE — ASSESSMENT & PLAN NOTE
· POA:  With signs of volume overload, initial imaging chest x-ray concerning for vascular congestion consistent with CHF  · Most recent echo in 2018 showed grade 1 diastolic dysfunction  · [id number]  · Repeated echo 8/20 with significant worsening of his diastolic dysfunction    · Continue diuresis   · Cardiology on board  · plan as outlined above

## 2021-08-27 NOTE — PROGRESS NOTES
[address] [facility]  Progress Note Teto Cannon 1940, 78 y o  male MRN: [id number]  Unit/Bed#: ICU 07 Encounter: [id number]  Primary Care Provider: Bee Arita DO   Date and time admitted to hospital: 8/19/2021  8:25 PM    * Acute respiratory failure with hypoxia [facility])  Assessment & Plan  · POA: anxiety, generalized fatigue since Tuesday (9/17)  His wife noted that on 8/19 he was more short of breath with associated cough and complaints of chest pain which prompted his visit to the ED  · In the ED patient was noted to be hypoxic, in respiratory distress  Patient improved with lasix, bipap, morphine and ativan  · Clinically patient appears  fluid overloaded, his lung sounds are coarse with scattered rales  He has trace to +1 left lower extremity edema that he reports as new  · COVID-19 negative x3  · Chest x-ray concerning for vascular congestion, multifocal pneumonia  · Continue antibiotics: vanc cefepime and flagyl day 4  Total antibiotics day 6  Consider discontinue antibiotics since bronchial culture currently shows no growth and negative gram stain  Viral culture and cytology was negative  · Monitor off antibiotics  Continue diuresis  · Monitor CBC and fever curve  · Failed conservative treatment and was intubated on 8/21  Continue to try to extubate when appropriate  · Wean FIO2 and PEEP as able with a goal to maintain O2 saturation > 90%  ·       Acute on chronic diastolic CHF (congestive heart failure) (HCC)  Assessment & Plan  · POA:  With signs of volume overload, initial imaging chest x-ray concerning for vascular congestion consistent with CHF  · Most recent echo in 2018 showed grade 1 diastolic dysfunction  · [id number]  · Repeated echo 8/20 with significant worsening of his diastolic dysfunction    · Continue diuresis   · Cardiology on board  · plan as outlined above      Chest pain  Assessment & Plan  · POA:  Reported midsternal/epigastric chest pain, persistent, 8/10  · Troponin initially elevated  0 08 continue to trend up in peaked 1 11 currently plateaued 5 33  · EKGs in ED normal sinus rhythm, left axis deviation with nonspecific conduction abnormalities, repeated EKG  normal sinus rhythm, significant ST abnormalities Heparin was discontinued on 8/24  · Continue daily aspirin  Continue metoprolol  Metoprolol was held due to hypotension  · Continue monitor on tele  · Cardiology on board, appreciate the input      CANDIE (acute kidney injury) [facility])  Assessment & Plan  Baseline cr appeared to be 0 7-0 9  Recent Labs     08/26/21  0447 08/26/21 2003 08/27/21  0420   CREATININE 1 47* 2 21* 2 25*   EGFR 43 27 26     Estimated Creatinine Clearance: 24 1 mL/min (A) (by C-G formula based on SCr of 2 25 mg/dL (H))  Patient currently needs diuresis for volume overload  Will continue monitor BMP  DC diurese when appropriate  Strict I and O    Sepsis ([address] )  Assessment & Plan  · POA :  Meets sepsis criteria on presentation tachycardia, tachypnea, lactic acidosis, leukocytosis and suspecting pneumonia    · Monitor off antibiotics for now due to negative culture result  · Rest of plan as outlined above    Hypochromic microcytic anemia  Assessment & Plan  · Has history of iron deficiency anemia due to inadequate dietary iron intake  · Hemoglobin currently stable, there is no signs of active bleeding  · EGD in February 2021 with no source upper GI bleed  · Iron panel:  Iron 29, ferritin 21, patient will require Venofer  · Continue iron supplement when appropriate  · May need OP hematology follow up  · Trend hg, transfuse for <7 0    Lactic acidosis  Assessment & Plan  · POA:  Lactic acid on presentation 8 1 in the setting of sepsis and suspected pneumonia  · Rest of plan as outlined above    Multifocal pneumonia  Assessment & Plan  · Chest x-ray 8/20:  Diffuse patchy airspace disease bilaterally concerning multifocal infiltrates  · CAP versus aspiration  · Tested negative for COVID-19 x2  · Legionella and strep pneumo antigens negative  · Initially started on Rocephin and azithromycin, currently on cefepime and flagyl day 3  Total antibiotics day 5   · Procalcitonin negative x2  Recent Labs     08/25/21  0518 08/26/21 0447 08/27/21  0420   WBC 12 71* 24 65* 17 96*     · Blood cultures shows no growth  · Bronchial lavage culture also shows no growth  · Continue monitor fever curve, WBCs    Hypokalemia  Assessment & Plan  Recent Labs     08/26/21 0447 08/26/21 2003 08/27/21  0420   K 4 3 4 5 3 7     · Keep potassium level > 3 8  · Monitor BMP  · Replete as needed  · Resolved    Mild cognitive impairment  Assessment & Plan  · Record review reveals stable memory issues with outpatient followup with geriatrics  · TSH, B12 nl in past  · Family reports no progression at this point  · Continue outpatient sertraline when appropriate    Aortic stenosis, moderate  Assessment & Plan  · History of mild to moderate aortic stenosis on echo 5/17/2018   · Repeated on 8/20: Showed markedly increased thickness, marked calcification, markedly reduced cuspal separation, reduced mobility, and sclerosis and moderate stenosis  · Cardiology on board  · Currently diuresing  · Monitor fluid volume status closely  · Strict I's and O's      Essential hypertension  Assessment & Plan  · On losartan 25 mg daily, amlodipine 10 mg daily  Hold losartan due to CANDIE  · Currently diuresis  Continue metoprolol 2 5 Q6H  · Continue hold antihypertensive meds, resume when  appropriate      ----------------------------------------------------------------------------------------  HPI/24hr events: Hypotension was noted   Metoprolol and hydralazine was held    Patient appropriate for transfer out of the ICU today?: No  Disposition: Continue Critical Care   Code Status: Level 1 - Full Code  ---------------------------------------------------------------------------------------  SUBJECTIVE  Unable to provide subjective due to intubation    Review of Systems   Unable to perform ROS: Intubated     Review of systems was unable to be performed secondary to intubation  ---------------------------------------------------------------------------------------  OBJECTIVE    Vitals   Vitals:    21 0300 21 0400 21 0500 21 0600   BP: (!) 86/45      Pulse: 78 78 86 82   Resp:    Temp: 98 6 °F (37 °C) 97 9 °F (36 6 °C) 98 6 °F (37 °C) 99 °F (37 2 °C)   TempSrc:  Bladder     SpO2: 96% 97% 94% 94%   Weight:    82 6 kg (182 lb 1 6 oz)   Height:         Temp (24hrs), Av 4 °F (37 4 °C), Min:97 9 °F (36 6 °C), Max:102 2 °F (39 °C)  Current: Temperature: 99 °F (37 2 °C)          Respiratory:  SpO2: SpO2: 94 %, SpO2 Activity: SpO2 Activity: At Rest, SpO2 Device: O2 Device: Ventilator, Capnography:         Invasive/non-invasive ventilation settings   Respiratory    Lab Data (Last 4 hours)    None         O2/Vent Data (Last 4 hours)       0245           Vent Mode AC/VC       Resp Rate (BPM) (BPM) 20       Vt (mL) (mL) 400       FIO2 (%) (%) 70       PEEP (cmH2O) (cmH2O) 6       MV 9 94                   Physical Exam  Vitals and nursing note reviewed  Constitutional:       General: He is not in acute distress  Appearance: Normal appearance  He is ill-appearing  HENT:      Head: Normocephalic and atraumatic  Right Ear: External ear normal       Left Ear: External ear normal       Nose: Nose normal       Mouth/Throat:      Mouth: Mucous membranes are moist       Pharynx: Oropharynx is clear  Eyes:      Extraocular Movements: Extraocular movements intact  Conjunctiva/sclera: Conjunctivae normal       Pupils: Pupils are equal, round, and reactive to light  Cardiovascular:      Rate and Rhythm: Normal rate and regular rhythm  Pulses: Normal pulses  Heart sounds: Murmur heard  Pulmonary:      Effort: No respiratory distress  Breath sounds: No rales        Comments: intubated  Abdominal:      General: Bowel sounds are decreased  Palpations: Abdomen is soft  Musculoskeletal:         General: Normal range of motion  Cervical back: Normal range of motion and neck supple  Right lower leg: No edema  Left lower leg: No edema  Skin:     General: Skin is warm and dry  Capillary Refill: Capillary refill takes less than 2 seconds  Findings: Bruising present  Neurological:      General: No focal deficit present  Mental Status: He is alert     Psychiatric:      Comments: sedated         Laboratory and Diagnostics:  Results from last 7 days   Lab Units 08/27/21 0420 08/26/21  0447 08/25/21  0518 08/24/21  0454 08/23/21  0504 08/22/21  0350 08/21/21  0415   WBC Thousand/uL 17 96* 24 65* 12 71* 12 94* 14 45* 12 72* 15 63*   HEMOGLOBIN g/dL 9 2* 9 3* 8 7* 7 6* 8 6* 8 2* 8 5*   HEMATOCRIT % 32 0* 33 4* 31 0* 27 1* 29 8* 28 4* 28 9*   PLATELETS Thousands/uL 281 296 238 208 232 225 240   NEUTROS PCT %  --   --   --   --   --  83* 86*   BANDS PCT %  --  2  --  1  --   --   --    MONOS PCT %  --   --   --   --   --  7 8   MONO PCT %  --  9  --  9  --   --   --      Results from last 7 days   Lab Units 08/27/21  0420 08/26/21 2003 08/26/21  0447 08/25/21  0518 08/24/21  0454 08/23/21  1808 08/23/21  0443   SODIUM mmol/L 143 142 140 143 145 144 143   POTASSIUM mmol/L 3 7 4 5 4 3 4 0 4 0 5 4* 2 8*   CHLORIDE mmol/L 103 103 102 107 112* 111* 108   CO2 mmol/L 32 29 28 26 23 25 28   ANION GAP mmol/L 8 10 10 10 10 8 7   BUN mg/dL 109* 96* 80* 53* 34* 32* 32*   CREATININE mg/dL 2 25* 2 21* 1 47* 1 44* 1 36* 1 25 1 18   CALCIUM mg/dL 9 2 9 3 8 7 8 5 7 7* 9 0 8 5   GLUCOSE RANDOM mg/dL 145* 168* 191* 169* 204* 155* 164*     Results from last 7 days   Lab Units 08/26/21  2003 08/23/21  0443 08/22/21  0408 08/21/21  0031   MAGNESIUM mg/dL 3 0* 1 9 2 3 1 7      Results from last 7 days   Lab Units 08/24/21  0756 08/24/21  0013 08/23/21  1808 08/23/21  1057 08/23/21  0443 08/22/21  1706 08/22/21  1132   PTT seconds 70* 50* 60* 110* 53* 61* 80*      Results from last 7 days   Lab Units 08/22/21  0352 08/20/21  1834 08/20/21  1446 08/20/21  1131 08/20/21  0832   TROPONIN I ng/mL 0 55* 0 92* 1 07* 1 29* 1 11*         ABG:  Results from last 7 days   Lab Units 08/25/21  1148   PH ART  7 424   PCO2 ART mm Hg 38 6   PO2 ART mm Hg 44 7*   HCO3 ART mmol/L 24 7   BASE EXC ART mmol/L 0 4   ABG SOURCE  Radial, Right     VBG:  Results from last 7 days   Lab Units 08/25/21  1148   ABG SOURCE  Radial, Right     Results from last 7 days   Lab Units 08/26/21  0826   PROCALCITONIN ng/ml 0 70*       Micro  Results from last 7 days   Lab Units 08/22/21  1506 08/22/21  1503 08/22/21  1501   GRAM STAIN RESULT  Rare Polys  No bacteria seen No Polys or Bacteria seen No Polys or Bacteria seen       EKG: NSR  Imaging: I have personally reviewed pertinent reports  and I have personally reviewed pertinent films in PACS    Intake and Output  I/O       08/25 0701 - 08/26 0700 08/26 0701 - 08/27 0700    I V  (mL/kg) 487 7 (5 8) 595 8 (7 2)    NG/ 200    IV Piggyback 80     Feedings 160 120    Total Intake(mL/kg) 1192 7 (14 1) 915 8 (11 1)    Urine (mL/kg/hr) 1010 (0 5) 1425 (0 7)    Emesis/NG output  900    Stool 0     Total Output 1010 2325    Net +182 7 -1409 3          Unmeasured Stool Occurrence 1 x           Height and Weights   Height: 5' 4" (162 6 cm)  IBW (Ideal Body Weight): 59 2 kg  Body mass index is 31 26 kg/m²    Weight (last 2 days)     Date/Time   Weight    08/27/21 0600   82 6 (182 1)    08/26/21 0545   84 8 (186 95)    08/25/21 0600   83 1 (183 2)                Nutrition       Diet Orders   (From admission, onward)             Start     Ordered    08/24/21 0607  Diet Enteral/Parenteral; Tube Feeding No Oral Diet; Jevity 1 2 Lg; Continuous; 40; Prosource Protein Liquid - Two Packets; 125; Water; Every 4 hours  Diet effective now     Question Answer Comment   Diet Type Enteral/Parenteral    Enteral/Parenteral Tube Feeding No Oral Diet    Tube Feeding Formula: Jevity 1 2 Lg    Bolus/Cyclic/Continuous Continuous    Tube Feeding Goal Rate (mL/hr): 40    Prosource Protein Liquid - No Carb Prosource Protein Liquid - Two Packets    Tube Feeding flush (mL): 125    Water Flush type: Water    Water flush frequency: Every 4 hours    RD to adjust diet per protocol?  Yes        08/24/21 5941                  Active Medications  Scheduled Meds:  Current Facility-Administered Medications   Medication Dose Route Frequency Provider Last Rate    Acetaminophen  650 mg Per NG Tube Q6H PRN Max 4/day Priya Alonso MD      amLODIPine  10 mg Oral Daily ANATOLY Connors      aspirin  81 mg Oral Daily Josefa Sun DO      calcium gluconate  1 g Intravenous Daily Curt Hutchins MD 1 g (08/26/21 0808)    chlorhexidine  15 mL Mouth/Throat Q12H [address], MD      dexmedetomidine  0 1-0 7 mcg/kg/hr Intravenous Titrated Natasha Jimenes MD 0 2 mcg/kg/hr (08/26/21 1326)    fentanyl citrate (PF)  50 mcg Intravenous Q1H PRN Felecia Cranker, PA-C      furosemide  10 mg/hr Intravenous Continuous Natasha Jimenes MD 10 mg/hr (08/26/21 1324)    heparin (porcine)  5,000 Units Subcutaneous [facility] Laurence Pina PA-C      hydrALAZINE  10 mg Intravenous Q6H PRN ANATOLY Saxena      hydrALAZINE  25 mg Oral Q8H [address] ANATOLY Connors      isosorbide dinitrate  10 mg Oral TID after meals ANATOLY Connors      metolazone  5 mg Oral Daily Natasha Jimenes MD      metoprolol  2 5 mg Intravenous Q6H [address] Nicolasa Joshi MD      morphine injection  2 mg Intravenous Q4H PRN Curt Hutchins MD      OLANZapine  5 mg Oral HS Laurence Pitts PA-C      omeprazole (PRILOSEC) suspension 2 mg/mL  20 mg Oral Daily Lidya Steve DO      polyethylene glycol  17 g Oral Daily Curt Hutchins MD      pravastatin  40 mg Oral Daily [facility]      propofol  5-50 mcg/kg/min Intravenous Titrated Rosana Covarrubias MD 35 mcg/kg/min (08/27/21 0344)    senna-docusate sodium  2 tablet Oral BID Curt Alexander MD      spironolactone  25 mg Oral Daily Nicolasa Joshi MD       Continuous Infusions:  dexmedetomidine, 0 1-0 7 mcg/kg/hr, Last Rate: 0 2 mcg/kg/hr (08/26/21 1326)  furosemide, 10 mg/hr, Last Rate: 10 mg/hr (08/26/21 1324)  propofol, 5-50 mcg/kg/min, Last Rate: 35 mcg/kg/min (08/27/21 0344)      PRN Meds:   Acetaminophen, 650 mg, Q6H PRN Max 4/day  fentanyl citrate (PF), 50 mcg, Q1H PRN  hydrALAZINE, 10 mg, Q6H PRN  morphine injection, 2 mg, Q4H PRN        Invasive Devices Review  Invasive Devices     Peripheral Intravenous Line            Peripheral IV 08/20/21 Left;Ventral (anterior) Forearm 6 days    Peripheral IV 08/21/21 Left;Upper;Ventral (anterior) Arm 5 days    Peripheral IV 08/22/21 Right Hand 4 days          Drain            Urethral Catheter Temperature probe 6 days    NG/OG/Enteral Tube 16 Fr Center mouth 5 days          Airway            ETT  Cuffed 8 mm 5 days                Rationale for remaining devices: acute resp failure  ---------------------------------------------------------------------------------------  Advance Directive and Living Will: Yes    Power of :    POLST:    ---------------------------------------------------------------------------------------  Care Time Delivered:   Upon my evaluation, this patient had a high probability of imminent or life-threatening deterioration due to acute resp failure, which required my direct attention, intervention, and personal management  I have personally provided 30 minutes (0600 to 0630) of critical care time, exclusive of procedures, teaching, family meetings, and any prior time recorded by providers other than myself  Herman Irby MD      Portions of the record may have been created with voice recognition software    Occasional wrong word or "sound a like" substitutions may have occurred due to the inherent limitations of voice recognition software    Read the chart carefully and recognize, using context, where substitutions have occurred

## 2021-08-27 NOTE — ASSESSMENT & PLAN NOTE
· Chest x-ray 8/20:  Diffuse patchy airspace disease bilaterally concerning multifocal infiltrates  · CAP versus aspiration  · Tested negative for COVID-19 x2  · Legionella and strep pneumo antigens negative  · Initially started on Rocephin and azithromycin, currently on cefepime and flagyl day 3  Total antibiotics day 5   · Procalcitonin negative x2  Recent Labs     08/25/21  0518 08/26/21  0447 08/27/21  0420   WBC 12 71* 24 65* 17 96*     · Blood cultures shows no growth  · Bronchial lavage culture also shows no growth     · Continue monitor fever curve, WBCs

## 2021-08-27 NOTE — PROGRESS NOTES
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  Moderate to severe aortic valve stenosis   Hypoxemic respiratory failure   Metabolic encephalopathy   Sepsis 2/2 Multifocal pneumonia   Mild cognitive impairment   Essential Hypertension  Pulmonary Hypertension   Iron deficiency anemia  10  Acute Kidney Injuy, creatinine continues to up trend       8/27/21: Patient having periods of hypotension overnight, hydralazine and metoprolol put on hold  Systolic's were in the mid to high 80s  Patient with worsening CANDIE however diuresing I/O 900/2300 with net -1400cc  Spoke with daughter-in-law at bedside  Patient Active Problem List   Diagnosis    Mixed hyperlipidemia    Essential hypertension    Aortic stenosis, moderate    Chest pain    Enlarged prostate without lower urinary tract symptoms (luts)    Fatty liver disease, nonalcoholic    Moderate episode of recurrent major depressive disorder (HCC)    PVC (premature ventricular contraction)    Medicare annual wellness visit, subsequent    Mild cognitive impairment    Obesity (BMI 30 0-34  9)    Do not resuscitate status    Chronic constipation    Hypokalemia    Iron deficiency anemia secondary to inadequate dietary iron intake    Multifocal pneumonia    Acute respiratory failure with hypoxia (HCC)    Acute on chronic diastolic CHF (congestive heart failure) (HCC)    Lactic acidosis    Hypochromic microcytic anemia    Sepsis (HCC)    CANDIE (acute kidney injury) ([facility] [address] )        PLAN:  -- c/w Lasix ggt and additional Metolazone 5mg as per CCM  --> Given increasing U/O on IV diuretics, continue with diuresis and will continue to monitor creatinine function  -- c/w Amlodipine 10mg PO Daily  -- c/w Aspirin 81mg PO Daily  -- c/w Metoprolol 2 5mg IV Q6H  Can increase to 5mg if needed and tolerating   -- c/w Aldactone 25mg PO Daily  -- c/w Isordil 10mg PO TID  -- c/w Hydralazine 25mg PO Q8H   -- Monitoring off antibiotics for now given negative cultures to date  -- Continued respiratory care and attempts at weaning as per CCM  Patient presently on minimal setting of FiO2 50% and PEEP of 6  -- f/u Limited TTE      *-*-*-*-*-*-*-*-*-*-*-*-*-*-*-*-*-*-*-*-*-*-*-*-*-*-*-*-*-*-*-*-*-*-*-*-*-*-*-*-*-*-*-*-*-*-*-*-*-*-*-*-*-*-  INTERVAL CHANGES / HISTORY OF PRESENT ILLNESS:  No acute events overnight  Patient remains intubated and sedated  *-*-*-*-*-*-*-*-*-*-*-*-*-*-*-*-*-*-*-*-*-*-*-*-*-*-*-*-*-*-*-*-*-*-*-*-*-*-*-*-*-*-*-*-*-*-*-*-*-*-*-*-*-*  REVIEW OF SYMPTOMS:    Positive for: Cannot be obtained  Negative for: All remaining as reviewed below and in HPI   SYSTEM SYMPTOMS REVIEWED:  General--weight change, fever, night sweats  Respiratoryl-- Wheezing, shortness of breath, cough, URI symptoms, sputum, blood  Cardiovascular--chest pain, syncope, dyspnea on exertion, edema, decline in exercise tolerance, claudication   Gastrointestinal--persistent vomiting, diarrhea, abdominal distention, blood in stool   Muscular or skeletal--joint pain or swelling   Neurologic--headaches, syncope, abnormal movement  Hematologic--history of easy bruising and bleeding   Endocrine--thyroid enlargement, heat or cold intolerance, polyuria   Psychiatric--anxiety, depression      *-*-*-*-*-*-*-*-*-*-*-*-*-*-*-*-*-*-*-*-*-*-*-*-*-*-*-*-*-*-*-*-*-*-*-*-*-*-*-*-*-*-*-*-*-*-*-*-*-*-*-*-*-*-  VITAL SIGNS:  Vitals:    08/27/21 0715 08/27/21 0719 08/27/21 0820 21 0920   BP: 102/53  111/53 (!) 119/46   Pulse: 76  88 90   Resp: (!) 26  (!) 23 21   Temp: 97 9 °F (36 6 °C)  97 9 °F (36 6 °C) 99 3 °F (37 4 °C)   TempSrc:       SpO2: 95% 96% 96% 96%   Weight:       Height:          Temp (24hrs), Av 1 °F (37 3 °C), Min:97 9 °F (36 6 °C), Max:102 2 °F (39 °C)  Current: Temperature: 99 3 °F (37 4 °C)    Weight (last 2 days)       Date/Time   Weight    21 0600   82 6 (182 1)    21 0545   84 8 (186 95)    21 0600   83 1 (183 2)              Body mass index is 31 26 kg/m²  Wt Readings from Last 3 Encounters:   21 82 6 kg (182 lb 1 6 oz)   21 83 5 kg (184 lb 2 oz)   21 82 8 kg (182 lb 9 6 oz)      Intake/Output Summary (Last 24 hours) at 2021 1100  Last data filed at 2021 7772  Gross per 24 hour   Intake 915 75 ml   Output 2575 ml   Net -1659 25 ml        *-*-*-*-*-*-*-*-*-*-*-*-*-*-*-*-*-*-*-*-*-*-*-*-*-*-*-*-*-*-*-*-*-*-*-*-*-*-*-*-*-*-*-*-*-*-*-*-*-*-*-*-*-*-  PHYSICAL EXAM:  General Appearance:   sedated and obtunded, appears comfortable in no respiratory distress while on mechanical ventilation  Head, Eyes, ENT:    No obvious abnormality, moist mucous mebranes  Neck:   Supple,   Unable to assess for jugular venous distension or carotid bruit   Back:     Symmetric, no curvature  Lungs:     Coarse breath sounds bilaterally  Crackles in b/l LLFs   Chest wall:    No tenderness or deformity   Heart:    Regular rate and rhythm, slightly tachycardic, harsh, mid to late-peaking systolic murmur along sternal border consistent with aortic valve stenosis, displaced cardiac impulse    Abdomen:     Soft, non-tender, No obvious masses, or organomegaly   Extremities:   Extremities warm, no cyanosis  Trace edema   Skin:   Skin color, texture, turgor normal, no rashes or lesions     *-*-*-*-*-*-*-*-*-*-*-*-*-*-*-*-*-*-*-*-*-*-*-*-*-*-*-*-*-*-*-*-*-*-*-*-*-*-*-*-*-*-*-*-*-*-*-*-*-*-*-*-*-*-  TELEMETRY, LAST ECG:  Telemetry reviewed     Sinus rhythm with sinus tachycardia  Single occurrence of ventricular triplet   Results for orders placed or performed during the hospital encounter of 08/19/21   ECG 12 lead   Result Value    Ventricular Rate 112    Atrial Rate 112    WY Interval 158    QRSD Interval 108    QT Interval 346    QTC Interval 473    P Axis 31    QRS Axis -32    T Wave Axis 75    Narrative    Sinus tachycardia  Right atrial enlargement  Left axis deviation  Pulmonary disease pattern  Voltage criteria for left ventricular hypertrophy  ST elevation, consider early repolarization, pericarditis, or injury  Marked ST abnormality, possible lateral subendocardial injury  Abnormal ECG  When compared with ECG of 24-AUG-2021 16:56,  Premature ectopic complexes are no longer Present  Confirmed by Nicolasa Joshi ([id number]) on 8/25/2021 11:11:23 PM      *-*-*-*-*-*-*-*-*-*-*-*-*-*-*-*-*-*-*-*-*-*-*-*-*-*-*-*-*-*-*-*-*-*-*-*-*-*-*-*-*-*-*-*-*-*-*-*-*-*-*-*-*-*-    CURRENT SCHEDULED MEDICATIONS:    Current Facility-Administered Medications:     Acetaminophen (TYLENOL) oral suspension 650 mg, 650 mg, Per NG Tube, Q6H PRN Max 4/day, Houston Franks MD, 649 6 mg at 08/26/21 1450    amLODIPine (NORVASC) tablet 10 mg, 10 mg, Oral, Daily, ANATOLY Alvarado    aspirin chewable tablet 81 mg, 81 mg, Oral, Daily, Lidya Steve DO, 81 mg at 08/27/21 5257    calcium gluconate 1 g in sodium chloride 0 9% 50 mL (premix), 1 g, Intravenous, Daily, Curt Fagan MD, Last Rate: 100 mL/hr at 08/27/21 0842, 1 g at 08/27/21 0842    chlorhexidine (PERIDEX) 0 12 % oral rinse 15 mL, 15 mL, Mouth/Throat, Q12H [address], Eleuterio Hunter MD, 15 mL at 08/27/21 0927    dexmedetomidine (PRECEDEX) 400 mcg in sodium chloride 0 9 % 100 mL infusion, 0 1-0 7 mcg/kg/hr, Intravenous, Titrated, Santiago Carrizales MD, Last Rate: 4 2 mL/hr at 08/26/21 1326, 0 2 mcg/kg/hr at 08/26/21 1326    fentanyl citrate (PF) 100 MCG/2ML 50 mcg, 50 mcg, Intravenous, Q1H PRN, Vinay Bolivar PA-C, 50 mcg at 08/25/21 2302    furosemide (LASIX) 500 mg infusion 50 mL, 10 mg/hr, Intravenous, Continuous, Santiago Carrizales MD, Last Rate: 1 mL/hr at 08/26/21 1324, 10 mg/hr at 08/26/21 1324    heparin (porcine) subcutaneous injection 5,000 Units, 5,000 Units, Subcutaneous, Q8H [address], Laurence Pitts PA-C, 5,000 Units at 08/27/21 3791    hydrALAZINE (APRESOLINE) injection 10 mg, 10 mg, Intravenous, Q6H PRN, Antionette Harada Bilofsky, CRNP, 10 mg at 08/25/21 2025    hydrALAZINE (APRESOLINE) tablet 25 mg, 25 mg, Oral, Q8H [address], ANATOLY Alvarado    isosorbide dinitrate (ISORDIL) tablet 10 mg, 10 mg, Oral, TID after meals, ANATOLY Del Valle    metolazone (ZAROXOLYN) tablet 5 mg, 5 mg, Oral, Daily, Santiago Carrizales MD, 5 mg at 08/27/21 0928    metoprolol (LOPRESSOR) injection 2 5 mg, 2 5 mg, Intravenous, Q6H [address], Nicolasa Joshi MD, 2 5 mg at 08/26/21 2358    morphine injection 2 mg, 2 mg, Intravenous, Q4H PRN, Curt Fagan MD, 2 mg at 08/24/21 2307    OLANZapine (ZyPREXA ZYDIS) dispersible tablet 5 mg, 5 mg, Oral, HS, Laurence Pitts PA-C, 5 mg at 08/26/21 2158    omeprazole (PRILOSEC) suspension 2 mg/mL, 20 mg, Oral, Daily, Lidya Steve DO, 20 mg at 08/26/21 0840    polyethylene glycol (MIRALAX) packet 17 g, 17 g, Oral, Daily, Curt Fagan MD, 17 g at 08/27/21 4001    pravastatin (PRAVACHOL) tablet 40 mg, 40 mg, Oral, Daily With Dinner, Vinay Bolivar PA-C, 40 mg at 08/26/21 1605    propofol (DIPRIVAN) 1000 mg in 100 mL infusion (premix), 5-50 mcg/kg/min, Intravenous, Titrated, Santiago Carrizales MD, Last Rate: 17 8 mL/hr at 08/27/21 0744, 35 mcg/kg/min at 08/27/21 0744    senna-docusate sodium (SENOKOT S) 8 6-50 mg per tablet 2 tablet, 2 tablet, Oral, BID, Curt Fagan MD, 2 tablet at 08/27/21 4307    spironolactone (ALDACTONE) tablet 25 mg, 25 mg, Oral, Daily, Nicolasa Joshi MD, 25 mg at 08/27/21 9293 ALLERGIES:  Allergies   Allergen Reactions    Ace Inhibitors Cough     Pt denied any allergies          *-*-*-*-*-*-*-*-*-*-*-*-*-*-*-*-*-*-*-*-*-*-*-*-*-*-*-*-*-*-*-*-*-*-*-*-*-*-*-*-*-*-*-*-*-*-*-*-*-*-*-*-*-*  LABORATORY DATA:  I have personally reviewed pertinent labs  CMP:  Results from last 7 days   Lab Units 08/27/21  0420 08/26/21 2003 08/26/21  0447   SODIUM mmol/L 143 142 140   POTASSIUM mmol/L 3 7 4 5 4 3   CHLORIDE mmol/L 103 103 102   CO2 mmol/L 32 29 28   BUN mg/dL 109* 96* 80*   CREATININE mg/dL 2 25* 2 21* 1 47*   CALCIUM mg/dL 9 2 9 3 8 7        Results from last 7 days   Lab Units 08/26/21 2003 08/23/21  0443 08/22/21  0408   MAGNESIUM mg/dL 3 0* 1 9 2 3        Cardiac Profile:   Results from last 7 days   Lab Units 08/22/21  0352 08/20/21  1834 08/20/21  1446   TROPONIN I ng/mL 0 55* 0 92* 1 07*                CBC:   Results from last 7 days   Lab Units 08/27/21  0420 08/26/21  0447 08/25/21  0518   WBC Thousand/uL 17 96* 24 65* 12 71*   HEMOGLOBIN g/dL 9 2* 9 3* 8 7*   HEMATOCRIT % 32 0* 33 4* 31 0*   PLATELETS Thousands/uL 281 296 238     PT/INR: No results found for: PT, INR, Microbiology:   Results from last 7 days   Lab Units 08/22/21  1506 08/22/21  1503 08/22/21  1501   GRAM STAIN RESULT  Rare Polys  No bacteria seen No Polys or Bacteria seen No Polys or Bacteria seen          *-*-*-*-*-*-*-*-*-*-*-*-*-*-*-*-*-*-*-*-*-*-*-*-*-*-*-*-*-*-*-*-*-*-*-*-*-*-*-*-*-*-*-*-*-*-*-*-*-*-*-*-*-*-  IMAGING STUDIES REPORTS: Imaging studies results reviewed    No valid procedures specified  No Chest XR results available for this patient  *-*-*-*-*-*-*-*-*-*-*-*-*-*-*-*-*-*-*-*-*-*-*-*-*-*-*-*-*-*-*-*-*-*-*-*-*-*-*-*-*-*-*-*-*-*-*-*-*-*-*-*-*-*-  ECHOCARDIOGRAM AND OTHER CARDIAC TESTS RESULTS:  Results for orders placed during the hospital encounter of 08/19/21    Echo complete with contrast if indicated    Narrative  [address] [facility] [address]    [place], [address] [facility]  [phone]    Transthoracic Echocardiogram  2D, M-mode, Doppler, and Color Doppler    Study date:  20-Aug-2021    Patient: Benito Bhakta  MR number: [id number]  Account number: [de-identified]  : 1940  Age: 78 years  Gender: Male  Status: Inpatient  Location: Bedside  Height: 64 in  Weight: 187 7 lb  BP: 160/ 73 mmHg    Indications: Heart failure    Diagnoses: I50 9 - Heart failure, unspecified    Sonographer:  Lindsay Long RDCS  Primary Physician:  Aleksandar Stewart DO  Referring Physician:  ANATOLY Bautista  Group:  [facility]  Interpreting Physician:  Lucinda Michele DO    SUMMARY    LEFT VENTRICLE:  Systolic function was at the lower limits of normal  Ejection fraction was estimated to be 50 %  There was mild to moderate hypokinesis of the mid-apical septal myocardial wall segments  Wall thickness was mildly increased  Features were likely suggestive of a pseudonormal left ventricular filling pattern, with concomitant abnormal relaxation and increased filling pressure (grade 2 diastolic dysfunction)  LEFT ATRIUM:  The atrium was mildly dilated  MITRAL VALVE:  There was moderate annular calcification  There was mild stenosis (mean diastolic transvalvular gradient ~ 3 3 mmHg at HR 86 BPM)  There was mild regurgitation  AORTIC VALVE:  The valve was trileaflet  Leaflets exhibited markedly increased thickness, marked calcification, markedly reduced cuspal separation, reduced mobility, and sclerosis  There was moderate stenosis  There was mild regurgitation  The peak valve velocity was 3 3 cm/s  Valve mean gradient was 22 mmHg  The aortic valve obstructive index (by VTI) was 0 29  Difficult to accurately estimate aortic valve area due to suboptimal LVOT visualization and measurements  TRICUSPID VALVE:  There was mild regurgitation  AORTA:  The root exhibited mild dilatation (4 1 cm [2 1 cm/m2] at the sinuses of valsalva), and mild fibrocalcific change      SUMMARY MEASUREMENTS  2D measurements:  Unspecified Anatomy:   %FS was 23  9 %  Ao Diam was 3 9 cm  Ao asc was 3 4 cm   EDV(Teich) was 110 5 ml   EF(Teich) was 47 6 %  ESV(Teich) was 57 9 ml  IVSd was 1 2 cm  LA Diam was 4 5 cm  LAAs A2C was 27 1 cm2  LAAs A4C was 26 2  cm2  LAESV A-L A2C was 101 1 ml  LAESV A-L A4C was 92 8 ml  LAESV Index (A-L) was 51 1 ml/m2  LAESV MOD A2C was 98 2 ml  LAESV MOD A4C was 86 6 ml  LAESV(A-L) was 97 5 ml  LAESV(MOD BP) was 92 3 ml  LAESVInd MOD BP was 48 4 ml/m2  LALs A2C was 6 2 cm  LALs A4C was 6 3 cm  LVEDV MOD A4C was 195 6 ml  LVEF MOD A4C was 42 1 %  LVESV MOD A4C was 113 2 ml  LVIDd was 4 9 cm  LVIDs was 3 7 cm  LVLd A4C was 10 6 cm  LVLs A4C was 9 6 cm  LVOT Diam was 2 6 cm  LVPWd  was 0 9 cm  RAAs A4C was 21 1 cm2  RAESV A-L was 66 8 ml  RAESV MOD was 65 4 ml  RALs was 5 7 cm  RVIDd was 4 7 cm  RWT was 0 4   SV MOD A4C was 82 4 ml   SV(Teich) was 52 6 ml   CW measurements:  Unspecified Anatomy:   AV Env  Ti was 267 9 ms   AV VTI was 59 6 cm   AV Vmax was 2 9 m/s  AV Vmean was 2 2 m/s  AV maxPG was 34 5 mmHg  AV meanPG was 21 6 mmHg  MV VTI was 35 cm  MV Vmax was 1 2 m/s  MV Vmean was 0 9 m/s  MV maxPG  was 6 mmHg  MV meanPG was 3 4 mmHg  TR Vmax was 3 2 m/s   TR maxPG was 40 mmHg  MM measurements:  Unspecified Anatomy:   TAPSE was 2 4 cm  PW measurements:  Unspecified Anatomy:   GREGORY (VTI) was 1 7 cm2  GREGORY Vmax was 1 8 cm2  AVAI (VTI) was 0 cm2/m2  AVAI Vmax was 0 cm2/m2  DVI was 0 3   E' Sept was 0 m/s  E/E' Sept was 31 6   LVOT Env  Ti was 308 3 ms  LVOT VTI was 20 3 cm  LVOT Vmax  was 1 m/s  LVOT Vmean was 0 7 m/s  LVOT maxPG was 4 2 mmHg  LVOT meanPG was 2 1 mmHg  LVSI Dopp was 54 5 ml/m2  LVSV Dopp was 104 1 ml   MV A David was 1 2 m/s  MV Dec Andrew was 7 1 m/s2  MV DecT was 204 7 ms   MV E David was 1 4 m/s  MV E/A Ratio was 1 2   MV PHT was 59 4 ms  MVA (VTI) was 3 cm2  MVA By PHT was 3 7 cm2      HISTORY: PRIOR HISTORY: HTN; AS; CP; HLD; PVC; Acute respiratory failure with hypoxia; CHF; Obesity; Depression    PROCEDURE: The procedure was performed at the bedside  This was a routine study  The transthoracic approach was used  The study included complete 2D imaging, M-mode, complete spectral Doppler, and color Doppler  The heart rate was 98 bpm,  at the start of the study  Images were obtained from the parasternal, apical, subcostal, and suprasternal notch acoustic windows  Echocardiographic views were limited due to poor acoustic window availability  This was a technically  difficult study  LEFT VENTRICLE: Size was normal  Systolic function was at the lower limits of normal  Ejection fraction was estimated to be 50 %  There was mild to moderate hypokinesis of the mid-apical septal myocardial wall segments  Wall thickness was  mildly increased  DOPPLER: Features were likely suggestive of a pseudonormal left ventricular filling pattern, with concomitant abnormal relaxation and increased filling pressure (grade 2 diastolic dysfunction)  RIGHT VENTRICLE: The size was normal  Systolic function was normal  Wall thickness was normal     LEFT ATRIUM: The atrium was mildly dilated  RIGHT ATRIUM: Size was normal     MITRAL VALVE: There was moderate annular calcification  DOPPLER: There was mild stenosis (mean diastolic transvalvular gradient ~ 3 3 mmHg at HR 86 BPM)  There was mild regurgitation  AORTIC VALVE: The valve was trileaflet  Leaflets exhibited markedly increased thickness, marked calcification, markedly reduced cuspal separation, reduced mobility, and sclerosis  DOPPLER: There was moderate stenosis  There was mild  regurgitation  TRICUSPID VALVE: Not well visualized  DOPPLER: There was mild regurgitation  PULMONIC VALVE: Not well visualized  PERICARDIUM: There was no pericardial effusion  The pericardium was normal in appearance  AORTA: The root exhibited mild dilatation (4 1 cm [2 1 cm/m2] at the sinuses of valsalva), and mild fibrocalcific change      SYSTEMIC VEINS: IVC: The inferior vena cava was not well visualized  MEASUREMENT TABLES    DOPPLER MEASUREMENTS  Aortic valve   (Reference normals)  Peak david   3 3 cm/s   (--)  Mean gradient   22 mmHg   (--)  Obstr index, VTI   0 29    (--)    SYSTEM MEASUREMENT TABLES    2D  %FS: 23 9 %  Ao Diam: 3 9 cm  Ao asc: 3 4 cm  EDV(Teich): 110 5 ml  EF(Teich): 47 6 %  ESV(Teich): 57 9 ml  IVSd: 1 2 cm  LA Diam: 4 5 cm  LAAs A2C: 27 1 cm2  LAAs A4C: 26 2 cm2  LAESV A-L A2C: 101 1 ml  LAESV A-L A4C: 92 8 ml  LAESV Index (A-L): 51 1 ml/m2  LAESV MOD A2C: 98 2 ml  LAESV MOD A4C: 86 6 ml  LAESV(A-L): 97 5 ml  LAESV(MOD BP): 92 3 ml  LAESVInd MOD BP: 48 4 ml/m2  LALs A2C: 6 2 cm  LALs A4C: 6 3 cm  LVEDV MOD A4C: 195 6 ml  LVEF MOD A4C: 42 1 %  LVESV MOD A4C: 113 2 ml  LVIDd: 4 9 cm  LVIDs: 3 7 cm  LVLd A4C: 10 6 cm  LVLs A4C: 9 6 cm  LVOT Diam: 2 6 cm  LVPWd: 0 9 cm  RAAs A4C: 21 1 cm2  RAESV A-L: 66 8 ml  RAESV MOD: 65 4 ml  RALs: 5 7 cm  RVIDd: 4 7 cm  RWT: 0 4  SV MOD A4C: 82 4 ml  SV(Teich): 52 6 ml    CW  AV Env  Ti: 267 9 ms  AV VTI: 59 6 cm  AV Vmax: 2 9 m/s  AV Vmean: 2 2 m/s  AV maxP 5 mmHg  AV meanP 6 mmHg  MV VTI: 35 cm  MV Vmax: 1 2 m/s  MV Vmean: 0 9 m/s  MV maxP mmHg  MV meanPG: 3 4 mmHg  TR Vmax: 3 2 m/s  TR maxP mmHg    MM  TAPSE: 2 4 cm    PW  GREGORY (VTI): 1 7 cm2  GREGORY Vmax: 1 8 cm2  AVAI (VTI): 0 cm2/m2  AVAI Vmax: 0 cm2/m2  DVI: 0 3  E' Sept: 0 m/s  E/E' Sept: 31 6  LVOT Env  Ti: 308 3 ms  LVOT VTI: 20 3 cm  LVOT Vmax: 1 m/s  LVOT Vmean: 0 7 m/s  LVOT maxP 2 mmHg  LVOT meanP 1 mmHg  LVSI Dopp: 54 5 ml/m2  LVSV Dopp: 104 1 ml  MV A David: 1 2 m/s  MV Dec Fountain: 7 1 m/s2  MV DecT: 204 7 ms  MV E David: 1 4 m/s  MV E/A Ratio: 1 2  MV PHT: 59 4 ms  MVA (VTI): 3 cm2  MVA By PHT: 3 7 cm2    [employer] Accredited [facility]    Prepared and electronically signed by    Carole Moreno DO  Signed 20-Aug-2021 14:28:06    No results found for this or any previous visit      No results found for this or any previous visit  No results found for this or any previous visit         *-*-*-*-*-*-*-*-*-*-*-*-*-*-*-*-*-*-*-*-*-*-*-*-*-*-*-*-*-*-*-*-*-*-*-*-*-*-*-*-*-*-*-*-*-*-*-*-*-*-*-*-*-*-  SIGNATURES:   @TQQ@   [facility] MD Doug

## 2021-08-27 NOTE — ASSESSMENT & PLAN NOTE
· POA: anxiety, generalized fatigue since Tuesday (9/17)  His wife noted that on 8/19 he was more short of breath with associated cough and complaints of chest pain which prompted his visit to the ED  · In the ED patient was noted to be hypoxic, in respiratory distress  Patient improved with lasix, bipap, morphine and ativan  · Clinically patient appears  fluid overloaded, his lung sounds are coarse with scattered rales  He has trace to +1 left lower extremity edema that he reports as new  · COVID-19 negative x3  · Chest x-ray concerning for vascular congestion, multifocal pneumonia  · Continue antibiotics: vanc cefepime and flagyl day 4  Total antibiotics day 6  Consider discontinue antibiotics since bronchial culture currently shows no growth and negative gram stain  Viral culture and cytology was negative  · Monitor off antibiotics  Continue diuresis  · Monitor CBC and fever curve  · Failed conservative treatment and was intubated on 8/21  Continue to try to extubate when appropriate    · Wean FIO2 and PEEP as able with a goal to maintain O2 saturation > 90%  ·

## 2021-08-27 NOTE — CONSULTS
Consultation - Nephrology   Hallie Mcmahan 78 y o  male MRN: [id number]  Unit/Bed#: ICU 07 Encounter: [id number]    ASSESSMENT AND PLAN:  Patient is a 67-year-old male with significant medical issues of diastolic CHF, moderate aortic stenosis, hypertension, hyperlipidemia, presented with shortness of breath, chest pain  We are consulted for CANDIE management  CANDIE, baseline creatinine 0 7 to 0 8  -CANDIE suspect multifactorial in the setting of relative hypotension, BP variability, use of Aldactone, hemodynamic insult, cardiorenal, component of ATN with infection issues  -creatinine now continue to progressively worsened up to 2 2 today  -urine output good  -UA shows no proteinuria, 2 to 4 RBCs  -no urgent indication for dialysis  Currently on Lasix drip as below  -BMP in a m   -currently has Moreno catheter  -avoid nephrotoxins or NSAIDs, avoid hypotension   -I had detailed discussion with patient's son Nita Benz (POA) over the phone and updated regarding progressive worsening renal failure  Discussed possible need for dialysis in the future if creatinine continued to worsen  He has provided phone consent which is in the chart  He verbalized understanding of my discussion with him  Azotemia, recommend to change tube feed to Nepro  -patient currently remains intubated, closely monitor for uremic symptoms although difficult to assess for any uremic encephalopathy given intubated    Diastolic CHF  -echo showed EF 87%, grade 2 diastolic dysfunction, moderate aortic stenosis  -now started on Lasix drip, good urine output, continue to monitor, goal to keep negative balance   -cardiology follow-up    History of hypertension, blood pressure now remains low normal   Agree with holding amlodipine, Aldactone (status post Aldactone today)  -on hydralazine, isosorbide as per Cardiology  Hypoxic respiratory failure, VDRF, on 50% FiO2    Presumed multifocal pneumonia, status post antibiotic  Leukocytosis, continue to monitor    Discussed above plan with ICU team    HISTORY OF PRESENT ILLNESS:  Requesting Physician: Pamella Lindsey MD  Reason for Consult:  CANDIE    Dian Mccord is a 78y o  year old male who was admitted to [facility] after presenting with worsening shortness of breath, chest pain  A renal consultation is requested today for assistance in the management of CANDIE  Old medical records including prior creatinine values from [facility] records were reviewed  Patient currently remains intubated unable to provide any history  All the history is obtained from reviewing medical records, talking to ICU staff and also talking to patient's son over the phone  Patient found to have progressive worsening CANDIE although still has good urine output  Remains currently intubated with suspicion for earlier pneumonia, status post antibiotic  Also now remains on aggressive diuresis      PAST MEDICAL HISTORY:  Past Medical History:   Diagnosis Date    Actinic keratosis     LAST ASSESSED: [id number]    Allergic rhinitis     LAST ASSESSED: [id number]    Anxiety     LAST ASSESSED: [id number]    Anxiety disorder     LAST ASSESSED: [id number]    Atelectasis of right lung     ABNORMAL CT SCAN OF [address] 12/2/2016 REPEAT NEEDED PER RADIOLOGY IN 3 MONTHS LAST ASSESSED: [id number]    Atypical chest pain     LAST ASSESSED: [id number]    Benign paroxysmal vertigo     LAST ASSESSED: 71CWK1140    Chronic cough     LAST ASSESSED: 08EUY2992    Chronic GERD     Degenerative arthritis of knee, bilateral     LAST ASSESSED: 03AQD2995    Diverticulitis of colon     LAST ASSESSED: 23YSE6404    Diverticulosis     LAST ASSESSED: 76YJS5055    Foreign body, eye     LAST ASSESSED: 22TDX8373    Functional gait abnormality     LAST ASSESSED: 23XBG6326    Hyperlipidemia     Hypertension     Hyponatremia     LAST ASSESSED: 77XKY7946    Leukocytosis     LAST ASSESSED: 21RWW9617    Murmur     Newly recognized murmur     LAST ASSESSED: 10DAW1930    Olecranon bursitis, unspecified elbow     Presence of indwelling urethral catheter     RESOLVED: 25WUT6448    Transient cerebral ischemia     LAST ASSESSED: 42ZGS9764    Urinary incontinence     LAST ASSESSED: 42FNZ4425    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     LAST ASSESSED: 94JVL6907       PAST SURGICAL HISTORY:  Past Surgical History:   Procedure Laterality Date    ADENOIDECTOMY      APPENDECTOMY      COLONOSCOPY  10/2006    FIBEROPTIC    INGUINAL HERNIA REPAIR      JOINT REPLACEMENT      b/l TKR 2015    SINUS SURGERY      TONSILLECTOMY         ALLERGIES:  Allergies   Allergen Reactions    Ace Inhibitors Cough     Pt denied any allergies         SOCIAL HISTORY:  Social History     Substance and Sexual Activity   Alcohol Use Yes    Comment: occ, SOCIAL     Social History     Substance and Sexual Activity   Drug Use No     Social History     Tobacco Use   Smoking Status Former Smoker    Quit date: 56    Years since quittin 6   Smokeless Tobacco Never Used   Tobacco Comment    2 ppd for 12 years        FAMILY HISTORY:  Family History   Problem Relation Age of Onset    Alzheimer's disease Mother     Coronary artery disease Brother        MEDICATIONS:    Current Facility-Administered Medications:     Acetaminophen (TYLENOL) oral suspension 650 mg, 650 mg, Per NG Tube, Q6H PRN Max 4/day, Gentry Dawson MD, 649 6 mg at 21 1450    aspirin chewable tablet 81 mg, 81 mg, Oral, Daily, Lidya Steve, DO, 81 mg at 21 4270    calcium gluconate 1 g in sodium chloride 0 9% 50 mL (premix), 1 g, Intravenous, Daily, Curt Santos MD, Last Rate: 100 mL/hr at 21 0842, 1 g at 21 0842    chlorhexidine (PERIDEX) 0 12 % oral rinse 15 mL, 15 mL, Mouth/Throat, Q12H [address], Nico Up MD, 15 mL at 21 0927    fentanyl citrate (PF) 100 MCG/2ML 50 mcg, 50 mcg, Intravenous, Q1H PRN, Anna Olsen PA-C, 50 mcg at 21 2302    furosemide (LASIX) 500 mg infusion 50 mL, 10 mg/hr, Intravenous, Continuous, Tana Bonilla MD, Last Rate: 1 mL/hr at 08/26/21 1324, 10 mg/hr at 08/26/21 1324    heparin (porcine) subcutaneous injection 5,000 Units, 5,000 Units, Subcutaneous, Q8H [address], Laurence Pitts PA-C, 5,000 Units at 08/27/21 0604    hydrALAZINE (APRESOLINE) injection 10 mg, 10 mg, Intravenous, Q6H PRN, ANATOLY Dinero, 10 mg at 08/25/21 2025    hydrALAZINE (APRESOLINE) tablet 25 mg, 25 mg, Oral, Q8H [address], MARIANA BricenoNP    isosorbide dinitrate (ISORDIL) tablet 10 mg, 10 mg, Oral, TID after meals, ANATOLY Del Valle    metolazone (ZAROXOLYN) tablet 5 mg, 5 mg, Oral, Daily, Tana Bonilla MD, 5 mg at 08/27/21 0928    metoprolol (LOPRESSOR) injection 2 5 mg, 2 5 mg, Intravenous, Q6H [address], Nicolasa Joshi MD, 2 5 mg at 08/26/21 2358    morphine injection 2 mg, 2 mg, Intravenous, Q4H PRN, Curt Galicia MD, 2 mg at 08/24/21 2307    OLANZapine (ZyPREXA ZYDIS) dispersible tablet 5 mg, 5 mg, Oral, HS, Laurence Pitts PA-C, 5 mg at 08/26/21 2158    omeprazole (PRILOSEC) suspension 2 mg/mL, 20 mg, Oral, Daily, Lidya Steve DO, 20 mg at 08/27/21 1154    polyethylene glycol (MIRALAX) packet 17 g, 17 g, Oral, Daily, Curt Galicia MD, 17 g at 08/27/21 1549    pravastatin (PRAVACHOL) tablet 40 mg, 40 mg, Oral, Daily With Dinner, Sia Damico PA-C, 40 mg at 08/26/21 1605    propofol (DIPRIVAN) 1000 mg in 100 mL infusion (premix), 5-50 mcg/kg/min, Intravenous, Titrated, Tana Bonilla MD, Last Rate: 17 8 mL/hr at 08/27/21 1242, 35 mcg/kg/min at 08/27/21 1242    senna-docusate sodium (SENOKOT S) 8 6-50 mg per tablet 2 tablet, 2 tablet, Oral, BID, Curt Galicia MD, 2 tablet at 08/27/21 0431    REVIEW OF SYSTEMS:  Unable to obtain any review of system as patient is currently intubated      PHYSICAL EXAM:  Current Weight: Weight - Scale: 82 6 kg (182 lb 1 6 oz)  First Weight: Weight - Scale: 85 4 kg (188 lb 4 4 oz)  Vitals:    08/27/21 1220   BP: 122/52   Pulse: 92   Resp: 22   Temp: 98 6 °F (37 °C)   SpO2: 93%       Intake/Output Summary (Last 24 hours) at 8/27/2021 1244  Last data filed at 8/27/2021 0934  Gross per 24 hour   Intake 915 75 ml   Output 2575 ml   Net -1659 25 ml     Wt Readings from Last 3 Encounters:   08/27/21 82 6 kg (182 lb 1 6 oz)   07/26/21 83 5 kg (184 lb 2 oz)   03/19/21 82 8 kg (182 lb 9 6 oz)     Temp Readings from Last 3 Encounters:   08/27/21 98 6 °F (37 °C)   07/26/21 (!) 97 1 °F (36 2 °C)   04/16/21 97 6 °F (36 4 °C)     BP Readings from Last 3 Encounters:   08/27/21 122/52   07/26/21 140/60   04/16/21 148/62     Pulse Readings from Last 3 Encounters:   08/27/21 92   04/16/21 76   02/23/21 87        Physical Examination:  General:  Lying in bed, intubated  Eyes:  Mild conjunctival pallor present  ENT:  ET tube present  Neck:  No obvious lymphadenopathy appreciated  Respiratory:  Bilateral coarse breath sound  CVS:  S1, S2 present  GI:  Soft, nondistended  CNS:  Intubated, sedated, does not follow commands at the time of my encounter  Extremities:  No significant edema in legs  Psych: Intubated, limited assessment  Skin:  No new rash in legs    Invasive Devices:   Urethral Catheter Temperature probe (Active)   Amt returned on insertion(mL) 130 mL 08/20/21 1201   Reasons to continue Urinary Catheter  Accurate I&O assessment in critically ill patients (48 hr  max) 08/27/21 1121   Goal for Removal Other (Comment) 08/27/21 1121   Site Assessment Clean;Skin intact; Patent 08/26/21 2000   Moreno Care Done 08/27/21 0900   Collection Container Standard drainage bag 08/26/21 2000   Securement Method Securing device (Describe) 08/26/21 2000   Output (mL) 300 mL 08/27/21 0934     Lab Results:   Results from last 7 days   Lab Units 08/27/21  0420 08/26/21 2003 08/26/21  4547 08/25/21  0518 08/24/21  0454 08/23/21  1808 08/23/21  8113 08/23/21  8185 08/22/21  0408 08/22/21  0350 08/21/21  0415 08/21/21  0031   WBC Thousand/uL 17 96*  --  24 65* 12 71* 12 94*  --  14 45*  --   --  12 72* 15 63*  --    HEMOGLOBIN g/dL 9 2*  --  9 3* 8 7* 7 6*  --  8 6*  --   --  8 2* 8 5*  --    HEMATOCRIT % 32 0*  --  33 4* 31 0* 27 1*  --  29 8*  --   --  28 4* 28 9*  --    PLATELETS Thousands/uL 281  --  296 238 208  --  232  --   --  225 240  --    POTASSIUM mmol/L 3 7 4 5 4 3 4 0 4 0 5 4*  --  2 8* 3 2*  --   --   --    CHLORIDE mmol/L 103 103 102 107 112* 111*  --  108 108  --   --   --    CO2 mmol/L 32 29 28 26 23 25  --  28 28  --   --   --    BUN mg/dL 109* 96* 80* 53* 34* 32*  --  32* 29*  --   --   --    CREATININE mg/dL 2 25* 2 21* 1 47* 1 44* 1 36* 1 25  --  1 18 1 11  --   --   --    CALCIUM mg/dL 9 2 9 3 8 7 8 5 7 7* 9 0  --  8 5 8 3  --   --   --    MAGNESIUM mg/dL  --  3 0*  --   --   --   --   --  1 9 2 3  --   --  1 7       Other Studies:   XR chest portable ICU   Final Result by Stu Eduardo MD (08/26 1022)      Increasing right peripheral consolidation with improving aeration at the lung bases  Workstation performed: [id number]         XR chest portable   Final Result by Curry Dillard MD (08/25 1511)   Persistent bilateral infiltrates suspicious for multifocal pneumonia      Typical endotracheal tube and enteric tube      Increased gastric distention               Workstation performed: [id number]         XR chest portable ICU   Final Result by Cinthya Galvez MD (08/24 7025)      Slight improvement in bilateral parenchymal infiltrative changes  ET tube now at the brent  Workstation performed: [id number]         XR chest portable ICU   Final Result by Cinthya Galvez MD (08/22 7466)   Worsening bilateral parenchymal changes  Endotracheal tube in the right main bronchus to be pulled back  If this has been pulled back a repeat x-ray for confirmation as indicated clinically        The study was marked in [facility] for immediate notification  Workstation performed: [id number]         XR chest portable ICU   Final Result by Nusrat Whelan MD (08/22 1059)      Persistent pulmonary edema  Workstation performed: [id number]         XR chest 1 view portable   ED Interpretation by Sarabjit Menendez MD (08/19 2123)   Bilateral diffuse infiltrates      Final Result by Ann Morales MD (08/20 5329)      Diffuse patchy airspace disease bilaterally  Consider CHF versus multifocal infiltrate  Findings concur with the preliminary ER interpretation  Workstation performed: [id number]         XR chest portable ICU    (Results Pending)   Chest x-ray images personally reviewed which shows bilateral pulmonary infiltrate  Portions of the record may have been created with voice recognition software  Occasional wrong word or "sound a like" substitutions may have occurred due to the inherent limitations of voice recognition software  Read the chart carefully and recognize, using context, where substitutions have occurred

## 2021-08-27 NOTE — ASSESSMENT & PLAN NOTE
Recent Labs     08/26/21  0447 08/26/21 2003 08/27/21  0420   K 4 3 4 5 3 7     · Keep potassium level > 3 8  · Monitor BMP  · Replete as needed  · Resolved

## 2021-08-28 LAB
ANION GAP SERPL CALCULATED.3IONS-SCNC: 15 MMOL/L (ref 4–13)
BUN SERPL-MCNC: 142 MG/DL (ref 5–25)
CALCIUM SERPL-MCNC: 9.3 MG/DL (ref 8.3–10.1)
CHLORIDE SERPL-SCNC: 99 MMOL/L (ref 100–108)
CO2 SERPL-SCNC: 30 MMOL/L (ref 21–32)
CREAT SERPL-MCNC: 2.32 MG/DL (ref 0.6–1.3)
ERYTHROCYTE [DISTWIDTH] IN BLOOD BY AUTOMATED COUNT: 22.1 % (ref 11.6–15.1)
GFR SERPL CREATININE-BSD FRML MDRD: 25 ML/MIN/1.73SQ M
GLUCOSE SERPL-MCNC: 164 MG/DL (ref 65–140)
HCT VFR BLD AUTO: 33.7 % (ref 36.5–49.3)
HGB BLD-MCNC: 9.7 G/DL (ref 12–17)
MCH RBC QN AUTO: 22.1 PG (ref 26.8–34.3)
MCHC RBC AUTO-ENTMCNC: 28.8 G/DL (ref 31.4–37.4)
MCV RBC AUTO: 77 FL (ref 82–98)
PLATELET # BLD AUTO: 334 THOUSANDS/UL (ref 149–390)
PMV BLD AUTO: 10.7 FL (ref 8.9–12.7)
POTASSIUM SERPL-SCNC: 3.1 MMOL/L (ref 3.5–5.3)
RBC # BLD AUTO: 4.38 MILLION/UL (ref 3.88–5.62)
SODIUM SERPL-SCNC: 144 MMOL/L (ref 136–145)
TRIGL SERPL-MCNC: 272 MG/DL
WBC # BLD AUTO: 14.02 THOUSAND/UL (ref 4.31–10.16)

## 2021-08-28 PROCEDURE — 99232 SBSQ HOSP IP/OBS MODERATE 35: CPT | Performed by: INTERNAL MEDICINE

## 2021-08-28 PROCEDURE — 99233 SBSQ HOSP IP/OBS HIGH 50: CPT | Performed by: INTERNAL MEDICINE

## 2021-08-28 PROCEDURE — 87106 FUNGI IDENTIFICATION YEAST: CPT | Performed by: INTERNAL MEDICINE

## 2021-08-28 PROCEDURE — 85027 COMPLETE CBC AUTOMATED: CPT

## 2021-08-28 PROCEDURE — 87205 SMEAR GRAM STAIN: CPT | Performed by: INTERNAL MEDICINE

## 2021-08-28 PROCEDURE — 87070 CULTURE OTHR SPECIMN AEROBIC: CPT | Performed by: INTERNAL MEDICINE

## 2021-08-28 PROCEDURE — 99291 CRITICAL CARE FIRST HOUR: CPT | Performed by: INTERNAL MEDICINE

## 2021-08-28 PROCEDURE — 94002 VENT MGMT INPAT INIT DAY: CPT

## 2021-08-28 PROCEDURE — 84478 ASSAY OF TRIGLYCERIDES: CPT | Performed by: INTERNAL MEDICINE

## 2021-08-28 PROCEDURE — 93005 ELECTROCARDIOGRAM TRACING: CPT

## 2021-08-28 PROCEDURE — 94003 VENT MGMT INPAT SUBQ DAY: CPT

## 2021-08-28 PROCEDURE — 80048 BASIC METABOLIC PNL TOTAL CA: CPT

## 2021-08-28 RX ORDER — METOPROLOL TARTRATE 5 MG/5ML
2.5 INJECTION INTRAVENOUS EVERY 6 HOURS
Status: DISCONTINUED | OUTPATIENT
Start: 2021-08-28 | End: 2021-08-30

## 2021-08-28 RX ORDER — ALBUMIN (HUMAN) 12.5 G/50ML
25 SOLUTION INTRAVENOUS EVERY 12 HOURS
Status: DISCONTINUED | OUTPATIENT
Start: 2021-08-28 | End: 2021-08-30

## 2021-08-28 RX ORDER — POTASSIUM CHLORIDE 20MEQ/15ML
40 LIQUID (ML) ORAL 2 TIMES DAILY
Status: DISCONTINUED | OUTPATIENT
Start: 2021-08-28 | End: 2021-08-29

## 2021-08-28 RX ADMIN — OLANZAPINE 5 MG: 5 TABLET, ORALLY DISINTEGRATING ORAL at 21:22

## 2021-08-28 RX ADMIN — FENTANYL CITRATE 50 MCG: 50 INJECTION INTRAMUSCULAR; INTRAVENOUS at 02:50

## 2021-08-28 RX ADMIN — HEPARIN SODIUM 5000 UNITS: 5000 INJECTION INTRAVENOUS; SUBCUTANEOUS at 21:17

## 2021-08-28 RX ADMIN — FENTANYL CITRATE 50 MCG: 50 INJECTION INTRAMUSCULAR; INTRAVENOUS at 04:30

## 2021-08-28 RX ADMIN — HEPARIN SODIUM 5000 UNITS: 5000 INJECTION INTRAVENOUS; SUBCUTANEOUS at 05:04

## 2021-08-28 RX ADMIN — Medication 20 MG: at 11:07

## 2021-08-28 RX ADMIN — ACETAMINOPHEN 650 MG: 650 SUSPENSION ORAL at 21:17

## 2021-08-28 RX ADMIN — PROPOFOL 20 MCG/KG/MIN: 10 INJECTION, EMULSION INTRAVENOUS at 11:05

## 2021-08-28 RX ADMIN — CHLORHEXIDINE GLUCONATE 0.12% ORAL RINSE 15 ML: 1.2 LIQUID ORAL at 09:05

## 2021-08-28 RX ADMIN — PROPOFOL 40 MCG/KG/MIN: 10 INJECTION, EMULSION INTRAVENOUS at 05:03

## 2021-08-28 RX ADMIN — CALCIUM GLUCONATE 1 G: 20 INJECTION, SOLUTION INTRAVENOUS at 09:05

## 2021-08-28 RX ADMIN — ASPIRIN 81 MG: 81 TABLET, CHEWABLE ORAL at 09:06

## 2021-08-28 RX ADMIN — HEPARIN SODIUM 5000 UNITS: 5000 INJECTION INTRAVENOUS; SUBCUTANEOUS at 14:28

## 2021-08-28 RX ADMIN — ALBUMIN (HUMAN) 25 G: 0.25 INJECTION, SOLUTION INTRAVENOUS at 15:49

## 2021-08-28 RX ADMIN — POTASSIUM CHLORIDE 40 MEQ: 20 SOLUTION ORAL at 09:06

## 2021-08-28 NOTE — ASSESSMENT & PLAN NOTE
· POA: anxiety, generalized fatigue since Tuesday (9/17)  His wife noted that on 8/19 he was more short of breath with associated cough and complaints of chest pain which prompted his visit to the ED  · In the ED patient was noted to be hypoxic, in respiratory distress  Patient improved with lasix, bipap, morphine and ativan  · Clinically patient appears  fluid overloaded, his lung sounds are coarse with scattered rales  He has trace to +1 left lower extremity edema that he reports as new  · COVID-19 negative x3  · Chest x-ray concerning for vascular congestion, multifocal pneumonia  · Continue antibiotics: vanc cefepime and flagyl day 4  Total antibiotics day 6  Consider discontinue antibiotics since bronchial culture currently shows no growth and negative gram stain  Viral culture and cytology was negative  · Monitor off antibiotics  Continue diuresis  · Monitor CBC and fever curve  · Failed conservative treatment and was intubated on 8/21  Continue to try to extubate when appropriate    · Wean FIO2 and PEEP as able with a goal to maintain O2 saturation > 90%

## 2021-08-28 NOTE — ASSESSMENT & PLAN NOTE
· Chest x-ray 8/20:  Diffuse patchy airspace disease bilaterally concerning multifocal infiltrates  · CAP versus aspiration  · Tested negative for COVID-19 x3  · Legionella and strep pneumo antigens negative  · Initially started on Rocephin and azithromycin, currently on cefepime and flagyl day 3  Total antibiotics day 5   · Procalcitonin negative x2  Recent Labs     08/25/21  0518 08/26/21  0447 08/27/21  0420   WBC 12 71* 24 65* 17 96*     · Blood cultures shows no growth  · Bronchial lavage culture also shows no growth     · Continue monitor fever curve, WBCs

## 2021-08-28 NOTE — ASSESSMENT & PLAN NOTE
· On losartan 25 mg daily, amlodipine 10 mg daily  Hold losartan due to CANDIE  · Currently diuresis   Continue metoprolol 2 5 Q6H  · Continue hold antihypertensive meds, resume when  appropriate 97.7

## 2021-08-28 NOTE — ASSESSMENT & PLAN NOTE
Baseline cr appeared to be 0 7-0 9  Recent Labs     08/26/21  0447 08/26/21 2003 08/27/21  0420   CREATININE 1 47* 2 21* 2 25*   EGFR 43 27 26     Estimated Creatinine Clearance: 24 1 mL/min (A) (by C-G formula based on SCr of 2 25 mg/dL (H))  Patient currently needs diuresis for volume overload  Will continue monitor BMP  DC diurese when appropriate    Strict I and O

## 2021-08-28 NOTE — ASSESSMENT & PLAN NOTE
· POA:  With signs of volume overload, initial imaging chest x-ray concerning for vascular congestion consistent with CHF  · Most recent echo in 2018 showed grade 1 diastolic dysfunction  · [id number]  · Repeated echo 8/20 with significant worsening of his diastolic dysfunction    · Continue diuresis   · Cardiology on board  · plan as outlined above

## 2021-08-28 NOTE — PROGRESS NOTES
[facility] [facility]  Progress Note Mason Wilkins 1940, 78 y o  male MRN: [id number]  Unit/Bed#: ICU 07 Encounter: [id number]  Primary Care Provider: Kathy Benites DO   Date and time admitted to hospital: 8/19/2021  8:25 PM    Acute on chronic diastolic CHF (congestive heart failure) ([facility] [address] )  Assessment & Plan  · POA:  With signs of volume overload, initial imaging chest x-ray concerning for vascular congestion consistent with CHF  · Most recent echo in 2018 showed grade 1 diastolic dysfunction  · [id number]  · Repeated echo 8/20 with significant worsening of his diastolic dysfunction  · Continue diuresis   · Cardiology on board  · plan as outlined above      Multifocal pneumonia  Assessment & Plan  · Chest x-ray 8/20:  Diffuse patchy airspace disease bilaterally concerning multifocal infiltrates  · CAP versus aspiration  · Tested negative for COVID-19 x3  · Legionella and strep pneumo antigens negative  · Initially started on Rocephin and azithromycin, currently on cefepime and flagyl day 3  Total antibiotics day 5   · Procalcitonin negative x2  Recent Labs     08/25/21  0518 08/26/21  0447 08/27/21  0420   WBC 12 71* 24 65* 17 96*     · Blood cultures shows no growth  · Bronchial lavage culture also shows no growth  · Continue monitor fever curve, WBCs    * Acute respiratory failure with hypoxia (HCC)  Assessment & Plan  · POA: anxiety, generalized fatigue since Tuesday (9/17)  His wife noted that on 8/19 he was more short of breath with associated cough and complaints of chest pain which prompted his visit to the ED  · In the ED patient was noted to be hypoxic, in respiratory distress  Patient improved with lasix, bipap, morphine and ativan  · Clinically patient appears  fluid overloaded, his lung sounds are coarse with scattered rales  He has trace to +1 left lower extremity edema that he reports as new     · COVID-19 negative x3  · Chest x-ray concerning for vascular congestion, multifocal pneumonia  · Continue antibiotics: vanc cefepime and flagyl day 4  Total antibiotics day 6  Consider discontinue antibiotics since bronchial culture currently shows no growth and negative gram stain  Viral culture and cytology was negative  · Monitor off antibiotics  Continue diuresis  · Monitor CBC and fever curve  · Failed conservative treatment and was intubated on 8/21  Continue to try to extubate when appropriate  · Wean FIO2 and PEEP as able with a goal to maintain O2 saturation > 90%        CANDIE (acute kidney injury) ([facility] )  Assessment & Plan  Baseline cr appeared to be 0 7-0 9  Recent Labs     08/26/21 0447 08/26/21 2003 08/27/21  0420   CREATININE 1 47* 2 21* 2 25*   EGFR 43 27 26     Estimated Creatinine Clearance: 24 1 mL/min (A) (by C-G formula based on SCr of 2 25 mg/dL (H))  Patient currently needs diuresis for volume overload  Will continue monitor BMP  DC diurese when appropriate  Strict I and O    Sepsis ([facility] [address] )  Assessment & Plan  · POA :  Meets sepsis criteria on presentation tachycardia, tachypnea, lactic acidosis, leukocytosis and suspecting pneumonia    · Monitor off antibiotics for now due to negative culture result  · Rest of plan as outlined above    Hypochromic microcytic anemia  Assessment & Plan  · Has history of iron deficiency anemia due to inadequate dietary iron intake  · Hemoglobin currently stable, there is no signs of active bleeding  · EGD in February 2021 with no source upper GI bleed  · Iron panel:  Iron 29, ferritin 21, patient will require Venofer  · Continue iron supplement when appropriate  · May need OP hematology follow up  · Trend hg, transfuse for <7 0    Lactic acidosis  Assessment & Plan  · POA:  Lactic acid on presentation 8 1 in the setting of sepsis and suspected pneumonia  · Rest of plan as outlined above    Hypokalemia  Assessment & Plan  Recent Labs     08/26/21 0447 08/26/21 2003 08/27/21  0420   K 4 3 4 5 3 7     · Keep potassium level > 3 8  · Monitor BMP  · Replete as needed  · Resolved    Mild cognitive impairment  Assessment & Plan  · Record review reveals stable memory issues with outpatient followup with geriatrics  · TSH, B12 nl in past  · Family reports no progression at this point  · Continue outpatient sertraline when appropriate     Chest pain  Assessment & Plan  · POA:  Reported midsternal/epigastric chest pain, persistent, 8/10  · Troponin initially elevated  0 08 continue to trend up in peaked 1 11 currently plateaued 2 45  · EKGs in ED normal sinus rhythm, left axis deviation with nonspecific conduction abnormalities, repeated EKG  normal sinus rhythm, significant ST abnormalities Heparin was discontinued on 8/24  · Continue daily aspirin  Continue metoprolol  Metoprolol was held due to hypotension  · Continue monitor on tele  · Cardiology on board, appreciate the input      Aortic stenosis, moderate  Assessment & Plan  · History of mild to moderate aortic stenosis on echo 5/17/2018   · Repeated on 8/20: Showed markedly increased thickness, marked calcification, markedly reduced cuspal separation, reduced mobility, and sclerosis and moderate stenosis  · Cardiology on board  · Currently diuresing  · Monitor fluid volume status closely  · Strict I's and O's      Essential hypertension  Assessment & Plan  · On losartan 25 mg daily, amlodipine 10 mg daily  Hold losartan due to CANDIE  · Currently diuresis  Continue metoprolol 2 5 Q6H  · Continue hold antihypertensive meds, resume when  appropriate    ----------------------------------------------------------------------------------------  HPI/24hr events: One episode of sinus tachycardia in the 150s with diffuse st wave inversions, transitioned back to NSR with suctioning  BMP pending at time of note       Patient appropriate for transfer out of the ICU today?: No  Disposition: Continue Critical Care   Code Status: Level 1 - Full Code  ---------------------------------------------------------------------------------------  SUBJECTIVE  ALEX    Review of Systems  Review of systems was unable to be performed secondary to intubated, sedated  ---------------------------------------------------------------------------------------  OBJECTIVE    Vitals   Vitals:    21 2100 21 2102 21 2200 21 2300   BP:       Pulse: 86 86 78 72   Resp: 22 22 20 20   Temp: 99 3 °F (37 4 °C) 99 3 °F (37 4 °C) 99 °F (37 2 °C) 98 2 °F (36 8 °C)   TempSrc:       SpO2: 93% 93% 96% 95%   Weight:       Height:         Temp (24hrs), Av 8 °F (37 1 °C), Min:97 9 °F (36 6 °C), Max:99 3 °F (37 4 °C)  Current: Temperature: 98 2 °F (36 8 °C)          Respiratory:  SpO2: SpO2: 95 %       Invasive/non-invasive ventilation settings   Respiratory    Lab Data (Last 4 hours)    None         O2/Vent Data (Last 4 hours)       0244           Vent Mode AC/VC       Resp Rate (BPM) (BPM) 20       Vt (mL) (mL) 400       FIO2 (%) (%) 50       PEEP (cmH2O) (cmH2O) 6       MV 8                   Physical Exam  Vitals and nursing note reviewed  Constitutional:       General: He is not in acute distress  Appearance: He is ill-appearing  Comments: Opens eyes to verbal stimuli   HENT:      Head: Normocephalic and atraumatic  Right Ear: External ear normal       Left Ear: External ear normal       Nose: Nose normal       Mouth/Throat:      Mouth: Mucous membranes are moist       Pharynx: Oropharynx is clear  Eyes:      Extraocular Movements: Extraocular movements intact  Conjunctiva/sclera: Conjunctivae normal       Pupils: Pupils are equal, round, and reactive to light  Cardiovascular:      Rate and Rhythm: Normal rate and regular rhythm  Pulses: Normal pulses  Heart sounds: Normal heart sounds     Pulmonary:      Comments: Diminished breath sounds  Mechanically ventilated  large thick tan secretions via ETT  Abdominal: General: Bowel sounds are normal       Palpations: Abdomen is soft  Musculoskeletal:      Cervical back: Normal range of motion and neck supple  Comments: Passive ROM   Skin:     General: Skin is warm and dry  Capillary Refill: Capillary refill takes 2 to 3 seconds     Neurological:      Comments: ALEX   Psychiatric:      Comments: ALEX         Laboratory and Diagnostics:  Results from last 7 days   Lab Units 08/28/21 0456 08/27/21 0420 08/26/21 0447 08/25/21  0518 08/24/21  0454 08/23/21  0504 08/22/21  0350   WBC Thousand/uL 14 02* 17 96* 24 65* 12 71* 12 94* 14 45* 12 72*   HEMOGLOBIN g/dL 9 7* 9 2* 9 3* 8 7* 7 6* 8 6* 8 2*   HEMATOCRIT % 33 7* 32 0* 33 4* 31 0* 27 1* 29 8* 28 4*   PLATELETS Thousands/uL 334 281 296 238 208 232 225   NEUTROS PCT %  --   --   --   --   --   --  83*   BANDS PCT %  --   --  2  --  1  --   --    MONOS PCT %  --   --   --   --   --   --  7   MONO PCT %  --   --  9  --  9  --   --      Results from last 7 days   Lab Units 08/27/21  0420 08/26/21 2003 08/26/21 0447 08/25/21 0518 08/24/21  0454 08/23/21  1808 08/23/21  0443   SODIUM mmol/L 143 142 140 143 145 144 143   POTASSIUM mmol/L 3 7 4 5 4 3 4 0 4 0 5 4* 2 8*   CHLORIDE mmol/L 103 103 102 107 112* 111* 108   CO2 mmol/L 32 29 28 26 23 25 28   ANION GAP mmol/L 8 10 10 10 10 8 7   BUN mg/dL 109* 96* 80* 53* 34* 32* 32*   CREATININE mg/dL 2 25* 2 21* 1 47* 1 44* 1 36* 1 25 1 18   CALCIUM mg/dL 9 2 9 3 8 7 8 5 7 7* 9 0 8 5   GLUCOSE RANDOM mg/dL 145* 168* 191* 169* 204* 155* 164*     Results from last 7 days   Lab Units 08/26/21 2003 08/23/21 0443 08/22/21  0408   MAGNESIUM mg/dL 3 0* 1 9 2 3      Results from last 7 days   Lab Units 08/24/21  0756 08/24/21  0013 08/23/21  1808 08/23/21  1057 08/23/21  0443 08/22/21  1706 08/22/21  1132   PTT seconds 70* 50* 60* 110* 53* 61* 80*      Results from last 7 days   Lab Units 08/22/21  0352   TROPONIN I ng/mL 0 55*         ABG:  Results from last 7 days   Lab Units 08/25/21  1148   [address] ART  7 424   PCO2 ART mm Hg 38 6   PO2 ART mm Hg 44 7*   HCO3 ART mmol/L 24 7   BASE EXC ART mmol/L 0 4   ABG SOURCE  Radial, Right     VBG:  Results from last 7 days   Lab Units 08/25/21  1148   ABG SOURCE  Radial, Right     Results from last 7 days   Lab Units 08/27/21  0433 08/26/21  0826   PROCALCITONIN ng/ml 0 77* 0 70*       Micro  Results from last 7 days   Lab Units 08/22/21  1506 08/22/21  1503 08/22/21  1501   GRAM STAIN RESULT  Rare Polys  No bacteria seen No Polys or Bacteria seen No Polys or Bacteria seen       EKG: NSR  Imaging: I have personally reviewed pertinent films in PACS    Intake and Output  I/O       08/26 0701 - 08/27 0700 08/27 0701 - 08/28 0700    I V  (mL/kg) 595 8 (7 2) 225 6 (2 7)    NG/     Feedings 120     Total Intake(mL/kg) 915 8 (11 1) 225 6 (2 7)    Urine (mL/kg/hr) 1425 (0 7) 1250 (0 6)    Emesis/NG output 900     Total Output 2325 1250    Net -1409 3 -1024 4                Height and Weights   Height: 5' 4" (162 6 cm)  IBW (Ideal Body Weight): 59 2 kg  Body mass index is 31 26 kg/m²  Weight (last 2 days)     Date/Time   Weight    08/27/21 0600   82 6 (182 1)    08/26/21 0545   84 8 (186 95)                Nutrition       Diet Orders   (From admission, onward)             Start     Ordered    08/27/21 1239  Diet Enteral/Parenteral; Tube Feeding No Oral Diet; Nepro; Continuous; 40; Prosource Protein Liquid - Two Packets; 125; Water; Every 4 hours  Diet effective now     Question Answer Comment   Diet Type Enteral/Parenteral    Enteral/Parenteral Tube Feeding No Oral Diet    Tube Feeding Formula: Nepro    Bolus/Cyclic/Continuous Continuous    Tube Feeding Goal Rate (mL/hr): 40    Prosource Protein Liquid - No Carb Prosource Protein Liquid - Two Packets    Tube Feeding flush (mL): 125    Water Flush type: Water    Water flush frequency: Every 4 hours    RD to adjust diet per protocol?  Yes        08/27/21 1238                  Active Medications  Scheduled Meds:  Current Facility-Administered Medications   Medication Dose Route Frequency Provider Last Rate    Acetaminophen  650 mg Per NG Tube Q6H PRN Max 4/day Flynn Clay MD      aspirin  81 mg Oral Daily Stephanie Palomo DO      calcium gluconate  1 g Intravenous Daily Curt Alexander MD 1 g (08/27/21 0842)    chlorhexidine  15 mL Mouth/Throat Q12H [address] Brent Bettencourt MD      fentanyl citrate (PF)  50 mcg Intravenous Q1H PRN Grady Robertson PA-C      furosemide  10 mg/hr Intravenous Continuous Rosana Covarrubias MD 10 mg/hr (08/27/21 1457)    heparin (porcine)  5,000 Units Subcutaneous [facility] Laurence Pina PA-C      hydrALAZINE  10 mg Intravenous Q6H PRN ANATOLY Weiss      hydrALAZINE  25 mg Oral Q8H [address] Sharl Schilder, CRNP      isosorbide dinitrate  10 mg Oral TID after meals Sharl Schilder, CRNP      metolazone  5 mg Oral Daily Rosana Covarrubias MD      metoprolol  2 5 mg Intravenous Q6H [address] Nicolasa Joshi MD      morphine injection  2 mg Intravenous Q4H PRN Curt Alexander MD      OLANZapine  5 mg Oral HS Laurence Pitts PA-C      omeprazole (PRILOSEC) suspension 2 mg/mL  20 mg Oral Daily Lidya Steve DO      polyethylene glycol  17 g Oral Daily Curt Alexander MD      pravastatin  40 mg Oral Daily With [facility] Association[facility]ТЛАНА      propofol  5-50 mcg/kg/min Intravenous Titrated Rosana Covarrubias MD 40 mcg/kg/min (08/28/21 0503)    senna-docusate sodium  2 tablet Oral BID Curt Alexander MD       Continuous Infusions:  furosemide, 10 mg/hr, Last Rate: 10 mg/hr (08/27/21 1457)  propofol, 5-50 mcg/kg/min, Last Rate: 40 mcg/kg/min (08/28/21 0503)      PRN Meds:   Acetaminophen, 650 mg, Q6H PRN Max 4/day  fentanyl citrate (PF), 50 mcg, Q1H PRN  hydrALAZINE, 10 mg, Q6H PRN  morphine injection, 2 mg, Q4H PRN        Invasive Devices Review  Invasive Devices     Peripheral Intravenous Line            Peripheral IV 08/20/21 Left;Ventral (anterior) Forearm 7 days    Peripheral IV 08/22/21 Right Hand 5 days    Peripheral IV 08/27/21 Left Antecubital <1 day          Drain            Urethral Catheter Temperature probe 7 days    NG/OG/Enteral Tube 16 Fr Center mouth 6 days          Airway            ETT  Cuffed 8 mm 6 days                Rationale for remaining devices: medical necessity  ---------------------------------------------------------------------------------------  Advance Directive and Living Will: Yes    Power of :    POLST:    ---------------------------------------------------------------------------------------  Care Time Delivered:   No Critical Care time spent       ANATOLY Tompkins      Portions of the record may have been created with voice recognition software  Occasional wrong word or "sound a like" substitutions may have occurred due to the inherent limitations of voice recognition software    Read the chart carefully and recognize, using context, where substitutions have occurred

## 2021-08-28 NOTE — PROGRESS NOTES
Cardiology   MD Gamaliel Vincent MD Ather Mansoor, MD Marinus Helper, DO, Hazel Jiang DO, Tobias Ozuna DO, [facility] [place]  -------------------------------------------------------------------  [facility]  [address] [place] [address] Rd  [place], [place] [address]  [place]  [phone]        Progress Note - Cardiology   Balaji Fan 78 y o  male MRN: [id number]  Unit/Bed#: ICU 07 Encounter: [id number]        Assessment/Recommendations/Discussion:   1  Acute on chronic systolic and diastolic CHF  2  Moderate aortic stenosis  3  Sepsis syndrome  4  Multifocal pneumonia  5  Hypertension  6  Acute kidney injury on chronic kidney disease      · Net balance remains negative with IV Lasix infusion, continued same for another 24 hours  Would recheck chest x-ray tomorrow  If remains unchanged, especially with progressively worsening renal function, would have a high suspicion of noncardiogenic pulmonary edema, possible persistent pneumonia or pneumonitis  · Appreciate Nephrology support  · Continue isosorbide, hydralazine  · Short run of atrial arrhythmia noted overnight, possibly flutter, recommend p o  Metoprolol, advanced as able        Subjective:  Patient seen and examined, intubated/sedated          Physical Exam:  GEN:  NAD, intubated/sedated  HEENT:  MMM, NCAT, pink conjunctiva, EOMI, nonicteric sclera  CV:  NO JVD/HJR, RR, NO M/R/G, +S1/S2, NO PARASTERNAL HEAVE/THRILL, NO LE EDEMA, NO HEPATIC SYSTOLIC PULSATION, WARM EXTREMITIES  RESP:  CTAB/L anteriorly  ABD:  SOFT, NT, NO GROSS ORGANOMEGALY        Vitals:   /56   Pulse 96   Temp 100 4 °F (38 °C)   Resp (!) 26   Ht 5' 4" (1 626 m)   Wt 79 5 kg (175 lb 4 3 oz)   SpO2 94%   BMI 30 08 kg/m²   Vitals:    08/27/21 0600 08/28/21 0600   Weight: 82 6 kg (182 lb 1 6 oz) 79 5 kg (175 lb 4 3 oz)       Intake/Output Summary (Last 24 hours) at 8/28/2021 1124  Last data filed at 8/28/2021 1110  Gross per 24 hour   Intake 1279 93 ml   Output 2700 ml   Net -1420 07 ml       TELEMETRY:  Sinus rhythm, PACs, short atrial run noted overnight  Lab Results:  Results from last 7 days   Lab Units 08/28/21  0456   WBC Thousand/uL 14 02*   HEMOGLOBIN g/dL 9 7*   HEMATOCRIT % 33 7*   PLATELETS Thousands/uL 334     Results from last 7 days   Lab Units 08/28/21  0456   POTASSIUM mmol/L 3 1*   CHLORIDE mmol/L 99*   CO2 mmol/L 30   BUN mg/dL 142*   CREATININE mg/dL 2 32*   CALCIUM mg/dL 9 3     Results from last 7 days   Lab Units 08/28/21  0456   POTASSIUM mmol/L 3 1*   CHLORIDE mmol/L 99*   CO2 mmol/L 30   BUN mg/dL 142*   CREATININE mg/dL 2 32*   CALCIUM mg/dL 9 3           Medications:    Current Facility-Administered Medications:     Acetaminophen (TYLENOL) oral suspension 650 mg, 650 mg, Per NG Tube, Q6H PRN Max 4/day, Nabila Adrian MD, 649 6 mg at 08/26/21 1450    aspirin chewable tablet 81 mg, 81 mg, Oral, Daily, Lidya Steve, , 81 mg at 08/28/21 0906    calcium gluconate 1 g in sodium chloride 0 9% 50 mL (premix), 1 g, Intravenous, Daily, Curt Hills MD, Last Rate: 100 mL/hr at 08/28/21 0905, 1 g at 08/28/21 0905    chlorhexidine (PERIDEX) 0 12 % oral rinse 15 mL, 15 mL, Mouth/Throat, Q12H [address], Jacquie Larry MD, 15 mL at 08/28/21 0905    fentanyl citrate (PF) 100 MCG/2ML 50 mcg, 50 mcg, Intravenous, Q1H PRN, Laurence Pitts PA-C, 50 mcg at 08/28/21 0430    furosemide (LASIX) 500 mg infusion 50 mL, 10 mg/hr, Intravenous, Continuous, Son Turner MD, Last Rate: 1 mL/hr at 08/27/21 1457, 10 mg/hr at 08/27/21 1457    heparin (porcine) subcutaneous injection 5,000 Units, 5,000 Units, Subcutaneous, Q8H [address], Bryanna Jyoner PA-C, 5,000 Units at 08/28/21 0504    hydrALAZINE (APRESOLINE) injection 10 mg, 10 mg, Intravenous, Q6H PRN, ANATOLY Romero, 10 mg at 08/25/21 2025    hydrALAZINE (APRESOLINE) tablet 25 mg, 25 mg, Oral, Q8H Adilene WOLF CRNP    isosorbide dinitrate (ISORDIL) tablet 10 mg, 10 mg, Oral, TID after meals, ANATOLY Ariza    metolazone (ZAROXOLYN) tablet 5 mg, 5 mg, Oral, Daily, Nena Lucas MD, 5 mg at 08/27/21 6774    metoprolol tartrate (LOPRESSOR) tablet 25 mg, 25 mg, Oral, Q12H [address], Curt Harvey MD    morphine injection 2 mg, 2 mg, Intravenous, Q4H PRN, Curt Harvey MD, 2 mg at 08/24/21 2307    OLANZapine (ZyPREXA ZYDIS) dispersible tablet 5 mg, 5 mg, Oral, HS, Laurence Pitts PA-C, 5 mg at 08/27/21 2300    omeprazole (PRILOSEC) suspension 2 mg/mL, 20 mg, Oral, Daily, Lidya Steve DO, 20 mg at 08/28/21 1107    polyethylene glycol (MIRALAX) packet 17 g, 17 g, Oral, Daily, Curt Harvey MD, 17 g at 08/27/21 8803    potassium chloride oral solution 40 mEq, 40 mEq, Oral, BID, ANATOLY Ariza, 40 mEq at 08/28/21 6083    pravastatin (PRAVACHOL) tablet 40 mg, 40 mg, Oral, Daily With Dinner, Yeison Dimas PA-C, 40 mg at 08/27/21 1638    propofol (DIPRIVAN) 1000 mg in 100 mL infusion (premix), 5-50 mcg/kg/min, Intravenous, Titrated, Nena Lucas MD, Last Rate: 10 2 mL/hr at 08/28/21 1105, 20 mcg/kg/min at 08/28/21 1105    senna-docusate sodium (SENOKOT S) 8 6-50 mg per tablet 2 tablet, 2 tablet, Oral, BID, Curt Harvey MD, 2 tablet at 08/27/21 0162    This note was completed in part utilizing M-Modal Fluency Direct Software  Grammatical errors, random word insertions, spelling mistakes, and incomplete sentences may be an occasional consequence of this system secondary to software limitations, ambient noise, and hardware issues  If you have any questions or concerns about the content, text, or information contained within the body of this dictation, please contact the provider for clarification

## 2021-08-28 NOTE — ASSESSMENT & PLAN NOTE
· POA:  Reported midsternal/epigastric chest pain, persistent, 8/10  · Troponin initially elevated  0 08 continue to trend up in peaked 1 11 currently plateaued 4 41  · EKGs in ED normal sinus rhythm, left axis deviation with nonspecific conduction abnormalities, repeated EKG  normal sinus rhythm, significant ST abnormalities Heparin was discontinued on 8/24  · Continue daily aspirin  Continue metoprolol   Metoprolol was held due to hypotension  · Continue monitor on tele  · Cardiology on board, appreciate the input

## 2021-08-28 NOTE — PROGRESS NOTES
NEPHROLOGY PROGRESS NOTE   Pina Davila 78 y o  male MRN: [id number]  Unit/Bed#: ICU 07 Encounter: [id number]      ASSESSMENT/PLAN:  1  Acute kidney injury:  Suspect ATN from infection, relative hypotension +/- CRS   Baseline creatinine <1 and creatinine was 0 9 on admission  Creatinine continues to slowly worsen up to 2 3 today  UA:  No proteinuria, 2-4 RBCs  Good urine output on Lasix drip at 10mg/h and metolazone 5mg daily   Spironolactone was d/c yesterday  No current indication for dialysis but HD consent was obtained yesterday by Dr Vitaliy Matthews from [facility] ([address] [facility])  2  Azotemia:  BUN worsening to 142  Possibly related to tube feeding was changed to Nepro yesterday  If continues to worsen may need dialysis  No current signs of GI bleed as hgb stable   3  Acute on chronic Grade 2 diastolic CHF and moderate aortic stenosis:  On Lasix drip  Was -1 7 L in the past 24 hours  4  Hypoxic respiratory failure: Patient remains intubated  Chest x-ray yesterday with no significant change in bilateral airspace opacities  5  Sepsis with Multifocal pneumonia: covid negative  Currently on cefepime and flagyl   6  Hypokalemia:  Potassium 3 1 today  Replaced by primary team   7  Anemia: hgb stable at 9 7  Status post Venofer earlier in the admission  8  Hx hypertension:  Blood pressure on the lower side  Home amlodipine and Aldactone are on hold  Continues hydralazine and isosorbide per Cardiology with hold parameters    Plan Summary:    No urgent indication for dialysis    Follow labs and urine output closely     SUBJECTIVE:  Discussed with nurse  No overnight events  Good urine output  Planning to trial decreasing sedation today      OBJECTIVE:  Current Weight: Weight - Scale: 79 5 kg (175 lb 4 3 oz)  Vitals:    08/28/21 0830   BP:    Pulse:    Resp:    Temp:    SpO2: 92%       Intake/Output Summary (Last 24 hours) at 8/28/2021 0903  Last data filed at 8/28/2021 9022  Gross per 24 hour   Intake 1176 17 ml Output 2475 ml   Net -1298 83 ml       General:  Intubated and sedated  Skin:  No rash  Eyes:  Sclerae anicteric  ENT:  ET tube in place  Neck:  Trachea midline with no JVD  Chest:  Coarse breath sounds bilaterally  CVS:  Regular rate and rhythm  Abdomen:  Soft, nontender, nondistended  Extremities:  No edema  Neuro:  Sedated      Medications:  Scheduled Meds:  Current Facility-Administered Medications   Medication Dose Route Frequency Provider Last Rate    Acetaminophen  650 mg Per NG Tube Q6H PRN Max 4/day Boom Ramírez MD      aspirin  81 mg Oral Daily Addie Covarrubias DO      calcium gluconate  1 g Intravenous Daily Curt Fagan MD 1 g (08/27/21 6042)    chlorhexidine  15 mL Mouth/Throat Q12H Denilson Toussaint MD      fentanyl citrate (PF)  50 mcg Intravenous Q1H PRN Vinay Bolivar PA-C      furosemide  10 mg/hr Intravenous Continuous Santiago Carrizales MD 10 mg/hr (08/27/21 5481)    heparin (porcine)  5,000 Units Subcutaneous [facility] Laurence Pina PA-C      hydrALAZINE  10 mg Intravenous Q6H PRN Antionette Harada Bilofsky, CRNP      hydrALAZINE  25 mg Oral Q8H [facility] ANATOLY Alvarado      isosorbide dinitrate  10 mg Oral TID after meals ANATOLY Alvarado      metolazone  5 mg Oral Daily Santiago Carrizales MD      metoprolol tartrate  25 mg Oral Q12H [facility] Curt Fagan MD      morphine injection  2 mg Intravenous Q4H PRN Curt Fagan MD      OLANZapine  5 mg Oral HS Laurence Pitts PA-C      omeprazole (PRILOSEC) suspension 2 mg/mL  20 mg Oral Daily Lidya Steve DO      polyethylene glycol  17 g Oral Daily Curt Fagan MD      potassium chloride  40 mEq Oral BID ANATOLY Alvarado      pravastatin  40 mg Oral Daily [facility]      propofol  5-50 mcg/kg/min Intravenous Titrated Santiago Carrizales MD 20 mcg/kg/min (08/28/21 0988)    senna-docusate sodium  2 tablet Oral BID Curt Fagan MD         PRN Meds:   Acetaminophen  Kera Silence fentanyl citrate (PF)    hydrALAZINE    morphine injection    Continuous Infusions:furosemide, 10 mg/hr, Last Rate: 10 mg/hr (08/27/21 1457)  propofol, 5-50 mcg/kg/min, Last Rate: 20 mcg/kg/min (08/28/21 2182)        Laboratory Results:  Results from last 7 days   Lab Units 08/28/21  0456 08/27/21  0420 08/26/21 2003 08/26/21  0447 08/25/21  0518 08/24/21  0454 08/23/21  1808 08/23/21  0504 08/23/21  0443 08/22/21  0408 08/22/21  0350   WBC Thousand/uL 14 02* 17 96*  --  24 65* 12 71* 12 94*  --  14 45*  --   --  12 72*   HEMOGLOBIN g/dL 9 7* 9 2*  --  9 3* 8 7* 7 6*  --  8 6*  --   --  8 2*   HEMATOCRIT % 33 7* 32 0*  --  33 4* 31 0* 27 1*  --  29 8*  --   --  28 4*   PLATELETS Thousands/uL 334 281  --  296 238 208  --  232  --   --  225   SODIUM mmol/L 144 143 142 140 143 145 144  --  143 144  --    POTASSIUM mmol/L 3 1* 3 7 4 5 4 3 4 0 4 0 5 4*  --  2 8* 3 2*  --    CHLORIDE mmol/L 99* 103 103 102 107 112* 111*  --  108 108  --    CO2 mmol/L 30 32 29 28 26 23 25  --  28 28  --    BUN mg/dL 142* 109* 96* 80* 53* 34* 32*  --  32* 29*  --    CREATININE mg/dL 2 32* 2 25* 2 21* 1 47* 1 44* 1 36* 1 25  --  1 18 1 11  --    CALCIUM mg/dL 9 3 9 2 9 3 8 7 8 5 7 7* 9 0  --  8 5 8 3  --    MAGNESIUM mg/dL  --   --  3 0*  --   --   --   --   --  1 9 2 3  --

## 2021-08-29 ENCOUNTER — APPOINTMENT (INPATIENT)
Dept: RADIOLOGY | Facility: HOSPITAL | Age: 81
DRG: 870 | End: 2021-08-29
Payer: MEDICARE

## 2021-08-29 LAB
ANION GAP SERPL CALCULATED.3IONS-SCNC: 11 MMOL/L (ref 4–13)
ANION GAP SERPL CALCULATED.3IONS-SCNC: 9 MMOL/L (ref 4–13)
ANISOCYTOSIS BLD QL SMEAR: PRESENT
BASOPHILS # BLD MANUAL: 0 THOUSAND/UL (ref 0–0.1)
BASOPHILS NFR MAR MANUAL: 0 % (ref 0–1)
BUN SERPL-MCNC: 148 MG/DL (ref 5–25)
BUN SERPL-MCNC: 159 MG/DL (ref 5–25)
CALCIUM SERPL-MCNC: 9.5 MG/DL (ref 8.3–10.1)
CALCIUM SERPL-MCNC: 9.7 MG/DL (ref 8.3–10.1)
CHLORIDE SERPL-SCNC: 100 MMOL/L (ref 100–108)
CHLORIDE SERPL-SCNC: 98 MMOL/L (ref 100–108)
CO2 SERPL-SCNC: 33 MMOL/L (ref 21–32)
CO2 SERPL-SCNC: 35 MMOL/L (ref 21–32)
CREAT SERPL-MCNC: 2.16 MG/DL (ref 0.6–1.3)
CREAT SERPL-MCNC: 2.21 MG/DL (ref 0.6–1.3)
EOSINOPHIL # BLD MANUAL: 0 THOUSAND/UL (ref 0–0.4)
EOSINOPHIL NFR BLD MANUAL: 0 % (ref 0–6)
ERYTHROCYTE [DISTWIDTH] IN BLOOD BY AUTOMATED COUNT: 22.3 % (ref 11.6–15.1)
GFR SERPL CREATININE-BSD FRML MDRD: 27 ML/MIN/1.73SQ M
GFR SERPL CREATININE-BSD FRML MDRD: 27 ML/MIN/1.73SQ M
GLUCOSE SERPL-MCNC: 134 MG/DL (ref 65–140)
GLUCOSE SERPL-MCNC: 194 MG/DL (ref 65–140)
HCT VFR BLD AUTO: 32.1 % (ref 36.5–49.3)
HGB BLD-MCNC: 9.4 G/DL (ref 12–17)
LYMPHOCYTES # BLD AUTO: 1.09 THOUSAND/UL (ref 0.6–4.47)
LYMPHOCYTES # BLD AUTO: 8 % (ref 14–44)
MCH RBC QN AUTO: 22.5 PG (ref 26.8–34.3)
MCHC RBC AUTO-ENTMCNC: 29.3 G/DL (ref 31.4–37.4)
MCV RBC AUTO: 77 FL (ref 82–98)
MONOCYTES # BLD AUTO: 1.91 THOUSAND/UL (ref 0–1.22)
MONOCYTES NFR BLD: 14 % (ref 4–12)
NEUTROPHILS # BLD MANUAL: 10.65 THOUSAND/UL (ref 1.85–7.62)
NEUTS BAND NFR BLD MANUAL: 2 % (ref 0–8)
NEUTS SEG NFR BLD AUTO: 76 % (ref 43–75)
NT-PROBNP SERPL-MCNC: 2737 PG/ML
OVALOCYTES BLD QL SMEAR: PRESENT
PLATELET # BLD AUTO: 355 THOUSANDS/UL (ref 149–390)
PLATELET BLD QL SMEAR: ADEQUATE
PMV BLD AUTO: 11 FL (ref 8.9–12.7)
POLYCHROMASIA BLD QL SMEAR: PRESENT
POTASSIUM SERPL-SCNC: 2.4 MMOL/L (ref 3.5–5.3)
POTASSIUM SERPL-SCNC: 4.2 MMOL/L (ref 3.5–5.3)
RBC # BLD AUTO: 4.18 MILLION/UL (ref 3.88–5.62)
SODIUM SERPL-SCNC: 142 MMOL/L (ref 136–145)
SODIUM SERPL-SCNC: 144 MMOL/L (ref 136–145)
WBC # BLD AUTO: 13.66 THOUSAND/UL (ref 4.31–10.16)

## 2021-08-29 PROCEDURE — 83880 ASSAY OF NATRIURETIC PEPTIDE: CPT | Performed by: INTERNAL MEDICINE

## 2021-08-29 PROCEDURE — 99233 SBSQ HOSP IP/OBS HIGH 50: CPT | Performed by: INTERNAL MEDICINE

## 2021-08-29 PROCEDURE — 85027 COMPLETE CBC AUTOMATED: CPT | Performed by: INTERNAL MEDICINE

## 2021-08-29 PROCEDURE — 80048 BASIC METABOLIC PNL TOTAL CA: CPT

## 2021-08-29 PROCEDURE — 99232 SBSQ HOSP IP/OBS MODERATE 35: CPT | Performed by: INTERNAL MEDICINE

## 2021-08-29 PROCEDURE — 71045 X-RAY EXAM CHEST 1 VIEW: CPT

## 2021-08-29 PROCEDURE — 99291 CRITICAL CARE FIRST HOUR: CPT | Performed by: INTERNAL MEDICINE

## 2021-08-29 PROCEDURE — 85007 BL SMEAR W/DIFF WBC COUNT: CPT | Performed by: INTERNAL MEDICINE

## 2021-08-29 PROCEDURE — 92610 EVALUATE SWALLOWING FUNCTION: CPT | Performed by: NURSE PRACTITIONER

## 2021-08-29 PROCEDURE — 80048 BASIC METABOLIC PNL TOTAL CA: CPT | Performed by: INTERNAL MEDICINE

## 2021-08-29 PROCEDURE — 94660 CPAP INITIATION&MGMT: CPT

## 2021-08-29 RX ORDER — POTASSIUM CHLORIDE 20MEQ/15ML
40 LIQUID (ML) ORAL
Status: COMPLETED | OUTPATIENT
Start: 2021-08-29 | End: 2021-08-29

## 2021-08-29 RX ORDER — POTASSIUM CHLORIDE 14.9 MG/ML
20 INJECTION INTRAVENOUS ONCE
Status: COMPLETED | OUTPATIENT
Start: 2021-08-29 | End: 2021-08-29

## 2021-08-29 RX ORDER — POTASSIUM CHLORIDE 20MEQ/15ML
40 LIQUID (ML) ORAL
Status: DISCONTINUED | OUTPATIENT
Start: 2021-08-29 | End: 2021-08-29

## 2021-08-29 RX ORDER — QUETIAPINE FUMARATE 25 MG/1
12.5 TABLET, FILM COATED ORAL
Status: DISCONTINUED | OUTPATIENT
Start: 2021-08-29 | End: 2021-08-30

## 2021-08-29 RX ADMIN — METOROPROLOL TARTRATE 2.5 MG: 5 INJECTION, SOLUTION INTRAVENOUS at 20:49

## 2021-08-29 RX ADMIN — HEPARIN SODIUM 5000 UNITS: 5000 INJECTION INTRAVENOUS; SUBCUTANEOUS at 21:13

## 2021-08-29 RX ADMIN — POTASSIUM CHLORIDE 40 MEQ: 20 SOLUTION ORAL at 08:37

## 2021-08-29 RX ADMIN — METOROPROLOL TARTRATE 2.5 MG: 5 INJECTION, SOLUTION INTRAVENOUS at 06:08

## 2021-08-29 RX ADMIN — HYDRALAZINE HYDROCHLORIDE 25 MG: 25 TABLET, FILM COATED ORAL at 21:14

## 2021-08-29 RX ADMIN — ALBUMIN (HUMAN) 25 G: 0.25 INJECTION, SOLUTION INTRAVENOUS at 14:24

## 2021-08-29 RX ADMIN — POLYETHYLENE GLYCOL 3350 17 G: 17 POWDER, FOR SOLUTION ORAL at 08:36

## 2021-08-29 RX ADMIN — ISOSORBIDE DINITRATE 10 MG: 10 TABLET ORAL at 12:27

## 2021-08-29 RX ADMIN — POTASSIUM CHLORIDE 40 MEQ: 20 SOLUTION ORAL at 12:27

## 2021-08-29 RX ADMIN — HEPARIN SODIUM 5000 UNITS: 5000 INJECTION INTRAVENOUS; SUBCUTANEOUS at 14:24

## 2021-08-29 RX ADMIN — Medication 20 MG: at 08:37

## 2021-08-29 RX ADMIN — METOLAZONE 5 MG: 5 TABLET ORAL at 08:37

## 2021-08-29 RX ADMIN — METOROPROLOL TARTRATE 2.5 MG: 5 INJECTION, SOLUTION INTRAVENOUS at 12:26

## 2021-08-29 RX ADMIN — Medication 10 MG/HR: at 10:51

## 2021-08-29 RX ADMIN — ISOSORBIDE DINITRATE 10 MG: 10 TABLET ORAL at 17:41

## 2021-08-29 RX ADMIN — METOROPROLOL TARTRATE 2.5 MG: 5 INJECTION, SOLUTION INTRAVENOUS at 02:28

## 2021-08-29 RX ADMIN — QUETIAPINE FUMARATE 12.5 MG: 25 TABLET ORAL at 21:15

## 2021-08-29 RX ADMIN — POTASSIUM CHLORIDE 40 MEQ: 20 SOLUTION ORAL at 10:49

## 2021-08-29 RX ADMIN — ASPIRIN 81 MG: 81 TABLET, CHEWABLE ORAL at 08:36

## 2021-08-29 RX ADMIN — POTASSIUM CHLORIDE 40 MEQ: 20 SOLUTION ORAL at 06:46

## 2021-08-29 RX ADMIN — CALCIUM GLUCONATE 1 G: 20 INJECTION, SOLUTION INTRAVENOUS at 08:37

## 2021-08-29 RX ADMIN — ALBUMIN (HUMAN) 25 G: 0.25 INJECTION, SOLUTION INTRAVENOUS at 02:49

## 2021-08-29 RX ADMIN — POTASSIUM CHLORIDE 20 MEQ: 14.9 INJECTION, SOLUTION INTRAVENOUS at 06:46

## 2021-08-29 RX ADMIN — PRAVASTATIN SODIUM 40 MG: 40 TABLET ORAL at 17:41

## 2021-08-29 RX ADMIN — DOCUSATE SODIUM AND SENNOSIDES 2 TABLET: 8.6; 5 TABLET, FILM COATED ORAL at 08:36

## 2021-08-29 RX ADMIN — HEPARIN SODIUM 5000 UNITS: 5000 INJECTION INTRAVENOUS; SUBCUTANEOUS at 06:08

## 2021-08-29 NOTE — PLAN OF CARE
Problem: Potential for Falls  Goal: Patient will remain free of falls  Description: INTERVENTIONS:  - Educate patient/family on patient safety including physical limitations  - Instruct patient to call for assistance with activity   - Consult OT/PT to assist with strengthening/mobility   - Keep Call bell within reach  - Keep bed low and locked with side rails adjusted as appropriate  - Keep care items and personal belongings within reach  - Initiate and maintain comfort rounds  - Make Fall Risk Sign visible to staff  - Apply yellow socks and bracelet for high fall risk patients  - Consider moving patient to room near nurses station  Outcome: Progressing     Problem: MOBILITY - ADULT  Goal: Maintain or return to baseline ADL function  Description: INTERVENTIONS:  -  Assess patient's ability to carry out ADLs; assess patient's baseline for ADL function and identify physical deficits which impact ability to perform ADLs (bathing, care of mouth/teeth, toileting, grooming, dressing, etc )  - Assess/evaluate cause of self-care deficits   - Assess range of motion  - Assess patient's mobility; develop plan if impaired  - Assess patient's need for assistive devices and provide as appropriate  - Encourage maximum independence but intervene and supervise when necessary  - Involve family in performance of ADLs  - Assess for home care needs following discharge   - Consider OT consult to assist with ADL evaluation and planning for discharge  - Provide patient education as appropriate  Outcome: Progressing  Goal: Maintains/Returns to pre admission functional level  Description: INTERVENTIONS:  - Perform BMAT or MOVE assessment daily    - Set and communicate daily mobility goal to care team and patient/family/caregiver     - Collaborate with rehabilitation services on mobility goals if consulted  - Out of bed for toileting  - Record patient progress and toleration of activity level   Outcome: Progressing     Problem: Prexisting or High Potential for Compromised Skin Integrity  Goal: Skin integrity is maintained or improved  Description: INTERVENTIONS:  - Identify patients at risk for skin breakdown  - Assess and monitor skin integrity  - Assess and monitor nutrition and hydration status  - Monitor labs   - Assess for incontinence   - Turn and reposition patient  - Assist with mobility/ambulation  - Relieve pressure over bony prominences  - Avoid friction and shearing  - Provide appropriate hygiene as needed including keeping skin clean and dry  - Evaluate need for skin moisturizer/barrier cream  - Collaborate with interdisciplinary team   - Patient/family teaching  - Consider wound care consult   Outcome: Progressing     Problem: NEUROSENSORY - ADULT  Goal: Achieves stable or improved neurological status  Description: INTERVENTIONS  - Monitor and report changes in neurological status  - Monitor vital signs such as temperature, blood pressure, glucose, and any other labs ordered   - Initiate measures to prevent increased intracranial pressure  - Monitor for seizure activity and implement precautions if appropriate      Outcome: Progressing  Goal: Achieves maximal functionality and self care  Description: INTERVENTIONS  - Monitor swallowing and airway patency with patient fatigue and changes in neurological status  - Encourage and assist patient to increase activity and self care     - Encourage visually impaired, hearing impaired and aphasic patients to use assistive/communication devices  Outcome: Progressing     Problem: CARDIOVASCULAR - ADULT  Goal: Maintains optimal cardiac output and hemodynamic stability  Description: INTERVENTIONS:  - Monitor I/O, vital signs and rhythm  - Monitor for S/S and trends of decreased cardiac output  - Administer and titrate ordered vasoactive medications to optimize hemodynamic stability  - Assess quality of pulses, skin color and temperature  - Assess for signs of decreased coronary artery perfusion  - Instruct patient to report change in severity of symptoms  Outcome: Progressing  Goal: Absence of cardiac dysrhythmias or at baseline rhythm  Description: INTERVENTIONS:  - Continuous cardiac monitoring, vital signs, obtain 12 lead EKG if ordered  - Administer antiarrhythmic and heart rate control medications as ordered  - Monitor electrolytes and administer replacement therapy as ordered  Outcome: Progressing     Problem: RESPIRATORY - ADULT  Goal: Achieves optimal ventilation and oxygenation  Description: INTERVENTIONS:  - Assess for changes in respiratory status  - Assess for changes in mentation and behavior  - Position to facilitate oxygenation and minimize respiratory effort  - Oxygen administered by appropriate delivery if ordered  - Initiate smoking cessation education as indicated  - Encourage broncho-pulmonary hygiene including cough, deep breathe, Incentive Spirometry  - Assess the need for suctioning and aspirate as needed  - Assess and instruct to report SOB or any respiratory difficulty  - Respiratory Therapy support as indicated  Outcome: Progressing     Problem: GASTROINTESTINAL - ADULT  Goal: Minimal or absence of nausea and/or vomiting  Description: INTERVENTIONS:  - Administer IV fluids if ordered to ensure adequate hydration  - Maintain NPO status until nausea and vomiting are resolved  - Nasogastric tube if ordered  - Administer ordered antiemetic medications as needed  - Provide nonpharmacologic comfort measures as appropriate  - Advance diet as tolerated, if ordered  - Consider nutrition services referral to assist patient with adequate nutrition and appropriate food choices  Outcome: Progressing  Goal: Maintains or returns to baseline bowel function  Description: INTERVENTIONS:  - Assess bowel function  - Encourage oral fluids to ensure adequate hydration  - Administer IV fluids if ordered to ensure adequate hydration  - Administer ordered medications as needed  - Encourage mobilization and activity  - Consider nutritional services referral to assist patient with adequate nutrition and appropriate food choices  Outcome: Progressing  Goal: Maintains adequate nutritional intake  Description: INTERVENTIONS:  - Monitor percentage of each meal consumed  - Identify factors contributing to decreased intake, treat as appropriate  - Assist with meals as needed  - Monitor I&O, weight, and lab values if indicated  - Obtain nutrition services referral as needed  Outcome: Progressing  Goal: Oral mucous membranes remain intact  Description: INTERVENTIONS  - Assess oral mucosa and hygiene practices  - Implement preventative oral hygiene regimen  - Implement oral medicated treatments as ordered  - Initiate Nutrition services referral as needed  Outcome: Progressing     Problem: GENITOURINARY - ADULT  Goal: Maintains or returns to baseline urinary function  Description: INTERVENTIONS:  - Assess urinary function  - Encourage oral fluids to ensure adequate hydration if ordered  - Administer IV fluids as ordered to ensure adequate hydration  - Administer ordered medications as needed  - Offer frequent toileting  - Follow urinary retention protocol if ordered  Outcome: Progressing  Goal: Absence of urinary retention  Description: INTERVENTIONS:  - Assess patients ability to void and empty bladder  - Monitor I/O  - Bladder scan as needed  - Discuss with physician/AP medications to alleviate retention as needed  - Discuss catheterization for long term situations as appropriate  Outcome: Progressing     Problem: METABOLIC, FLUID AND ELECTROLYTES - ADULT  Goal: Electrolytes maintained within normal limits  Description: INTERVENTIONS:  - Monitor labs and assess patient for signs and symptoms of electrolyte imbalances  - Administer electrolyte replacement as ordered  - Monitor response to electrolyte replacements, including repeat lab results as appropriate  - Instruct patient on fluid and nutrition as appropriate  Outcome: Progressing  Goal: Fluid balance maintained  Description: INTERVENTIONS:  - Monitor labs   - Monitor I/O and WT  - Instruct patient on fluid and nutrition as appropriate  - Assess for signs & symptoms of volume excess or deficit  Outcome: Progressing  Goal: Glucose maintained within target range  Description: INTERVENTIONS:  - Monitor Blood Glucose as ordered  - Assess for signs and symptoms of hyperglycemia and hypoglycemia  - Administer ordered medications to maintain glucose within target range  - Assess nutritional intake and initiate nutrition service referral as needed  Outcome: Progressing     Problem: SKIN/TISSUE INTEGRITY - ADULT  Goal: Skin Integrity remains intact(Skin Breakdown Prevention)  Description: Assess:  -Assess extremities for adequate circulation and sensation     Bed Management:  -Have minimal linens on bed & keep smooth, unwrinkled  -Change linens as needed when moist or perspiring    Problem: HEMATOLOGIC - ADULT  Goal: Maintains hematologic stability  Description: INTERVENTIONS  - Assess for signs and symptoms of bleeding or hemorrhage  - Monitor labs  - Administer supportive blood products/factors as ordered and appropriate  Outcome: Progressing     Problem: MUSCULOSKELETAL - ADULT  Goal: Maintain or return mobility to safest level of function  Description: INTERVENTIONS:  - Assess patient's ability to carry out ADLs; assess patient's baseline for ADL function and identify physical deficits which impact ability to perform ADLs (bathing, care of mouth/teeth, toileting, grooming, dressing, etc )  - Assess/evaluate cause of self-care deficits   - Assess range of motion  - Assess patient's mobility  - Assess patient's need for assistive devices and provide as appropriate  - Encourage maximum independence but intervene and supervise when necessary  - Involve family in performance of ADLs  - Assess for home care needs following discharge   - Consider OT consult to assist with ADL evaluation and planning for discharge  - Provide patient education as appropriate  Outcome: Progressing     Problem: Nutrition/Hydration-ADULT  Goal: Nutrient/Hydration intake appropriate for improving, restoring or maintaining nutritional needs  Description: Monitor and assess patient's nutrition/hydration status for malnutrition  Collaborate with interdisciplinary team and initiate plan and interventions as ordered  Monitor patient's weight and dietary intake as ordered or per policy  Utilize nutrition screening tool and intervene as necessary  Determine patient's food preferences and provide high-protein, high-caloric foods as appropriate       INTERVENTIONS:  - Monitor oral intake, urinary output, labs, and treatment plans  - Assess nutrition and hydration status and recommend course of action  - Evaluate amount of meals eaten  - Assist patient with eating if necessary   - Allow adequate time for meals  - Recommend/ encourage appropriate diets, oral nutritional supplements, and vitamin/mineral supplements  - Order, calculate, and assess calorie counts as needed  - Recommend, monitor, and adjust tube feedings and TPN/PPN based on assessed needs  - Assess need for intravenous fluids  - Provide specific nutrition/hydration education as appropriate  - Include patient/family/caregiver in decisions related to nutrition  Outcome: Progressing

## 2021-08-29 NOTE — PROGRESS NOTES
NEPHROLOGY PROGRESS NOTE   Gomez Short 78 y o  male MRN: [id number]  Unit/Bed#: ICU 07 Encounter: [id number]      ASSESSMENT/PLAN:  1  Acute kidney injury:  Suspect ATN from infection, relative hypotension +/- CRS   Baseline creatinine <1 and creatinine was 0 9 on admission  Creatinine today slightly better at 2 21 and BUN fairly stable at 148  UA:  No proteinuria, 2-4 RBCs  Good urine output on Lasix drip at 10mg/h and metolazone 5mg daily   No current indication for dialysis but HD consent was obtained yesterday by Dr Stu Crawley from son [facility] ([address] [facility])  2  Azotemia:  BUN worsening to 148  Possibly related to tube feeding was changed to [facility] 8/27  Started on albumin yesterday  If continues to worsen may need dialysis  No current signs of GI bleed as hgb stable   3  Acute on chronic Grade 2 diastolic CHF and moderate aortic stenosis:  On Lasix drip  Was -568cc in the past 24 hours  · Getting repeat CXR per cardiology with plans to likely cut down diuretics if no pulm edema--may need to consider CT if CXR not definitive   4  Hypoxic respiratory failure: now extubated and on HFNC  5  Sepsis with Multifocal pneumonia: covid negative  S/p  cefepime and flagyl   6  Hypokalemia:  Potassium dropped to 2 4 today  Getting KCl po 40meq q2h x 4 doses and KCL 20meq IV once  · Getting repeat BMP at noon   7  Anemia: hgb stable at 9 4  Status post Venofer earlier in the admission  8  Hypertension:  Blood pressure better today as it had been on the lower side  Home amlodipine and Aldactone are on hold  Continues hydralazine and isosorbide per Cardiology with hold parameters    Plan Summary:    No urgent indication for dialysis    Continue albumin 25g q12h   Consider stopping metolazone and decreasing diuretics    Consider CT chest if CXR not helpful at determining pulm edema    Discussed with primary team    Follow up noon BMP     SUBJECTIVE:  Now extubated and on high flow nasal cannula   Complains of abdominal tenderness and feeling thirsty       OBJECTIVE:  Current Weight: Weight - Scale: 78 1 kg (172 lb 2 9 oz)  Vitals:    08/29/21 0820   BP: 124/61   Pulse: 86   Resp: 21   Temp: 99 °F (37 2 °C)   SpO2: 96%       Intake/Output Summary (Last 24 hours) at 8/29/2021 0940  Last data filed at 8/29/2021 0848  Gross per 24 hour   Intake 1594 18 ml   Output 2910 ml   Net -1315 82 ml     General:  High flow nasal cannula, awake   Skin:  No rash  Eyes:  Sclerae anicteric  ENT:  dry mucous membranes  Neck:  Trachea midline   Chest:  Decreased breath sounds and wheezing  CVS:  Regular rate and rhythm  Abdomen:  Tender to palpation   Extremities:  No edema  Neuro:  Awake and alert  Psych: cooperative     Medications:  Scheduled Meds:  Current Facility-Administered Medications   Medication Dose Route Frequency Provider Last Rate    Acetaminophen  650 mg Per NG Tube Q6H PRN Max 4/day Guanako Leal MD      albumin human  25 g Intravenous Q12H Curt Falk MD      aspirin  81 mg Oral Daily Lidya Steve, DO      calcium gluconate  1 g Intravenous Daily Curt aFlk MD 1 g (08/29/21 0405)    furosemide  10 mg/hr Intravenous Continuous Milady Cruz MD 10 mg/hr (08/27/21 1457)    heparin (porcine)  5,000 Units Subcutaneous [facility] Laurence Pina PA-C      hydrALAZINE  10 mg Intravenous Q6H PRN ANATOLY Hill      hydrALAZINE  25 mg Oral Q8H [address] Clement Barbie, CRNP      isosorbide dinitrate  10 mg Oral TID after meals Clement Valentin, CRNP      metolazone  5 mg Oral Daily Milady Cruz MD      metoprolol  2 5 mg Intravenous Q6H Clement Valentin, CRNP      morphine injection  2 mg Intravenous Q4H PRN Curt Falk MD      OLANZapine  5 mg Oral HS Laurence Pitts PA-C      omeprazole (PRILOSEC) suspension 2 mg/mL  20 mg Oral Daily Lidya MADERA Dosrachel, DO      polyethylene glycol  17 g Oral Daily Curt Falk MD      potassium chloride  40 mEq Oral Q2H MD Noah Mejia pravastatin  40 mg Oral Daily With [place], [place]      senna-docusate sodium  2 tablet Oral BID Chen-Ping Daryle Quest, MD         PRN Meds:   Acetaminophen    hydrALAZINE    morphine injection    Continuous Infusions:furosemide, 10 mg/hr, Last Rate: 10 mg/hr (08/27/21 1457)        Laboratory Results:  Results from last 7 days   Lab Units 08/29/21  0509 08/28/21  0456 08/27/21  0420 08/26/21 2003 08/26/21  0447 08/25/21  0518 08/24/21  0454 08/23/21  0504 08/23/21  0443   WBC Thousand/uL 13 66* 14 02* 17 96*  --  24 65* 12 71* 12 94* 14 45*  --    HEMOGLOBIN g/dL 9 4* 9 7* 9 2*  --  9 3* 8 7* 7 6* 8 6*  --    HEMATOCRIT % 32 1* 33 7* 32 0*  --  33 4* 31 0* 27 1* 29 8*  --    PLATELETS Thousands/uL 355 334 281  --  296 238 208 232  --    SODIUM mmol/L 144 144 143 142 140 143 145  --  143   POTASSIUM mmol/L 2 4* 3 1* 3 7 4 5 4 3 4 0 4 0  --  2 8*   CHLORIDE mmol/L 98* 99* 103 103 102 107 112*  --  108   CO2 mmol/L 35* 30 32 29 28 26 23  --  28   BUN mg/dL 148* 142* 109* 96* 80* 53* 34*  --  32*   CREATININE mg/dL 2 21* 2 32* 2 25* 2 21* 1 47* 1 44* 1 36*  --  1 18   CALCIUM mg/dL 9 5 9 3 9 2 9 3 8 7 8 5 7 7*  --  8 5   MAGNESIUM mg/dL  --   --   --  3 0*  --   --   --   --  1 9

## 2021-08-29 NOTE — ASSESSMENT & PLAN NOTE
· Chest x-ray 8/20:  Diffuse patchy airspace disease bilaterally concerning multifocal infiltrates  · CAP versus aspiration  · Tested negative for COVID-19 x3  · Legionella and strep pneumo antigens negative  · Initially started on Rocephin and azithromycin, completed 3 days of cefepime and flagyl on 8/23  Total antibiotics day 5   · Procalcitonin negative x2  Recent Labs     08/27/21  0420 08/28/21  0456   WBC 17 96* 14 02*     · Blood cultures shows no growth  · Bronchial lavage culture also shows no growth     · Continue monitor fever curve, WBCs  · Sputum cx sent 8/28

## 2021-08-29 NOTE — ASSESSMENT & PLAN NOTE
· POA: anxiety, generalized fatigue since Tuesday (9/17)  His wife noted that on 8/19 he was more short of breath with associated cough and complaints of chest pain which prompted his visit to the ED  · In the ED patient was noted to be hypoxic, in respiratory distress  Patient improved with lasix, bipap, morphine and ativan  · Clinically patient appears  fluid overloaded, his lung sounds are coarse with scattered rales  He has trace to +1 left lower extremity edema that he reports as new  · COVID-19 negative x3  · Chest x-ray concerning for vascular congestion, multifocal pneumonia  · Continue antibiotics: vanc cefepime and flagyl day 4  Total antibiotics day 6  Consider discontinue antibiotics since bronchial culture currently shows no growth and negative gram stain  Viral culture and cytology was negative  · Monitor off antibiotics   Continue diuresis  · Monitor CBC and fever curve  · Failed conservative treatment and was intubated on 8/21 8/28 extubated to bipap, transitioned to high flow NC   · Goal to maintain O2 saturation > 90%

## 2021-08-29 NOTE — PROGRESS NOTES
[address] [facility]  Progress Note Kathy Mandujano 1940, 78 y o  male MRN: [id number]  Unit/Bed#: ICU 07 Encounter: [id number]  Primary Care Provider: Gwendolyn Guajardo DO   Date and time admitted to hospital: 8/19/2021  8:25 PM    Acute on chronic diastolic CHF (congestive heart failure) ([facility] [address] )  Assessment & Plan  · POA:  With signs of volume overload, initial imaging chest x-ray concerning for vascular congestion consistent with CHF  · Most recent echo in 2018 showed grade 1 diastolic dysfunction  · [id number]  · Repeated echo 8/20 with significant worsening of his diastolic dysfunction  · Continue diuresis with lasix infusion   · Cardiology on board  · plan as outlined above         Multifocal pneumonia  Assessment & Plan  · Chest x-ray 8/20:  Diffuse patchy airspace disease bilaterally concerning multifocal infiltrates  · CAP versus aspiration  · Tested negative for COVID-19 x3  · Legionella and strep pneumo antigens negative  · Initially started on Rocephin and azithromycin, completed 3 days of cefepime and flagyl on 8/23  Total antibiotics day 5   · Procalcitonin negative x2  Recent Labs     08/27/21  0420 08/28/21  0456   WBC 17 96* 14 02*     · Blood cultures shows no growth  · Bronchial lavage culture also shows no growth  · Continue monitor fever curve, WBCs  · Sputum cx sent 8/28    * Acute respiratory failure with hypoxia [facility])  Assessment & Plan  · POA: anxiety, generalized fatigue since Tuesday (9/17)  His wife noted that on 8/19 he was more short of breath with associated cough and complaints of chest pain which prompted his visit to the ED  · In the ED patient was noted to be hypoxic, in respiratory distress  Patient improved with lasix, bipap, morphine and ativan  · Clinically patient appears  fluid overloaded, his lung sounds are coarse with scattered rales  He has trace to +1 left lower extremity edema that he reports as new     · COVID-19 negative x3  · Chest x-ray concerning for vascular congestion, multifocal pneumonia  · Continue antibiotics: vanc cefepime and flagyl day 4  Total antibiotics day 6  Consider discontinue antibiotics since bronchial culture currently shows no growth and negative gram stain  Viral culture and cytology was negative  · Monitor off antibiotics  Continue diuresis  · Monitor CBC and fever curve  · Failed conservative treatment and was intubated on 8/21 8/28 extubated to bipap, transitioned to high flow NC   · Goal to maintain O2 saturation > 90%        CANDIE (acute kidney injury) ([facility] )  Assessment & Plan  Baseline cr appeared to be 0 7-0 9  Recent Labs     08/26/21 2003 08/27/21 0420 08/28/21  0456   CREATININE 2 21* 2 25* 2 32*   EGFR 27 26 25     Estimated Creatinine Clearance: 23 mL/min (A) (by C-G formula based on SCr of 2 32 mg/dL (H))  Patient currently needs diuresis for volume overload  Will continue monitor BMP  DC diurese when appropriate  Strict I and O     Sepsis ([facility] )  Assessment & Plan  · POA :  Meets sepsis criteria on presentation tachycardia, tachypnea, lactic acidosis, leukocytosis and suspecting pneumonia    · Monitor off antibiotics for now due to negative culture result  · Rest of plan as outlined above     Hypochromic microcytic anemia  Assessment & Plan  · Has history of iron deficiency anemia due to inadequate dietary iron intake  · Hemoglobin currently stable, there is no signs of active bleeding  · EGD in February 2021 with no source upper GI bleed  · Iron panel:  Iron 29, ferritin 21, patient will require Venofer  · Continue iron supplement when appropriate  · May need OP hematology follow up  · Trend hg, transfuse for <7 0     Lactic acidosis  Assessment & Plan  · POA:  Lactic acid on presentation 8 1 in the setting of sepsis and suspected pneumonia  · Rest of plan as outlined above     Hypokalemia  Assessment & Plan  Recent Labs     08/26/21 2003 08/27/21 0420 08/28/21  0456   K 4 5 3 7 3 1*     · Keep potassium level > 3 8  · Monitor BMP  · Replete as needed  · Resolved    Mild cognitive impairment  Assessment & Plan  · Record review reveals stable memory issues with outpatient followup with geriatrics  · TSH, B12 nl in past  · Family reports no progression at this point  · Continue outpatient sertraline when appropriate       Chest pain  Assessment & Plan  · POA:  Reported midsternal/epigastric chest pain, persistent, 8/10  · Troponin initially elevated  0 08 continue to trend up in peaked 1 11 currently plateaued 5 07  · EKGs in ED normal sinus rhythm, left axis deviation with nonspecific conduction abnormalities, repeated EKG  normal sinus rhythm, significant ST abnormalities Heparin was discontinued on 8/24  · Continue daily aspirin  Continue metoprolol  Metoprolol was held due to hypotension  · Continue monitor on tele  · Cardiology on board, appreciate the input       Aortic stenosis, moderate  Assessment & Plan  · History of mild to moderate aortic stenosis on echo 5/17/2018   · Repeated on 8/20: Showed markedly increased thickness, marked calcification, markedly reduced cuspal separation, reduced mobility, and sclerosis and moderate stenosis  · Cardiology on board  · Currently diuresing  · Monitor fluid volume status closely  · Strict I's and O's       Essential hypertension  Assessment & Plan  · On losartan 25 mg daily, amlodipine 10 mg daily  Hold losartan due to CANDIE  · Currently diuresis  Continue metoprolol 2 5 Q6H  · Continue hold antihypertensive meds, resume when  Appropriate     ----------------------------------------------------------------------------------------  HPI/24hr events: No events overnight  HR improved from 130s to 80s  He did not sleep  Labs pending at time of note      Patient appropriate for transfer out of the ICU today?: No  Disposition: Continue Critical Care   Code Status: Level 1 - Full Code  ---------------------------------------------------------------------------------------  SUBJECTIVE  Makes needs known  Review of Systems  Review of systems was reviewed and negative unless stated above in HPI/24-hour events   ---------------------------------------------------------------------------------------  OBJECTIVE    Vitals   Vitals:    21 0115 21 0215 21 0315 21 0415   BP: 109/57 111/53 115/52 115/52   Pulse: 92 86 80 82   Resp: (!) 25 (!) 27 17 21   Temp: 99 3 °F (37 4 °C) 99 °F (37 2 °C) 97 9 °F (36 6 °C) 99 °F (37 2 °C)   TempSrc:       SpO2: 98% 95% 98% 98%   Weight:       Height:         Temp (24hrs), Av 7 °F (37 6 °C), Min:97 9 °F (36 6 °C), Max:101 1 °F (38 4 °C)  Current: Temperature: 99 °F (37 2 °C)          Respiratory:  SpO2: SpO2: 98 %       Invasive/non-invasive ventilation settings   Respiratory    Lab Data (Last 4 hours)    None         O2/Vent Data (Last 4 hours)       0240          Non-Invasive Ventilation Mode HFNC (High flow)                   Physical Exam  Vitals and nursing note reviewed  Constitutional:       General: He is not in acute distress  Appearance: He is ill-appearing  Comments: Difficulty communicating   HENT:      Head: Normocephalic and atraumatic  Right Ear: External ear normal       Left Ear: External ear normal       Nose: Nose normal       Mouth/Throat:      Mouth: Mucous membranes are moist       Pharynx: Oropharynx is clear  Eyes:      Extraocular Movements: Extraocular movements intact  Conjunctiva/sclera: Conjunctivae normal       Pupils: Pupils are equal, round, and reactive to light  Cardiovascular:      Rate and Rhythm: Regular rhythm  Tachycardia present  Pulses: Normal pulses  Heart sounds: Normal heart sounds  Pulmonary:      Comments: Diminished breath sounds throughout  Weak cough  tachypnea  Abdominal:      General: Bowel sounds are normal       Palpations: Abdomen is soft  Comments: [facility] 8/28   Musculoskeletal:         General: Normal range of motion  Cervical back: Normal range of motion and neck supple  Skin:     General: Skin is warm  Capillary Refill: Capillary refill takes less than 2 seconds  Neurological:      Mental Status: He is alert  He is disoriented  Comments:  Follows commands   Psychiatric:      Comments: Cooperative  ALEX thought/judegement         Laboratory and Diagnostics:  Results from last 7 days   Lab Units 08/28/21  0456 08/27/21  0420 08/26/21 0447 08/25/21  0518 08/24/21  0454 08/23/21  0504   WBC Thousand/uL 14 02* 17 96* 24 65* 12 71* 12 94* 14 45*   HEMOGLOBIN g/dL 9 7* 9 2* 9 3* 8 7* 7 6* 8 6*   HEMATOCRIT % 33 7* 32 0* 33 4* 31 0* 27 1* 29 8*   PLATELETS Thousands/uL 334 281 296 238 208 232   BANDS PCT %  --   --  2  --  1  --    MONO PCT %  --   --  9  --  9  --      Results from last 7 days   Lab Units 08/28/21  0456 08/27/21  0420 08/26/21 2003 08/26/21  0447 08/25/21  0518 08/24/21  0454 08/23/21  1808   SODIUM mmol/L 144 143 142 140 143 145 144   POTASSIUM mmol/L 3 1* 3 7 4 5 4 3 4 0 4 0 5 4*   CHLORIDE mmol/L 99* 103 103 102 107 112* 111*   CO2 mmol/L 30 32 29 28 26 23 25   ANION GAP mmol/L 15* 8 10 10 10 10 8   BUN mg/dL 142* 109* 96* 80* 53* 34* 32*   CREATININE mg/dL 2 32* 2 25* 2 21* 1 47* 1 44* 1 36* 1 25   CALCIUM mg/dL 9 3 9 2 9 3 8 7 8 5 7 7* 9 0   GLUCOSE RANDOM mg/dL 164* 145* 168* 191* 169* 204* 155*     Results from last 7 days   Lab Units 08/26/21 2003 08/23/21  0443   MAGNESIUM mg/dL 3 0* 1 9      Results from last 7 days   Lab Units 08/24/21  0756 08/24/21  0013 08/23/21  1808 08/23/21  1057 08/23/21  0443 08/22/21  1706 08/22/21  1132   PTT seconds 70* 50* 60* 110* 53* 61* 80*              ABG:  Results from last 7 days   Lab Units 08/25/21  1148   PH ART  7 424   PCO2 ART mm Hg 38 6   PO2 ART mm Hg 44 7*   HCO3 ART mmol/L 24 7   BASE EXC ART mmol/L 0 4   ABG SOURCE  Radial, Right     VBG:  Results from last 7 days   Lab Units 08/25/21  1148   ABG SOURCE  Radial, Right     Results from last 7 days   Lab Units 08/27/21  0433 08/26/21  0826   PROCALCITONIN ng/ml 0 77* 0 70*       Micro  Results from last 7 days   Lab Units 08/22/21  1506 08/22/21  1503 08/22/21  1501   GRAM STAIN RESULT  Rare Polys  No bacteria seen No Polys or Bacteria seen No Polys or Bacteria seen       EKG: ST  Imaging: I have personally reviewed pertinent films in PACS    Intake and Output  I/O       08/27 0701 - 08/28 0700 08/28 0701 - 08/29 0700    I V  (mL/kg) 453 4 (5 7) 175 (2 2)    NG/GT  375    IV Piggyback  100    Feedings  555    Total Intake(mL/kg) 453 4 (5 7) 1205 (15 2)    Urine (mL/kg/hr) 2250 (1 2) 1650 (1 5)    Emesis/NG output      Total Output 2250 1650    Net -1796 6 -445                Height and Weights   Height: 5' 4" (162 6 cm)  IBW (Ideal Body Weight): 59 2 kg  Body mass index is 30 08 kg/m²  Weight (last 2 days)     Date/Time   Weight    08/28/21 0600   79 5 (175 27)    08/27/21 0600   82 6 (182 1)                Nutrition       Diet Orders   (From admission, onward)             Start     Ordered    08/28/21 2112  Diet Regular; Regular House  Diet effective now     Question Answer Comment   Diet Type Regular    Regular Regular House    Special Instructions Plastic Utensil Only    RD to adjust diet per protocol?  Yes        08/28/21 2111                  Active Medications  Scheduled Meds:  Current Facility-Administered Medications   Medication Dose Route Frequency Provider Last Rate    Acetaminophen  650 mg Per NG Tube Q6H PRN Max 4/day Nia Alvarado MD      albumin human  25 g Intravenous Q12H Curt Clarke MD      aspirin  81 mg Oral Daily Lidya Steve DO      calcium gluconate  1 g Intravenous Daily Curt Clarke MD 1 g (08/28/21 0905)    furosemide  10 mg/hr Intravenous Continuous Judy Hand MD 10 mg/hr (08/27/21 1457)    heparin (porcine)  5,000 Units Subcutaneous Q8H [address] [place] [address] René Duarte PA-C      hydrALAZINE  10 mg Intravenous Q6H PRN ANATOLY Bradford      hydrALAZINE  25 mg Oral Q8H [address] ANATOLY Tompkins      isosorbide dinitrate  10 mg Oral TID after meals ANATOLY Tompkins      metolazone  5 mg Oral Daily Wyatt Gomez MD      metoprolol  2 5 mg Intravenous Q6H ANATOLY Tompkins      morphine injection  2 mg Intravenous Q4H PRN Curt Vera MD      OLANZapine  5 mg Oral HS Laurence Pitts PA-C      omeprazole (PRILOSEC) suspension 2 mg/mL  20 mg Oral Daily Lidya Steve DO      polyethylene glycol  17 g Oral Daily Curt Vera MD      potassium chloride  40 mEq Oral BID ANATOLY Tompkins      pravastatin  40 mg Oral Daily With [place], [place]      senna-docusate sodium  2 tablet Oral BID Curt Vera MD       Continuous Infusions:  furosemide, 10 mg/hr, Last Rate: 10 mg/hr (08/27/21 8327)      PRN Meds:   Acetaminophen, 650 mg, Q6H PRN Max 4/day  hydrALAZINE, 10 mg, Q6H PRN  morphine injection, 2 mg, Q4H PRN        Invasive Devices Review  Invasive Devices     Peripheral Intravenous Line            Peripheral IV 08/27/21 Left Antecubital 1 day    Peripheral IV 08/27/21 Right Antecubital 1 day    Peripheral IV 08/27/21 Right;Upper Arm 1 day          Drain            Urethral Catheter Temperature probe 8 days    NG/OG/Enteral Tube 16 Fr Center mouth 7 days                Rationale for remaining devices: medical necessity  ---------------------------------------------------------------------------------------  Advance Directive and Living Will: Yes    Power of :    POLST:    ---------------------------------------------------------------------------------------  Care Time Delivered:   No Critical Care time spent       ANATOLY Tompkins      Portions of the record may have been created with voice recognition software    Occasional wrong word or "sound a like" substitutions may have occurred due to the inherent limitations of voice recognition software    Read the chart carefully and recognize, using context, where substitutions have occurred

## 2021-08-29 NOTE — ASSESSMENT & PLAN NOTE
Baseline cr appeared to be 0 7-0 9  Recent Labs     08/26/21 2003 08/27/21  0420 08/28/21  0456   CREATININE 2 21* 2 25* 2 32*   EGFR 27 26 25     Estimated Creatinine Clearance: 23 mL/min (A) (by C-G formula based on SCr of 2 32 mg/dL (H))  Patient currently needs diuresis for volume overload  Will continue monitor BMP  DC diurese when appropriate    Strict I and O

## 2021-08-29 NOTE — ASSESSMENT & PLAN NOTE
· POA:  Reported midsternal/epigastric chest pain, persistent, 8/10  · Troponin initially elevated  0 08 continue to trend up in peaked 1 11 currently plateaued 8 89  · EKGs in ED normal sinus rhythm, left axis deviation with nonspecific conduction abnormalities, repeated EKG  normal sinus rhythm, significant ST abnormalities Heparin was discontinued on 8/24  · Continue daily aspirin  Continue metoprolol   Metoprolol was held due to hypotension  · Continue monitor on tele  · Cardiology on board, appreciate the input

## 2021-08-29 NOTE — ASSESSMENT & PLAN NOTE
· POA:  With signs of volume overload, initial imaging chest x-ray concerning for vascular congestion consistent with CHF  · Most recent echo in 2018 showed grade 1 diastolic dysfunction  · [id number]  · Repeated echo 8/20 with significant worsening of his diastolic dysfunction    · Continue diuresis with lasix infusion   · Cardiology on board  · plan as outlined above

## 2021-08-29 NOTE — DISCHARGE INSTR - DIET
9/14/21  8:36-9:05  Received eval order  Pt already on caseload  ? Of silent aspiration  Subjective: Pt alert and pleasant  Conversing appropriately  States he has 30 days to live and wants ogo home and see his grandchildren and his flowers  Objective:  Pt was made NPO last night  This am he was seen w/ ice chips, nectar water and  thin  States he is not hungry and all he wants is regular water  Pt was seated fully upright  Took all items w/ prompt swallow, adequate appearing control and transfer  Prompt swallow w/ no overt s/s aspiration  Assessment: no overt s/s aspiration  Plan: family would like vbs done to r/o silent aspiration  Dr Rubi Toth has already ordered  Will complete later today           Video Barium Swallow Study 9/14/21     Summary:  Given puree, thin, and nectar trials: (pt refused soft solid, stated he was too tired to chew  Currently on 8L mfnc)  Oral stage was WNL for bolus formation, control, and transfer  Pharyngeal stage was WNL for epiglottic inversion, hyolaryngeal excursion, and pharyngeal constriction  Swallows prompt  No pharyngeal retention  No penetration or aspiration observed this study  Transient penetration w/ thin w/ intermittent trace epiglottic undercoat       Esophageal stage was WNL per gross screening  Images are on PACS for review       Recommendations:  Diet: puree  Liquids:thin  Meds: crush (ptt unable to transfer 1/2 pill in puree today)  Strategies: feed only when fully alert and upright     Upright position  F/u ST tx: brief  Therapy Prognosis: ? favorable  Prognosis considerations: ? Overall medical status  Full Supervision  Aspiration Precautions  Reflux Precautions  Consider consult with: nutrition  Results reviewed with: pt, nursing, son in room, physician  Aspiration precautions posted      Patient's goal:  Noted above in progress note     Goals:  Dysphagia LTG  -Patient will demonstrate safe and effective oral intake (without overt s/s significant oral/pharyngeal dysphagia including s/s penetration or aspiration) for the highest appropriate diet level       1  Pt will tolerate least restrictive diet w/out s/s aspiration or oral/pharyngeal difficulties  2 Pt will will effectively  transfer purees w/out s/s dysphagia/aspiration  3 Pt will tolerate thin liquids w/out s/s aspiration       Previous VBS:  none  Current Diet:   was puree and nectar here  Made NPO to r/o silent aspiration  Premorbid diet:  regular  Dentition:  Edentulous  GF states dentures at this time  O2 requirement:  8L mfnc  Oral mech:  Strength and ROM:  wfl  Vocal Quality/Speech:  Wnl  No hoarseness  No wet vocal quality  Cognitive status:  Alert and appropriate at this time but this waxes and wanes     Consistencies administered: Barium laden applesauce,  nectar thick, thin liquids,  1/2 13mm barium pill in puree that he was unable to transfer  Liquids were administered straw       Pt was seated laterally at 90 degrees          Please refer to initial bedside eval and progress notes for additional info, as well as daily charting and radiological studies       Addendum 9/15/21: good intake at breakfast  Fed by ST  NO s/s aspiration puree and thin

## 2021-08-29 NOTE — SPEECH THERAPY NOTE
Speech Language/Pathology  Speech/Language Pathology  Assessment    Patient Name: Erwin Madden  YXZVT'R Date: 8/29/2021     Problem List  Principal Problem:    Acute respiratory failure with hypoxia [facility])  Active Problems:    Essential hypertension    Aortic stenosis, moderate    Chest pain    Mild cognitive impairment    Hypokalemia    Multifocal pneumonia    Acute on chronic diastolic CHF (congestive heart failure) (HCC)    Lactic acidosis    Hypochromic microcytic anemia    Sepsis (HCC)    CANDIE (acute kidney injury) ([facility] )    Past Medical History  Past Medical History:   Diagnosis Date    Actinic keratosis     LAST ASSESSED: [id number]    Allergic rhinitis     LAST ASSESSED: [id number]    Anxiety     LAST ASSESSED: 09KVX0675    Anxiety disorder     LAST ASSESSED: 82QEJ7407    Atelectasis of right lung     ABNORMAL CT SCAN OF [address] 12/2/2016 REPEAT NEEDED PER RADIOLOGY IN 3 MONTHS LAST ASSESSED: [id number]    Atypical chest pain     LAST ASSESSED: 47VQB2557    Benign paroxysmal vertigo     LAST ASSESSED: 16TOE8548    Chronic cough     LAST ASSESSED: 02YPC6484    Chronic GERD     Degenerative arthritis of knee, bilateral     LAST ASSESSED: 07ATG2054    Diverticulitis of colon     LAST ASSESSED: 11JZK5685    Diverticulosis     LAST ASSESSED: 32YNB4501    Foreign body, eye     LAST ASSESSED: 53YVQ2607    Functional gait abnormality     LAST ASSESSED: 18QHZ4705    Hyperlipidemia     Hypertension     Hyponatremia     LAST ASSESSED: 44QMO4936    Leukocytosis     LAST ASSESSED: 78DPS5216    Murmur     Newly recognized murmur     LAST ASSESSED: 41JAX2167    Olecranon bursitis, unspecified elbow     Presence of indwelling urethral catheter     RESOLVED: 77MAV6177    Transient cerebral ischemia     LAST ASSESSED: 76KOD5560    Urinary incontinence     LAST ASSESSED: 45QNG8527    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     LAST ASSESSED: 24KRC3928     Past Surgical History  Past Surgical History:   Procedure Laterality Date    ADENOIDECTOMY      APPENDECTOMY      COLONOSCOPY  10/2006    FIBEROPTIC    INGUINAL HERNIA REPAIR      JOINT REPLACEMENT      b/l TKR Sept 2015    SINUS SURGERY      TONSILLECTOMY     H&P 8/20/21 " HX and PE limited by: ability to understand patient with bipap and thick accent  Hallie Mcmahan is a 78 y o  male with a PMH of MCI, HTN, HLD, GERD, Anxiety, Mod Aortic stenosis, who presents with to the ED via EMS with a one day history of SOB and chest pain  According to his wife, patient was increasingly anxious and not acting himself since Tuesday (8/17) when his 2 grandsons went back to college  He wanted to be left alone, he had a poor appetite and he was not moving around much  On Thursday (8/19) patient had worsening shortness of breath with a cough and complained of chest pain in the evening which prompted his wife to call EMS  He recently had visitors who had been traveling from out of state, no one has been tested for COVID that his family knows of  According to ED staff, at time of my assessment, patient was looking improved following treatment with ativan, morphine, lasix, bipap and aspirin  An EKG revealed no STEMI, troponin is mildly elevated  His CXR shows significant disease with multifocal infiltrates  "     Bedside Swallow Evaluation:    Summary:  Pt presents w/ an oropharyngeal swallow function WFL for pureed solids and nectar thick liquids  Pt received in bed with patient's daughter and wife at the bedside  Pt's daughter reporting patient tried a little applesauce and "did well" but coughing x1/5 sips of thin liquids via straw earlier when provided by the daughter  No baseline dysphagia concerns reported by patient or family  Recommendations:  Diet: pureed solids  Liquid: nectar thick liquids   Pt can have small single ice chips  Meds: crushed in puree  Supervision: assist 1:1 with meals  Positioning:Upright  Strategies: Pt to take PO/Meds only when fully alert and upright  Slow rate of intake, monitor respiratory status closely during meals  Oral care: continue  Aspiration precautions  Reflux precautions    Therapy Prognosis: good with adherence to the above recommendations  Prognosis considerations: good, patient motivation and strong family support  Frequency:2x weekly       Goal(s):  Pt will tolerate least restrictive diet w/out s/s aspiration or oral/pharyngeal difficulties  Patient's goal: to go home    Consider consult w/:  Nutrition    Reason for consult:  R/o aspiration  Determine safest and least restrictive diet  Change in mental status  H/o neurological disease  respiratory compromise  Nursing reported cough w/ PO  current pna      Current diet: regular solids and thin liquids    Premorbid diet[de-identified] regular solids and thin liquids    Previous VBS: No VBS history known to this service  This service was consulted during this hospitalization but evaluation not indicated at that time as patient was on bipap and/or intubated during f/u attempts  O2 requirement: HFNC    Voice/Speech: hypophonic    Social: WFL    Follows commands: inconsistent for 1 step commands throughout assessment                           Cognitive Status: oriented x2/3     Oral mech exam:  Dentition: ill fitting upper dentures even with fixodent  Family reporting at baseline the dentures are not ill fitting  Labial strength and ROM: reduced  Lingual strength and ROM: reduced  Mandibular strength and ROM: reduced  Velum: adequate   Secretion management: adequate   Volitional cough: weak  Volitional swallow: weak    Items administered:  Puree, nectar thick liquid, thin liquids, Liquids were taken by straw and teaspoon  Items administered by SLP, patient unable to feed himself due to extremity weakness and reduced hand to mouth coordination at this time       Oral stage:  Lip closure: adequate   Mastication: adequate for ice chips but slightly prolonged and effortful   Bolus control: adequate   Transfer: slightly prolonged but effective  Oral residue: none  Pocketing: none    Pharyngeal stage:  Swallow promptness: suspected delay in the initiation of the swallow  Laryngeal rise: reduced  Wet voice: no  Throat clear: no  Cough:no  Secondary swallows:no  Audible swallows:no  No overt s/s aspiration    Esophageal stage:  No s/s reported    Aspiration precautions posted    Results d/w:  Pt,  family,

## 2021-08-29 NOTE — PROGRESS NOTES
Cardiology   MD Roya Mejia MD Ather Mansoor, MD Lehman Sheerer, DO, Zoë Maciel DO, Adi Benavides DO, [facility] [place]  -------------------------------------------------------------------  [facility] ORTHOPEDIC [facility]  [address] [place] [address] Rd  [place], [place] [address]  [place]  [phone]        Progress Note - Cardiology   Magnus Ho 78 y o  male MRN: [id number]  Unit/Bed#: ICU 07 Encounter: [id number]        Assessment/Recommendations/Discussion:   1  Acute on chronic systolic and diastolic CHF  2  Moderate aortic stenosis  3  Sepsis syndrome  4  Multifocal pneumonia  5  Hypertension  6  Acute kidney injury on chronic kidney disease     · Continue lasix infusion and metolazone  Clincially improving, now extubated  · Watch renal fx, appreciate nephrology support  Would decrease diuretic tx tomorrow if BUN continues to rise  · Continue BB, hydralazine, isosorbide  Would prioritize BB if BP soft        Subjective: Pt seeen/examined    Extubated, no CP          Physical Exam:  GEN:  NAD, frail  HEENT:  MMM, NCAT, pink conjunctiva, EOMI, nonicteric sclera  CV:  NO JVD/HJR, RR, NO M/R/G, +S1/S2, NO PARASTERNAL HEAVE/THRILL, NO LE EDEMA, NO HEPATIC SYSTOLIC PULSATION, WARM EXTREMITIES  RESP:  CTAB/L  ABD:  SOFT, NT, NO GROSS ORGANOMEGALY        Vitals:   /65   Pulse 88   Temp 99 °F (37 2 °C)   Resp 22   Ht 5' 4" (1 626 m)   Wt 78 1 kg (172 lb 2 9 oz)   SpO2 94%   BMI 29 55 kg/m²   Vitals:    08/28/21 0600 08/29/21 0600   Weight: 79 5 kg (175 lb 4 3 oz) 78 1 kg (172 lb 2 9 oz)       Intake/Output Summary (Last 24 hours) at 8/29/2021 1325  Last data filed at 8/29/2021 1204  Gross per 24 hour   Intake 1458 02 ml   Output 2460 ml   Net -1001 98 ml         Lab Results:  Results from last 7 days   Lab Units 08/29/21  0509   WBC Thousand/uL 13 66*   HEMOGLOBIN g/dL 9 4*   HEMATOCRIT % 32 1*   PLATELETS Thousands/uL 355     Results from last 7 days   Lab Units 08/29/21  0509   POTASSIUM mmol/L 2 4*   CHLORIDE mmol/L 98*   CO2 mmol/L 35*   BUN mg/dL 148*   CREATININE mg/dL 2 21*   CALCIUM mg/dL 9 5     Results from last 7 days   Lab Units 08/29/21  0509   POTASSIUM mmol/L 2 4*   CHLORIDE mmol/L 98*   CO2 mmol/L 35*   BUN mg/dL 148*   CREATININE mg/dL 2 21*   CALCIUM mg/dL 9 5           Medications:    Current Facility-Administered Medications:     Acetaminophen (TYLENOL) oral suspension 650 mg, 650 mg, Per NG Tube, Q6H PRN Max 4/day, Nia Alvarado MD, 650 mg at 08/28/21 2117    albumin human (FLEXBUMIN) 25 % injection 25 g, 25 g, Intravenous, Q12H, Curt Clarke MD, 25 g at 08/29/21 0249    aspirin chewable tablet 81 mg, 81 mg, Oral, Daily, Lidya Steve DO, 81 mg at 08/29/21 0836    calcium gluconate 1 g in sodium chloride 0 9% 50 mL (premix), 1 g, Intravenous, Daily, Curt Clarke MD, Last Rate: 100 mL/hr at 08/29/21 0837, 1 g at 08/29/21 0837    furosemide (LASIX) 500 mg infusion 50 mL, 10 mg/hr, Intravenous, Continuous, Judy Hand MD, Last Rate: 1 mL/hr at 08/29/21 1051, 10 mg/hr at 08/29/21 1051    heparin (porcine) subcutaneous injection 5,000 Units, 5,000 Units, Subcutaneous, Q8H [address], Laurence Pitts PA-C, 5,000 Units at 08/29/21 0608    hydrALAZINE (APRESOLINE) injection 10 mg, 10 mg, Intravenous, Q6H PRN, ANATOLY Rosales, 10 mg at 08/25/21 2025    hydrALAZINE (APRESOLINE) tablet 25 mg, 25 mg, Oral, Q8H [address], ANATOLY Whitmore    isosorbide dinitrate (ISORDIL) tablet 10 mg, 10 mg, Oral, TID after meals, ANATOLY Del Valle, 10 mg at 08/29/21 1227    metolazone (ZAROXOLYN) tablet 5 mg, 5 mg, Oral, Daily, Judy Hand MD, 5 mg at 08/29/21 0837    metoprolol (LOPRESSOR) injection 2 5 mg, 2 5 mg, Intravenous, Q6H, ANATOLY Whitmore, 2 5 mg at 08/29/21 1226    morphine injection 2 mg, 2 mg, Intravenous, Q4H PRN, Curt Clarke MD, 2 mg at 08/24/21 2307    omeprazole (PRILOSEC) suspension 2 mg/mL, 20 mg, Oral, Daily, Lidya Steve DO, 20 mg at 08/29/21 0837    polyethylene glycol (MIRALAX) packet 17 g, 17 g, Oral, Daily, Curt Rust MD, 17 g at 08/29/21 0836    pravastatin (PRAVACHOL) tablet 40 mg, 40 mg, Oral, Daily With Laverda Face, PA-C, 40 mg at 08/27/21 1638    QUEtiapine (SEROquel) tablet 12 5 mg, 12 5 mg, Oral, HS, ANATOLY Garcia    senna-docusate sodium (SENOKOT S) 8 6-50 mg per tablet 2 tablet, 2 tablet, Oral, BID, Curt Rust MD, 2 tablet at 08/29/21 5221    This note was completed in part utilizing M-Modal Fluency Direct Software  Grammatical errors, random word insertions, spelling mistakes, and incomplete sentences may be an occasional consequence of this system secondary to software limitations, ambient noise, and hardware issues  If you have any questions or concerns about the content, text, or information contained within the body of this dictation, please contact the provider for clarification

## 2021-08-29 NOTE — ASSESSMENT & PLAN NOTE
Recent Labs     08/26/21 2003 08/27/21  0420 08/28/21  0456   K 4 5 3 7 3 1*     · Keep potassium level > 3 8  · Monitor BMP  · Replete as needed  · Resolved

## 2021-08-30 ENCOUNTER — APPOINTMENT (INPATIENT)
Dept: NON INVASIVE DIAGNOSTICS | Facility: HOSPITAL | Age: 81
DRG: 870 | End: 2021-08-30
Payer: MEDICARE

## 2021-08-30 PROBLEM — I48.91 ATRIAL FIBRILLATION (HCC): Status: ACTIVE | Noted: 2021-08-30

## 2021-08-30 PROBLEM — E83.41 HYPERMAGNESEMIA: Status: ACTIVE | Noted: 2021-08-30

## 2021-08-30 PROBLEM — E87.2 LACTIC ACIDOSIS: Status: RESOLVED | Noted: 2021-08-19 | Resolved: 2021-08-30

## 2021-08-30 PROBLEM — J18.9 MULTIFOCAL PNEUMONIA: Status: RESOLVED | Noted: 2021-08-19 | Resolved: 2021-08-30

## 2021-08-30 PROBLEM — E87.6 HYPOKALEMIA: Status: RESOLVED | Noted: 2021-02-19 | Resolved: 2021-08-30

## 2021-08-30 PROBLEM — E87.0 HYPERNATREMIA: Status: ACTIVE | Noted: 2021-08-30

## 2021-08-30 PROBLEM — R07.9 CHEST PAIN: Status: RESOLVED | Noted: 2017-09-04 | Resolved: 2021-08-30

## 2021-08-30 PROBLEM — R77.8 ELEVATED TROPONIN LEVEL: Status: ACTIVE | Noted: 2021-08-30

## 2021-08-30 PROBLEM — A41.9 SEPSIS (HCC): Status: RESOLVED | Noted: 2021-08-20 | Resolved: 2021-08-30

## 2021-08-30 PROBLEM — Z66 DO NOT RESUSCITATE STATUS: Status: RESOLVED | Noted: 2021-01-25 | Resolved: 2021-08-30

## 2021-08-30 LAB
ANION GAP SERPL CALCULATED.3IONS-SCNC: 12 MMOL/L (ref 4–13)
ANION GAP SERPL CALCULATED.3IONS-SCNC: 13 MMOL/L (ref 4–13)
ANION GAP SERPL CALCULATED.3IONS-SCNC: 13 MMOL/L (ref 4–13)
ANISOCYTOSIS BLD QL SMEAR: PRESENT
ATRIAL RATE: 157 BPM
ATRIAL RATE: 87 BPM
ATRIAL RATE: 87 BPM
BASOPHILS # BLD MANUAL: 0.11 THOUSAND/UL (ref 0–0.1)
BASOPHILS NFR MAR MANUAL: 1 % (ref 0–1)
BUN SERPL-MCNC: 165 MG/DL (ref 5–25)
BUN SERPL-MCNC: 180 MG/DL (ref 5–25)
BUN SERPL-MCNC: 191 MG/DL (ref 5–25)
CALCIUM SERPL-MCNC: 10.3 MG/DL (ref 8.3–10.1)
CALCIUM SERPL-MCNC: 10.6 MG/DL (ref 8.3–10.1)
CALCIUM SERPL-MCNC: 10.7 MG/DL (ref 8.3–10.1)
CHLORIDE SERPL-SCNC: 100 MMOL/L (ref 100–108)
CHLORIDE SERPL-SCNC: 100 MMOL/L (ref 100–108)
CHLORIDE SERPL-SCNC: 99 MMOL/L (ref 100–108)
CO2 SERPL-SCNC: 33 MMOL/L (ref 21–32)
CO2 SERPL-SCNC: 34 MMOL/L (ref 21–32)
CO2 SERPL-SCNC: 34 MMOL/L (ref 21–32)
CREAT SERPL-MCNC: 1.99 MG/DL (ref 0.6–1.3)
CREAT SERPL-MCNC: 2.2 MG/DL (ref 0.6–1.3)
CREAT SERPL-MCNC: 2.34 MG/DL (ref 0.6–1.3)
EOSINOPHIL # BLD MANUAL: 0.11 THOUSAND/UL (ref 0–0.4)
EOSINOPHIL NFR BLD MANUAL: 1 % (ref 0–6)
ERYTHROCYTE [DISTWIDTH] IN BLOOD BY AUTOMATED COUNT: 23.2 % (ref 11.6–15.1)
GFR SERPL CREATININE-BSD FRML MDRD: 25 ML/MIN/1.73SQ M
GFR SERPL CREATININE-BSD FRML MDRD: 27 ML/MIN/1.73SQ M
GFR SERPL CREATININE-BSD FRML MDRD: 30 ML/MIN/1.73SQ M
GLUCOSE SERPL-MCNC: 163 MG/DL (ref 65–140)
GLUCOSE SERPL-MCNC: 186 MG/DL (ref 65–140)
GLUCOSE SERPL-MCNC: 222 MG/DL (ref 65–140)
HCT VFR BLD AUTO: 33.7 % (ref 36.5–49.3)
HGB BLD-MCNC: 9.8 G/DL (ref 12–17)
HYPERCHROMIA BLD QL SMEAR: PRESENT
LYMPHOCYTES # BLD AUTO: 1.91 THOUSAND/UL (ref 0.6–4.47)
LYMPHOCYTES # BLD AUTO: 17 % (ref 14–44)
MAGNESIUM SERPL-MCNC: 3.8 MG/DL (ref 1.6–2.6)
MCH RBC QN AUTO: 22.4 PG (ref 26.8–34.3)
MCHC RBC AUTO-ENTMCNC: 29.1 G/DL (ref 31.4–37.4)
MCV RBC AUTO: 77 FL (ref 82–98)
MONOCYTES # BLD AUTO: 1.23 THOUSAND/UL (ref 0–1.22)
MONOCYTES NFR BLD: 11 % (ref 4–12)
NEUTROPHILS # BLD MANUAL: 7.85 THOUSAND/UL (ref 1.85–7.62)
NEUTS SEG NFR BLD AUTO: 70 % (ref 43–75)
P AXIS: -3 DEGREES
P AXIS: 113 DEGREES
P AXIS: 36 DEGREES
PLATELET # BLD AUTO: 379 THOUSANDS/UL (ref 149–390)
PLATELET BLD QL SMEAR: ADEQUATE
PMV BLD AUTO: 10.6 FL (ref 8.9–12.7)
POLYCHROMASIA BLD QL SMEAR: PRESENT
POTASSIUM SERPL-SCNC: 2.8 MMOL/L (ref 3.5–5.3)
POTASSIUM SERPL-SCNC: 3 MMOL/L (ref 3.5–5.3)
POTASSIUM SERPL-SCNC: 3.2 MMOL/L (ref 3.5–5.3)
PR INTERVAL: 142 MS
PR INTERVAL: 154 MS
PR INTERVAL: 171 MS
QRS AXIS: -29 DEGREES
QRS AXIS: -33 DEGREES
QRS AXIS: -34 DEGREES
QRSD INTERVAL: 113 MS
QRSD INTERVAL: 121 MS
QRSD INTERVAL: 125 MS
QT INTERVAL: 329 MS
QT INTERVAL: 375 MS
QT INTERVAL: 396 MS
QTC INTERVAL: 452 MS
QTC INTERVAL: 477 MS
QTC INTERVAL: 532 MS
RBC # BLD AUTO: 4.37 MILLION/UL (ref 3.88–5.62)
SODIUM SERPL-SCNC: 145 MMOL/L (ref 136–145)
SODIUM SERPL-SCNC: 146 MMOL/L (ref 136–145)
SODIUM SERPL-SCNC: 147 MMOL/L (ref 136–145)
T WAVE AXIS: 128 DEGREES
T WAVE AXIS: 149 DEGREES
T WAVE AXIS: 157 DEGREES
VENTRICULAR RATE: 157 BPM
VENTRICULAR RATE: 87 BPM
VENTRICULAR RATE: 87 BPM
WBC # BLD AUTO: 11.21 THOUSAND/UL (ref 4.31–10.16)

## 2021-08-30 PROCEDURE — 85007 BL SMEAR W/DIFF WBC COUNT: CPT

## 2021-08-30 PROCEDURE — 97167 OT EVAL HIGH COMPLEX 60 MIN: CPT

## 2021-08-30 PROCEDURE — 80048 BASIC METABOLIC PNL TOTAL CA: CPT

## 2021-08-30 PROCEDURE — 80048 BASIC METABOLIC PNL TOTAL CA: CPT | Performed by: NURSE PRACTITIONER

## 2021-08-30 PROCEDURE — 93010 ELECTROCARDIOGRAM REPORT: CPT | Performed by: INTERNAL MEDICINE

## 2021-08-30 PROCEDURE — 99232 SBSQ HOSP IP/OBS MODERATE 35: CPT | Performed by: INTERNAL MEDICINE

## 2021-08-30 PROCEDURE — 97163 PT EVAL HIGH COMPLEX 45 MIN: CPT | Performed by: PHYSICAL THERAPIST

## 2021-08-30 PROCEDURE — 83735 ASSAY OF MAGNESIUM: CPT | Performed by: NURSE PRACTITIONER

## 2021-08-30 PROCEDURE — 99233 SBSQ HOSP IP/OBS HIGH 50: CPT | Performed by: INTERNAL MEDICINE

## 2021-08-30 PROCEDURE — 99291 CRITICAL CARE FIRST HOUR: CPT | Performed by: INTERNAL MEDICINE

## 2021-08-30 PROCEDURE — 93923 UPR/LXTR ART STDY 3+ LVLS: CPT | Performed by: SURGERY

## 2021-08-30 PROCEDURE — 85027 COMPLETE CBC AUTOMATED: CPT

## 2021-08-30 PROCEDURE — 93923 UPR/LXTR ART STDY 3+ LVLS: CPT

## 2021-08-30 PROCEDURE — 93005 ELECTROCARDIOGRAM TRACING: CPT

## 2021-08-30 RX ORDER — POTASSIUM CHLORIDE 14.9 MG/ML
20 INJECTION INTRAVENOUS ONCE
Status: COMPLETED | OUTPATIENT
Start: 2021-08-30 | End: 2021-08-30

## 2021-08-30 RX ORDER — AMIODARONE HYDROCHLORIDE 200 MG/1
200 TABLET ORAL 2 TIMES DAILY WITH MEALS
Status: DISCONTINUED | OUTPATIENT
Start: 2021-09-06 | End: 2021-08-30

## 2021-08-30 RX ORDER — MAGNESIUM SULFATE 1 G/100ML
1 INJECTION INTRAVENOUS ONCE
Status: COMPLETED | OUTPATIENT
Start: 2021-08-30 | End: 2021-08-30

## 2021-08-30 RX ORDER — AMIODARONE HYDROCHLORIDE 200 MG/1
200 TABLET ORAL 2 TIMES DAILY WITH MEALS
Status: DISCONTINUED | OUTPATIENT
Start: 2021-09-06 | End: 2021-09-06

## 2021-08-30 RX ORDER — AMIODARONE HYDROCHLORIDE 200 MG/1
200 TABLET ORAL
Status: DISCONTINUED | OUTPATIENT
Start: 2021-08-30 | End: 2021-08-30

## 2021-08-30 RX ORDER — POTASSIUM CHLORIDE 20MEQ/15ML
40 LIQUID (ML) ORAL ONCE
Status: COMPLETED | OUTPATIENT
Start: 2021-08-30 | End: 2021-08-30

## 2021-08-30 RX ORDER — POTASSIUM CHLORIDE 20 MEQ/1
40 TABLET, EXTENDED RELEASE ORAL ONCE
Status: DISCONTINUED | OUTPATIENT
Start: 2021-08-30 | End: 2021-09-03

## 2021-08-30 RX ORDER — POTASSIUM CHLORIDE 20 MEQ/1
20 TABLET, EXTENDED RELEASE ORAL ONCE
Status: COMPLETED | OUTPATIENT
Start: 2021-08-30 | End: 2021-08-30

## 2021-08-30 RX ORDER — AMIODARONE HYDROCHLORIDE 200 MG/1
200 TABLET ORAL
Status: DISCONTINUED | OUTPATIENT
Start: 2021-09-14 | End: 2021-09-06 | Stop reason: SDUPTHER

## 2021-08-30 RX ORDER — AMIODARONE HYDROCHLORIDE 200 MG/1
200 TABLET ORAL
Status: DISCONTINUED | OUTPATIENT
Start: 2021-09-14 | End: 2021-08-30

## 2021-08-30 RX ORDER — QUETIAPINE FUMARATE 25 MG/1
25 TABLET, FILM COATED ORAL
Status: DISCONTINUED | OUTPATIENT
Start: 2021-08-30 | End: 2021-09-02

## 2021-08-30 RX ORDER — AMIODARONE HYDROCHLORIDE 200 MG/1
200 TABLET ORAL
Status: DISPENSED | OUTPATIENT
Start: 2021-08-30 | End: 2021-09-06

## 2021-08-30 RX ADMIN — METOLAZONE 5 MG: 5 TABLET ORAL at 08:28

## 2021-08-30 RX ADMIN — ALBUMIN (HUMAN) 25 G: 0.25 INJECTION, SOLUTION INTRAVENOUS at 04:57

## 2021-08-30 RX ADMIN — MAGNESIUM SULFATE HEPTAHYDRATE 1 G: 1 INJECTION, SOLUTION INTRAVENOUS at 14:11

## 2021-08-30 RX ADMIN — ISOSORBIDE DINITRATE 10 MG: 10 TABLET ORAL at 18:30

## 2021-08-30 RX ADMIN — PRAVASTATIN SODIUM 40 MG: 40 TABLET ORAL at 16:22

## 2021-08-30 RX ADMIN — HYDRALAZINE HYDROCHLORIDE 25 MG: 25 TABLET, FILM COATED ORAL at 05:02

## 2021-08-30 RX ADMIN — DOCUSATE SODIUM AND SENNOSIDES 2 TABLET: 8.6; 5 TABLET, FILM COATED ORAL at 08:28

## 2021-08-30 RX ADMIN — POTASSIUM CHLORIDE 40 MEQ: 20 SOLUTION ORAL at 21:59

## 2021-08-30 RX ADMIN — AMIODARONE HYDROCHLORIDE 200 MG: 200 TABLET ORAL at 16:21

## 2021-08-30 RX ADMIN — DOCUSATE SODIUM AND SENNOSIDES 2 TABLET: 8.6; 5 TABLET, FILM COATED ORAL at 18:30

## 2021-08-30 RX ADMIN — APIXABAN 2.5 MG: 2.5 TABLET, FILM COATED ORAL at 14:05

## 2021-08-30 RX ADMIN — ISOSORBIDE DINITRATE 10 MG: 10 TABLET ORAL at 08:34

## 2021-08-30 RX ADMIN — HEPARIN SODIUM 5000 UNITS: 5000 INJECTION INTRAVENOUS; SUBCUTANEOUS at 05:02

## 2021-08-30 RX ADMIN — AMIODARONE HYDROCHLORIDE 200 MG: 200 TABLET ORAL at 14:06

## 2021-08-30 RX ADMIN — ASPIRIN 81 MG: 81 TABLET, CHEWABLE ORAL at 08:28

## 2021-08-30 RX ADMIN — ISOSORBIDE DINITRATE 10 MG: 10 TABLET ORAL at 14:06

## 2021-08-30 RX ADMIN — METOROPROLOL TARTRATE 2.5 MG: 5 INJECTION, SOLUTION INTRAVENOUS at 07:55

## 2021-08-30 RX ADMIN — POTASSIUM CHLORIDE 20 MEQ: 1500 TABLET, EXTENDED RELEASE ORAL at 16:21

## 2021-08-30 RX ADMIN — Medication 20 MG: at 09:30

## 2021-08-30 RX ADMIN — QUETIAPINE FUMARATE 25 MG: 25 TABLET ORAL at 22:00

## 2021-08-30 RX ADMIN — ACETAMINOPHEN 650 MG: 650 SUSPENSION ORAL at 04:57

## 2021-08-30 RX ADMIN — APIXABAN 2.5 MG: 2.5 TABLET, FILM COATED ORAL at 18:30

## 2021-08-30 RX ADMIN — METOROPROLOL TARTRATE 2.5 MG: 5 INJECTION, SOLUTION INTRAVENOUS at 02:00

## 2021-08-30 RX ADMIN — POTASSIUM CHLORIDE 20 MEQ: 14.9 INJECTION, SOLUTION INTRAVENOUS at 14:04

## 2021-08-30 RX ADMIN — POTASSIUM CHLORIDE 20 MEQ: 1500 TABLET, EXTENDED RELEASE ORAL at 14:06

## 2021-08-30 RX ADMIN — CALCIUM GLUCONATE 1 G: 20 INJECTION, SOLUTION INTRAVENOUS at 08:21

## 2021-08-30 NOTE — ASSESSMENT & PLAN NOTE
· Patient with moderate aortic stenosis  · Continue outpatient cardiology follow-up    Avoid volume depletion and hypotension

## 2021-08-30 NOTE — PROGRESS NOTES
Pastoral Care Progress Note    2021  Patient: Fanny Iverson : 1940  Admission Date & Time: 2021  MRN: [id number] [facility]: [id number]                     Chaplaincy Interventions Utilized:   Empowerment: Encouraged self-care    Exploration: Explored relational needs & resources and Explored spiritual needs & resources    Collaboration: Encouraged adherence to treatment plan    Relationship Building: Cultivated a relationship of care and support and Listened empathically    Chaplaincy Outcomes Achieved:  Expressed gratitude    Spiritual Coping Strategies Utilized:   Spiritual gratitude

## 2021-08-30 NOTE — ASSESSMENT & PLAN NOTE
· Patient's history of essential hypertension  · Blood pressure adequately controlled  · Continue hydralazine 25 mg q 8 hours, isosorbide 10 mg t i d , metoprolol 12 5 mg q 12 hours  · Continue to monitor and titrate as needed

## 2021-08-30 NOTE — ASSESSMENT & PLAN NOTE
Baseline cr appeared to be 0 7-0 9  Recent Labs     08/28/21  0456 08/29/21  0509 08/29/21  1423   CREATININE 2 32* 2 21* 2 16*   EGFR 25 27 27     Estimated Creatinine Clearance: 24 5 mL/min (A) (by C-G formula based on SCr of 2 16 mg/dL (H))  · Patient currently needs diuresis for volume overload    · Will continue monitor BMP  · Strict I and O

## 2021-08-30 NOTE — PROGRESS NOTES
Progress Note - Critical Care   Tania Lipscomb 78 y o  male MRN: [id number]  Unit/Bed#: ICU 07 Encounter: [id number]    Assessment/Plan:  Acute hypoxic respiratory failure  Acute on chronic combined systolic and diastolic CHF  Multifocal pneumonia s/p 5 days of antibiotics  New onset atrial fibrillation  Aortic stenosis  CANDIE  Abnormal troponin - to consider type II spill due to CHF and pneumonia  Paroxysmal atrial fibrillation  Microcytic hypochromic anemia  Cad  Cognitive disorder  Hypokalemia  Cold left foot    The patient is ill  He has improved from pulmonary standpoint and is extubated but remains on high flow oxygen  He is in renal failure with a persistently elevated creatinine since 8/26  He has worsening uremia and ongoing delirium  Has new onset afib  - D/C Lasix drip  Pt is significantly net negative with worsening uremia and metabolic alkalosis  Will use IV Lasix as needed  - Replete potassium  - start Apixaban for new afib  D/C ASA  - Start Amiodarone per cardiology  - Increase Seroquel dose  - Cont  statin  - Cont  Nectar thick dental soft diet  - Check LLE foot doppler  - Trial transition to midflow oxygen  - D/C Albumin, data does not support its use in this setting  - D/C calcium gluconate, pt corrected calcium is too high  - D/C Miralax, pt having loose stool  OK to keep Senna for now  - D/C Moreno    Peripherals  No nelli  OK to downgrade med-surg telemetry  Discussed on rounds and with girlfriend at bedside  Critical Care Time: 32 minutes to manage respiratory failure, heart failure, new afib  Documented critical care time excludes any procedures documented elsewhere  It also excludes any family updates     _____________________________________________________________________    Chief complaint:  I'm good  HPI/24hr events:   Extubated 8/20  Overnight had low grade temps with delirium  Started on Seroquel  Poor appetite  Found lying in bed  No distressed   Girlfriend at bedside  ROS  Unable to review due to pt answering limited questions    Medications:  Current Facility-Administered Medications   Medication Dose Route Frequency Provider Last Rate    Acetaminophen  650 mg Per NG Tube Q6H PRN Max 4/day Thaddeus Messina MD      albumin human  25 g Intravenous Q12H Curt Land MD Stopped (08/30/21 7512)    aspirin  81 mg Oral Daily Lidya L Dostal, DO      calcium gluconate  1 g Intravenous Daily Curt Land MD Stopped (08/30/21 2818)    furosemide  10 mg/hr Intravenous Continuous Maurisio Ohara MD 10 mg/hr (08/30/21 0812)    heparin (porcine)  5,000 Units Subcutaneous [facility] Laurence Pina, СВЕТЛАНА      hydrALAZINE  10 mg Intravenous Q6H PRN ANATOLY Mojica      hydrALAZINE  25 mg Oral Q8H [address] Corinne DuANATOLY galeano      isosorbide dinitrate  10 mg Oral TID after meals ANATOLY Back      metolazone  5 mg Oral Daily Maurisio Ohara MD      metoprolol  2 5 mg Intravenous Q6H ANATOLY Back      morphine injection  2 mg Intravenous Q4H PRN Curt Land MD      omeprazole (PRILOSEC) suspension 2 mg/mL  20 mg Oral Daily Lidya Steve,       polyethylene glycol  17 g Oral Daily Curt Land MD      pravastatin  40 mg Oral Daily With [facility] [place], СВЕТЛАНА      QUEtiapine  12 5 mg Oral HS Amando ReusingANATOLY      senna-docusate sodium  2 tablet Oral BID Curt Land MD         furosemide, 10 mg/hr, Last Rate: 10 mg/hr (08/30/21 2085)          Physical exam:  Vitals: Body mass index is 28 76 kg/m²  Blood pressure 120/59, pulse 90, temperature 99 7 °F (37 6 °C), temperature source Bladder, resp  rate (!) 25, height 5' 4" (1 626 m), weight 76 kg (167 lb 8 8 oz), SpO2 96 %  ,  Temp  Min: 96 4 °F (35 8 °C)  Max: 102 9 °F (39 4 °C)  IBW (Ideal Body Weight): 59 2 kg    SpO2: 96 %  SpO2 Activity: At Rest  O2 Device: High flow nasal cannula      Intake/Output Summary (Last 24 hours) at 8/30/2021 1200  Last data filed at 8/30/2021 1101  Gross per 24 hour   Intake 757 99 ml   Output 3400 ml   Net -2642 01 ml       Invasive/non-invasive ventilation settings:   Respiratory    Lab Data (Last 4 hours)    None         O2/Vent Data (Last 4 hours)    None              Invasive Devices     Peripheral Intravenous Line            Peripheral IV 08/27/21 Left Antecubital 3 days    Peripheral IV 08/27/21 Right Antecubital 3 days    Peripheral IV 08/27/21 Right;Upper Arm 3 days          Drain            Urethral Catheter Temperature probe 9 days                Physical Exam: Afebrile; 30L 50%  Gen: appears chronically ill, no distress  HEENT: NCAT; EOMI; anicteric sclera  Neck: supple  Chest: clear b/l bs  CVS: regular  Abd: soft, nd  Ext: no edema, no significant temperature difference between feet  Neuro: nonfocal  Skin: warm, dry  I/O -1 9L    Diagnostic Data:  Lab: I have personally reviewed pertinent lab results     CBC:   Results from last 7 days   Lab Units 08/30/21  0512 08/29/21  0509 08/28/21  0456   WBC Thousand/uL 11 21* 13 66* 14 02*   HEMOGLOBIN g/dL 9 8* 9 4* 9 7*   HEMATOCRIT % 33 7* 32 1* 33 7*   PLATELETS Thousands/uL 379 355 334       CMP:   Results from last 7 days   Lab Units 08/30/21  0512 08/29/21  1423 08/29/21  0509   SODIUM mmol/L 147* 142 144   POTASSIUM mmol/L 3 0* 4 2 2 4*   CHLORIDE mmol/L 100 100 98*   CO2 mmol/L 34* 33* 35*   BUN mg/dL 165* 159* 148*   CREATININE mg/dL 1 99* 2 16* 2 21*   CALCIUM mg/dL 10 3* 9 7 9 5     PT/INR:   No results found for: PT, INR,   Magnesium:   Results from last 7 days   Lab Units 08/26/21 2003   MAGNESIUM mg/dL 3 0*       Microbiology:  Results from last 7 days   Lab Units 08/28/21  1219   SPUTUM CULTURE  Culture too young- will reincubate   GRAM STAIN RESULT  1+ Polys*  2+ Gram positive cocci in pairs*  2+ Gram positive cocci in clusters*     Labs per my review reveal hypokalemia; hypernatremia; uremia; abnormal renal function; abnormal calcium; leukocytosis improved; anemia stable    Imaging:  CXR b/l patchy airspace     Cardiac lab/EKG/telemetry/ECHO:   ECHO 8/27/21: EF 52%, moderate AS    VTE Prophylaxis: Apixaban    Code Status: Level 1 - Full Code    Lidya Steve DO    Portions of the record may have been created with voice recognition software  Occasional wrong word or "sound a like" substitutions may have occurred due to the inherent limitations of voice recognition software  Read the chart carefully and recognize, using context, where substitutions have occurred

## 2021-08-30 NOTE — PROGRESS NOTES
Cardiology   MD Alona Donis MD Ather Mansoor, MD Aminta Lone, DO, Kelvin Roa DO, Fanny Valeds DO, [facility] [place]  -------------------------------------------------------------------  [facility]  [address]  [place], [place] [address]  [place]  [phone]        Progress Note - Cardiology   Sheryle Doles 78 y o  male MRN: [id number]  Unit/Bed#: ICU 07 Encounter: [id number]        Assessment/Recommendations/Discussion:   1  Paroxysmal atrial fibrillation  2  Moderate aortic stenosis  3  Sepsis syndrome  4  Multifocal pneumonia  5  Hypertension  6  Acute kidney injury on chronic kidney disease  7  Fever and persistent leukocytosis  8  Diminished left dorsalis pedis pulse with left cold foot      · New onset atrial fibrillation noted on telemetry helicopter ventricular response, now back to sinus rhythm  Recommend starting amiodarone 200 mg p o  T i d  Chads Vasc score is 3 (age, hypertension, vascular disease)--start anticoagulation if no procedures planned in the near future  May have to assess long-term candidacy once his critical illness resolves  · Agree that he may need a diuretic holiday in light of worsening azotemia  Appreciate Nephrology support  · Titrate oxygen down as able  · Continue aspirin, statin subcutaneous heparin  Would discontinue aspirin and subcutaneous heparin if anticoagulation is initiated  · Check lower extremity arterial Dopplers in light of diminished left dorsalis pedis pulse with left cold foot            Subjective:  Patient seen and examined denies pain            Physical Exam:  GEN:  NAD  HEENT:  MMM, NCAT, pink conjunctiva, EOMI, nonicteric sclera  CV:  NO JVD/HJR, RR, NO M/R/G, +S1/S2, NO PARASTERNAL HEAVE/THRILL, NO LE EDEMA, NO HEPATIC SYSTOLIC PULSATION, WARM EXTREMITIES  RESP:  CTAB/L  ABD:  SOFT, NT, NO GROSS ORGANOMEGALY        Vitals:   BP (!) 126/32   Pulse 92   Temp 99 7 °F (37 6 °C) (Bladder)   Resp 20   Ht 5' 4" (1 626 m)   Wt 76 kg (167 lb 8 8 oz)   SpO2 99%   BMI 28 76 kg/m²   Vitals:    08/29/21 0600 08/30/21 0600   Weight: 78 1 kg (172 lb 2 9 oz) 76 kg (167 lb 8 8 oz)       Intake/Output Summary (Last 24 hours) at 8/30/2021 1047  Last data filed at 8/30/2021 1007  Gross per 24 hour   Intake 1187 99 ml   Output 3400 ml   Net -2212 01 ml       TELEMETRY:  Paroxysmal atrial fibrillation  Lab Results:  Results from last 7 days   Lab Units 08/30/21  0512   WBC Thousand/uL 11 21*   HEMOGLOBIN g/dL 9 8*   HEMATOCRIT % 33 7*   PLATELETS Thousands/uL 379     Results from last 7 days   Lab Units 08/30/21  0512   POTASSIUM mmol/L 3 0*   CHLORIDE mmol/L 100   CO2 mmol/L 34*   BUN mg/dL 165*   CREATININE mg/dL 1 99*   CALCIUM mg/dL 10 3*     Results from last 7 days   Lab Units 08/30/21  0512   POTASSIUM mmol/L 3 0*   CHLORIDE mmol/L 100   CO2 mmol/L 34*   BUN mg/dL 165*   CREATININE mg/dL 1 99*   CALCIUM mg/dL 10 3*           Medications:    Current Facility-Administered Medications:     Acetaminophen (TYLENOL) oral suspension 650 mg, 650 mg, Per NG Tube, Q6H PRN Max 4/day, Delilah Morton MD, 650 mg at 08/30/21 0457    albumin human (FLEXBUMIN) 25 % injection 25 g, 25 g, Intravenous, Q12H, Curt Santos MD, Stopped at 08/30/21 0391    aspirin chewable tablet 81 mg, 81 mg, Oral, Daily, Lidya Steve DO, 81 mg at 08/30/21 0828    calcium gluconate 1 g in sodium chloride 0 9% 50 mL (premix), 1 g, Intravenous, Daily, Curt Santos MD, Last Rate: 100 mL/hr at 08/30/21 0821, 1 g at 08/30/21 0821    furosemide (LASIX) 500 mg infusion 50 mL, 10 mg/hr, Intravenous, Continuous, Manda Avelar MD, Last Rate: 1 mL/hr at 08/30/21 0812, 10 mg/hr at 08/30/21 0812    heparin (porcine) subcutaneous injection 5,000 Units, 5,000 Units, Subcutaneous, Q8H [address], Laurence Pitts PA-C, 5,000 Units at 08/30/21 0502    hydrALAZINE (APRESOLINE) injection 10 mg, 10 mg, Intravenous, Q6H PRN, ANATOLY Wayne, 10 mg at 08/25/21 2025    hydrALAZINE (APRESOLINE) tablet 25 mg, 25 mg, Oral, Q8H [address], ANATOLY Bueno, 25 mg at 08/30/21 0502    isosorbide dinitrate (ISORDIL) tablet 10 mg, 10 mg, Oral, TID after meals, ANATOLY Bueno, 10 mg at 08/30/21 0834    metolazone (ZAROXOLYN) tablet 5 mg, 5 mg, Oral, Daily, Fredrick Espinoza MD, 5 mg at 08/30/21 0828    metoprolol (LOPRESSOR) injection 2 5 mg, 2 5 mg, Intravenous, Q6H, ANATOLY Bueno, 2 5 mg at 08/30/21 0755    morphine injection 2 mg, 2 mg, Intravenous, Q4H PRN, Curt Ballesteros MD, 2 mg at 08/24/21 2307    omeprazole (PRILOSEC) suspension 2 mg/mL, 20 mg, Oral, Daily, Lidya Steve DO, 20 mg at 08/30/21 0930    polyethylene glycol (MIRALAX) packet 17 g, 17 g, Oral, Daily, Curt Ballesteros MD, 17 g at 08/29/21 0836    pravastatin (PRAVACHOL) tablet 40 mg, 40 mg, Oral, Daily With Dinner, СВЕТЛАНА Kat, 40 mg at 08/29/21 1741    QUEtiapine (SEROquel) tablet 12 5 mg, 12 5 mg, Oral, HS, ANATOLY Conn, 12 5 mg at 08/29/21 2115    senna-docusate sodium (SENOKOT S) 8 6-50 mg per tablet 2 tablet, 2 tablet, Oral, BID, Curt Ballesteros MD, 2 tablet at 08/30/21 6112    This note was completed in part utilizing [employer] Software  Grammatical errors, random word insertions, spelling mistakes, and incomplete sentences may be an occasional consequence of this system secondary to software limitations, ambient noise, and hardware issues  If you have any questions or concerns about the content, text, or information contained within the body of this dictation, please contact the provider for clarification

## 2021-08-30 NOTE — PHYSICAL THERAPY NOTE
Physical Therapy Evaluation    Performed at least 2 patient identifiers during session:  Patient Active Problem List   Diagnosis    Mixed hyperlipidemia    Essential hypertension    Aortic stenosis, moderate    Chest pain    Enlarged prostate without lower urinary tract symptoms (luts)    Fatty liver disease, nonalcoholic    Moderate episode of recurrent major depressive disorder ([facility] )    PVC (premature ventricular contraction)    Medicare annual wellness visit, subsequent    Mild cognitive impairment    Obesity (BMI 30 0-34  9)    Do not resuscitate status    Chronic constipation    Hypokalemia    Iron deficiency anemia secondary to inadequate dietary iron intake    Multifocal pneumonia    Acute respiratory failure with hypoxia (HCC)    Acute on chronic diastolic [facility]congestive heart failure) ([facility])    Lactic acidosis    Hypochromic microcytic anemia    Sepsis (HCC)    CANDIE (acute kidney injury) ([facility] )       Past Medical History:   Diagnosis Date    Actinic keratosis     LAST ASSESSED: [id number]    Allergic rhinitis     LAST ASSESSED: [id number]    Anxiety     LAST ASSESSED: [id number]    Anxiety disorder     LAST ASSESSED: 07UMZ5565    Atelectasis of right lung     ABNORMAL CT SCAN OF 14 [facility] 12/2/2016 REPEAT NEEDED PER RADIOLOGY IN 3 MONTHS LAST ASSESSED: [id number]    Atypical chest pain     LAST ASSESSED: [id number]    Benign paroxysmal vertigo     LAST ASSESSED: 87TWY5151    Chronic cough     LAST ASSESSED: [id number]    Chronic GERD     Degenerative arthritis of knee, bilateral     LAST ASSESSED: 88XKY3734    Diverticulitis of colon     LAST ASSESSED: 63GSF9201    Diverticulosis     LAST ASSESSED: 94UDD0329    Foreign body, eye     LAST ASSESSED: 71KCB8361    Functional gait abnormality     LAST ASSESSED: 36CPC3588    Hyperlipidemia     Hypertension     Hyponatremia     LAST ASSESSED: 28HHW6100    Leukocytosis     LAST ASSESSED: 45QZW5067    Murmur     Newly recognized murmur     LAST ASSESSED: 01RLF4858    Olecranon bursitis, unspecified elbow     Presence of indwelling urethral catheter     RESOLVED: [id number]    Transient cerebral ischemia     LAST ASSESSED: 07VWQ5192    Urinary incontinence     LAST ASSESSED: 69LRQ5569    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     LAST ASSESSED: 70BVX3722       Past Surgical History:   Procedure Laterality Date    ADENOIDECTOMY      APPENDECTOMY      COLONOSCOPY  10/2006    FIBEROPTIC    INGUINAL HERNIA REPAIR      JOINT REPLACEMENT      b/l TKR Sept 2015    SINUS SURGERY      TONSILLECTOMY          08/30/21 1107   PT Last Visit   PT Visit Date 08/30/21   Note Type   Note type Evaluation   Pain Assessment   Pain Assessment Tool FLACC   Pain Rating: FLACC (Rest) - Face 0   Pain Rating: FLACC (Rest) - Legs 0   Pain Rating: FLACC (Rest) - Activity 0   Pain Rating: FLACC (Rest) - Cry 0   Pain Rating: FLACC (Rest) - Consolability 0   Score: FLACC (Rest) 0   Pain Rating: FLACC (Activity) - Face 0   Pain Rating: FLACC (Activity) - Legs 0   Pain Rating: FLACC (Activity) - Activity 0   Pain Rating: FLACC (Activity) - Cry 0   Pain Rating: FLACC (Activity) - Consolability 0   Score: FLACC (Activity) 0   Home Living   Type of Home House   Home Layout One level  (1 stair to enter)   Home Equipment Walker;Cane   Prior Function   Level of Issue Independent with ADLs and functional mobility   Lives With Spouse   Receives Help From Family   ADL Assistance Independent   IADLs Independent   Falls in the last 6 months 0   Comments pt was indep without device   drives, gardens   General   Family/Caregiver Present No   Cognition   Overall Cognitive Status Impaired   Orientation Level Oriented to person   RUE Assessment   RUE Assessment X  (proximal weakness, str overall < 3/5)   LUE Assessment   LUE Assessment X  (proximal weakness, str overall < 3/5)   RLE Assessment   RLE Assessment X  (str 3-/5)   LLE Assessment   LLE Assessment X  (str 3-/5)   Coordination   Movements are Fluid and Coordinated 1   Sensation   (unable to test- not understanding directions )   Bed Mobility   Rolling R 1  Dependent   Additional items Assist x 2; Increased time required;Verbal cues;LE management   Rolling L 1  Dependent   Additional items Assist x 2; Increased time required;Verbal cues;LE management   Supine to Sit 1  Dependent   Additional items Assist x 2; Increased time required;Verbal cues;LE management   Sit to Supine 1  Dependent   Additional items Assist x 2; Increased time required;Verbal cues;LE management   Additional Comments pt resisting efforts to mobilize, sit edge of bed, retropulsive, drift to right   Transfers   Sit to Stand Unable to assess  (pt refused to try)   Balance   Static Sitting Poor   Dynamic Sitting Poor -   Endurance Deficit   Endurance Deficit Yes   Endurance Deficit Description HFNC, spO2 92% with sitting   Activity Tolerance   Activity Tolerance Treatment limited secondary to medical complications (Comment)   Medical Staff Made Aware OT   Nurse Made Aware yes   Assessment   Prognosis Fair   Problem List Decreased strength;Decreased range of motion;Decreased endurance; Impaired balance;Decreased mobility; Decreased coordination;Decreased cognition; Impaired judgement;Decreased safety awareness; Impaired hearing   Assessment pt admitted with chest pain, dx with chf, vdrf, possible pneumonia, elevated troponin, anemia, change in mental status, r/o vascular compromise lle and r/o aspiratoin  pt referred to PT  pt was living with wife in 1 story home, 1 stair to enter, indep without device and driving , active gardening  pt dmeonstrated severe functoinal limitations due to recent illness and prior mild cognitive impairment  pt was dependent for all mobility, resisting efforts for bed mobility and edge of bed sitting  pt was unable to appose gravity for full rom all 4 extremities  pt in sitting was drifting to right and rear   pt refused to try standing  pt will need skilled PT for deficits in rom, strength, balance, locomotion, cardiopulmonary function, cognition  pt will need rehab  Barriers to Discharge None   Goals   Patient Goals lay down   STG Expiration Date 09/13/21   Short Term Goal #1 min assist for bed moiblity, mod assist of 1 for transfers  improve strength and balance by 1/2 to 1 grade  demonstrate good safety practices  tolerate 45 minutes activity  Plan   Treatment/Interventions Functional transfer training;LE strengthening/ROM; Therapeutic exercise; Endurance training;Cognitive reorientation;Patient/family training;Equipment eval/education; Bed mobility; Compensatory technique education;Continued evaluation;Spoke to nursing;OT   PT Frequency   (3-5x/week)   Recommendation   PT Discharge Recommendation Post acute rehabilitation services   PT - OK to Discharge Yes  (rehab)   Cici 8 in Bed Without Bedrails 1   Lying on Back to Sitting on Edge of Flat Bed 1   Moving Bed to Chair 1   Standing Up From Chair 1   Walk in Room 1   Climb 3-5 Stairs 1   Basic Mobility Inpatient Raw Score 6   Turning Head Towards Sound 3   Follow Simple Instructions 2   Low Function Basic Mobility Raw Score 11   Low Function Basic Mobility Standardized Score 16 55       An AM-PAC Basic Mobility standardized score less than 42 9 suggests the patient may benefit from discharge to post-acute rehab services      History: co - morbidities, fall risk, use of assistive device, assist for adl's, cognition, multiple lines  Exam: impairments in locomotion, musculoskeletal, balance,posture, cardiac, pulmonary, cognition   Clinical: unstable/unpredictable  Complexity:high  Manuela Collado, PT

## 2021-08-30 NOTE — OCCUPATIONAL THERAPY NOTE
Occupational Therapy Evaluation(time=9519-7803)     Patient Name: Eddie Ortez  EHHHN'K Date: 8/30/2021  Problem List  Principal Problem:    Acute respiratory failure with hypoxia [facility])  Active Problems:    Essential hypertension    Aortic stenosis, moderate    Chest pain    Mild cognitive impairment    Hypokalemia    Multifocal pneumonia    Acute on chronic diastolic CHF (congestive heart failure) (HCC)    Lactic acidosis    Hypochromic microcytic anemia    Sepsis (HCC)    CANDIE (acute kidney injury) ([address] )    Past Medical History  Past Medical History:   Diagnosis Date    Actinic keratosis     LAST ASSESSED: [id number]    Allergic rhinitis     LAST ASSESSED: 05BOU7313    Anxiety     LAST ASSESSED: 72RYJ2977    Anxiety disorder     LAST ASSESSED: 52OUG9502    Atelectasis of right lung     ABNORMAL CT SCAN OF [address] 12/2/2016 REPEAT NEEDED PER RADIOLOGY IN 3 MONTHS LAST ASSESSED: [id number]    Atypical chest pain     LAST ASSESSED: 35WAH3228    Benign paroxysmal vertigo     LAST ASSESSED: 55ITS9377    Chronic cough     LAST ASSESSED: 97TSK1820    Chronic GERD     Degenerative arthritis of knee, bilateral     LAST ASSESSED: 47NPJ4175    Diverticulitis of colon     LAST ASSESSED: 82YGQ0661    Diverticulosis     LAST ASSESSED: 95ROU8685    Foreign body, eye     LAST ASSESSED: 34DEZ9467    Functional gait abnormality     LAST ASSESSED: 09PQE0707    Hyperlipidemia     Hypertension     Hyponatremia     LAST ASSESSED: 00GNV0623    Leukocytosis     LAST ASSESSED: 26RMA1441    Murmur     Newly recognized murmur     LAST ASSESSED: 50YOO7497    Olecranon bursitis, unspecified elbow     Presence of indwelling urethral catheter     RESOLVED: 20QJR8990    Transient cerebral ischemia     LAST ASSESSED: 26DTP5783    Urinary incontinence     LAST ASSESSED: 86JQS6044    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     LAST ASSESSED: 72DST4859     Past Surgical History  Past Surgical History:   Procedure Laterality Date    ADENOIDECTOMY      APPENDECTOMY      COLONOSCOPY  10/2006    FIBEROPTIC    INGUINAL HERNIA REPAIR      JOINT REPLACEMENT      b/l TKR Sept 2015    SINUS SURGERY      TONSILLECTOMY               08/30/21 1112   Note Type   Note type Evaluation   Restrictions/Precautions   Weight Bearing Precautions Per Order No   Other Precautions Fall Risk;Multiple lines;Cognitive; Chair Alarm; Bed Alarm;Telemetry;O2   Pain Assessment   Pain Assessment Tool FLACC   Pain Rating: FLACC (Rest) - Face 0   Pain Rating: FLACC (Rest) - Legs 0   Pain Rating: FLACC (Rest) - Activity 0   Pain Rating: FLACC (Rest) - Cry 1   Pain Rating: FLACC (Rest) - Consolability 0   Score: FLACC (Rest) 1   Home Living   Type of Home House   Home Layout One level  (1 anil)   Bathroom Equipment Commode   Home Equipment Walker;Cane   Prior Function   Lives With Spouse   ADL Assistance Independent   Lifestyle   Autonomy Per CM notes, PTA pt was independent with his ADLs, transfers, ambulation--with SPC during the day, RW at night; +, +falls   Reciprocal Relationships supportive family   Service to Others unable to assess   Intrinsic Gratification gardening   Psychosocial   Psychosocial (WDL) X   Patient Behaviors/Mood [place] affect   Subjective   Subjective "I'm in [place] "   ADL   Where Assessed Edge of bed   Eating Assistance 4  Minimal Assistance   Grooming Assistance 2  Maximal Assistance   UB Bathing Assistance 2  Maximal Assistance   LB Bathing Assistance 1  Total Assistance   UB Dressing Assistance 2  Maximal Assistance   LB Dressing Assistance 1  Total Assistance   Bed Mobility   Rolling R 1  Dependent   Additional items Assist x 2; Increased time required;Verbal cues;LE management   Rolling L 1  Dependent   Additional items Assist x 2; Increased time required;Verbal cues;LE management   Supine to Sit 1  Dependent   Additional items Assist x 2; Increased time required;Verbal cues;LE management Sit to Supine 1  Dependent   Additional items Assist x 2; Increased time required;Verbal cues;LE management   Transfers   Sit to Stand Unable to assess  (pt with limited trunk control and tolerance)   Functional Mobility   Functional Mobility   ([facility]   Balance   Static Sitting Poor   Dynamic Sitting Poor -   Activity Tolerance   Activity Tolerance Patient limited by fatigue   Medical Staff Made Aware nsg, P T     RUE Assessment   RUE Assessment X  (limited b/l shr AROM;PROM=grossly WFLs;elbow-distal=WFLs)   RUE Strength   RUE Overall Strength   (shr=2-/5, elbow-distal=3/5)   LUE Assessment   LUE Assessment X  (limited b/l shr AROM;PROM=grossly WFLs;elbow-distal=WFLs)   LUE Strength   LUE Overall Strength   (shr=2-/5, elbow-distal=3/5)   Hand Function   Gross Motor Coordination Impaired   Fine Motor Coordination Impaired   Sensation   Light Touch   (unable to assess)   Proprioception   Proprioception   (unable to assess)   Vision - Complex Assessment   Visual Fields   (able to scan visual fields)   Perception   Inattention/Neglect Appears intact   Cognition   Overall Cognitive Status Impaired   Arousal/Participation Arousable   Attention Difficulty attending to directions   Orientation Level Oriented to person;Disoriented to place; Disoriented to time;Disoriented to situation   Memory Decreased short term memory;Decreased recall of recent events;Decreased recall of precautions   Following Commands Follows one step commands inconsistently   Assessment   Limitation Decreased UE strength;Decreased ADL status; Decreased UE ROM; Decreased Safe judgement during ADL;Decreased cognition;Decreased endurance;Decreased high-level ADLs   Prognosis Fair   Assessment Pt is a 82y/o male admitted to the hospital 2* symptoms of SOB, increased anxiety, and decreased appetite  Pt noted with CHF, PNA, and acute respiratory failure/VDRF(extubated 8/28)   Pt with PMH anxiety, vertigo, HTN, TIA, cognitive decline, and b/l TKR  During initial eval, pt demonstarted deficits with his functional balance, functional mobility, ADL status, transfer safety, b/l UE strength, activity tolerance(currently fair=15-20mins), and cognition(i e orientation, memory, problem-solving)  OOB mobility limited 2* pt's trunk control/balance and fatigue  Pt would benefit from continued OT assessment and tx for the above deficits  3-5xwk/1-2wks  Goals   Patient Goals "to lay down"   STG Time Frame   (1-7 days)   Short Term Goal #1 Pt will tolerate continued functional mobility/cognitive assessment and appropriate goals and d/c recommendations will be established  Short Term Goal #2 Pt will demonstrate improved activity tolerance to good(20-30mins) and sitting tolerance(on EOB) to 10-15mins to assist with ADLs  Short Term Goal  Pt will demonstrate Perfecto with their bed mobility to facilitate EOB ADLs  LTG Time Frame   (7-14 days)   Long Term Goal #1 Pt will demonstrate improved functional balance by 1 grade to assist with ADLs/transfers  Long Term Goal #2 Pt will demonstrate Perfecto with his UE and mod A with LE bathing/dressing  Long Term Goal Pt will demonstrate improved b/l UE strength by 1/2 MM grade to assist with ADLs/transfers  Plan   Treatment Interventions ADL retraining;Functional transfer training;UE strengthening/ROM; Endurance training;Cognitive reorientation;Patient/family training;Equipment evaluation/education; Compensatory technique education;Continued evaluation   Goal Expiration Date 09/13/21   OT Treatment Day 0   OT Frequency 3-5x/wk   Recommendation   OT Discharge Recommendation Post acute rehabilitation services   AM-PAC Daily Activity Inpatient   Lower Body Dressing 2   Bathing 2   Toileting 2   Upper Body Dressing 2   Grooming 2   Eating 2   Daily Activity Raw Score 12   Daily Activity Standardized Score (Calc for Raw Score >=11) 30 6   AM-PAC Applied Cognition Inpatient   Following a Speech/Presentation 2 Understanding Ordinary Conversation 2   Taking Medications 1   Remembering Where Things Are Placed or Put Away 1   Remembering List of 4-5 Errands 1   Taking Care of Complicated Tasks 1   Applied Cognition Raw Score 8   Applied Cognition Standardized Score 19 32   Keara Root, OT

## 2021-08-30 NOTE — ASSESSMENT & PLAN NOTE
· Patient's previous creatinine appears to range 0 8-1 0  · He developed acute kidney injury during his hospitalization, while he was critically ill  · He was evaluated by the nephrology team:  His acute kidney injury is likely secondary to ATN from his infection, and hypotension    Also concern for underlying cardiorenal syndrome  · Patient was placed on a Lasix drip, since discontinued  · Creatinine has peaked at 2 94 today

## 2021-08-30 NOTE — ASSESSMENT & PLAN NOTE
Wt Readings from Last 3 Encounters:   08/30/21 76 kg (167 lb 8 8 oz)   07/26/21 83 5 kg (184 lb 2 oz)   03/19/21 82 8 kg (182 lb 9 6 oz)     · POA:  With signs of volume overload, initial imaging chest x-ray concerning for vascular congestion consistent with CHF  · Most recent echo in 2018 showed grade 1 diastolic dysfunction  · Repeated echo 8/27 with left ventricular ejection fraction 50%    Mild-moderate hypokinesis of the mid-apical septal segment, grade 2 diastolic dysfunction  · Patient was placed on a Lasix infusion while in the ICU, since discontinued  · Appreciate cardiology/nephrology evaluation and fluid management

## 2021-08-30 NOTE — ASSESSMENT & PLAN NOTE
· On losartan 25 mg daily, amlodipine 10 mg daily  Hold losartan due to CANDIE  · Continue diuresis    ·  Continue metoprolol 2 5 Q6H  · Continue hold antihypertensive meds, resume when  Appropriate

## 2021-08-30 NOTE — ASSESSMENT & PLAN NOTE
· In the ED patient was noted to be hypoxic, in respiratory distress  Patient improved with lasix, bipap, morphine and ativan     · Admission Chest x-ray concerning for vascular congestion, multifocal pneumonia  · Patient was admitted to the critical care team, initially on BiPAP, and requiring intubation 8/21  · Patient was extubated 8/28 and transition to high-flow oxygen  · He was diuresed for his acute/chronic diastolic congestive heart failure exacerbation, and was given Lasix infusion  · He was diagnosed with multifocal pneumonia and completed a 5 day course of antibiotics  · He was noted to have dysphagia and was on a dental soft, nectar thick diet, followed by speech therapy  · Continue to titrate oxygen:  Currently 6 L mid flow

## 2021-08-30 NOTE — PLAN OF CARE
Problem: Nutrition/Hydration-ADULT  Goal: Nutrient/Hydration intake appropriate for improving, restoring or maintaining nutritional needs  Description: Monitor and assess patient's nutrition/hydration status for malnutrition  Collaborate with interdisciplinary team and initiate plan and interventions as ordered  Monitor patient's weight and dietary intake as ordered or per policy  Utilize nutrition screening tool and intervene as necessary  Determine patient's food preferences and provide high-protein, high-caloric foods as appropriate       INTERVENTIONS:  - Monitor oral intake, urinary output, labs, and treatment plans  - Assess nutrition and hydration status and recommend course of action  - Evaluate amount of meals eaten  - Assist patient with eating if necessary   - Allow adequate time for meals  - Recommend/ encourage appropriate diets, oral nutritional supplements, and vitamin/mineral supplements  - Order, calculate, and assess calorie counts as needed  - Recommend, monitor, and adjust tube feedings and TPN/PPN based on assessed needs  - Assess need for intravenous fluids  - Provide specific nutrition/hydration education as appropriate  - Include patient/family/caregiver in decisions related to nutrition  Outcome: Not Progressing   Poor po

## 2021-08-30 NOTE — ASSESSMENT & PLAN NOTE
Recent Labs     08/28/21  0456 08/29/21  0509 08/29/21  1423   K 3 1* 2 4* 4 2     · Keep potassium level > 3 8  · Monitor BMP  · Replete as needed  · Resolved

## 2021-08-30 NOTE — ASSESSMENT & PLAN NOTE
· Patient was admitted with multifocal pneumonia  · Chest x-ray 8/20:  Diffuse patchy airspace disease bilaterally concerning multifocal infiltrates  · Diagnosed with CAP versus aspiration  · Patient completed 5 day course of antibiotics under the guidance of the pulmonary team  · Tested negative for COVID-19 x3  · Legionella and strep pneumo antigens negative  · Procalcitonin negative x2  · Blood cultures shows no growth  · Bronchial lavage culture also shows no growth    Negative AFB and pneumocystis  · Continue monitor fever curve, WBCs off antibiotics

## 2021-08-30 NOTE — PROGRESS NOTES
Progress Note - Nephrology   Valeriy Shin 78 y o  male MRN: [id number]  Unit/Bed#: ICU 07 Encounter: [id number]      Assessment / Plan:  1  Acute kidney injury likely secondary to ATN given recent infection relative hypotension  Also secondary component of cardiorenal syndrome  -sCr 2 2 today with  on lasix gtt   -would hold lasix gtt in light of rising sCr and BUN  -cardio follows  -f/u am BMP  -avoid relative hypotension  -consent for RRT on file, obtained previously by Dr Cristy Amaya  2  Progressive azotemia likely secondary to ongoing diuresis plus heart failure  3  Acute on chronic diastolic heart failure currently on Lasix infusion volume status slowly improving  Currently on Hi Derek oxygen  4  Hypoxic respiratory failure, currently on high-flow oxygen  5  Sepsis with recent multifocal pneumonia, COVID negative, status post antibiotic treatment  6  Hypokalemia - K 2 8, diuretic induced, continue with replacement, consider holding lasix gtt in light of severe hypokalemia  7  Anemia, hemoglobin appears stable  8  Hypernatremia - improved to 145 on repeat check, allow patient to drink to thirst, concern for dehydration in light of high sodium and high BUN as well as hypercalcemia  9  hypermagnesemia  Mg 3 8, monitor mag, avoid mag containing products        Subjective:   He states he is breathing better  Denies chest pain  No complaints  On Lasix drip  Per nursing, urinating well  Objective:     Vitals: Blood pressure 115/54, pulse 86, temperature 99 7 °F (37 6 °C), temperature source Bladder, resp  rate (!) 24, height 5' 4" (1 626 m), weight 76 kg (167 lb 8 8 oz), SpO2 100 %  ,Body mass index is 28 76 kg/m²  Temp (24hrs), Av 5 °F (37 5 °C), Min:98 6 °F (37 °C), Max:100 °F (37 8 °C)      Weight (last 2 days)     Date/Time   Weight    21 0600   76 (167 55)    21 0600   78 1 (172 18)    21 0600   79 5 (175 27)                Intake/Output Summary (Last 24 hours) at 8/30/2021 1520  Last data filed at 8/30/2021 1358  Gross per 24 hour   Intake 660 51 ml   Output 3075 ml   Net -2414 49 ml     I/O last 24 hours: In: 1480 5 [P O :1200; I V :80 5; IV Piggyback:200]  Out: 4000 [Urine:4000]        Physical Exam:   Physical Exam  Vitals reviewed  Constitutional:       General: He is not in acute distress  Appearance: Normal appearance  He is well-developed  He is not diaphoretic  Comments: On hi sharif O2 via NC   HENT:      Head: Normocephalic and atraumatic  Nose: Nose normal       Mouth/Throat:      Mouth: Mucous membranes are moist       Pharynx: No oropharyngeal exudate  Eyes:      General: No scleral icterus  Right eye: No discharge  Left eye: No discharge  Neck:      Thyroid: No thyromegaly  Cardiovascular:      Rate and Rhythm: Normal rate and regular rhythm  Heart sounds: Normal heart sounds  Pulmonary:      Breath sounds: No wheezing or rales  Comments: Coarse BS b/l  Abdominal:      General: Bowel sounds are normal  There is no distension  Palpations: Abdomen is soft  Tenderness: There is no abdominal tenderness  Genitourinary:     Comments: miller  Musculoskeletal:         General: No swelling  Cervical back: Neck supple  Lymphadenopathy:      Cervical: No cervical adenopathy  Skin:     General: Skin is warm and dry  Findings: No rash  Neurological:      Mental Status: He is alert        Comments: awake   Psychiatric:         Behavior: Behavior normal          Invasive Devices     Peripheral Intravenous Line            Peripheral IV 08/27/21 Left Antecubital 3 days    Peripheral IV 08/27/21 Right Antecubital 3 days    Peripheral IV 08/27/21 Right;Upper Arm 3 days          Drain            Urethral Catheter Temperature probe 10 days                Medications:    Scheduled Meds:  Current Facility-Administered Medications   Medication Dose Route Frequency Provider Last Rate    Acetaminophen  650 mg Per NG Tube Q6H PRN Max 4/day Victor Hugo Love MD      [START ON 9/14/2021] amiodarone  200 mg Oral Daily With Breakfast Ladena Mutters, CRNP      Followed by   Geyl Birmingham amiodarone  200 mg Oral TID With Meals Ladena Mutters, CRNP      Followed by   Alex Camargo ON 9/6/2021] amiodarone  200 mg Oral BID With Meals Ladena Mutters, CRNP      apixaban  2 5 mg Oral BID Charles Sonday, CRNP      hydrALAZINE  10 mg Intravenous Q6H PRN Mauricio Ortiz, CRNP      hydrALAZINE  25 mg Oral Q8H [address] Sascha Cleopatra, CRNP      isosorbide dinitrate  10 mg Oral TID after meals Sascha Cleopatra, CRNP      metoprolol tartrate  12 5 mg Oral Q12H [address] Charles Sonday, CRNP      omeprazole (PRILOSEC) suspension 2 mg/mL  20 mg Oral Daily Lidya Steve,       potassium chloride  20 mEq Intravenous Once Ladena Mutters, CRNP 20 mEq (08/30/21 1404)    pravastatin  40 mg Oral Daily With Todd, PA-      QUEtiapine  25 mg Oral HS Ladena Mutters, CRNP      senna-docusate sodium  2 tablet Oral BID Curt Gaitan MD         PRN Meds:   Acetaminophen    hydrALAZINE    Continuous Infusions:         LAB RESULTS:      Results from last 7 days   Lab Units 08/30/21  1333 08/30/21  0512 08/29/21  1423 08/29/21  0509 08/28/21  0456 08/27/21  0420 08/26/21 2003 08/26/21  0447 08/25/21  0518 08/24/21  0454   WBC Thousand/uL  --  11 21*  --  13 66* 14 02* 17 96*  --  24 65* 12 71* 12 94*   HEMOGLOBIN g/dL  --  9 8*  --  9 4* 9 7* 9 2*  --  9 3* 8 7* 7 6*   HEMATOCRIT %  --  33 7*  --  32 1* 33 7* 32 0*  --  33 4* 31 0* 27 1*   PLATELETS Thousands/uL  --  379  --  355 334 281  --  296 238 208   LYMPHO PCT %  --  17  --  8*  --   --   --  5  --  7*   MONO PCT %  --  11  --  14*  --   --   --  9  --  9   EOS PCT %  --  1  --  0  --   --   --   --   --  2   POTASSIUM mmol/L 2 8* 3 0* 4 2 2 4* 3 1* 3 7 4 5 4 3 4 0 4 0   CHLORIDE mmol/L 99* 100 100 98* 99* 103 103 102 107 112*   CO2 mmol/L 34* 34* 33* 35* 30 32 29 28 26 23   BUN mg/dL 180* 165* 159* 148* 142* 109* 96* 80* 53* 34*   CREATININE mg/dL 2 20* 1 99* 2 16* 2 21* 2 32* 2 25* 2 21* 1 47* 1 44* 1 36*   CALCIUM mg/dL 10 7* 10 3* 9 7 9 5 9 3 9 2 9 3 8 7 8 5 7 7*   MAGNESIUM mg/dL 3 8*  --   --   --   --   --  3 0*  --   --   --        CUTURES:  Lab Results   Component Value Date    BLOODCX No Growth After 5 Days  08/19/2021    BLOODCX No Growth After 5 Days  08/19/2021    BLOODCX No Growth After 5 Days  12/02/2016    BLOODCX No Growth After 5 Days  12/02/2016    URINECX 10,000-19,000 cfu/ml Mixed Contaminants X5 12/14/2016    URINECX >100,000 cfu/ml Klebsiella oxytoca 12/02/2016                 Portions of the record may have been created with voice recognition software  Occasional wrong word or "sound a like" substitutions may have occurred due to the inherent limitations of voice recognition software  Read the chart carefully and recognize, using context, where substitutions have occurred  If you have any questions, please contact the dictating provider

## 2021-08-30 NOTE — PLAN OF CARE
Problem: OCCUPATIONAL THERAPY ADULT  Goal: Performs self-care activities at highest level of function for planned discharge setting  See evaluation for individualized goals  Description: Treatment Interventions: ADL retraining, Functional transfer training, UE strengthening/ROM, Endurance training, Cognitive reorientation, Patient/family training, Equipment evaluation/education, Compensatory technique education, Continued evaluation          See flowsheet documentation for full assessment, interventions and recommendations  Note: Limitation: Decreased UE strength, Decreased ADL status, Decreased UE ROM, Decreased Safe judgement during ADL, Decreased cognition, Decreased endurance, Decreased high-level ADLs  Prognosis: Fair  Assessment: Pt is a 80y/o male admitted to the hospital 2* symptoms of SOB, increased anxiety, and decreased appetite  Pt noted with CHF, PNA, and acute respiratory failure/VDRF(extubated 8/28)  Pt with PMH anxiety, vertigo, HTN, TIA, cognitive decline, and b/l TKR  During initial eval, pt demonstarted deficits with his functional balance, functional mobility, ADL status, transfer safety, b/l UE strength, activity tolerance(currently fair=15-20mins), and cognition(i e orientation, memory, problem-solving)  OOB mobility limited 2* pt's trunk control/balance and fatigue  Pt would benefit from continued OT assessment and tx for the above deficits  3-5xwk/1-2wks        OT Discharge Recommendation: Post acute rehabilitation services

## 2021-08-30 NOTE — ASSESSMENT & PLAN NOTE
· POA:  With signs of volume overload, initial imaging chest x-ray concerning for vascular congestion consistent with CHF  · Most recent echo in 2018 showed grade 1 diastolic dysfunction  · [id number]  · Repeated echo 8/20 with significant worsening of his diastolic dysfunction    · Continue diuresis with lasix infusion   · Cardiology on board  · plan as outlined above

## 2021-08-30 NOTE — ASSESSMENT & PLAN NOTE
· POA:  Reported midsternal/epigastric chest pain, persistent, 8/10  · Troponin initially elevated  0 08 continue to trend up in peaked 1 11 currently plateaued 6 39  · EKGs in ED normal sinus rhythm, left axis deviation with nonspecific conduction abnormalities, repeated EKG  normal sinus rhythm, significant ST abnormalities Heparin was discontinued on 8/24  · Continue daily aspirin  Continue metoprolol   Metoprolol was held due to hypotension  · Continue monitor on tele  · Cardiology on board, appreciate the input

## 2021-08-30 NOTE — ASSESSMENT & PLAN NOTE
· Chest x-ray 8/20:  Diffuse patchy airspace disease bilaterally concerning multifocal infiltrates  · CAP versus aspiration  · Tested negative for COVID-19 x3  · Legionella and strep pneumo antigens negative  · Initially started on Rocephin and azithromycin, completed 3 days of cefepime and flagyl on 8/23  Total antibiotics day 5   · Procalcitonin negative x2  Recent Labs     08/27/21  0420 08/28/21  0456 08/29/21  0509   WBC 17 96* 14 02* 13 66*     · Blood cultures shows no growth  · Bronchial lavage culture also shows no growth     · Continue monitor fever curve, WBCs  · Sputum cx sent 8/28

## 2021-08-30 NOTE — PLAN OF CARE
Problem: PHYSICAL THERAPY ADULT  Goal: Performs mobility at highest level of function for planned discharge setting  See evaluation for individualized goals  Description: Treatment/Interventions: Functional transfer training, LE strengthening/ROM, Therapeutic exercise, Endurance training, Cognitive reorientation, Patient/family training, Equipment eval/education, Bed mobility, Compensatory technique education, Continued evaluation, Spoke to nursing, OT          See flowsheet documentation for full assessment, interventions and recommendations  8/30/2021 1212 by Chetan Thurston, PT  Note: Prognosis: Fair  Problem List: Decreased strength, Decreased range of motion, Decreased endurance, Impaired balance, Decreased mobility, Decreased coordination, Decreased cognition, Impaired judgement, Decreased safety awareness, Impaired hearing  Assessment: pt admitted with chest pain, dx with chf, vdrf, possible pneumonia, elevated troponin, anemia, change in mental status, r/o vascular compromise lle and r/o aspiratoin  pt referred to PT  pt was living with wife in 1 story home, 1 stair to enter, indep without device and driving , active gardening  pt dmeonstrated severe functoinal limitations due to recent illness and prior mild cognitive impairment  pt was dependent for all mobility, resisting efforts for bed mobility and edge of bed sitting  pt was unable to appose gravity for full rom all 4 extremities  pt in sitting was drifting to right and rear  pt refused to try standing  pt will need skilled PT for deficits in rom, strength, balance, locomotion, cardiopulmonary function, cognition  pt will need rehab  Barriers to Discharge: None        PT Discharge Recommendation: Post acute rehabilitation services     PT - OK to Discharge: Yes (rehab)    See flowsheet documentation for full assessment       8/30/2021 1212 by Chetan Thurston PT  Note: Prognosis: Fair  Problem List: Decreased strength, Decreased range of motion, Decreased endurance, Impaired balance, Decreased mobility, Decreased coordination, Decreased cognition, Impaired judgement, Decreased safety awareness, Impaired hearing  Assessment: pt admitted with chest pain, dx with chf, vdrf, possible pneumonia, elevated troponin, anemia, change in mental status, r/o vascular compromise lle and r/o aspiratoin  pt referred to PT  pt was living with wife in 1 story home, 1 stair to enter, indep without device and driving , active gardening  pt dmeonstrated severe functoinal limitations due to recent illness and prior mild cognitive impairment  pt was dependent for all mobility, resisting efforts for bed mobility and edge of bed sitting  pt was unable to appose gravity for full rom all 4 extremities  pt in sitting was drifting to right and rear  pt refused to try standing  pt will need skilled PT for deficits in rom, strength, balance, locomotion, cardiopulmonary function, cognition  pt will need rehab  Barriers to Discharge: None        PT Discharge Recommendation: Post acute rehabilitation services     PT - OK to Discharge: Yes (rehab)    See flowsheet documentation for full assessment

## 2021-08-31 PROBLEM — E87.6 HYPOKALEMIA: Status: RESOLVED | Noted: 2021-02-19 | Resolved: 2021-08-31

## 2021-08-31 PROBLEM — I73.9 PAD (PERIPHERAL ARTERY DISEASE) (HCC): Status: ACTIVE | Noted: 2021-08-31

## 2021-08-31 LAB
ANION GAP SERPL CALCULATED.3IONS-SCNC: 17 MMOL/L (ref 4–13)
ANION GAP SERPL CALCULATED.3IONS-SCNC: 17 MMOL/L (ref 4–13)
BACTERIA SPT RESP CULT: ABNORMAL
BACTERIA SPT RESP CULT: ABNORMAL
BUN SERPL-MCNC: 196 MG/DL (ref 5–25)
BUN SERPL-MCNC: 213 MG/DL (ref 5–25)
CA-I BLD-SCNC: 1.19 MMOL/L (ref 1.12–1.32)
CALCIUM SERPL-MCNC: 10.4 MG/DL (ref 8.3–10.1)
CALCIUM SERPL-MCNC: 9.9 MG/DL (ref 8.3–10.1)
CHLORIDE SERPL-SCNC: 104 MMOL/L (ref 100–108)
CHLORIDE SERPL-SCNC: 104 MMOL/L (ref 100–108)
CO2 SERPL-SCNC: 28 MMOL/L (ref 21–32)
CO2 SERPL-SCNC: 29 MMOL/L (ref 21–32)
CREAT SERPL-MCNC: 2.94 MG/DL (ref 0.6–1.3)
CREAT SERPL-MCNC: 3.57 MG/DL (ref 0.6–1.3)
GFR SERPL CREATININE-BSD FRML MDRD: 15 ML/MIN/1.73SQ M
GFR SERPL CREATININE-BSD FRML MDRD: 19 ML/MIN/1.73SQ M
GLUCOSE SERPL-MCNC: 154 MG/DL (ref 65–140)
GLUCOSE SERPL-MCNC: 172 MG/DL (ref 65–140)
GLUCOSE SERPL-MCNC: 213 MG/DL (ref 65–140)
GRAM STN SPEC: ABNORMAL
MAGNESIUM SERPL-MCNC: 4 MG/DL (ref 1.6–2.6)
P JIROVECII AG SPEC QL IF: NORMAL
POTASSIUM SERPL-SCNC: 3.5 MMOL/L (ref 3.5–5.3)
POTASSIUM SERPL-SCNC: 3.6 MMOL/L (ref 3.5–5.3)
SODIUM SERPL-SCNC: 149 MMOL/L (ref 136–145)
SODIUM SERPL-SCNC: 150 MMOL/L (ref 136–145)

## 2021-08-31 PROCEDURE — 99233 SBSQ HOSP IP/OBS HIGH 50: CPT | Performed by: INTERNAL MEDICINE

## 2021-08-31 PROCEDURE — 82948 REAGENT STRIP/BLOOD GLUCOSE: CPT

## 2021-08-31 PROCEDURE — 92526 ORAL FUNCTION THERAPY: CPT

## 2021-08-31 PROCEDURE — 99232 SBSQ HOSP IP/OBS MODERATE 35: CPT | Performed by: INTERNAL MEDICINE

## 2021-08-31 PROCEDURE — 80048 BASIC METABOLIC PNL TOTAL CA: CPT | Performed by: INTERNAL MEDICINE

## 2021-08-31 PROCEDURE — 83735 ASSAY OF MAGNESIUM: CPT | Performed by: NURSE PRACTITIONER

## 2021-08-31 PROCEDURE — 80048 BASIC METABOLIC PNL TOTAL CA: CPT | Performed by: NURSE PRACTITIONER

## 2021-08-31 PROCEDURE — 82330 ASSAY OF CALCIUM: CPT | Performed by: NURSE PRACTITIONER

## 2021-08-31 RX ORDER — DEXTROSE MONOHYDRATE 50 MG/ML
125 INJECTION, SOLUTION INTRAVENOUS CONTINUOUS
Status: DISCONTINUED | OUTPATIENT
Start: 2021-08-31 | End: 2021-09-01

## 2021-08-31 RX ADMIN — AMIODARONE HYDROCHLORIDE 200 MG: 200 TABLET ORAL at 16:11

## 2021-08-31 RX ADMIN — PRAVASTATIN SODIUM 40 MG: 40 TABLET ORAL at 16:11

## 2021-08-31 RX ADMIN — Medication 12.5 MG: at 21:49

## 2021-08-31 RX ADMIN — APIXABAN 2.5 MG: 2.5 TABLET, FILM COATED ORAL at 16:09

## 2021-08-31 RX ADMIN — DOCUSATE SODIUM AND SENNOSIDES 2 TABLET: 8.6; 5 TABLET, FILM COATED ORAL at 16:09

## 2021-08-31 RX ADMIN — DEXTROSE 50 ML/HR: 5 SOLUTION INTRAVENOUS at 08:30

## 2021-08-31 RX ADMIN — QUETIAPINE FUMARATE 25 MG: 25 TABLET ORAL at 22:49

## 2021-08-31 NOTE — ASSESSMENT & PLAN NOTE
-patient was diagnosed with sepsis secondary to multifocal pneumonia    -he was treated with antibiotics and improved

## 2021-08-31 NOTE — PROGRESS NOTES
Progress Note - Nephrology   Ricardo Thomson 78 y o  male MRN: [id number]  Unit/Bed#: [place] [address] -01 Encounter: [id number]      Assessment / Plan:  1  Acute kidney injury likely secondary to ATN given recent infection relative hypotension  Also secondary component of cardiorenal syndrome  -sCr up to 2 94 today with   I highly suspect dehydration playing a role based on clinical exam and high sNa  -would hold lasix gtt in light of rising sCr and BUN  -have begun D5 at 50ml/hr  -recheck sNa at 3pm today   -cardio follows  -f/u am BMP  -avoid relative hypotension  -consent for RRT on file, obtained previously by Dr Michale Lesches  2  Progressive azotemia likely secondary to ongoing diuresis plus heart failure  3  Acute on chronic diastolic heart failure - holding lasix infusion  Currently on 4L o2 via NC  4  Hypoxic respiratory failure, currently on 4L  5  Sepsis with recent multifocal pneumonia, COVID negative, status post antibiotic treatment  6  Hypokalemia - resolved s/p repletion, off diuretics  7  Anemia - hemoglobin appears stable in high 9s, monitor CBC  8  Hypernatremia -sNa 149, continue D5 gtt as above, concern for dehydration in light of high sodium and high BUN as well as mild hypercalcemia  9  hypermagnesemia  Mg 4, monitor mag, avoid mag containing products        Subjective:   Unable to elicit review of systems as patient lethargic  Now on 4 L oxygen via nasal cannula  Family updated at bedside  Objective:     Vitals: Blood pressure 135/66, pulse 105, temperature (!) 97 2 °F (36 2 °C), temperature source Axillary, resp  rate (!) 26, height 5' 4" (1 626 m), weight 73 2 kg (161 lb 6 oz), SpO2 96 %  ,Body mass index is 27 7 kg/m²  Temp (24hrs), Av 5 °F (36 4 °C), Min:97 2 °F (36 2 °C), Max:97 8 °F (36 6 °C)      Weight (last 2 days)     Date/Time   Weight    21 0600   73 2 (161 38)    21 0600   76 (167 55)    21 0600   78 1 (172 18)                Intake/Output Summary (Last 24 hours) at 8/31/2021 1214  Last data filed at 8/31/2021 0830  Gross per 24 hour   Intake 1162 52 ml   Output 525 ml   Net 637 52 ml     I/O last 24 hours: In: 1317 2 [I V :1017 2; IV Piggyback:300]  Out: 975 [Urine:975]        Physical Exam:   Physical Exam  Vitals reviewed  Constitutional:       General: He is not in acute distress  Appearance: Normal appearance  He is well-developed  He is ill-appearing  He is not diaphoretic  HENT:      Head: Normocephalic and atraumatic  Mouth/Throat:      Mouth: Mucous membranes are dry  Pharynx: No oropharyngeal exudate  Eyes:      General: No scleral icterus  Right eye: No discharge  Left eye: No discharge  Neck:      Thyroid: No thyromegaly  Cardiovascular:      Rate and Rhythm: Normal rate and regular rhythm  Heart sounds: Normal heart sounds  Pulmonary:      Effort: Pulmonary effort is normal       Breath sounds: Normal breath sounds  No wheezing or rales  Abdominal:      General: Bowel sounds are normal  There is no distension  Palpations: Abdomen is soft  Tenderness: There is no abdominal tenderness  Musculoskeletal:         General: No swelling  Normal range of motion  Cervical back: Neck supple  Lymphadenopathy:      Cervical: No cervical adenopathy  Skin:     General: Skin is warm and dry  Findings: No rash  Comments: Skin tenting   Neurological:      Mental Status: He is alert        Comments: Arouses briefly to name, lethargic         Invasive Devices     Peripheral Intravenous Line            Peripheral IV 08/31/21 Proximal;Right;Ventral (anterior) Forearm <1 day                Medications:    Scheduled Meds:  Current Facility-Administered Medications   Medication Dose Route Frequency Provider Last Rate    Acetaminophen  650 mg Per NG Tube Q6H PRN Max 4/day ANATOLY Small      [START ON 9/14/2021] amiodarone  200 mg Oral Daily With Breakfast ANATOLY Ramirez Followed by   Kamilah Singh amiodarone  200 mg Oral TID With Meals ANATOLY Presley      Followed by   Mery Giles ON 9/6/2021] amiodarone  200 mg Oral BID With Meals ANATOLY Presley      apixaban  2 5 mg Oral BID Ean Espinosa, ANATOLY      dextrose  50 mL/hr Intravenous Continuous Maryann K Nicolasa, DO 50 mL/hr (08/31/21 0830)    hydrALAZINE  10 mg Intravenous Q6H PRN ANATOLY Presley      hydrALAZINE  25 mg Oral [facility] Ean Ord, ANATOLY      isosorbide dinitrate  10 mg Oral TID after meals ANATOLY Presley      metoprolol tartrate  12 5 mg Oral Q12H [address] Ean Jesús, ANATOLY      omeprazole (PRILOSEC) suspension 2 mg/mL  20 mg Oral Daily Ean Jesús, ANATOLY      potassium chloride  40 mEq Oral Once Ange Sol PA-C      pravastatin  40 mg Oral Daily With Jabil Caroline Sonday, ANATOLY      QUEtiapine  25 mg Oral HS ANATOLY Presley      senna-docusate sodium  2 tablet Oral BID ANATOLY Presley         PRN Meds:   Acetaminophen    hydrALAZINE    Continuous Infusions:dextrose, 50 mL/hr, Last Rate: 50 mL/hr (08/31/21 0830)            LAB RESULTS:      Results from last 7 days   Lab Units 08/31/21  0516 08/30/21  1845 08/30/21  1333 08/30/21  0512 08/29/21  1423 08/29/21  0509 08/28/21  0456 08/27/21  0420 08/26/21 2003 08/26/21  0447 08/25/21  0518   WBC Thousand/uL  --   --   --  11 21*  --  13 66* 14 02* 17 96*  --  24 65* 12 71*   HEMOGLOBIN g/dL  --   --   --  9 8*  --  9 4* 9 7* 9 2*  --  9 3* 8 7*   HEMATOCRIT %  --   --   --  33 7*  --  32 1* 33 7* 32 0*  --  33 4* 31 0*   PLATELETS Thousands/uL  --   --   --  379  --  355 334 281  --  296 238   LYMPHO PCT %  --   --   --  17  --  8*  --   --   --  5  --    MONO PCT %  --   --   --  11  --  14*  --   --   --  9  --    EOS PCT %  --   --   --  1  --  0  --   --   --   --   --    POTASSIUM mmol/L 3 6 3 2* 2 8* 3 0* 4 2 2 4* 3 1* 3 7 4 5 4 3 4 0   CHLORIDE mmol/L 104 100 99* 100 100 98* 99* 103 103 102 107   CO2 mmol/L 28 33* 34* 34* 33* 35* 30 32 29 28 26   BUN mg/dL 196* 191* 180* 165* 159* 148* 142* 109* 96* 80* 53*   CREATININE mg/dL 2 94* 2 34* 2 20* 1 99* 2 16* 2 21* 2 32* 2 25* 2 21* 1 47* 1 44*   CALCIUM mg/dL 10 4* 10 6* 10 7* 10 3* 9 7 9 5 9 3 9 2 9 3 8 7 8 5   MAGNESIUM mg/dL 4 0*  --  3 8*  --   --   --   --   --  3 0*  --   --        CUTURES:  Lab Results   Component Value Date    BLOODCX No Growth After 5 Days  08/19/2021    BLOODCX No Growth After 5 Days  08/19/2021    BLOODCX No Growth After 5 Days  12/02/2016    BLOODCX No Growth After 5 Days  12/02/2016    URINECX 10,000-19,000 cfu/ml Mixed Contaminants X5 12/14/2016    URINECX >100,000 cfu/ml Klebsiella oxytoca 12/02/2016                 Portions of the record may have been created with voice recognition software  Occasional wrong word or "sound a like" substitutions may have occurred due to the inherent limitations of voice recognition software  Read the chart carefully and recognize, using context, where substitutions have occurred  If you have any questions, please contact the dictating provider

## 2021-08-31 NOTE — TREATMENT PLAN
Sodium trended up to 150 meq/L   Renal function worsening to cr 3 5    Plan:  Increased D5w to 100 ml/hr    Monitor am sodium level and adjust as necessary

## 2021-08-31 NOTE — PROGRESS NOTES
Cardiology   MD Esperanza Casarez, MD Erica Iqbal DO, Srinivasan Conley DO, Charles Garcia DO, [facility] - [place]  -------------------------------------------------------------------  [facility]  [address] [place] [address] Rd  [place], [place] [address]  [place]  [phone]        Progress Note - Cardiology   Cyndi Bronson 78 y o  male MRN: [id number]  Unit/Bed#: [address] 216-01 Encounter: [id number]        Assessment/Recommendations/Discussion:   1  Paroxysmal atrial fibrillation  2  Moderate aortic stenosis  3  Sepsis syndrome  4  Multifocal pneumonia  5  Hypertension  6  Acute kidney injury on chronic kidney disease  7  Fever and persistent leukocytosis  8  Diminished left dorsalis pedis pulse with left cold foot        · Place on telemetry after amiodarone initiated yesterday for paroxysmal atrial fibrillation and flutter  Eliquis initiated  · Diuretics on hold in light of progressive worsening azotemia, with BUN now 196 mg/dL  Appreciate nephrologist support  Continues to hold and follow renal function   · Continue hydralazine, isosorbide, metoprolol for cardiomyopathy--unable to take p o   Meds this morning  · Continue pravastatin for dyslipidemia        Subjective:  Patient seen and examined, resting comfortably          Physical Exam:  GEN:  NAD  HEENT:  MMM, NCAT, pink conjunctiva, EOMI, nonicteric sclera  CV:  NO JVD/HJR, RR, NO M/R/G, +S1/S2, NO PARASTERNAL HEAVE/THRILL, NO LE EDEMA, NO HEPATIC SYSTOLIC PULSATION, WARM EXTREMITIES  RESP:  CTAB/L  ABD:  SOFT, NT, NO GROSS ORGANOMEGALY        Vitals:   /66   Pulse 105   Temp (!) 97 2 °F (36 2 °C) (Axillary)   Resp (!) 26   Ht 5' 4" (1 626 m)   Wt 73 2 kg (161 lb 6 oz)   SpO2 96%   BMI 27 70 kg/m²   Vitals:    08/30/21 0600 08/31/21 0600   Weight: 76 kg (167 lb 8 8 oz) 73 2 kg (161 lb 6 oz)       Intake/Output Summary (Last 24 hours) at 8/31/2021 1128  Last data filed at 8/31/2021 0830  Gross per 24 hour   Intake 1162 52 ml   Output 525 ml   Net 637 52 ml       TELEMETRY:  Off telemetry  Lab Results:  Results from last 7 days   Lab Units 08/30/21  0512   WBC Thousand/uL 11 21*   HEMOGLOBIN g/dL 9 8*   HEMATOCRIT % 33 7*   PLATELETS Thousands/uL 379     Results from last 7 days   Lab Units 08/31/21  0516   POTASSIUM mmol/L 3 6   CHLORIDE mmol/L 104   CO2 mmol/L 28   BUN mg/dL 196*   CREATININE mg/dL 2 94*   CALCIUM mg/dL 10 4*     Results from last 7 days   Lab Units 08/31/21  0516   POTASSIUM mmol/L 3 6   CHLORIDE mmol/L 104   CO2 mmol/L 28   BUN mg/dL 196*   CREATININE mg/dL 2 94*   CALCIUM mg/dL 10 4*           Medications:    Current Facility-Administered Medications:     Acetaminophen (TYLENOL) oral suspension 650 mg, 650 mg, Per NG Tube, Q6H PRN Max 4/day, ANATOLY Small, 650 mg at 08/30/21 1754    amiodarone tablet 200 mg, 200 mg, Oral, TID With Meals, 200 mg at 08/30/21 1621 **FOLLOWED BY** [START ON 9/6/2021] amiodarone tablet 200 mg, 200 mg, Oral, BID With Meals **FOLLOWED BY** [START ON 9/14/2021] amiodarone tablet 200 mg, 200 mg, Oral, Daily With Breakfast, ANATOLY Small    apixaban Kathey Brilliant) tablet 2 5 mg, 2 5 mg, Oral, BID, ANATOLY Small, 2 5 mg at 08/30/21 1830    dextrose 5 % infusion, 50 mL/hr, Intravenous, Continuous, Maryann Baldwin DO, Last Rate: 50 mL/hr at 08/31/21 0830, 50 mL/hr at 08/31/21 0830    hydrALAZINE (APRESOLINE) injection 10 mg, 10 mg, Intravenous, Q6H PRN, ANATOLY Zarate, 10 mg at 08/25/21 2025    hydrALAZINE (APRESOLINE) tablet 25 mg, 25 mg, Oral, Q8H [address], ANATOLY Small, 25 mg at 08/30/21 0502    isosorbide dinitrate (ISORDIL) tablet 10 mg, 10 mg, Oral, TID after meals, ANATOLY Zarate, 10 mg at 08/30/21 1830    metoprolol tartrate (LOPRESSOR) partial tablet 12 5 mg, 12 5 mg, Oral, Q12H [facility], ANATOLY Small    omeprazole (PRILOSEC) suspension 2 mg/mL, 20 mg, Oral, Daily, ANATOLY Small, 20 mg at 08/30/21 0930    potassium chloride (K-DUR,KLOR-CON) CR tablet 40 mEq, 40 mEq, Oral, Once, Ramona Sheets PA-C    pravastatin (PRAVACHOL) tablet 40 mg, 40 mg, Oral, Daily With ANATOLY Keyes, 40 mg at 08/30/21 1622    QUEtiapine (SEROquel) tablet 25 mg, 25 mg, Oral, HS, ANATOLY Small, 25 mg at 08/30/21 2200    senna-docusate sodium (SENOKOT S) 8 6-50 mg per tablet 2 tablet, 2 tablet, Oral, BID, ANATOLY Machado, 2 tablet at 08/30/21 1830    This note was completed in part utilizing M-Modal Fluency Direct Software  Grammatical errors, random word insertions, spelling mistakes, and incomplete sentences may be an occasional consequence of this system secondary to software limitations, ambient noise, and hardware issues  If you have any questions or concerns about the content, text, or information contained within the body of this dictation, please contact the provider for clarification

## 2021-08-31 NOTE — PROGRESS NOTES
[facility]  Progress Note Jeancarlos Julian 1940, 78 y o  male MRN: [id number]  Unit/Bed#: [address] [place] [address] 216-01 Encounter: [id number]  Primary Care Provider: Simon Rios DO   Date and time admitted to hospital: 8/19/2021  8:25 PM    * Acute respiratory failure with hypoxia [facility])  Assessment & Plan  · In the ED patient was noted to be hypoxic, in respiratory distress  Patient improved with lasix, bipap, morphine and ativan  · Admission Chest x-ray concerning for vascular congestion, multifocal pneumonia  · Patient was admitted to the critical care team, initially on BiPAP, and requiring intubation 8/21  · Patient was extubated 8/28 and transition to high-flow oxygen  · He was diuresed for his acute/chronic diastolic congestive heart failure exacerbation, and was given Lasix infusion  · He was diagnosed with multifocal pneumonia and completed a 5 day course of antibiotics  · He was noted to have dysphagia and was on a dental soft, nectar thick diet, followed by speech therapy  · Continue to titrate oxygen:  Currently 6 L mid flow    Atrial fibrillation ([address] )  Assessment & Plan  -patient was diagnosed with new onset atrial fibrillation with rapid ventricular response  -currently back in normal sinus rhythm  -appreciate cardiology evaluation recommendations  -patient was loaded on amiodarone 200 mg p o  t i d   -anticoagulation is indicated with chads Vasc score of 4:  Continue Eliquis 2 5 b i d   -fall precautions, continue PT for gait evaluation  -discontinue aspirin  -2D echocardiogram 8/27:  Left ventricular ejection fraction 52%    Probable moderate aortic stenosis and mild aortic regurgitation  -check TSH    CANDIE (acute kidney injury) ([address] )  Assessment & Plan  · Patient's previous creatinine appears to range 0 8-1 0  · He developed acute kidney injury during his hospitalization, while he was critically ill  · He was evaluated by the nephrology team:  His acute kidney injury is likely secondary to ATN from his infection, and hypotension  Also concern for underlying cardiorenal syndrome  · Patient was placed on a Lasix drip, since discontinued  · Creatinine has peaked at 2 94 today    Acute on chronic diastolic CHF (congestive heart failure) ([facility] )  Assessment & Plan  Wt Readings from Last 3 Encounters:   08/30/21 76 kg (167 lb 8 8 oz)   07/26/21 83 5 kg (184 lb 2 oz)   03/19/21 82 8 kg (182 lb 9 6 oz)     · POA:  With signs of volume overload, initial imaging chest x-ray concerning for vascular congestion consistent with CHF  · Most recent echo in 2018 showed grade 1 diastolic dysfunction  · Repeated echo 8/27 with left ventricular ejection fraction 50%  Mild-moderate hypokinesis of the mid-apical septal segment, grade 2 diastolic dysfunction  · Patient was placed on a Lasix infusion while in the ICU, since discontinued  · Appreciate cardiology/nephrology evaluation and fluid management        Multifocal pneumonia  Assessment & Plan  · Patient was admitted with multifocal pneumonia  · Chest x-ray 8/20:  Diffuse patchy airspace disease bilaterally concerning multifocal infiltrates  · Diagnosed with CAP versus aspiration  · Patient completed 5 day course of antibiotics under the guidance of the pulmonary team  · Tested negative for COVID-19 x3  · Legionella and strep pneumo antigens negative  · Procalcitonin negative x2  · Blood cultures shows no growth  · Bronchial lavage culture also shows no growth    Negative AFB and pneumocystis  · Continue monitor fever curve, WBCs off antibiotics        PAD (peripheral artery disease) ([facility] )  Assessment & Plan  -patient noted to have a dusky left foot  -LEADS performed  -vascular surgery consulted  -cont eliquis    Elevated troponin level  Assessment & Plan  · Abnormal troponin - to consider type II spill due to CHF and pneumonia    Hypochromic microcytic anemia  Assessment & Plan  · Monitor hemoglobin  · The iron replacement    Hypokalemia  Assessment & Plan  Repleted and normalized    Mild cognitive impairment  Assessment & Plan  · Delirium precautions    Aortic stenosis, moderate  Assessment & Plan  · Patient with moderate aortic stenosis  · Continue outpatient cardiology follow-up  Avoid volume depletion and hypotension    Essential hypertension  Assessment & Plan  · Patient's history of essential hypertension  · Blood pressure adequately controlled  · Continue hydralazine 25 mg q 8 hours, isosorbide 10 mg t i d , metoprolol 12 5 mg q 12 hours  · Continue to monitor and titrate as needed    Sepsis (HCC)-resolved as of 8/30/2021  Assessment & Plan  -patient was diagnosed with sepsis secondary to multifocal pneumonia  -he was treated with antibiotics and improved            Family:  Called Arvella Dandy, significant other and LM to call my cell phone  Called son Mary Ramachandran- spoke w his wife, he is on his way here  Will update at bedside    VTE Pharmacologic Prophylaxis: RX contraindicated due to: Eliquis  VTE Mechanical Prophylaxis: sequential compression device        Certification Statement: The patient will continue to require additional inpatient hospital stay due to need for further acute intervention for acute kidney injury, hypoxia    Status: inpatient     Total time spent today including interview, exam, review of ICU charts, radiology reports, lab results:  50 minutes    ===================================================================    Subjective:  Patient opens eyes to voice but then quickly falls back asleep  He indicates he is having difficulty breathing  He denies any pain anywhere  He does not communicate any other responses  Physical Exam:   Temp:  [97 2 °F (36 2 °C)-97 8 °F (36 6 °C)] 97 2 °F (36 2 °C)  HR:  [] 109  Resp:  [17-36] 17  BP: ()/(32-67) 135/66    Gen:  Lying with head of the bed at 80°  Appears tachypneic  Lethargic    Opens eyes briefly to voice but falls back asleep  Cooperates with exam  Heart: regular rate and rhythm, S1S2 present, no murmur, rub or gallop  Lungs:  Coarse breath sounds bilaterally  Poor air movement  No current wheezes, crackles, rhonchi    No accessory muscle use or respiratory distress  Abd: soft, non-tender, non-distended  NABS, no guarding, rebound or peritoneal signs  Extremities: no clubbing, cyanosis or edema  1+pedal pulses bilaterally  Left foot warm  No cyanosis  No pallor  Neuro:  Lethargic  Opens eyes to voice  Cooperates with exam   Quickly falls back asleep     Skin: warm and dry: no petechiae, purpura and rash  LABS:   Results from last 7 days   Lab Units 08/30/21  0512 08/29/21  0509 08/28/21  0456   WBC Thousand/uL 11 21* 13 66* 14 02*   HEMOGLOBIN g/dL 9 8* 9 4* 9 7*   HEMATOCRIT % 33 7* 32 1* 33 7*   PLATELETS Thousands/uL 379 355 334     Results from last 7 days   Lab Units 08/31/21  0516 08/30/21  1845 08/30/21  1333   POTASSIUM mmol/L 3 6 3 2* 2 8*   CHLORIDE mmol/L 104 100 99*   CO2 mmol/L 28 33* 34*   BUN mg/dL 196* 191* 180*   CREATININE mg/dL 2 94* 2 34* 2 20*   CALCIUM mg/dL 10 4* 10 6* 10 7*       Hospital Data:  8/30 LEADS  RIGHT LOWER LIMB  Ankle/Brachial Index: 1 40  which is in the unreliable range  PPG/PVR Tracings are normal   Triphasic waveforms at the ankle  Metatarsal Pressure 166 mmHg  Great Toe Pressure: 146 mmHg, within the healing range  LEFT LOWER LIMB  Ankle/Brachial Index: 1 38 which is in the normal range  PPG/PVR Tracings are normal   Triphasic waveforms at the ankle  Metatarsal Pressure 117 mmHg  Great Toe Pressure: 110 mmHg, within the healing range  8/29 chest x-ray  Stable patchy bilateral airspace disease question related to pulmonary edema or infiltrate    8/27 chest x-ray  No significant change in bilateral airspace opacities  Stable life-support tubes    8/26 chest x-ray  Increasing right peripheral consolidation with improving aeration at the lung bases      8/25 chest x-ray  Persistent bilateral infiltrates suspicious for multifocal pneumonia  Typical endotracheal tube and enteric tube  Increased gastric distention    8/23 chest x-ray  Slight improvement in bilateral parenchymal infiltrative changes  ET tube now at the brent    8/21 chest x-ray  Worsening bilateral parenchymal changes  Endotracheal tube in the right main bronchus to be pulled back  If this has been pulled back a repeat x-ray for confirmation as indicated clinically  8/19 chest x-ray  Diffuse patchy airspace disease bilaterally  Consider CHF versus multifocal infiltrate    8/27 echocardiogram  LEFT VENTRICLE:  Systolic function was at the lower limits of normal  Ejection fraction was estimated to be 52 %  This study was inadequate for the evaluation of regional wall motion  Wall thickness was moderately increased    8/20 echocardiogram  LEFT VENTRICLE:  Systolic function was at the lower limits of normal  Ejection fraction was estimated to be 50 %  There was mild to moderate hypokinesis of the mid-apical septal myocardial wall segments  Wall thickness was mildly increased  Features were likely suggestive of a pseudonormal left ventricular filling pattern, with concomitant abnormal relaxation and increased filling pressure (grade 2 diastolic dysfunction)  Moderate aortic stenosis    Mild mitral regurgitation    Microbiology  8/28 sputum culture:  Pending  8/26 COVID:  Negative  8/22 bronchoscopy/BAL:  No growth  8/22 bronchial culture:  No growth  8/22 bronchoscopy viral culture:  No growth  8/22 bronchoscopy viral culture:  No results  8/22 BAL culture:  No growth  8/22 BAL pneumocystis:  Negative  8/22 BAL AFB:  Negative  8/20 COVID:  Negative  8/20-Legionella/strep pneumoniae  8/19 blood culture:  Negative x2  8/19 COVID:  Negative  ---------------------------------------------------------------------------------------------------------------  This note has been constructed using a voice recognition system

## 2021-08-31 NOTE — ASSESSMENT & PLAN NOTE
-patient was diagnosed with new onset atrial fibrillation with rapid ventricular response  -currently back in normal sinus rhythm  -appreciate cardiology evaluation recommendations  -patient was loaded on amiodarone 200 mg p o  t i d   -anticoagulation is indicated with chads Vasc score of 4:  Continue Eliquis 2 5 b i d   -fall precautions, continue PT for gait evaluation  -discontinue aspirin  -2D echocardiogram 8/27:  Left ventricular ejection fraction 52%    Probable moderate aortic stenosis and mild aortic regurgitation  -check TSH

## 2021-08-31 NOTE — SPEECH THERAPY NOTE
Speech Language/Pathology    Speech/Language Pathology Progress Note    Patient Name: Ever Wise  [id number] Date: 8/31/2021     Problem List  Principal Problem:    Acute respiratory failure with hypoxia [facility])  Active Problems:    Essential hypertension    Aortic stenosis, moderate    Mild cognitive impairment    Multifocal pneumonia    Acute on chronic diastolic CHF (congestive heart failure) (HCC)    CANDIE (acute kidney injury) ([facility] )    Atrial fibrillation (HCC)    Elevated troponin level    Hypermagnesemia    Hypernatremia    PAD (peripheral artery disease) ([facility] )       Past Medical History  Past Medical History:   Diagnosis Date    Actinic keratosis     LAST ASSESSED: [id number]    Allergic rhinitis     LAST ASSESSED: [id number]    Anxiety     LAST ASSESSED: 51MMP8868    Anxiety disorder     LAST ASSESSED: 86FVD7338    Atelectasis of right lung     ABNORMAL CT SCAN OF [address] 12/2/2016 REPEAT NEEDED PER RADIOLOGY IN 3 MONTHS LAST ASSESSED: [id number]    Atypical chest pain     LAST ASSESSED: [id number]    Benign paroxysmal vertigo     LAST ASSESSED: 09RWQ9031    Chronic cough     LAST ASSESSED: [id number]    Chronic GERD     Degenerative arthritis of knee, bilateral     LAST ASSESSED: 99HOP6495    Diverticulitis of colon     LAST ASSESSED: 20AIT0429    Diverticulosis     LAST ASSESSED: 89WIM4757    Do not resuscitate status 1/25/2021    Foreign body, eye     LAST ASSESSED: 01MHB1879    Functional gait abnormality     LAST ASSESSED: 34UOA8002    Hyperlipidemia     Hypertension     Hyponatremia     LAST ASSESSED: 39QKM1294    Lactic acidosis 8/19/2021    Leukocytosis     LAST ASSESSED: 08OLP3895    Multifocal pneumonia 8/19/2021    Murmur     Newly recognized murmur     LAST ASSESSED: 68EDP0460    Olecranon bursitis, unspecified elbow     Presence of indwelling urethral catheter     RESOLVED: [id number]    Transient cerebral ischemia     LAST ASSESSED: 55LEC6836    Urinary incontinence     LAST ASSESSED: 00BSN5339    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     LAST ASSESSED: 58ORT5051        Past Surgical History  Past Surgical History:   Procedure Laterality Date    ADENOIDECTOMY      APPENDECTOMY      COLONOSCOPY  10/2006    FIBEROPTIC    INGUINAL HERNIA REPAIR      JOINT REPLACEMENT      b/l TKR Sept 2015    SINUS SURGERY      TONSILLECTOMY           Subjective:  Pt seen for dysphagia tx  Pt sitting upright in bed, but leaning his head and body to the R; pt not following commands to move head to midline, does not stay in midline when moved, continues leaning to R side  Pt's son in room  Objective:  Reviewed chart and discussed with RN  Pt apparently ate well yesterday but today has reduced TAM and has been c/o difficulty breathing  He pocketed pudding this morning, so feeding was stopped by staff  His sats are in the upper 90s, on O2 by NC  Pt's son in room, and confirms no difficulty eating prior to last week when pt became ill  Pt was assessed by ST on 8/29, when a pureed diet and NTL were recommended, but a dysphagia 3 diet and thin liquid order was placed in chart  Today, pt will open his eyes to verbal stimulation, but stares straight ahead  He makes no attempt to communicate, does not follow any commands  RR is increased  Pt opened his mouth to take 2 ice chips, one at a time  Pt bit the spoon but able to retrieve the ice chip  He did manipulate the ice chip, but no pharyngeal swallow triggered  Pt was not given anything more PO, discussed with son, RN, and physician  Assessment:  Pt with poor TAM today  No pharyngeal swallows triggered with ice chips  RR is increased  He is not currently safe for PO intake, risk for aspiration is high  Plan/Recommendations:  Recommend NPO for now, due to poor TAM  ST to reassess tomorrow

## 2021-08-31 NOTE — QUICK NOTE
Updated son Sonia Ruff and significant other [facility] Masters at bedside    Reviewed test results and tx plan

## 2021-08-31 NOTE — ASSESSMENT & PLAN NOTE
-patient noted to have a dusky left foot  -LEADS performed  -vascular surgery consulted  -cont eliquis

## 2021-08-31 NOTE — QUICK NOTE
QUICK NOTE - Deterioration Index  Kelton John 78 y o  male MRN: [id number]  Unit/Bed#: [place] [address] [place] [address] 216-01 Encounter: [id number]    Date Paged: 21  Time Paged: Cedrick Banks 29  Room #: 856  Arrival Time: 7893  Deterioration index score at time of page: 61 4  %  Spoke with Gricel Meléndez PA-C via TT from primary team  Need to escalate level of care: no     PROBLEMS resulting in high DI score: Factor Value    21% Age 81   18% Supplemental oxygen Mid flow nasal cannula   13% Respiratory rate 22   11% Sodium 146 mmol/L   11% Thony coma scale 14   8% Pulse 113   6% Cardiac rhythm Sinus tachycardia    Factor Value   4% WBC count 11 21 Thousand/uL   3% Systolic 066   2% Potassium 3 2 mmol/L   2%  mg/dL   1% Hematocrit 33 7 %   <1% Pulse oximetry 98 %   <1% Temperature 97 2 °F (36 2 °C)         Factors Not Contributing to Score            PLAN:     Pt on 6 L Midflow   No acute distress   MS unchanged   Spoke with RN at bedside    Please contact critical care via [employer] with any questions or concerns       Vitals:   Vitals:    21 2115 21 2125 21 2335 21 0606   BP: (!) 96/47 (!) 96/47 103/56 140/67   BP Location:       Pulse: 104  101    Resp: (!) 36  22    Temp:   (!) 97 2 °F (36 2 °C)    TempSrc:       SpO2: 93%  92%    Weight:       Height:           Respiratory:  SpO2: SpO2: 92 %, SpO2 Activity: SpO2 Activity: At Rest, SpO2 Device: O2 Device: Mid flow nasal cannula       Temperature: Temp (24hrs), Av 7 °F (37 1 °C), Min:97 2 °F (36 2 °C), Max:100 °F (37 8 °C)  Current: Temperature: (!) 97 2 °F (36 2 °C)    Labs:   Results from last 7 days   Lab Units 21  0512 21  0509 21  0456 21  0447   WBC Thousand/uL 11 21* 13 66* 14 02* 24 65*   HEMOGLOBIN g/dL 9 8* 9 4* 9 7* 9 3*   HEMATOCRIT % 33 7* 32 1* 33 7* 33 4*   PLATELETS Thousands/uL 379 355 334 296   MONO PCT % 11 14*  --  9     Results from last 7 days   Lab Units 21  1845 21  1333 08/30/21  0512   SODIUM mmol/L 146* 145 147*   POTASSIUM mmol/L 3 2* 2 8* 3 0*   CHLORIDE mmol/L 100 99* 100   CO2 mmol/L 33* 34* 34*   BUN mg/dL 191* 180* 165*   CREATININE mg/dL 2 34* 2 20* 1 99*   CALCIUM mg/dL 10 6* 10 7* 10 3*     Results from last 7 days   Lab Units 08/30/21  1333 08/26/21  2003   MAGNESIUM mg/dL 3 8* 3 0*             Results from last 7 days   Lab Units 08/27/21  0433 08/26/21  0826   PROCALCITONIN ng/ml 0 77* 0 70*       Code Status: Level 1 - Full Code

## 2021-09-01 ENCOUNTER — APPOINTMENT (INPATIENT)
Dept: RADIOLOGY | Facility: HOSPITAL | Age: 81
DRG: 870 | End: 2021-09-01
Attending: RADIOLOGY
Payer: MEDICARE

## 2021-09-01 ENCOUNTER — APPOINTMENT (INPATIENT)
Dept: CT IMAGING | Facility: HOSPITAL | Age: 81
DRG: 870 | End: 2021-09-01
Payer: MEDICARE

## 2021-09-01 ENCOUNTER — APPOINTMENT (INPATIENT)
Dept: RADIOLOGY | Facility: HOSPITAL | Age: 81
DRG: 870 | End: 2021-09-01
Payer: MEDICARE

## 2021-09-01 ENCOUNTER — APPOINTMENT (INPATIENT)
Dept: DIALYSIS | Facility: HOSPITAL | Age: 81
DRG: 870 | End: 2021-09-01
Payer: MEDICARE

## 2021-09-01 PROBLEM — D72.829 LEUKOCYTOSIS: Status: ACTIVE | Noted: 2021-09-01

## 2021-09-01 LAB
ALBUMIN SERPL BCP-MCNC: 3.9 G/DL (ref 3.5–5)
ALP SERPL-CCNC: 90 U/L (ref 46–116)
ALT SERPL W P-5'-P-CCNC: 61 U/L (ref 12–78)
ANION GAP SERPL CALCULATED.3IONS-SCNC: 17 MMOL/L (ref 4–13)
ANION GAP SERPL CALCULATED.3IONS-SCNC: 21 MMOL/L (ref 4–13)
AST SERPL W P-5'-P-CCNC: 43 U/L (ref 5–45)
BASOPHILS # BLD AUTO: 0.25 THOUSANDS/ΜL (ref 0–0.1)
BASOPHILS NFR BLD AUTO: 1 % (ref 0–1)
BILIRUB SERPL-MCNC: 0.52 MG/DL (ref 0.2–1)
BILIRUB UR QL STRIP: NEGATIVE
BUN SERPL-MCNC: 223 MG/DL (ref 5–25)
BUN SERPL-MCNC: 232 MG/DL (ref 5–25)
CALCIUM SERPL-MCNC: 10.1 MG/DL (ref 8.3–10.1)
CALCIUM SERPL-MCNC: 10.2 MG/DL (ref 8.3–10.1)
CHLORIDE SERPL-SCNC: 101 MMOL/L (ref 100–108)
CHLORIDE SERPL-SCNC: 101 MMOL/L (ref 100–108)
CLARITY UR: CLEAR
CO2 SERPL-SCNC: 25 MMOL/L (ref 21–32)
CO2 SERPL-SCNC: 27 MMOL/L (ref 21–32)
COLOR UR: YELLOW
CREAT SERPL-MCNC: 3.68 MG/DL (ref 0.6–1.3)
CREAT SERPL-MCNC: 3.84 MG/DL (ref 0.6–1.3)
EOSINOPHIL # BLD AUTO: 0.28 THOUSAND/ΜL (ref 0–0.61)
EOSINOPHIL NFR BLD AUTO: 1 % (ref 0–6)
ERYTHROCYTE [DISTWIDTH] IN BLOOD BY AUTOMATED COUNT: 25.2 % (ref 11.6–15.1)
GFR SERPL CREATININE-BSD FRML MDRD: 14 ML/MIN/1.73SQ M
GFR SERPL CREATININE-BSD FRML MDRD: 14 ML/MIN/1.73SQ M
GLUCOSE SERPL-MCNC: 164 MG/DL (ref 65–140)
GLUCOSE SERPL-MCNC: 175 MG/DL (ref 65–140)
GLUCOSE SERPL-MCNC: 208 MG/DL (ref 65–140)
GLUCOSE UR STRIP-MCNC: NEGATIVE MG/DL
HCT VFR BLD AUTO: 37.9 % (ref 36.5–49.3)
HGB BLD-MCNC: 10.5 G/DL (ref 12–17)
HGB UR QL STRIP.AUTO: NEGATIVE
IMM GRANULOCYTES # BLD AUTO: >0.5 THOUSAND/UL (ref 0–0.2)
IMM GRANULOCYTES NFR BLD AUTO: 3 % (ref 0–2)
KETONES UR STRIP-MCNC: NEGATIVE MG/DL
LEUKOCYTE ESTERASE UR QL STRIP: NEGATIVE
LYMPHOCYTES # BLD AUTO: 1.57 THOUSANDS/ΜL (ref 0.6–4.47)
LYMPHOCYTES NFR BLD AUTO: 8 % (ref 14–44)
MCH RBC QN AUTO: 22.8 PG (ref 26.8–34.3)
MCHC RBC AUTO-ENTMCNC: 27.7 G/DL (ref 31.4–37.4)
MCV RBC AUTO: 82 FL (ref 82–98)
MONOCYTES # BLD AUTO: 2.29 THOUSAND/ΜL (ref 0.17–1.22)
MONOCYTES NFR BLD AUTO: 11 % (ref 4–12)
NEUTROPHILS # BLD AUTO: 15.19 THOUSANDS/ΜL (ref 1.85–7.62)
NEUTS SEG NFR BLD AUTO: 76 % (ref 43–75)
NITRITE UR QL STRIP: NEGATIVE
NRBC BLD AUTO-RTO: 0 /100 WBCS
PH UR STRIP.AUTO: 5 [PH]
PLATELET # BLD AUTO: 480 THOUSANDS/UL (ref 149–390)
PMV BLD AUTO: 11.1 FL (ref 8.9–12.7)
POTASSIUM SERPL-SCNC: 3.4 MMOL/L (ref 3.5–5.3)
POTASSIUM SERPL-SCNC: 3.5 MMOL/L (ref 3.5–5.3)
PROCALCITONIN SERPL-MCNC: 0.17 NG/ML
PROT SERPL-MCNC: 8.4 G/DL (ref 6.4–8.2)
PROT UR STRIP-MCNC: NEGATIVE MG/DL
RBC # BLD AUTO: 4.61 MILLION/UL (ref 3.88–5.62)
SODIUM SERPL-SCNC: 145 MMOL/L (ref 136–145)
SODIUM SERPL-SCNC: 147 MMOL/L (ref 136–145)
SP GR UR STRIP.AUTO: 1.02 (ref 1–1.03)
TSH SERPL DL<=0.05 MIU/L-ACNC: 0.83 UIU/ML (ref 0.36–3.74)
UROBILINOGEN UR QL STRIP.AUTO: 0.2 E.U./DL
WBC # BLD AUTO: 20.2 THOUSAND/UL (ref 4.31–10.16)

## 2021-09-01 PROCEDURE — 5A1D70Z PERFORMANCE OF URINARY FILTRATION, INTERMITTENT, LESS THAN 6 HOURS PER DAY: ICD-10-PCS | Performed by: INTERNAL MEDICINE

## 2021-09-01 PROCEDURE — 82948 REAGENT STRIP/BLOOD GLUCOSE: CPT

## 2021-09-01 PROCEDURE — 80048 BASIC METABOLIC PNL TOTAL CA: CPT | Performed by: PHYSICIAN ASSISTANT

## 2021-09-01 PROCEDURE — 76937 US GUIDE VASCULAR ACCESS: CPT

## 2021-09-01 PROCEDURE — 71250 CT THORAX DX C-: CPT

## 2021-09-01 PROCEDURE — 84443 ASSAY THYROID STIM HORMONE: CPT | Performed by: INTERNAL MEDICINE

## 2021-09-01 PROCEDURE — 87040 BLOOD CULTURE FOR BACTERIA: CPT | Performed by: INTERNAL MEDICINE

## 2021-09-01 PROCEDURE — 71045 X-RAY EXAM CHEST 1 VIEW: CPT

## 2021-09-01 PROCEDURE — 97530 THERAPEUTIC ACTIVITIES: CPT

## 2021-09-01 PROCEDURE — 86803 HEPATITIS C AB TEST: CPT | Performed by: PHYSICIAN ASSISTANT

## 2021-09-01 PROCEDURE — 85027 COMPLETE CBC AUTOMATED: CPT | Performed by: INTERNAL MEDICINE

## 2021-09-01 PROCEDURE — 92526 ORAL FUNCTION THERAPY: CPT

## 2021-09-01 PROCEDURE — 84145 PROCALCITONIN (PCT): CPT | Performed by: INTERNAL MEDICINE

## 2021-09-01 PROCEDURE — 97535 SELF CARE MNGMENT TRAINING: CPT

## 2021-09-01 PROCEDURE — G1004 CDSM NDSC: HCPCS

## 2021-09-01 PROCEDURE — 76937 US GUIDE VASCULAR ACCESS: CPT | Performed by: RADIOLOGY

## 2021-09-01 PROCEDURE — 87340 HEPATITIS B SURFACE AG IA: CPT | Performed by: PHYSICIAN ASSISTANT

## 2021-09-01 PROCEDURE — 77001 FLUOROGUIDE FOR VEIN DEVICE: CPT | Performed by: RADIOLOGY

## 2021-09-01 PROCEDURE — 36556 INSERT NON-TUNNEL CV CATH: CPT | Performed by: RADIOLOGY

## 2021-09-01 PROCEDURE — C1752 CATH,HEMODIALYSIS,SHORT-TERM: HCPCS

## 2021-09-01 PROCEDURE — 99233 SBSQ HOSP IP/OBS HIGH 50: CPT | Performed by: INTERNAL MEDICINE

## 2021-09-01 PROCEDURE — 86704 HEP B CORE ANTIBODY TOTAL: CPT | Performed by: PHYSICIAN ASSISTANT

## 2021-09-01 PROCEDURE — 74176 CT ABD & PELVIS W/O CONTRAST: CPT

## 2021-09-01 PROCEDURE — 81003 URINALYSIS AUTO W/O SCOPE: CPT | Performed by: INTERNAL MEDICINE

## 2021-09-01 PROCEDURE — 80053 COMPREHEN METABOLIC PANEL: CPT | Performed by: INTERNAL MEDICINE

## 2021-09-01 PROCEDURE — 86705 HEP B CORE ANTIBODY IGM: CPT | Performed by: PHYSICIAN ASSISTANT

## 2021-09-01 PROCEDURE — 90935 HEMODIALYSIS ONE EVALUATION: CPT | Performed by: INTERNAL MEDICINE

## 2021-09-01 PROCEDURE — 86706 HEP B SURFACE ANTIBODY: CPT | Performed by: PHYSICIAN ASSISTANT

## 2021-09-01 PROCEDURE — 99232 SBSQ HOSP IP/OBS MODERATE 35: CPT | Performed by: INTERNAL MEDICINE

## 2021-09-01 PROCEDURE — 02HV33Z INSERTION OF INFUSION DEVICE INTO SUPERIOR VENA CAVA, PERCUTANEOUS APPROACH: ICD-10-PCS | Performed by: INTERNAL MEDICINE

## 2021-09-01 PROCEDURE — C1894 INTRO/SHEATH, NON-LASER: HCPCS

## 2021-09-01 RX ORDER — POTASSIUM CHLORIDE 20 MEQ/1
20 TABLET, EXTENDED RELEASE ORAL ONCE
Status: COMPLETED | OUTPATIENT
Start: 2021-09-01 | End: 2021-09-01

## 2021-09-01 RX ADMIN — DOCUSATE SODIUM AND SENNOSIDES 2 TABLET: 8.6; 5 TABLET, FILM COATED ORAL at 08:33

## 2021-09-01 RX ADMIN — APIXABAN 2.5 MG: 2.5 TABLET, FILM COATED ORAL at 08:33

## 2021-09-01 RX ADMIN — DEXTROSE 100 ML/HR: 5 SOLUTION INTRAVENOUS at 03:05

## 2021-09-01 RX ADMIN — Medication 20 MG: at 08:35

## 2021-09-01 RX ADMIN — Medication 12.5 MG: at 08:33

## 2021-09-01 RX ADMIN — AMIODARONE HYDROCHLORIDE 200 MG: 200 TABLET ORAL at 12:20

## 2021-09-01 RX ADMIN — METRONIDAZOLE 500 MG: 500 INJECTION, SOLUTION INTRAVENOUS at 08:33

## 2021-09-01 RX ADMIN — METRONIDAZOLE 500 MG: 500 INJECTION, SOLUTION INTRAVENOUS at 23:25

## 2021-09-01 RX ADMIN — QUETIAPINE FUMARATE 25 MG: 25 TABLET ORAL at 21:28

## 2021-09-01 RX ADMIN — CEFEPIME HYDROCHLORIDE 1000 MG: 1 INJECTION, POWDER, FOR SOLUTION INTRAMUSCULAR; INTRAVENOUS at 10:34

## 2021-09-01 RX ADMIN — CEFEPIME HYDROCHLORIDE 1000 MG: 1 INJECTION, POWDER, FOR SOLUTION INTRAMUSCULAR; INTRAVENOUS at 20:43

## 2021-09-01 RX ADMIN — AMIODARONE HYDROCHLORIDE 200 MG: 200 TABLET ORAL at 17:37

## 2021-09-01 RX ADMIN — APIXABAN 2.5 MG: 2.5 TABLET, FILM COATED ORAL at 17:37

## 2021-09-01 RX ADMIN — POTASSIUM CHLORIDE 20 MEQ: 1500 TABLET, EXTENDED RELEASE ORAL at 10:34

## 2021-09-01 RX ADMIN — PRAVASTATIN SODIUM 40 MG: 40 TABLET ORAL at 17:37

## 2021-09-01 RX ADMIN — METRONIDAZOLE 500 MG: 500 INJECTION, SOLUTION INTRAVENOUS at 17:37

## 2021-09-01 RX ADMIN — AMIODARONE HYDROCHLORIDE 200 MG: 200 TABLET ORAL at 08:33

## 2021-09-01 NOTE — ASSESSMENT & PLAN NOTE
· Patient was admitted with multifocal pneumonia  · Chest x-ray 8/20:  Diffuse patchy airspace disease bilaterally concerning multifocal infiltrates  · Diagnosed with CAP versus aspiration  · Patient completed 5 day course of antibiotics under the guidance of the pulmonary team  · Tested negative for COVID-19 x3  · Legionella and strep pneumo antigens negative  · Blood cultures shows no growth  · Bronchial lavage culture also shows no growth    Negative AFB and pneumocystis  · Sputum culture with coag-negative staph and Candida, likely colonization  · Continue monitor fever curve, WBCs off antibiotics  · Leukocytosis had improved daily and on 08/31 was 11  · Leukocytosis drastically worsened 9/1 up to 20, no steroids given:   · Repeat CT scan chest/abdomen/pelvis, repeat procalcitonin  · Patient at risk for recurrent pneumonia due to lethargy and dysphagia:  Restart cefepime/Flagyl  · Check pancultures

## 2021-09-01 NOTE — ASSESSMENT & PLAN NOTE
Patient with hypernatremia secondary to free water deficit and dehydration  Slightly improved with IV fluids

## 2021-09-01 NOTE — ASSESSMENT & PLAN NOTE
· Patient's history of essential hypertension  · Blood pressure adequately controlled  · Currently prescribed hydralazine 25 mg q 8 hours, isosorbide 10 mg t i d , metoprolol 12 5 mg q 12 hours  · Discussed with RN:  Will hold antihypertensives for the rest of today to avoid hypotension with planned dialysis

## 2021-09-01 NOTE — SPEECH THERAPY NOTE
Speech Language/Pathology    Speech/Language Pathology Progress Note    Patient Name: Eddie NATHAN Date: 9/1/2021     Problem List  Principal Problem:    Acute respiratory failure with hypoxia ([facility] )  Active Problems:    Essential hypertension    Aortic stenosis, moderate    Mild cognitive impairment    Multifocal pneumonia    Acute on chronic diastolic CHF (congestive heart failure) (HCC)    CANDIE (acute kidney injury) ([facility] )    Atrial fibrillation (HCC)    Elevated troponin level    Hypermagnesemia    Hypernatremia    PAD (peripheral artery disease) ([facility] )       Past Medical History  Past Medical History:   Diagnosis Date    Actinic keratosis     LAST ASSESSED: [id number]    Allergic rhinitis     LAST ASSESSED: [id number]    Anxiety     LAST ASSESSED: 15GRQ7761    Anxiety disorder     LAST ASSESSED: 70LZK0662    Atelectasis of right lung     ABNORMAL CT SCAN OF [address] 12/2/2016 REPEAT NEEDED PER RADIOLOGY IN 3 MONTHS LAST ASSESSED: [id number]    Atypical chest pain     LAST ASSESSED: 36QAS4286    Benign paroxysmal vertigo     LAST ASSESSED: 61ZKI5066    Chronic cough     LAST ASSESSED: 85PGM1627    Chronic GERD     Degenerative arthritis of knee, bilateral     LAST ASSESSED: 23LYK5043    Diverticulitis of colon     LAST ASSESSED: 21THQ9480    Diverticulosis     LAST ASSESSED: 38KOF8960    Do not resuscitate status 1/25/2021    Foreign body, eye     LAST ASSESSED: [id number]    Functional gait abnormality     LAST ASSESSED: 06SOJ7138    Hyperlipidemia     Hypertension     Hyponatremia     LAST ASSESSED: 06CVH0023    Lactic acidosis 8/19/2021    Leukocytosis     LAST ASSESSED: 22NLX2773    Multifocal pneumonia 8/19/2021    Murmur     Newly recognized murmur     LAST ASSESSED: 48VKL6675    Olecranon bursitis, unspecified elbow     Presence of indwelling urethral catheter     RESOLVED: [id number]    Transient cerebral ischemia     LAST ASSESSED: 64GKZ4661    Urinary incontinence     LAST ASSESSED: 91TTS8473    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     LAST ASSESSED: 15KDL9482        Past Surgical History  Past Surgical History:   Procedure Laterality Date    ADENOIDECTOMY      APPENDECTOMY      COLONOSCOPY  10/2006    FIBEROPTIC    INGUINAL HERNIA REPAIR      JOINT REPLACEMENT      b/l TKR Sept 2015    SINUS SURGERY      TONSILLECTOMY           Subjective:  Pt more alert today  Noted placed on puree and nectar yesterday  Became more alert  Objective:  Pt seen for po tolerance and d additional recommendations  Voice was moderately/severly  Dysphonic to begin  Improved w/ moisture/po, mild/mod  Needs extra time to swallow  Transfer and swallow are moderately delayed at times  No obvious oral residue  Laryngeal rise appeared fair/adequate  Intermittent mild throat clearing  No cough or decline in o2 saturation  Nectar liquids tolerated same  Assessment:  Alert at this time  Confused  Echolalic  Mild/mod dysphonia  Appears to be tolerating puree and nectar thick at this time when fully alert  Needs to be fed  Plan/Recommendations:  Puree w/ nectar

## 2021-09-01 NOTE — ASSESSMENT & PLAN NOTE
-patient was diagnosed with sepsis secondary to multifocal pneumonia, admitted to the critical care team   -he was treated with antibiotics and improved

## 2021-09-01 NOTE — ASSESSMENT & PLAN NOTE
-patient initially had a leukocytosis, secondary to multifocal pneumonia  -it had progressively improved with his course of antibiotics  -8/31 white blood cell count had improved to 11  -9/1 white blood cell count accelerated to 20    Medications reviewed, patient has not received any steroids or other medications that would accelerate his white blood cell count  -patient with new abdominal discomfort on exam  -will evaluate source of leukocytosis  -a) pulmonary:  Patient with recent lethargy, and dysphagia, at risk for aspiration pneumonia  -restart empiric antibiotics with cefepime/Flagyl  -check blood cultures x2  -check CT scan chest  b) GI:  Patient complaining of abdominal pain  -check CT scan abdomen/pelvis  -check LFTs  -bowel regimen:  Bowel movement charted 8/31  c) patient with suprapubic tenderness:    -Check urinalysis  -urinary retention protocol:  -current bladder scan without significant retention  D) check blood cultures x2

## 2021-09-01 NOTE — PROGRESS NOTES
Progress Note - Cardiology   Avis Florez 78 y o  male MRN: [id number]  Unit/Bed#: [place] [address] -01 Encounter: [id number]          Assessment / Plan  Hypoxic respiratory failure requiring mechanical ventilation    - Likely due to multifocal pneumonia with possible component of congestive heart failure and/or aspiration contributing   - Intubated 8/21-extubated 8/28  Multifocal pneumonia   - Completed a 5 day course of antibiotics  Acute diastolic CHF   - Echo 0/42/5998:  EF 50% (mild-moderate hypokinesia mid-apical wall segment) with grade 2 diastolic dysfunction  CKD, CANDIE   - Baseline creatinine less than 1   - Signs of uremia requiring initiation of dialysis 9/1/2021   Paroxysmal atrial fibrillation and flutter   - Initiated on amiodarone 8/30/21   - Eliquis 2 5 mg b i d  Non MI troponin elevation   - Peak troponin 1 29  Moderate aortic stenosis (echo 8/20/2021:  Peak velocity 3 3 centimeters/second with gradient of 22 mm)    Potassium 3 4, , creatinine 3 68    · With worsening renal function and signs of uremia the patient has had a  catheter placed in IR with Nephrology plan to initiate dialysis today  · Patient is currently maintaining a sinus rhythm on amiodarone ~~> continue same with t i d  dosing through 9/8 with 200 mg daily thereafter  · Continue Eliquis anticoagulation - 2 5 mg b i d  · While the patient's abnormal troponin levels were likely related to non MI elevation he may have had some chest discomfort as part of his pre-hospital symptoms and is noted to have some wall motion abnormalities on his echocardiogram   That being said it may be reasonable at some point if the patient is to remain on dialysis to consider coronary angiography during this hospitalization ~~> this can be reassessed on a daily basis      Subjective:  Just back from Interventional Radiology  No complaint and admits overall comfort        Vitals:  Vitals:    09/01/21 0456 09/01/21 0600   Weight: 74 3 kg (163 lb 12 8 oz) 73 3 kg (161 lb 9 6 oz)   ,  Vitals:    09/01/21 0608 09/01/21 0717 09/01/21 0726 09/01/21 1044   BP: 124/63 111/55 113/56 108/53   BP Location:  Right arm     Pulse: 85 90 89 97   Resp: 22 22 20    Temp:  (!) 97 2 °F (36 2 °C)     TempSrc:  Axillary     SpO2: 90% 95% 95% (!) 89%   Weight:       Height:           Exam:  General:  Alert normally conversant and comfortable-appearing  Heart:  Regular with controlled rate with soft basilar BRENDAN  Lungs:  Few scattered coarse breath sounds and trace basilar rales  Lower Limbs:  No edema           Telemetry:       Sinus rhythm with ventricular rate     Medications:    Current Facility-Administered Medications:     Acetaminophen (TYLENOL) oral suspension 650 mg, 650 mg, Per NG Tube, Q6H PRN Max 4/day, ANATOLY Small, 650 mg at 08/30/21 8136    amiodarone tablet 200 mg, 200 mg, Oral, TID With Meals, 200 mg at 09/01/21 1220 **FOLLOWED BY** [START ON 9/6/2021] amiodarone tablet 200 mg, 200 mg, Oral, BID With Meals **FOLLOWED BY** [START ON 9/14/2021] amiodarone tablet 200 mg, 200 mg, Oral, Daily With Breakfast, ANATOLY Small    apixaban Donelda Sebastien) tablet 2 5 mg, 2 5 mg, Oral, BID, ANATOLY Small, 2 5 mg at 09/01/21 5123    cefepime (MAXIPIME) 1,000 mg in dextrose 5 % 50 mL IVPB, 1,000 mg, Intravenous, Q12H, Catalino Burnette MD, Last Rate: 100 mL/hr at 09/01/21 1034, 1,000 mg at 09/01/21 1034    dextrose 5 % infusion, 125 mL/hr, Intravenous, Continuous, Maryann Baldwin DO, Last Rate: 125 mL/hr at 09/01/21 0829, 125 mL/hr at 09/01/21 0829    hydrALAZINE (APRESOLINE) injection 10 mg, 10 mg, Intravenous, Q6H PRN, ANATOLY Villarreal, 10 mg at 08/25/21 2025    hydrALAZINE (APRESOLINE) tablet 25 mg, 25 mg, Oral, Q8H [address], [place] ANATOLY Espinosa, 25 mg at 08/30/21 0502    isosorbide dinitrate (ISORDIL) tablet 10 mg, 10 mg, Oral, TID after meals, ANATOLY Villarreal, 10 mg at 08/30/21 1830    metoprolol tartrate (LOPRESSOR) partial tablet 12 5 mg, 12 5 mg, Oral, Q12H [address], ANATOLY Small, 12 5 mg at 09/01/21 1213    metroNIDAZOLE (FLAGYL) IVPB (premix) 500 mg 100 mL, 500 mg, Intravenous, Q8H, Magnus Mosley MD, Last Rate: 200 mL/hr at 09/01/21 0833, 500 mg at 09/01/21 0833    omeprazole (PRILOSEC) suspension 2 mg/mL, 20 mg, Oral, Daily, ANATOLY Small, 20 mg at 09/01/21 0835    potassium chloride (K-DUR,KLOR-CON) CR tablet 40 mEq, 40 mEq, Oral, Once, Ramona Sheets PA-C    pravastatin (PRAVACHOL) tablet 40 mg, 40 mg, Oral, Daily With ANATOLY Rivera, 40 mg at 08/31/21 1611    QUEtiapine (SEROquel) tablet 25 mg, 25 mg, Oral, HS, ANATOLY Small, 25 mg at 08/31/21 2249    senna-docusate sodium (SENOKOT S) 8 6-50 mg per tablet 2 tablet, 2 tablet, Oral, BID, Tesfaye Kamara, CRNP, 2 tablet at 09/01/21 5201      Labs/Data:        Results from last 7 days   Lab Units 09/01/21  0450 08/30/21  0512 08/29/21  0509   WBC Thousand/uL 20 20* 11 21* 13 66*   HEMOGLOBIN g/dL 10 5* 9 8* 9 4*   HEMATOCRIT % 37 9 33 7* 32 1*   PLATELETS Thousands/uL 480* 379 355     Results from last 7 days   Lab Units 09/01/21  0450 08/31/21  1802 08/31/21  0516   POTASSIUM mmol/L 3 4* 3 5 3 6   CHLORIDE mmol/L 101 104 104   CO2 mmol/L 25 29 28   BUN mg/dL 223* 213* 196*

## 2021-09-01 NOTE — ASSESSMENT & PLAN NOTE
Wt Readings from Last 3 Encounters:   09/01/21 73 3 kg (161 lb 9 6 oz)   07/26/21 83 5 kg (184 lb 2 oz)   03/19/21 82 8 kg (182 lb 9 6 oz)     · POA:  With signs of volume overload, initial imaging chest x-ray concerning for vascular congestion consistent with CHF  · Most recent echo in 2018 showed grade 1 diastolic dysfunction  · Repeated echo 8/27 with left ventricular ejection fraction 50%    Mild-moderate hypokinesis of the mid-apical septal segment, grade 2 diastolic dysfunction  · Patient was placed on a Lasix infusion while in the ICU, since discontinued  · Appreciate cardiology/nephrology evaluation and fluid management  · Hypovolemic, IV fluids given

## 2021-09-01 NOTE — ASSESSMENT & PLAN NOTE
· Patient's previous creatinine appears to range 0 8-1 0  · He developed acute kidney injury during his hospitalization, while he was critically ill  · He was evaluated by the nephrology team:  His acute kidney injury is likely secondary to ATN from his infection, and hypotension  Also concern for underlying cardiorenal syndrome  · Patient was placed on a Lasix drip, since discontinued  · Creatinine has continue to worsen to 3 68, with elevated anion gap  · Bladder scan performed    No significant urinary retention  · Discussed with Nephrology:  Dialysis catheter planned today to initiate dialysis

## 2021-09-01 NOTE — TELEMEDICINE
e-Consult (IPC)  - Interventional Radiology  Ricardo Thomson 78 y o  male MRN: [id number]  Unit/Bed#: [place] [address] [place] [place] [address] [address]-01 Encounter: [id number]    IR has been consulted to evaluate the patient, determine the appropriate procedure, and whether or not a procedure can and should be performed regarding the care of Ricardo Thomson  IP Consult to IR  Consult performed by: Ned Hill MD  Consult ordered by: Bhavana Valiente DO        09/01/21      Assessment/Recommendation:   78-year-old with ATN in worsening renal function requiring urgent hemodialysis today  Consult for line placement  Total time spent in review of data, discussion with requesting provider and rendering advice was 15 minutes  Thank you for allowing Interventional Radiology to participate in the care of Ricardo Thomson  Please don't hesitate to call or TigerText us with any questions       Alyse Gannon MD

## 2021-09-01 NOTE — PLAN OF CARE
Problem: PHYSICAL THERAPY ADULT  Goal: Performs mobility at highest level of function for planned discharge setting  See evaluation for individualized goals  Description: Treatment/Interventions: Functional transfer training, LE strengthening/ROM, Therapeutic exercise, Endurance training, Cognitive reorientation, Patient/family training, Equipment eval/education, Bed mobility, Compensatory technique education, Continued evaluation, Spoke to nursing, OT          See flowsheet documentation for full assessment, interventions and recommendations  Outcome: Progressing  Note: Prognosis: Fair  Problem List: Decreased strength, Decreased endurance, Impaired balance, Decreased mobility, Decreased cognition, Impaired judgement, Decreased safety awareness, Pain  Assessment: Pt seen for PT per POC  Pt continue to demonstrate dec mobility, balance, endurance & amb  Pt require maxAx2 for bed mobility & sit<>stand transitions + cues for techniques & safety  Pt able to sit at EOB w/ R trunk lean & require modAx1 to maintain static sitting position  Pt able to stand w/ RW w/ maxAx2 but unable to tolerate amb 2* to inc weakness, poor standing & balance tolerance  (+) fatigue noted  See above levels of assistance required for all functional tasks  Pt incontinent of bowels during session require assistance for pericare & diaper change  Pt on 3L O2 NC w/ SpO2 89%  Nsg staff most recent vital signs as follows: /53   Pulse 97   Temp (!) 97 2 °F (36 2 °C) (Axillary)   Resp 20   Ht 5' 4" (1 626 m)   Wt 73 3 kg (161 lb 9 6 oz)   SpO2 (!) 89%   BMI 27 74 kg/m²   Will continue PT per POC  May trial quick move device for transfers next tx session  At end of session, pt back in bed in stable condition, call bell & phone in reach, bed alarm activated, all lines intact  Fall precautions reinforced w/ good understanding   The patient's AM-PAC Basic Mobility Inpatient Short Form Low Function Raw Score 10 , Standardized Score is 14  65  A standardized score less 42 9 suggests the patient may benefit from discharge to post-acute rehab services  From PT standpoint, will continue to recommend inpt rehab at D/C  CM to follow  Nsg staff to continue to mobilized pt as tolerated to prevent decline in function  Nsg notified  Barriers to Discharge: None        PT Discharge Recommendation: Post acute rehabilitation services     PT - OK to Discharge: Yes (to STR when medically cleared)    See flowsheet documentation for full assessment

## 2021-09-01 NOTE — PLAN OF CARE
Problem: OCCUPATIONAL THERAPY ADULT  Goal: Performs self-care activities at highest level of function for planned discharge setting  See evaluation for individualized goals  Description: Treatment Interventions: ADL retraining, Functional transfer training, UE strengthening/ROM, Endurance training, Cognitive reorientation, Patient/family training, Equipment evaluation/education, Compensatory technique education, Continued evaluation          See flowsheet documentation for full assessment, interventions and recommendations  Note: Limitation: Decreased UE strength, Decreased ADL status, Decreased UE ROM, Decreased Safe judgement during ADL, Decreased cognition, Decreased endurance, Decreased high-level ADLs  Prognosis: Fair  Assessment: Pt seen for 40min tx session with focus on functional mobility, ADL status, transfer safety, functional balance, and cognition  Pt able to tolerate OOB mobility; sitting balance=f-/p+, standing balance=p/p-  Pt required heavy assistance with all functional mobility tasks  Cognitive deficits noted--i e orientation, direction-following  Pt dependent with LE ADLs  Pt continues to demonstrate appropriateness for inpt rehab to improve his overall level of independence  See below noted for updated mobility goals  Will continue        OT Discharge Recommendation: Post acute rehabilitation services

## 2021-09-01 NOTE — ASSESSMENT & PLAN NOTE
· In the ED patient was noted to be hypoxic, in respiratory distress  Patient improved with lasix, bipap, morphine and ativan     · Admission Chest x-ray concerning for vascular congestion, multifocal pneumonia  · Patient was admitted to the critical care team, initially on BiPAP, and requiring intubation 8/21  · Patient was extubated 8/28 and transition to high-flow oxygen  · He was diuresed for his acute/chronic diastolic congestive heart failure exacerbation, and was given Lasix infusion  · He was diagnosed with multifocal pneumonia and completed a 5 day course of antibiotics  · He was noted to have dysphagia and was on a dental soft, nectar thick diet, followed by speech therapy  · Patient's hypoxia continues to improve  · 8/31:  He was down to 6 L mid flow oxygen  · 9/1 currently on 3 L nasal cannula  · Continue to titrate as needed

## 2021-09-01 NOTE — PROGRESS NOTES
NEPHROLOGY PROGRESS NOTE   Yakelin Lorenzana 78 y o  male MRN: [id number]  Unit/Bed#: [place] [address] -01 Encounter: [id number]      ASSESSMENT/PLAN:  1  Acute kidney injury:  Suspect ATN from infection, relative hypotension +/- CRS   Baseline creatinine <1 and creatinine was 0 9 on admission  Creatinine continues to significantly worsen up to 3 6 today and BUN up to 223  [place] to have prerenal component so his Lasix drip was stopped recently and he is on D5W at 125 cc/hour  UA:  No proteinuria, 2-4 RBCs  Dialysis consent obtained last week by Dr Caryn Flores from [facility])  Will discuss with attending--may need dialysis given worsening BUN despite IV fluids and confusion  2  Azotemia:  BUN worsening to 223  Felt to be related to diuresis but now off Lasix drip  Hemoglobin trending up with no signs of GI bleed  3  Hyponatremia:  Sodium trending down to 147 after increasing D5W  Will stop oral fluid restriction  4  Mild hypercalcemia:  Likely related to volume depletion  Continue IV fluids  5  Hypokalemia:  Potassium 3 4 today  6  Acute on chronic Grade 2 diastolic CHF and moderate aortic stenosis:  Lasix drip on hold  Respiratory status seems to be improving  · Getting CT of chest abdomen and pelvis today  · S/p x-ray today but awaiting read  7  Hypoxic respiratory failure: now extubated   Awaiting result imaging today  8  Sepsis:  [place] to be due to multifocal pneumonia and status post antibiotics  COVID negative  Now with worsening leukocytosis so CT scan was ordered by primary team  · Started back on Flagyl and cefepime  9  Anemia:  Hemoglobin improving to 10 5  Status post Venofer earlier in the admission    Plan Summary:    Will d/w attending regarding possible dialysis    Continue D5W at 125cc/h    k-dur 20meq x 1 today    Discussed with nurse-- need strict I/O (will try condom cath)    Repeat BMP at 2pm     SUBJECTIVE:  Working with speech therapy  According to nurse he is more alert today  He is not answering my questions and is just mumbling incoherently  OBJECTIVE:  Current Weight: Weight - Scale: 73 3 kg (161 lb 9 6 oz)  Vitals:    09/01/21 0726   BP: 113/56   Pulse: 89   Resp: 20   Temp:    SpO2: 95%     General:  No acute distress  Skin:  No rash  Eyes:  Sclerae anicteric  ENT:  Moist mucous membranes  Neck:  Trachea midline   Chest:  Coarse breath sounds bilaterally  CVS:  Regular rate and rhythm  Abdomen:  Soft, nontender, nondistended  Extremities:  No edema  Neuro:  Awake but confused  Psych:  Cooperative     Medications:  Scheduled Meds:  Current Facility-Administered Medications   Medication Dose Route Frequency Provider Last Rate    Acetaminophen  650 mg Per NG Tube Q6H PRN Max 4/day ANATOLY Arce      [START ON 9/14/2021] amiodarone  200 mg Oral Daily With Breakfast ANATOLY Small      Followed by   Lena Monge amiodarone  200 mg Oral TID With Meals ANATOLY Arce      Followed by   Rosie Hdez ON 9/6/2021] amiodarone  200 mg Oral BID With Meals ANATOLY Arce      apixaban  2 5 mg Oral BID ANATOLY Small      cefepime  1,000 mg Intravenous Q12H Vanita Alcala MD      dextrose  125 mL/hr Intravenous Continuous Mario Salvage,  mL/hr (09/01/21 0829)    hydrALAZINE  10 mg Intravenous Q6H PRN ANATOLY Arce      hydrALAZINE  25 mg Oral [facility] ANATOLY Almodovar      isosorbide dinitrate  10 mg Oral TID after meals ANATOLY Small      metoprolol tartrate  12 5 mg Oral Q12H [facility] ANATOLY Small      metroNIDAZOLE  500 mg Intravenous Q8H Vanita Alcala  mg (09/01/21 0833)    omeprazole (PRILOSEC) suspension 2 mg/mL  20 mg Oral Daily ANATOLY Small      potassium chloride  40 mEq Oral Once Tonia Tate PA-C      pravastatin  40 mg Oral Daily With Jabil ANATOLY Ledesma      QUEtiapine  25 mg Oral HS ANATOLY Arce      senna-docusate sodium  2 tablet Oral BID ANATOLY Arce         PRN Meds:   Acetaminophen    hydrALAZINE    Continuous Infusions:dextrose, 125 mL/hr, Last Rate: 125 mL/hr (09/01/21 0829)        Laboratory Results:  Results from last 7 days   Lab Units 09/01/21  0450 08/31/21  1802 08/31/21  0516 08/30/21  1845 08/30/21  1333 08/30/21  0512 08/29/21  1423 08/29/21  0509 08/28/21  0456 08/27/21  0420 08/26/21 2003 08/26/21  0447   WBC Thousand/uL 20 20*  --   --   --   --  11 21*  --  13 66* 14 02* 17 96*  --  24 65*   HEMOGLOBIN g/dL 10 5*  --   --   --   --  9 8*  --  9 4* 9 7* 9 2*  --  9 3*   HEMATOCRIT % 37 9  --   --   --   --  33 7*  --  32 1* 33 7* 32 0*  --  33 4*   PLATELETS Thousands/uL 480*  --   --   --   --  379  --  355 334 281  --  296   SODIUM mmol/L 147* 150* 149* 146* 145 147* 142 144 144 143 142 140   POTASSIUM mmol/L 3 4* 3 5 3 6 3 2* 2 8* 3 0* 4 2 2 4* 3 1* 3 7 4 5 4 3   CHLORIDE mmol/L 101 104 104 100 99* 100 100 98* 99* 103 103 102   CO2 mmol/L 25 29 28 33* 34* 34* 33* 35* 30 32 29 28   BUN mg/dL 223* 213* 196* 191* 180* 165* 159* 148* 142* 109* 96* 80*   CREATININE mg/dL 3 68* 3 57* 2 94* 2 34* 2 20* 1 99* 2 16* 2 21* 2 32* 2 25* 2 21* 1 47*   CALCIUM mg/dL 10 2* 9 9 10 4* 10 6* 10 7* 10 3* 9 7 9 5 9 3 9 2 9 3 8 7   MAGNESIUM mg/dL  --   --  4 0*  --  3 8*  --   --   --   --   --  3 0*  --

## 2021-09-01 NOTE — PROGRESS NOTES
[address] [facility]  Progress Note Desiree Zapata 1940, 78 y o  male MRN: [id number]  Unit/Bed#: [address] 87 216-01 Encounter: [id number]  Primary Care Provider: Eliot Richardson DO   Date and time admitted to hospital: 8/19/2021  8:25 PM    * Acute respiratory failure with hypoxia [facility])  Assessment & Plan  · In the ED patient was noted to be hypoxic, in respiratory distress  Patient improved with lasix, bipap, morphine and ativan  · Admission Chest x-ray concerning for vascular congestion, multifocal pneumonia  · Patient was admitted to the critical care team, initially on BiPAP, and requiring intubation 8/21  · Patient was extubated 8/28 and transition to high-flow oxygen  · He was diuresed for his acute/chronic diastolic congestive heart failure exacerbation, and was given Lasix infusion  · He was diagnosed with multifocal pneumonia and completed a 5 day course of antibiotics  · He was noted to have dysphagia and was on a dental soft, nectar thick diet, followed by speech therapy  · Patient's hypoxia continues to improve  · 8/31:  He was down to 6 L mid flow oxygen  · 9/1 currently on 3 L nasal cannula  · Continue to titrate as needed     Leukocytosis  Assessment & Plan  -patient initially had a leukocytosis, secondary to multifocal pneumonia  -it had progressively improved with his course of antibiotics  -8/31 white blood cell count had improved to 11  -9/1 white blood cell count accelerated to 20    Medications reviewed, patient has not received any steroids or other medications that would accelerate his white blood cell count  -patient with new abdominal discomfort on exam  -will evaluate source of leukocytosis  -a) pulmonary:  Patient with recent lethargy, and dysphagia, at risk for aspiration pneumonia  -restart empiric antibiotics with cefepime/Flagyl  -check blood cultures x2  -check CT scan chest  b) GI:  Patient complaining of abdominal pain  -check CT scan abdomen/pelvis  -check LFTs  -bowel regimen:  Bowel movement charted 8/31  c) patient with suprapubic tenderness:    -Check urinalysis  -urinary retention protocol:  -current bladder scan without significant retention  D) check blood cultures x2      Atrial fibrillation ([facility] [address] )  Assessment & Plan  -patient was diagnosed with new onset atrial fibrillation with rapid ventricular response  -currently back in normal sinus rhythm  -appreciate cardiology evaluation recommendations  -patient was loaded on amiodarone 200 mg p o  t i d   -anticoagulation is indicated with chads Vasc score of 4:  Continue Eliquis 2 5 b i d   -fall precautions, continue PT for gait evaluation  -discontinue aspirin  -2D echocardiogram 8/27:  Left ventricular ejection fraction 52%  Probable moderate aortic stenosis and mild aortic regurgitation  -normal TSH    CANDIE (acute kidney injury) ([facility] [address] )  Assessment & Plan  · Patient's previous creatinine appears to range 0 8-1 0  · He developed acute kidney injury during his hospitalization, while he was critically ill  · He was evaluated by the nephrology team:  His acute kidney injury is likely secondary to ATN from his infection, and hypotension  Also concern for underlying cardiorenal syndrome  · Patient was placed on a Lasix drip, since discontinued  · Creatinine has continue to worsen to 3 68, with elevated anion gap  · Bladder scan performed    No significant urinary retention  · Discussed with Nephrology:  Dialysis catheter planned today to initiate dialysis    Acute on chronic diastolic CHF (congestive heart failure) (HCC)  Assessment & Plan  Wt Readings from Last 3 Encounters:   09/01/21 73 3 kg (161 lb 9 6 oz)   07/26/21 83 5 kg (184 lb 2 oz)   03/19/21 82 8 kg (182 lb 9 6 oz)     · POA:  With signs of volume overload, initial imaging chest x-ray concerning for vascular congestion consistent with CHF  · Most recent echo in 2018 showed grade 1 diastolic dysfunction  · Repeated echo 8/27 with left ventricular ejection fraction 50%  Mild-moderate hypokinesis of the mid-apical septal segment, grade 2 diastolic dysfunction  · Patient was placed on a Lasix infusion while in the ICU, since discontinued  · Appreciate cardiology/nephrology evaluation and fluid management  · Hypovolemic, IV fluids given        Multifocal pneumonia  Assessment & Plan  · Patient was admitted with multifocal pneumonia  · Chest x-ray 8/20:  Diffuse patchy airspace disease bilaterally concerning multifocal infiltrates  · Diagnosed with CAP versus aspiration  · Patient completed 5 day course of antibiotics under the guidance of the pulmonary team  · Tested negative for COVID-19 x3  · Legionella and strep pneumo antigens negative  · Blood cultures shows no growth  · Bronchial lavage culture also shows no growth  Negative AFB and pneumocystis  · Sputum culture with coag-negative staph and Candida, likely colonization  · Continue monitor fever curve, WBCs off antibiotics  · Leukocytosis had improved daily and on 08/31 was 11  · Leukocytosis drastically worsened 9/1 up to 20, no steroids given:   · Repeat CT scan chest/abdomen/pelvis, repeat procalcitonin  · Patient at risk for recurrent pneumonia due to lethargy and dysphagia:  Restart cefepime/Flagyl  · Check pancultures        PAD (peripheral artery disease) ([facility] [address] )  Assessment & Plan  -patient noted to have a dusky left foot while in the ICU  -LEADS performed:  Left lower extremity NICK 1 38  Great toe pressure within the healing range   -vascular surgery consult not required  -patient with palpable pulses  -cont eliquis  -continue monitor    Hypernatremia  Assessment & Plan  Patient with hypernatremia secondary to free water deficit and dehydration  Slightly improved with IV fluids    Mild cognitive impairment  Assessment & Plan  · Delirium precautions    Aortic stenosis, moderate  Assessment & Plan  · Patient with moderate aortic stenosis  · Continue outpatient cardiology follow-up  Avoid volume depletion and hypotension    Essential hypertension  Assessment & Plan  · Patient's history of essential hypertension  · Blood pressure adequately controlled  · Currently prescribed hydralazine 25 mg q 8 hours, isosorbide 10 mg t i d , metoprolol 12 5 mg q 12 hours  · Discussed with RN:  Will hold antihypertensives for the rest of today to avoid hypotension with planned dialysis    Sepsis (HCC)-resolved as of 8/30/2021  Assessment & Plan  -patient was diagnosed with sepsis secondary to multifocal pneumonia, admitted to the critical care team   -he was treated with antibiotics and improved          Family:  Called patient's son Raz Barajas- who is POA  LM to call my cell phone for update  Called pt's son Olimpia Bradshaw  Reviewed all test results and treatment plan  Gave update regarding elevated infection count and workup pursued  Gave update regarding worsening renal function  Gave update regarding oxygen level    Discussed with patient's nurse  Discussed with   Discussed with nephrologist:  Dr Halima Hinton    VTE Pharmacologic Prophylaxis: RX contraindicated due to: Eliquis  VTE Mechanical Prophylaxis: sequential compression device        Certification Statement: The patient will continue to require additional inpatient hospital stay due to need for further acute intervention for acute kidney injury    Status: inpatient     Total time spent today including interview, exam, discussion with consultants, review of results:  45 minutes    ===================================================================    Subjective:  Patient is lying in bed with head of the bed at 60°  Awake  Communicates by nodding and shaking his head and responding yes/no  He denies any pain anywhere at all  Noted to be wincing during abdominal palpation  Denies any shortness of breath  Denies any cough    Does not respond verbally or nonverbally to questions regarding if he has any nausea, diarrhea      Physical Exam: Temp:  [97 2 °F (36 2 °C)-98 °F (36 7 °C)] 97 2 °F (36 2 °C)  HR:  [] 97  Resp:  [19-22] 20  BP: (108-127)/(53-67) 108/53    Gen:  Pleasant, non-tachypnic, non-dyspnic  Conversant  Makes eye contact  Smiles  Heart: regular rate and rhythm, S1S2 present, no murmur, rub or gallop  Lungs:  Coarse breath sounds bilaterally  Decreased air movement  No current wheezes, crackles, rhonchi  No accessory muscle use or respiratory distress  Abd: soft, + tender with palpation across anterior abdomen  No point tenderness elicited  No rebound  No guarding, non-distended  NABS, no peritoneal signs  Extremities: no clubbing, cyanosis or edema  2+pedal pulses bilaterally  Full range of motion  Neuro: awake, alert  Makes eye contact  LABS:   Results from last 7 days   Lab Units 09/01/21  0450 08/30/21  0512 08/29/21  0509   WBC Thousand/uL 20 20* 11 21* 13 66*   HEMOGLOBIN g/dL 10 5* 9 8* 9 4*   HEMATOCRIT % 37 9 33 7* 32 1*   PLATELETS Thousands/uL 480* 379 355     Results from last 7 days   Lab Units 09/01/21  0450 08/31/21  1802 08/31/21  0516   POTASSIUM mmol/L 3 4* 3 5 3 6   CHLORIDE mmol/L 101 104 104   CO2 mmol/L 25 29 28   BUN mg/dL 223* 213* 196*   CREATININE mg/dL 3 68* 3 57* 2 94*   CALCIUM mg/dL 10 2* 9 9 10 [address] [place] [address] Data:  9/1 CT chest/abdomen/pelvis:  Pending  9/1 chest x-ray:  Pending    8/30 LEADS  RIGHT LOWER LIMB  Ankle/Brachial Index: 1 40  which is in the unreliable range  PPG/PVR Tracings are normal   Triphasic waveforms at the ankle  Metatarsal Pressure 166 mmHg  Great Toe Pressure: 146 mmHg, within the healing range  LEFT LOWER LIMB  Ankle/Brachial Index: 1 38 which is in the normal range  PPG/PVR Tracings are normal   Triphasic waveforms at the ankle    Metatarsal Pressure 117 mmHg  Great Toe Pressure: 110 mmHg, within the healing range      8/29 chest x-ray  Stable patchy bilateral airspace disease question related to pulmonary edema or infiltrate     8/27 chest x-ray  No significant change in bilateral airspace opacities  Stable life-support tubes     8/26 chest x-ray  Increasing right peripheral consolidation with improving aeration at the lung bases      8/25 chest x-ray  Persistent bilateral infiltrates suspicious for multifocal pneumonia  Typical endotracheal tube and enteric tube  Increased gastric distention     8/23 chest x-ray  Slight improvement in bilateral parenchymal infiltrative changes   ET tube now at the brent     8/21 chest x-ray  Worsening bilateral parenchymal changes  Endotracheal tube in the right main bronchus to be pulled back   If this has been pulled back a repeat x-ray for confirmation as indicated clinically      8/19 chest x-ray  Diffuse patchy airspace disease bilaterally   Consider CHF versus multifocal infiltrate     8/27 echocardiogram  LEFT VENTRICLE:  Systolic function was at the lower limits of normal  Ejection fraction was estimated to be 52 %  This study was inadequate for the evaluation of regional wall motion  Wall thickness was moderately increased     8/20 echocardiogram  LEFT VENTRICLE:  Systolic function was at the lower limits of normal  Ejection fraction was estimated to be 50 %  There was mild to moderate hypokinesis of the mid-apical septal myocardial wall segments  Wall thickness was mildly increased  Features were likely suggestive of a pseudonormal left ventricular filling pattern, with concomitant abnormal relaxation and increased filling pressure (grade 2 diastolic dysfunction)  Moderate aortic stenosis    Mild mitral regurgitation     Microbiology  8/28 sputum culture:   coag-negative Staph, Candida  8/26 COVID:  Negative  8/22 bronchoscopy/BAL:  No growth  8/22 bronchial culture:  No growth  8/22 bronchoscopy viral culture:  No growth  8/22 bronchoscopy viral culture:  No results  8/22 BAL culture:  No growth  8/22 BAL pneumocystis:  Negative  8/22 BAL AFB:  Negative  8/20 COVID:  Negative  8/20-Legionella/strep pneumoniae  8/19 blood culture:  Negative x2  8/19 COVID:  Negative        ---------------------------------------------------------------------------------------------------------------  This note has been constructed using a voice recognition system

## 2021-09-01 NOTE — OCCUPATIONAL THERAPY NOTE
Occupational Therapy Progress Note(time=1030-1110)     Patient Name: Zandra WILLIAMSON Date: 9/1/2021  Problem List  Principal Problem:    Acute respiratory failure with hypoxia [facility])  Active Problems:    Essential hypertension    Aortic stenosis, moderate    Mild cognitive impairment    Multifocal pneumonia    Acute on chronic diastolic CHF (congestive heart failure) (HCC)    CANDIE (acute kidney injury) ([facility] [address] )    Atrial fibrillation (HCC)    Hypernatremia    PAD (peripheral artery disease) ([facility] )    Leukocytosis       09/01/21 1030   Note Type   Note Type Treatment   Restrictions/Precautions   Weight Bearing Precautions Per Order No   Other Precautions Fall Risk;Pain;Cognitive; Chair Alarm; Bed Alarm;O2;Multiple lines   Pain Assessment   Pain Assessment Tool FLACC   Pain Rating: FLACC (Rest) - Face 0   Pain Rating: FLACC (Rest) - Legs 0   Pain Rating: FLACC (Rest) - Activity 0   Pain Rating: FLACC (Rest) - Cry 1   Pain Rating: FLACC (Rest) - Consolability 0   Score: FLACC (Rest) 1   ADL   Where Assessed Edge of bed   LB Dressing Assistance 1  Total Assistance   LB Dressing Deficit Don/doff R sock; Don/doff L sock   Toileting Assistance  1  Total Assistance   Functional Standing Tolerance   Time 20-30secs   Bed Mobility   Rolling R 2  Maximal assistance   Additional items Assist x 2; Increased time required;Verbal cues;LE management   Rolling L 2  Maximal assistance   Additional items Assist x 2; Increased time required;Verbal cues;LE management   Supine to Sit 2  Maximal assistance   Additional items Assist x 2; Increased time required;Verbal cues;LE management   Sit to Supine 2  Maximal assistance   Additional items Assist x 2; Increased time required;Verbal cues;LE management   Transfers   Sit to Stand 2  Maximal assistance   Additional items Assist x 2; Increased time required;Verbal cues   Stand to Sit 2  Maximal assistance   Additional items Assist x 2; Increased time required;Verbal cues   Additional Comments Per MD's LOS:  Return in about 12 days (around 8/9/2021) for MD visit with cbc, comp, mg,  for Gemzar.- patient left without scheduling, message sent to PSR's to schedule patient. Lab orders entered. Patient scheduled on 8/10.  1. Rad Onc consult for RT/chemo.-service to entered. 2. Palliative Care consult.- service to entered. Will see patient on 8/3. 3. Genetic counseling.- service to entered. 4. PET scan next few days.- scheduled on 8/6. 5. Thor NOVAK and teaching.- stat [id number] entered for prior auth for Gemzar. Teaching scheduled with SCARLET Khan on 8/9/. 6. Cardiology consult.- service to entered. 7. 2 D Echo w D.- scheduled on 8/3. 8. Port.- placement scheduled on 8/5. Staff message sent to Dr. Ortega regarding when patient should stop taking her Warfarin prior to port placement.    bp's=108/53(EOB)   Functional Mobility   Functional Mobility   (recommend hoyerlift for OOB with nsg)   Cognition   Overall Cognitive Status Impaired   Arousal/Participation Arousable   Attention Attends with cues to redirect   Orientation Level Oriented to person;Disoriented to place; Disoriented to time;Disoriented to situation   Memory Decreased short term memory;Decreased recall of recent events;Decreased recall of precautions   Following Commands Follows one step commands inconsistently   Activity Tolerance   Activity Tolerance Patient limited by fatigue   Medical Staff Made Aware nsg, P T  Assessment   Assessment Pt seen for 40min tx session with focus on functional mobility, ADL status, transfer safety, functional balance, and cognition  Pt able to tolerate OOB mobility; sitting balance=f-/p+, standing balance=p/p-  Pt required heavy assistance with all functional mobility tasks  Cognitive deficits noted--i e orientation, direction-following  Pt dependent with LE ADLs  Pt continues to demonstrate appropriateness for inpt rehab to improve his overall level of independence  See below noted for updated mobility goals  Will continue  Plan   Treatment Interventions ADL retraining;Functional transfer training;UE strengthening/ROM; Endurance training;Cognitive reorientation;Patient/family training;Equipment evaluation/education; Compensatory technique education;Continued evaluation   Goal Expiration Date 09/13/21   OT Treatment Day 1   OT Frequency 3-5x/wk   Recommendation   OT Discharge Recommendation Post acute rehabilitation services   AM-PAC Daily Activity Inpatient   Lower Body Dressing 1   Bathing 1   Toileting 1   Upper Body Dressing 2   Grooming 2   Eating 2   Daily Activity Raw Score 9   Turning Head Towards Sound 2   Follow Simple Instructions 2   Low Function Daily Activity Raw Score 13   Low Function Daily Activity Standardized Score 23 16   AM-PAC Applied Cognition Inpatient   Following a Speech/Presentation 2   Understanding Ordinary Conversation 2   Taking Medications 1   Remembering Where Things Are Placed or Put Away 1   Remembering List of 4-5 Errands 1   Taking Care of Complicated Tasks 1   Applied Cognition Raw Score 8   Applied Cognition Standardized Score 19 32   Additional goals:  1  Pt will demonstrate min A with his sit-stand transfers to assist with ADLs  2  Pt will demonstrate improved functional balance by 1 grade to assist with ADLs  3  Pt will demonstrate proper walker/transfer safety 100% of the time     Cristian Mccoy, OT

## 2021-09-01 NOTE — ASSESSMENT & PLAN NOTE
-patient was diagnosed with new onset atrial fibrillation with rapid ventricular response  -currently back in normal sinus rhythm  -appreciate cardiology evaluation recommendations  -patient was loaded on amiodarone 200 mg p o  t i d   -anticoagulation is indicated with chads Vasc score of 4:  Continue Eliquis 2 5 b i d   -fall precautions, continue PT for gait evaluation  -discontinue aspirin  -2D echocardiogram 8/27:  Left ventricular ejection fraction 52%    Probable moderate aortic stenosis and mild aortic regurgitation  -normal TSH

## 2021-09-01 NOTE — PLAN OF CARE
Problem: Potential for Falls  Goal: Patient will remain free of falls  Description: INTERVENTIONS:  - Educate patient/family on patient safety including physical limitations  - Instruct patient to call for assistance with activity   - Consult OT/PT to assist with strengthening/mobility   - Keep Call bell within reach  - Keep bed low and locked with side rails adjusted as appropriate  - Keep care items and personal belongings within reach  - Initiate and maintain comfort rounds  - Make Fall Risk Sign visible to staff  - Offer Toileting every  Hours, in advance of need  - Initiate/Maintain alarm  - Obtain necessary fall risk management equipment:   - Apply yellow socks and bracelet for high fall risk patients  - Consider moving patient to room near nurses station  Outcome: Progressing     Problem: MOBILITY - ADULT  Goal: Maintain or return to baseline ADL function  Description: INTERVENTIONS:  -  Assess patient's ability to carry out ADLs; assess patient's baseline for ADL function and identify physical deficits which impact ability to perform ADLs (bathing, care of mouth/teeth, toileting, grooming, dressing, etc )  - Assess/evaluate cause of self-care deficits   - Assess range of motion  - Assess patient's mobility; develop plan if impaired  - Assess patient's need for assistive devices and provide as appropriate  - Encourage maximum independence but intervene and supervise when necessary  - Involve family in performance of ADLs  - Assess for home care needs following discharge   - Consider OT consult to assist with ADL evaluation and planning for discharge  - Provide patient education as appropriate  Outcome: Progressing  Goal: Maintains/Returns to pre admission functional level  Description: INTERVENTIONS:  - Perform BMAT or MOVE assessment daily    - Set and communicate daily mobility goal to care team and patient/family/caregiver     - Collaborate with rehabilitation services on mobility goals if consulted  - Perform Range of Motion  times a day  - Reposition patient every  hours  - Dangle patient  times a day  - Stand patient  times a day  - Ambulate patient  times a day  - Out of bed to chair  times a day   - Out of bed for meals  times a day  - Out of bed for toileting  - Record patient progress and toleration of activity level   Outcome: Progressing     Problem: Prexisting or High Potential for Compromised Skin Integrity  Goal: Skin integrity is maintained or improved  Description: INTERVENTIONS:  - Identify patients at risk for skin breakdown  - Assess and monitor skin integrity  - Assess and monitor nutrition and hydration status  - Monitor labs   - Assess for incontinence   - Turn and reposition patient  - Assist with mobility/ambulation  - Relieve pressure over bony prominences  - Avoid friction and shearing  - Provide appropriate hygiene as needed including keeping skin clean and dry  - Evaluate need for skin moisturizer/barrier cream  - Collaborate with interdisciplinary team   - Patient/family teaching  - Consider wound care consult   Outcome: Progressing     Problem: NEUROSENSORY - ADULT  Goal: Achieves stable or improved neurological status  Description: INTERVENTIONS  - Monitor and report changes in neurological status  - Monitor vital signs such as temperature, blood pressure, glucose, and any other labs ordered   - Initiate measures to prevent increased intracranial pressure  - Monitor for seizure activity and implement precautions if appropriate      Outcome: Progressing  Goal: Achieves maximal functionality and self care  Description: INTERVENTIONS  - Monitor swallowing and airway patency with patient fatigue and changes in neurological status  - Encourage and assist patient to increase activity and self care     - Encourage visually impaired, hearing impaired and aphasic patients to use assistive/communication devices  Outcome: Progressing     Problem: CARDIOVASCULAR - ADULT  Goal: Maintains optimal cardiac output and hemodynamic stability  Description: INTERVENTIONS:  - Monitor I/O, vital signs and rhythm  - Monitor for S/S and trends of decreased cardiac output  - Administer and titrate ordered vasoactive medications to optimize hemodynamic stability  - Assess quality of pulses, skin color and temperature  - Assess for signs of decreased coronary artery perfusion  - Instruct patient to report change in severity of symptoms  Outcome: Progressing  Goal: Absence of cardiac dysrhythmias or at baseline rhythm  Description: INTERVENTIONS:  - Continuous cardiac monitoring, vital signs, obtain 12 lead EKG if ordered  - Administer antiarrhythmic and heart rate control medications as ordered  - Monitor electrolytes and administer replacement therapy as ordered  Outcome: Progressing     Problem: RESPIRATORY - ADULT  Goal: Achieves optimal ventilation and oxygenation  Description: INTERVENTIONS:  - Assess for changes in respiratory status  - Assess for changes in mentation and behavior  - Position to facilitate oxygenation and minimize respiratory effort  - Oxygen administered by appropriate delivery if ordered  - Initiate smoking cessation education as indicated  - Encourage broncho-pulmonary hygiene including cough, deep breathe, Incentive Spirometry  - Assess the need for suctioning and aspirate as needed  - Assess and instruct to report SOB or any respiratory difficulty  - Respiratory Therapy support as indicated  Outcome: Progressing     Problem: GASTROINTESTINAL - ADULT  Goal: Minimal or absence of nausea and/or vomiting  Description: INTERVENTIONS:  - Administer IV fluids if ordered to ensure adequate hydration  - Maintain NPO status until nausea and vomiting are resolved  - Nasogastric tube if ordered  - Administer ordered antiemetic medications as needed  - Provide nonpharmacologic comfort measures as appropriate  - Advance diet as tolerated, if ordered  - Consider nutrition services referral to assist patient with adequate nutrition and appropriate food choices  Outcome: Progressing  Goal: Maintains or returns to baseline bowel function  Description: INTERVENTIONS:  - Assess bowel function  - Encourage oral fluids to ensure adequate hydration  - Administer IV fluids if ordered to ensure adequate hydration  - Administer ordered medications as needed  - Encourage mobilization and activity  - Consider nutritional services referral to assist patient with adequate nutrition and appropriate food choices  Outcome: Progressing  Goal: Maintains adequate nutritional intake  Description: INTERVENTIONS:  - Monitor percentage of each meal consumed  - Identify factors contributing to decreased intake, treat as appropriate  - Assist with meals as needed  - Monitor I&O, weight, and lab values if indicated  - Obtain nutrition services referral as needed  Outcome: Progressing  Goal: Oral mucous membranes remain intact  Description: INTERVENTIONS  - Assess oral mucosa and hygiene practices  - Implement preventative oral hygiene regimen  - Implement oral medicated treatments as ordered  - Initiate Nutrition services referral as needed  Outcome: Progressing     Problem: GENITOURINARY - ADULT  Goal: Maintains or returns to baseline urinary function  Description: INTERVENTIONS:  - Assess urinary function  - Encourage oral fluids to ensure adequate hydration if ordered  - Administer IV fluids as ordered to ensure adequate hydration  - Administer ordered medications as needed  - Offer frequent toileting  - Follow urinary retention protocol if ordered  Outcome: Progressing  Goal: Absence of urinary retention  Description: INTERVENTIONS:  - Assess patients ability to void and empty bladder  - Monitor I/O  - Bladder scan as needed  - Discuss with physician/AP medications to alleviate retention as needed  - Discuss catheterization for long term situations as appropriate  Outcome: Progressing     Problem: METABOLIC, FLUID AND ELECTROLYTES - ADULT  Goal: Electrolytes maintained within normal limits  Description: INTERVENTIONS:  - Monitor labs and assess patient for signs and symptoms of electrolyte imbalances  - Administer electrolyte replacement as ordered  - Monitor response to electrolyte replacements, including repeat lab results as appropriate  - Instruct patient on fluid and nutrition as appropriate  Outcome: Progressing  Goal: Fluid balance maintained  Description: INTERVENTIONS:  - Monitor labs   - Monitor I/O and WT  - Instruct patient on fluid and nutrition as appropriate  - Assess for signs & symptoms of volume excess or deficit  Outcome: Progressing  Goal: Glucose maintained within target range  Description: INTERVENTIONS:  - Monitor Blood Glucose as ordered  - Assess for signs and symptoms of hyperglycemia and hypoglycemia  - Administer ordered medications to maintain glucose within target range  - Assess nutritional intake and initiate nutrition service referral as needed  Outcome: Progressing     Problem: SKIN/TISSUE INTEGRITY - ADULT  Goal: Skin Integrity remains intact(Skin Breakdown Prevention)  Description: Assess:  -Perform Kevon assessment every   -Clean and moisturize skin every   -Inspect skin when repositioning, toileting, and assisting with ADLS  -Assess under medical devices such as  every   -Assess extremities for adequate circulation and sensation     Bed Management:  -Have minimal linens on bed & keep smooth, unwrinkled  -Change linens as needed when moist or perspiring  -Avoid sitting or lying in one position for more than  hours while in bed  -Keep HOB at degrees     Toileting:  -Offer bedside commode  -Assess for incontinence every   -Use incontinent care products after each incontinent episode such as     Activity:  -Mobilize patient  times a day  -Encourage activity and walks on unit  -Encourage or provide ROM exercises   -Turn and reposition patient every  Hours  -Use appropriate equipment to lift or move patient in bed  -Instruct/ Assist with weight shifting every  when out of bed in chair  -Consider limitation of chair time  hour intervals    Skin Care:  -Avoid use of baby powder, tape, friction and shearing, hot water or constrictive clothing  -Relieve pressure over bony prominences using   -Do not massage red bony areas    Next Steps:  -Teach patient strategies to minimize risks such as    -Consider consults to  interdisciplinary teams such a  Outcome: Progressing     Problem: HEMATOLOGIC - ADULT  Goal: Maintains hematologic stability  Description: INTERVENTIONS  - Assess for signs and symptoms of bleeding or hemorrhage  - Monitor labs  - Administer supportive blood products/factors as ordered and appropriate  Outcome: Progressing     Problem: MUSCULOSKELETAL - ADULT  Goal: Maintain or return mobility to safest level of function  Description: INTERVENTIONS:  - Assess patient's ability to carry out ADLs; assess patient's baseline for ADL function and identify physical deficits which impact ability to perform ADLs (bathing, care of mouth/teeth, toileting, grooming, dressing, etc )  - Assess/evaluate cause of self-care deficits   - Assess range of motion  - Assess patient's mobility  - Assess patient's need for assistive devices and provide as appropriate  - Encourage maximum independence but intervene and supervise when necessary  - Involve family in performance of ADLs  - Assess for home care needs following discharge   - Consider OT consult to assist with ADL evaluation and planning for discharge  - Provide patient education as appropriate  Outcome: Progressing     Problem: Nutrition/Hydration-ADULT  Goal: Nutrient/Hydration intake appropriate for improving, restoring or maintaining nutritional needs  Description: Monitor and assess patient's nutrition/hydration status for malnutrition  Collaborate with interdisciplinary team and initiate plan and interventions as ordered    Monitor patient's weight and dietary intake as ordered or per policy  Utilize nutrition screening tool and intervene as necessary  Determine patient's food preferences and provide high-protein, high-caloric foods as appropriate       INTERVENTIONS:  - Monitor oral intake, urinary output, labs, and treatment plans  - Assess nutrition and hydration status and recommend course of action  - Evaluate amount of meals eaten  - Assist patient with eating if necessary   - Allow adequate time for meals  - Recommend/ encourage appropriate diets, oral nutritional supplements, and vitamin/mineral supplements  - Order, calculate, and assess calorie counts as needed  - Recommend, monitor, and adjust tube feedings and TPN/PPN based on assessed needs  - Assess need for intravenous fluids  - Provide specific nutrition/hydration education as appropriate  - Include patient/family/caregiver in decisions related to nutrition  Outcome: Progressing

## 2021-09-01 NOTE — TREATMENT PLAN
Patient seen and examined for his 1st dialysis treatment  About 7 minutes into treatment he developed a 6 beat run of V-tach  Stat BMP sent  Blood pressure acceptable at 103/49  Patient is awake and alert during the episode  Currently running on a 4 K bath, 200 blood flow, 2 hour treatment, 140 sodium  I was present for the 1st 20 minutes of treatment and no further episodes occurred  Plan for next dialysis tomorrow    Discussed with Dr Rufina Avendano

## 2021-09-01 NOTE — QUICK NOTE
Called to see pt at the end of HD  Pt with hypotension and HD ended at approx 50min  Pt given ivf bolus  Patient with generalized, weakness, fatigue, and watery bowel movements  Per RN did not appear consistent with C diff    Patient denies any pain  Denies any shortness of breath  Coughing up thick, white phlegm    Patient examined:  Systolic blood pressure 07/RHHHQ rate 87  Saturations 87% on 3 L  nasal cannula titrated up  General:  Patient appears very fatigued  Awake  Makes eye contact  Communicates with one-word answers  Heart:  Regular rate and rhythm  No murmur, rub, gallop  Lungs:  Coarse breath sounds bilaterally, however no bibasilar crackles  Abdomen:  Soft, nontender, nondistended, normoactive bowel sounds        Assessment/plan  1-intra dialysis hypotension:  Hemodialysis ended early  IV fluids given with improvement  -continue to monitor closely  -hold isosorbide/hydralazine  -continue metoprolol with hold parameters  -avoid hypotension  -additional IV fluid boluses as needed    2-hypoxia:  -continue to titrate oxygen saturations      Patient with significant weakness and debility  Continue full medical care  Called patient's son Bess Cabrera who is his power-of-, and gait update  Discussed with him goals of care  Significant other Krista Dougherty present at the bedside    Will spend the night here  Daniel Mejia will visit tomorrow

## 2021-09-01 NOTE — ASSESSMENT & PLAN NOTE
-patient noted to have a dusky left foot while in the ICU  -LEADS performed:  Left lower extremity NICK 1 38    Great toe pressure within the healing range   -vascular surgery consult not required  -patient with palpable pulses  -cont eliquis  -continue monitor

## 2021-09-01 NOTE — PHYSICAL THERAPY NOTE
PT PROGRESS NOTE    Name: Valeriy Shin  AGE: 78 y o  MRN: [id number]  LENGTH OF STAY: 15    Admitting Dx:  Acute respiratory failure ([place] [address] ) [J96 00]  Leukocytosis [D72 829]  Respiratory distress [R06 03]  Severe sepsis ([place] [address] ) [A41 9, R65 20]      Patient Active Problem List   Diagnosis    Mixed hyperlipidemia    Essential hypertension    Aortic stenosis, moderate    Enlarged prostate without lower urinary tract symptoms (luts)    Fatty liver disease, nonalcoholic    Moderate episode of recurrent major depressive disorder ([place] [address] )    PVC (premature ventricular contraction)    Medicare annual wellness visit, subsequent    Mild cognitive impairment    Obesity (BMI 30 0-34  9)    Chronic constipation    Iron deficiency anemia secondary to inadequate dietary iron intake    Multifocal pneumonia    Acute respiratory failure with hypoxia (HCC)    Acute on chronic diastolic CHF (congestive heart failure) (HCC)    Hypochromic microcytic anemia    CANDIE (acute kidney injury) ([place] [address] )    Atrial fibrillation (HCC)    Elevated troponin level    Hypermagnesemia    Hypernatremia    PAD (peripheral artery disease) (HCC)    Leukocytosis              09/01/21 1108   PT Last Visit   PT Visit Date 09/01/21   Note Type   Note Type Treatment   Pain Assessment   Pain Assessment Tool FLACC   Pain Location/Orientation Location: Generalized   Hospital Pain Intervention(s) Repositioned; Ambulation/increased activity; Emotional support; Rest   Pain Rating: FLACC (Rest) - Face 0   Pain Rating: FLACC (Rest) - Legs 0   Pain Rating: FLACC (Rest) - Activity 0   Pain Rating: FLACC (Rest) - Cry 0   Pain Rating: FLACC (Rest) - Consolability 0   Score: FLACC (Rest) 0   Pain Rating: FLACC (Activity) - Face 1   Pain Rating: FLACC (Activity) - Legs 1   Pain Rating: FLACC (Activity) - Activity 0   Pain Rating: FLACC (Activity) - Cry 1   Pain Rating: FLACC (Activity) - Consolability 1   Score: FLACC (Activity) 4   Restrictions/Precautions Weight Bearing Precautions Per Order No   Other Precautions Fall Risk;Pain;Multiple lines;O2;Cognitive; Chair Alarm; Bed Alarm  (3L O2 NC)   General   Chart Reviewed Yes   Response to Previous Treatment Patient unable to report, no changes reported from family or staff   Family/Caregiver Present Yes  (significant other)   Cognition   Overall Cognitive Status Impaired   Arousal/Participation Arousable   Attention Difficulty attending to directions   Orientation Level Oriented to person;Disoriented to place; Disoriented to time;Disoriented to situation   Following Commands Follows one step commands inconsistently   Subjective   Subjective Offered no complaints  Bed Mobility   Rolling R 2  Maximal assistance   Additional items Assist x 2;Bedrails; Increased time required;Verbal cues;LE management   Rolling L 2  Maximal assistance   Additional items Assist x 2; Increased time required;Verbal cues;LE management   Supine to Sit 2  Maximal assistance   Additional items Assist x 2;HOB elevated; Increased time required;Verbal cues;LE management   Sit to Supine 2  Maximal assistance   Additional items Assist x 2; Increased time required;Verbal cues;LE management   Additional Comments cues for techniques & safety; pt able to sit at EOB w/ trunk lean to the R requiring modAx1 to maintain sitting balance  Transfers   Sit to Stand 2  Maximal assistance   Additional items Assist x 2; Increased time required;Verbal cues   Stand to Sit 2  Maximal assistance   Additional items Assist x 2; Increased time required;Verbal cues   Additional Comments cues for techniques & safety; poor standing balance & tolerance; poot BUE support on RW as well as poor BLE support   Ambulation/Elevation   Gait pattern Not appropriate  (pt unable to tolerate at this time)   Balance   Static Sitting Poor   Dynamic Sitting Poor -   Static Standing Poor -   Dynamic Standing   (zero)   Ambulatory Zero   Endurance Deficit   Endurance Deficit Yes   Endurance Deficit Description weakness; dec alertness; fatigue   Activity Tolerance   Activity Tolerance Patient limited by fatigue;Patient limited by pain;Treatment limited secondary to medical complications (Comment)   Medical Staff Made Aware OT Clark   Nurse Made Aware IRVING Andrews   Assessment   Prognosis Fair   Problem List Decreased strength;Decreased endurance; Impaired balance;Decreased mobility; Decreased cognition; Impaired judgement;Decreased safety awareness;Pain   Assessment Pt seen for PT per POC  Pt continue to demonstrate dec mobility, balance, endurance & amb  Pt require maxAx2 for bed mobility & sit<>stand transitions + cues for techniques & safety  Pt able to sit at EOB w/ R trunk lean & require modAx1 to maintain static sitting position  Pt able to stand w/ RW w/ maxAx2 but unable to tolerate amb 2* to inc weakness, poor standing & balance tolerance  (+) fatigue noted  See above levels of assistance required for all functional tasks  Pt incontinent of bowels during session require assistance for pericare & diaper change  Pt on 3L O2 NC w/ SpO2 89%  Nsg staff most recent vital signs as follows: /53   Pulse 97   Temp (!) 97 2 °F (36 2 °C) (Axillary)   Resp 20   Ht 5' 4" (1 626 m)   Wt 73 3 kg (161 lb 9 6 oz)   SpO2 (!) 89%   BMI 27 74 kg/m²   Will continue PT per POC  May trial quick move device for transfers next tx session  At end of session, pt back in bed in stable condition, call bell & phone in reach, bed alarm activated, all lines intact  Fall precautions reinforced w/ good understanding  The patient's AM-PAC Basic Mobility Inpatient Short Form Low Function Raw Score 10 , Standardized Score is 14 65  A standardized score less 42 9 suggests the patient may benefit from discharge to post-acute rehab services  From PT standpoint, will continue to recommend inpt rehab at D/C  CM to follow  Nsg staff to continue to mobilized pt as tolerated to prevent decline in function  Nsg notified      Goals Patient Goals to get back in bed   STG Expiration Date 09/13/21   PT Treatment Day 1   Plan   Treatment/Interventions Functional transfer training;LE strengthening/ROM; Therapeutic exercise; Endurance training;Patient/family training;Bed mobility;Continued evaluation;Spoke to nursing;OT;Family   Progress Slow progress, medical status limitations   PT Frequency Other (Comment)  (3-5x/wk)   Recommendation   PT Discharge Recommendation Post acute rehabilitation services   PT - OK to Discharge Yes  (to STR when medically cleared)   AM-PAC Basic Mobility Inpatient   Turning in Bed Without Bedrails 1   Lying on Back to Sitting on Edge of Flat Bed 1   Moving Bed to Chair 1   Standing Up From Chair 1   Walk in Room 1   Climb 3-5 Stairs 1   Basic Mobility Inpatient Raw Score 6   Turning Head Towards Sound 2   Follow Simple Instructions 2   Low Function Basic Mobility Raw Score 10   Low Function Basic Mobility Standardized Score 14 65   Roxanne Peñaloza, PT

## 2021-09-01 NOTE — HEMODIALYSIS
Post-Dialysis RN Treatment Note    Blood Pressure:  Pre 103/49 mm/Hg  Post 98/46 mmHg   EDW:  TBD   Weight:  Pre 74 kg   Post:  none   Mode of weight measurement:   Bed scale   Volume Removed  225 ml    Treatment duration 50 minutes    NS given:  100 ml x 3 for sbp <100    Treatment shortened:  Yes  Terminated after 50 minutes for symptomatic hypotension  No improvement post nss bolus  Medications given during Rx:  none   Estimated Kt/V:  none   Access type:  RIJ temporary catheter   Access Status:  maintains 200 bfr   Report called to primary nurse:  Yes  Verbal to Howard Barajas  Aware of early termination for hypotension  Dr Syeda Donaldson paged to evaluate patient

## 2021-09-02 LAB
ALBUMIN SERPL BCP-MCNC: 3.3 G/DL (ref 3.5–5)
ALP SERPL-CCNC: 89 U/L (ref 46–116)
ALT SERPL W P-5'-P-CCNC: 70 U/L (ref 12–78)
ANION GAP SERPL CALCULATED.3IONS-SCNC: 17 MMOL/L (ref 4–13)
AST SERPL W P-5'-P-CCNC: 43 U/L (ref 5–45)
BASOPHILS # BLD AUTO: 0.23 THOUSANDS/ΜL (ref 0–0.1)
BASOPHILS NFR BLD AUTO: 1 % (ref 0–1)
BILIRUB DIRECT SERPL-MCNC: 0.23 MG/DL (ref 0–0.2)
BILIRUB SERPL-MCNC: 0.57 MG/DL (ref 0.2–1)
BUN SERPL-MCNC: 193 MG/DL (ref 5–25)
CALCIUM SERPL-MCNC: 9.5 MG/DL (ref 8.3–10.1)
CHLORIDE SERPL-SCNC: 103 MMOL/L (ref 100–108)
CO2 SERPL-SCNC: 25 MMOL/L (ref 21–32)
CREAT SERPL-MCNC: 3.76 MG/DL (ref 0.6–1.3)
EOSINOPHIL # BLD AUTO: 0.62 THOUSAND/ΜL (ref 0–0.61)
EOSINOPHIL NFR BLD AUTO: 3 % (ref 0–6)
ERYTHROCYTE [DISTWIDTH] IN BLOOD BY AUTOMATED COUNT: 25.1 % (ref 11.6–15.1)
GFR SERPL CREATININE-BSD FRML MDRD: 14 ML/MIN/1.73SQ M
GLUCOSE SERPL-MCNC: 137 MG/DL (ref 65–140)
HBV CORE AB SER QL: REACTIVE
HBV CORE IGM SER QL: ABNORMAL
HBV SURFACE AB SER-ACNC: 198.85 MIU/ML
HBV SURFACE AG SER QL: ABNORMAL
HCT VFR BLD AUTO: 32.8 % (ref 36.5–49.3)
HCV AB SER QL: ABNORMAL
HGB BLD-MCNC: 9.5 G/DL (ref 12–17)
IMM GRANULOCYTES # BLD AUTO: >0.5 THOUSAND/UL (ref 0–0.2)
IMM GRANULOCYTES NFR BLD AUTO: 2 % (ref 0–2)
LYMPHOCYTES # BLD AUTO: 1.49 THOUSANDS/ΜL (ref 0.6–4.47)
LYMPHOCYTES NFR BLD AUTO: 6 % (ref 14–44)
MCH RBC QN AUTO: 23.7 PG (ref 26.8–34.3)
MCHC RBC AUTO-ENTMCNC: 29 G/DL (ref 31.4–37.4)
MCV RBC AUTO: 82 FL (ref 82–98)
MONOCYTES # BLD AUTO: 2.03 THOUSAND/ΜL (ref 0.17–1.22)
MONOCYTES NFR BLD AUTO: 9 % (ref 4–12)
NEUTROPHILS # BLD AUTO: 18.82 THOUSANDS/ΜL (ref 1.85–7.62)
NEUTS SEG NFR BLD AUTO: 79 % (ref 43–75)
NRBC BLD AUTO-RTO: 0 /100 WBCS
PLATELET # BLD AUTO: 449 THOUSANDS/UL (ref 149–390)
PMV BLD AUTO: 11.2 FL (ref 8.9–12.7)
POTASSIUM SERPL-SCNC: 3.6 MMOL/L (ref 3.5–5.3)
PROCALCITONIN SERPL-MCNC: 0.12 NG/ML
PROT SERPL-MCNC: 7.4 G/DL (ref 6.4–8.2)
RBC # BLD AUTO: 4.01 MILLION/UL (ref 3.88–5.62)
SODIUM SERPL-SCNC: 145 MMOL/L (ref 136–145)
WBC # BLD AUTO: 23.76 THOUSAND/UL (ref 4.31–10.16)

## 2021-09-02 PROCEDURE — 99232 SBSQ HOSP IP/OBS MODERATE 35: CPT | Performed by: INTERNAL MEDICINE

## 2021-09-02 PROCEDURE — 84145 PROCALCITONIN (PCT): CPT | Performed by: INTERNAL MEDICINE

## 2021-09-02 PROCEDURE — 80048 BASIC METABOLIC PNL TOTAL CA: CPT | Performed by: PHYSICIAN ASSISTANT

## 2021-09-02 PROCEDURE — 80076 HEPATIC FUNCTION PANEL: CPT | Performed by: INTERNAL MEDICINE

## 2021-09-02 PROCEDURE — 99233 SBSQ HOSP IP/OBS HIGH 50: CPT | Performed by: INTERNAL MEDICINE

## 2021-09-02 PROCEDURE — 99223 1ST HOSP IP/OBS HIGH 75: CPT | Performed by: INTERNAL MEDICINE

## 2021-09-02 PROCEDURE — 5A1D70Z PERFORMANCE OF URINARY FILTRATION, INTERMITTENT, LESS THAN 6 HOURS PER DAY: ICD-10-PCS | Performed by: INTERNAL MEDICINE

## 2021-09-02 PROCEDURE — 85025 COMPLETE CBC W/AUTO DIFF WBC: CPT | Performed by: INTERNAL MEDICINE

## 2021-09-02 PROCEDURE — 92526 ORAL FUNCTION THERAPY: CPT

## 2021-09-02 RX ORDER — AMOXICILLIN 250 MG
2 CAPSULE ORAL 2 TIMES DAILY PRN
Status: DISCONTINUED | OUTPATIENT
Start: 2021-09-02 | End: 2021-09-17 | Stop reason: HOSPADM

## 2021-09-02 RX ORDER — METRONIDAZOLE 500 MG/1
500 TABLET ORAL EVERY 8 HOURS SCHEDULED
Status: DISCONTINUED | OUTPATIENT
Start: 2021-09-02 | End: 2021-09-02

## 2021-09-02 RX ORDER — SODIUM CHLORIDE 450 MG/100ML
50 INJECTION, SOLUTION INTRAVENOUS CONTINUOUS
Status: DISCONTINUED | OUTPATIENT
Start: 2021-09-02 | End: 2021-09-03

## 2021-09-02 RX ADMIN — METRONIDAZOLE 500 MG: 500 INJECTION, SOLUTION INTRAVENOUS at 08:19

## 2021-09-02 RX ADMIN — Medication 12.5 MG: at 21:20

## 2021-09-02 RX ADMIN — SODIUM CHLORIDE 50 ML/HR: 0.45 INJECTION, SOLUTION INTRAVENOUS at 11:42

## 2021-09-02 RX ADMIN — APIXABAN 2.5 MG: 2.5 TABLET, FILM COATED ORAL at 16:18

## 2021-09-02 RX ADMIN — CEFEPIME HYDROCHLORIDE 1000 MG: 1 INJECTION, POWDER, FOR SOLUTION INTRAMUSCULAR; INTRAVENOUS at 11:42

## 2021-09-02 RX ADMIN — APIXABAN 2.5 MG: 2.5 TABLET, FILM COATED ORAL at 08:20

## 2021-09-02 RX ADMIN — Medication 20 MG: at 08:41

## 2021-09-02 RX ADMIN — AMIODARONE HYDROCHLORIDE 200 MG: 200 TABLET ORAL at 16:18

## 2021-09-02 RX ADMIN — PRAVASTATIN SODIUM 40 MG: 40 TABLET ORAL at 16:18

## 2021-09-02 RX ADMIN — AMIODARONE HYDROCHLORIDE 200 MG: 200 TABLET ORAL at 13:25

## 2021-09-02 RX ADMIN — SODIUM CHLORIDE 3 G: 9 INJECTION, SOLUTION INTRAVENOUS at 13:25

## 2021-09-02 RX ADMIN — AMIODARONE HYDROCHLORIDE 200 MG: 200 TABLET ORAL at 08:20

## 2021-09-02 RX ADMIN — DOCUSATE SODIUM AND SENNOSIDES 2 TABLET: 8.6; 5 TABLET, FILM COATED ORAL at 16:18

## 2021-09-02 NOTE — SPEECH THERAPY NOTE
Speech Language/Pathology    Speech/Language Pathology Progress Note    Patient Name: Nando KING Date: 9/2/2021     Problem List  Principal Problem:    Acute respiratory failure with hypoxia [facility])  Active Problems:    Essential hypertension    Aortic stenosis, moderate    Mild cognitive impairment    Multifocal pneumonia    Acute on chronic diastolic CHF (congestive heart failure) (HCC)    CANDIE (acute kidney injury) ([facility] [address] )    Atrial fibrillation (HCC)    Hypernatremia    [facility]    Leukocytosis       Past Medical History  Past Medical History:   Diagnosis Date    Actinic keratosis     LAST ASSESSED: [id number]    Allergic rhinitis     LAST ASSESSED: [id number]    Anxiety     LAST ASSESSED: 19WMP5451    Anxiety disorder     LAST ASSESSED: 39IZL1804    Atelectasis of right lung     ABNORMAL CT SCAN OF [address] 12/2/2016 REPEAT NEEDED PER RADIOLOGY IN 3 MONTHS LAST ASSESSED: [id number]    Atypical chest pain     LAST ASSESSED: 10OFL6685    Benign paroxysmal vertigo     LAST ASSESSED: 29OVA2816    Chronic cough     LAST ASSESSED: 34VFV6139    Chronic GERD     Degenerative arthritis of knee, bilateral     LAST ASSESSED: 59VQF0458    Diverticulitis of colon     LAST ASSESSED: 90WCE7709    Diverticulosis     LAST ASSESSED: 77FEM4067    Do not resuscitate status 1/25/2021    Foreign body, eye     LAST ASSESSED: 64DCO7157    Functional gait abnormality     LAST ASSESSED: 83ZAO3356    Hyperlipidemia     Hypertension     Hyponatremia     LAST ASSESSED: 11TBT0919    Lactic acidosis 8/19/2021    Leukocytosis     LAST ASSESSED: 45DBW3326    Multifocal pneumonia 8/19/2021    Murmur     Newly recognized murmur     LAST ASSESSED: 42YGC7456    Olecranon bursitis, unspecified elbow     Presence of indwelling urethral catheter     RESOLVED: 74TCE5658    Transient cerebral ischemia     LAST ASSESSED: 54JUS4303    Urinary incontinence     LAST ASSESSED: 86OJD0402    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     LAST ASSESSED: 95NQP0931        Past Surgical History  Past Surgical History:   Procedure Laterality Date    ADENOIDECTOMY      APPENDECTOMY      COLONOSCOPY  10/2006    FIBEROPTIC    INGUINAL HERNIA REPAIR      JOINT REPLACEMENT      b/l TKR Sept 2015    SINUS SURGERY      TONSILLECTOMY           Subjective:  Pt seen for dysphagia tx  Pt sitting upright in bed for tx, son at bedside  Pt alert, confused  Objective:  Lunch was delivered, but pt hadn't eaten any due to poor appetite  He was willing to eat a few bites of applesauce and drink a few sips of NTL by straw, with his son feeding him  Pt noted to cough prior to PO  Sats were in the upper 80s prior to PO; nursing notified, and sats keira above 90% when oxygen was turned up  Swallows of pureed and NTL appeared prompt  There were no overt s/s of aspiration, but pt did cough x1 during tx, not timed with the swallow  At this point pt c/o his bottom hurting and wanted to lie down  Discussed with son, who reports pt waxing and waning in alertness  He is agreeable to continuing conservative diet for now  Reviewed aspiration precautions, including sitting fully upright for all PO, feeding only when fully alert  He verbalized understanding  Assessment:  Pt appears to be tolerating pureed diet and NTL, but minimal PO intake today, due to poor appetite  Plan/Recommendations:  Continue current diet of pureed and NTL for now  Trial thin liquids and solids when pt agreeable

## 2021-09-02 NOTE — PROGRESS NOTES
The metronidazole has  been converted to Oral per [facility] IV-to-PO Auto-Conversion Protocol for Adults as approved by the Pharmacy and Therapeutics Committee  The patient met all eligible criteria:  3 Age = 23years old   2) Received at least one dose of the IV form   3) Receiving at least one other scheduled oral/enteral medication   4) Tolerating an oral/enteral diet   and did not have any exclusions:   1) Critical care patient   2) Active GI bleed (IF assessing H2RAs or PPIs)   3) Continuous tube feeding (IF assessing cipro, doxycycline, levofloxacin, minocycline, rifampin, or voriconazole)   4) Receiving PO vancomycin (IF assessing metronidazole)   5) Persistent nausea and/or vomiting   6) Ileus or gastrointestinal obstruction   7) Yahaira/nasogastric tube set for continuous suction   8) Specific order not to automatically convert to PO (in the order's comments or if discussed in the most recent Infectious Disease or primary team's progress notes)

## 2021-09-02 NOTE — CONSULTS
Consultation - Infectious Disease   Andreia Simms 78 y o  male MRN: [id number]  Unit/Bed#: [place] [address] -01 Encounter: [id number]    IMPRESSION & RECOMMENDATIONS:   1  Leukocytosis  WBC count with initial improvement during treatment for multifocal pneumonia  He has since had additional elevations and WBC count has increased to 23 76 today  Unclear etiology  Consider possible aspiration in the setting of recent lethargy  I personally reviewed his CT of the chest/abdomen/pelvis which showed ground-glass opacities in the lungs but no other acute findings  He denies GI and  symptoms  He has a new temporary HD catheter in place however this was inserted after WBC count had increased  Blood cultures are pending  Fortunately he remains clinically stable and nontoxic  He is afebrile  Procalcitonin from 09/01/2021 was negative  A repeat procalcitonin is pending  He has been receiving empiric cefepime and Flagyl  I will transition him to IV Unasyn now while we await updated blood culture results and repeat procalcitonin     -stop IV cefepime  -stop oral Flagyl  -start IV Unasyn, 3g daily dosed for renal function  -check CBCD and BMP tomorrow  -follow-up blood cultures  -follow-up pending procalcitonin  -monitor vitals    2  Multifocal pneumonia  Patient hypoxic with O2 saturations in the 60s at home  He is fully vaccinated for COVID-19 and has had multiple needs COVID-19 PCR is which were negative  Several chest x-rays, and now CT of the chest, show ground-glass opacities  He has undergone five days of antibiotic treatment  Now with worsening leukocytosis  Consider patient may have aspirated while lethargic  He is following with speech pathology  Patient's procalcitonin was negative on 09/01/2021   A repeat procalcitonin is pending   -antibiotic as above  -check CBC and BMP tomorrow  -follow-up pending procalcitonin  -monitor vitals  -monitor respiratory status  -O2 support per primary service  -continue follow-up with speech pathology    3  Acute hypoxic respiratory failure  Secondary to multifocal pneumonia  Also consider pulmonary edema related to diastolic dysfunction  Patient initially required intubation but was successfully extubated and now remained stable on nasal cannula O2  Now with new concerns for aspiration  He is following closely with speech pathology   -antibiotic as above  -monitor CBC  -monitor procalcitonin  -monitor vitals  -monitor respiratory status  -O2 support per primary service  -continue follow-up with speech pathology    4  Acute kidney injury  Likely multifactorial given patient's sepsis presentation, pneumonia, and hypotension  Consider ATN  Nephrology is following closely  He now has a temporary HD catheter placed and was started on HD last night  He might require transition to CRRT due to ongoing hypotension   -dose adjust antibiotics for renal function/HD/CRRT  -continue close follow-up with Nephrology    5  Sepsis  POA  Tachycardia, tachypnea, leukocytosis, lactic acidosis, and hypoxia  Secondary to multifocal pneumonia  Consider component of worsening diastolic dysfunction  Patient did initially require intubation and mechanical ventilation support in the ICU  He has since improved and has been transitioned to med/surge level care  He has ongoing leukocytosis as above but otherwise does not meet sepsis criteria today   -leukocytosis workup as above    Thank you for the consult  We will continue to follow along  Above plan was discussed in detail with patient and his son at the bedside  Above plan was discussed in detail with [facility] attending, Dr Delia Canales  HISTORY OF PRESENT ILLNESS:  Reason for Consult:  Leukocytosis  HPI: Abe Ramirez is an 78y o  year old male who presented to the [place] [address] Emergency Department on 08/19/2021 with shortness of breath and chest pain  Patient reported his symptoms started on the day prior    He had been coughing a lot  When he developed chest pain he called EMS  They found his O2 saturation was 65% on room air  He was placed on CPAP for transport to the hospital     Upon arrival to the ED the patient's temperature was 97 9° F  Heart rate was 98  Respiratory rate was 24  O2 saturation was 91% on CPAP  His BP was 134/63  His WBC count was 17 11  His lactic acid was 8 1  Patient's creatinine was 0 97 with a GFR of 72  Patient was given a COVID-19 PCR which was negative  Blood cultures were sent  He completed a chest x-ray which showed diffuse patchy infiltrates  He was given morphine and Ativan and his chest pain improved  He was started on ceftriaxone and azithromycin  He was placed on BiPAP and was admitted for additional medical management  After his admission his antibiotic regimen was initially transitioned to ceftriaxone  He developed fevers and he was broadened to cefepime, Flagyl, and vancomycin  He completed a total of five days of antibiotic treatment  His troponin was elevated  Cardiology assessed the patient  Concern for non myocardial infarction troponin elevation related to pulmonary edema  He received diuresis  Echocardiogram showed worsening diastolic dysfunction  Patient was ultimately intubated on 08/21/2021  He underwent bronchoscopy on 08/22/2021  BAL with no growth of bacteria  PCP, AFB, viral culture were negative  He had multiple repeat COVID-19 PCRs which were all negative  Patient's renal function worsened  Nephrology was consulted  On 08/28/2021 the patient was extubated to BiPAP and then transitioned to high-flow and then mid flow nasal cannula O2  He developed AFib and required apixaban and amiodarone  On 08/31/2021 he transitioned out of the ICU to med/surge level care  He has had increasing lethargy but is now down but is now down to 2L nasal cannula O2  His WBC count has been steadily increasing over the past two days    He has been hypotensive  His renal function continued to worsen  A temporary HD line was placed by IR and he has been started on dialysis  New blood cultures were sent  He completed a CT of the chest/abdomen/pelvis which showed ongoing ground-glass opacities throughout the lungs, a fluid-filled small bowel and colon, and colonic diverticulosis without acute diverticulitis  He has been started on empiric antibiotic coverage with cefepime and Flagyl  We have been asked for formal consult for leukocytosis  The patient has anxiety, hypertension, hyperlipidemia, GERD, atypical chest pain, vertigo, diverticulosis, allergic rhinitis, functional gait abnormality, chronic cough, transient cerebral ischemia, urinary incontinence, heart murmur, peripheral arterial disease, enlarged prostate, fatty liver, chronic constipation, degenerative arthritis, and deficiency anemia, and aortic stenosis  He has a history of lung atelectasis, pneumonia, lactic acidosis, olecranon bursitis, hyponatremia, adenoidectomy, colonoscopy, inguinal hernia repair, appendectomy, bilateral total knee replacements, sinus surgery, and tonsillectomy  He is a former cigarette smoker and former E cigarette smoker  He has allergies Ace inhibitors  REVIEW OF SYSTEMS:  Patient tells me he is doing okay  He is very eager to be discharged home  Patient denies pain in his chest   He denies difficulty breathing  He tells me he has an occasional cough and that his phlegm is thick  He has hiccups  He denies nausea, vomiting, and abdominal pain  He denies sore throat runny nose  He denies itching and rashes to his skin  A complete 12 point system-based review of systems is otherwise negative      PAST MEDICAL HISTORY:  Past Medical History:   Diagnosis Date    Actinic keratosis     LAST ASSESSED: [id number]    Allergic rhinitis     LAST ASSESSED: [id number]    Anxiety     LAST ASSESSED: [id number]    Anxiety disorder     LAST ASSESSED: 29BUD1290    Atelectasis of right lung     ABNORMAL CT SCAN OF [address] 2016 REPEAT NEEDED PER RADIOLOGY IN 3 MONTHS LAST ASSESSED: [id number]    Atypical chest pain     LAST ASSESSED: 56JJM1446    Benign paroxysmal vertigo     LAST ASSESSED: 20QFW6437    Chronic cough     LAST ASSESSED: 52JEJ0039    Chronic GERD     Degenerative arthritis of knee, bilateral     LAST ASSESSED: 49HHB7557    Diverticulitis of colon     LAST ASSESSED: 70QNS1024    Diverticulosis     LAST ASSESSED: 39OAW8919    Do not resuscitate status 2021    Foreign body, eye     LAST ASSESSED: 40DLW4738    Functional gait abnormality     LAST ASSESSED: 83PEP6244    Hyperlipidemia     Hypertension     Hyponatremia     LAST ASSESSED: 11BCN2722    Lactic acidosis 2021    Leukocytosis     LAST ASSESSED: 88VYB5564    Multifocal pneumonia 2021    Murmur     Newly recognized murmur     LAST ASSESSED: 75SJC7708    Olecranon bursitis, unspecified elbow     Presence of indwelling urethral catheter     RESOLVED: 71DLH0395    Transient cerebral ischemia     LAST ASSESSED: 66KRD9325    Urinary incontinence     LAST ASSESSED: 59VJL4826    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     LAST ASSESSED: 64ZGP2214     Past Surgical History:   Procedure Laterality Date    ADENOIDECTOMY      APPENDECTOMY      COLONOSCOPY  10/2006    FIBEROPTIC    INGUINAL HERNIA REPAIR      JOINT REPLACEMENT      b/l TKR 2015    SINUS SURGERY      TONSILLECTOMY       FAMILY HISTORY:  Non-contributory    SOCIAL HISTORY:  Social History   Social History     Substance and Sexual Activity   Alcohol Use Yes    Comment: occ, SOCIAL     Social History     Substance and Sexual Activity   Drug Use No     Social History     Tobacco Use   Smoking Status Former Smoker    Quit date: 56    Years since quittin 7   Smokeless Tobacco Never Used   Tobacco Comment    2 ppd for 12 years      ALLERGIES:  Allergies   Allergen Reactions  Ace Inhibitors Cough     Pt denied any allergies       MEDICATIONS:  All current active medications have been reviewed  ANTIBIOTICS:  Cefepime 2  Flagyl 2  Antibiotics 2 (previously completed five days of antibiotics on 2021)    PHYSICAL EXAM:  Temp:  [97 3 °F (36 3 °C)-97 6 °F (36 4 °C)] 97 3 °F (36 3 °C)  HR:  [60-97] 91  Resp:  [18-20] 18  BP: ()/(39-53) 105/53  SpO2:  [89 %-95 %] 95 %  Temp (24hrs), Av 5 °F (36 4 °C), Min:97 3 °F (36 3 °C), Max:97 6 °F (36 4 °C)  Current: Temperature: (!) 97 3 °F (36 3 °C)    Intake/Output Summary (Last 24 hours) at 2021 0908  Last data filed at 2021 0452  Gross per 24 hour   Intake 800 ml   Output 725 ml   Net 75 ml     General Appearance:  Appearing tired, somewhat weak, nontoxic, and in no acute distress  Patient has hiccups  Head:  Normocephalic, without obvious abnormality, atraumatic  Eyes:  Conjunctiva pink and sclera anicteric, both eyes  Nose: Nares normal, mucosa normal, no drainage  Throat: Oropharynx moist without lesions  Neck: Supple, symmetrical, no adenopathy, no tenderness/mass/nodules  R temp HD catheter in place, no spreading erythema from insertion site  Lungs:   Mildly coarse in upper lobes and decreased in the bases to auscultation bilaterally, respirations unlabored on nasal cannula O2  Chest Wall:  No tenderness or deformity  Heart:  Tachycardic; no murmur, rub or gallop  Abdomen:   Soft, non-tender, non-distended, positive bowel sounds throughout  Extremities: No cyanosis or clubbing, no edema  Skin: No rashes or lesions  No draining wounds noted  Lymph nodes: Cervical, supraclavicular nodes normal    Neurologic: Alert , oriented to person and place, follows commands, speech is organized, symmetric facial movement  LABS, IMAGING, & OTHER STUDIES:  Lab Results:  I have personally reviewed pertinent labs    Results from last 7 days   Lab Units 21  0441 21  0450 21  0512   WBC Thousand/uL 23 76* 20 20* 11 21*   HEMOGLOBIN g/dL 9 5* 10 5* 9 8*   PLATELETS Thousands/uL 449* 480* 379     Results from last 7 days   Lab Units 09/02/21  0441 09/01/21  0450   POTASSIUM mmol/L 3 6 3 4*   CHLORIDE mmol/L 103 101   CO2 mmol/L 25 25   BUN mg/dL 193* 223*   CREATININE mg/dL 3 76* 3 68*   EGFR ml/min/1 73sq m 14 14   CALCIUM mg/dL 9 5 10 2*   AST U/L 43 43   ALT U/L 70 61   ALK PHOS U/L 89 90     Results from last 7 days   Lab Units 09/01/21  1215 08/28/21  1219   BLOOD CULTURE  Received in Microbiology Lab  Culture in Progress  Received in Microbiology Lab  Culture in Progress  --    SPUTUM CULTURE   --  3+ Growth of Staphylococcus coagulase negative*  3+ Growth of Candida parapsilosis*   GRAM STAIN RESULT   --  1+ Polys*  2+ Gram positive cocci in pairs*  2+ Gram positive cocci in clusters*     08/22/2021 1506 08/24/2021 0710 Bronchial culture and Gram stain [[id number]]   Bronchial from Tracheal, Washing    Final result [facility]  Component Value   Bronchial Culture No growth   Gram Stain Result Rare Polys    This is an appended report  These results have been appended to a previously preliminary verified report  No bacteria seen    This is an appended report  These results have been appended to a previously preliminary verified report  08/22/2021 1503 08/24/2021 0710 Bronchial culture and Gram stain [[id number]]   Bronchial from Lung, Left Upper Lobe Bronchoalveolar Lavage    Final result [address] [facility]  Component Value   Bronchial Culture No growth   Gram Stain Result No Polys or Bacteria seen    This is an appended report  These results have been appended to a previously preliminary verified report                08/22/2021 1501 09/01/2021 1005 Virus culture [[id number]]    Bronchial from Lung, Right Middle Lobe Bronchoalveolar Lavage    Final result [address] [facility] Performed at: [address] [facility] [address]  [id number]   : Sharon Hartley MD, Phone:  [phone]   No virus isolated  Component Value   No component results              08/22/2021 1501 08/24/2021 0713 Bronchial culture and Gram stain [[id number]]   Bronchial from Lung, Right Middle Lobe Bronchoalveolar Lavage    Final result [address] [facility]  Component Value   Bronchial Culture No growth   Gram Stain Result No Polys or Bacteria seen    This is an appended report  These results have been appended to a previously preliminary verified report  08/22/2021 1501 08/31/2021 1741 Pneumocystis smear by DFA [[id number]]   Bronchial from Lung, Right Middle Lobe Bronchoalveolar Lavage    Edited Result - FINAL St  [address] [facility]  Component Value   Pneumocystis Smear, DFA Direct FA negative for Pneumocystis jiroveci (carinii)              08/22/2021 1501 09/01/2021 0629 AFB Culture with Stain [[id number]]   Bronchial from Lung, Right Middle Lobe Bronchoalveolar Lavage    Preliminary result [facility]  Component Value   AFB Culture No growth to date P   AFB Stain No acid fast bacilli seen P        08/20/2021 0104 08/20/2021 0736 Legionella antigen, urine [[id number]]   Urine, Other    Final result [facility]  Component Value   Legionella Urinary Antigen Negative              08/20/2021 0104 08/20/2021 0734 Strep Pneumoniae, Urine [[id number]]   Urine, Other    Final result [facility]  Component Value   Strep pneumoniae antigen, urine Negative          Results from last 7 days   Lab Units 09/01/21  1215 08/27/21  0433   PROCALCITONIN ng/ml 0 17 0 77*     Imaging Studies:   I have personally reviewed pertinent imaging study reports and images in PACS      CT CHEST ABDOMEN PELVIS WO CONTRAST 9/1/2021 - I personally reviewed this image which showed peripheral ground-glass opacities in both lungs  Patient has fluid-filled small bowel and colon  He has colonic diverticulosis without diverticulitis  Kidneys appear unremarkable without hydronephrosis  Bladder appears unremarkable  XR CHEST PORTABLE 9/1/2021 - I personally reviewed this image which showed stable bilateral patchy opacities  XR CHEST PORTABLE ICU 8/30/2021 - I personally reviewed this image which showed stable bilateral patchy opacities  XR CHEST PORTABLE ICU 8/27/2021 - I personally reviewed this image which showed bilateral patchy opacities  ET tube intact  XR CHEST PORTABLE ICU 8/26/2021 - I personally reviewed this image which showed improving aeration in both lung bases  There is an increased right upper lung field consolidation  ET tube intact  XR CHEST PORTABLE 8/25/2021 - I personally reviewed this image which showed bilateral patchy infiltrates  ET tube intact  XR CHEST PORTABLE ICU 8/24/2021 - I personally reviewed this image which showed slight improvement in bilateral patchy opacities  ET tube intact  XR CHEST PORTABLE ICU 8/22/2021 - I personally reviewed this image which showed worsening bilateral airspace opacities  ET tube intact  XR CHEST PORTABLE ICU 8/20/2021 - I personally reviewed this image which showed diffuse patchy airspace disease  Other Studies:   New blood cultures are pending    I have personally reviewed pertinent reports:    08/19/2021 2102 08/19/2021 2221 Novel Coronavirus (Covid-19),PCR [facility] - 2 Hour Stat [[id number]]    Nares from Nasopharyngeal Swab    Final result [facility] The specimen collection materials, transport medium, and/or testing methodology utilized in the production of these test results have been proven to be reliable in a limited validation with an abbreviated program under the Emergency Utilization Authorization provided by the [employer] reported as "Presumptive positive" will be confirmed with secondary testing to ensure result accuracy   Clinical caution and judgement should be used with the interpretation of these results with consideration of the clinical impression and other laboratory testing   Testing reported as "Positive" or "Negative" has been proven to be accurate according to standard laboratory validation requirements   All testing is performed with control materials showing appropriate reactivity at standard intervals  Component Value   SARS-CoV-2 Negative        08/20/2021 0106 08/20/2021 0256 NOVEL CORONAVIRUS (COVID-19), PCR [facility] [[id number]]    Nares from Nose    Final result 24[facility] The specimen collection materials, transport medium, and/or testing methodology utilized in the production of these test results have been proven to be reliable in a limited validation with an abbreviated program under the Emergency Utilization Authorization provided by the [employer]  reported as "Presumptive positive" will be confirmed with secondary testing to ensure result accuracy   Clinical caution and judgement should be used with the interpretation of these results with consideration of the clinical impression and other laboratory testing   Testing reported as "Positive" or "Negative" has been proven to be accurate according to standard laboratory validation requirements   All testing is performed with control materials showing appropriate reactivity at standard intervals   Component Value   SARS-CoV-2 Negative        08/26/2021 1405 08/26/2021 1519 NOVEL CORONAVIRUS (COVID-19), PCR [facility] [[id number]]    Nares from Nose    Final result [facility] The specimen collection materials, transport medium, and/or testing methodology utilized in the production of these test results have been proven to be reliable in a limited validation with an abbreviated program under the Emergency Utilization Authorization provided by the [employer]  reported as "Presumptive positive" will be confirmed with secondary testing to ensure result accuracy   Clinical caution and judgement should be used with the interpretation of these results with consideration of the clinical impression and other laboratory testing   Testing reported as "Positive" or "Negative" has been proven to be accurate according to standard laboratory validation requirements   All testing is performed with control materials showing appropriate reactivity at standard intervals   Component Value   SARS-CoV-2 Negative

## 2021-09-02 NOTE — PHYSICAL THERAPY NOTE
Physical Therapy Cancellation Note    Attempted to see pt  This PM, however pt  Currently undergoing speech evaluation at bedside  Will cancel and continue to follow as appropriate       Onofre Leonardo PTA

## 2021-09-02 NOTE — OCCUPATIONAL THERAPY NOTE
Occupational Therapy  OT tx session cancelled  Pt unavailable at attempted tx time  Will continue to follow as available   GIBSON Gauthier

## 2021-09-02 NOTE — PROGRESS NOTES
Progress Note - Cardiology   Ever Wise 78 y o  male MRN: [id number]  Unit/Bed#: [address] 216-01 Encounter: [id number]    Assessment:  1  Hypoxic respiratory failure requiring prolonged mechanical ventilation, intubated 8/21 and extubated 8/28  Combined multifocal pneumonia/possible aspiration with acute diastolic congestive heart failure  Patient appeared to respond to diuretics but aggressive diuresis resulted in renal failure now on dialysis  2  Moderate aortic stenosis, could be severe with low flow  Peak aortic valve velocity 3 3 M/S with a mean gradient of 22 mmHg 3  Non MI troponin elevation, peak troponin 1 29  3  Paroxysmal atrial fibrillation and flutter on amiodarone and Eliquis  4  From , presenting symptoms was shortness of breath for 3 days becoming severe and accompanied by severe chest discomfort  Patient placed on BiPAP on admission with only limited conversation possible  Patient on BiPAP for at least 36 hours and then was intubated and placed on a mechanical ventilator  EKG beginning August 22, 2021, 3rd day after admission begin showing biphasic in deeply inverted T-waves in the mid precordial leads  However, on the following day August 23, 2021 those T-waves resolved    Plan:  1  After patient further stabilized with a better BUN and is stronger, recommend limited cardiac catheterization to assess his coronary arteries  2  If coronary arteries are normal, recommend dobutamine echocardiogram with assessment of aortic valve velocity, mean gradient and aortic valve area to rule out low-flow severe aortic stenosis  3  Although aortic valve should be investigated, the preserved systolic function, suggests that patient's heart failure was not secondary to aortic stenosis  Heart failure secondary to severe aortic stenosis should be systolic and not diastolic  Interval history:  Patient feels much better since he has had dialysis  BUN is still 193    WBC count is 23760  Patient still remembers the chest discomfort he had on the day of admission      PROBLEM LIST:    Patient Active Problem List    Diagnosis Date Noted    Leukocytosis 09/01/2021    PAD (peripheral artery disease) ([place] [address] ) 08/31/2021    Atrial fibrillation ([place] [address] ) 08/30/2021    Elevated troponin level 08/30/2021    Hypermagnesemia 08/30/2021    Hypernatremia 08/30/2021    CANDIE (acute kidney injury) ([place] [address] ) 08/26/2021    Multifocal pneumonia 08/19/2021    Acute respiratory failure with hypoxia ([place] [address] ) 08/19/2021    Acute on chronic diastolic CHF [facility]congestive heart failure) [facility]) 08/19/2021    Hypochromic microcytic anemia 08/19/2021    Iron deficiency anemia secondary to inadequate dietary iron intake 02/19/2021    Chronic constipation 01/25/2021    Obesity (BMI 30 0-34 9) 07/19/2019    Mild cognitive impairment 01/07/2019    Medicare annual wellness visit, subsequent 04/26/2018    Moderate episode of recurrent major depressive disorder ([place] [address] ) 03/23/2018    PVC (premature ventricular contraction) 03/23/2018    Mixed hyperlipidemia     Essential hypertension     Aortic stenosis, moderate     Enlarged prostate without lower urinary tract symptoms (luts) 03/01/2012    Fatty liver disease, nonalcoholic 56/16/5106       Vitals: /57   Pulse 87   Temp 98 6 °F (37 °C)   Resp 19   Ht 5' 4" (1 626 m)   Wt 73 2 kg (161 lb 6 oz)   SpO2 (!) 89%   BMI 27 70 kg/m²   PREVIOUS WEIGHTS:   Wt Readings from Last 5 Encounters:   09/02/21 73 2 kg (161 lb 6 oz)   07/26/21 83 5 kg (184 lb 2 oz)   03/19/21 82 8 kg (182 lb 9 6 oz)   02/23/21 79 5 kg (175 lb 4 3 oz)   01/25/21 81 6 kg (180 lb)        Labs/Data:        Results from last 7 days   Lab Units 09/02/21  0441 09/01/21  0450 08/30/21  0512   WBC Thousand/uL 23 76* 20 20* 11 21*   HEMOGLOBIN g/dL 9 5* 10 5* 9 8*   HEMATOCRIT % 32 8* 37 9 33 7*   MCV fL 82 82 77*   MCH pg 23 7* 22 8* 22 4*   MCHC g/dL 29 0* 27 7* 29 1*   PLATELETS Thousands/uL 449* 480* 379 Results from last 7 days   Lab Units 09/02/21  0441 09/01/21  1622 09/01/21  0450   EGFR ml/min/1 73sq m 14 14 14   SODIUM mmol/L 145 145 147*   POTASSIUM mmol/L 3 6 3 5 3 4*   CHLORIDE mmol/L 103 101 101   CO2 mmol/L 25 27 25   BUN mg/dL 193* 232* 223*   CREATININE mg/dL 3 76* 3 84* 3 68*     Results from last 7 days   Lab Units 09/02/21  0441 09/01/21  1622 09/01/21  0450 08/31/21  0516 08/30/21  1333 08/26/21 2003   CALCIUM mg/dL 9 5 10 1 10 2* 10 4* 10 7* 9 3   AST U/L 43  --  43  --   --   --    ALT U/L 70  --  61  --   --   --    ALK PHOS U/L 89  --  90  --   --   --    MAGNESIUM mg/dL  --   --   --  4 0* 3 8* 3 0*         Lab Results   Component Value Date    NTBNP 2,737 (H) 08/29/2021    NTBNP 693 (H) 08/19/2021     Lab Results   Component Value Date    CHOLESTEROL 138 03/12/2021    TRIG 272 (H) 08/28/2021    HDL 44 03/12/2021    LDLCALC 71 03/12/2021    HGBA1C 5 5 08/20/2021                Current Facility-Administered Medications:     Acetaminophen (TYLENOL) oral suspension 650 mg, 650 mg, Per NG Tube, Q6H PRN Max 4/day, ANATOLY Small, 650 mg at 08/30/21 7385    amiodarone tablet 200 mg, 200 mg, Oral, TID With Meals, 200 mg at 09/02/21 1618 **FOLLOWED BY** [START ON 9/6/2021] amiodarone tablet 200 mg, 200 mg, Oral, BID With Meals **FOLLOWED BY** [START ON 9/14/2021] amiodarone tablet 200 mg, 200 mg, Oral, Daily With Breakfast, ANATOLY Small    ampicillin-sulbactam (UNASYN) 3 g in sodium chloride 0 9 % 100 mL IVPB, 3 g, Intravenous, Q24H, Amarilys Lassiter MD, Last Rate: 200 mL/hr at 09/02/21 1325, 3 g at 09/02/21 1325    apixaban (ELIQUIS) tablet 2 5 mg, 2 5 mg, Oral, BID, ANATOLY Small, 2 5 mg at 09/02/21 1618    hydrALAZINE (APRESOLINE) injection 10 mg, 10 mg, Intravenous, Q6H PRN, ANATOLY Sosa, 10 mg at 08/25/21 2025    hydrALAZINE (APRESOLINE) tablet 25 mg, 25 mg, Oral, Q8H [address], ANATOLY Small, 25 mg at 08/30/21 0502    isosorbide dinitrate (ISORDIL) tablet 10 mg, 10 mg, Oral, TID after meals, Kendrick Chancy, CRNP, 10 mg at 08/30/21 1830    metoprolol tartrate (LOPRESSOR) partial tablet 12 5 mg, 12 5 mg, Oral, Q12H [facility], Kendrick Alexandr, CRNP, 12 5 mg at 09/01/21 4855    omeprazole (PRILOSEC) suspension 2 mg/mL, 20 mg, Oral, Daily, Ean Espinosa, MARIANANP, 20 mg at 09/02/21 0841    potassium chloride (K-DUR,KLOR-CON) CR tablet 40 mEq, 40 mEq, Oral, Once, Ramona Sheets PA-C    pravastatin (PRAVACHOL) tablet 40 mg, 40 mg, Oral, Daily With Nusrat ANATOLY Guevara, 40 mg at 09/02/21 1618    QUEtiapine (SEROquel) tablet 25 mg, 25 mg, Oral, HS, Ean Espinosa, CRNP, 25 mg at 09/01/21 2128    senna-docusate sodium (SENOKOT S) 8 6-50 mg per tablet 2 tablet, 2 tablet, Oral, BID, Kendrick Chancy, CRNP, 2 tablet at 09/02/21 1618    sodium chloride infusion 0 45 %, 50 mL/hr, Intravenous, Continuous, Kristie Aviles PA-C, Last Rate: 50 mL/hr at 09/02/21 1142, 50 mL/hr at 09/02/21 1142  Allergies   Allergen Reactions    Ace Inhibitors Cough     Pt denied any allergies           Intake/Output Summary (Last 24 hours) at 9/2/2021 1658  Last data filed at 9/2/2021 1142  Gross per 24 hour   Intake 1260 ml   Output 725 ml   Net 535 ml       Invasive Devices     Peripheral Intravenous Line            Peripheral IV 08/31/21 Proximal;Right;Ventral (anterior) Forearm 2 days          Hemodialysis Catheter            HD Temporary Double Catheter 1 day                Review of Systems   Constitutional: Negative for activity change  Respiratory: Negative for cough, chest tightness, shortness of breath and wheezing  Cardiovascular: Negative for chest pain, palpitations and leg swelling  Skin: Negative for color change  Neurological: Positive for weakness  Negative for dizziness, tremors, syncope, light-headedness and headaches  Psychiatric/Behavioral: Negative for agitation and confusion  Physical Exam  Vitals reviewed     Constitutional:       General: He is not in acute distress  Appearance: He is well-developed  HENT:      Head: Normocephalic and atraumatic  Neck:      Thyroid: No thyromegaly  Vascular: No carotid bruit or JVD  Trachea: No tracheal deviation  Cardiovascular:      Rate and Rhythm: Normal rate and regular rhythm  Pulses: Normal pulses  Heart sounds: Murmur heard  No friction rub  No gallop  Comments: Grade 2-3/6 mid peaking systolic ejection murmur radiating to the base of the neck  Pulmonary:      Effort: Pulmonary effort is normal  No respiratory distress  Breath sounds: Normal breath sounds  No wheezing, rhonchi or rales  Chest:      Chest wall: No tenderness  Musculoskeletal:      Cervical back: Normal range of motion and neck supple  Right lower leg: No edema  Left lower leg: No edema  Skin:     General: Skin is warm and dry  Neurological:      General: No focal deficit present  Mental Status: He is alert and oriented to person, place, and time  Gait: Gait normal    Psychiatric:         Mood and Affect: Mood normal          Behavior: Behavior normal          Thought Content: Thought content normal          Judgment: Judgment normal          --------------------------------------------------------------------------------  TREADMILL STRESS  No results found for this or any previous visit      ----------------------------------------------------------------------------------------------  NUCLEAR STRESS TEST: No results found for this or any previous visit  Results for orders placed during the hospital encounter of 17    NM myocardial perfusion spect (rx stress and/or rest)    [address], [address] [facility]  [phone]    Rest/Stress Gated SPECT Myocardial Perfusion Imaging After Regadenoson    Patient: Crissy Sorto  MR number: [id number]  Account number: [de-identified]  : 1940  Age: 76 years  Gender: Male  Status: Outpatient  Location: Stress lab  Height: 63 in  Weight: 192 lb  BP: 156/ 78 mmHg    Allergies: NO KNOWN ALLERGIES    Diagnosis: R07 9 - Chest pain, unspecified    Primary Physician:  Lars Arreguin DO  Technician:  Aleksandra Byers  RN:  Lis Clark RN BSN  Group:  [facility]  Report Prepared By[de-identified]  Lis Clark RN BSN  Interpreting Physician:  William Don DO    INDICATIONS: Detection of coronary artery disease  HISTORY: The patient is a 76year old  male  Chest pain status: chest pain, radiation to neck and jaw area  Coronary artery disease risk factors: dyslipidemia, hypertension, and former smoking  Cardiovascular history: none  significant  Medications: a calcium channel blocker, a diuretic, and aspirin  PHYSICAL EXAM: Baseline physical exam screening: normal lung exam     REST ECG: Normal sinus rhythm  The ECG showed occasional premature ventricular contractions  PROCEDURE: The study was performed in the stress lab  A regadenoson infusion pharmacologic stress test was performed  Gated SPECT myocardial perfusion imaging was performed after stress and at rest  Systolic blood pressure was 156 mmHg, at  the start of the study  Diastolic blood pressure was 78 mmHg, at the start of the study  The heart rate was 85 bpm, at the start of the study  IV double checked  Regadenoson protocol:  HR bpm SBP mmHg DBP mmHg Symptoms  Baseline 85 156 78 none  Immediate 108 153 75 mild dyspnea  5 min 92 156 68 none  No medications or fluids given  The patient also performed low level exercise  STRESS SUMMARY: Duration of pharmacologic stress was 3 min  Maximal heart rate during stress was 108 bpm  The rate-pressure product for the peak heart rate and blood pressure was 26113  There was no chest pain during stress  The stress  test was terminated due to protocol completion  Pre oxygen saturation: 97 %  Peak oxygen saturation: 98 %   The stress ECG was negative for ischemia  Arrhythmia during stress: isolated premature ventricular beats and pairs of premature  ventricular beats  ISOTOPE ADMINISTRATION:  Resting isotope administration Stress isotope administration  Agent Tetrofosmin Tetrofosmin  Dose 10 mCi 33 mCi  Date -- 09/05/2017  Injection time 08:58 10:30  Imaging time 09:40 11:00  Injection-image interval 42 min 30 min    The radiopharmaceutical was injected at the peak effect of pharmacologic stress  MYOCARDIAL PERFUSION IMAGING:  The image quality was good  Left ventricular size was normal  The TID ratio was 0 96  PERFUSION DEFECTS:  -  There were no perfusion defects  GATED SPECT:  The calculated left ventricular ejection fraction was 59 %  Left ventricular ejection fraction was within normal limits by visual estimate  There was no left ventricular regional abnormality  SUMMARY:  -  Stress results: There was no chest pain during stress  -  ECG conclusions: The stress ECG was negative for ischemia  -  Perfusion imaging: There were no perfusion defects   -  Gated SPECT: The calculated left ventricular ejection fraction was 59 %  Left ventricular ejection fraction was within normal limits by visual estimate  There was no left ventricular regional abnormality  IMPRESSIONS: Normal study after pharmacologic vasodilation  Myocardial perfusion imaging was normal at rest and with stress  Left ventricular systolic function was normal     Prepared and signed by    Marybeth Chatterjee DO  Signed 09/05/2017 17:03:37    --------------------------------------------------------------------------------  ECHO:   Results for orders placed during the hospital encounter of 08/19/21    Echo complete with contrast if indicated    [address]  [address]    [facility], [address]  [phone]    Transthoracic Echocardiogram  2D, M-mode, Doppler, and Color Doppler    Study date:  20-Aug-2021    Patient: Kathleen Lance  MR number: [id number]  Account number: [de-identified]  : 1940  Age: 78 years  Gender: Male  Status: Inpatient  Location: Bedside  Height: 64 in  Weight: 187 7 lb  BP: 160/ 73 mmHg    Indications: Heart failure    Diagnoses: I50 9 - Heart failure, unspecified    Sonographer:  Halie Harvey RDCS  Primary Physician:  Ami Giron DO  Referring Physician:  ANATOLY Kumar  Group:  [facility]  Interpreting Physician:  Pamela Velazco DO    SUMMARY    LEFT VENTRICLE:  Systolic function was at the lower limits of normal  Ejection fraction was estimated to be 50 %  There was mild to moderate hypokinesis of the mid-apical septal myocardial wall segments  Wall thickness was mildly increased  Features were likely suggestive of a pseudonormal left ventricular filling pattern, with concomitant abnormal relaxation and increased filling pressure (grade 2 diastolic dysfunction)  LEFT ATRIUM:  The atrium was mildly dilated  MITRAL VALVE:  There was moderate annular calcification  There was mild stenosis (mean diastolic transvalvular gradient ~ 3 3 mmHg at HR 86 BPM)  There was mild regurgitation  AORTIC VALVE:  The valve was trileaflet  Leaflets exhibited markedly increased thickness, marked calcification, markedly reduced cuspal separation, reduced mobility, and sclerosis  There was moderate stenosis  There was mild regurgitation  The peak valve velocity was 3 3 cm/s  Valve mean gradient was 22 mmHg  The aortic valve obstructive index (by VTI) was 0 29  Difficult to accurately estimate aortic valve area due to suboptimal LVOT visualization and measurements  TRICUSPID VALVE:  There was mild regurgitation  AORTA:  The root exhibited mild dilatation (4 1 cm [2 1 cm/m2] at the sinuses of valsalva), and mild fibrocalcific change  SUMMARY MEASUREMENTS  2D measurements:  Unspecified Anatomy:   %FS was 23 9 %  Ao Diam was 3 9 cm  Ao asc was 3 4 cm   EDV(Teich) was 110 5 ml  bedside  This was a routine study  The transthoracic approach was used  The study included complete 2D imaging, M-mode, complete spectral Doppler, and color Doppler  The heart rate was 98 bpm,  at the start of the study  Images were obtained from the parasternal, apical, subcostal, and suprasternal notch acoustic windows  Echocardiographic views were limited due to poor acoustic window availability  This was a technically  difficult study  LEFT VENTRICLE: Size was normal  Systolic function was at the lower limits of normal  Ejection fraction was estimated to be 50 %  There was mild to moderate hypokinesis of the mid-apical septal myocardial wall segments  Wall thickness was  mildly increased  DOPPLER: Features were likely suggestive of a pseudonormal left ventricular filling pattern, with concomitant abnormal relaxation and increased filling pressure (grade 2 diastolic dysfunction)  RIGHT VENTRICLE: The size was normal  Systolic function was normal  Wall thickness was normal     LEFT ATRIUM: The atrium was mildly dilated  RIGHT ATRIUM: Size was normal     MITRAL VALVE: There was moderate annular calcification  DOPPLER: There was mild stenosis (mean diastolic transvalvular gradient ~ 3 3 mmHg at HR 86 BPM)  There was mild regurgitation  AORTIC VALVE: The valve was trileaflet  Leaflets exhibited markedly increased thickness, marked calcification, markedly reduced cuspal separation, reduced mobility, and sclerosis  DOPPLER: There was moderate stenosis  There was mild  regurgitation  TRICUSPID VALVE: Not well visualized  DOPPLER: There was mild regurgitation  PULMONIC VALVE: Not well visualized  PERICARDIUM: There was no pericardial effusion  The pericardium was normal in appearance  AORTA: The root exhibited mild dilatation (4 1 cm [2 1 cm/m2] at the sinuses of valsalva), and mild fibrocalcific change  SYSTEMIC VEINS: IVC: The inferior vena cava was not well visualized      MEASUREMENT TABLES    DOPPLER MEASUREMENTS  Aortic valve   (Reference normals)  Peak david   3 3 cm/s   (--)  Mean gradient   22 mmHg   (--)  Obstr index, VTI   0 29    (--)    SYSTEM MEASUREMENT TABLES    2D  %FS: 23 9 %  Ao Diam: 3 9 cm  Ao asc: 3 4 cm  EDV(Teich): 110 5 ml  EF(Teich): 47 6 %  ESV(Teich): 57 9 ml  IVSd: 1 2 cm  LA Diam: 4 5 cm  LAAs A2C: 27 1 cm2  LAAs A4C: 26 2 cm2  LAESV A-L A2C: 101 1 ml  LAESV A-L A4C: 92 8 ml  LAESV Index (A-L): 51 1 ml/m2  LAESV MOD A2C: 98 2 ml  LAESV MOD A4C: 86 6 ml  LAESV(A-L): 97 5 ml  LAESV(MOD BP): 92 3 ml  LAESVInd MOD BP: 48 4 ml/m2  LALs A2C: 6 2 cm  LALs A4C: 6 3 cm  LVEDV MOD A4C: 195 6 ml  LVEF MOD A4C: 42 1 %  LVESV MOD A4C: 113 2 ml  LVIDd: 4 9 cm  LVIDs: 3 7 cm  LVLd A4C: 10 6 cm  LVLs A4C: 9 6 cm  LVOT Diam: 2 6 cm  LVPWd: 0 9 cm  RAAs A4C: 21 1 cm2  RAESV A-L: 66 8 ml  RAESV MOD: 65 4 ml  RALs: 5 7 cm  RVIDd: 4 7 cm  RWT: 0 4  SV MOD A4C: 82 4 ml  SV(Teich): 52 6 ml    CW  AV Env  Ti: 267 9 ms  AV VTI: 59 6 cm  AV Vmax: 2 9 m/s  AV Vmean: 2 2 m/s  AV maxP 5 mmHg  AV meanP 6 mmHg  MV VTI: 35 cm  MV Vmax: 1 2 m/s  MV Vmean: 0 9 m/s  MV maxP mmHg  MV meanPG: 3 4 mmHg  TR Vmax: 3 2 m/s  TR maxP mmHg    MM  TAPSE: 2 4 cm    PW  GREGORY (VTI): 1 7 cm2  GREGORY Vmax: 1 8 cm2  AVAI (VTI): 0 cm2/m2  AVAI Vmax: 0 cm2/m2  DVI: 0 3  E' Sept: 0 m/s  E/E' Sept: 31 6  LVOT Env  Ti: 308 3 ms  LVOT VTI: 20 3 cm  LVOT Vmax: 1 m/s  LVOT Vmean: 0 7 m/s  LVOT maxP 2 mmHg  LVOT meanP 1 mmHg  LVSI Dopp: 54 5 ml/m2  LVSV Dopp: 104 1 ml  MV A David: 1 2 m/s  MV Dec [place]: 7 1 m/s2  MV DecT: 204 7 ms  MV E David: 1 4 m/s  MV E/A Ratio: 1 2  MV PHT: 59 4 ms  MVA (VTI): 3 cm2  MVA By PHT: 3 7 cm2    [employer] Accredited Echocardiography Laboratory    Prepared and electronically signed by    Destiney Zeng DO  Signed 20-Aug-2021 14:28:06    No results found for this or any previous visit     --------------------------------------------------------------------------------  HOLTER  Results for orders placed during the hospital encounter of 18    Holter monitor - 24 hour    Narrative  PT NAME: Sohail Sosa  : 1940  AGE: [de-identified] y o  GENDER: male  MRN: [id number]   PROCEDURE: Holter monitor - 24 hour        INDICATIONS: Palpitations      FINDINGS:    Holter monitoring revealed predominant sinus rhythm with an average heart rate of 85 BPM, a minimum heart rate of 67 BPM, and a maximum heart rate of 117 BPM     There were about 301 ventricular ectopic beats, all occurring as single PVC's with no evidence of ventricular tachycardia  There were about 1825 supraventricular ectopic beats, mostly occurring as single PAC's and a 6-beat atrial run, with no evidence of sustained supraventricular tachycardia, or any atrial fibrillation/ flutter  There was no evidence of significant bradyarrhythmia or advanced heart block  The longest R-R interval was 1 2 seconds  The patient's symptom diary reported no symptoms  No results found for this or any previous visit     ======================================================     Imaging:   I have personally reviewed pertinent reports

## 2021-09-02 NOTE — PROGRESS NOTES
[facility]  Progress Note [place] Narrow 1940, 78 y o  male MRN: [id number]  Unit/Bed#: [address] 216-01 Encounter: [id number]  Primary Care Provider: Brandon Bliss DO   Date and time admitted to hospital: 8/19/2021  8:25 PM    * Acute respiratory failure with hypoxia [facility])  Assessment & Plan  · In the ED patient was noted to be hypoxic, in respiratory distress  Patient improved with lasix, bipap, morphine and ativan  · Admission Chest x-ray concerning for vascular congestion, multifocal pneumonia  · Patient was admitted to the critical care team, initially on BiPAP, and requiring intubation 8/21  · Patient was extubated 8/28 and transition to high-flow oxygen  · He was diuresed for his acute/chronic diastolic congestive heart failure exacerbation, and was given Lasix infusion  · He was diagnosed with multifocal pneumonia and completed a 5 day course of antibiotics  · He was noted to have dysphagia and was on a dental soft, nectar thick diet, followed by speech therapy  · Patient's hypoxia continues to improve  · 8/31:  He was down to 6 L mid flow oxygen  · Today:  down to 2 L nasal cannula  · Continue to titrate as needed     Leukocytosis  Assessment & Plan  -patient initially had a leukocytosis, secondary to multifocal pneumonia  -it had progressively improved with his course of antibiotics  -8/31 white blood cell count had improved to 11  -9/1 white blood cell count accelerated to 20    Medications reviewed, patient has not received any steroids or other medications that would accelerate his white blood cell count  -patient with new abdominal discomfort on exam  -currently evaluate source of leukocytosis  -a) pulmonary:  Patient with recent lethargy, and dysphagia, at risk for aspiration pneumonia  -restarted empiric antibiotics with cefepime/Flagyl:  Transitioned to Unasyn by ID  -9/1 repeat blood cultures negative x2  -9/1 CT scan chest: "Limited study due to motion artifact  Extensive groundglass opacities throughout both lungs, compatible with nonspecific infectious process, including COVID 19 pneumonia    Fluid-filled small bowel and colon which may represent nonspecific gastroenteritis  Colonic diverticulosis without diverticulitis "  b) GI:  Mild abdominal pain:  Fluid-filled colon  Patient with small amount of loose stool, not consistent with C diff  -start probiotic  c) patient with suprapubic tenderness:    -negative urinalysis  -continue urinary retention protocol:  -current bladder scan without significant retention  -appreciate Infectious Disease teams evaluation and recommendations:  Patient's leukocytosis may be secondary to aspiration pneumonitis, rather than bacterial aspiration pneumonia in light of normal procalcitonin  -currently on Unasyn:  Re-evaluate in a m   -will also recheck peripheral smear:  No evidence of blasts or leukemic cells, appears neutrophilic predominance          Atrial fibrillation ([facility] [address] )  Assessment & Plan  -patient was diagnosed with new onset atrial fibrillation with rapid ventricular response  -currently back in normal sinus rhythm  -appreciate cardiology evaluation recommendations  -patient was loaded on amiodarone 200 mg p o  t i d   -anticoagulation is indicated with chads Vasc score of 4:  Continue Eliquis 2 5 b i d   -fall precautions, continue PT for gait evaluation  -discontinued aspirin  -2D echocardiogram 8/27:  Left ventricular ejection fraction 52%    Probable moderate aortic stenosis and mild aortic regurgitation  -normal TSH  -cardiology recommend catheterization for ischemic testing in the future once patient's acute illness has stabilized    CANDIE (acute kidney injury) ([facility] [address] )  Assessment & Plan  · Patient's previous creatinine appears to range 0 8-1 0  · He developed acute kidney injury during his hospitalization, while he was critically ill  · He was evaluated by the nephrology team:  His acute kidney injury is likely secondary to ATN from his infection, and hypotension  Also concern for underlying cardiorenal syndrome  · Patient was placed on a Lasix drip, since discontinued  · Creatinine has continue to worsen to 3 68 on 9/1, with elevated anion gap  · Bladder scan performed  No significant urinary retention  · 9/1:  Patient had dialysis catheter placed IR and initiated dialysis, he only tolerated 50 minutes due to subsequent hypotension  · Patient was evaluated by the nephrology team today:  Not uremic, increase urine output, and improved mental status:  Dialysis deferred for today  · IV fluids with 1/2 normal saline at 50 cc/h    Acute on chronic diastolic CHF (congestive heart failure) ([facility] [address] )  Assessment & Plan  Wt Readings from Last 3 Encounters:   09/02/21 73 2 kg (161 lb 6 oz)   07/26/21 83 5 kg (184 lb 2 oz)   03/19/21 82 8 kg (182 lb 9 6 oz)     · Patient presented with an acute/chronic diastolic congestive heart failure exacerbation, with signs of volume overload, initial imaging chest x-ray concerning for vascular congestion  · Most recent echo in 2018 showed grade 1 diastolic dysfunction  · Repeated echo 8/27 with left ventricular ejection fraction 50%  Mild-moderate hypokinesis of the mid-apical septal segment, grade 2 diastolic dysfunction  · Patient was placed on a Lasix infusion while in the ICU, since discontinued  · Appreciate cardiology/nephrology evaluation and fluid management  · Hypovolemic, IV fluids given  · Currently slightly hypovolemic today:   On cautious IV fluids at 45 cc/hour  · Appreciate coordination with Nephrology and Cardiology Services        Multifocal pneumonia  Assessment & Plan  · Patient was admitted with multifocal pneumonia  · Chest x-ray 8/20:  Diffuse patchy airspace disease bilaterally concerning multifocal infiltrates  · Diagnosed with CAP versus aspiration  · Patient completed 5 day course of antibiotics under the guidance of the pulmonary team  · Tested negative for COVID-19 x3  · Legionella and strep pneumo antigens negative  · Blood cultures shows no growth  · Bronchial lavage culture also shows no growth  Negative AFB and pneumocystis  · Sputum culture with coag-negative staph and Candida, likely colonization  · Patient has clinically improved, and his eukocytosis had improved daily and on 08/31 was 11  · Leukocytosis drastically worsened 9/1 up to 20,(no steroids given):   · Repeat CT scan chest/abdomen/pelvis on 9/1 with extensive ground-glass opacities throughout both lungs, fluid-filled small bowel and colon  · Patient noted to be at risk for recurrent pneumonia due to lethargy and dysphagia:    · 9/1 Restared cefepime/Flagyl for empiric coverage, pancultures ordered:    · Conferred with the Infectious Disease team:  Antibiotics changed to Unasyn  · Procalcitonin 0 17-->0 12, indicating lower likelihood of bacterial infection        PAD (peripheral artery disease) ([facility] [address] )  Assessment & Plan  -patient noted to have a dusky left foot while in the ICU  -LEADS performed:  Left lower extremity NICK 1 38  Great toe pressure within the healing range   -vascular surgery consult not required  -patient with palpable pulses  -cont eliquis  -continue monitor    Hypernatremia  Assessment & Plan  Patient with hypernatremia secondary to free water deficit and dehydration  Slightly improved with IV fluids    Mild cognitive impairment  Assessment & Plan  · Delirium precautions    Aortic stenosis, moderate  Assessment & Plan  · Patient with moderate aortic stenosis  · Appreciate cardiology evaluation and recommendations:  Once patient is stabilized, cardiac catheterization is recommended to evaluate coronary arteries    · If coronary arteries are normal, Cardiology recommends dobutamine echocardiogram with assessment of aortic valve, to rule out low-flow severe aortic stenosis    Essential hypertension  Assessment & Plan  · Patient's history of essential hypertension  · Blood pressure adequately controlled  · Currently prescribed hydralazine 25 mg q 8 hours, isosorbide 10 mg t i d , metoprolol 12 5 mg q 12 hours  · Hydralazine and isosorbide with hyper hold parameters of a systolic of 325  · Continue to monitor, titrate, to avoid any hypotension  · Medications will be held prior to dialysis, per policy  · May need to discontinue hydralazine/isosorbide if patient has borderline blood pressure    Sepsis (HCC)-resolved as of 8/30/2021  Assessment & Plan  -patient was diagnosed with sepsis secondary to multifocal pneumonia, admitted to the critical care team   -he was treated with antibiotics and improved          Family:  Updated son Tonya Mckenzie and significant other Murray Cole at the bedside, along with the Nephrology PA  Reviewed all test results and tx plan    dw pts nurse  irving   irving Nephrology PA: Hema Raphael PA-C  D/w ID: [email or web address] [id number]    VTE Pharmacologic Prophylaxis: RX contraindicated due to: eliquis  VTE Mechanical Prophylaxis: sequential compression device        Certification Statement: The patient will continue to require additional inpatient hospital stay due to need for further acute intervention for chiquis    Status: inpatient     ===================================================================    Subjective:  Patient relates he feels better today  He is currently being fed breakfast by his significant other  He denies any pain anywhere  He denies any shortness of breath or cough  He denies any abdominal pain  He denies any nausea or vomiting  Notes he had loose bowels after the laxative  Physical Exam:   Temp:  [97 3 °F (36 3 °C)-98 6 °F (37 °C)] 98 6 °F (37 °C)  HR:  [87-91] 87  Resp:  [18-19] 19  BP: (103-119)/(44-57) 119/57    Gen:  Pleasant, non-tachypnic, non-dyspnic  Conversant  Heart: regular rate and rhythm, S1S2 present, no murmur, rub or gallop  Lungs: decreased BS bilaterally  Decreased air movement  bilat rhonchi  No wheezing  No crackles   No accessory muscle use or respiratory distress  Abd: soft, non-tender, non-distended  NABS, no guarding, rebound or peritoneal signs  Extremities: no clubbing, cyanosis or edema  1+pedal pulses bilaterally  Full range of motion  Neuro: awake, alert  Less lethargic   interactive  Skin: warm and dry: no petechiae, purpura and rash  LABS:   Results from last 7 days   Lab Units 09/02/21  0441 09/01/21  0450 08/30/21  0512   WBC Thousand/uL 23 76* 20 20* 11 21*   HEMOGLOBIN g/dL 9 5* 10 5* 9 8*   HEMATOCRIT % 32 8* 37 9 33 7*   PLATELETS Thousands/uL 449* 480* 379     Results from last 7 days   Lab Units 09/02/21  0441 09/01/21  1622 09/01/21  0450   POTASSIUM mmol/L 3 6 3 5 3 4*   CHLORIDE mmol/L 103 101 101   CO2 mmol/L 25 27 25   BUN mg/dL 193* 232* 223*   CREATININE mg/dL 3 76* 3 84* 3 68*   CALCIUM mg/dL 9 5 10 1 10 [address] [place] [address] AND CLINICS Data:  9/1 CT chest/abdomen/pelvis:  Limited study due to motion artifact  Extensive groundglass opacities throughout both lungs, compatible with nonspecific infectious process, including COVID 19 pneumonia  Fluid-filled small bowel and colon which may represent nonspecific gastroenteritis  Colonic diverticulosis without diverticulitis    9/1 chest x-ray:  Stable patchy bilateral airspace disease     8/30 LEADS  RIGHT LOWER LIMB  Ankle/Brachial Index: 1 40  which is in the unreliable range  PPG/PVR Tracings are normal   Triphasic waveforms at the ankle  Metatarsal Pressure 166 mmHg  Great Toe Pressure: 146 mmHg, within the healing range  LEFT LOWER LIMB  Ankle/Brachial Index: 1 38 which is in the normal range  PPG/PVR Tracings are normal   Triphasic waveforms at the ankle  Metatarsal Pressure 117 mmHg  Great Toe Pressure: 110 mmHg, within the healing range      8/29 chest x-ray  Stable patchy bilateral airspace disease question related to pulmonary edema or infiltrate     8/27 chest x-ray  No significant change in bilateral airspace opacities    Stable life-support tubes     8/26 chest x-ray  Increasing right peripheral consolidation with improving aeration at the lung bases      8/25 chest x-ray  Persistent bilateral infiltrates suspicious for multifocal pneumonia  Typical endotracheal tube and enteric tube  Increased gastric distention     8/23 chest x-ray  Slight improvement in bilateral parenchymal infiltrative changes   ET tube now at the brent     8/21 chest x-ray  Worsening bilateral parenchymal changes  Endotracheal tube in the right main bronchus to be pulled back   If this has been pulled back a repeat x-ray for confirmation as indicated clinically      8/19 chest x-ray  Diffuse patchy airspace disease bilaterally   Consider CHF versus multifocal infiltrate     8/27 echocardiogram  LEFT VENTRICLE:  Systolic function was at the lower limits of normal  Ejection fraction was estimated to be 52 %  This study was inadequate for the evaluation of regional wall motion  Wall thickness was moderately increased     8/20 echocardiogram  LEFT VENTRICLE:  Systolic function was at the lower limits of normal  Ejection fraction was estimated to be 50 %  There was mild to moderate hypokinesis of the mid-apical septal myocardial wall segments  Wall thickness was mildly increased  Features were likely suggestive of a pseudonormal left ventricular filling pattern, with concomitant abnormal relaxation and increased filling pressure (grade 2 diastolic dysfunction)    Moderate aortic stenosis   Mild mitral regurgitation     Microbiology  9/1 blood culture:  Blood culture negative x2  8/28 sputum culture:   coag-negative Staph, Candida  8/26 COVID:  Negative  8/22 bronchoscopy/BAL:  No growth  8/22 bronchial culture:  No growth  8/22 bronchoscopy viral culture:  No growth  8/22 bronchoscopy viral culture:  No results  8/22 BAL culture:  No growth  8/22 BAL pneumocystis:  Negative  8/22 BAL AFB:  Negative  8/20 COVID:  Negative  8/20-Legionella/strep pneumoniae  8/19 blood culture:  Negative x2  8/19 COVID:  Negative             ---------------------------------------------------------------------------------------------------------------  This note has been constructed using a voice recognition system

## 2021-09-02 NOTE — PROGRESS NOTES
NEPHROLOGY PROGRESS NOTE   Tania Lipscomb 78 y o  male MRN: [id number]  Unit/Bed#: [place] [address] -01 Encounter: [id number]      ASSESSMENT/PLAN:  1  Acute kidney injury:  Suspect ATN from infection, relative hypotension +/- CRS   Baseline creatinine <1 and creatinine was 0 9 on admission  Creatinine peaked yesterday at 3 8 with BUN of 232  Repeat UA yesterday was bland  Yesterday due to confusion and azotemia he was started on dialysis  Did not tolerate dialysis well with significant hypotension and treatment was terminated after 50 minute  Given improvement in mental status, improvement in urine output and slight improvement in labs will hold dialysis today  Want to avoid more intra dialytic hypotension which could slow down his renal recovery  Will reassess tomorrow for further dialysis needs  Restart maintenance fluid--half-normal saline at 50 cc/hour  2  Azotemia:  BUN trending down to 193 today  Suspect prerenal related to prior Lasix drip  Will restart gentle fluid  3  Hyponatremia:  Sodium down to 145 today  Previously was on D5W  Will start half-normal saline today  Also encourage oral intake  4  Hypokalemia:  Potassium improving to 3 6 today  5  Acute on chronic Grade 2 diastolic CHF and moderate aortic stenosis:  CT did not show any signs of volume overload  Previously on Lasix drip  Monitor closely on gentle IV fluids  6  Hypoxic respiratory failure:  Improving and down to 2 L nasal cannula today  CT revealed extensive ground-glass opacities throughout both lungs compatible with nonspecific infectious process  7  Sepsis:  Initially due to multifocal pneumonia and status post antibiotics  COVID negative  Now with worsening leukocytosis so CT scan was ordered by primary team yesterday which showed nonspecific ground-glass opacity  · Started back on Flagyl and cefepime  · Repeat blood cultures pending  8  Anemia:  Hemoglobin 9 5 today   Status post Venofer earlier in the admission    Plan Summary:    Hold further dialysis today and re-evaluate tomorrow   Start 1/2 NS at 50cc/h    Strict I/O    Check am BMP     SUBJECTIVE:  Much more awake and alert this morning  Discussed with significant other bedside and he has been eating and drinking better this morning as well  His urine output also seems to be improving      OBJECTIVE:  Current Weight: Weight - Scale: 73 2 kg (161 lb 6 oz)  Vitals:    09/02/21 0921   BP:    Pulse:    Resp:    Temp:    SpO2: 93%     General:  No acute distress  Skin:  No rash  Eyes:  Sclerae anicteric  ENT:  Moist mucous membranes  Neck:  Trachea midline   Chest:  Coarse breath sounds bilaterally  CVS:  Regular rate and rhythm  Abdomen:  Soft, nontender, nondistended  Extremities:  No edema  Neuro:  Awake and alert  Psych:  Appropriate affect    Medications:  Scheduled Meds:  Current Facility-Administered Medications   Medication Dose Route Frequency Provider Last Rate    Acetaminophen  650 mg Per NG Tube Q6H PRN Max 4/day ANATOLY Zarate      [START ON 9/14/2021] amiodarone  200 mg Oral Daily With Breakfast ANATOLY Small      Followed by   Maxi Gomez amiodarone  200 mg Oral TID With Meals ANATOLY Zarate      Followed by   Tha [employer] ON 9/6/2021] amiodarone  200 mg Oral BID With Meals ANATOLY Zarate      apixaban  2 5 mg Oral BID ANATOLY Zarate      cefepime  1,000 mg Intravenous Q12H Jasmin Oliveira MD 1,000 mg (09/01/21 2043)    hydrALAZINE  10 mg Intravenous Q6H PRN ANATOLY Zarate      hydrALAZINE  25 mg Oral [facility] ANATOLY Almodovar      isosorbide dinitrate  10 mg Oral TID after meals ANATOLY Small      metoprolol tartrate  12 5 mg Oral Q12H [address] ANATOLY Small      metroNIDAZOLE  500 mg Oral Q8H [address], [place]      omeprazole (PRILOSEC) suspension 2 mg/mL  20 mg Oral Daily ANATOLY Small      potassium chloride  40 mEq Oral Once Ramona Sheets PA-C      pravastatin  40 mg Oral Daily With Herzio Leatha Pina Ord, ANATOLY      QUEtiapine  25 mg Oral HS Darral Clas, [address]      senna-docusate sodium  2 tablet Oral BID ANATOLY Casillas         PRN Meds:   Acetaminophen    hydrALAZINE    Laboratory Results:  Results from last 7 days   Lab Units 09/02/21  0441 09/01/21  1622 09/01/21  0450 08/31/21  1802 08/31/21  0516 08/30/21  1845 08/30/21  1333 08/30/21  0512 08/29/21  0509 08/28/21  0456 08/27/21  0420 08/26/21 2003   WBC Thousand/uL 23 76*  --  20 20*  --   --   --   --  11 21* 13 66* 14 02* 17 96*  --    HEMOGLOBIN g/dL 9 5*  --  10 5*  --   --   --   --  9 8* 9 4* 9 7* 9 2*  --    HEMATOCRIT % 32 8*  --  37 9  --   --   --   --  33 7* 32 1* 33 7* 32 0*  --    PLATELETS Thousands/uL 449*  --  480*  --   --   --   --  379 355 334 281  --    SODIUM mmol/L 145 145 147* 150* 149* 146* 145 147* 144 144 143 142   POTASSIUM mmol/L 3 6 3 5 3 4* 3 5 3 6 3 2* 2 8* 3 0* 2 4* 3 1* 3 7 4 5   CHLORIDE mmol/L 103 101 101 104 104 100 99* 100 98* 99* 103 103   CO2 mmol/L 25 27 25 29 28 33* 34* 34* 35* 30 32 29   BUN mg/dL 193* 232* 223* 213* 196* 191* 180* 165* 148* 142* 109* 96*   CREATININE mg/dL 3 76* 3 84* 3 68* 3 57* 2 94* 2 34* 2 20* 1 99* 2 21* 2 32* 2 25* 2 21*   CALCIUM mg/dL 9 5 10 1 10 2* 9 9 10 4* 10 6* 10 7* 10 3* 9 5 9 3 9 2 9 3   MAGNESIUM mg/dL  --   --   --   --  4 0*  --  3 8*  --   --   --   --  3 0*

## 2021-09-02 NOTE — ASSESSMENT & PLAN NOTE
· In the ED patient was noted to be hypoxic, in respiratory distress  Patient improved with lasix, bipap, morphine and ativan     · Admission Chest x-ray concerning for vascular congestion, multifocal pneumonia  · Patient was admitted to the critical care team, initially on BiPAP, and requiring intubation 8/21  · Patient was extubated 8/28 and transition to high-flow oxygen  · He was diuresed for his acute/chronic diastolic congestive heart failure exacerbation, and was given Lasix infusion  · He was diagnosed with multifocal pneumonia and completed a 5 day course of antibiotics  · He was noted to have dysphagia and was on a dental soft, nectar thick diet, followed by speech therapy  · Patient's hypoxia continues to improve  · 8/31:  He was down to 6 L mid flow oxygen  · Today:  down to 2 L nasal cannula  · Continue to titrate as needed

## 2021-09-03 LAB
ALBUMIN SERPL BCP-MCNC: 3.3 G/DL (ref 3.5–5)
ALP SERPL-CCNC: 85 U/L (ref 46–116)
ALT SERPL W P-5'-P-CCNC: 55 U/L (ref 12–78)
ANION GAP SERPL CALCULATED.3IONS-SCNC: 17 MMOL/L (ref 4–13)
AST SERPL W P-5'-P-CCNC: 38 U/L (ref 5–45)
BASOPHILS # BLD MANUAL: 0 THOUSAND/UL (ref 0–0.1)
BASOPHILS NFR MAR MANUAL: 0 % (ref 0–1)
BILIRUB SERPL-MCNC: 0.54 MG/DL (ref 0.2–1)
BUN SERPL-MCNC: 180 MG/DL (ref 5–25)
CALCIUM ALBUM COR SERPL-MCNC: 10.1 MG/DL (ref 8.3–10.1)
CALCIUM SERPL-MCNC: 9.5 MG/DL (ref 8.3–10.1)
CHLORIDE SERPL-SCNC: 103 MMOL/L (ref 100–108)
CO2 SERPL-SCNC: 25 MMOL/L (ref 21–32)
CREAT SERPL-MCNC: 3.16 MG/DL (ref 0.6–1.3)
EOSINOPHIL # BLD MANUAL: 0 THOUSAND/UL (ref 0–0.4)
EOSINOPHIL NFR BLD MANUAL: 0 % (ref 0–6)
ERYTHROCYTE [DISTWIDTH] IN BLOOD BY AUTOMATED COUNT: 25.5 % (ref 11.6–15.1)
GFR SERPL CREATININE-BSD FRML MDRD: 17 ML/MIN/1.73SQ M
GLUCOSE SERPL-MCNC: 116 MG/DL (ref 65–140)
HCT VFR BLD AUTO: 34 % (ref 36.5–49.3)
HGB BLD-MCNC: 9.8 G/DL (ref 12–17)
HYPERCHROMIA BLD QL SMEAR: PRESENT
LYMPHOCYTES # BLD AUTO: 15 % (ref 14–44)
LYMPHOCYTES # BLD AUTO: 3.2 THOUSAND/UL (ref 0.6–4.47)
MACROCYTES BLD QL AUTO: PRESENT
MAGNESIUM SERPL-MCNC: 3.1 MG/DL (ref 1.6–2.6)
MCH RBC QN AUTO: 23.8 PG (ref 26.8–34.3)
MCHC RBC AUTO-ENTMCNC: 28.8 G/DL (ref 31.4–37.4)
MCV RBC AUTO: 83 FL (ref 82–98)
MONOCYTES # BLD AUTO: 0.43 THOUSAND/UL (ref 0–1.22)
MONOCYTES NFR BLD: 2 % (ref 4–12)
NEUTROPHILS # BLD MANUAL: 17.7 THOUSAND/UL (ref 1.85–7.62)
NEUTS SEG NFR BLD AUTO: 83 % (ref 43–75)
PLATELET # BLD AUTO: 426 THOUSANDS/UL (ref 149–390)
PLATELET BLD QL SMEAR: ADEQUATE
PMV BLD AUTO: 11.6 FL (ref 8.9–12.7)
POTASSIUM SERPL-SCNC: 3.1 MMOL/L (ref 3.5–5.3)
PROT SERPL-MCNC: 7.4 G/DL (ref 6.4–8.2)
RBC # BLD AUTO: 4.11 MILLION/UL (ref 3.88–5.62)
RBC MORPH BLD: PRESENT
SODIUM SERPL-SCNC: 145 MMOL/L (ref 136–145)
WBC # BLD AUTO: 21.32 THOUSAND/UL (ref 4.31–10.16)

## 2021-09-03 PROCEDURE — 83735 ASSAY OF MAGNESIUM: CPT | Performed by: NURSE PRACTITIONER

## 2021-09-03 PROCEDURE — 99232 SBSQ HOSP IP/OBS MODERATE 35: CPT

## 2021-09-03 PROCEDURE — 80053 COMPREHEN METABOLIC PANEL: CPT | Performed by: INTERNAL MEDICINE

## 2021-09-03 PROCEDURE — 99232 SBSQ HOSP IP/OBS MODERATE 35: CPT | Performed by: INTERNAL MEDICINE

## 2021-09-03 PROCEDURE — 85027 COMPLETE CBC AUTOMATED: CPT | Performed by: INTERNAL MEDICINE

## 2021-09-03 PROCEDURE — 85007 BL SMEAR W/DIFF WBC COUNT: CPT | Performed by: INTERNAL MEDICINE

## 2021-09-03 RX ORDER — POTASSIUM CHLORIDE 20MEQ/15ML
20 LIQUID (ML) ORAL ONCE
Status: COMPLETED | OUTPATIENT
Start: 2021-09-03 | End: 2021-09-03

## 2021-09-03 RX ORDER — POTASSIUM CHLORIDE 20MEQ/15ML
40 LIQUID (ML) ORAL ONCE
Status: COMPLETED | OUTPATIENT
Start: 2021-09-03 | End: 2021-09-03

## 2021-09-03 RX ORDER — LIDOCAINE 50 MG/G
1 PATCH TOPICAL DAILY
Status: DISCONTINUED | OUTPATIENT
Start: 2021-09-04 | End: 2021-09-17 | Stop reason: HOSPADM

## 2021-09-03 RX ORDER — POTASSIUM CHLORIDE 20 MEQ/1
40 TABLET, EXTENDED RELEASE ORAL ONCE
Status: DISCONTINUED | OUTPATIENT
Start: 2021-09-03 | End: 2021-09-03

## 2021-09-03 RX ORDER — SACCHAROMYCES BOULARDII 250 MG
250 CAPSULE ORAL 2 TIMES DAILY
Status: DISCONTINUED | OUTPATIENT
Start: 2021-09-03 | End: 2021-09-17 | Stop reason: HOSPADM

## 2021-09-03 RX ORDER — POTASSIUM CHLORIDE 20MEQ/15ML
10 LIQUID (ML) ORAL ONCE
Status: COMPLETED | OUTPATIENT
Start: 2021-09-03 | End: 2021-09-03

## 2021-09-03 RX ORDER — ALPRAZOLAM 0.25 MG/1
0.25 TABLET ORAL
Status: DISCONTINUED | OUTPATIENT
Start: 2021-09-03 | End: 2021-09-17 | Stop reason: HOSPADM

## 2021-09-03 RX ORDER — POTASSIUM CHLORIDE 750 MG/1
10 TABLET, EXTENDED RELEASE ORAL ONCE
Status: DISCONTINUED | OUTPATIENT
Start: 2021-09-03 | End: 2021-09-03

## 2021-09-03 RX ADMIN — AMIODARONE HYDROCHLORIDE 200 MG: 200 TABLET ORAL at 09:14

## 2021-09-03 RX ADMIN — Medication 250 MG: at 17:16

## 2021-09-03 RX ADMIN — POTASSIUM CHLORIDE 20 MEQ: 20 SOLUTION ORAL at 12:36

## 2021-09-03 RX ADMIN — APIXABAN 2.5 MG: 2.5 TABLET, FILM COATED ORAL at 09:14

## 2021-09-03 RX ADMIN — PRAVASTATIN SODIUM 40 MG: 40 TABLET ORAL at 17:16

## 2021-09-03 RX ADMIN — Medication 12.5 MG: at 09:14

## 2021-09-03 RX ADMIN — SODIUM CHLORIDE 50 ML/HR: 0.45 INJECTION, SOLUTION INTRAVENOUS at 09:13

## 2021-09-03 RX ADMIN — Medication 12.5 MG: at 21:06

## 2021-09-03 RX ADMIN — Medication 20 MG: at 09:15

## 2021-09-03 RX ADMIN — AMIODARONE HYDROCHLORIDE 200 MG: 200 TABLET ORAL at 17:17

## 2021-09-03 RX ADMIN — POTASSIUM CHLORIDE 10 MEQ: 20 SOLUTION ORAL at 09:15

## 2021-09-03 RX ADMIN — SODIUM CHLORIDE 75 ML/HR: 234 INJECTION, SOLUTION, CONCENTRATE INTRAVENOUS at 17:17

## 2021-09-03 RX ADMIN — AMIODARONE HYDROCHLORIDE 200 MG: 200 TABLET ORAL at 13:50

## 2021-09-03 RX ADMIN — POTASSIUM CHLORIDE 40 MEQ: 20 SOLUTION ORAL at 17:17

## 2021-09-03 RX ADMIN — APIXABAN 2.5 MG: 2.5 TABLET, FILM COATED ORAL at 17:16

## 2021-09-03 NOTE — ASSESSMENT & PLAN NOTE
· Patient was admitted with multifocal pneumonia  · Chest x-ray 8/20:  Diffuse patchy airspace disease bilaterally concerning multifocal infiltrates  · Diagnosed with CAP versus aspiration  · Patient completed 5 day course of antibiotics under the guidance of the pulmonary team  · Tested negative for COVID-19 x3  · Legionella and strep pneumo antigens negative  · Blood cultures shows no growth  · Bronchial lavage culture also shows no growth    Negative AFB and pneumocystis  · Sputum culture with coag-negative staph and Candida, likely colonization  · Patient has clinically improved, and his eukocytosis had improved daily and on 08/31 was 11  · Leukocytosis drastically worsened 9/1 up to 20,(no steroids given):   · Repeat CT scan chest/abdomen/pelvis on 9/1 with extensive ground-glass opacities throughout both lungs, fluid-filled small bowel and colon  · Patient noted to be at risk for recurrent pneumonia due to lethargy and dysphagia:    · 9/1 Restared cefepime/Flagyl for empiric coverage, pancultures ordered:    · Conferred with the Infectious Disease team:  Antibiotics changed to Unasyn  · Procalcitonin 0 17-->0 12, indicating lower likelihood of bacterial infection

## 2021-09-03 NOTE — ASSESSMENT & PLAN NOTE
Wt Readings from Last 3 Encounters:   09/02/21 73 2 kg (161 lb 6 oz)   07/26/21 83 5 kg (184 lb 2 oz)   03/19/21 82 8 kg (182 lb 9 6 oz)     · Patient presented with an acute/chronic diastolic congestive heart failure exacerbation, with signs of volume overload, initial imaging chest x-ray concerning for vascular congestion  · Most recent echo in 2018 showed grade 1 diastolic dysfunction  · Repeated echo 8/27 with left ventricular ejection fraction 50%  Mild-moderate hypokinesis of the mid-apical septal segment, grade 2 diastolic dysfunction  · Patient was placed on a Lasix infusion while in the ICU, since discontinued  · Appreciate cardiology/nephrology evaluation and fluid management  · Hypovolemic, IV fluids given  · Currently slightly hypovolemic today:   On cautious IV fluids at 45 cc/hour  · Appreciate coordination with Nephrology and Cardiology Services

## 2021-09-03 NOTE — PROGRESS NOTES
Progress Note - Infectious Disease   Lonita Heal 78 y o  male MRN: [id number]  Unit/Bed#: [place] [address] -01 Encounter: [id number]    Impression/Plan:  1  Leukocytosis  WBC count with initial improvement during treatment for multifocal pneumonia  He has since had additional elevations in WBC count  Unclear etiology  Suspect aspiration pneumonitis in the setting of recent lethargy  CT of the chest/abdomen/pelvis showed ground-glass opacities in the lungs but no other acute findings  He denies GI and  symptoms  He has a new temporary HD catheter in place however this was inserted after WBC count had increased  new blood cultures are negative after 24 hours  Fortunately he remains clinically stable and nontoxic  He is afebrile  Procalcitonin x2 were negative  He is receiving IV Unasyn which he is tolerating without difficulty  Given his clinical improvement I recommend stopping antibiotic treatment at this time      -stop IV Unasyn  -monitor patient off antibiotics  -check CBCD and BMP tomorrow  -follow-up blood cultures  -monitor vitals     2  Multifocal pneumonia  Patient hypoxic with O2 saturations in the 60s at home  He is fully vaccinated for COVID-19 and has had multiple needs COVID-19 PCR is which were negative  Several chest x-rays, and now CT of the chest, show ground-glass opacities  He has undergone five days of antibiotic treatment  He then developed worsening leukocytosis  Consider patient may have aspirated while lethargic  He is following with speech pathology  Patient has procalcitonin x2 which were negative  No indication for ongoing antibiotics at this time   -monitor patient off antibiotics  -check CBC and BMP tomorrow  -monitor vitals  -monitor respiratory status  -O2 support per primary service  -continue follow-up with speech pathology     3  Acute hypoxic respiratory failure  Secondary to multifocal pneumonia    Also consider pulmonary edema related to diastolic dysfunction  Patient initially required intubation but was successfully extubated and now remained stable on nasal cannula O2  Now with new concerns for aspiration  He is following closely with speech pathology  -monitor vitals  -monitor respiratory status  -O2 support per primary service  -continue follow-up with speech pathology     4  Acute kidney injury  Likely multifactorial given patient's sepsis presentation, pneumonia, and hypotension  Consider ATN  Nephrology is following closely  He now has a temporary HD catheter placed and was started on HD   -dose adjust antibiotics for renal function/HD  -continue close follow-up with Nephrology     5  Sepsis  POA  Tachycardia, tachypnea, leukocytosis, lactic acidosis, and hypoxia  Secondary to multifocal pneumonia  Consider component of worsening diastolic dysfunction  Patient did initially require intubation and mechanical ventilation support in the ICU  He has since improved and has been transitioned to med/surge level care  He has ongoing leukocytosis as above but otherwise does not meet sepsis criteria today   -leukocytosis workup as above    We will continue to follow along with the patient  He will be formally reassessed by the infectious disease team on Monday, 2021  Please call sooner with new questions or concerns  Above plan was discussed in detail with patient at the bedside  Above plan was discussed in detail with [facility] attending, Dr Delia Canales  Antibiotics:  Unasyn 2  Antibiotics 3 (previously completed five days antibiotics on 2021)    Subjective:  Limited ROS as patient is very sleepy this morning  He denied pain in his chest and abdomen  He denied headache  He denied nausea  He offered no other symptoms      Objective:  Vitals:  Temp:  [97 3 °F (36 3 °C)-98 6 °F (37 °C)] 97 3 °F (36 3 °C)  HR:  [77-89] 77  Resp:  [18-20] 19  BP: (103-121)/(44-57) 119/55  SpO2:  [91 %-95 %] 91 %  Temp (24hrs), Av 1 °F (36 7 °C), Min:97 3 °F (36 3 °C), Max:98 6 °F (37 °C)  Current: Temperature: (!) 97 3 °F (36 3 °C)    Physical Exam:   General Appearance:  Tired, difficult to arouse but he did eventually start interacting with me  He appeared comfortable supine in bed in no acute distress  Throat: Oropharynx dry without lesions  Lips dry  Lungs:   Decreased throughout mildly coarse in the upper lobes to auscultation bilaterally; no wheezing; respirations unlabored nasal cannula O2  Heart:  RRR; no murmur, rub or gallop  Abdomen:   Soft, non-tender, non-distended, positive bowel sounds  Extremities: No clubbing or cyanosis, no edema  Skin: No new rashes or lesions noted on exposed skin  Labs, Imaging, & Other studies:   All pertinent labs and imaging studies were personally reviewed  Results from last 7 days   Lab Units 09/03/21  0456 09/02/21 0441 09/01/21  0450   WBC Thousand/uL 21 32* 23 76* 20 20*   HEMOGLOBIN g/dL 9 8* 9 5* 10 5*   PLATELETS Thousands/uL 426* 449* 480*     Results from last 7 days   Lab Units 09/03/21  0456 09/02/21  0441 09/01/21  0450   POTASSIUM mmol/L 3 1* 3 6 3 4*   CHLORIDE mmol/L 103 103 101   CO2 mmol/L 25 25 25   BUN mg/dL 180* 193* 223*   CREATININE mg/dL 3 16* 3 76* 3 68*   EGFR ml/min/1 73sq m 17 14 14   CALCIUM mg/dL 9 5 9 5 10 2*   AST U/L 38 43 43   ALT U/L 55 70 61   ALK PHOS U/L 85 89 90     Results from last 7 days   Lab Units 09/01/21  1215 08/28/21  1219   BLOOD CULTURE  No Growth at 24 hrs    No Growth at 24 hrs   --    SPUTUM CULTURE   --  3+ Growth of Staphylococcus coagulase negative*  3+ Growth of Candida parapsilosis*   GRAM STAIN RESULT   --  1+ Polys*  2+ Gram positive cocci in pairs*  2+ Gram positive cocci in clusters*     Results from last 7 days   Lab Units 09/02/21  0441 09/01/21  1215   PROCALCITONIN ng/ml 0 12 0 17

## 2021-09-03 NOTE — ASSESSMENT & PLAN NOTE
-patient initially had a leukocytosis, secondary to multifocal pneumonia  -it had progressively improved with his course of antibiotics  -8/31 white blood cell count had improved to 11  -9/1 white blood cell count accelerated to 20  Medications reviewed, patient has not received any steroids or other medications that would accelerate his white blood cell count  -patient with new abdominal discomfort on exam  -currently evaluate source of leukocytosis  -a) pulmonary:  Patient with recent lethargy, and dysphagia, at risk for aspiration pneumonia  -restarted empiric antibiotics with cefepime/Flagyl:  Transitioned to Unasyn by ID  -9/1 repeat blood cultures negative x2  -9/1 CT scan chest: "Limited study due to motion artifact  Extensive groundglass opacities throughout both lungs, compatible with nonspecific infectious process, including COVID 19 pneumonia    Fluid-filled small bowel and colon which may represent nonspecific gastroenteritis  Colonic diverticulosis without diverticulitis "  b) GI:  Mild abdominal pain:  Fluid-filled colon    Patient with small amount of loose stool, not consistent with C diff  -started probiotic  c) patient with suprapubic tenderness:    -negative urinalysis  -continue urinary retention protocol:  -current bladder scan without significant retention  -appreciate Infectious Disease teams evaluation and recommendations:  Patient's leukocytosis may be secondary to aspiration pneumonitis, rather than bacterial aspiration pneumonia in light of normal procalcitonin x2  -9/3 antibiotics discontinued:  Continue to monitor  -peripheral smear:  No evidence of blasts or leukemic cells, appears neutrophilic predominance

## 2021-09-03 NOTE — ASSESSMENT & PLAN NOTE
· Patient's previous creatinine appears to range 0 8-1 0  · He developed acute kidney injury during his hospitalization, while he was critically ill  · He was evaluated by the nephrology team:  His acute kidney injury is likely secondary to ATN from his infection, and hypotension  Also concern for underlying cardiorenal syndrome  · Patient was placed on a Lasix drip, since discontinued  · Creatinine has continue to worsen to 3 68 on 9/1, with elevated anion gap  · Bladder scan performed    No significant urinary retention  · 9/1:  Patient had dialysis catheter placed IR and initiated dialysis, he only tolerated 50 minutes due to subsequent hypotension  · 9/2-9/3:  Creatinine continues to gradually improve:  No indication for dialysis currently  · Continue IV fluids with 1/2 normal saline at 50 cc/h  · If creatinine is recovering, and no additional dialysis is required, there are concerns that cardiac catheterization dye could result in further acute kidney injury

## 2021-09-03 NOTE — CASE MANAGEMENT
Patient is not medically cleared to d/c yet  Will require STR  Called patient's son and Enrike Conti, and discussed rehab options (acute vs  Subacute)  Vel Gorham agrees that patient will require STR at d/c but is uncertain which facility  Discussed Freedom of Choice  Offered to provide Prakash with a list of potential facilities  He declined stating that he knows of a few nurses in the area and would like to discuss it with them  Vel Donaldson will call Case Management back once he identifies a few facilities of interest  CM Department will continue to follow

## 2021-09-03 NOTE — ASSESSMENT & PLAN NOTE
· Patient with moderate aortic stenosis  · Appreciate cardiology evaluation and recommendations:  Once patient is stabilized, cardiac catheterization is recommended to evaluate coronary arteries    · If coronary arteries are normal, Cardiology recommends dobutamine echocardiogram with assessment of aortic valve, to rule out low-flow severe aortic stenosis

## 2021-09-03 NOTE — PLAN OF CARE
Problem: Potential for Falls  Goal: Patient will remain free of falls  Description: INTERVENTIONS:  - Educate patient/family on patient safety including physical limitations  - Instruct patient to call for assistance with activity   - Consult OT/PT to assist with strengthening/mobility   - Keep Call bell within reach  - Keep bed low and locked with side rails adjusted as appropriate  - Keep care items and personal belongings within reach  - Initiate and maintain comfort rounds  - Make Fall Risk Sign visible to staff  - Offer Toileting every  Hours, in advance of need  - Initiate/Maintain alarm  - Obtain necessary fall risk management equipment:   - Apply yellow socks and bracelet for high fall risk patients  - Consider moving patient to room near nurses station  Outcome: Progressing     Problem: MOBILITY - ADULT  Goal: Maintain or return to baseline ADL function  Description: INTERVENTIONS:  -  Assess patient's ability to carry out ADLs; assess patient's baseline for ADL function and identify physical deficits which impact ability to perform ADLs (bathing, care of mouth/teeth, toileting, grooming, dressing, etc )  - Assess/evaluate cause of self-care deficits   - Assess range of motion  - Assess patient's mobility; develop plan if impaired  - Assess patient's need for assistive devices and provide as appropriate  - Encourage maximum independence but intervene and supervise when necessary  - Involve family in performance of ADLs  - Assess for home care needs following discharge   - Consider OT consult to assist with ADL evaluation and planning for discharge  - Provide patient education as appropriate  Outcome: Progressing  Goal: Maintains/Returns to pre admission functional level  Description: INTERVENTIONS:  - Perform BMAT or MOVE assessment daily    - Set and communicate daily mobility goal to care team and patient/family/caregiver     - Collaborate with rehabilitation services on mobility goals if consulted  - Perform Range of Motion  times a day  - Reposition patient every  hours  - Dangle patient  times a day  - Stand patient  times a day  - Ambulate patient  times a day  - Out of bed to chair  times a day   - Out of bed for meals  times a day  - Out of bed for toileting  - Record patient progress and toleration of activity level   Outcome: Progressing     Problem: Prexisting or High Potential for Compromised Skin Integrity  Goal: Skin integrity is maintained or improved  Description: INTERVENTIONS:  - Identify patients at risk for skin breakdown  - Assess and monitor skin integrity  - Assess and monitor nutrition and hydration status  - Monitor labs   - Assess for incontinence   - Turn and reposition patient  - Assist with mobility/ambulation  - Relieve pressure over bony prominences  - Avoid friction and shearing  - Provide appropriate hygiene as needed including keeping skin clean and dry  - Evaluate need for skin moisturizer/barrier cream  - Collaborate with interdisciplinary team   - Patient/family teaching  - Consider wound care consult   Outcome: Progressing     Problem: NEUROSENSORY - ADULT  Goal: Achieves stable or improved neurological status  Description: INTERVENTIONS  - Monitor and report changes in neurological status  - Monitor vital signs such as temperature, blood pressure, glucose, and any other labs ordered   - Initiate measures to prevent increased intracranial pressure  - Monitor for seizure activity and implement precautions if appropriate      Outcome: Progressing  Goal: Achieves maximal functionality and self care  Description: INTERVENTIONS  - Monitor swallowing and airway patency with patient fatigue and changes in neurological status  - Encourage and assist patient to increase activity and self care     - Encourage visually impaired, hearing impaired and aphasic patients to use assistive/communication devices  Outcome: Progressing     Problem: CARDIOVASCULAR - ADULT  Goal: Maintains optimal cardiac output and hemodynamic stability  Description: INTERVENTIONS:  - Monitor I/O, vital signs and rhythm  - Monitor for S/S and trends of decreased cardiac output  - Administer and titrate ordered vasoactive medications to optimize hemodynamic stability  - Assess quality of pulses, skin color and temperature  - Assess for signs of decreased coronary artery perfusion  - Instruct patient to report change in severity of symptoms  Outcome: Progressing  Goal: Absence of cardiac dysrhythmias or at baseline rhythm  Description: INTERVENTIONS:  - Continuous cardiac monitoring, vital signs, obtain 12 lead EKG if ordered  - Administer antiarrhythmic and heart rate control medications as ordered  - Monitor electrolytes and administer replacement therapy as ordered  Outcome: Progressing     Problem: RESPIRATORY - ADULT  Goal: Achieves optimal ventilation and oxygenation  Description: INTERVENTIONS:  - Assess for changes in respiratory status  - Assess for changes in mentation and behavior  - Position to facilitate oxygenation and minimize respiratory effort  - Oxygen administered by appropriate delivery if ordered  - Initiate smoking cessation education as indicated  - Encourage broncho-pulmonary hygiene including cough, deep breathe, Incentive Spirometry  - Assess the need for suctioning and aspirate as needed  - Assess and instruct to report SOB or any respiratory difficulty  - Respiratory Therapy support as indicated  Outcome: Progressing     Problem: GASTROINTESTINAL - ADULT  Goal: Minimal or absence of nausea and/or vomiting  Description: INTERVENTIONS:  - Administer IV fluids if ordered to ensure adequate hydration  - Maintain NPO status until nausea and vomiting are resolved  - Nasogastric tube if ordered  - Administer ordered antiemetic medications as needed  - Provide nonpharmacologic comfort measures as appropriate  - Advance diet as tolerated, if ordered  - Consider nutrition services referral to assist patient with adequate nutrition and appropriate food choices  Outcome: Progressing  Goal: Maintains or returns to baseline bowel function  Description: INTERVENTIONS:  - Assess bowel function  - Encourage oral fluids to ensure adequate hydration  - Administer IV fluids if ordered to ensure adequate hydration  - Administer ordered medications as needed  - Encourage mobilization and activity  - Consider nutritional services referral to assist patient with adequate nutrition and appropriate food choices  Outcome: Progressing  Goal: Maintains adequate nutritional intake  Description: INTERVENTIONS:  - Monitor percentage of each meal consumed  - Identify factors contributing to decreased intake, treat as appropriate  - Assist with meals as needed  - Monitor I&O, weight, and lab values if indicated  - Obtain nutrition services referral as needed  Outcome: Progressing  Goal: Oral mucous membranes remain intact  Description: INTERVENTIONS  - Assess oral mucosa and hygiene practices  - Implement preventative oral hygiene regimen  - Implement oral medicated treatments as ordered  - Initiate Nutrition services referral as needed  Outcome: Progressing     Problem: GENITOURINARY - ADULT  Goal: Maintains or returns to baseline urinary function  Description: INTERVENTIONS:  - Assess urinary function  - Encourage oral fluids to ensure adequate hydration if ordered  - Administer IV fluids as ordered to ensure adequate hydration  - Administer ordered medications as needed  - Offer frequent toileting  - Follow urinary retention protocol if ordered  Outcome: Progressing  Goal: Absence of urinary retention  Description: INTERVENTIONS:  - Assess patients ability to void and empty bladder  - Monitor I/O  - Bladder scan as needed  - Discuss with physician/AP medications to alleviate retention as needed  - Discuss catheterization for long term situations as appropriate  Outcome: Progressing     Problem: METABOLIC, FLUID AND ELECTROLYTES - ADULT  Goal: Electrolytes maintained within normal limits  Description: INTERVENTIONS:  - Monitor labs and assess patient for signs and symptoms of electrolyte imbalances  - Administer electrolyte replacement as ordered  - Monitor response to electrolyte replacements, including repeat lab results as appropriate  - Instruct patient on fluid and nutrition as appropriate  Outcome: Progressing  Goal: Fluid balance maintained  Description: INTERVENTIONS:  - Monitor labs   - Monitor I/O and WT  - Instruct patient on fluid and nutrition as appropriate  - Assess for signs & symptoms of volume excess or deficit  Outcome: Progressing  Goal: Glucose maintained within target range  Description: INTERVENTIONS:  - Monitor Blood Glucose as ordered  - Assess for signs and symptoms of hyperglycemia and hypoglycemia  - Administer ordered medications to maintain glucose within target range  - Assess nutritional intake and initiate nutrition service referral as needed  Outcome: Progressing     Problem: SKIN/TISSUE INTEGRITY - ADULT  Goal: Skin Integrity remains intact(Skin Breakdown Prevention)  Description: Assess:  -Perform Kevon assessment every   -Clean and moisturize skin every   -Inspect skin when repositioning, toileting, and assisting with ADLS  -Assess under medical devices such as  every   -Assess extremities for adequate circulation and sensation     Bed Management:  -Have minimal linens on bed & keep smooth, unwrinkled  -Change linens as needed when moist or perspiring  -Avoid sitting or lying in one position for more than  hours while in bed  -Keep HOB at degrees     Toileting:  -Offer bedside commode  -Assess for incontinence every   -Use incontinent care products after each incontinent episode such as     Activity:  -Mobilize patient  times a day  -Encourage activity and walks on unit  -Encourage or provide ROM exercises   -Turn and reposition patient every  Hours  -Use appropriate equipment to lift or move patient in bed  -Instruct/ Assist with weight shifting every  when out of bed in chair  -Consider limitation of chair time  hour intervals    Skin Care:  -Avoid use of baby powder, tape, friction and shearing, hot water or constrictive clothing  -Relieve pressure over bony prominences using   -Do not massage red bony areas    Next Steps:  -Teach patient strategies to minimize risks such as    -Consider consults to  interdisciplinary teams such as   Outcome: Progressing     Problem: HEMATOLOGIC - ADULT  Goal: Maintains hematologic stability  Description: INTERVENTIONS  - Assess for signs and symptoms of bleeding or hemorrhage  - Monitor labs  - Administer supportive blood products/factors as ordered and appropriate  Outcome: Progressing     Problem: MUSCULOSKELETAL - ADULT  Goal: Maintain or return mobility to safest level of function  Description: INTERVENTIONS:  - Assess patient's ability to carry out ADLs; assess patient's baseline for ADL function and identify physical deficits which impact ability to perform ADLs (bathing, care of mouth/teeth, toileting, grooming, dressing, etc )  - Assess/evaluate cause of self-care deficits   - Assess range of motion  - Assess patient's mobility  - Assess patient's need for assistive devices and provide as appropriate  - Encourage maximum independence but intervene and supervise when necessary  - Involve family in performance of ADLs  - Assess for home care needs following discharge   - Consider OT consult to assist with ADL evaluation and planning for discharge  - Provide patient education as appropriate  Outcome: Progressing     Problem: Nutrition/Hydration-ADULT  Goal: Nutrient/Hydration intake appropriate for improving, restoring or maintaining nutritional needs  Description: Monitor and assess patient's nutrition/hydration status for malnutrition  Collaborate with interdisciplinary team and initiate plan and interventions as ordered    Monitor patient's weight and dietary intake as ordered or per policy  Utilize nutrition screening tool and intervene as necessary  Determine patient's food preferences and provide high-protein, high-caloric foods as appropriate       INTERVENTIONS:  - Monitor oral intake, urinary output, labs, and treatment plans  - Assess nutrition and hydration status and recommend course of action  - Evaluate amount of meals eaten  - Assist patient with eating if necessary   - Allow adequate time for meals  - Recommend/ encourage appropriate diets, oral nutritional supplements, and vitamin/mineral supplements  - Order, calculate, and assess calorie counts as needed  - Recommend, monitor, and adjust tube feedings and TPN/PPN based on assessed needs  - Assess need for intravenous fluids  - Provide specific nutrition/hydration education as appropriate  - Include patient/family/caregiver in decisions related to nutrition  Outcome: Progressing

## 2021-09-03 NOTE — PROGRESS NOTES
NEPHROLOGY PROGRESS NOTE   Bryan Bhardwaj 78 y o  male MRN: [id number]  Unit/Bed#: [place] [address] [facility] 216-01 Encounter: [id number]  Reason for Consult: CANDIE    ASSESSMENT/PLAN:  CANDIE:  Suspected ATN with infection, hypotension, as well as CRS contributing   -peak creatinine of 3 60   -baseline creatinine less than 1 0  Originally presented with creatinine of 0 9   -patient received 1 treatment of HD which was terminated due to hypotension with systolic blood pressure in the 80s  -holding on further RRT, will continue to evaluate for further dialysis needs  -repeat UA bland   -bladder scan negative   -continues on gentle hydration with half-normal saline at 50 cc/hour   -creatinine improved today  Will hold further HD for now and monitor over the weekend  -recommend avoiding nephrotoxins, further hypotension, IV contrast   -strict I/O  Azotemia:  Previously on Lasix drip   -currently on gentle hydration  Will continue for now   -will continue to monitor and trend   -evaluating for further dialysis needs  Hypokalemia: Will continue to monitor and replace as needed    -will check Mg level    -will provide oral replacement today  Hypernatremia:  Suspected hypovolemic  Improved with IV hydration  -currently on half-normal saline at 50 cc/hour   -continue to monitor  Acute on chronic grade II diastolic congestive heart failure: With moderate aortic stenosis  EF of 52%  -CT scan negative for evidence volume overload   -previously on Lasix drip  Currently on hold  -currently on gentle hydration  -recommend daily weights, fluid restriction, I/O  Leukocytosis:  Secondary to multifactorial pneumonia   -continues on antibiotics per primary care team     -COVID testing negative  -repeat blood cultures negative x 24 hours  Anemia:  -received Venofer earlier this admission  -will continue to monitor and transfuse as needed for hemoglobin less than 7 0      Disposition:  Requiring additional stay due to medical needs  SUBJECTIVE:  The patient was seen resting in bed  He is pleasant this morning  He denies any current pain  He appears to be comfortable  Per nursing staff, he is incontinent to urine      OBJECTIVE:  Current Weight: Weight - Scale: 74 kg (163 lb 2 3 oz)  Vitals:    09/02/21 2034 09/03/21 0600 09/03/21 0614 09/03/21 0729   BP: 121/57  120/55 119/55   BP Location: Left arm   Left arm   Pulse: 86  79 77   Resp: 20  20 19   Temp: 98 5 °F (36 9 °C)   (!) 97 3 °F (36 3 °C)   TempSrc: Axillary   Oral   SpO2: 95%  93% 91%   Weight:  74 kg (163 lb 2 3 oz)     Height:           Intake/Output Summary (Last 24 hours) at 9/3/2021 1007  Last data filed at 9/3/2021 0913  Gross per 24 hour   Intake 1440 ml   Output 355 ml   Net 1085 ml     General: NAD  Skin: warm, dry, intact, no rash  HEENT: Moist mucous membranes, sclera anicteric, normocephalic, atraumatic  Neck: No apparent JVD appreciated  Chest: lung sounds clear B/L, on RA   CVS:Regular rate and rhythm, no murmer   Abdomen: Soft, round, non-tender, +BS  Extremities: No B/L LE edema present  Neuro: alert and oriented  Psych: appropriate mood and affect     Medications:    Current Facility-Administered Medications:     Acetaminophen (TYLENOL) oral suspension 650 mg, 650 mg, Per NG Tube, Q6H PRN Max 4/day, ANATOLY Small, 650 mg at 08/30/21 3383    amiodarone tablet 200 mg, 200 mg, Oral, TID With Meals, 200 mg at 09/03/21 0914 **FOLLOWED BY** [START ON 9/6/2021] amiodarone tablet 200 mg, 200 mg, Oral, BID With Meals **FOLLOWED BY** [START ON 9/14/2021] amiodarone tablet 200 mg, 200 mg, Oral, Daily With Breakfast, ANATOLY Small    ampicillin-sulbactam (UNASYN) 3 g in sodium chloride 0 9 % 100 mL IVPB, 3 g, Intravenous, Q24H, Ignacia Apodaca MD, Last Rate: 200 mL/hr at 09/02/21 1325, 3 g at 09/02/21 1325    apixaban (ELIQUIS) tablet 2 5 mg, 2 5 mg, Oral, BID, ANATOLY Veronica, 2 5 mg at 09/03/21 0914    hydrALAZINE (APRESOLINE) injection 10 mg, 10 mg, Intravenous, Q6H PRN, LoliANATOLY Man, 10 mg at 08/25/21 2025    hydrALAZINE (APRESOLINE) tablet 25 mg, 25 mg, Oral, Q8H [address], [place]an [place], ANATOLY, 25 mg at 08/30/21 0502    isosorbide dinitrate (ISORDIL) tablet 10 mg, 10 mg, Oral, TID after meals, ANATOLY Gonzales, 10 mg at 08/30/21 1830    metoprolol tartrate (LOPRESSOR) partial tablet 12 5 mg, 12 5 mg, Oral, Q12H [address], Ean EspinosaANATOLY, 12 5 mg at 09/03/21 0914    omeprazole (PRILOSEC) suspension 2 mg/mL, 20 mg, Oral, Daily, Ean Johnsonday, CRNP, 20 mg at 09/03/21 0915    pravastatin (PRAVACHOL) tablet 40 mg, 40 mg, Oral, Daily With Maurilio LouisANATOLY Love, 40 mg at 09/02/21 1618    senna-docusate sodium (SENOKOT S) 8 6-50 mg per tablet 2 tablet, 2 tablet, Oral, BID PRN, America Mehta MD    sodium chloride infusion 0 45 %, 50 mL/hr, Intravenous, Continuous, Fred Tello PA-C, Last Rate: 50 mL/hr at 09/03/21 0913, 50 mL/hr at 09/03/21 0913    Laboratory Results:  Results from last 7 days   Lab Units 09/03/21  0456 09/02/21  0441 09/01/21  1622 09/01/21  0450 08/31/21  0516 08/30/21  1333   WBC Thousand/uL 21 32* 23 76*  --  20 20*  --   --    HEMOGLOBIN g/dL 9 8* 9 5*  --  10 5*  --   --    HEMATOCRIT % 34 0* 32 8*  --  37 9  --   --    PLATELETS Thousands/uL 426* 449*  --  480*  --   --    SODIUM mmol/L 145 145 145 147* 149* 145   POTASSIUM mmol/L 3 1* 3 6 3 5 3 4* 3 6 2 8*   CHLORIDE mmol/L 103 103 101 101 104 99*   CO2 mmol/L 25 25 27 25 28 34*   BUN mg/dL 180* 193* 232* 223* 196* 180*   CREATININE mg/dL 3 16* 3 76* 3 84* 3 68* 2 94* 2 20*   CALCIUM mg/dL 9 5 9 5 10 1 10 2* 10 4* 10 7*   MAGNESIUM mg/dL  --   --   --   --  4 0* 3 8*   ALK PHOS U/L 85 89  --  90  --   --    ALT U/L 55 70  --  61  --   --    AST U/L 38 43  --  43  --   --

## 2021-09-03 NOTE — ASSESSMENT & PLAN NOTE
-patient was diagnosed with new onset atrial fibrillation with rapid ventricular response  -currently back in normal sinus rhythm  -appreciate cardiology evaluation recommendations  -patient was loaded on amiodarone 200 mg p o  t i d   -anticoagulation is indicated with chads Vasc score of 4:  Continue Eliquis 2 5 b i d   -fall precautions, continue PT for gait evaluation  -discontinued aspirin  -2D echocardiogram 8/27:  Left ventricular ejection fraction 52%    Probable moderate aortic stenosis and mild aortic regurgitation  -normal TSH  -cardiology recommend catheterization for ischemic testing in the future once patient's acute illness has stabilized

## 2021-09-03 NOTE — ASSESSMENT & PLAN NOTE
-patient was diagnosed with sepsis on admission, secondary to multifocal pneumonia, admitted to the critical care team   -he was treated with antibiotics and improved

## 2021-09-03 NOTE — ASSESSMENT & PLAN NOTE
· Patient was admitted with multifocal pneumonia  · Chest x-ray 8/20:  Diffuse patchy airspace disease bilaterally concerning multifocal infiltrates  · Diagnosed with CAP versus aspiration  · Patient completed 5 day course of antibiotics under the guidance of the pulmonary team  · Tested negative for COVID-19 x3, fully vaccinated prior to admission  · Legionella and strep pneumo antigens negative  · Blood cultures shows no growth  · Bronchial lavage culture also shows no growth    Negative AFB and pneumocystis  · Sputum culture with coag-negative staph and Candida, likely colonization  · Patient has clinically improved, and his leukocytosis had improved daily and on 08/31 was 11  · Leukocytosis drastically worsened 9/1 up to 20,(no steroids given):   · Repeat CT scan chest/abdomen/pelvis on 9/1 with extensive ground-glass opacities throughout both lungs, fluid-filled small bowel and colon  · Patient noted to be at risk for recurrent pneumonia due to lethargy and dysphagia:    · 9/1 Restared cefepime/Flagyl for empiric coverage, pancultures ordered:    · Conferred with the Infectious Disease team:  Antibiotics changed to Unasyn  · Procalcitonin 0 17-->0 12, indicating lower likelihood of bacterial infection  · Appreciate recommendations by Infectious Disease team:  Antibiotics currently held, continue to monitor, acute leukocytosis possibly secondary to aspiration pneumonitis  · Continue strict aspiration precautions and dysphagia diet

## 2021-09-03 NOTE — ASSESSMENT & PLAN NOTE
Wt Readings from Last 3 Encounters:   09/03/21 74 kg (163 lb 2 3 oz)   07/26/21 83 5 kg (184 lb 2 oz)   03/19/21 82 8 kg (182 lb 9 6 oz)     · Patient presented with an acute/chronic diastolic congestive heart failure exacerbation, with signs of volume overload, initial imaging chest x-ray concerning for vascular congestion  · Most recent echo in 2018 showed grade 1 diastolic dysfunction  · Repeated echo 8/27 with left ventricular ejection fraction 50%    Mild-moderate hypokinesis of the mid-apical septal segment, grade 2 diastolic dysfunction  · Patient was placed on a Lasix infusion while in the ICU, since discontinued  · Appreciate cardiology/nephrology evaluation and fluid management  · Hypovolemic, IV fluids at 50 cc/hour  · Appreciate coordination with Nephrology and Cardiology Services

## 2021-09-03 NOTE — ASSESSMENT & PLAN NOTE
-patient was diagnosed with new onset atrial fibrillation with rapid ventricular response  -currently back in normal sinus rhythm  -appreciate cardiology evaluation recommendations  -patient was loaded on amiodarone 200 mg p o  t i d   -anticoagulation is indicated with chads Vasc score of 4:  Continue Eliquis 2 5 b i d   -fall precautions, continue PT for gait evaluation  -discontinued aspirin  -2D echocardiogram 8/27:  Left ventricular ejection fraction 52%    Probable moderate aortic stenosis and mild aortic regurgitation  -normal TSH  -cardiology recommend catheterization for ischemic testing in the future once patient's acute illness has stabilized, and renal function acceptable for catheterization dye

## 2021-09-03 NOTE — ASSESSMENT & PLAN NOTE
· Patient's previous creatinine appears to range 0 8-1 0  · He developed acute kidney injury during his hospitalization, while he was critically ill  · He was evaluated by the nephrology team:  His acute kidney injury is likely secondary to ATN from his infection, and hypotension  Also concern for underlying cardiorenal syndrome  · Patient was placed on a Lasix drip, since discontinued  · Creatinine has continue to worsen to 3 68 on 9/1, with elevated anion gap  · Bladder scan performed    No significant urinary retention  · 9/1:  Patient had dialysis catheter placed IR and initiated dialysis, he only tolerated 50 minutes due to subsequent hypotension  · Patient was evaluated by the nephrology team today:  Not uremic, increase urine output, and improved mental status:  Dialysis deferred for today  · IV fluids with 1/2 normal saline at 50 cc/h

## 2021-09-03 NOTE — ASSESSMENT & PLAN NOTE
· In the ED patient was noted to be hypoxic, in respiratory distress  Patient improved with lasix, bipap, morphine and ativan     · Admission Chest x-ray concerning for vascular congestion, multifocal pneumonia  · Patient was admitted to the critical care team, initially on BiPAP, and requiring intubation 8/21  · Patient was extubated 8/28 and transition to high-flow oxygen  · He was diuresed for his acute/chronic diastolic congestive heart failure exacerbation, and was given Lasix infusion  · He was diagnosed with multifocal pneumonia and completed a 5 day course of antibiotics  · He was noted to have dysphagia and was on a dental soft, nectar thick diet, followed by speech therapy  · Patient's hypoxia continues to improve  · 8/31:  He was down to 6 L mid flow oxygen  · Currently with adequate oxygenation on 2 L nasal cannula  · Continue to titrate as needed

## 2021-09-03 NOTE — PROGRESS NOTES
[facility]  Progress Note Jessica Pearce 1940, 78 y o  male MRN: [id number]  Unit/Bed#: [address] 87 216-01 Encounter: [id number]  Primary Care Provider: Tyler Castorena DO   Date and time admitted to hospital: 8/19/2021  8:25 PM    * Acute respiratory failure with hypoxia [facility])  Assessment & Plan  · In the ED patient was noted to be hypoxic, in respiratory distress  Patient improved with lasix, bipap, morphine and ativan  · Admission Chest x-ray concerning for vascular congestion, multifocal pneumonia  · Patient was admitted to the critical care team, initially on BiPAP, and requiring intubation 8/21  · Patient was extubated 8/28 and transition to high-flow oxygen  · He was diuresed for his acute/chronic diastolic congestive heart failure exacerbation, and was given Lasix infusion  · He was diagnosed with multifocal pneumonia and completed a 5 day course of antibiotics  · He was noted to have dysphagia and was on a dental soft, nectar thick diet, followed by speech therapy  · Patient's hypoxia continues to improve  · 8/31:  He was down to 6 L mid flow oxygen  · Currently with adequate oxygenation on 2 L nasal cannula  · Continue to titrate as needed     Leukocytosis  Assessment & Plan  -patient initially had a leukocytosis, secondary to multifocal pneumonia  -it had progressively improved with his course of antibiotics  -8/31 white blood cell count had improved to 11  -9/1 white blood cell count accelerated to 20    Medications reviewed, patient has not received any steroids or other medications that would accelerate his white blood cell count  -patient with new abdominal discomfort on exam  -currently evaluate source of leukocytosis  -a) pulmonary:  Patient with recent lethargy, and dysphagia, at risk for aspiration pneumonia  -restarted empiric antibiotics with cefepime/Flagyl:  Transitioned to Unasyn by ID  -9/1 repeat blood cultures negative x2  -9/1 CT scan chest: "Limited study due to motion artifact  Extensive groundglass opacities throughout both lungs, compatible with nonspecific infectious process, including COVID 19 pneumonia    Fluid-filled small bowel and colon which may represent nonspecific gastroenteritis  Colonic diverticulosis without diverticulitis "  b) GI:  Mild abdominal pain:  Fluid-filled colon  Patient with small amount of loose stool, not consistent with C diff  -started probiotic  c) patient with suprapubic tenderness:    -negative urinalysis  -continue urinary retention protocol:  -current bladder scan without significant retention  -appreciate Infectious Disease teams evaluation and recommendations:  Patient's leukocytosis may be secondary to aspiration pneumonitis, rather than bacterial aspiration pneumonia in light of normal procalcitonin x2  -9/3 antibiotics discontinued:  Continue to monitor  -peripheral smear:  No evidence of blasts or leukemic cells, appears neutrophilic predominance          [facility] ([facility] )  Assessment & Plan  -patient was diagnosed with new onset atrial fibrillation with rapid ventricular response  -currently back in normal sinus rhythm  -appreciate cardiology evaluation recommendations  -patient was loaded on amiodarone 200 mg p o  t i d   -anticoagulation is indicated with chads Vasc score of 4:  Continue Eliquis 2 5 b i d   -fall precautions, continue PT for gait evaluation  -discontinued aspirin  -2D echocardiogram 8/27:  Left ventricular ejection fraction 52%    Probable moderate aortic stenosis and mild aortic regurgitation  -normal TSH  -cardiology recommend catheterization for ischemic testing in the future once patient's acute illness has stabilized, and renal function acceptable for catheterization dye    CANDIE (acute kidney injury) ([facility] )  Assessment & Plan  · Patient's previous creatinine appears to range 0 8-1 0  · He developed acute kidney injury during his hospitalization, while he was critically ill  · He was evaluated by the nephrology team:  His acute kidney injury is likely secondary to ATN from his infection, and hypotension  Also concern for underlying cardiorenal syndrome  · Patient was placed on a Lasix drip, since discontinued  · Creatinine has continue to worsen to 3 68 on 9/1, with elevated anion gap  · Bladder scan performed  No significant urinary retention  · 9/1:  Patient had dialysis catheter placed IR and initiated dialysis, he only tolerated 50 minutes due to subsequent hypotension  · 9/2-9/3:  Creatinine continues to gradually improve:  No indication for dialysis currently  · Continue IV fluids with 1/2 normal saline at 50 cc/h  · If creatinine is recovering, and no additional dialysis is required, there are concerns that cardiac catheterization dye could result in further acute kidney injury    Acute on chronic diastolic CHF (congestive heart failure) ([facility] )  Assessment & Plan  Wt Readings from Last 3 Encounters:   09/03/21 74 kg (163 lb 2 3 oz)   07/26/21 83 5 kg (184 lb 2 oz)   03/19/21 82 8 kg (182 lb 9 6 oz)     · Patient presented with an acute/chronic diastolic congestive heart failure exacerbation, with signs of volume overload, initial imaging chest x-ray concerning for vascular congestion  · Most recent echo in 2018 showed grade 1 diastolic dysfunction  · Repeated echo 8/27 with left ventricular ejection fraction 50%    Mild-moderate hypokinesis of the mid-apical septal segment, grade 2 diastolic dysfunction  · Patient was placed on a Lasix infusion while in the ICU, since discontinued  · Appreciate cardiology/nephrology evaluation and fluid management  · Hypovolemic, IV fluids at 50 cc/hour  · Appreciate coordination with Nephrology and Cardiology Services          Multifocal pneumonia  Assessment & Plan  · Patient was admitted with multifocal pneumonia  · Chest x-ray 8/20:  Diffuse patchy airspace disease bilaterally concerning multifocal infiltrates  · Diagnosed with CAP versus aspiration  · Patient completed 5 day course of antibiotics under the guidance of the pulmonary team  · Tested negative for COVID-19 x3, fully vaccinated prior to admission  · Legionella and strep pneumo antigens negative  · Blood cultures shows no growth  · Bronchial lavage culture also shows no growth  Negative AFB and pneumocystis  · Sputum culture with coag-negative staph and Candida, likely colonization  · Patient has clinically improved, and his leukocytosis had improved daily and on 08/31 was 11  · Leukocytosis drastically worsened 9/1 up to 20,(no steroids given):   · Repeat CT scan chest/abdomen/pelvis on 9/1 with extensive ground-glass opacities throughout both lungs, fluid-filled small bowel and colon  · Patient noted to be at risk for recurrent pneumonia due to lethargy and dysphagia:    · 9/1 Restared cefepime/Flagyl for empiric coverage, pancultures ordered:    · Conferred with the Infectious Disease team:  Antibiotics changed to Unasyn  · Procalcitonin 0 17-->0 12, indicating lower likelihood of bacterial infection  · Appreciate recommendations by Infectious Disease team:  Antibiotics currently held, continue to monitor, acute leukocytosis possibly secondary to aspiration pneumonitis  · Continue strict aspiration precautions and dysphagia diet        PAD (peripheral artery disease) ([facility] )  Assessment & Plan  -patient noted to have a dusky left foot while in the ICU  -LEADS performed:  Left lower extremity NICK 1 38    Great toe pressure within the healing range   -vascular surgery consult not required  -patient with palpable pulses  -cont eliquis  -continue monitor    Hypernatremia  Assessment & Plan  Patient with hypernatremia secondary to free water deficit and dehydration  Slightly improved with IV fluids    Mild cognitive impairment  Assessment & Plan  · Delirium precautions    Aortic stenosis, moderate  Assessment & Plan  · Patient with moderate aortic stenosis  · Appreciate cardiology evaluation and recommendations:  Once patient is stabilized, cardiac catheterization is recommended to evaluate coronary arteries  · If coronary arteries are normal, Cardiology recommends dobutamine echocardiogram with assessment of aortic valve, to rule out low-flow severe aortic stenosis    Essential hypertension  Assessment & Plan  · Patient's history of essential hypertension  · Blood pressure adequately controlled  · Currently prescribed hydralazine 25 mg q 8 hours, isosorbide 10 mg t i d , metoprolol 12 5 mg q 12 hours  · Hydralazine and isosorbide with hyper hold parameters of a systolic of 930  · Continue to monitor, titrate, to avoid any hypotension  · Medications will be held prior to dialysis, per policy  · May need to discontinue hydralazine/isosorbide if patient has borderline blood pressure    Sepsis (HCC)-resolved as of 8/30/2021  Assessment & Plan  -patient was diagnosed with sepsis on admission, secondary to multifocal pneumonia, admitted to the critical care team   -he was treated with antibiotics and improved          Family:  Called pts son Himanshu Chandra and gave update    VTE Pharmacologic Prophylaxis: RX contraindicated due to: Eliquis  VTE Mechanical Prophylaxis: sequential compression device    Discussed with patient's nurse  Discussed with   Discussed with ID: ANISH LEDBETTER    Certification Statement: The patient will continue to require additional inpatient hospital stay due to need for further acute intervention for acute kidney injury    Status: inpatient     ===================================================================    Subjective:  Patient notes he does not feel well today  He states he feels weak  He is tearful, and talking about passing away  Family at the bedside speaking with him, trying to raise his spirits  Patient denies any complaints  No shortness of breath  No cough noted    Tolerating p o   Increasing urine output      Physical Exam: Temp:  [97 3 °F (36 3 °C)-98 6 °F (37 °C)] 97 3 °F (36 3 °C)  HR:  [77-89] 77  Resp:  [18-20] 19  BP: (103-121)/(44-57) 119/55    Gen:  Pleasant, non-tachypnic, non-dyspnic  Lying with head of the bed at 75°  Conversant  Able to speak in complete sentences without having to stop for dyspnea  Heart: regular rate and rhythm, S1S2 present, no murmur, rub or gallop  Lungs:  Decreased breath sounds at the bases, otherwise clear to ausculatation bilaterally  No wheezing, crackles, or rhonchi  No accessory muscle use or respiratory distress  Abd: soft, non-tender, non-distended  NABS, no guarding, rebound or peritoneal signs  Extremities: no clubbing, cyanosis or edema  Neuro: awake, alert  Interactive  Moving all 4 extremities  Skin: warm and dry: no petechiae, purpura and rash  LABS:   Results from last 7 days   Lab Units 09/03/21  0456 09/02/21  0441 09/01/21  0450   WBC Thousand/uL 21 32* 23 76* 20 20*   HEMOGLOBIN g/dL 9 8* 9 5* 10 5*   HEMATOCRIT % 34 0* 32 8* 37 9   PLATELETS Thousands/uL 426* 449* 480*     Results from last 7 days   Lab Units 09/03/21  0456 09/02/21 0441 09/01/21  1622   POTASSIUM mmol/L 3 1* 3 6 3 5   CHLORIDE mmol/L 103 103 101   CO2 mmol/L 25 25 27   BUN mg/dL 180* 193* 232*   CREATININE mg/dL 3 16* 3 76* 3 84*   CALCIUM mg/dL 9 5 9 5 10 1       Hospital Data:  9/1 CT chest/abdomen/pelvis:  Limited study due to motion artifact   Extensive groundglass opacities throughout both lungs, compatible with nonspecific infectious process, including COVID 19 pneumonia  Fluid-filled small bowel and colon which may represent nonspecific gastroenteritis   Colonic diverticulosis without diverticulitis     9/1 chest x-ray:  Stable patchy bilateral airspace disease     8/30 LEADS  RIGHT LOWER LIMB  Ankle/Brachial Index: 1 40  which is in the unreliable range  PPG/PVR Tracings are normal   Triphasic waveforms at the ankle    Metatarsal Pressure 166 mmHg  Great Toe Pressure: 146 mmHg, within the healing range  LEFT LOWER LIMB  Ankle/Brachial Index: 1 38 which is in the normal range  PPG/PVR Tracings are normal   Triphasic waveforms at the ankle  Metatarsal Pressure 117 mmHg  Great Toe Pressure: 110 mmHg, within the healing range      8/29 chest x-ray  Stable patchy bilateral airspace disease question related to pulmonary edema or infiltrate     8/27 chest x-ray  No significant change in bilateral airspace opacities  Stable life-support tubes     8/26 chest x-ray  Increasing right peripheral consolidation with improving aeration at the lung bases      8/25 chest x-ray  Persistent bilateral infiltrates suspicious for multifocal pneumonia  Typical endotracheal tube and enteric tube  Increased gastric distention     8/23 chest x-ray  Slight improvement in bilateral parenchymal infiltrative changes   ET tube now at the brent     8/21 chest x-ray  Worsening bilateral parenchymal changes  Endotracheal tube in the right main bronchus to be pulled back   If this has been pulled back a repeat x-ray for confirmation as indicated clinically      8/19 chest x-ray  Diffuse patchy airspace disease bilaterally   Consider CHF versus multifocal infiltrate     8/27 echocardiogram  LEFT VENTRICLE:  Systolic function was at the lower limits of normal  Ejection fraction was estimated to be 52 %  This study was inadequate for the evaluation of regional wall motion  Wall thickness was moderately increased     8/20 echocardiogram  LEFT VENTRICLE:  Systolic function was at the lower limits of normal  Ejection fraction was estimated to be 50 %  There was mild to moderate hypokinesis of the mid-apical septal myocardial wall segments  Wall thickness was mildly increased  Features were likely suggestive of a pseudonormal left ventricular filling pattern, with concomitant abnormal relaxation and increased filling pressure (grade 2 diastolic dysfunction)    Moderate aortic stenosis   Mild mitral regurgitation     Microbiology  9/1 blood culture:  Blood culture negative x2  8/28 sputum culture:   coag-negative Staph, Candida  8/26 COVID:  Negative  8/22 bronchoscopy/BAL:  No growth  8/22 bronchial culture:  No growth  8/22 bronchoscopy viral culture:  No growth  8/22 bronchoscopy viral culture:  No results  8/22 BAL culture:  No growth  8/22 BAL pneumocystis:  Negative  8/22 BAL AFB:  Negative  8/20 COVID:  Negative  8/20-Legionella/strep pneumoniae  8/19 blood culture:  Negative x2  8/19 COVID:  Negative          ---------------------------------------------------------------------------------------------------------------  This note has been constructed using a voice recognition system

## 2021-09-03 NOTE — ASSESSMENT & PLAN NOTE
· Patient's history of essential hypertension  · Blood pressure adequately controlled  · Currently prescribed hydralazine 25 mg q 8 hours, isosorbide 10 mg t i d , metoprolol 12 5 mg q 12 hours  · Hydralazine and isosorbide with hyper hold parameters of a systolic of 661  · Continue to monitor, titrate, to avoid any hypotension  · Medications will be held prior to dialysis, per policy  · May need to discontinue hydralazine/isosorbide if patient has borderline blood pressure

## 2021-09-03 NOTE — PROGRESS NOTES
Progress Note - Cardiology   Urban Lively 78 y o  male MRN: [id number]  Unit/Bed#: [place] [address] -01 Encounter: [id number]      Assessment/Recommendations/Discussion:   1  Hypoxic respiratory failure requiring prolonged mechanical ventilation where he was intubated on August 21st, ultimately extubated on August 28  2  Combined multifocal pneumonia with possible aspiration along with acute diastolic CHF  3  Acute renal failure  4  Likely Moderate aortic stenosis with peak velocity of 3 3 m/sec, mean gradient 22 mmHg  5  Non MI troponin elevation  6  Paroxysmal atrial fibrillation atrial flutter on amiodarone and Eliquis  7  ECG changes during events of respiratory failure concerning for ischemia with biphasic and deeply inverted T-waves in the mid precordial leads    PLAN   His renal function continues to slowly improve   Given his respiratory failure episode, heart failure, aortic stenosis, ECG changes concerning for ischemia, after his renal function and respiratory status/infection status improves, will plan for coronary angiogram to evaluate for obstructive coronary artery disease  At that time would also consider crossing the aortic valve to measure gradient across the valve and obtain valve area   The stroke volume index on his prior echo on August 20th was measured to be 54 5 mL/m2, indicating preserved flow  Doubt that he has paradoxical low-flow low gradient severe aortic stenosis given this, dobutamine stress echo likely not to be too helpful   GREGORY by VTI was 1 7 cm2, does not suggest normal flow low gradient AS either   The other concern is, the 1st echo that was performed for these calculations were made was done in the setting of sepsis and he may have had a high output secondary to this    Would consider repeat echo at this time to remeasure aortic valve and left ventricular outflow tract velocities now that this has resolved to again calculate stroke volume index, cardiac output, aortic valve area and aortic valve area index, dimensionless index, peak velocity and mean gradient   If further concern for significant aortic valve disease, may consider FANNY in the future, however would do this after evaluation by catheterization if needed   I personally reviewed his echo images and the aortic valve itself is not well visualized but does appear to be heavily calcified and restricted in systolic excursion  Ana Cabral Continue Eliquis, amiodarone for AFib    Subjective:   HPI  Feels okay today, no chest pain or SOB      Review of Systems: As noted in HPI  Rest of ROS is negative  Vitals:   /61   Pulse 82   Temp (!) 97 3 °F (36 3 °C) (Oral)   Resp 19   Ht 5' 4" (1 626 m)   Wt 74 kg (163 lb 2 3 oz)   SpO2 93%   BMI 28 00 kg/m²   Vitals:    09/02/21 0549 09/03/21 0600   Weight: 73 2 kg (161 lb 6 oz) 74 kg (163 lb 2 3 oz)       Intake/Output Summary (Last 24 hours) at 9/3/2021 1444  Last data filed at 9/3/2021 0913  Gross per 24 hour   Intake --   Output 255 ml   Net -255 ml       Physical Exam   Constitutional: awake, alert and oriented, in no acute distress, no obvious deformities  Head: Normocephalic, without obvious abnormality, atraumatic  Eyes: conjunctivae clear and moist  Sclera anicteric  No xanthelasmas  Pupils equal bilaterally  Extraocular motions are full  Ear nose mouth and throat: ears are symmetrical bilaterally, hearing appears to be equal bilaterally, no nasal discharge or epistaxis, oropharynx is clear with moist mucous membranes  Lungs: decreased breath sounds bilaterally  Heart: regular rate and rhythm, S1, S2 normal, 4/6 systolic murmur RUSB, no click, no rub and no gallop, no lower extremity edema  Abdomen: soft, non-tender; bowel sounds normal; no masses,  no organomegaly  Psychiatric:  Patient is anxious  Skin: Skin is warm, dry, intact  No obvious rashes or lesions on exposed extremities  Nail beds are pink with no cyanosis or clubbing      TELEMETRY: short NSVT    Lab Results:  Results from last 7 days   Lab Units 09/03/21  0456   WBC Thousand/uL 21 32*   HEMOGLOBIN g/dL 9 8*   HEMATOCRIT % 34 0*   PLATELETS Thousands/uL 426*     Results from last 7 days   Lab Units 09/03/21  0456   POTASSIUM mmol/L 3 1*   CHLORIDE mmol/L 103   CO2 mmol/L 25   BUN mg/dL 180*   CREATININE mg/dL 3 16*   CALCIUM mg/dL 9 5   ALK PHOS U/L 85   ALT U/L 55   AST U/L 38     Results from last 7 days   Lab Units 09/03/21  0456   POTASSIUM mmol/L 3 1*   CHLORIDE mmol/L 103   CO2 mmol/L 25   BUN mg/dL 180*   CREATININE mg/dL 3 16*   CALCIUM mg/dL 9 5           Medications:    Current Facility-Administered Medications:     Acetaminophen (TYLENOL) oral suspension 650 mg, 650 mg, Per NG Tube, Q6H PRN Max 4/day, ANATOLY Small, 650 mg at 08/30/21 7665    amiodarone tablet 200 mg, 200 mg, Oral, TID With Meals, 200 mg at 09/03/21 1350 **FOLLOWED BY** [START ON 9/6/2021] amiodarone tablet 200 mg, 200 mg, Oral, BID With Meals **FOLLOWED BY** [START ON 9/14/2021] amiodarone tablet 200 mg, 200 mg, Oral, Daily With Breakfast, ANATOLY Small    apixaban Bernestine Rastafarian) tablet 2 5 mg, 2 5 mg, Oral, BID, ANATOLY Small, 2 5 mg at 09/03/21 0914    hydrALAZINE (APRESOLINE) injection 10 mg, 10 mg, Intravenous, Q6H PRN, ANATOLY Stafford, 10 mg at 08/25/21 2025    hydrALAZINE (APRESOLINE) tablet 25 mg, 25 mg, Oral, Q8H [address], ANATOLY Small, 25 mg at 08/30/21 0502    isosorbide dinitrate (ISORDIL) tablet 10 mg, 10 mg, Oral, TID after meals, ANATOLY Stafford, 10 mg at 08/30/21 1830    metoprolol tartrate (LOPRESSOR) partial tablet 12 5 mg, 12 5 mg, Oral, Q12H [address], ANATOLY Small, 12 5 mg at 09/03/21 0914    omeprazole (PRILOSEC) suspension 2 mg/mL, 20 mg, Oral, Daily, ANATOLY Small, 20 mg at 09/03/21 0915    pravastatin (PRAVACHOL) tablet 40 mg, 40 mg, Oral, Daily With ANATOLY Suero, 40 mg at 09/02/21 1618    saccharomyces boulardii (FLORASTOR) capsule 250 mg, 250 mg, Oral, BID, Min Bennett MD    senna-docusate sodium (SENOKOT S) 8 6-50 mg per tablet 2 tablet, 2 tablet, Oral, BID PRN, Min Bennett MD    sodium chloride infusion 0 45 %, 50 mL/hr, Intravenous, Continuous, ANATOLY Thompson, Last Rate: 50 mL/hr at 09/03/21 0913, 50 mL/hr at 09/03/21 0913    This note was completed in part utilizing [employer] Flu[employer] Direct Software  Grammatical errors, random word insertions, spelling mistakes, and incomplete sentences may be an occasional consequence of this system secondary to software limitations, ambient noise, and hardware issues  If you have any questions or concerns about the content, text, or information contained within the body of this dictation, please contact the provider for clarification      Damien Beltran DO, [facility]  9/3/2021 2:44 PM

## 2021-09-03 NOTE — ASSESSMENT & PLAN NOTE
· Patient's history of essential hypertension  · Blood pressure adequately controlled  · Currently prescribed hydralazine 25 mg q 8 hours, isosorbide 10 mg t i d , metoprolol 12 5 mg q 12 hours  · Hydralazine and isosorbide with hyper hold parameters of a systolic of 066  · Continue to monitor, titrate, to avoid any hypotension  · Medications will be held prior to dialysis, per policy  · May need to discontinue hydralazine/isosorbide if patient has borderline blood pressure

## 2021-09-04 LAB
ANION GAP SERPL CALCULATED.3IONS-SCNC: 19 MMOL/L (ref 4–13)
ANISOCYTOSIS BLD QL SMEAR: PRESENT
BASOPHILS # BLD MANUAL: 0 THOUSAND/UL (ref 0–0.1)
BASOPHILS NFR MAR MANUAL: 0 % (ref 0–1)
BUN SERPL-MCNC: 143 MG/DL (ref 5–25)
CALCIUM SERPL-MCNC: 9.5 MG/DL (ref 8.3–10.1)
CHLORIDE SERPL-SCNC: 108 MMOL/L (ref 100–108)
CO2 SERPL-SCNC: 23 MMOL/L (ref 21–32)
CREAT SERPL-MCNC: 2.46 MG/DL (ref 0.6–1.3)
EOSINOPHIL # BLD MANUAL: 0 THOUSAND/UL (ref 0–0.4)
EOSINOPHIL NFR BLD MANUAL: 0 % (ref 0–6)
ERYTHROCYTE [DISTWIDTH] IN BLOOD BY AUTOMATED COUNT: 26.6 % (ref 11.6–15.1)
GFR SERPL CREATININE-BSD FRML MDRD: 23 ML/MIN/1.73SQ M
GLUCOSE SERPL-MCNC: 117 MG/DL (ref 65–140)
HCT VFR BLD AUTO: 36.1 % (ref 36.5–49.3)
HGB BLD-MCNC: 10.2 G/DL (ref 12–17)
HYPERCHROMIA BLD QL SMEAR: PRESENT
LYMPHOCYTES # BLD AUTO: 1.47 THOUSAND/UL (ref 0.6–4.47)
LYMPHOCYTES # BLD AUTO: 7 % (ref 14–44)
MACROCYTES BLD QL AUTO: PRESENT
MAGNESIUM SERPL-MCNC: 3 MG/DL (ref 1.6–2.6)
MCH RBC QN AUTO: 23.8 PG (ref 26.8–34.3)
MCHC RBC AUTO-ENTMCNC: 28.3 G/DL (ref 31.4–37.4)
MCV RBC AUTO: 84 FL (ref 82–98)
MONOCYTES # BLD AUTO: 1.26 THOUSAND/UL (ref 0–1.22)
MONOCYTES NFR BLD: 6 % (ref 4–12)
NEUTROPHILS # BLD MANUAL: 18.27 THOUSAND/UL (ref 1.85–7.62)
NEUTS BAND NFR BLD MANUAL: 1 % (ref 0–8)
NEUTS SEG NFR BLD AUTO: 86 % (ref 43–75)
PHOSPHATE SERPL-MCNC: 5.2 MG/DL (ref 2.3–4.1)
PLATELET # BLD AUTO: 413 THOUSANDS/UL (ref 149–390)
PLATELET BLD QL SMEAR: ABNORMAL
PMV BLD AUTO: 11.5 FL (ref 8.9–12.7)
POTASSIUM SERPL-SCNC: 3.5 MMOL/L (ref 3.5–5.3)
RBC # BLD AUTO: 4.28 MILLION/UL (ref 3.88–5.62)
SODIUM SERPL-SCNC: 145 MMOL/L (ref 136–145)
SODIUM SERPL-SCNC: 150 MMOL/L (ref 136–145)
WBC # BLD AUTO: 21 THOUSAND/UL (ref 4.31–10.16)

## 2021-09-04 PROCEDURE — 84100 ASSAY OF PHOSPHORUS: CPT | Performed by: INTERNAL MEDICINE

## 2021-09-04 PROCEDURE — 85027 COMPLETE CBC AUTOMATED: CPT | Performed by: INTERNAL MEDICINE

## 2021-09-04 PROCEDURE — 99232 SBSQ HOSP IP/OBS MODERATE 35: CPT | Performed by: INTERNAL MEDICINE

## 2021-09-04 PROCEDURE — 84295 ASSAY OF SERUM SODIUM: CPT | Performed by: NURSE PRACTITIONER

## 2021-09-04 PROCEDURE — 80048 BASIC METABOLIC PNL TOTAL CA: CPT | Performed by: INTERNAL MEDICINE

## 2021-09-04 PROCEDURE — 83735 ASSAY OF MAGNESIUM: CPT | Performed by: INTERNAL MEDICINE

## 2021-09-04 PROCEDURE — 85007 BL SMEAR W/DIFF WBC COUNT: CPT | Performed by: INTERNAL MEDICINE

## 2021-09-04 RX ORDER — DEXTROSE MONOHYDRATE 50 MG/ML
75 INJECTION, SOLUTION INTRAVENOUS CONTINUOUS
Status: DISCONTINUED | OUTPATIENT
Start: 2021-09-04 | End: 2021-09-07

## 2021-09-04 RX ADMIN — HYDRALAZINE HYDROCHLORIDE 25 MG: 25 TABLET, FILM COATED ORAL at 05:03

## 2021-09-04 RX ADMIN — ALPRAZOLAM 0.25 MG: 0.25 TABLET ORAL at 20:40

## 2021-09-04 RX ADMIN — Medication 12.5 MG: at 09:07

## 2021-09-04 RX ADMIN — APIXABAN 2.5 MG: 2.5 TABLET, FILM COATED ORAL at 18:23

## 2021-09-04 RX ADMIN — AMIODARONE HYDROCHLORIDE 200 MG: 200 TABLET ORAL at 09:07

## 2021-09-04 RX ADMIN — Medication 12.5 MG: at 20:40

## 2021-09-04 RX ADMIN — PRAVASTATIN SODIUM 40 MG: 40 TABLET ORAL at 18:23

## 2021-09-04 RX ADMIN — Medication 250 MG: at 09:07

## 2021-09-04 RX ADMIN — DEXTROSE 50 ML/HR: 5 SOLUTION INTRAVENOUS at 12:03

## 2021-09-04 RX ADMIN — Medication 250 MG: at 18:23

## 2021-09-04 RX ADMIN — HYDRALAZINE HYDROCHLORIDE 25 MG: 25 TABLET, FILM COATED ORAL at 22:38

## 2021-09-04 RX ADMIN — AMIODARONE HYDROCHLORIDE 200 MG: 200 TABLET ORAL at 18:23

## 2021-09-04 RX ADMIN — LIDOCAINE 1 PATCH: 50 PATCH CUTANEOUS at 09:07

## 2021-09-04 RX ADMIN — ISOSORBIDE DINITRATE 10 MG: 10 TABLET ORAL at 09:08

## 2021-09-04 RX ADMIN — APIXABAN 2.5 MG: 2.5 TABLET, FILM COATED ORAL at 09:07

## 2021-09-04 RX ADMIN — ALPRAZOLAM 0.25 MG: 0.25 TABLET ORAL at 00:00

## 2021-09-04 RX ADMIN — Medication 20 MG: at 09:07

## 2021-09-04 NOTE — PROGRESS NOTES
NEPHROLOGY PROGRESS NOTE   Balaji Shallow 78 y o  male MRN: [id number]  Unit/Bed#: [place] [address] 216-01 Encounter: [id number]  Reason for Consult: CANDIE    PLAN:   -acute kidney injury, received 1 dialysis treatment which was stopped due to hypotension  Monitoring for further dialysis needs  We no need for dialysis today  Will continue to hold diuretics  Continue on hypotonic IV fluids  -severe azotemia, improving  Will continue IV fluids and hold diuretics  Continue to monitor for further dialysis needs   -temporary HD catheter with bloody drainage, will have dialysis nurse changed today and continue to monitor for further bleeding   -hypernatremia, currently on quarter normal saline with increasing sodium level to 150 this morning  Will change IV fluids to D5W  Will continue to monitor and trend BMP  -hyperphosphatemia, will place on low phos diet continue to monitor  Will start on phosphorus binders if remains elevated  -hypokalemia, will continue to supplement as needed  Mag level elevated   -acute on chronic CHF, previously on Lasix drip, examining hypovolemic  Will continue IV fluids  Will continue to hold diuretics  ASSESSMENT/PLAN:  CANDIE:  Suspected ATN with infection, hypotension, as well as CRS contributing   -peak creatinine of 3 60   -baseline creatinine less than 1 0  Originally presented with creatinine of 0 9   -patient received 1 treatment of HD which was terminated due to hypotension with systolic blood pressure in the 80s  -repeat UA bland   -bladder scan negative   -continues on gentle hydration with quarter normal saline  Will change to D5W  -creatinine continues to improve today  Will hold further HD for now and monitor over the weekend  -recommend avoiding nephrotoxins, further hypotension, IV contrast   -strict I/O  Access:  Temporary HD catheter  Site with bloody drainage    Will have dialysis nurse changed today and continue to monitor for further 9/3/2021 1509  Gross per 24 hour   Intake --   Output 380 ml   Net -380 ml     General: NAD  Skin: warm, dry, intact, no rash  HEENT: Moist mucous membranes, sclera anicteric, normocephalic, atraumatic  Neck: No apparent JVD appreciated  Chest: lung sounds clear B/L, on RA   CVS:Regular rate and rhythm, no murmer   Abdomen: Soft, round, non-tender, +BS  Extremities: No B/L LE edema present  Neuro: alert and oriented  Psych: appropriate mood and affect     Medications:    Current Facility-Administered Medications:     Acetaminophen (TYLENOL) oral suspension 650 mg, 650 mg, Per NG Tube, Q6H PRN Max 4/day, ANATOLY Small, 650 mg at 08/30/21 0457    ALPRAZolam Clemente Dome) tablet 0 25 mg, 0 25 mg, Oral, HS PRN, ANATOLY Smallwood, 0 25 mg at 09/04/21 0000    amiodarone tablet 200 mg, 200 mg, Oral, TID With Meals, 200 mg at 09/04/21 0907 **FOLLOWED BY** [START ON 9/6/2021] amiodarone tablet 200 mg, 200 mg, Oral, BID With Meals **FOLLOWED BY** [START ON 9/14/2021] amiodarone tablet 200 mg, 200 mg, Oral, Daily With Breakfast, ANATOLY Small    apixaban Benjamin Bellamy) tablet 2 5 mg, 2 5 mg, Oral, BID, ANATOLY Small, 2 5 mg at 09/04/21 0907    hydrALAZINE (APRESOLINE) injection 10 mg, 10 mg, Intravenous, Q6H PRN, ANATOLY Huddleston, 10 mg at 08/25/21 2025    hydrALAZINE (APRESOLINE) tablet 25 mg, 25 mg, Oral, Q8H [facility], ANATOLY Small, 25 mg at 09/04/21 0503    isosorbide dinitrate (ISORDIL) tablet 10 mg, 10 mg, Oral, TID after meals, ANATOLY Small, 10 mg at 09/04/21 0908    lidocaine (LIDODERM) 5 % patch 1 patch, 1 patch, Topical, Daily, Jose Luna MD, 1 patch at 09/04/21 0907    metoprolol tartrate (LOPRESSOR) partial tablet 12 5 mg, 12 5 mg, Oral, Q12H [facility] [place] ANATOLY John, 12 5 mg at 09/04/21 0907    omeprazole (PRILOSEC) suspension 2 mg/mL, 20 mg, Oral, Daily, ANATOLY Small, 20 mg at 09/04/21 0907    pravastatin (PRAVACHOL) tablet 40 mg, 40 mg, Oral, Daily With Artemio Espinosa, ANATOLY, 40 mg at 09/03/21 1716    saccharomyces boulardii (FLORASTOR) capsule 250 mg, 250 mg, Oral, BID, [place] He MD, 250 mg at 09/04/21 0907    senna-docusate sodium (SENOKOT S) 8 6-50 mg per tablet 2 tablet, 2 tablet, Oral, BID PRN, [place] He MD    sodium chloride infusion 0 225 %, 75 mL/hr, Intravenous, Continuous, Alexia Shirley MD, Last Rate: 75 mL/hr at 09/03/21 1717, 75 mL/hr at 09/03/21 1717    Laboratory Results:  Results from last 7 days   Lab Units 09/04/21  0445 09/03/21  0456 09/02/21  0441 09/01/21  0450 08/31/21  0516   WBC Thousand/uL 21 00* 21 32* 23 76* 20 20*  --    HEMOGLOBIN g/dL 10 2* 9 8* 9 5* 10 5*  --    HEMATOCRIT % 36 1* 34 0* 32 8* 37 9  --    PLATELETS Thousands/uL 413* 426* 449* 480*  --    SODIUM mmol/L 150* 145 145 147* 149*   POTASSIUM mmol/L 3 5 3 1* 3 6 3 4* 3 6   CHLORIDE mmol/L 108 103 103 101 104   CO2 mmol/L 23 25 25 25 28   BUN mg/dL 143* 180* 193* 223* 196*   CREATININE mg/dL 2 46* 3 16* 3 76* 3 68* 2 94*   CALCIUM mg/dL 9 5 9 5 9 5 10 2* 10 4*   MAGNESIUM mg/dL 3 0* 3 1*  --   --  4 0*   PHOSPHORUS mg/dL 5 2*  --   --   --   --    ALK PHOS U/L  --  85 89 90  --    ALT U/L  --  55 70 61  --    AST U/L  --  38 43 43  --

## 2021-09-04 NOTE — PROGRESS NOTES
Progress Note - Grant Rachel 78 y o  male MRN: [id number]    Unit/Bed#: [place] [address] -01 Encounter: [id number]    Assessment/Plan:    Acute hypoxic resp failure  etiology vascular congestion multifocal pneumonia, completed 5 day course of antibiotics, aggressive diuresis slowly improving now on 2 liters 96 percent    CANDIE     dialysis x1 creatinine being monitored with gentle fluids with Nephrology, creatinine improved 2 46 currently no additional dialysis required, nephrology controlling IV fluids    Acute/chronic diastolic HF  working with Nephrology and Cardiology to maintain volume status while monitoring creatinine, currently on rate control with amiodarone/metoprolol, hydralazine    AFib     rate control with amiodarone anticoagulation Eliquis, consider cardiac catheterization once creatinine is stable and can tolerate     Hypertension    controlled with hydralazine Imdur and metoprolol    Hypernatremia   discussed with nephrology possible D5 IV fluid    Dementia    continue cognitive support     Subjective:   Minimal responses, no chest pain or shortness of breath, not hungry not sure how he feels      Objective:     Vitals: Blood pressure 139/54, pulse 85, temperature 98 °F (36 7 °C), resp  rate (!) 24, height 5' 4" (1 626 m), weight 74 7 kg (164 lb 10 9 oz), SpO2 96 %  ,Body mass index is 28 27 kg/m²        Results from last 7 days   Lab Units 09/04/21  0445   WBC Thousand/uL 21 00*   HEMOGLOBIN g/dL 10 2*   HEMATOCRIT % 36 1*   PLATELETS Thousands/uL 413*     Results from last 7 days   Lab Units 09/04/21  0445 09/03/21  0456   POTASSIUM mmol/L 3 5 3 1*   CHLORIDE mmol/L 108 103   CO2 mmol/L 23 25   BUN mg/dL 143* 180*   CREATININE mg/dL 2 46* 3 16*   CALCIUM mg/dL 9 5 9 5   ALK PHOS U/L  --  85   ALT U/L  --  55   AST U/L  --  38       Scheduled Meds:  Current Facility-Administered Medications   Medication Dose Route Frequency Provider Last Rate    Acetaminophen  650 mg Per NG Tube Q6H PRN Max 4/day Fiorella Cones, CRNP      ALPRAZolam  0 25 mg Oral HS PRN Tiffanie Francis, ANATOLY      [START ON 9/14/2021] amiodarone  200 mg Oral Daily With Breakfast Fiorella Cones, CRNP      Followed by   Patrick Molina amiodarone  200 mg Oral TID With Meals Fiorella Cones, CRNP      Followed by   Clarice Emy ON 9/6/2021] amiodarone  200 mg Oral BID With Meals Fiorella Cones, CRNP      apixaban  2 5 mg Oral BID Fiorella Cones, CRNP      hydrALAZINE  10 mg Intravenous Q6H PRN Fiorella Cones, CRNP      hydrALAZINE  25 mg Oral [facility] Stanly Sonday, CRNP      isosorbide dinitrate  10 mg Oral TID after meals Fiorella Cones, CRNP      lidocaine  1 patch Topical Daily Tank Calhoun MD      metoprolol tartrate  12 5 mg Oral Q12H [facility] Charles Sonday, CRNP      omeprazole (PRILOSEC) suspension 2 mg/mL  20 mg Oral Daily Charles Sonday, CRNP      pravastatin  40 mg Oral Daily With [employer] Sonday, CRNP      saccharomyces boulardii  250 mg Oral BID Tank Calhoun MD      senna-docusate sodium  2 tablet Oral BID PRN Tank Calhoun MD      sodium chloride infusion 0 225 %  75 mL/hr Intravenous Continuous Flora Davidson MD 75 mL/hr (09/03/21 1717)       Continuous Infusions:  sodium chloride infusion 0 225 %, 75 mL/hr, Last Rate: 75 mL/hr (09/03/21 1717)          Physical exam:  General appearance:  Alert elderly frail poor interaction  Head/Eyes:  Nonicteric sluggish  Neck:  Supple  Lungs:  Decreased BS bilateral no wheezing rhonchi or rales  Heart: normal S1 S2 irregular  Abdomen: Soft nontender with bowel sounds  Extremities: no edema  Skin: no rash    Invasive Devices     Peripheral Intravenous Line            Peripheral IV 09/04/21 Left;Proximal;Ventral (anterior) Forearm <1 day          Hemodialysis Catheter            HD Temporary Double Catheter 2 days                  VTE Pharmacologic Prophylaxis:  Eliquis      Counseling / Coordination of Care  Total floor / unit time spent today 30  minutes    Greater than 50% of total time was spent with the patient and / or family counseling and / or coordination of care    A description of the counseling / coordination of care:

## 2021-09-04 NOTE — QUICK NOTE
QUICK NOTE - Deterioration Index  Maritza Best 78 y o  male MRN: [id number]  Unit/Bed#: [place] [address] 216-01 Encounter: [id number]    Date Paged: 21  Time Paged: [address]  Room #: 18  Primary RN: Grayson Schuster Time: 259  Deterioration index score at time of page: 67 9  %  Spoke with Carlos Collier from primary team  Need to escalate level of care: no     PROBLEMS resulting in high DI score:    Patient pulls his oxygen off and desaturates, at time of conversation with Carlos Collier RN, his oxygen was replaced and his saturation is greater than 90%    Please call [phone] with any questions       Vitals:   Vitals:    21 1350 21 1509 21 2106 21   BP: 127/61 128/60 129/61 129/61   BP Location:  Left arm     Pulse: 82 78 83 83   Resp:  19  20   Temp:  97 6 °F (36 4 °C)  98 1 °F (36 7 °C)   TempSrc:  Oral     SpO2: 93% 91%  91%   Weight:       Height:           Respiratory:   SpO2: SpO2: 91 %  O2 Flow Rate (L/min): 3 L/min    Temperature: Temp (24hrs), Av 7 °F (36 5 °C), Min:97 3 °F (36 3 °C), Max:98 1 °F (36 7 °C)  Current: Temperature: 98 1 °F (36 7 °C)    Labs:   Results from last 7 days   Lab Units 21  04421  0450   WBC Thousand/uL 21 32* 23 76* 20 20*   HEMOGLOBIN g/dL 9 8* 9 5* 10 5*   HEMATOCRIT % 34 0* 32 8* 37 9   PLATELETS Thousands/uL 426* 449* 480*   NEUTROS PCT %  --  79* 76*   MONOS PCT %  --  9 11   MONO PCT % 2*  --   --      Results from last 7 days   Lab Units 216 21  0441 21  1622 21  0450   SODIUM mmol/L 145 145 145 147*   POTASSIUM mmol/L 3 1* 3 6 3 5 3 4*   CHLORIDE mmol/L 103 103 101 101   CO2 mmol/L 25 25 27 25   BUN mg/dL 180* 193* 232* 223*   CREATININE mg/dL 3 16* 3 76* 3 84* 3 68*   CALCIUM mg/dL 9 5 9 5 10 1 10 2*   ALK PHOS U/L 85 89  --  90   ALT U/L 55 70  --  61   AST U/L 38 43  --  43     Results from last 7 days   Lab Units 21  0456 21  0516 21  1333   MAGNESIUM mg/dL 3 1* 4 0* 3 8* Results from last 7 days   Lab Units 09/02/21  0441 09/01/21  1215   PROCALCITONIN ng/ml 0 12 0 17       Code Status: Level 1 - Full Code

## 2021-09-04 NOTE — PROGRESS NOTES
Progress Note - Cardiology   Ricardo Thomson 78 y o  male MRN: [id number]  Unit/Bed#: [address] 216-01 Encounter: [id number]          Assessment / Plan  Hypoxic respiratory failure requiring mechanical ventilation               - Likely due to multifocal pneumonia with possible component of congestive heart failure and/or aspiration contributing              - Intubated 8/21-extubated 8/28  Multifocal pneumonia              - Completed a 5 day course of antibiotics  Acute diastolic CHF              - Echo 8/20/2021:  EF 50% (mild-moderate hypokinesia mid-apical wall segment) with grade 2 diastolic dysfunction  CKD, CANDIE - probable ATN in the setting of hypotension and sepsis              -  previous Baseline creatinine less than 1              - Signs of uremia requiring initiation of dialysis 9/1/2021--> not repeated with subsequent improvement in renal function        Paroxysmal atrial fibrillation and flutter              - Initiated on amiodarone 8/30/21              - Eliquis 2 5 mg b i d    Non MI troponin elevation              - Peak troponin 1 29  Probable subclinical CAD    - extensive coronary artery calcifications seen on chest CT and some ECG changes concerning for ischemia (biphasic and deeply inverted T-waves) in mid precordial leads during acute respiratory failure  Moderate aortic stenosis (echo 8/20/2021:  Peak velocity 3 3 centimeters/second with gradient of 22 mm)     --Potassium 3 5,  (improved), creatinine 2 46 (improved)    --WBCs 21 0, hemoglobin 10 2, platelets 754 K   --Normal room air saturation on 2 liters/minute    · Patient presently with signs of delirium - suspect altered wake/sleep cycle probable metabolic component  · As detailed in Dr Kevin Calles' note of 9/3/2021, concern is previously been raised regarding the severity of the patient's aortic stenosis - moderate versus severe with prior echocardiogram done in the setting of sepsis and high output state possibly skewing data ~~> as he is currently improved will plan to repeat his echocardiogram and if catheterized would also suggest measuring transaortic gradient for comparison  · With signs of subclinical CAD and previous chest pains we suggested coronary angiography when patient was initiated on dialysis ~~> with signs of renal recovery no longer being dialyzed  Will confer with Nephrology regarding angiography which may at this time tab risk greater than better  · For the patient's atrial fibrillation he remains on amiodarone (thus far for 2 6 g) and Eliquis (2 5mg bid) ~~> continue t i d  amiodarone through 9/8; 200mg/d  thereafter  · BP controlled on hydralazine 25 mg q 8, Isordil 10 mg t i d , metoprolol 12 5 mg b i d  ~~> continue same      Subjective:  Patient is sleeping upon arrival   Arousable and poorly interactive without cohesive conversation    Vitals:  Vitals:    09/03/21 0600 09/04/21 0600   Weight: 74 kg (163 lb 2 3 oz) 74 7 kg (164 lb 10 9 oz)   ,  Vitals:    09/03/21 2107 09/04/21 0446 09/04/21 0600 09/04/21 0719   BP: 129/61 133/55  139/54   BP Location:       Pulse: 83 85  85   Resp: 20   (!) 24   Temp: 98 1 °F (36 7 °C)   98 °F (36 7 °C)   TempSrc:       SpO2: 91% 94%  96%   Weight:   74 7 kg (164 lb 10 9 oz)    Height:           Exam:  General:  Sleepy, arousable, but not able to follow commands    Some inappropriate verbal replies  Heart:  Presently regular  Lungs:  Currently clear  Lower Limbs:  No edema             Medications:    Current Facility-Administered Medications:     Acetaminophen (TYLENOL) oral suspension 650 mg, 650 mg, Per NG Tube, Q6H PRN Max 4/day, ANATOLY Small, 650 mg at 08/30/21 0457    ALPRAZolam Ankur Foster) tablet 0 25 mg, 0 25 mg, Oral, HS PRN, ANATOLY Duarte, 0 25 mg at 09/04/21 0000    amiodarone tablet 200 mg, 200 mg, Oral, TID With Meals, 200 mg at 09/04/21 0907 **FOLLOWED BY** [START ON 9/6/2021] amiodarone tablet 200 mg, 200 mg, Oral, BID With Meals **FOLLOWED BY** [START ON 9/14/2021] amiodarone tablet 200 mg, 200 mg, Oral, Daily With Breakfast, ANATOLY Small    apixaban Jenniferfaisal Ballesteros) tablet 2 5 mg, 2 5 mg, Oral, BID, ANATOLY Small, 2 5 mg at 09/04/21 0907    dextrose 5 % infusion, 50 mL/hr, Intravenous, Continuous, ANATOLY Guzman, Last Rate: 50 mL/hr at 09/04/21 1203, 50 mL/hr at 09/04/21 1203    hydrALAZINE (APRESOLINE) injection 10 mg, 10 mg, Intravenous, Q6H PRN, ANATOLY Nassar, 10 mg at 08/25/21 2025    hydrALAZINE (APRESOLINE) tablet 25 mg, 25 mg, Oral, Q8H [facility], ANATOLY Small, 25 mg at 09/04/21 0503    isosorbide dinitrate (ISORDIL) tablet 10 mg, 10 mg, Oral, TID after meals, ANATOLY Small, 10 mg at 09/04/21 0908    lidocaine (LIDODERM) 5 % patch 1 patch, 1 patch, Topical, Daily, Santiago Coleman MD, 1 patch at 09/04/21 0907    metoprolol tartrate (LOPRESSOR) partial tablet 12 5 mg, 12 5 mg, Oral, Q12H [facility], ANATOLY Small, 12 5 mg at 09/04/21 0907    omeprazole (PRILOSEC) suspension 2 mg/mL, 20 mg, Oral, Daily, ANATOLY Small, 20 mg at 09/04/21 1665    pravastatin (PRAVACHOL) tablet 40 mg, 40 mg, Oral, Daily With Ru ANATOLY Deng, 40 mg at 09/03/21 1716    saccharomyces boulardii (FLORASTOR) capsule 250 mg, 250 mg, Oral, BID, Santiago Coleman MD, 250 mg at 09/04/21 0907    senna-docusate sodium (SENOKOT S) 8 6-50 mg per tablet 2 tablet, 2 tablet, Oral, BID PRN, Santiago Coleman MD      Labs/Data:        Results from last 7 days   Lab Units 09/04/21  0445 09/03/21  0456 09/02/21  0441   WBC Thousand/uL 21 00* 21 32* 23 76*   HEMOGLOBIN g/dL 10 2* 9 8* 9 5*   HEMATOCRIT % 36 1* 34 0* 32 8*   PLATELETS Thousands/uL 413* 426* 449*     Results from last 7 days   Lab Units 09/04/21  0445 09/03/21  0456 09/02/21  0441   POTASSIUM mmol/L 3 5 3 1* 3 6   CHLORIDE mmol/L 108 103 103   CO2 mmol/L 23 25 25   BUN mg/dL 143* 180* 193*

## 2021-09-05 LAB
ANION GAP SERPL CALCULATED.3IONS-SCNC: 12 MMOL/L (ref 4–13)
BUN SERPL-MCNC: 114 MG/DL (ref 5–25)
CALCIUM SERPL-MCNC: 8.9 MG/DL (ref 8.3–10.1)
CHLORIDE SERPL-SCNC: 112 MMOL/L (ref 100–108)
CO2 SERPL-SCNC: 26 MMOL/L (ref 21–32)
CREAT SERPL-MCNC: 2.09 MG/DL (ref 0.6–1.3)
GFR SERPL CREATININE-BSD FRML MDRD: 28 ML/MIN/1.73SQ M
GLUCOSE SERPL-MCNC: 145 MG/DL (ref 65–140)
POTASSIUM SERPL-SCNC: 3.5 MMOL/L (ref 3.5–5.3)
SODIUM SERPL-SCNC: 148 MMOL/L (ref 136–145)
SODIUM SERPL-SCNC: 150 MMOL/L (ref 136–145)

## 2021-09-05 PROCEDURE — 84295 ASSAY OF SERUM SODIUM: CPT | Performed by: NURSE PRACTITIONER

## 2021-09-05 PROCEDURE — 99232 SBSQ HOSP IP/OBS MODERATE 35: CPT | Performed by: INTERNAL MEDICINE

## 2021-09-05 PROCEDURE — 80048 BASIC METABOLIC PNL TOTAL CA: CPT | Performed by: INTERNAL MEDICINE

## 2021-09-05 RX ADMIN — AMIODARONE HYDROCHLORIDE 200 MG: 200 TABLET ORAL at 13:41

## 2021-09-05 RX ADMIN — AMIODARONE HYDROCHLORIDE 200 MG: 200 TABLET ORAL at 08:29

## 2021-09-05 RX ADMIN — PRAVASTATIN SODIUM 40 MG: 40 TABLET ORAL at 17:35

## 2021-09-05 RX ADMIN — Medication 250 MG: at 08:29

## 2021-09-05 RX ADMIN — APIXABAN 2.5 MG: 2.5 TABLET, FILM COATED ORAL at 08:29

## 2021-09-05 RX ADMIN — ALPRAZOLAM 0.25 MG: 0.25 TABLET ORAL at 20:15

## 2021-09-05 RX ADMIN — AMIODARONE HYDROCHLORIDE 200 MG: 200 TABLET ORAL at 17:35

## 2021-09-05 RX ADMIN — Medication 12.5 MG: at 20:15

## 2021-09-05 RX ADMIN — Medication 12.5 MG: at 08:29

## 2021-09-05 RX ADMIN — HYDRALAZINE HYDROCHLORIDE 25 MG: 25 TABLET, FILM COATED ORAL at 05:08

## 2021-09-05 RX ADMIN — Medication 250 MG: at 17:35

## 2021-09-05 RX ADMIN — HYDRALAZINE HYDROCHLORIDE 25 MG: 25 TABLET, FILM COATED ORAL at 20:15

## 2021-09-05 RX ADMIN — APIXABAN 2.5 MG: 2.5 TABLET, FILM COATED ORAL at 17:35

## 2021-09-05 RX ADMIN — Medication 20 MG: at 08:29

## 2021-09-05 NOTE — PROGRESS NOTES
NEPHROLOGY PROGRESS NOTE   Gomez Short 78 y o  male MRN: [id number]  Unit/Bed#: [place] [facility] 2 [place] [id number] Encounter: [id number]  Reason for Consult: CANDIE    PLAN:   -CANDIE, creatinine has fortunately began to improve  Will hold on further dialysis  Likely will need catheter removed early this week  Currently on hypotonic fluids   -azotemia, improving  Continue with gentle hydration  -hypokalemia, currently resolved  Will replace as needed  -hypernatremia, with rise in sodium again this morning to 150  Will increase D5W to 50 cc/hour repeat sodium level at 1:00 p m  Today  Adjust fluids accordingly  Encourage oral intake   -hyperphosphatemia, currently on low phos diet  Will recheck phosphorus level in a m  Hopefully improving with renal function   -acute on chronic diastolic CHF, diuretics currently on hold   -may benefit from goals of care discussion with family  ASSESSMENT/PLAN:  CANDIE:  Suspected ATN with infection, hypotension, as well as CRS contributing   -peak creatinine of 3 60   -baseline creatinine less than 1 0   Originally presented with creatinine of 0 9   -patient received 1 treatment of HD which was terminated due to hypotension with systolic blood pressure in the 80s   -creatinine continues to improve  Likely will be able to have temporary dialysis catheter removed early this week if creatinine continues to improve   -currently on hypotonic IV fluids  -repeat UA bland   -bladder scan negative  -recommend avoiding nephrotoxins, further hypotension, IV contrast   -strict I/O      Access:  Temporary HD catheter  likely will be able to be discontinued early this week      Azotemia:  Previously on Lasix drip  (improved)  -currently on gentle hydration   Will continue for now   -will continue to monitor and trend   -evaluating for further dialysis needs      Hypokalemia: Will continue to monitor and replace as needed    -Mag level elevated at 3 0      Hypernatremia:  Suspected hypovolemic  -currently on D5W  -repeat sodium level last evening 145 so fluids were held for 2 hours and reduced to 30 cc/hour   -sodium level increased again this morning to 150, will increase D5W to 50 cc/hour   -encourage oral intake   -continue to monitor   Check BMP later today       Hyperphosphatemia:  -currently on low phosphorus diet  -will recheck phosphorus level in a m  Elfego Thompson -start on phos binders if remains elevated       Acute on chronic grade II diastolic congestive heart failure:  With moderate aortic stenosis   EF of 52%   -cardiology team following   -considering repeat echocardiogram   -CT scan negative for evidence volume overload   -previously on Lasix drip   Currently on hold  -currently on gentle hydration  -recommend daily weights, fluid restriction, I/O      Leukocytosis:  Secondary to multifactorial pneumonia   -continues on antibiotics per primary care team     -COVID testing negative  -repeat blood cultures negative x 24 hours       Anemia:  -received Venofer earlier this admission  -will continue to monitor and transfuse as needed for hemoglobin less than 7 0  Disposition:  requiring additional stay due to medical needs  SUBJECTIVE:  The patient is resting in bed  He is moaning out lab  He is not answering my questions  He shakes his head at me when I am speaking to him and is yelling out      OBJECTIVE:  Current Weight: Weight - Scale: 74 7 kg (164 lb 10 9 oz)  Vitals:    09/04/21 1454 09/04/21 1524 09/04/21 2251 09/05/21 0722   BP: (!) 101/40 129/52 130/54 124/55   BP Location:    Left arm   Pulse: 85 86 91 89   Resp:  18 18 20   Temp: 97 6 °F (36 4 °C) (!) 96 8 °F (36 °C) 98 4 °F (36 9 °C) 98 3 °F (36 8 °C)   TempSrc:       SpO2: 92% 92% (!) 88% 93%   Weight:       Height:           Intake/Output Summary (Last 24 hours) at 9/5/2021 0829  Last data filed at 9/4/2021 0933  Gross per 24 hour   Intake 240 ml   Output --   Net 240 ml     General:  Yelling out loud  Skin: warm, dry, intact, no rash  HEENT:  Dry mucous membranes, sclera anicteric, normocephalic, atraumatic  Neck: No apparent JVD appreciated  Chest: lung sounds decreased B/L, on O2, temporary HD catheter with bloody drainage  CVS:Regular rate and rhythm, no murmer   Abdomen: Soft, round, non-tender, +BS  Extremities: No B/L LE edema present  Neuro: alert, unable to further assess  Psych: appropriate mood and affect     Medications:    Current Facility-Administered Medications:     Acetaminophen (TYLENOL) oral suspension 650 mg, 650 mg, Per NG Tube, Q6H PRN Max 4/day, ANATOLY Small, 650 mg at 08/30/21 0457    ALPRAZolam Avis Ling) tablet 0 25 mg, 0 25 mg, Oral, HS PRN, Ronelle Aase, CRNP, 0 25 mg at 09/04/21 2040    amiodarone tablet 200 mg, 200 mg, Oral, TID With Meals, 200 mg at 09/05/21 0829 **FOLLOWED BY** [START ON 9/6/2021] amiodarone tablet 200 mg, 200 mg, Oral, BID With Meals **FOLLOWED BY** [START ON 9/14/2021] amiodarone tablet 200 mg, 200 mg, Oral, Daily With Breakfast, ANATOLY Small    apixaban [place] Dhaliwal) tablet 2 5 mg, 2 5 mg, Oral, BID, ANATOLY Small, 2 5 mg at 09/05/21 0829    dextrose 5 % infusion, 30 mL/hr, Intravenous, Continuous, ANATOLY Nix, Last Rate: 30 mL/hr at 09/04/21 2038, 30 mL/hr at 09/04/21 2038    hydrALAZINE (APRESOLINE) injection 10 mg, 10 mg, Intravenous, Q6H PRN, ANATOLY Presley, 10 mg at 08/25/21 2025    hydrALAZINE (APRESOLINE) tablet 25 mg, 25 mg, Oral, Q8H [address], ANATOLY Small, 25 mg at 09/05/21 0508    isosorbide dinitrate (ISORDIL) tablet 10 mg, 10 mg, Oral, TID after meals, ANATOLY Presley, 10 mg at 09/04/21 0908    lidocaine (LIDODERM) 5 % patch 1 patch, 1 patch, Topical, Daily, Yumiko Hidalgo MD, 1 patch at 09/04/21 0907    metoprolol tartrate (LOPRESSOR) partial tablet 12 5 mg, 12 5 mg, Oral, Q12H [address], ANATOLY Small, 12 5 mg at 09/05/21 0829    omeprazole (PRILOSEC) suspension 2 mg/mL, 20 mg, Oral, Daily, Vicky Cortes, CRNP, 20 mg at 09/05/21 1674    pravastatin (PRAVACHOL) tablet 40 mg, 40 mg, Oral, Daily With [facility] Sonday, CRNP, 40 mg at 09/04/21 1823    saccharomyces boulardii (FLORASTOR) capsule 250 mg, 250 mg, Oral, BID, Kurtis Morgan MD, 250 mg at 09/05/21 0829    senna-docusate sodium (SENOKOT S) 8 6-50 mg per tablet 2 tablet, 2 tablet, Oral, BID PRN, Kurtis Morgan MD    Laboratory Results:  Results from last 7 days   Lab Units 09/05/21  0647 09/04/21  1815 09/04/21  0445 09/03/21  0456 09/02/21  0441 09/01/21  0450 08/31/21  0516   WBC Thousand/uL  --   --  21 00* 21 32* 23 76* 20 20*  --    HEMOGLOBIN g/dL  --   --  10 2* 9 8* 9 5* 10 5*  --    HEMATOCRIT %  --   --  36 1* 34 0* 32 8* 37 9  --    PLATELETS Thousands/uL  --   --  413* 426* 449* 480*  --    SODIUM mmol/L 150* 145 150* 145 145 147* 149*   POTASSIUM mmol/L 3 5  --  3 5 3 1* 3 6 3 4* 3 6   CHLORIDE mmol/L 112*  --  108 103 103 101 104   CO2 mmol/L 26  --  23 25 25 25 28   BUN mg/dL 114*  --  143* 180* 193* 223* 196*   CREATININE mg/dL 2 09*  --  2 46* 3 16* 3 76* 3 68* 2 94*   CALCIUM mg/dL 8 9  --  9 5 9 5 9 5 10 2* 10 4*   MAGNESIUM mg/dL  --   --  3 0* 3 1*  --   --  4 0*   PHOSPHORUS mg/dL  --   --  5 2*  --   --   --   --    ALK PHOS U/L  --   --   --  85 89 90  --    ALT U/L  --   --   --  55 70 61  --    AST U/L  --   --   --  38 43 43  --

## 2021-09-05 NOTE — PROGRESS NOTES
Progress Note - Tania Lipscomb 78 y o  male MRN: [id number]    Unit/Bed#: [place] [id number] Encounter: [id number]    Assessment/Plan:    Acute hypoxic resp failure  affective diuresis and antibiotics slowly improving now stable on 2 liters oxygen    A KI     creatinine stable 2 09 with gentle IV fluids slowly improving monitor with Nephrology    Hypernatremia    D5, hypotonic solution sodium stable 150 monitor with Nephrology           Acute/chronic diastolic HF  working with Nephrology and Cardiology to maintain fluid status holding diuretics maintain rate control continue hydralazine metoprolol amiodarone    AFib     rate control with amiodarone and metoprolol, anticoagulation Eliquis    Hypertension    well controlled with hydralazine Imdur metoprolol    Dementia    continue cognitive support      Subjective:   not adequate response      Objective:     Vitals: Blood pressure 124/55, pulse 89, temperature 98 3 °F (36 8 °C), resp  rate 20, height 5' 4" (1 626 m), weight 74 7 kg (164 lb 10 9 oz), SpO2 93 %  ,Body mass index is 28 27 kg/m²        Results from last 7 days   Lab Units 09/04/21  0445   WBC Thousand/uL 21 00*   HEMOGLOBIN g/dL 10 2*   HEMATOCRIT % 36 1*   PLATELETS Thousands/uL 413*     Results from last 7 days   Lab Units 09/05/21  0647 09/03/21  0456   POTASSIUM mmol/L 3 5 3 1*   CHLORIDE mmol/L 112* 103   CO2 mmol/L 26 25   BUN mg/dL 114* 180*   CREATININE mg/dL 2 09* 3 16*   CALCIUM mg/dL 8 9 9 5   ALK PHOS U/L  --  85   ALT U/L  --  55   AST U/L  --  38       Scheduled Meds:  Current Facility-Administered Medications   Medication Dose Route Frequency Provider Last Rate    Acetaminophen  650 mg Per NG Tube Q6H PRN Max 4/day ANATOLY Small      ALPRAZolam  0 25 mg Oral HS PRN ANATOLY Jones      [START ON 9/14/2021] amiodarone  200 mg Oral Daily With Breakfast ANATOLY Small      Followed by   Miguel amiodarone  200 mg Oral TID With Meals ANATOLY Younger      Followed by  [START ON 9/6/2021] amiodarone  200 mg Oral BID With Meals Carlos Lorenzo, ANATOLY      apixaban  2 5 mg Oral BID Carlos Lorenzo, ANATOLY      dextrose  50 mL/hr Intravenous Continuous Gisselle Endo, CRNP 50 mL/hr (09/05/21 0837)    hydrALAZINE  10 mg Intravenous Q6H PRN Carlos Lorenzo, ANATOLY      hydrALAZINE  25 mg Oral [facility] Ean Ord, [address]      isosorbide dinitrate  10 mg Oral TID after meals Carlos Lorenzo, ANATOLY      lidocaine  1 patch Topical Daily Ignatius Meckel, MD      metoprolol tartrate  12 5 mg Oral Q12H [facility] Charles Sonday, ANATOLY      omeprazole (PRILOSEC) suspension 2 mg/mL  20 mg Oral Daily Charles Sonday, CRLORENA      pravastatin  40 mg Oral Daily With [employer] Sonday, CRNP      saccharomyces boulardii  250 mg Oral BID Ignatius Meckel, MD      senna-docusate sodium  2 tablet Oral BID PRN Ignatius Meckel, MD         Continuous Infusions:  dextrose, 50 mL/hr, Last Rate: 50 mL/hr (09/05/21 0837)          Physical exam:  General appearance:  Alert no distress confused elderly  Head/Eyes:  Nonicteric pale sluggish  Neck:  Supple  Lungs:  Very decreased BS bilateral no wheezing rhonchi or rales  Heart: normal S1 S2 irregular  Abdomen: Soft nontender with bowel sounds  Extremities: no edema  Skin: no rash    Invasive Devices     Peripheral Intravenous Line            Peripheral IV 09/04/21 Left;Proximal;Ventral (anterior) Forearm 1 day          Hemodialysis Catheter            HD Temporary Double Catheter 3 days          Drain            External Urinary Catheter <1 day                  VTE Pharmacologic Prophylaxis:  Eliquis      Counseling / Coordination of Care  Total floor / unit time spent today 30  minutes  Greater than 50% of total time was spent with the patient and / or family counseling and / or coordination of care  A description of the counseling / coordination of care: Flores Craig

## 2021-09-06 LAB
ANION GAP SERPL CALCULATED.3IONS-SCNC: 13 MMOL/L (ref 4–13)
ATRIAL RATE: 92 BPM
BACTERIA BLD CULT: NORMAL
BACTERIA BLD CULT: NORMAL
BUN SERPL-MCNC: 83 MG/DL (ref 5–25)
CALCIUM SERPL-MCNC: 9.3 MG/DL (ref 8.3–10.1)
CHLORIDE SERPL-SCNC: 112 MMOL/L (ref 100–108)
CO2 SERPL-SCNC: 26 MMOL/L (ref 21–32)
CREAT SERPL-MCNC: 1.76 MG/DL (ref 0.6–1.3)
GFR SERPL CREATININE-BSD FRML MDRD: 35 ML/MIN/1.73SQ M
GLUCOSE SERPL-MCNC: 220 MG/DL (ref 65–140)
P AXIS: 22 DEGREES
PHOSPHATE SERPL-MCNC: 4 MG/DL (ref 2.3–4.1)
POTASSIUM SERPL-SCNC: 3.7 MMOL/L (ref 3.5–5.3)
PR INTERVAL: 176 MS
QRS AXIS: -48 DEGREES
QRSD INTERVAL: 124 MS
QT INTERVAL: 422 MS
QTC INTERVAL: 521 MS
SODIUM SERPL-SCNC: 151 MMOL/L (ref 136–145)
T WAVE AXIS: 81 DEGREES
VENTRICULAR RATE: 92 BPM

## 2021-09-06 PROCEDURE — 84100 ASSAY OF PHOSPHORUS: CPT | Performed by: NURSE PRACTITIONER

## 2021-09-06 PROCEDURE — 80048 BASIC METABOLIC PNL TOTAL CA: CPT | Performed by: PHYSICIAN ASSISTANT

## 2021-09-06 PROCEDURE — 99232 SBSQ HOSP IP/OBS MODERATE 35: CPT | Performed by: INTERNAL MEDICINE

## 2021-09-06 PROCEDURE — 93010 ELECTROCARDIOGRAM REPORT: CPT | Performed by: INTERNAL MEDICINE

## 2021-09-06 PROCEDURE — 99232 SBSQ HOSP IP/OBS MODERATE 35: CPT | Performed by: STUDENT IN AN ORGANIZED HEALTH CARE EDUCATION/TRAINING PROGRAM

## 2021-09-06 PROCEDURE — 93005 ELECTROCARDIOGRAM TRACING: CPT

## 2021-09-06 RX ORDER — AMIODARONE HYDROCHLORIDE 200 MG/1
200 TABLET ORAL
Status: COMPLETED | OUTPATIENT
Start: 2021-09-07 | End: 2021-09-08

## 2021-09-06 RX ORDER — AMIODARONE HYDROCHLORIDE 200 MG/1
200 TABLET ORAL
Status: DISCONTINUED | OUTPATIENT
Start: 2021-09-09 | End: 2021-09-06

## 2021-09-06 RX ORDER — QUETIAPINE FUMARATE 25 MG/1
12.5 TABLET, FILM COATED ORAL
Status: DISCONTINUED | OUTPATIENT
Start: 2021-09-06 | End: 2021-09-06

## 2021-09-06 RX ORDER — AMIODARONE HYDROCHLORIDE 200 MG/1
200 TABLET ORAL
Status: DISCONTINUED | OUTPATIENT
Start: 2021-09-09 | End: 2021-09-17 | Stop reason: HOSPADM

## 2021-09-06 RX ADMIN — AMIODARONE HYDROCHLORIDE 200 MG: 200 TABLET ORAL at 10:34

## 2021-09-06 RX ADMIN — HYDRALAZINE HYDROCHLORIDE 25 MG: 25 TABLET, FILM COATED ORAL at 05:42

## 2021-09-06 RX ADMIN — APIXABAN 2.5 MG: 2.5 TABLET, FILM COATED ORAL at 17:51

## 2021-09-06 RX ADMIN — Medication 250 MG: at 10:34

## 2021-09-06 RX ADMIN — ISOSORBIDE DINITRATE 10 MG: 10 TABLET ORAL at 10:34

## 2021-09-06 RX ADMIN — APIXABAN 2.5 MG: 2.5 TABLET, FILM COATED ORAL at 10:34

## 2021-09-06 RX ADMIN — Medication 20 MG: at 10:34

## 2021-09-06 RX ADMIN — Medication 12.5 MG: at 10:34

## 2021-09-06 RX ADMIN — PRAVASTATIN SODIUM 40 MG: 40 TABLET ORAL at 17:51

## 2021-09-06 RX ADMIN — DEXTROSE 75 ML/HR: 5 SOLUTION INTRAVENOUS at 19:17

## 2021-09-06 RX ADMIN — Medication 250 MG: at 17:51

## 2021-09-06 RX ADMIN — LIDOCAINE 1 PATCH: 50 PATCH CUTANEOUS at 10:35

## 2021-09-06 RX ADMIN — ALPRAZOLAM 0.25 MG: 0.25 TABLET ORAL at 22:16

## 2021-09-06 NOTE — ASSESSMENT & PLAN NOTE
78year old male presenting with acute respiratory failure with hypoxia  Possibly due to vascular congestion / multifocal pneumonia  Required ICU level of care / intubation  Completed antibiotic therapy   S/p extubation and now down to 1L NC   - Diuretics and fluid balance per nephrology

## 2021-09-06 NOTE — PROGRESS NOTES
Progress Note - Infectious Disease   Sohail Sosa 78 y o  male MRN: [id number]  Unit/Bed#: [place] [address] -01 Encounter: [id number]    Impression/Plan:  1  Leukocytosis   WBC count with initial improvement during treatment for multifocal pneumonia   He since had additional elevations in WBC count   Unclear etiology  Likely inflammatory in nature  Also consider aspiration pneumonitis in the setting of recent lethargy  CT of the chest/abdomen/pelvis showed ground-glass opacities in the lungs but no other acute findings   He denies GI and  symptoms   He has a new temporary HD catheter in place however this was inserted after WBC count had increased  New blood cultures are negative after 4 days   Fortunately he remains clinically stable and nontoxic  He is afebrile  Procalcitonin x2 were negative  He remains clinically stable being monitored off antibiotics     -monitor patient off antibiotics  -monitor CBC and BMP  -follow-up blood cultures  -monitor vitals     2  Multifocal pneumonia   Patient hypoxic with O2 saturations in the 60s at home  He is fully vaccinated for COVID-19 and has had multiple needs COVID-19 PCR is which were negative  Several chest x-rays, and now CT of the chest, show ground-glass opacities   He has undergone five days of antibiotic treatment  He then developed worsening leukocytosis   Consider patient may have aspirated while lethargic   He is following with speech pathology  Patient has procalcitonin x2 which were negative    No indication for ongoing antibiotics at this time   -monitor patient off antibiotics  -monitor CBC and BMP  -monitor vitals  -monitor respiratory status  -O2 support per primary service  -continue follow-up with speech pathology     3  Acute hypoxic respiratory failure   Secondary to multifocal pneumonia   Also consider pulmonary edema related to diastolic dysfunction   Patient initially required intubation but was successfully extubated and now remained stable on nasal cannula O2   Now with new concerns for aspiration pneumonitis  I did witness him coughing out phlegm while he ate his breakfast pudding this morning  He is following closely with speech pathology  -monitor vitals  -monitor respiratory status  -O2 support per primary service  -continue follow-up with speech pathology     4  Acute kidney injury   Likely multifactorial given patient's sepsis presentation, pneumonia, and hypotension   Consider ATN   Nephrology is following closely  [facility] temporarily had an HD catheter and was undergoing HD  Renal function has improved  Dialysis catheter will likely be discontinued soon   -monitor creatinine  -continue close follow-up with Nephrology     5  Sepsis  POA   Tachycardia, tachypnea, leukocytosis, lactic acidosis, and hypoxia  Secondary to multifocal pneumonia   Consider component of worsening diastolic dysfunction   Patient did initially require intubation and mechanical ventilation support in the ICU  [facility] has since improved and has been transitioned to med/surge level care  [facility] has ongoing leukocytosis as above but otherwise does not meet sepsis criteria today   -leukocytosis workup as above     Above plan was discussed in detail with patient at the bedside  Above plan was discussed in detail with [facility] attending, Dr George Hu     Antibiotics:  No current antibiotic use    Subjective:  Patient emotional during my first visit so I returned a bit later to continue speaking with him  He tells me all he cares about is going home  He denies pain in his chest and abdomen  He tells me that he does cough at times  He denies difficulty breathing  He denies nausea  He offers no other symptoms      Objective:  Vitals:  Temp:  [97 7 °F (36 5 °C)-98 °F (36 7 °C)] 97 9 °F (36 6 °C)  HR:  [82-91] 87  Resp:  [22] 22  BP: (111-119)/(46-57) 119/57  SpO2:  [90 %-96 %] 91 %  Temp (24hrs), Av 9 °F (36 6 °C), Min:97 7 °F (36 5 °C), Max:98 °F (36 7 °C)  Current: Temperature: 97 9 °F (36 6 °C)    Physical Exam:   General Appearance:  Alert, interactive but emotional/tearful and slightly confused  He appears nontoxic and in no acute distress  Throat: Oropharynx moist without lesions  Lungs:   Clear to auscultation bilaterally; no wheezes, rhonchi or rales; respirations unlabored on room air  Heart:  RRR; no murmur, rub or gallop  Abdomen:   Soft, non-tender, non-distended, positive bowel sounds  Extremities: No clubbing or cyanosis, no edema   Skin: No new rashes, lesions, or draining wounds noted on exposed skin  Labs, Imaging, & Other studies:   All pertinent labs and imaging studies were personally reviewed  Results from last 7 days   Lab Units 09/04/21  0445 09/03/21  0456 09/02/21  0441   WBC Thousand/uL 21 00* 21 32* 23 76*   HEMOGLOBIN g/dL 10 2* 9 8* 9 5*   PLATELETS Thousands/uL 413* 426* 449*     Results from last 7 days   Lab Units 09/05/21  0647 09/03/21  0456 09/02/21  0441 09/01/21  0450   POTASSIUM mmol/L 3 5 3 1* 3 6 3 4*   CHLORIDE mmol/L 112* 103 103 101   CO2 mmol/L 26 25 25 25   BUN mg/dL 114* 180* 193* 223*   CREATININE mg/dL 2 09* 3 16* 3 76* 3 68*   EGFR ml/min/1 73sq m 28 17 14 14   CALCIUM mg/dL 8 9 9 5 9 5 10 2*   AST U/L  --  38 43 43   ALT U/L  --  55 70 61   ALK PHOS U/L  --  85 89 90     Results from last 7 days   Lab Units 09/01/21  1215   BLOOD CULTURE  No Growth After 4 Days  No Growth After 4 Days       Results from last 7 days   Lab Units 09/02/21  0441 09/01/21  1215   PROCALCITONIN ng/ml 0 12 0 17

## 2021-09-06 NOTE — ASSESSMENT & PLAN NOTE
Acute kidney injury requiring hemodialysis  Seemed to have improved dramatically  Renal considering HD catheter line discontinuation given continued improvement      Recent Labs     09/04/21  0445 09/05/21  0647 09/06/21  0923   * 114* 83*   CREATININE 2 46* 2 09* 1 76*   EGFR 23 28 35       Results from last 7 days   Lab Units 09/01/21  1651   BLOOD UA  Negative   PROTEIN UA mg/dl Negative

## 2021-09-06 NOTE — PROGRESS NOTES
[address] [facility]  Progress Note Marine Shell 1940, 78 y o  male MRN: [id number]  Unit/Bed#: [address] [place] 216-01 Encounter: [id number]  Primary Care Provider: Jeanna Mann DO   Date and time admitted to hospital: 8/19/2021  8:25 PM    * Acute respiratory failure with hypoxia [facility])  Assessment & Plan  78year old male presenting with acute respiratory failure with hypoxia  Possibly due to vascular congestion / multifocal pneumonia  Required ICU level of care / intubation  Completed antibiotic therapy  S/p extubation and now down to 1L NC   - Diuretics and fluid balance per nephrology    Acute on chronic diastolic CHF (congestive heart failure) ([facility])  Assessment & Plan  Wt Readings from Last 3 Encounters:   09/06/21 73 6 kg (162 lb 4 1 oz)   07/26/21 83 5 kg (184 lb 2 oz)   03/19/21 82 8 kg (182 lb 9 6 oz)     Presented with acute on chronic diastolic heart failure exacerbation  Euvolemic / hypovolemic    - Lasix infusion discontinued  Now hypovolemic  On gentle hydration    - Appreciate renal and cardiology recommendations  Hypernatremia  Assessment & Plan  Due to free water deficit and dehydration   - IV hydration  - Management per renal     Recent Labs     09/04/21  0445 09/04/21  1815 09/05/21  0647 09/05/21  1533 09/06/21  0923   SODIUM 150* 145 150* 148* 151*             Atrial fibrillation ([facility])  Assessment & Plan  New onset atrial fibrillation with RVR  Currently on NSR   - Continue Eliquis 2 5mg BID  - Continue amiodarone load per cardiology  - Cardiology recommends cath for ischemic testing once stabilized and cleared by renal to do so  catheterization dye    CANDIE (acute kidney injury) [facility])  Assessment & Plan  Acute kidney injury requiring hemodialysis  Seemed to have improved dramatically  Renal considering HD catheter line discontinuation given continued improvement      Recent Labs     09/04/21  0445 09/05/21  0647 09/06/21  0923   * 114* 83*   CREATININE 2 46* 2 09* 1 76*   EGFR 23 28 35       Results from last 7 days   Lab Units 09/01/21  1651   BLOOD UA  Negative   PROTEIN UA mg/dl Negative         Multifocal pneumonia  Assessment & Plan  Completed antibiotic therapy  Was restarted on antibiotics due to concerns for aspiration pneumonia  As per previous providers discussion with ID, likely due to aspiration pneumonitis  - Continue monitoring off antibiotics  - Follow-up cultures  - Aspiration prectautions  - Dysphagia diet    Mild cognitive impairment  Assessment & Plan  Delirium precautions  Preferably would like to avoid chemical medications given QTC of 510  Geriatrics consulted  - Seroquel 12 5mg HS for now started  Aortic stenosis, moderate  Assessment & Plan  Patient with moderate aortic stenosis  · Appreciate cardiology evaluation and recommendations:  Once patient is stabilized, cardiac catheterization is recommended to evaluate coronary arteries  · If coronary arteries are normal, Cardiology recommends dobutamine echocardiogram with assessment of aortic valve, to rule out low-flow severe aortic stenosis    Essential hypertension  Assessment & Plan  Controlled  - Continue hydralazine 25mg TID, isosorbide 10mg tid, and metoprolol 12 5mg Q12  VTE Pharmacologic Prophylaxis:   Pharmacologic: Apixaban (Eliquis)  Mechanical VTE Prophylaxis in Place: Yes    Patient Centered Rounds: I have performed bedside rounds with nursing staff today  Discussions with Specialists or Other Care Team Provider: nursing    Education and Discussions with Family / Patient: family    Time Spent for Care: 30 minutes  More than 50% of total time spent on counseling and coordination of care as described above      Current Length of Stay: 18 day(s)    Current Patient Status: Inpatient   Certification Statement: The patient will continue to require additional inpatient hospital stay due to iv fluids, renal reeval    Discharge Plan: active    Code Status: Level 1 - Full Code      Subjective:   Patient seen and examined at bedside  He was agitated and confused earlier  Seemed to be redirectable  I discussed his case with his family at bedside  They were requesting medications to calm him down  Objective:     Vitals:   Temp (24hrs), Av 3 °F (36 8 °C), Min:97 7 °F (36 5 °C), Max:99 7 °F (37 6 °C)    Temp:  [97 7 °F (36 5 °C)-99 7 °F (37 6 °C)] 99 7 °F (37 6 °C)  HR:  [85-95] 95  Resp:  [22] 22  BP: (111-119)/(57-58) 111/58  SpO2:  [86 %-94 %] 86 %  Body mass index is 27 85 kg/m²  Input and Output Summary (last 24 hours):     No intake or output data in the 24 hours ending 21 1518    Physical Exam:     Physical Exam  Vitals reviewed  HENT:      Head: Normocephalic  Nose: Nose normal       Mouth/Throat:      Mouth: Mucous membranes are dry  Eyes:      General: No scleral icterus  Cardiovascular:      Rate and Rhythm: Normal rate  Pulmonary:      Effort: Pulmonary effort is normal  No respiratory distress  Abdominal:      General: There is no distension  Palpations: Abdomen is soft  Tenderness: There is no abdominal tenderness  Skin:     General: Skin is warm  Neurological:      Mental Status: He is alert  He is disoriented         Additional Data:     Labs:    Results from last 7 days   Lab Units 21  0445 21  0441   WBC Thousand/uL 21 00* 23 76*   HEMOGLOBIN g/dL 10 2* 9 5*   HEMATOCRIT % 36 1* 32 8*   PLATELETS Thousands/uL 413* 449*   BANDS PCT % 1  --    NEUTROS PCT %  --  79*   LYMPHS PCT %  --  6*   LYMPHO PCT % 7*  --    MONOS PCT %  --  9   MONO PCT % 6  --    EOS PCT % 0 3     Results from last 7 days   Lab Units 21  0923 21  0456   SODIUM mmol/L 151* 145   POTASSIUM mmol/L 3 7 3 1*   CHLORIDE mmol/L 112* 103   CO2 mmol/L 26 25   BUN mg/dL 83* 180*   CREATININE mg/dL 1 76* 3 16*   ANION GAP mmol/L 13 17*   CALCIUM mg/dL 9 3 9 5   ALBUMIN g/dL  --  3 3*   TOTAL BILIRUBIN mg/dL  --  0 54   ALK PHOS U/L  --  85 ALT U/L  --  55   AST U/L  --  38   GLUCOSE RANDOM mg/dL 220* 116         Results from last 7 days   Lab Units 09/01/21  1719 08/31/21  1508   POC GLUCOSE mg/dl 164* 172*         Results from last 7 days   Lab Units 09/02/21  0441 09/01/21  1215   PROCALCITONIN ng/ml 0 12 0 17           * I Have Reviewed All Lab Data Listed Above  * Additional Pertinent Lab Tests Reviewed: Sly 66 Admission Reviewed    Imaging:    Imaging Reports Reviewed Today Include: imaging since admission    Recent Cultures (last 7 days):     Results from last 7 days   Lab Units 09/01/21  1215   BLOOD CULTURE  No Growth After 4 Days  No Growth After 4 Days         Last 24 Hours Medication List:   Current Facility-Administered Medications   Medication Dose Route Frequency Provider Last Rate    Acetaminophen  650 mg Per NG Tube Q6H PRN Max 4/day ANATOLY Small      ALPRAZolam  0 25 mg Oral HS PRN ANATOLY Martell      [START ON 9/14/2021] amiodarone  200 mg Oral Daily With Breakfast ANATOLY Small      Followed by   Abigail Funes amiodarone  200 mg Oral BID With Meals ANATOLY Stafford      apixaban  2 5 mg Oral BID ANATOLY Small      dextrose  75 mL/hr Intravenous Continuous DEBBY Zuniga-C 75 mL/hr (09/06/21 1036)    hydrALAZINE  10 mg Intravenous Q6H PRN ANATOLY Stafford      hydrALAZINE  25 mg Oral [facility] ANATOLY Almodovar      isosorbide dinitrate  10 mg Oral TID after meals ANATOLY Stafford      lidocaine  1 patch Topical Daily Aman Mcclure MD      metoprolol tartrate  12 5 mg Oral Q12H [address] ANATOLY Small      omeprazole (PRILOSEC) suspension 2 mg/mL  20 mg Oral Daily ANATOLY Small      pravastatin  40 mg Oral Daily With Jabil ANATOLY Ledesma      QUEtiapine  12 5 mg Oral HS Dearreuben Simon MD      saccharomyces boulardii  250 mg Oral BID Aman Mcclure MD      senna-docusate sodium  2 tablet Oral BID PRN Aman Mcclure MD          Today, Patient Was Seen By: Kaila Velazquez MD    ** Please Note: Dictation voice to text software may have been used in the creation of this document   **

## 2021-09-06 NOTE — TELEMEDICINE
e-Consult (IPC)  - Interventional Radiology  Bryan Bhardwaj 78 y o  male MRN: [id number]  Unit/Bed#: [address] [place] 216-01 Encounter: [id number]    IR has been consulted to evaluate the patient, determine the appropriate procedure, and whether or not a procedure can and should be performed regarding the care of Bryan Bhardwaj  IP Consult to IR  Consult performed by: Domi Aguirre MD  Consult ordered by: Kathy Santos PA-C        09/06/21      Assessment/Recommendation:   Had temporary dialysis line placed for acute renal failure  Now improving no longer requiring hemodialysis  Consult for line removal       Total time spent in review of data, discussion with requesting provider and rendering advice was 15 minutes  Thank you for allowing Interventional Radiology to participate in the care of Bryan Bhardwaj  Please don't hesitate to call or TigerText us with any questions       Mak Sanchez MD

## 2021-09-06 NOTE — PROGRESS NOTES
NEPHROLOGY PROGRESS NOTE   Ronny Richardson 78 y o  male MRN: [id number]  Unit/Bed#: [place] [address] -01 Encounter: [id number]      ASSESSMENT/PLAN:  1  Acute kidney injury:  Suspect ATN from infection, relative hypotension +/- CRS   · Baseline creatinine <1 and creatinine was 0 9 on admission  · Creatinine peaked yesterday at 3 8 with BUN of 232  · Repeat UA  was bland  · Due to confusion and azotemia he was started on dialysis on 9/1 but only had 1 short treatment which was stopped due to hypotension  · Mental status is better than last week  · Continue D5W at 50 cc/hour  · Today's BMP is pending  · Discussed with nurse the need for accurate urine output  2  Azotemia: was improving down to 114 yesterday  [place]omer to be related to volume depletion  3  Hypernatremia:  Sodium was up to 150 yesterday morning and down to 148 last night  Awaiting today's BMP  Continue hypotonic fluids  4  Acute on chronic Grade 2 diastolic CHF and moderate aortic stenosis:  Previously on Lasix drip but now euvolemic/dry  Monitor closely on gentle IV fluids  5  Hypoxic respiratory failure:   significantly improved  CT revealed extensive ground-glass opacities throughout both lungs compatible with nonspecific infectious process  6  Sepsis:  Initially due to multifocal pneumonia and status post antibiotics  COVID negative  8  Anemia:   last hemoglobin 10 2  Status post Venofer earlier in the admission    Plan Summary:    Today's BMP pending   Continue D5W at 50 cc/hour   If labs improving recommend IR consult to remove temporary HD catheter    Addendum:  Today's labs reviewed  Creatinine improving down to 1 7 with BUN improving to 83  Will discontinue dialysis catheter  Sodium up to 151 so will increase D5W to 75 cc/hour    SUBJECTIVE:  No overnight events  He tells me he is doing okay but then begins moaning      OBJECTIVE:  Current Weight: Weight - Scale: 73 6 kg (162 lb 4 1 oz)  Vitals:    09/06/21 0730   BP: 119/57   Pulse: 87   Resp: 22   Temp: 97 9 °F (36 6 °C)   SpO2: 91%     General:  No acute distress  Skin:  No rash  Eyes:  Sclerae anicteric  ENT:  Moist mucous membranes  Neck:  Trachea midline with no JVD  Chest:  Clear to auscultation bilaterally  CVS:  Regular rate and rhythm  Abdomen:  Soft, nontender, nondistended  Extremities:  No edema  Neuro:  Awake and alert but a little confused  Psych:  Cooperative      Medications:  Scheduled Meds:  Current Facility-Administered Medications   Medication Dose Route Frequency Provider Last Rate    Acetaminophen  650 mg Per NG Tube Q6H PRN Max 4/day Ean Jesús, ANATOLY      ALPRAZolam  0 25 mg Oral HS PRN ANATOLY Molina      [START ON 9/14/2021] amiodarone  200 mg Oral Daily With Breakfast Charles Sonday, MARIANANP      Followed by   Patrick Jesse amiodarone  200 mg Oral TID With Meals Fiorella Cones, CRNP      Followed by   Patrick Jesse amiodarone  200 mg Oral BID With Meals Fiorella Cones, ANATOLY      apixaban  2 5 mg Oral BID [facility] Sonday, ANATOLY      dextrose  50 mL/hr Intravenous Continuous Thee Manjarrez, MARIANANP 50 mL/hr (09/05/21 9491)    hydrALAZINE  10 mg Intravenous Q6H PRN Fiorella Cones, ANATOLY      hydrALAZINE  25 mg Oral [facility] Charles Ord, CRLORENA      isosorbide dinitrate  10 mg Oral TID after meals Fiorella Cones, CRLORENA      lidocaine  1 patch Topical Daily Tank Calhoun MD      metoprolol tartrate  12 5 mg Oral Q12H [address] Charles Sonday, CRNP      omeprazole (PRILOSEC) suspension 2 mg/mL  20 mg Oral Daily Charles Sonday, CRLORENA      pravastatin  40 mg Oral Daily With [employer] Sonday, CRNP      saccharomyces boulardii  250 mg Oral BID Tank Calhuon MD      senna-docusate sodium  2 tablet Oral BID PRN Tank Calhoun MD         PRN Meds:   Acetaminophen    ALPRAZolam    hydrALAZINE    senna-docusate sodium    Continuous Infusions:dextrose, 50 mL/hr, Last Rate: 50 mL/hr (09/05/21 6492)        Laboratory Results:  Results from last 7 days   Lab Units 09/06/21  2795 09/05/21  1533 09/05/21  0647 09/04/21  1815 09/04/21  0445 09/03/21  0456 09/02/21  0441 09/01/21  1622 09/01/21  0450 08/31/21  1802 08/31/21  0516 08/30/21  1333   WBC Thousand/uL  --   --   --   --  21 00* 21 32* 23 76*  --  20 20*  --   --   --    HEMOGLOBIN g/dL  --   --   --   --  10 2* 9 8* 9 5*  --  10 5*  --   --   --    HEMATOCRIT %  --   --   --   --  36 1* 34 0* 32 8*  --  37 9  --   --   --    PLATELETS Thousands/uL  --   --   --   --  413* 426* 449*  --  480*  --   --   --    SODIUM mmol/L  --  148* 150* 145 150* 145 145 145 147* 150* 149* 145   POTASSIUM mmol/L  --   --  3 5  --  3 5 3 1* 3 6 3 5 3 4* 3 5 3 6 2 8*   CHLORIDE mmol/L  --   --  112*  --  108 103 103 101 101 104 104 99*   CO2 mmol/L  --   --  26  --  23 25 25 27 25 29 28 34*   BUN mg/dL  --   --  114*  --  143* 180* 193* 232* 223* 213* 196* 180*   CREATININE mg/dL  --   --  2 09*  --  2 46* 3 16* 3 76* 3 84* 3 68* 3 57* 2 94* 2 20*   CALCIUM mg/dL  --   --  8 9  --  9 5 9 5 9 5 10 1 10 2* 9 9 10 4* 10 7*   MAGNESIUM mg/dL  --   --   --   --  3 0* 3 1*  --   --   --   --  4 0* 3 8*   PHOSPHORUS mg/dL 4 0  --   --   --  5 2*  --   --   --   --   --   --   --

## 2021-09-06 NOTE — ASSESSMENT & PLAN NOTE
Wt Readings from Last 3 Encounters:   09/06/21 73 6 kg (162 lb 4 1 oz)   07/26/21 83 5 kg (184 lb 2 oz)   03/19/21 82 8 kg (182 lb 9 6 oz)     Presented with acute on chronic diastolic heart failure exacerbation  Euvolemic / hypovolemic    - Lasix infusion discontinued  Now hypovolemic  On gentle hydration    - Appreciate renal and cardiology recommendations

## 2021-09-06 NOTE — ASSESSMENT & PLAN NOTE
New onset atrial fibrillation with RVR   Currently on NSR   - Continue Eliquis 2 5mg BID  - Continue amiodarone load per cardiology  - Cardiology recommends cath for ischemic testing once stabilized and cleared by renal to do so  catheterization dye

## 2021-09-06 NOTE — ASSESSMENT & PLAN NOTE
Completed antibiotic therapy  Was restarted on antibiotics due to concerns for aspiration pneumonia  As per previous providers discussion with ID, likely due to aspiration pneumonitis  - Continue monitoring off antibiotics  - Follow-up cultures    - Aspiration prectautions  - Dysphagia diet

## 2021-09-06 NOTE — PLAN OF CARE
Problem: Potential for Falls  Goal: Patient will remain free of falls  Description: INTERVENTIONS:  - Educate patient/family on patient safety including physical limitations  - Instruct patient to call for assistance with activity   - Consult OT/PT to assist with strengthening/mobility   - Keep Call bell within reach  - Keep bed low and locked with side rails adjusted as appropriate  - Keep care items and personal belongings within reach  - Initiate and maintain comfort rounds  - Make Fall Risk Sign visible to staff  - Apply yellow socks and bracelet for high fall risk patients  - Consider moving patient to room near nurses station  Outcome: Progressing     Problem: MOBILITY - ADULT  Goal: Maintain or return to baseline ADL function  Description: INTERVENTIONS:  -  Assess patient's ability to carry out ADLs; assess patient's baseline for ADL function and identify physical deficits which impact ability to perform ADLs (bathing, care of mouth/teeth, toileting, grooming, dressing, etc )  - Assess/evaluate cause of self-care deficits   - Assess range of motion  - Assess patient's mobility; develop plan if impaired  - Assess patient's need for assistive devices and provide as appropriate  - Encourage maximum independence but intervene and supervise when necessary  - Involve family in performance of ADLs  - Assess for home care needs following discharge   - Consider OT consult to assist with ADL evaluation and planning for discharge  - Provide patient education as appropriate  Outcome: Progressing  Goal: Maintains/Returns to pre admission functional level  Description: INTERVENTIONS:  - Perform BMAT or MOVE assessment daily    - Set and communicate daily mobility goal to care team and patient/family/caregiver     - Collaborate with rehabilitation services on mobility goals if consulted  - Out of bed for toileting  - Record patient progress and toleration of activity level   Outcome: Progressing     Problem: Prexisting or High Potential for Compromised Skin Integrity  Goal: Skin integrity is maintained or improved  Description: INTERVENTIONS:  - Identify patients at risk for skin breakdown  - Assess and monitor skin integrity  - Assess and monitor nutrition and hydration status  - Monitor labs   - Assess for incontinence   - Turn and reposition patient  - Assist with mobility/ambulation  - Relieve pressure over bony prominences  - Avoid friction and shearing  - Provide appropriate hygiene as needed including keeping skin clean and dry  - Evaluate need for skin moisturizer/barrier cream  - Collaborate with interdisciplinary team   - Patient/family teaching  - Consider wound care consult   Outcome: Progressing     Problem: NEUROSENSORY - ADULT  Goal: Achieves stable or improved neurological status  Description: INTERVENTIONS  - Monitor and report changes in neurological status  - Monitor vital signs such as temperature, blood pressure, glucose, and any other labs ordered   - Initiate measures to prevent increased intracranial pressure  - Monitor for seizure activity and implement precautions if appropriate      Outcome: Progressing  Goal: Achieves maximal functionality and self care  Description: INTERVENTIONS  - Monitor swallowing and airway patency with patient fatigue and changes in neurological status  - Encourage and assist patient to increase activity and self care     - Encourage visually impaired, hearing impaired and aphasic patients to use assistive/communication devices  Outcome: Progressing     Problem: CARDIOVASCULAR - ADULT  Goal: Maintains optimal cardiac output and hemodynamic stability  Description: INTERVENTIONS:  - Monitor I/O, vital signs and rhythm  - Monitor for S/S and trends of decreased cardiac output  - Administer and titrate ordered vasoactive medications to optimize hemodynamic stability  - Assess quality of pulses, skin color and temperature  - Assess for signs of decreased coronary artery perfusion  - Instruct patient to report change in severity of symptoms  Outcome: Progressing  Goal: Absence of cardiac dysrhythmias or at baseline rhythm  Description: INTERVENTIONS:  - Continuous cardiac monitoring, vital signs, obtain 12 lead EKG if ordered  - Administer antiarrhythmic and heart rate control medications as ordered  - Monitor electrolytes and administer replacement therapy as ordered  Outcome: Progressing     Problem: RESPIRATORY - ADULT  Goal: Achieves optimal ventilation and oxygenation  Description: INTERVENTIONS:  - Assess for changes in respiratory status  - Assess for changes in mentation and behavior  - Position to facilitate oxygenation and minimize respiratory effort  - Oxygen administered by appropriate delivery if ordered  - Initiate smoking cessation education as indicated  - Encourage broncho-pulmonary hygiene including cough, deep breathe, Incentive Spirometry  - Assess the need for suctioning and aspirate as needed  - Assess and instruct to report SOB or any respiratory difficulty  - Respiratory Therapy support as indicated  Outcome: Progressing     Problem: GASTROINTESTINAL - ADULT  Goal: Minimal or absence of nausea and/or vomiting  Description: INTERVENTIONS:  - Administer IV fluids if ordered to ensure adequate hydration  - Maintain NPO status until nausea and vomiting are resolved  - Nasogastric tube if ordered  - Administer ordered antiemetic medications as needed  - Provide nonpharmacologic comfort measures as appropriate  - Advance diet as tolerated, if ordered  - Consider nutrition services referral to assist patient with adequate nutrition and appropriate food choices  Outcome: Progressing  Goal: Maintains or returns to baseline bowel function  Description: INTERVENTIONS:  - Assess bowel function  - Encourage oral fluids to ensure adequate hydration  - Administer IV fluids if ordered to ensure adequate hydration  - Administer ordered medications as needed  - Encourage mobilization and activity  - Consider nutritional services referral to assist patient with adequate nutrition and appropriate food choices  Outcome: Progressing  Goal: Maintains adequate nutritional intake  Description: INTERVENTIONS:  - Monitor percentage of each meal consumed  - Identify factors contributing to decreased intake, treat as appropriate  - Assist with meals as needed  - Monitor I&O, weight, and lab values if indicated  - Obtain nutrition services referral as needed  Outcome: Progressing  Goal: Oral mucous membranes remain intact  Description: INTERVENTIONS  - Assess oral mucosa and hygiene practices  - Implement preventative oral hygiene regimen  - Implement oral medicated treatments as ordered  - Initiate Nutrition services referral as needed  Outcome: Progressing     Problem: GENITOURINARY - ADULT  Goal: Maintains or returns to baseline urinary function  Description: INTERVENTIONS:  - Assess urinary function  - Encourage oral fluids to ensure adequate hydration if ordered  - Administer IV fluids as ordered to ensure adequate hydration  - Administer ordered medications as needed  - Offer frequent toileting  - Follow urinary retention protocol if ordered  Outcome: Progressing  Goal: Absence of urinary retention  Description: INTERVENTIONS:  - Assess patients ability to void and empty bladder  - Monitor I/O  - Bladder scan as needed  - Discuss with physician/AP medications to alleviate retention as needed  - Discuss catheterization for long term situations as appropriate  Outcome: Progressing     Problem: METABOLIC, FLUID AND ELECTROLYTES - ADULT  Goal: Electrolytes maintained within normal limits  Description: INTERVENTIONS:  - Monitor labs and assess patient for signs and symptoms of electrolyte imbalances  - Administer electrolyte replacement as ordered  - Monitor response to electrolyte replacements, including repeat lab results as appropriate  - Instruct patient on fluid and nutrition as appropriate  Outcome: Progressing  Goal: Fluid balance maintained  Description: INTERVENTIONS:  - Monitor labs   - Monitor I/O and WT  - Instruct patient on fluid and nutrition as appropriate  - Assess for signs & symptoms of volume excess or deficit  Outcome: Progressing  Goal: Glucose maintained within target range  Description: INTERVENTIONS:  - Monitor Blood Glucose as ordered  - Assess for signs and symptoms of hyperglycemia and hypoglycemia  - Administer ordered medications to maintain glucose within target range  - Assess nutritional intake and initiate nutrition service referral as needed  Outcome: Progressing     Problem: SKIN/TISSUE INTEGRITY - ADULT  Goal: Skin Integrity remains intact(Skin Breakdown Prevention)  Description: Assess:  -Assess extremities for adequate circulation and sensation     Bed Management:  -Have minimal linens on bed & keep smooth, unwrinkled  -Change linens as needed when moist or perspiring      Toileting:  -Offer bedside commode    Activity:  -Encourage activity and walks on unit  -Encourage or provide ROM exercises   -Use appropriate equipment to lift or move patient in bed    Skin Care:  -Avoid use of baby powder, tape, friction and shearing, hot water or constrictive clothing  -Do not massage red bony areas  Outcome: Progressing     Problem: HEMATOLOGIC - ADULT  Goal: Maintains hematologic stability  Description: INTERVENTIONS  - Assess for signs and symptoms of bleeding or hemorrhage  - Monitor labs  - Administer supportive blood products/factors as ordered and appropriate  Outcome: Progressing     Problem: MUSCULOSKELETAL - ADULT  Goal: Maintain or return mobility to safest level of function  Description: INTERVENTIONS:  - Assess patient's ability to carry out ADLs; assess patient's baseline for ADL function and identify physical deficits which impact ability to perform ADLs (bathing, care of mouth/teeth, toileting, grooming, dressing, etc )  - Assess/evaluate cause of self-care deficits   -

## 2021-09-06 NOTE — QUICK NOTE
Discontinued seroquel due to QTC prolongation to 521  Encourage maximizing conservative measures rather than pharmacologic

## 2021-09-06 NOTE — ASSESSMENT & PLAN NOTE
Due to free water deficit and dehydration   - IV hydration  - Management per renal     Recent Labs     09/04/21  0445 09/04/21  1815 09/05/21  0647 09/05/21  1533 09/06/21  0923   SODIUM 150* 145 150* 148* 151*

## 2021-09-06 NOTE — ASSESSMENT & PLAN NOTE
Patient with moderate aortic stenosis  · Appreciate cardiology evaluation and recommendations:  Once patient is stabilized, cardiac catheterization is recommended to evaluate coronary arteries    · If coronary arteries are normal, Cardiology recommends dobutamine echocardiogram with assessment of aortic valve, to rule out low-flow severe aortic stenosis

## 2021-09-06 NOTE — ASSESSMENT & PLAN NOTE
Delirium precautions  Preferably would like to avoid chemical medications given QTC of 510  Geriatrics consulted  - Seroquel 12 5mg HS for now started

## 2021-09-07 ENCOUNTER — APPOINTMENT (INPATIENT)
Dept: CT IMAGING | Facility: HOSPITAL | Age: 81
DRG: 870 | End: 2021-09-07
Payer: MEDICARE

## 2021-09-07 ENCOUNTER — APPOINTMENT (INPATIENT)
Dept: RADIOLOGY | Facility: HOSPITAL | Age: 81
DRG: 870 | End: 2021-09-07
Attending: RADIOLOGY
Payer: MEDICARE

## 2021-09-07 PROBLEM — R26.2 AMBULATORY DYSFUNCTION: Status: ACTIVE | Noted: 2021-09-07

## 2021-09-07 PROBLEM — G47.01 INSOMNIA DUE TO MEDICAL CONDITION: Status: ACTIVE | Noted: 2021-09-07

## 2021-09-07 PROBLEM — M54.50 ACUTE MIDLINE LOW BACK PAIN WITHOUT SCIATICA: Status: ACTIVE | Noted: 2021-09-07

## 2021-09-07 PROBLEM — G93.41 ACUTE METABOLIC ENCEPHALOPATHY: Status: ACTIVE | Noted: 2021-09-07

## 2021-09-07 PROBLEM — D64.9 ANEMIA: Status: ACTIVE | Noted: 2021-09-07

## 2021-09-07 LAB
ANION GAP SERPL CALCULATED.3IONS-SCNC: 13 MMOL/L (ref 4–13)
BASOPHILS # BLD AUTO: 0.11 THOUSANDS/ΜL (ref 0–0.1)
BASOPHILS NFR BLD AUTO: 1 % (ref 0–1)
BUN SERPL-MCNC: 63 MG/DL (ref 5–25)
CALCIUM SERPL-MCNC: 8.8 MG/DL (ref 8.3–10.1)
CHLORIDE SERPL-SCNC: 115 MMOL/L (ref 100–108)
CO2 SERPL-SCNC: 25 MMOL/L (ref 21–32)
CREAT SERPL-MCNC: 1.55 MG/DL (ref 0.6–1.3)
EOSINOPHIL # BLD AUTO: 0.68 THOUSAND/ΜL (ref 0–0.61)
EOSINOPHIL NFR BLD AUTO: 4 % (ref 0–6)
ERYTHROCYTE [DISTWIDTH] IN BLOOD BY AUTOMATED COUNT: 27.7 % (ref 11.6–15.1)
GFR SERPL CREATININE-BSD FRML MDRD: 41 ML/MIN/1.73SQ M
GLUCOSE SERPL-MCNC: 141 MG/DL (ref 65–140)
HCT VFR BLD AUTO: 32.1 % (ref 36.5–49.3)
HGB BLD-MCNC: 9.1 G/DL (ref 12–17)
IMM GRANULOCYTES # BLD AUTO: 0.27 THOUSAND/UL (ref 0–0.2)
IMM GRANULOCYTES NFR BLD AUTO: 2 % (ref 0–2)
LYMPHOCYTES # BLD AUTO: 1.77 THOUSANDS/ΜL (ref 0.6–4.47)
LYMPHOCYTES NFR BLD AUTO: 10 % (ref 14–44)
MAGNESIUM SERPL-MCNC: 2.4 MG/DL (ref 1.6–2.6)
MCH RBC QN AUTO: 24.9 PG (ref 26.8–34.3)
MCHC RBC AUTO-ENTMCNC: 28.3 G/DL (ref 31.4–37.4)
MCV RBC AUTO: 88 FL (ref 82–98)
MONOCYTES # BLD AUTO: 1.7 THOUSAND/ΜL (ref 0.17–1.22)
MONOCYTES NFR BLD AUTO: 10 % (ref 4–12)
NEUTROPHILS # BLD AUTO: 13.39 THOUSANDS/ΜL (ref 1.85–7.62)
NEUTS SEG NFR BLD AUTO: 73 % (ref 43–75)
NRBC BLD AUTO-RTO: 0 /100 WBCS
PLATELET # BLD AUTO: 474 THOUSANDS/UL (ref 149–390)
PMV BLD AUTO: 11.4 FL (ref 8.9–12.7)
POTASSIUM SERPL-SCNC: 3.2 MMOL/L (ref 3.5–5.3)
PROCALCITONIN SERPL-MCNC: <0.05 NG/ML
RBC # BLD AUTO: 3.65 MILLION/UL (ref 3.88–5.62)
SODIUM SERPL-SCNC: 153 MMOL/L (ref 136–145)
WBC # BLD AUTO: 17.92 THOUSAND/UL (ref 4.31–10.16)

## 2021-09-07 PROCEDURE — 99232 SBSQ HOSP IP/OBS MODERATE 35: CPT | Performed by: INTERNAL MEDICINE

## 2021-09-07 PROCEDURE — 97530 THERAPEUTIC ACTIVITIES: CPT

## 2021-09-07 PROCEDURE — 80048 BASIC METABOLIC PNL TOTAL CA: CPT | Performed by: STUDENT IN AN ORGANIZED HEALTH CARE EDUCATION/TRAINING PROGRAM

## 2021-09-07 PROCEDURE — 99232 SBSQ HOSP IP/OBS MODERATE 35: CPT

## 2021-09-07 PROCEDURE — 99232 SBSQ HOSP IP/OBS MODERATE 35: CPT | Performed by: STUDENT IN AN ORGANIZED HEALTH CARE EDUCATION/TRAINING PROGRAM

## 2021-09-07 PROCEDURE — 70450 CT HEAD/BRAIN W/O DYE: CPT

## 2021-09-07 PROCEDURE — 97110 THERAPEUTIC EXERCISES: CPT

## 2021-09-07 PROCEDURE — 99222 1ST HOSP IP/OBS MODERATE 55: CPT | Performed by: PSYCHIATRY & NEUROLOGY

## 2021-09-07 PROCEDURE — G1004 CDSM NDSC: HCPCS

## 2021-09-07 PROCEDURE — 85025 COMPLETE CBC W/AUTO DIFF WBC: CPT | Performed by: STUDENT IN AN ORGANIZED HEALTH CARE EDUCATION/TRAINING PROGRAM

## 2021-09-07 PROCEDURE — 83735 ASSAY OF MAGNESIUM: CPT | Performed by: STUDENT IN AN ORGANIZED HEALTH CARE EDUCATION/TRAINING PROGRAM

## 2021-09-07 PROCEDURE — 99223 1ST HOSP IP/OBS HIGH 75: CPT | Performed by: FAMILY MEDICINE

## 2021-09-07 PROCEDURE — 84145 PROCALCITONIN (PCT): CPT | Performed by: STUDENT IN AN ORGANIZED HEALTH CARE EDUCATION/TRAINING PROGRAM

## 2021-09-07 RX ORDER — POTASSIUM CHLORIDE 20MEQ/15ML
40 LIQUID (ML) ORAL ONCE
Status: COMPLETED | OUTPATIENT
Start: 2021-09-07 | End: 2021-09-07

## 2021-09-07 RX ADMIN — POTASSIUM CHLORIDE: 2 INJECTION, SOLUTION, CONCENTRATE INTRAVENOUS at 23:34

## 2021-09-07 RX ADMIN — ACETAMINOPHEN 650 MG: 650 SUSPENSION ORAL at 12:38

## 2021-09-07 RX ADMIN — Medication 20 MG: at 08:27

## 2021-09-07 RX ADMIN — SERTRALINE HYDROCHLORIDE 50 MG: 50 TABLET ORAL at 10:23

## 2021-09-07 RX ADMIN — Medication 250 MG: at 08:20

## 2021-09-07 RX ADMIN — APIXABAN 2.5 MG: 2.5 TABLET, FILM COATED ORAL at 17:23

## 2021-09-07 RX ADMIN — POTASSIUM CHLORIDE 40 MEQ: 20 SOLUTION ORAL at 12:38

## 2021-09-07 RX ADMIN — Medication 12.5 MG: at 08:19

## 2021-09-07 RX ADMIN — AMIODARONE HYDROCHLORIDE 200 MG: 200 TABLET ORAL at 12:38

## 2021-09-07 RX ADMIN — LIDOCAINE 1 PATCH: 50 PATCH CUTANEOUS at 12:48

## 2021-09-07 RX ADMIN — AMIODARONE HYDROCHLORIDE 200 MG: 200 TABLET ORAL at 08:16

## 2021-09-07 RX ADMIN — Medication 250 MG: at 17:23

## 2021-09-07 RX ADMIN — PRAVASTATIN SODIUM 40 MG: 40 TABLET ORAL at 17:23

## 2021-09-07 RX ADMIN — POTASSIUM CHLORIDE: 2 INJECTION, SOLUTION, CONCENTRATE INTRAVENOUS at 12:57

## 2021-09-07 RX ADMIN — APIXABAN 2.5 MG: 2.5 TABLET, FILM COATED ORAL at 08:18

## 2021-09-07 RX ADMIN — Medication 12.5 MG: at 21:35

## 2021-09-07 RX ADMIN — AMIODARONE HYDROCHLORIDE 200 MG: 200 TABLET ORAL at 17:23

## 2021-09-07 NOTE — ASSESSMENT & PLAN NOTE
Delirium precautions  Preferably would like to avoid chemical medications given QTC of 510  Geriatrics consulted

## 2021-09-07 NOTE — ASSESSMENT & PLAN NOTE
I would recommend to resume physical therapy  Encourage out of bed as tolerated  Encourage p o  Hydration    Monitor orthostatic vital signs

## 2021-09-07 NOTE — PHYSICAL THERAPY NOTE
Physical Therapy Progress Note     09/07/21 1513   PT Last Visit   PT Visit Date 09/07/21   Note Type   Note Type Treatment   Pain Assessment   Pain Assessment Tool FLACC   Pain Rating: FLACC (Rest) - Face 1   Pain Rating: FLACC (Rest) - Legs 1   Pain Rating: FLACC (Rest) - Activity 1   Pain Rating: FLACC (Rest) - Cry 1   Pain Rating: FLACC (Rest) - Consolability 1   Score: FLACC (Rest) 5   Pain Rating: FLACC (Activity) - Face 1   Pain Rating: FLACC (Activity) - Legs 1   Pain Rating: FLACC (Activity) - Activity 1   Pain Rating: FLACC (Activity) - Cry 1   Pain Rating: FLACC (Activity) - Consolability 2   Score: FLACC (Activity) 6   Restrictions/Precautions   Weight Bearing Precautions Per Order No   Other Precautions Fall Risk;Pain;Multiple lines;Telemetry; Bed Alarm   General   Chart Reviewed Yes   Response to Previous Treatment Patient reporting fatigue but able to participate  Family/Caregiver Present No   Subjective   Subjective Willing to participate in therapy this PM    Bed Mobility   Rolling R 2  Maximal assistance   Additional items Assist x 2;Bedrails; Increased time required;Leg ;Verbal cues;LE management   Rolling L 2  Maximal assistance   Additional items Assist x 2;Bedrails;Leg ; Increased time required;Verbal cues;LE management   Endurance Deficit   Endurance Deficit Yes   Endurance Deficit Description weakness/fatigue/pain   Activity Tolerance   Activity Tolerance Patient limited by pain; Patient limited by fatigue   Nurse Made Aware Yes   Exercises   THR Supine;10 reps;AAROM; Bilateral   Assessment   Prognosis Fair   Problem List Decreased strength;Decreased endurance;Decreased range of motion;Decreased mobility; Impaired balance;Decreased cognition; Impaired judgement;Decreased safety awareness;Decreased skin integrity;Pain   Assessment Pt  supine in bed upon my arrival  Pt  reporting fatigue/pain, however agreeable to therapeutic intervention   Performance of HEP with cues provided for proper completion  Noted with increased therapeutic intervention, pt  became emotional "crying" requiring increased emotional support of therapist for participation  Pt  performed limited transfers requiring A of therapist with cues for hand placement/technique  Not appropriate for OOB this treatment session due to poor participation and direction following this treatment session  Pt  repositioned supine in bed with alarm active at end of treatment session  PT will continue to recommend d/c to rehab when medically stable for continued improvement of noted impairments  Barriers to Discharge Inaccessible home environment;Decreased caregiver support   Barriers to Discharge Comments Level of support at home  Goals   Patient Goals To rest    STG Expiration Date 09/13/21   PT Treatment Day 2   Plan   Treatment/Interventions Functional transfer training;LE strengthening/ROM; Therapeutic exercise; Endurance training;Bed mobility;Spoke to nursing;Spoke to case management   Progress Slow progress, decreased activity tolerance   PT Frequency Other (Comment)  (3-5x/wk)   Recommendation   PT Discharge Recommendation Post acute rehabilitation services   PT - OK to Discharge Yes  (if d/c to rehab when medically stable  )   AM-PAC Basic Mobility Inpatient   Turning in Bed Without Bedrails 1   Lying on Back to Sitting on Edge of Flat Bed 1   Moving Bed to Chair 1   Standing Up From Chair 1   Walk in Room 1   Climb 3-5 Stairs 1   Basic Mobility Inpatient Raw Score 6   Turning Head Towards Sound 3   Follow Simple Instructions 3   Low Function Basic Mobility Raw Score 12   Low Function Basic Mobility Standardized Score 18 33     An AM-PAC Basic Mobility standardized score less than 42 9 suggests the patient may benefit from discharge to post-acute rehab services      Vin Gray PTA

## 2021-09-07 NOTE — ASSESSMENT & PLAN NOTE
-Patient is high risk of delirium due to mild cognitive impairment, metabolic disturbance, change in environment, pain, insomnia, pain  -Initiate delirium precautions  -maintain normal sleep/wake cycle, add melatonin 3 mg q h s   -minimize overnight interruptions, group overnight vitals/labs/nursing checks as possible  -dim lights, close blinds and turn off tv to minimize stimulation and encourage sleep environment in evenings  -ensure that pain is well controlled  consider Tylenol 975mg Q8H scheduled , gabapentin 100 mg q h s   -monitor for fecal and urinary retention which may precipitate delirium , tender to palpation lower abdomen today- monitor bladder scans   -encourage early mobilization and ambulation, resume physical therapy  -provide frequent reorientation and redirection  -encourage family and friends at the bedside to help calm patient if anxious, resume sertraline and add gabapentin 100 mg q h s   -avoid medications which may precipitate or worsen delirium such as tramadol, benzodiazepine, anticholinergics, and antihistaminics  -encourage hydration and nutrition , assist with feeding   -redirect unwanted behaviors as first line, avoid physical restraints    -

## 2021-09-07 NOTE — PROGRESS NOTES
[address] [place] [address]  Progress Note Teresa Bernstein 1940, 78 y o  male MRN: [id number]  Unit/Bed#: [address] [place] [address] 216-01 Encounter: [id number]  Primary Care Provider: Elisabet Stewart DO   Date and time admitted to hospital: 8/19/2021  8:25 PM    * Acute respiratory failure with hypoxia [facility])  Assessment & Plan  78year old male presenting with acute respiratory failure with hypoxia  Possibly due to vascular congestion / multifocal pneumonia  Required ICU level of care / intubation  Completed antibiotic therapy  S/p extubation and now down to 1L NC   - Diuretics and fluid balance per nephrology    Acute on chronic diastolic CHF (congestive heart failure) ([facility])  Assessment & Plan  Wt Readings from Last 3 Encounters:   09/07/21 73 kg (160 lb 15 oz)   07/26/21 83 5 kg (184 lb 2 oz)   03/19/21 82 8 kg (182 lb 9 6 oz)     Presented with acute on chronic diastolic heart failure exacerbation  Euvolemic / hypovolemic    - Lasix infusion discontinued  Now hypovolemic  On gentle hydration    - Appreciate renal and cardiology recommendations  Hypernatremia  Assessment & Plan  Due to free water deficit and dehydration   - IV hydration  - Management per renal     Recent Labs     09/04/21  1815 09/05/21  0647 09/05/21  1533 09/06/21  0923 09/07/21  0435   SODIUM 145 150* 148* 151* 153*             Atrial fibrillation ([facility])  Assessment & Plan  New onset atrial fibrillation with RVR  Currently on NSR   - Continue Eliquis 2 5mg BID  - Continue amiodarone load per cardiology  - Cardiology recommends cath for ischemic testing once stabilized and cleared by renal to do so  catheterization dye    CANDIE (acute kidney injury) [facility])  Assessment & Plan  Acute kidney injury requiring hemodialysis  Continues to improve  HD catheter line discontinued       Recent Labs     09/05/21  0647 09/06/21  0923 09/07/21  0435   * 83* 63*   CREATININE 2 09* 1 76* 1 55*   EGFR 28 35 41       Results from last 7 days Lab Units 21  1651   BLOOD UA  Negative   PROTEIN UA mg/dl Negative         Multifocal pneumonia  Assessment & Plan  Completed antibiotic therapy  Was restarted on antibiotics due to concerns for aspiration pneumonia  As per previous providers discussion with ID, likely due to aspiration pneumonitis  - Continue monitoring off antibiotics  - Follow-up cultures  - Aspiration precautions  - Dysphagia diet    Mild cognitive impairment  Assessment & Plan  Delirium precautions  Preferably would like to avoid chemical medications given QTC of 510  Geriatrics consulted  Essential hypertension  Assessment & Plan  Controlled  - Continue hydralazine 25mg TID, isosorbide 10mg tid, and metoprolol 12 5mg Q12  VTE Pharmacologic Prophylaxis:   Pharmacologic: Apixaban (Eliquis)  Mechanical VTE Prophylaxis in Place: Yes    Patient Centered Rounds: I have performed bedside rounds with nursing staff today  Discussions with Specialists or Other Care Team Provider: nursing    Education and Discussions with Family / Patient: family, daughter-in-law    Time Spent for Care: 30 minutes  More than 50% of total time spent on counseling and coordination of care as described above  Current Length of Stay: 19 day(s)    Current Patient Status: Inpatient   Certification Statement: The patient will continue to require additional inpatient hospital stay due to cardiology reeval, geriatrics eval    Discharge Plan: active    Code Status: Level 1 - Full Code      Subjective:   Patient seen and examined at bedside  Currently confused  He does not have any new complaints at this time  Objective:     Vitals:   Temp (24hrs), Av 6 °F (37 °C), Min:97 8 °F (36 6 °C), Max:99 7 °F (37 6 °C)    Temp:  [97 8 °F (36 6 °C)-99 7 °F (37 6 °C)] 98 4 °F (36 9 °C)  HR:  [] 89  Resp:  [20-22] 20  BP: (107-118)/(57-60) 118/60  SpO2:  [84 %-92 %] 92 %  Body mass index is 27 62 kg/m²       Input and Output Summary (last 24 hours): Intake/Output Summary (Last 24 hours) at 9/7/2021 0932  Last data filed at 9/6/2021 1916  Gross per 24 hour   Intake 965 ml   Output --   Net 965 ml       Physical Exam:     Physical Exam  Vitals reviewed  Constitutional:       General: He is not in acute distress  HENT:      Head: Normocephalic  Nose: Nose normal    Eyes:      General: No scleral icterus  Cardiovascular:      Rate and Rhythm: Normal rate  Heart sounds: Murmur heard  Pulmonary:      Effort: Pulmonary effort is normal  No respiratory distress  Abdominal:      General: There is no distension  Palpations: Abdomen is soft  Tenderness: There is no abdominal tenderness  Musculoskeletal:      Right lower leg: No edema  Left lower leg: No edema  Skin:     General: Skin is warm  Neurological:      Mental Status: He is alert  He is disoriented     Psychiatric:         Mood and Affect: Mood normal          Behavior: Behavior normal        Additional Data:     Labs:    Results from last 7 days   Lab Units 09/07/21  0435 09/04/21  0445   WBC Thousand/uL 17 92* 21 00*   HEMOGLOBIN g/dL 9 1* 10 2*   HEMATOCRIT % 32 1* 36 1*   PLATELETS Thousands/uL 474* 413*   BANDS PCT %  --  1   NEUTROS PCT % 73  --    LYMPHS PCT % 10*  --    LYMPHO PCT %  --  7*   MONOS PCT % 10  --    MONO PCT %  --  6   EOS PCT % 4 0     Results from last 7 days   Lab Units 09/07/21  0435 09/03/21  0456   SODIUM mmol/L 153* 145   POTASSIUM mmol/L 3 2* 3 1*   CHLORIDE mmol/L 115* 103   CO2 mmol/L 25 25   BUN mg/dL 63* 180*   CREATININE mg/dL 1 55* 3 16*   ANION GAP mmol/L 13 17*   CALCIUM mg/dL 8 8 9 5   ALBUMIN g/dL  --  3 3*   TOTAL BILIRUBIN mg/dL  --  0 54   ALK PHOS U/L  --  85   ALT U/L  --  55   AST U/L  --  38   GLUCOSE RANDOM mg/dL 141* 116         Results from last 7 days   Lab Units 09/01/21  1719 08/31/21  1508   POC GLUCOSE mg/dl 164* 172*         Results from last 7 days   Lab Units 09/02/21  0441 09/01/21  1215   PROCALCITONIN ng/ml 0  12 0 17           * I Have Reviewed All Lab Data Listed Above  * Additional Pertinent Lab Tests Reviewed: Sly 66 Admission Reviewed    Imaging:    Imaging Reports Reviewed Today Include: no new imaging    Recent Cultures (last 7 days):     Results from last 7 days   Lab Units 09/01/21  1215   BLOOD CULTURE  No Growth After 5 Days  No Growth After 5 Days  Last 24 Hours Medication List:   Current Facility-Administered Medications   Medication Dose Route Frequency Provider Last Rate    Acetaminophen  650 mg Per NG Tube Q6H PRN Max 4/day Ean Espinosa, ANATOLY      ALPRAZolam  0 25 mg Oral HS PRN ANATOLY Gil      amiodarone  200 mg Oral TID With Meals Thyra Door PA-C      [START ON 9/9/2021] amiodarone  200 mg Oral Daily With Breakfast Thyra Door, PA-C      apixaban  2 5 mg Oral BID Pinklianne Espinosa, ANATOLY      dextrose  75 mL/hr Intravenous Continuous Rudolfo Ahumada, PA-C 75 mL/hr (09/06/21 1917)    hydrALAZINE  10 mg Intravenous Q6H PRN ANATOLY Fleming      hydrALAZINE  25 mg Oral [facility] Ean Culp, CRLORENA      isosorbide dinitrate  10 mg Oral TID after meals ANATOLY Fleming      lidocaine  1 patch Topical Daily Kori Contreras MD      metoprolol tartrate  12 5 mg Oral Q12H [address] Ean Espinosa, ANATOLY      omeprazole (PRILOSEC) suspension 2 mg/mL  20 mg Oral Daily Ean Espinosa, ANATOLY      pravastatin  40 mg Oral Daily With Jabil ANATOLY Ledesma      saccharomyces boulardii  250 mg Oral BID Kori Contreras MD      senna-docusate sodium  2 tablet Oral BID PRN Kori Contreras MD          Today, Patient Was Seen By: Santiago Alejandro MD    ** Please Note: Dictation voice to text software may have been used in the creation of this document   **

## 2021-09-07 NOTE — ASSESSMENT & PLAN NOTE
Patient is currently alert oriented x1, more confused that his baseline  Continue supportive care and reorient as needed  Continue follow-up with geriatrics as a patient  Encourage family to visit

## 2021-09-07 NOTE — ASSESSMENT & PLAN NOTE
Acute kidney injury requiring hemodialysis  Continues to improve  HD catheter line discontinued       Recent Labs     09/05/21  0647 09/06/21  0923 09/07/21  0435   * 83* 63*   CREATININE 2 09* 1 76* 1 55*   EGFR 28 35 41       Results from last 7 days   Lab Units 09/01/21  1651   BLOOD UA  Negative   PROTEIN UA mg/dl Negative

## 2021-09-07 NOTE — ASSESSMENT & PLAN NOTE
Due to free water deficit and dehydration   - IV hydration  - Management per renal     Recent Labs     09/04/21  1815 09/05/21  0647 09/05/21  1533 09/06/21  0923 09/07/21  0435   SODIUM 145 150* 148* 151* 153*

## 2021-09-07 NOTE — PROGRESS NOTES
Progress Note - Cardiology   Ida Awan 78 y o  male MRN: [id number]  Unit/Bed#: [place] [address] -01 Encounter: [id number]      Assessment/Recommendations/Discussion:   1  Hypoxic respiratory failure  2  Multifocal pneumonia  3  Acute diastolic heart failure  4  Moderate aortic stenosis, peak velocity 3 3 meters/second, mean gradient 22 mm Hg  5  Acute renal failure, resolved  6  Paroxysmal atrial fibrillation and atrial flutter, on amiodarone, Eliquis  7  Non MI troponin elevation  8  Probable subclinical CAD    PLAN   He is more confused today than he was when I saw him last week   His renal function has recovered, no further need for temporary dialysis, temporary dialysis catheter was removed   His aortic stenosis on his echocardiogram visually appears to be severe although the valve is not well visualized in short axis view it does appear heavily calcified with significant restricted excursion during systole  His peak velocity mean gradients measured to be in the moderate range however, although this echo was performed in the setting of significant sepsis where he was ultimately there after intubated and on a ventilator for a week for respiratory failure  Could have had high output at that time, stroke volume index was in the 50s   We will repeat limited echo today to re-evaluate ejection fraction as well as peak and mean gradients, calculated aortic valve area now that he is medically improved   Given his confusion however, agree with prior assessments, risk of catheterization at this point outweighs benefits   Continue with medical therapy for presumed coronary artery disease as well as aortic stenosis and atrial fibrillation    Amiodarone 200 mg 3 times a day loading dose, followed by 200 mg daily thereafter starting on September 9th   Eliquis 2 5 b i d    Isosorbide dinitrate 10 t i d , hydralazine 25 t i d    Metoprolol tartrate 12 5 b i d    Pravastatin 40 mg daily   Plan for geriatric Medicine evaluation        Subjective:   HPI  Still notes that he is short of breath, emotional   RN states he has been confused this morning but able to be redirected  Review of Systems: As noted in HPI  Rest of ROS is negative  Vitals:   /60   Pulse 89   Temp 98 4 °F (36 9 °C)   Resp 20   Ht 5' 4" (1 626 m)   Wt 73 kg (160 lb 15 oz)   SpO2 93%   BMI 27 62 kg/m²   Vitals:    09/06/21 0543 09/07/21 0600   Weight: 73 6 kg (162 lb 4 1 oz) 73 kg (160 lb 15 oz)       Intake/Output Summary (Last 24 hours) at 9/7/2021 0943  Last data filed at 9/6/2021 1916  Gross per 24 hour   Intake 965 ml   Output --   Net 965 ml       Physical Exam   Constitutional: awake, alert, in no acute distress, no obvious deformities, confused, but able to be redirected  Head: Normocephalic, without obvious abnormality, atraumatic  Eyes: conjunctivae clear and moist  Sclera anicteric  No xanthelasmas  Pupils equal bilaterally  Extraocular motions are full  Ear nose mouth and throat: ears are symmetrical bilaterally, hearing appears to be equal bilaterally, no nasal discharge or epistaxis, oropharynx is clear with moist mucous membranes  Neck:  Trachea is midline, neck is supple, no thyromegaly or significant lymphadenopathy, there is full range of motion  Lungs: clear to auscultation bilaterally, no wheezes, no rales, no rhonchi, no accessory muscle use, breathing is nonlabored  Heart: regular rate and rhythm, S1, S2 normal, 4/6 systolic murmur at the right upper sternal border, no click, no rub and no gallop, no lower extremity edema  Abdomen: soft, non-tender; bowel sounds normal; no masses,  no organomegaly  Skin: Skin is warm, dry, intact  No obvious rashes or lesions on exposed extremities  Nail beds are pink with no cyanosis or clubbing      Lab Results:  Results from last 7 days   Lab Units 09/07/21  0435   WBC Thousand/uL 17 92*   HEMOGLOBIN g/dL 9 1*   HEMATOCRIT % 32 1*   PLATELETS Thousands/uL 474* Results from last 7 days   Lab Units 215 21  0456   POTASSIUM mmol/L 3 2* 3 1*   CHLORIDE mmol/L 115* 103   CO2 mmol/L 25 25   BUN mg/dL 63* 180*   CREATININE mg/dL 1 55* 3 16*   CALCIUM mg/dL 8 8 9 5   ALK PHOS U/L  --  85   ALT U/L  --  55   AST U/L  --  38     Results from last 7 days   Lab Units 21  0435   POTASSIUM mmol/L 3 2*   CHLORIDE mmol/L 115*   CO2 mmol/L 25   BUN mg/dL 63*   CREATININE mg/dL 1 55*   CALCIUM mg/dL 8 8           Medications:    Current Facility-Administered Medications:     Acetaminophen (TYLENOL) oral suspension 650 mg, 650 mg, Per NG Tube, Q6H PRN Max 4/day, ANATOLY Samll, 650 mg at 21 0457    ALPRAZolam Raul Covarrubias) tablet 0 25 mg, 0 25 mg, Oral, HS PRN, ANATOLY Humphrey, 0 25 mg at 21 2216    [] amiodarone tablet 200 mg, 200 mg, Oral, TID With Meals, 200 mg at 21 1034 **FOLLOWED BY** amiodarone tablet 200 mg, 200 mg, Oral, TID With Meals, 200 mg at 21 0816 **FOLLOWED BY** [DISCONTINUED] amiodarone tablet 200 mg, 200 mg, Oral, Daily With Breakfast, ANATOLY Small    [START ON 2021] amiodarone tablet 200 mg, 200 mg, Oral, Daily With Breakfast, Destini Lin PA-C    apixaban David Johnny) tablet 2 5 mg, 2 5 mg, Oral, BID, ANATOLY Small, 2 5 mg at 21    dextrose 5 % infusion, 75 mL/hr, Intravenous, Continuous, Yury Jauregui PA-C, Last Rate: 75 mL/hr at 21, 75 mL/hr at 21    hydrALAZINE (APRESOLINE) injection 10 mg, 10 mg, Intravenous, Q6H PRN, ANATOLY Patricia, 10 mg at 21    hydrALAZINE (APRESOLINE) tablet 25 mg, 25 mg, Oral, Q8H [facility], ANATOLY Small, 25 mg at 21 0542    isosorbide dinitrate (ISORDIL) tablet 10 mg, 10 mg, Oral, TID after meals, ANATOLY Small, 10 mg at 21 1034    lidocaine (LIDODERM) 5 % patch 1 patch, 1 patch, Topical, Daily, Carolina Conley MD, 1 patch at 21 1035    metoprolol tartrate (LOPRESSOR) partial tablet 12 5 mg, 12 5 mg, Oral, Q12H [address], Gar Dayami, CRNP, 12 5 mg at 09/07/21 6149    omeprazole (PRILOSEC) suspension 2 mg/mL, 20 mg, Oral, Daily, ANATOLY Small, 20 mg at 09/07/21 0827    pravastatin (PRAVACHOL) tablet 40 mg, 40 mg, Oral, Daily With ANATOLY Crowder, 40 mg at 09/06/21 1751    saccharomyces boulardii (FLORASTOR) capsule 250 mg, 250 mg, Oral, BID, Miles Phan MD, 250 mg at 09/07/21 0820    senna-docusate sodium (SENOKOT S) 8 6-50 mg per tablet 2 tablet, 2 tablet, Oral, BID PRN, Miles Phan MD    This note was completed in part utilizing M-Modal Fluency Direct Software  Grammatical errors, random word insertions, spelling mistakes, and incomplete sentences may be an occasional consequence of this system secondary to software limitations, ambient noise, and hardware issues  If you have any questions or concerns about the content, text, or information contained within the body of this dictation, please contact the provider for clarification      Leticia Funes DO, [facility]  9/7/2021 9:43 AM

## 2021-09-07 NOTE — PROGRESS NOTES
Follow up Consultation    Nephrology   Adelita Fitch 78 y o  male MRN: [id number]  Unit/Bed#: [place] [address] [address] [address]-01 Encounter: [id number]      Physician Requesting Consult: Navi Gaytan MD        ASSESSMENT/PLAN:     · Acute kidney injury :  - CANDIE most likely secondary to ischemic injury due to relative hypotension in light of underlying comorbidities plus cardiorenal syndrome plus some component of prerenal azotemia due to over-diuresis  - After review of records In [facility] as well as Care everywhere it appears that the patient has a baseline Creatinine of 0 7-0 9 mg/dL  - patient was admitted with a creatinine of 0 97 mg/dL on 08/19/2021   - peak creatinine thus far during this hospitalization at 3 84 mg/dL on 09/01/2021   - patient's creatinine today is at 1 55 mg/dL, stable and improving towards baseline   - no further indication for dialysis  - patient underwent dialysis treatment on 09/01/2021 however 50 minutes into treatment patient had issues with hypotension and treatment was discontinued  - right IJ temporary dialysis catheter removed on 09/07/2021  -change IV fluids to D5 water plus 20 mg KCL at 125 cc an hour  - hold diuretics  - check BMP Q 12  - no acute indication for dialysis at this time continue monitor daily for dialysis needs  - check BMP, magnesium, phosphorus in a m   - CT chest abdomen pelvis from 09/01/2021 showing no hydronephrosis  Extensive ground-glass opacities compatible with nonspecific infectious process     - Await renal recovery  - Optimize hemodynamic status to avoid delay in renal recovery    - Place on a renal diet when allowed diet order    - Avoid nephrotoxins, adjust meds to appropriate GFR   - Strict I/O   - Daily weights  - Urinary retention protocol if patient does not have a [place]  - will need to set up patient for follow up with Nephrology as an outpatient post hospitalization   - as an outpatient for nephrology patient follows up with no nephrologist     · Blood pressure/hypertension:  - current medications:  Hydralazine 25 mg p o  Q 8, Isordil 10 mg p o  T i d  metoprolol 12 5 mg p o  B i d   - recommendations:  No changes for now  - Optimize hemodynamics   - Maintain MAP > 65mmHg  - Avoid BP fluctuations      · H/H/anemia:  - most recent hemoglobin at 9 1 grams/deciliter  - maintain hemoglobin greater than 8 grams/deciliter     · Acid-base electrolytes:  ? Hypernatremia:    § Most recent sodium at 153 mEq  § Change IV fluids to D5 +20 mEq KCL increase rate to 125 cc an hour     ? Hypokalemia:  § Most recent potassium 3 2 mEq  § At potassium to IV fluids check BMP Q 12        ? Significant azotemia:  § Most recent BUN of 63 improving  § Continue IV fluids avoid diuretics  ? Acid-base:    § Most recent bicarb at 25     · Volume status:  ?  Clinically appears to be hypovolemic to euvolemic  Adjust IV fluids as above  Hold diuretics      · Proteinuria:   ? Most recent UA with no protein as of 09/01/2021 or blood     · Other medical problems:  ? Leukocytosis:  Management per primary team   Likely secondary to multifocal pneumonia  COVID negative  ? Acute on chronic CHF:  Management per primary team   Hold diuretics for now patient also has history of moderate aortic stenosis EF of 52%  Previously was on Lasix drip    hold diuretics for now for repeat echo per Cardiology  Follow-up with cardiology  ? Altered mental status:  Management primary team     Thanks for the consult  Will continue to follow  Please call with questions/ concerns  Above-mentioned orders and Plan in terms of acute kidney injury was discussed with the team in [address] Lu[address] MD, FASN, 9/7/2021, 12:16 PM              Objective :   Patient seen and examined in his room remains pleasantly confused urine output not adequately documented remains afebrile    Dialysis catheter temporary removed      PHYSICAL EXAM  /60   Pulse 89   Temp 98 4 °F (36 9 °C)   Resp 20   Ht 5' 4" (1 626 m)   Wt 73 kg (160 lb 15 oz)   SpO2 93%   BMI 27 62 kg/m²   Temp (24hrs), Av 6 °F (37 °C), Min:97 8 °F (36 6 °C), Max:99 7 °F (37 6 °C)        Intake/Output Summary (Last 24 hours) at 2021 1216  Last data filed at 2021 1916  Gross per 24 hour   Intake 965 ml   Output --   Net 965 ml       I/O last 24 hours: In: 965 [I V :965]  Out: -       Current Weight: Weight - Scale: 73 kg (160 lb 15 oz)  First Weight: Weight - Scale: 85 4 kg (188 lb 4 4 oz)  Physical Exam  Vitals and nursing note reviewed  Constitutional:       General: He is not in acute distress  Appearance: Normal appearance  He is normal weight  He is ill-appearing  He is not toxic-appearing or diaphoretic  HENT:      Head: Normocephalic and atraumatic  Mouth/Throat:      Mouth: Mucous membranes are dry  Pharynx: Oropharynx is clear  No oropharyngeal exudate  Eyes:      General: No scleral icterus  Conjunctiva/sclera: Conjunctivae normal    Cardiovascular:      Heart sounds: Murmur heard  Pulmonary:      Effort: Pulmonary effort is normal  No respiratory distress  Breath sounds: No stridor  No wheezing  Comments: Course breath sounds  Abdominal:      General: There is no distension  Palpations: Abdomen is soft  There is no mass  Tenderness: There is no abdominal tenderness  Musculoskeletal:         General: No swelling  Cervical back: Normal range of motion and neck supple  Skin:     General: Skin is warm  Coloration: Skin is not jaundiced  Neurological:      General: No focal deficit present  Mental Status: He is alert  He is disoriented     Psychiatric:         Mood and Affect: Mood normal          Behavior: Behavior normal              Review of Systems   Unable to perform ROS: Mental status change       Scheduled Meds:  Current Facility-Administered Medications   Medication Dose Route Frequency Provider Last Rate    Acetaminophen  650 mg Per NG Tube Q6H PRN Max 4/day Ling Pickup Jesús, CRNP      ALPRAZolam  0 25 mg Oral HS PRN Barbara Stephens, CRLORENA      amiodarone  200 mg Oral TID With Meals Osmel Dodson PA-C      [START ON 9/9/2021] amiodarone  200 mg Oral Daily With Breakfast Osmel Dodson PA-C      apixaban  2 5 mg Oral BID Raissa Olney Jesús, CRNP      dextrose  75 mL/hr Intravenous Continuous Raisa Salcedo PA-C 75 mL/hr (09/06/21 1917)    hydrALAZINE  10 mg Intravenous Q6H PRN Gaetano John, ANATOLY      hydrALAZINE  25 mg Oral [facility] Ean Culp, CRLORENA      isosorbide dinitrate  10 mg Oral TID after meals Gaetano John, ANATOLY      lidocaine  1 patch Topical Daily Jose Luna MD      metoprolol tartrate  12 5 mg Oral Q12H [facility] Ean Espinosa, CRNP      omeprazole (PRILOSEC) suspension 2 mg/mL  20 mg Oral Daily Ean Espinosa, CRLORENA      pravastatin  40 mg Oral Daily With [employer] Jesús, CRLORENA      saccharomyces boulardii  250 mg Oral BID Jose Luna MD      senna-docusate sodium  2 tablet Oral BID PRN Jose Luna MD      sertraline  50 mg Oral Daily Navi Gaytan MD         PRN Meds:   Acetaminophen    ALPRAZolam    hydrALAZINE    senna-docusate sodium    Continuous Infusions:dextrose, 75 mL/hr, Last Rate: 75 mL/hr (09/06/21 1917)          Invasive Devices: Invasive Devices     Peripheral Intravenous Line            Peripheral IV 09/06/21 Dorsal (posterior); Right Hand <1 day                  LABORATORY:    Results from last 7 days   Lab Units 09/07/21  0435 09/06/21  0923 09/06/21  0541 09/05/21  0647 09/04/21  0445 09/03/21  0456 09/02/21  0441 09/01/21  1622 09/01/21  0450   WBC Thousand/uL 17 92*  --   --   --  21 00* 21 32* 23 76*  --  20 20*   HEMOGLOBIN g/dL 9 1*  --   --   --  10 2* 9 8* 9 5*  --  10 5*   HEMATOCRIT % 32 1*  --   --   --  36 1* 34 0* 32 8*  --  37 9   PLATELETS Thousands/uL 474*  --   --   --  413* 426* 449*  --  480*   POTASSIUM mmol/L 3 2* 3 7  --  3 5 3 5 3 1* 3 6 3 5 3 4*   CHLORIDE mmol/L 115* 112*  --  112* 108 103 103 101 101   CO2 mmol/L 25 26  --  26 23 25 25 27 25   BUN mg/dL 63* 83*  --  114* 143* 180* 193* 232* 223*   CREATININE mg/dL 1 55* 1 76*  --  2 09* 2 46* 3 16* 3 76* 3 84* 3 68*   CALCIUM mg/dL 8 8 9 3  --  8 9 9 5 9 5 9 5 10 1 10 2*   MAGNESIUM mg/dL 2 4  --   --   --  3 0* 3 1*  --   --   --    PHOSPHORUS mg/dL  --   --  4 0  --  5 2*  --   --   --   --       rest all reviewed    RADIOLOGY:  CT chest abdomen pelvis wo contrast   Final Result by Bryanna Potts MD (09/01 2022)      Limited study due to motion artifact  Extensive groundglass opacities throughout both lungs, compatible with nonspecific infectious process, including COVID 19 pneumonia  Fluid-filled small bowel and colon which may represent nonspecific gastroenteritis  Colonic diverticulosis without diverticulitis  Workstation performed: [id number]         IR temporary dialysis catheter placement   Final Result by Ana Rosa Vigil MD (09/02 1521)   Impression: Image guided placement of nontunneled central venous dialysis catheter  Workstation performed: [id number]         XR chest portable   Final Result by Indiana Tucker DO (09/01 1424)      Stable patchy bilateral airspace disease  Workstation performed: [id number]         VAS NICK & waveform analysis, multiple levels   Final Result by Linnette Gross MD (08/30 7500)      XR chest portable ICU   Final Result by Susan Johnson MD (08/30 2403)      Stable patchy bilateral airspace disease question related to pulmonary edema or infiltrate  Workstation performed: [id number]         XR chest portable ICU   Final Result by Indiana Tucker DO (08/27 7200)      No significant change in bilateral airspace opacities  Stable life-support tubes                    Workstation performed: [id number]         XR chest portable ICU   Final Result by Lisa Ralph MD (08/26 1022)      Increasing right peripheral consolidation with improving aeration at the lung bases  Workstation performed: [id number]         XR chest portable   Final Result by Delaney Vasquez MD (08/25 1511)   Persistent bilateral infiltrates suspicious for multifocal pneumonia      Typical endotracheal tube and enteric tube      Increased gastric distention               Workstation performed: [id number]         XR chest portable ICU   Final Result by Petros Pierre MD (08/24 5333)      Slight improvement in bilateral parenchymal infiltrative changes  ET tube now at the brent  Workstation performed: [id number]         XR chest portable ICU   Final Result by Petros Pierre MD (08/22 1509)   Worsening bilateral parenchymal changes  Endotracheal tube in the right main bronchus to be pulled back  If this has been pulled back a repeat x-ray for confirmation as indicated clinically  The study was marked in [facility] for immediate notification  Workstation performed: [id number]         XR chest portable ICU   Final Result by Aurelia Amaya MD (08/22 5199)      Persistent pulmonary edema  Workstation performed: [id number]         XR chest 1 view portable   ED Interpretation by Beatrice Mendez MD (08/19 2123)   Bilateral diffuse infiltrates      Final Result by Alpesh Dupree MD (08/20 1210)      Diffuse patchy airspace disease bilaterally  Consider CHF versus multifocal infiltrate  Findings concur with the preliminary ER interpretation  Workstation performed: [id number]           Rest all reviewed    Portions of the record may have been created with voice recognition software  Occasional wrong word or "sound a like" substitutions may have occurred due to the inherent limitations of voice recognition software  Read the chart carefully and recognize, using context, where substitutions have occurred  If you have any questions, please contact the dictating provider

## 2021-09-07 NOTE — CONSULTS
Consultation - Neurology   Sohail Sosa 78 y o  male MRN: [id number]  Unit/Bed#: [place] 2 -01 Encounter: [id number]      Assessment/Plan   79 yo male with HTN, HLD, Mild , GERD, aortic stenosis and anxiety admitted on 8/19/21 for management of acute respiratory failure likely related to multifocal pneumonia and acute on chronic CHF  Hospital course was also complicated by CANDIE, hypernatremia, and new onset Afib with RVR for which he was started on Eliquis  Acute metabolic encephalopathy  Assessment & Plan  - Patient continues with alteration in mental status  - CT Head with no acute intracranial pathology  - Suspect Delirium superimposed on acute encephalopathy due to multifactorial etiology  Patient with complicated hospital course, metabolic disturbances, and sleep disturbance in a patient with limited cognitive reserve in a patient with baseline mild cognitive impairment  - B12 and folate  - Geriatrics consulted; appreciate recommendations  - medical management per Primary team  - promote sleep/wake cycle  - avoid CNS altering medications  - supportive care    Atrial fibrillation [facility])  Assessment & Plan  - continued on Eliquis 2 5 BID and amiodarone  - Cardiology following     Recommendations for outpatient neurological follow up have yet to be determined  History of Present Illness     Reason for Consult / Principal Problem: acute encephalopathy  Hx and PE limited by: cognitive impairment  HPI: Sohail Sosa is a 78 y o   male with HTN, HLD, mild cognitive impairment, iron deficiency anemia, aortic stenosis, GERD, and anxiety who presented to ED on 8/19/21 with SOB, chest pain and alteration in mental status  He was hypoxic on arrival, placed on BiPaP, and started on empiric antibiotics for multifocal pneumonia  COVID negative   Hospital course complicated by acute on chronic CHF, CANDIE requiring hemodialysis (now resolved), hypernatremia and new onset Afib with RVR for which Eliquis 2 5 mg BID was started  Neurology was asked to evaluate patient for acute encephalopathy  CT head completed with no acute intracranial pathology  Inpatient consult to Neurology  Consult performed by: ANATOLY Alonso  Consult ordered by: Siva Blanco MD          Review of Systems   Constitutional: Positive for fatigue  Some pain with movement of extremities   HENT:        Dry mouth, thirsty   Respiratory:        Shortness of breath with exertion   Cardiovascular: Negative  Gastrointestinal: Negative  Genitourinary:        Incontinent of urine   Skin: Negative  Neurological: Negative          Historical Information   Past Medical History:   Diagnosis Date    Actinic keratosis     LAST ASSESSED: [id number]    Allergic rhinitis     LAST ASSESSED: [id number]    Anxiety     LAST ASSESSED: [id number]    Anxiety disorder     LAST ASSESSED: 63VSF7389    Atelectasis of right lung     ABNORMAL CT SCAN OF [address] 12/2/2016 REPEAT NEEDED PER RADIOLOGY IN 3 MONTHS LAST ASSESSED: [id number]    Atypical chest pain     LAST ASSESSED: [id number]    Benign paroxysmal vertigo     LAST ASSESSED: 77ARS7073    Chronic cough     LAST ASSESSED: 11MVK5447    Chronic GERD     Degenerative arthritis of knee, bilateral     LAST ASSESSED: [id number]    Diverticulitis of colon     LAST ASSESSED: 20JUM1558    Diverticulosis     LAST ASSESSED: 61TDW5744    Do not resuscitate status 1/25/2021    Foreign body, eye     LAST ASSESSED: 16WDH7750    Functional gait abnormality     LAST ASSESSED: 48LVA7850    Hyperlipidemia     Hypertension     Hyponatremia     LAST ASSESSED: 77HGV7324    Lactic acidosis 8/19/2021    Leukocytosis     LAST ASSESSED: 05IPB7887    Multifocal pneumonia 8/19/2021    Murmur     Newly recognized murmur     LAST ASSESSED: [id number]    Olecranon bursitis, unspecified elbow     Presence of indwelling urethral catheter     RESOLVED: 59WDJ4474    Transient cerebral ischemia     LAST ASSESSED: 81URY2236    Urinary incontinence     LAST ASSESSED: 43UMK3146    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     LAST ASSESSED: 87XAL7065     Past Surgical History:   Procedure Laterality Date    ADENOIDECTOMY      APPENDECTOMY      COLONOSCOPY  10/2006    FIBEROPTIC    INGUINAL HERNIA REPAIR      IR TEMPORARY DIALYSIS CATHETER PLACEMENT  2021    JOINT REPLACEMENT      b/l TKR 2015    SINUS SURGERY      TONSILLECTOMY       Social History   Social History     Substance and Sexual Activity   Alcohol Use Yes    Comment: occ, SOCIAL     Social History     Substance and Sexual Activity   Drug Use No     E-Cigarette/Vaping    E-Cigarette Use Former User      E-Cigarette/Vaping Substances    Nicotine No     THC No     CBD No     Flavoring No     Other No     Unknown No      Social History     Tobacco Use   Smoking Status Former Smoker    Quit date: 56    Years since quittin 7   Smokeless Tobacco Never Used   Tobacco Comment    2 ppd for 12 years      Family History:   Family History   Problem Relation Age of Onset    Alzheimer's disease Mother     Coronary artery disease Brother        Review of previous medical records was  completed  Meds/Allergies   all current active meds have been reviewed and PTA meds:   Prior to Admission Medications   Prescriptions Last Dose Informant Patient Reported? Taking?    Omega-3 Fatty Acids (FISH OIL PO)  Self Yes No   Sig: Take by mouth 2 (two) times a day   Probiotic Product (PROBIOTIC DAILY PO)  Self Yes No   Sig: Take by mouth daily   VITAMIN D, ERGOCALCIFEROL, PO  Self Yes No   Sig: Take by mouth   amLODIPine (NORVASC) 10 mg tablet   No No   Sig: Take 1 tablet (10 mg total) by mouth daily   aspirin 81 MG tablet  Self Yes No   Sig: Take 81 mg by mouth    finasteride (PROSCAR) 5 mg tablet   No No   Sig: Take 1 tablet (5 mg total) by mouth daily   losartan (COZAAR) 25 mg tablet   No No   Sig: Take 1 tablet (25 mg total) by mouth daily   multivitamin (THERAGRAN) TABS  Self Yes No   Sig: Take 1 tablet by mouth daily   pantoprazole (PROTONIX) 40 mg tablet   No No   Sig: Take 1 tablet (40 mg total) by mouth 2 (two) times a day   polyethylene glycol (MIRALAX) 17 g packet  Self Yes No   Sig: Take 17 g by mouth daily   pravastatin (PRAVACHOL) 40 mg tablet   No No   Sig: Take 1 tablet (40 mg total) by mouth daily   sertraline (ZOLOFT) 50 mg tablet   No No   Sig: Take 1 tablet (50 mg total) by mouth daily   tamsulosin (FLOMAX) 0 4 mg   No No   Sig: Take 1 capsule (0 4 mg total) by mouth daily at bedtime      Facility-Administered Medications: None       Allergies   Allergen Reactions    Ace Inhibitors Cough     Pt denied any allergies         Objective   Vitals:Blood pressure 116/58, pulse 86, temperature 98 4 °F (36 9 °C), temperature source Axillary, resp  rate 20, height 5' 4" (1 626 m), weight 73 kg (160 lb 15 oz), SpO2 (!) 84 %  ,Body mass index is 27 62 kg/m²  Intake/Output Summary (Last 24 hours) at 9/7/2021 1432  Last data filed at 9/7/2021 1257  Gross per 24 hour   Intake 1235 ml   Output --   Net 1235 ml       Invasive Devices: Invasive Devices     Peripheral Intravenous Line            Peripheral IV 09/06/21 Dorsal (posterior); Right Hand <1 day                Physical Exam  HENT:      Head: Normocephalic and atraumatic  Mouth/Throat:      Mouth: Mucous membranes are dry  Pharynx: No oropharyngeal exudate  Eyes:      General:         Right eye: No discharge  Left eye: No discharge  Extraocular Movements: Extraocular movements intact  Conjunctiva/sclera: Conjunctivae normal       Pupils: Pupils are equal, round, and reactive to light  Cardiovascular:      Rate and Rhythm: Normal rate and regular rhythm  Heart sounds: Normal heart sounds  Pulmonary:      Effort: Pulmonary effort is normal  No respiratory distress  Breath sounds: Normal breath sounds     Abdominal: General: There is no distension  Palpations: Abdomen is soft  Musculoskeletal:         General: No swelling or tenderness  Skin:     General: Skin is warm and dry  Neurological:      Mental Status: He is alert  Deep Tendon Reflexes:      Reflex Scores:       Bicep reflexes are 2+ on the right side and 2+ on the left side  Brachioradialis reflexes are 2+ on the right side and 2+ on the left side  Patellar reflexes are 2+ on the right side and 2+ on the left side  Neurologic Exam     Mental Status   Oriented to person  Disoriented to place  Disoriented to time  Attention: decreased  Concentration: decreased  Speech: (Incomprehensible at times  But speech clear when he speaks slower  )  Able to name object  Intermittently follows 1 step commands  Tangential   Talks about being a Tailor, 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  One is a   Cranial Nerves     CN II   Visual acuity: (blinks to threat bilaterally)    CN III, IV, VI   Pupils are equal, round, and reactive to light  Right pupil: Size: 2 mm  Shape: regular  Left pupil: Size: 2 mm  Shape: regular  Nystagmus: none   Diplopia: none  CN V-XII intact  Tongue midline     Motor Exam Strength testing limited due to pain with movement of extremities  Strength appears full     Sensory Exam   Light touch normal      Gait, Coordination, and Reflexes     Tremor   Resting tremor: absent    Reflexes   Right brachioradialis: 2+  Left brachioradialis: 2+  Right biceps: 2+  Left biceps: 2+  Right patellar: 2+  Left patellar: 2+  Right plantar: equivocal  Left plantar: equivocal      Lab Results:   I have personally reviewed pertinent reports      CBC:   Results from last 7 days   Lab Units 09/07/21  0435 09/04/21  0445 09/03/21  0456   WBC Thousand/uL 17 92* 21 00* 21 32*   RBC Million/uL 3 65* 4 28 4 11   HEMOGLOBIN g/dL 9 1* 10 2* 9 8*   HEMATOCRIT % 32 1* 36 1* 34 0*   MCV fL 88 84 83   PLATELETS Thousands/uL 474* 413* 426*   , BMP/CMP:   Results from last 7 days   Lab Units 09/07/21  0435 09/06/21  0923 09/05/21  1533 09/05/21  0647 09/03/21  0456 09/02/21  0441 09/01/21  0450   SODIUM mmol/L 153* 151* 148* 150* 145 145 147*   POTASSIUM mmol/L 3 2* 3 7  --  3 5 3 1* 3 6 3 4*   CHLORIDE mmol/L 115* 112*  --  112* 103 103 101   CO2 mmol/L 25 26  --  26 25 25 25   BUN mg/dL 63* 83*  --  114* 180* 193* 223*   CREATININE mg/dL 1 55* 1 76*  --  2 09* 3 16* 3 76* 3 68*   CALCIUM mg/dL 8 8 9 3  --  8 9 9 5 9 5 10 2*   AST U/L  --   --   --   --  38 43 43   ALT U/L  --   --   --   --  55 70 61   ALK PHOS U/L  --   --   --   --  85 89 90   EGFR ml/min/1 73sq m 41 35  --  28 17 14 14   TSH:   Results from last 7 days   Lab Units 09/01/21  0450   TSH 3RD GENERATON uIU/mL 0 834   Urinalysis:   Results from last 7 days   Lab Units 09/01/21  1651   COLOR UA  Yellow   CLARITY UA  Clear   SPEC GRAV UA  1 025   PH UA  5 0   LEUKOCYTES UA  Negative   NITRITE UA  Negative   GLUCOSE UA mg/dl Negative   KETONES UA mg/dl Negative   BILIRUBIN UA  Negative   BLOOD UA  Negative     Imaging Studies: I have personally reviewed pertinent reports  and I have personally reviewed pertinent films in PACS     EKG, Pathology, and Other Studies: I have personally reviewed pertinent reports     and I have personally reviewed pertinent films in PACS     VTE Prophylaxis: Sequential compression device (Venodyne)     Code Status: Level 1 - Full Code  Advance Directive and Living Will: Yes

## 2021-09-07 NOTE — PLAN OF CARE
Problem: PHYSICAL THERAPY ADULT  Goal: Performs mobility at highest level of function for planned discharge setting  See evaluation for individualized goals  Description: Treatment/Interventions: Functional transfer training, LE strengthening/ROM, Therapeutic exercise, Endurance training, Cognitive reorientation, Patient/family training, Equipment eval/education, Bed mobility, Compensatory technique education, Continued evaluation, Spoke to nursing, OT          See flowsheet documentation for full assessment, interventions and recommendations  Outcome: Not Progressing  Note: Prognosis: Fair  Problem List: Decreased strength, Decreased endurance, Decreased range of motion, Decreased mobility, Impaired balance, Decreased cognition, Impaired judgement, Decreased safety awareness, Decreased skin integrity, Pain  Assessment: Pt  supine in bed upon my arrival  Pt  reporting fatigue/pain, however agreeable to therapeutic intervention  Performance of HEP with cues provided for proper completion  Noted with increased therapeutic intervention, pt  became emotional "crying" requiring increased emotional support of therapist for participation  Pt  performed limited transfers requiring A of therapist with cues for hand placement/technique  Not appropriate for OOB this treatment session due to poor participation and direction following this treatment session  Pt  repositioned supine in bed with alarm active at end of treatment session  PT will continue to recommend d/c to rehab when medically stable for continued improvement of noted impairments  Barriers to Discharge: Inaccessible home environment, Decreased caregiver support  Barriers to Discharge Comments: Level of support at home  PT Discharge Recommendation: Post acute rehabilitation services     PT - OK to Discharge: Yes (if d/c to rehab when medically stable )    See flowsheet documentation for full assessment

## 2021-09-07 NOTE — PLAN OF CARE
Problem: Potential for Falls  Goal: Patient will remain free of falls  Description: INTERVENTIONS:  - Educate patient/family on patient safety including physical limitations  - Instruct patient to call for assistance with activity   - Consult OT/PT to assist with strengthening/mobility   - Keep Call bell within reach  - Keep bed low and locked with side rails adjusted as appropriate  - Keep care items and personal belongings within reach  - Initiate and maintain comfort rounds  - Make Fall Risk Sign visible to staff  - Offer Toileting   - Initiate/Maintain bed alarm  - Obtain necessary fall risk management equipment  - Apply yellow socks and bracelet for high fall risk patients  - Consider moving patient to room near nurses station  Outcome: Progressing     Problem: MOBILITY - ADULT  Goal: Maintain or return to baseline ADL function  Description: INTERVENTIONS:  -  Assess patient's ability to carry out ADLs; assess patient's baseline for ADL function and identify physical deficits which impact ability to perform ADLs (bathing, care of mouth/teeth, toileting, grooming, dressing, etc )  - Assess/evaluate cause of self-care deficits   - Assess range of motion  - Assess patient's mobility; develop plan if impaired  - Assess patient's need for assistive devices and provide as appropriate  - Encourage maximum independence but intervene and supervise when necessary  - Involve family in performance of ADLs  - Assess for home care needs following discharge   - Consider OT consult to assist with ADL evaluation and planning for discharge  - Provide patient education as appropriate  Outcome: Progressing  Goal: Maintains/Returns to pre admission functional level  Description: INTERVENTIONS:  - Perform BMAT or MOVE assessment daily    - Set and communicate daily mobility goal to care team and patient/family/caregiver     - Collaborate with rehabilitation services on mobility goals if consulted  - Perform Range of Motion   - Out of bed for toileting  - Record patient progress and toleration of activity level   Outcome: Progressing     Problem: Prexisting or High Potential for Compromised Skin Integrity  Goal: Skin integrity is maintained or improved  Description: INTERVENTIONS:  - Identify patients at risk for skin breakdown  - Assess and monitor skin integrity  - Assess and monitor nutrition and hydration status  - Monitor labs   - Assess for incontinence   - Turn and reposition patient  - Assist with mobility/ambulation  - Relieve pressure over bony prominences  - Avoid friction and shearing  - Provide appropriate hygiene as needed including keeping skin clean and dry  - Evaluate need for skin moisturizer/barrier cream  - Collaborate with interdisciplinary team   - Patient/family teaching  - Consider wound care consult   Outcome: Progressing     Problem: NEUROSENSORY - ADULT  Goal: Achieves stable or improved neurological status  Description: INTERVENTIONS  - Monitor and report changes in neurological status  - Monitor vital signs such as temperature, blood pressure, glucose, and any other labs ordered   - Initiate measures to prevent increased intracranial pressure  - Monitor for seizure activity and implement precautions if appropriate      Outcome: Progressing  Goal: Achieves maximal functionality and self care  Description: INTERVENTIONS  - Monitor swallowing and airway patency with patient fatigue and changes in neurological status  - Encourage and assist patient to increase activity and self care     - Encourage visually impaired, hearing impaired and aphasic patients to use assistive/communication devices  Outcome: Progressing     Problem: CARDIOVASCULAR - ADULT  Goal: Maintains optimal cardiac output and hemodynamic stability  Description: INTERVENTIONS:  - Monitor I/O, vital signs and rhythm  - Monitor for S/S and trends of decreased cardiac output  - Administer and titrate ordered vasoactive medications to optimize hemodynamic stability  - Assess quality of pulses, skin color and temperature  - Assess for signs of decreased coronary artery perfusion  - Instruct patient to report change in severity of symptoms  Outcome: Progressing  Goal: Absence of cardiac dysrhythmias or at baseline rhythm  Description: INTERVENTIONS:  - Continuous cardiac monitoring, vital signs, obtain 12 lead EKG if ordered  - Administer antiarrhythmic and heart rate control medications as ordered  - Monitor electrolytes and administer replacement therapy as ordered  Outcome: Progressing     Problem: RESPIRATORY - ADULT  Goal: Achieves optimal ventilation and oxygenation  Description: INTERVENTIONS:  - Assess for changes in respiratory status  - Assess for changes in mentation and behavior  - Position to facilitate oxygenation and minimize respiratory effort  - Oxygen administered by appropriate delivery if ordered  - Initiate smoking cessation education as indicated  - Encourage broncho-pulmonary hygiene including cough, deep breathe, Incentive Spirometry  - Assess the need for suctioning and aspirate as needed  - Assess and instruct to report SOB or any respiratory difficulty  - Respiratory Therapy support as indicated  Outcome: Progressing     Problem: GASTROINTESTINAL - ADULT  Goal: Minimal or absence of nausea and/or vomiting  Description: INTERVENTIONS:  - Administer IV fluids if ordered to ensure adequate hydration  - Maintain NPO status until nausea and vomiting are resolved  - Nasogastric tube if ordered  - Administer ordered antiemetic medications as needed  - Provide nonpharmacologic comfort measures as appropriate  - Advance diet as tolerated, if ordered  - Consider nutrition services referral to assist patient with adequate nutrition and appropriate food choices  Outcome: Progressing  Goal: Maintains or returns to baseline bowel function  Description: INTERVENTIONS:  - Assess bowel function  - Encourage oral fluids to ensure adequate hydration  - Administer IV fluids if ordered to ensure adequate hydration  - Administer ordered medications as needed  - Encourage mobilization and activity  - Consider nutritional services referral to assist patient with adequate nutrition and appropriate food choices  Outcome: Progressing  Goal: Maintains adequate nutritional intake  Description: INTERVENTIONS:  - Monitor percentage of each meal consumed  - Identify factors contributing to decreased intake, treat as appropriate  - Assist with meals as needed  - Monitor I&O, weight, and lab values if indicated  - Obtain nutrition services referral as needed  Outcome: Progressing  Goal: Oral mucous membranes remain intact  Description: INTERVENTIONS  - Assess oral mucosa and hygiene practices  - Implement preventative oral hygiene regimen  - Implement oral medicated treatments as ordered  - Initiate Nutrition services referral as needed  Outcome: Progressing     Problem: GENITOURINARY - ADULT  Goal: Maintains or returns to baseline urinary function  Description: INTERVENTIONS:  - Assess urinary function  - Encourage oral fluids to ensure adequate hydration if ordered  - Administer IV fluids as ordered to ensure adequate hydration  - Administer ordered medications as needed  - Offer frequent toileting  - Follow urinary retention protocol if ordered  Outcome: Progressing  Goal: Absence of urinary retention  Description: INTERVENTIONS:  - Assess patients ability to void and empty bladder  - Monitor I/O  - Bladder scan as needed  - Discuss with physician/AP medications to alleviate retention as needed  - Discuss catheterization for long term situations as appropriate  Outcome: Progressing     Problem: METABOLIC, FLUID AND ELECTROLYTES - ADULT  Goal: Electrolytes maintained within normal limits  Description: INTERVENTIONS:  - Monitor labs and assess patient for signs and symptoms of electrolyte imbalances  - Administer electrolyte replacement as ordered  - Monitor response to electrolyte replacements, including repeat lab results as appropriate  - Instruct patient on fluid and nutrition as appropriate  Outcome: Progressing  Goal: Fluid balance maintained  Description: INTERVENTIONS:  - Monitor labs   - Monitor I/O and WT  - Instruct patient on fluid and nutrition as appropriate  - Assess for signs & symptoms of volume excess or deficit  Outcome: Progressing  Goal: Glucose maintained within target range  Description: INTERVENTIONS:  - Monitor Blood Glucose as ordered  - Assess for signs and symptoms of hyperglycemia and hypoglycemia  - Administer ordered medications to maintain glucose within target range  - Assess nutritional intake and initiate nutrition service referral as needed  Outcome: Progressing     Problem: SKIN/TISSUE INTEGRITY - ADULT  Goal: Skin Integrity remains intact(Skin Breakdown Prevention)  Description: Assess:  -Perform Kevon assessment   -Clean and moisturize skin  -Inspect skin when repositioning, toileting, and assisting with ADLS  -Assess under medical devices  -Assess extremities for adequate circulation and sensation     Bed Management:  -Have minimal linens on bed & keep smooth, unwrinkled  -Change linens as needed when moist or perspiring  -Avoid sitting or lying in one position    Toileting:  -Offer bedside commode  -Assess for incontinence   -Use incontinent care products after each incontinent episode     Activity:  -Mobilize patient  -Encourage activity and walks on unit  -Encourage or provide ROM exercises   -Turn and reposition patient every 2 Hours  -Use appropriate equipment to lift or move patient in bed  -Instruct/ Assist with weight shifting     Skin Care:  -Avoid use of baby powder, tape, friction and shearing, hot water or constrictive clothing  -Relieve pressure over bony prominences   -Do not massage red bony areas    Next Steps:  -Teach patient strategies to minimize risks   -Consider consults to  interdisciplinary teams  Outcome: Progressing     Problem: HEMATOLOGIC - ADULT  Goal: Maintains hematologic stability  Description: INTERVENTIONS  - Assess for signs and symptoms of bleeding or hemorrhage  - Monitor labs  - Administer supportive blood products/factors as ordered and appropriate  Outcome: Progressing     Problem: MUSCULOSKELETAL - ADULT  Goal: Maintain or return mobility to safest level of function  Description: INTERVENTIONS:  - Assess patient's ability to carry out ADLs; assess patient's baseline for ADL function and identify physical deficits which impact ability to perform ADLs (bathing, care of mouth/teeth, toileting, grooming, dressing, etc )  - Assess/evaluate cause of self-care deficits   - Assess range of motion  - Assess patient's mobility  - Assess patient's need for assistive devices and provide as appropriate  - Encourage maximum independence but intervene and supervise when necessary  - Involve family in performance of ADLs  - Assess for home care needs following discharge   - Consider OT consult to assist with ADL evaluation and planning for discharge  - Provide patient education as appropriate  Outcome: Progressing     Problem: Nutrition/Hydration-ADULT  Goal: Nutrient/Hydration intake appropriate for improving, restoring or maintaining nutritional needs  Description: Monitor and assess patient's nutrition/hydration status for malnutrition  Collaborate with interdisciplinary team and initiate plan and interventions as ordered  Monitor patient's weight and dietary intake as ordered or per policy  Utilize nutrition screening tool and intervene as necessary  Determine patient's food preferences and provide high-protein, high-caloric foods as appropriate       INTERVENTIONS:  - Monitor oral intake, urinary output, labs, and treatment plans  - Assess nutrition and hydration status and recommend course of action  - Evaluate amount of meals eaten  - Assist patient with eating if necessary   - Allow adequate time for meals  - Recommend/ encourage appropriate diets, oral nutritional supplements, and vitamin/mineral supplements  - Order, calculate, and assess calorie counts as needed  - Recommend, monitor, and adjust tube feedings and TPN/PPN based on assessed needs  - Assess need for intravenous fluids  - Provide specific nutrition/hydration education as appropriate  - Include patient/family/caregiver in decisions related to nutrition  Outcome: Progressing

## 2021-09-07 NOTE — ASSESSMENT & PLAN NOTE
- Patient continues with alteration in mental status  - CT Head with no acute intracranial pathology  - Suspect Delirium superimposed on acute encephalopathy due to multifactorial etiology   Patient with complicated hospital course, metabolic disturbances, and sleep disturbance in a patient with limited cognitive reserve in a patient with baseline mild cognitive impairment  - B12 and folate  - Geriatrics consulted; appreciate recommendations  - medical management per Primary team  - promote sleep/wake cycle  - avoid CNS altering medications  - supportive care

## 2021-09-07 NOTE — ASSESSMENT & PLAN NOTE
Most likely secondary to prolonged physical inactivity  I would recommend Tylenol 975 mg p o  T i d , gabapentin 100 mg p o  Q h s , add lidocaine patch to lower back    Continue physical therapy

## 2021-09-07 NOTE — NURSING NOTE
Patient's IV infiltrated was pulled out accidentally by the patient  The IV was infusing when the event occurred  The site was swollen, red, and warm to the touch  Ice was applied to the infiltrated site  Dr Destiny Edward was contacted and made aware of the event      Jose Guadalupe Reese RN

## 2021-09-07 NOTE — CONSULTS
Consultation - [id number] [facility] 78 y o  male MRN: [id number]  Unit/Bed#: [place] [address] -01 Encounter: [id number]      Assessment/Plan     Anemia  Assessment & Plan  Hemoglobin today at 9 1, slowly trending down  Manage as per primary team    Insomnia due to medical condition  Assessment & Plan  Add melatonin 3 mg p o  Q h s  Avoid caffeine use  Continue to monitor      Acute midline low back pain without sciatica  Assessment & Plan  Most likely secondary to prolonged physical inactivity  I would recommend Tylenol 975 mg p o  T i d , gabapentin 100 mg p o  Q h s , add lidocaine patch to lower back  Continue physical therapy    Ambulatory dysfunction  Assessment & Plan  I would recommend to resume physical therapy  Encourage out of bed as tolerated  Encourage p o  Hydration    Monitor orthostatic vital signs    Acute metabolic encephalopathy  Assessment & Plan    -Patient is high risk of delirium due to mild cognitive impairment, metabolic disturbance, change in environment, pain, insomnia  -Initiate delirium precautions  -maintain normal sleep/wake cycle, add melatonin 3 mg q h s   -minimize overnight interruptions, group overnight vitals/labs/nursing checks as possible  -dim lights, close blinds and turn off tv to minimize stimulation and encourage sleep environment in evenings  -ensure that pain is well controlled  consider Tylenol 975mg Q8H scheduled , gabapentin 100 mg q h s   -monitor for fecal and urinary retention which may precipitate delirium  -encourage early mobilization and ambulation, resume physical therapy  -provide frequent reorientation and redirection  -encourage family and friends at the bedside to help calm patient if anxious, resume sertraline and add gabapentin 100 mg q h s   -avoid medications which may precipitate or worsen delirium such as tramadol, benzodiazepine, anticholinergics, and antihistaminics  -encourage hydration and nutrition , assist with feeding -redirect unwanted behaviors as first line, avoid physical restraints  CANDIE (acute kidney injury) ([facility] [address] )  Assessment & Plan  Creatinine improving, continue management as per primary team    Mild cognitive impairment  Assessment & Plan  Patient is currently alert oriented x1, more confused that his baseline  Continue supportive care and reorient as needed  Continue follow-up with geriatrics as a patient  Encourage family to visit  History of Present Illness   Physician Requesting Consult: Maurice Lucero MD  Reason for Consult / Principal Problem: delirium/ cognitive impairment    Additional history obtained from: AKHIL Gee      HPI: Ida Awan is a 78y o  year old male who presents with respiratory insufficiency secondary to CHF and  Pneumonia  Patient was seen and examined at bedside, he is alert oriented x1, seems very anxious and gets tearful at times  As per nursing staff he did not sleep overnight, his appetite is decreased, he had a bowel movement yesterday  He denies abdominal pain nausea dysuria  He reports lower back pain, as per staff he did not get out of bed for a few days how  Discussed with his daughter in law, she reports the patient has mild cognitive impairment at baseline, he needs assistance with most IADLs, he lives with his wife who is helping him with his medication and IADLs  Per daughter in-law currently he is more confused  She reported history of PTSD  Inpatient consult to Gerontology  Consult performed by: Mary Germain MD  Consult ordered by: Maurice Lucero MD          Review of Systems   Constitutional: Negative for chills and fever  Respiratory: Positive for cough  Negative for shortness of breath and wheezing  Cardiovascular: Positive for chest pain  Gastrointestinal: Negative for abdominal pain  Genitourinary: Negative for dysuria  Musculoskeletal: Positive for back pain  Negative for gait problem     Neurological: Negative for dizziness  Psychiatric/Behavioral: Positive for sleep disturbance       ROS limited due to confusion/cognitive impairment      Historical Information   Past Medical History:   Diagnosis Date    Actinic keratosis     LAST ASSESSED: [id number]    Allergic rhinitis     LAST ASSESSED: 87YBX1099    Anxiety     LAST ASSESSED: [id number]    Anxiety disorder     LAST ASSESSED: 41SYO6697    Atelectasis of right lung     ABNORMAL CT SCAN OF [address] 12/2/2016 REPEAT NEEDED PER RADIOLOGY IN 3 MONTHS LAST ASSESSED: [id number]    Atypical chest pain     LAST ASSESSED: [id number]    Benign paroxysmal vertigo     LAST ASSESSED: 07QPS3432    Chronic cough     LAST ASSESSED: 36WSJ6207    Chronic GERD     Degenerative arthritis of knee, bilateral     LAST ASSESSED: 58QXD9613    Diverticulitis of colon     LAST ASSESSED: 52ODE4232    Diverticulosis     LAST ASSESSED: 13RVE3741    Do not resuscitate status 1/25/2021    Foreign body, eye     LAST ASSESSED: [id number]    Functional gait abnormality     LAST ASSESSED: 18KAT8762    Hyperlipidemia     Hypertension     Hyponatremia     LAST ASSESSED: 60MGE6865    Lactic acidosis 8/19/2021    Leukocytosis     LAST ASSESSED: 89VHI4740    Multifocal pneumonia 8/19/2021    Murmur     Newly recognized murmur     LAST ASSESSED: [id number]    Olecranon bursitis, unspecified elbow     Presence of indwelling urethral catheter     RESOLVED: 42FAM2890    Transient cerebral ischemia     LAST ASSESSED: 03OAG8857    Urinary incontinence     LAST ASSESSED: 50WDM1060    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     LAST ASSESSED: 73BLV6486     Past Surgical History:   Procedure Laterality Date    ADENOIDECTOMY      APPENDECTOMY      COLONOSCOPY  10/2006    FIBEROPTIC    INGUINAL HERNIA REPAIR      IR TEMPORARY DIALYSIS CATHETER PLACEMENT  9/1/2021    JOINT REPLACEMENT      b/l TKR Sept 2015    SINUS SURGERY      TONSILLECTOMY       Social History Social History     Substance and Sexual Activity   Alcohol Use Yes    Comment: occ, SOCIAL     Social History     Substance and Sexual Activity   Drug Use No     Social History     Tobacco Use   Smoking Status Former Smoker    Quit date: 56    Years since quittin 7   Smokeless Tobacco Never Used   Tobacco Comment    2 ppd for 12 years      Family History:   Family History   Problem Relation Age of Onset    Alzheimer's disease Mother     Coronary artery disease Brother        Meds/Allergies   Amlodipine 10 mg daily  Aspirin 81 mg daily  Finasteride 5 mg daily  Losartan 25 mg daily  Multi vitamins  Fish oil  MiraLax 17 g daily  Tamsulosin 0 4 mg daily  Sertraline 50 mg daily  Pravastatin 40 mg daily    Allergies   Allergen Reactions    Ace Inhibitors Cough     Pt denied any allergies         Objective     Intake/Output Summary (Last 24 hours) at 2021 1046  Last data filed at 2021 1916  Gross per 24 hour   Intake 965 ml   Output --   Net 965 ml     Invasive Devices     Peripheral Intravenous Line            Peripheral IV 21 Dorsal (posterior); Right Hand <1 day                Physical Exam  Vitals and nursing note reviewed  Constitutional:       Appearance: He is well-developed  Comments: Frail looking   HENT:      Head: Normocephalic and atraumatic  Ears:      Comments: [place]     Mouth/Throat:      Mouth: Mucous membranes are dry  Comments: edentulous  Eyes:      Conjunctiva/sclera: Conjunctivae normal    Cardiovascular:      Rate and Rhythm: Normal rate and regular rhythm  Heart sounds: Heart sounds are distant  No murmur heard  Pulmonary:      Effort: Pulmonary effort is normal  No respiratory distress  Comments: Coarse at basis  Abdominal:      Palpations: Abdomen is soft  Tenderness: There is no abdominal tenderness  Musculoskeletal:         General: Tenderness (low back) present  Cervical back: Neck supple  Right lower leg: No edema  Left lower leg: No edema  Skin:     General: Skin is warm and dry  Neurological:      Mental Status: He is alert  He is disoriented  Comments: AAOx1   Psychiatric:      Comments: Dysphoric mood, anxious         Lab Results:   I have personally reviewed pertinent lab results including the following:  -CBC, CMP    I have personally reviewed the following imaging study reports in PACS:  - CT chest and abdomen        VTE Prophylaxis: eliquis    Code Status: Level 1 - Full Code  Advance Directive and Living Will: Yes      Family and Social Support: lives with his wife  No data recorded    Goals of Care: return home    Thank you for allowing me to participate in your patients' care  Please do not hesitate to call with any additional questions    Checo Harvey MD

## 2021-09-07 NOTE — SEDATION DOCUMENTATION
Temp HD catheter removed bedside without complication  Pt tolerated well  Site covered with Steri-strips   Zandra VILLATORO aware of removal

## 2021-09-07 NOTE — ASSESSMENT & PLAN NOTE
Completed antibiotic therapy  Was restarted on antibiotics due to concerns for aspiration pneumonia  As per previous providers discussion with ID, likely due to aspiration pneumonitis  - Continue monitoring off antibiotics  - Follow-up cultures    - Aspiration precautions  - Dysphagia diet

## 2021-09-07 NOTE — ASSESSMENT & PLAN NOTE
Wt Readings from Last 3 Encounters:   09/07/21 73 kg (160 lb 15 oz)   07/26/21 83 5 kg (184 lb 2 oz)   03/19/21 82 8 kg (182 lb 9 6 oz)     Presented with acute on chronic diastolic heart failure exacerbation  Euvolemic / hypovolemic    - Lasix infusion discontinued  Now hypovolemic  On gentle hydration    - Appreciate renal and cardiology recommendations

## 2021-09-08 ENCOUNTER — APPOINTMENT (INPATIENT)
Dept: NON INVASIVE DIAGNOSTICS | Facility: HOSPITAL | Age: 81
DRG: 870 | End: 2021-09-08
Payer: MEDICARE

## 2021-09-08 LAB
ANION GAP SERPL CALCULATED.3IONS-SCNC: 10 MMOL/L (ref 4–13)
ANION GAP SERPL CALCULATED.3IONS-SCNC: 9 MMOL/L (ref 4–13)
BUN SERPL-MCNC: 37 MG/DL (ref 5–25)
BUN SERPL-MCNC: 44 MG/DL (ref 5–25)
CALCIUM SERPL-MCNC: 8.1 MG/DL (ref 8.3–10.1)
CALCIUM SERPL-MCNC: 8.9 MG/DL (ref 8.3–10.1)
CHLORIDE SERPL-SCNC: 107 MMOL/L (ref 100–108)
CHLORIDE SERPL-SCNC: 114 MMOL/L (ref 100–108)
CO2 SERPL-SCNC: 21 MMOL/L (ref 21–32)
CO2 SERPL-SCNC: 22 MMOL/L (ref 21–32)
CREAT SERPL-MCNC: 1.25 MG/DL (ref 0.6–1.3)
CREAT SERPL-MCNC: 1.32 MG/DL (ref 0.6–1.3)
FOLATE SERPL-MCNC: 7.4 NG/ML (ref 3.1–17.5)
GFR SERPL CREATININE-BSD FRML MDRD: 50 ML/MIN/1.73SQ M
GFR SERPL CREATININE-BSD FRML MDRD: 53 ML/MIN/1.73SQ M
GLUCOSE SERPL-MCNC: 132 MG/DL (ref 65–140)
GLUCOSE SERPL-MCNC: 98 MG/DL (ref 65–140)
POTASSIUM SERPL-SCNC: 4 MMOL/L (ref 3.5–5.3)
POTASSIUM SERPL-SCNC: 4.8 MMOL/L (ref 3.5–5.3)
PROCALCITONIN SERPL-MCNC: <0.05 NG/ML
SODIUM SERPL-SCNC: 138 MMOL/L (ref 136–145)
SODIUM SERPL-SCNC: 145 MMOL/L (ref 136–145)
VIT B12 SERPL-MCNC: 1403 PG/ML (ref 100–900)

## 2021-09-08 PROCEDURE — 93308 TTE F-UP OR LMTD: CPT

## 2021-09-08 PROCEDURE — 99232 SBSQ HOSP IP/OBS MODERATE 35: CPT

## 2021-09-08 PROCEDURE — 80048 BASIC METABOLIC PNL TOTAL CA: CPT | Performed by: STUDENT IN AN ORGANIZED HEALTH CARE EDUCATION/TRAINING PROGRAM

## 2021-09-08 PROCEDURE — 93321 DOPPLER ECHO F-UP/LMTD STD: CPT

## 2021-09-08 PROCEDURE — 99232 SBSQ HOSP IP/OBS MODERATE 35: CPT | Performed by: FAMILY MEDICINE

## 2021-09-08 PROCEDURE — 99232 SBSQ HOSP IP/OBS MODERATE 35: CPT | Performed by: STUDENT IN AN ORGANIZED HEALTH CARE EDUCATION/TRAINING PROGRAM

## 2021-09-08 PROCEDURE — 80048 BASIC METABOLIC PNL TOTAL CA: CPT | Performed by: INTERNAL MEDICINE

## 2021-09-08 PROCEDURE — 97535 SELF CARE MNGMENT TRAINING: CPT

## 2021-09-08 PROCEDURE — 99232 SBSQ HOSP IP/OBS MODERATE 35: CPT | Performed by: INTERNAL MEDICINE

## 2021-09-08 PROCEDURE — 82607 VITAMIN B-12: CPT | Performed by: NURSE PRACTITIONER

## 2021-09-08 PROCEDURE — 93325 DOPPLER ECHO COLOR FLOW MAPG: CPT

## 2021-09-08 PROCEDURE — 97530 THERAPEUTIC ACTIVITIES: CPT

## 2021-09-08 PROCEDURE — 82746 ASSAY OF FOLIC ACID SERUM: CPT | Performed by: NURSE PRACTITIONER

## 2021-09-08 PROCEDURE — 84145 PROCALCITONIN (PCT): CPT | Performed by: STUDENT IN AN ORGANIZED HEALTH CARE EDUCATION/TRAINING PROGRAM

## 2021-09-08 RX ORDER — GABAPENTIN 100 MG/1
100 CAPSULE ORAL
Status: DISCONTINUED | OUTPATIENT
Start: 2021-09-08 | End: 2021-09-17 | Stop reason: HOSPADM

## 2021-09-08 RX ORDER — LANOLIN ALCOHOL/MO/W.PET/CERES
3 CREAM (GRAM) TOPICAL
Status: DISCONTINUED | OUTPATIENT
Start: 2021-09-08 | End: 2021-09-17 | Stop reason: HOSPADM

## 2021-09-08 RX ORDER — ACETAMINOPHEN 650 MG/20.3ML
650 SUSPENSION ORAL EVERY 6 HOURS
Status: DISCONTINUED | OUTPATIENT
Start: 2021-09-08 | End: 2021-09-17 | Stop reason: HOSPADM

## 2021-09-08 RX ORDER — POTASSIUM CHLORIDE 20MEQ/15ML
40 LIQUID (ML) ORAL 2 TIMES DAILY
Status: DISCONTINUED | OUTPATIENT
Start: 2021-09-08 | End: 2021-09-08

## 2021-09-08 RX ADMIN — ACETAMINOPHEN 650 MG: 650 SUSPENSION ORAL at 17:04

## 2021-09-08 RX ADMIN — GABAPENTIN 100 MG: 100 CAPSULE ORAL at 21:07

## 2021-09-08 RX ADMIN — Medication 12.5 MG: at 08:01

## 2021-09-08 RX ADMIN — Medication 12.5 MG: at 21:07

## 2021-09-08 RX ADMIN — ACETAMINOPHEN 650 MG: 650 SUSPENSION ORAL at 21:45

## 2021-09-08 RX ADMIN — POTASSIUM CHLORIDE: 2 INJECTION, SOLUTION, CONCENTRATE INTRAVENOUS at 09:34

## 2021-09-08 RX ADMIN — LIDOCAINE 1 PATCH: 50 PATCH CUTANEOUS at 08:01

## 2021-09-08 RX ADMIN — POTASSIUM CHLORIDE: 2 INJECTION, SOLUTION, CONCENTRATE INTRAVENOUS at 17:03

## 2021-09-08 RX ADMIN — AMIODARONE HYDROCHLORIDE 200 MG: 200 TABLET ORAL at 12:12

## 2021-09-08 RX ADMIN — AMIODARONE HYDROCHLORIDE 200 MG: 200 TABLET ORAL at 08:01

## 2021-09-08 RX ADMIN — POTASSIUM CHLORIDE 40 MEQ: 20 SOLUTION ORAL at 09:34

## 2021-09-08 RX ADMIN — SERTRALINE HYDROCHLORIDE 50 MG: 50 TABLET ORAL at 08:01

## 2021-09-08 RX ADMIN — PRAVASTATIN SODIUM 40 MG: 40 TABLET ORAL at 17:04

## 2021-09-08 RX ADMIN — Medication 250 MG: at 17:04

## 2021-09-08 RX ADMIN — Medication 20 MG: at 08:12

## 2021-09-08 RX ADMIN — Medication 250 MG: at 08:01

## 2021-09-08 RX ADMIN — APIXABAN 2.5 MG: 2.5 TABLET, FILM COATED ORAL at 17:04

## 2021-09-08 RX ADMIN — MELATONIN 3 MG: at 21:07

## 2021-09-08 RX ADMIN — APIXABAN 2.5 MG: 2.5 TABLET, FILM COATED ORAL at 08:01

## 2021-09-08 RX ADMIN — AMIODARONE HYDROCHLORIDE 200 MG: 200 TABLET ORAL at 17:04

## 2021-09-08 NOTE — ASSESSMENT & PLAN NOTE
Due to free water deficit and dehydration   - IV hydration  - Management per renal     Recent Labs     09/05/21  1533 09/06/21  0923 09/07/21  0435   SODIUM 148* 151* 153*

## 2021-09-08 NOTE — PROGRESS NOTES
Progress Note - Geriatric Medicine   Abe Ramirez 78 y o  male MRN: [id number]  Unit/Bed#: [place] [address] -[id number] Encounter: [id number]      Assessment/Plan:  Anemia  Assessment & Plan  Hemoglobin today at 9 1, slowly trending down  Manage as per primary team    Insomnia due to medical condition  Assessment & Plan  Add melatonin 3 mg p o  Q h s  Avoid caffeine use  Continue to monitor      Acute midline low back pain without sciatica  Assessment & Plan  Most likely secondary to prolonged physical inactivity  I would recommend Tylenol 975 mg p o  T i d , gabapentin 100 mg p o  Q h s , add lidocaine patch to lower back  Continue physical therapy    Ambulatory dysfunction  Assessment & Plan  I would recommend to resume physical therapy  Encourage out of bed as tolerated  Encourage p o  Hydration    Monitor orthostatic vital signs    Acute metabolic encephalopathy  Assessment & Plan    -Patient is high risk of delirium due to mild cognitive impairment, metabolic disturbance, change in environment, pain, insomnia, pain  -Initiate delirium precautions  -maintain normal sleep/wake cycle, add melatonin 3 mg q h s   -minimize overnight interruptions, group overnight vitals/labs/nursing checks as possible  -dim lights, close blinds and turn off tv to minimize stimulation and encourage sleep environment in evenings  -ensure that pain is well controlled  consider Tylenol 975mg Q8H scheduled , gabapentin 100 mg q h s   -monitor for fecal and urinary retention which may precipitate delirium , tender to palpation lower abdomen today- monitor bladder scans   -encourage early mobilization and ambulation, resume physical therapy  -provide frequent reorientation and redirection  -encourage family and friends at the bedside to help calm patient if anxious, resume sertraline and add gabapentin 100 mg q h s   -avoid medications which may precipitate or worsen delirium such as tramadol, benzodiazepine, anticholinergics, and antihistaminics  -encourage hydration and nutrition , assist with feeding   -redirect unwanted behaviors as first line, avoid physical restraints  -       CANDIE (acute kidney injury) ([facility] [address] )  Assessment & Plan  Creatinine improving, continue management as per primary team    Mild cognitive impairment  Assessment & Plan  Patient is currently alert oriented x1, more confused that his baseline  Continue supportive care and reorient as needed  Continue follow-up with geriatrics as a patient  Encourage family to visit  Subjective:   Seen and examined at bedside, reports lower abdominal pain  Today he is alert oriented times 2  As per staff he became slightly agitated last evening after his wife left  No acute events reported overnight  Review of Systems   Respiratory: Positive for shortness of breath  Negative for cough  Cardiovascular: Negative for chest pain  Gastrointestinal: Positive for abdominal pain  Negative for constipation  Genitourinary: Negative for dysuria  Musculoskeletal: Positive for back pain and gait problem  ROS limited due to change in mental status    Objective:     Vitals: Blood pressure 113/72, pulse 83, temperature 98 8 °F (37 1 °C), resp  rate 18, height 5' 4" (1 626 m), weight 73 kg (160 lb 15 oz), SpO2 92 %  ,Body mass index is 27 62 kg/m²  Intake/Output Summary (Last 24 hours) at 9/8/2021 1038  Last data filed at 9/8/2021 0933  Gross per 24 hour   Intake 2120 ml   Output --   Net 2120 ml       Current Medications: Reviewed    Physical Exam:   Physical Exam  Vitals and nursing note reviewed  Constitutional:       Appearance: He is well-developed  HENT:      Head: Normocephalic and atraumatic  Ears:      Comments: [place]     Mouth/Throat:      Mouth: Mucous membranes are dry  Eyes:      Conjunctiva/sclera: Conjunctivae normal    Cardiovascular:      Rate and Rhythm: Normal rate and regular rhythm  Heart sounds: Heart sounds are distant  No murmur heard  Pulmonary:      Effort: Pulmonary effort is normal  No respiratory distress  Breath sounds: Normal breath sounds  Abdominal:      Palpations: Abdomen is soft  Tenderness: There is abdominal tenderness (lower abdomen)  Musculoskeletal:         General: Tenderness present  Cervical back: Neck supple  Right lower leg: Edema (trace) present  Left lower leg: Edema (trace) present  Skin:     General: Skin is warm and dry  Neurological:      Mental Status: He is alert  He is disoriented  Comments: AAOx2   Psychiatric:         Behavior: Behavior normal           Invasive Devices     Peripheral Intravenous Line            Peripheral IV 09/06/21 Dorsal (posterior); Right Hand 1 day                Lab, Imaging and other studies: I have personally reviewed pertinent reports

## 2021-09-08 NOTE — PLAN OF CARE
Problem: Potential for Falls  Goal: Patient will remain free of falls  Description: INTERVENTIONS:  - Educate patient/family on patient safety including physical limitations  - Instruct patient to call for assistance with activity   - Consult OT/PT to assist with strengthening/mobility   - Keep Call bell within reach  - Keep bed low and locked with side rails adjusted as appropriate  - Keep care items and personal belongings within reach  - Initiate and maintain comfort rounds  - Make Fall Risk Sign visible to staff  - Offer Toileting   - Apply yellow socks and bracelet for high fall risk patients  - Consider moving patient to room near nurses station  Outcome: Progressing     Problem: MOBILITY - ADULT  Goal: Maintain or return to baseline ADL function  Description: INTERVENTIONS:  -  Assess patient's ability to carry out ADLs; assess patient's baseline for ADL function and identify physical deficits which impact ability to perform ADLs (bathing, care of mouth/teeth, toileting, grooming, dressing, etc )  - Assess/evaluate cause of self-care deficits   - Assess range of motion  - Assess patient's mobility; develop plan if impaired  - Assess patient's need for assistive devices and provide as appropriate  - Encourage maximum independence but intervene and supervise when necessary  - Involve family in performance of ADLs  - Assess for home care needs following discharge   - Consider OT consult to assist with ADL evaluation and planning for discharge  - Provide patient education as appropriate  Outcome: Progressing  Goal: Maintains/Returns to pre admission functional level  Description: INTERVENTIONS:  - Perform BMAT or MOVE assessment daily    - Set and communicate daily mobility goal to care team and patient/family/caregiver     - Collaborate with rehabilitation services on mobility goals if consulted  - Perform Range of Motion  - Out of bed for toileting  - Record patient progress and toleration of activity level Outcome: Progressing     Problem: Prexisting or High Potential for Compromised Skin Integrity  Goal: Skin integrity is maintained or improved  Description: INTERVENTIONS:  - Identify patients at risk for skin breakdown  - Assess and monitor skin integrity  - Assess and monitor nutrition and hydration status  - Monitor labs   - Assess for incontinence   - Turn and reposition patient  - Assist with mobility/ambulation  - Relieve pressure over bony prominences  - Avoid friction and shearing  - Provide appropriate hygiene as needed including keeping skin clean and dry  - Evaluate need for skin moisturizer/barrier cream  - Collaborate with interdisciplinary team   - Patient/family teaching  - Consider wound care consult   Outcome: Progressing     Problem: NEUROSENSORY - ADULT  Goal: Achieves stable or improved neurological status  Description: INTERVENTIONS  - Monitor and report changes in neurological status  - Monitor vital signs such as temperature, blood pressure, glucose, and any other labs ordered   - Initiate measures to prevent increased intracranial pressure  - Monitor for seizure activity and implement precautions if appropriate      Outcome: Progressing  Goal: Achieves maximal functionality and self care  Description: INTERVENTIONS  - Monitor swallowing and airway patency with patient fatigue and changes in neurological status  - Encourage and assist patient to increase activity and self care     - Encourage visually impaired, hearing impaired and aphasic patients to use assistive/communication devices  Outcome: Progressing     Problem: CARDIOVASCULAR - ADULT  Goal: Maintains optimal cardiac output and hemodynamic stability  Description: INTERVENTIONS:  - Monitor I/O, vital signs and rhythm  - Monitor for S/S and trends of decreased cardiac output  - Administer and titrate ordered vasoactive medications to optimize hemodynamic stability  - Assess quality of pulses, skin color and temperature  - Assess for signs of decreased coronary artery perfusion  - Instruct patient to report change in severity of symptoms  Outcome: Progressing  Goal: Absence of cardiac dysrhythmias or at baseline rhythm  Description: INTERVENTIONS:  - Continuous cardiac monitoring, vital signs, obtain 12 lead EKG if ordered  - Administer antiarrhythmic and heart rate control medications as ordered  - Monitor electrolytes and administer replacement therapy as ordered  Outcome: Progressing     Problem: RESPIRATORY - ADULT  Goal: Achieves optimal ventilation and oxygenation  Description: INTERVENTIONS:  - Assess for changes in respiratory status  - Assess for changes in mentation and behavior  - Position to facilitate oxygenation and minimize respiratory effort  - Oxygen administered by appropriate delivery if ordered  - Initiate smoking cessation education as indicated  - Encourage broncho-pulmonary hygiene including cough, deep breathe, Incentive Spirometry  - Assess the need for suctioning and aspirate as needed  - Assess and instruct to report SOB or any respiratory difficulty  - Respiratory Therapy support as indicated  Outcome: Progressing     Problem: GASTROINTESTINAL - ADULT  Goal: Minimal or absence of nausea and/or vomiting  Description: INTERVENTIONS:  - Administer IV fluids if ordered to ensure adequate hydration  - Maintain NPO status until nausea and vomiting are resolved  - Nasogastric tube if ordered  - Administer ordered antiemetic medications as needed  - Provide nonpharmacologic comfort measures as appropriate  - Advance diet as tolerated, if ordered  - Consider nutrition services referral to assist patient with adequate nutrition and appropriate food choices  Outcome: Progressing  Goal: Maintains or returns to baseline bowel function  Description: INTERVENTIONS:  - Assess bowel function  - Encourage oral fluids to ensure adequate hydration  - Administer IV fluids if ordered to ensure adequate hydration  - Administer ordered medications as needed  - Encourage mobilization and activity  - Consider nutritional services referral to assist patient with adequate nutrition and appropriate food choices  Outcome: Progressing  Goal: Maintains adequate nutritional intake  Description: INTERVENTIONS:  - Monitor percentage of each meal consumed  - Identify factors contributing to decreased intake, treat as appropriate  - Assist with meals as needed  - Monitor I&O, weight, and lab values if indicated  - Obtain nutrition services referral as needed  Outcome: Progressing  Goal: Oral mucous membranes remain intact  Description: INTERVENTIONS  - Assess oral mucosa and hygiene practices  - Implement preventative oral hygiene regimen  - Implement oral medicated treatments as ordered  - Initiate Nutrition services referral as needed  Outcome: Progressing     Problem: GENITOURINARY - ADULT  Goal: Maintains or returns to baseline urinary function  Description: INTERVENTIONS:  - Assess urinary function  - Encourage oral fluids to ensure adequate hydration if ordered  - Administer IV fluids as ordered to ensure adequate hydration  - Administer ordered medications as needed  - Offer frequent toileting  - Follow urinary retention protocol if ordered  Outcome: Progressing  Goal: Absence of urinary retention  Description: INTERVENTIONS:  - Assess patients ability to void and empty bladder  - Monitor I/O  - Bladder scan as needed  - Discuss with physician/AP medications to alleviate retention as needed  - Discuss catheterization for long term situations as appropriate  Outcome: Progressing     Problem: METABOLIC, FLUID AND ELECTROLYTES - ADULT  Goal: Electrolytes maintained within normal limits  Description: INTERVENTIONS:  - Monitor labs and assess patient for signs and symptoms of electrolyte imbalances  - Administer electrolyte replacement as ordered  - Monitor response to electrolyte replacements, including repeat lab results as appropriate  - Instruct patient on fluid and nutrition as appropriate  Outcome: Progressing  Goal: Fluid balance maintained  Description: INTERVENTIONS:  - Monitor labs   - Monitor I/O and WT  - Instruct patient on fluid and nutrition as appropriate  - Assess for signs & symptoms of volume excess or deficit  Outcome: Progressing  Goal: Glucose maintained within target range  Description: INTERVENTIONS:  - Monitor Blood Glucose as ordered  - Assess for signs and symptoms of hyperglycemia and hypoglycemia  - Administer ordered medications to maintain glucose within target range  - Assess nutritional intake and initiate nutrition service referral as needed  Outcome: Progressing     Problem: SKIN/TISSUE INTEGRITY - ADULT  Goal: Skin Integrity remains intact(Skin Breakdown Prevention)  Description: Assess:  -Perform [place] assessment   -Clean and moisturize skin   -Inspect skin when repositioning, toileting, and assisting with ADLS  -Assess under medical devices   -Assess extremities for adequate circulation and sensation     Bed Management:  -Have minimal linens on bed & keep smooth, unwrinkled  -Change linens as needed when moist or perspiring  -Avoid sitting or lying in one position    Toileting:  -Offer bedside commode  -Assess for incontinence     Activity:  -Mobilize patient  -Encourage activity and walks on unit  -Encourage or provide ROM exercises   -Turn and reposition patient every 2 Hours  -Use appropriate equipment to lift or move patient in bed  -Instruct/ Assist with weight shifting     Skin Care:  -Avoid use of baby powder, tape, friction and shearing, hot water or constrictive clothing  -Relieve pressure over bony prominences   -Do not massage red bony areas    Next Steps:  -Teach patient strategies to minimize risks   -Consider consults to  interdisciplinary teams   Outcome: Progressing     Problem: HEMATOLOGIC - ADULT  Goal: Maintains hematologic stability  Description: INTERVENTIONS  - Assess for signs and symptoms of bleeding or needed  - Recommend, monitor, and adjust tube feedings and TPN/PPN based on assessed needs  - Assess need for intravenous fluids  - Provide specific nutrition/hydration education as appropriate  - Include patient/family/caregiver in decisions related to nutrition  Outcome: Progressing

## 2021-09-08 NOTE — OCCUPATIONAL THERAPY NOTE
Occupational Therapy Treatment Note:         09/08/21 1025   OT Last Visit   OT Visit Date 09/08/21   Note Type   Note Type Treatment   Restrictions/Precautions   Weight Bearing Precautions Per Order No   Other Precautions Pain; Fall Risk;Multiple lines;Telemetry; Chair Alarm; Bed Alarm;Cognitive   Pain Assessment   Pain Assessment Tool 0-10   Pain Score No Pain   Pain Location/Orientation Location: Generalized   ADL   Where Assessed Supine, bed   Grooming Assistance 4  Minimal Assistance   Grooming Deficit Setup;Verbal cueing;Supervision/safety; Increased time to complete;Wash/dry hands; Wash/dry face;Brushing hair   UB Bathing Assistance 3  Moderate Assistance   UB Bathing Deficit Setup;Supervision/safety;Verbal cueing; Increased time to complete   UB Bathing Comments increased A required due to poor focus to tasks  LB Bathing Assistance 2  Maximal Assistance   LB Bathing Deficit Setup;Verbal cueing;Supervision/safety; Increased time to complete;Perineal area; Buttocks;Right lower leg including foot; Left lower leg including foot   LB Bathing Comments increased A due to slight incontinence of bowel noted  UB Dressing Assistance 4  Minimal Assistance   UB Dressing Deficit Setup   LB Dressing Assistance 2  Maximal [address]; Requires assistive device for steadying;Supervision/safety;Verbal cueing; Increased time to complete; Don/doff R sock; Don/doff L sock   LB Dressing Comments increased A required to roll for staff to assist with application of brief  Toileting Assistance  Unable to assess   Toileting Comments Pt currently wearing brief  Incontinent of stool this tx session  Functional Standing Tolerance   Comments unable to assess  Bed Mobility   Rolling R 2  Maximal assistance   Additional items Assist x 2;Bedrails; Increased time required;Verbal cues;LE management   Rolling L 2  Maximal assistance   Additional items Assist x 2;Bedrails; Increased time required;LE management;Verbal cues   Supine to Sit 2  Maximal assistance   Additional items Assist x 2;Bedrails; Increased time required;Verbal cues;LE management   Sit to Supine 2  Maximal assistance   Additional items Assist x 2;Bedrails; Increased time required;Verbal cues;LE management   Additional Comments cues required due to increased anxiety with increased tears  Transfers   Sit to Stand Unable to assess   Additional Comments Pt unable to tolerate activtiy  Functional Mobility   Functional Mobility   (unable to assess  )   Cognition   Overall Cognitive Status Impaired   Arousal/Participation Arousable   Attention Difficulty attending to directions   Orientation Level Oriented to person;Disoriented to place; Disoriented to time;Disoriented to situation   Memory Decreased short term memory;Decreased recall of recent events;Decreased recall of precautions   Following Commands Follows one step commands without difficulty   Comments cues to encourage emotional support required this tx session  Additional Activities   Additional Activities Other (Comment)  (reviewed basic orientation  )   Additional Activities Comments Pt with limited focus to tasks  Difficulty noted to redirect Pt's  Activity Tolerance   Activity Tolerance Patient limited by fatigue   Medical Staff Made Aware reviewed current plan of care  Assessment   Assessment Pt was seen for skilled OT with focus on bed mobility, self care routine, brielle care, basic orientation and review of current plan of care  See above levels of A required for bed mobility and light grooming activity  Pt initially agreeable to activities  Limited engagement noted with attempts to achieve EOB positioning  Pt resistive to movement warranting need for extended time to provide reassurance and emotional support to carry over safe techs with activities  Pt returned to supine positioning with encouragement to complete BUE ROM exercises after positioning the bed as chair   Pt agreeable and thanked staff for assistance  The patient's raw score on the AM-PAC Daily Activity inpatient short form is 9, standardized score is 30 6, less than 39 4  Patients at this level are likely to benefit from discharge to post-acute rehabilitation services  Plan   Treatment Interventions ADL retraining;Functional transfer training;UE strengthening/ROM; Endurance training;Cognitive reorientation;Patient/family training   Goal Expiration Date 09/13/21   OT Treatment Day 2   OT Frequency 3-5x/wk   Recommendation   OT Discharge Recommendation Post acute rehabilitation services   [facility]   Lower Body Dressing 1   Bathing 1   Toileting 1   Upper Body Dressing 2   Grooming 2   Eating 2   Daily Activity Raw Score 9   Turning Head Towards Sound 2   Follow Simple Instructions 2   Low Function Daily Activity Raw Score 13   Low Function Daily Activity Standardized Score 23 16   AM-[facility] Applied Cognition Inpatient   Following a Speech/Presentation 2   Understanding Ordinary Conversation 2   Taking Medications 1   Remembering Where Things Are Placed or Put Away 1   Remembering List of 4-5 Errands 1   Taking Care of Complicated Tasks 1   Applied Cognition Raw Score 8   Applied Cognition Standardized Score 19 32   David Falguni, [address]

## 2021-09-08 NOTE — PROGRESS NOTES
Follow up Consultation    Nephrology   Tania Lipscomb 78 y o  male MRN: [id number]  Unit/Bed#: [facility] [address] 2 [address] 87 216-01 Encounter: [id number]      Physician Requesting Consult: Piyush Hudson MD        ASSESSMENT/PLAN:     · Acute kidney injury :  - CANDIE most likely secondary to ischemic injury due to relative hypotension in light of underlying comorbidities plus cardiorenal syndrome plus some component of prerenal azotemia due to over-diuresis  - After review of records In [facility] as well as Care everywhere it appears that the patient has a baseline Creatinine of 0 7-0 9 mg/dL  - patient was admitted with a creatinine of 0 97 mg/dL on 08/19/2021   - peak creatinine thus far during this hospitalization at 3 84 mg/dL on 09/01/2021   - patient's creatinine today is at 1 32 mg/dL, stable and continues to improve towards baseline   - no further indication for dialysis  - patient underwent dialysis treatment on 09/01/2021 however 50 minutes into treatment patient had issues with hypotension and treatment was discontinued  - right IJ temporary dialysis catheter removed on 09/07/2021  -continue IV fluids to D5 water plus 20 mg KCL , increased rate to 150 cc an hour  - hold diuretics  - check BMP Q 12  - no acute indication for dialysis at this time continue monitor daily for dialysis needs  - check BMP, magnesium, phosphorus in a m   - CT chest abdomen pelvis from 09/01/2021 showing no hydronephrosis  Extensive ground-glass opacities compatible with nonspecific infectious process     - Await renal recovery  - Optimize hemodynamic status to avoid delay in renal recovery    - Place on a renal diet when allowed diet order    - Avoid nephrotoxins, adjust meds to appropriate GFR   - Strict I/O   - Daily weights  - Urinary retention protocol if patient does not have a [place]  - will need to set up patient for follow up with Nephrology as an outpatient post hospitalization   - as an outpatient for nephrology patient follows up with no nephrologist     · Blood pressure/hypertension:  - current medications:  Hydralazine 25 mg p o  Q 8, Isordil 10 mg p o  T i d  metoprolol 12 5 mg p o  B i d   - recommendations:  No changes for now  - Optimize hemodynamics   - Maintain MAP > 65mmHg  - Avoid BP fluctuations      · H/H/anemia:  - most recent hemoglobin at 9 1 grams/deciliter  - maintain hemoglobin greater than 8 grams/deciliter     · Acid-base electrolytes:  ? Hypernatremia:    § Most recent sodium at 145 mEq  § Continue IV fluids to D5 +20 mEq KCL increase rate to 150 cc an hour     ? Hypokalemia:  § Most recent potassium 4 0 mEq  § At potassium to IV fluids check BMP Q 12        ? Significant azotemia:  § Most recent BUN of 44 improving  § Continue IV fluids avoid diuretics  ? Acid-base:    § Most recent bicarb at 22     · Volume status:  ?  Clinically appears to be hypovolemic to euvolemic  Adjust IV fluids as above  Hold diuretics      · Proteinuria:   ? Most recent UA with no protein as of 09/01/2021 or blood     · Other medical problems:  ? Leukocytosis:  Management per primary team   Likely secondary to multifocal pneumonia  COVID negative  ? Acute on chronic CHF:  Management per primary team   Hold diuretics for now patient also has history of moderate aortic stenosis EF of 52%  Previously was on Lasix drip    hold diuretics for now for repeat echo per Cardiology  Follow-up with cardiology  ? Altered mental status:  Management primary team     Thanks for the consult  Will continue to follow  Please call with questions/ concerns  Above-mentioned orders and Plan in terms of acute kidney injury was discussed with the team in [address] MD, FASN, 9/8/2021, 10:45 AM              Objective :   Patient seen and examined in his room no overnight events hemodynamically stable urine output not adequately documented  Remains afebrile much more alert and oriented today        PHYSICAL EXAM  /72   Pulse 83   Temp 98 8 °F (37 1 °C)   Resp 18   Ht 5' 4" (1 626 m)   Wt 73 kg (160 lb 15 oz)   SpO2 92%   BMI 27 62 kg/m²   Temp (24hrs), Av °F (36 7 °C), Min:96 4 °F (35 8 °C), Max:98 9 °F (37 2 °C)        Intake/Output Summary (Last 24 hours) at 2021 1045  Last data filed at 2021 0933  Gross per 24 hour   Intake 2120 ml   Output --   Net 2120 ml       I/O last 24 hours: In: 1524 [P O :270; I V :1970]  Out: -       Current Weight: Weight - Scale: 73 kg (160 lb 15 oz)  First Weight: Weight - Scale: 85 4 kg (188 lb 4 4 oz)  Physical Exam  Vitals and nursing note reviewed  Constitutional:       General: He is not in acute distress  Appearance: Normal appearance  He is normal weight  He is ill-appearing  He is not toxic-appearing or diaphoretic  HENT:      Head: Normocephalic and atraumatic  Mouth/Throat:      Mouth: Mucous membranes are dry  Pharynx: Oropharynx is clear  No oropharyngeal exudate  Eyes:      General: No scleral icterus  Conjunctiva/sclera: Conjunctivae normal    Cardiovascular:      Rate and Rhythm: Normal rate  Heart sounds: Normal heart sounds  No friction rub  Pulmonary:      Effort: Pulmonary effort is normal  No respiratory distress  Breath sounds: Normal breath sounds  No wheezing  Abdominal:      General: There is no distension  Palpations: Abdomen is soft  There is no mass  Tenderness: There is no abdominal tenderness  Musculoskeletal:         General: No swelling  Cervical back: Normal range of motion and neck supple  Skin:     General: Skin is warm  Coloration: Skin is not jaundiced  Neurological:      General: No focal deficit present  Mental Status: He is alert  Psychiatric:         Mood and Affect: Mood normal          Behavior: Behavior normal              Review of Systems   Constitutional: Negative for chills and fever  HENT: Negative for congestion  Respiratory: Negative for cough and shortness of breath  Cardiovascular: Negative for leg swelling  Gastrointestinal: Negative for abdominal pain, nausea and vomiting  Musculoskeletal: Negative for back pain  Skin: Negative for rash  Neurological: Negative for headaches  Psychiatric/Behavioral: Negative for agitation  All other systems reviewed and are negative  Scheduled Meds:  Current Facility-Administered Medications   Medication Dose Route Frequency Provider Last Rate    Acetaminophen  650 mg Per NG Tube Q6H PRN Max 4/day Ean Espinosa, ANATOLY      ALPRAZolam  0 25 mg Oral HS PRN Doty Mis, CRLORENA      amiodarone  200 mg Oral TID With Meals Brenda Sorto PA-C      [START ON 9/9/2021] amiodarone  200 mg Oral Daily With Breakfast Brenda Sorto PA-C      apixaban  2 5 mg Oral BID Catheline Robyn Alexday, ANATOLY      hydrALAZINE  10 mg Intravenous Q6H PRN ANATOLY Doss      hydrALAZINE  25 mg Oral [facility] Ean Culp, ANATOLY      isosorbide dinitrate  10 mg Oral TID after meals ANATOLY Doss      lidocaine  1 patch Topical Daily Min Bennett MD      metoprolol tartrate  12 5 mg Oral Q12H [facility] Ean Espinosa, ANATOLY      omeprazole (PRILOSEC) suspension 2 mg/mL  20 mg Oral Daily ANAOTLY Doss      IV infusion builder   Intravenous Continuous Maciel Degroot  mL/hr at 09/08/21 8589    potassium chloride  40 mEq Oral BID Maciel Degroot MD      pravastatin  40 mg Oral Daily With [employer], ANATOLY      saccharomyces boulardii  250 mg Oral BID Min Bennett MD      senna-docusate sodium  2 tablet Oral BID PRN Min Bennett MD      sertraline  50 mg Oral Daily Kimberly Kennedy MD         PRN Meds:   Acetaminophen    ALPRAZolam    hydrALAZINE    senna-docusate sodium    Continuous Infusions:IV infusion builder, , Last Rate: 150 mL/hr at 09/08/21 0934          Invasive Devices: Invasive Devices     Peripheral Intravenous Line            Peripheral IV 09/06/21 Dorsal (posterior); Right Hand 1 day LABORATORY:    Results from last 7 days   Lab Units 09/08/21  0449 09/07/21  0435 09/06/21  0923 09/06/21  0541 09/05/21  0647 09/04/21  0445 09/03/21  0456 09/02/21  0441   WBC Thousand/uL  --  17 92*  --   --   --  21 00* 21 32* 23 76*   HEMOGLOBIN g/dL  --  9 1*  --   --   --  10 2* 9 8* 9 5*   HEMATOCRIT %  --  32 1*  --   --   --  36 1* 34 0* 32 8*   PLATELETS Thousands/uL  --  474*  --   --   --  413* 426* 449*   POTASSIUM mmol/L 4 0 3 2* 3 7  --  3 5 3 5 3 1* 3 6   CHLORIDE mmol/L 114* 115* 112*  --  112* 108 103 103   CO2 mmol/L 22 25 26  --  26 23 25 25   BUN mg/dL 44* 63* 83*  --  114* 143* 180* 193*   CREATININE mg/dL 1 32* 1 55* 1 76*  --  2 09* 2 46* 3 16* 3 76*   CALCIUM mg/dL 8 9 8 8 9 3  --  8 9 9 5 9 5 9 5   MAGNESIUM mg/dL  --  2 4  --   --   --  3 0* 3 1*  --    PHOSPHORUS mg/dL  --   --   --  4 0  --  5 2*  --   --       rest all reviewed    RADIOLOGY:  CT head wo contrast   Final Result by Jason Vazquez MD (09/07 1413)      No acute intracranial abnormality  Workstation performed: [id number]         CT chest abdomen pelvis wo contrast   Final Result by Micheal Paz MD (09/01 2022)      Limited study due to motion artifact  Extensive groundglass opacities throughout both lungs, compatible with nonspecific infectious process, including COVID 19 pneumonia  Fluid-filled small bowel and colon which may represent nonspecific gastroenteritis  Colonic diverticulosis without diverticulitis  Workstation performed: [id number]         IR temporary dialysis catheter placement   Final Result by Dipika Diaz MD (09/02 1521)   Impression: Image guided placement of nontunneled central venous dialysis catheter  Workstation performed: [id number]         XR chest portable   Final Result by Jed Fitzgerald DO (09/01 1424)      Stable patchy bilateral airspace disease                    Workstation performed: [id number]         VAS NICK & waveform analysis, multiple levels   Final Result by Oskar Ojeda MD (08/30 2108)      XR chest portable ICU   Final Result by Gilford Heap, MD (08/30 2420)      Stable patchy bilateral airspace disease question related to pulmonary edema or infiltrate  Workstation performed: [id number]         XR chest portable ICU   Final Result by Jordan Lopez DO (08/27 1555)      No significant change in bilateral airspace opacities  Stable life-support tubes  Workstation performed: [id number]         XR chest portable ICU   Final Result by Liz Farley MD (08/26 1022)      Increasing right peripheral consolidation with improving aeration at the lung bases  Workstation performed: [id number]         XR chest portable   Final Result by Navid Cifuentes MD (08/25 1511)   Persistent bilateral infiltrates suspicious for multifocal pneumonia      Typical endotracheal tube and enteric tube      Increased gastric distention               Workstation performed: [id number]         XR chest portable ICU   Final Result by Lovette Ganser, MD (08/24 1889)      Slight improvement in bilateral parenchymal infiltrative changes  ET tube now at the brent  Workstation performed: [id number]         XR chest portable ICU   Final Result by Lovette Ganser, MD (08/22 1509)   Worsening bilateral parenchymal changes  Endotracheal tube in the right main bronchus to be pulled back  If this has been pulled back a repeat x-ray for confirmation as indicated clinically  The study was marked in [facility] for immediate notification  Workstation performed: [id number]         XR chest portable ICU   Final Result by Nunu Méndez MD (08/22 1059)      Persistent pulmonary edema                    Workstation performed: [id number]         XR chest 1 view portable   ED Interpretation by Floyd Trinh MD (08/19 2123)   Bilateral diffuse infiltrates      Final Result by Alpesh Dupree MD (08/20 3726)      Diffuse patchy airspace disease bilaterally  Consider CHF versus multifocal infiltrate  Findings concur with the preliminary ER interpretation  Workstation performed: [id number]           Rest all reviewed    Portions of the record may have been created with voice recognition software  Occasional wrong word or "sound a like" substitutions may have occurred due to the inherent limitations of voice recognition software  Read the chart carefully and recognize, using context, where substitutions have occurred  If you have any questions, please contact the dictating provider

## 2021-09-08 NOTE — ASSESSMENT & PLAN NOTE
Wt Readings from Last 3 Encounters:   09/08/21 73 kg (160 lb 15 oz)   07/26/21 83 5 kg (184 lb 2 oz)   03/19/21 82 8 kg (182 lb 9 6 oz)     Presented with acute on chronic diastolic heart failure exacerbation  Euvolemic / hypovolemic    - Lasix infusion discontinued  Now hypovolemic  On gentle hydration    - Appreciate renal and cardiology recommendations

## 2021-09-08 NOTE — CASE MANAGEMENT
CM called pt's son, Bess Cabrera to discuss STR referrals  Ac's 1st choice is [facility], 2nd choice is [facility]   CM made referrals via [employer]  [facility] can accept the pt  CM will continue to follow

## 2021-09-08 NOTE — ASSESSMENT & PLAN NOTE
Acute kidney injury requiring hemodialysis  Continues to improve  HD catheter line discontinued       Recent Labs     09/06/21  0923 09/07/21  0435   BUN 83* 63*   CREATININE 1 76* 1 55*   EGFR 35 41       Results from last 7 days   Lab Units 09/01/21  1651   BLOOD UA  Negative   PROTEIN UA mg/dl Negative

## 2021-09-08 NOTE — PLAN OF CARE
Problem: OCCUPATIONAL THERAPY ADULT  Goal: Performs self-care activities at highest level of function for planned discharge setting  See evaluation for individualized goals  Description: Occupational Therapy    Outcome: Progressing  Note: Limitation: Decreased UE strength, Decreased ADL status, Decreased UE ROM, Decreased Safe judgement during ADL, Decreased cognition, Decreased endurance, Decreased high-level ADLs  Prognosis: Fair  Assessment: Pt was seen for skilled OT with focus on bed mobility, self care routine, brielle care, basic orientation and review of current plan of care  See above levels of A required for bed mobility and light grooming activity  Pt initially agreeable to activities  Limited engagement noted with attempts to achieve EOB positioning  Pt resistive to movement warranting need for extended time to provide reassurance and emotional support to carry over safe techs with activities  Pt returned to supine positioning with encouragement to complete BUE ROM exercises after positioning the bed as chair  Pt agreeable and thanked staff for assistance  The patient's raw score on the AM-PAC Daily Activity inpatient short form is 9, standardized score is 30 6, less than 39 4  Patients at this level are likely to benefit from discharge to post-acute rehabilitation services        OT Discharge Recommendation: Post acute rehabilitation services

## 2021-09-08 NOTE — PLAN OF CARE
Problem: Potential for Falls  Goal: Patient will remain free of falls  Description: INTERVENTIONS:  - Educate patient/family on patient safety including physical limitations  - Instruct patient to call for assistance with activity   - Consult OT/PT to assist with strengthening/mobility   - Keep Call bell within reach  - Keep bed low and locked with side rails adjusted as appropriate  - Keep care items and personal belongings within reach  - Initiate and maintain comfort rounds  - Make Fall Risk Sign visible to staff  - Offer Toileting   - Initiate/Maintain bed alarm  - Obtain necessary fall risk management equipment  - Apply yellow socks and bracelet for high fall risk patients  - Consider moving patient to room near nurses station  9/8/2021 1943 by Dianne Rod RN  Outcome: Progressing  9/8/2021 1245 by Dianne Rod RN  Outcome: Progressing     Problem: MOBILITY - ADULT  Goal: Maintain or return to baseline ADL function  Description: INTERVENTIONS:  -  Assess patient's ability to carry out ADLs; assess patient's baseline for ADL function and identify physical deficits which impact ability to perform ADLs (bathing, care of mouth/teeth, toileting, grooming, dressing, etc )  - Assess/evaluate cause of self-care deficits   - Assess range of motion  - Assess patient's mobility; develop plan if impaired  - Assess patient's need for assistive devices and provide as appropriate  - Encourage maximum independence but intervene and supervise when necessary  - Involve family in performance of ADLs  - Assess for home care needs following discharge   - Consider OT consult to assist with ADL evaluation and planning for discharge  - Provide patient education as appropriate  9/8/2021 1943 by Dianne Rod RN  Outcome: Progressing  9/8/2021 1245 by Dianne Rod RN  Outcome: Progressing  Goal: Maintains/Returns to pre admission functional level  Description: INTERVENTIONS:  - Perform BMAT or MOVE assessment daily    - Set and communicate daily mobility goal to care team and patient/family/caregiver     - Collaborate with rehabilitation services on mobility goals if consulted  - Perform Range of Motion   - Out of bed for toileting  - Record patient progress and toleration of activity level   9/8/2021 1943 by Eugene Winkler RN  Outcome: Progressing  9/8/2021 1245 by Eugene Winkler RN  Outcome: Progressing     Problem: Prexisting or High Potential for Compromised Skin Integrity  Goal: Skin integrity is maintained or improved  Description: INTERVENTIONS:  - Identify patients at risk for skin breakdown  - Assess and monitor skin integrity  - Assess and monitor nutrition and hydration status  - Monitor labs   - Assess for incontinence   - Turn and reposition patient  - Assist with mobility/ambulation  - Relieve pressure over bony prominences  - Avoid friction and shearing  - Provide appropriate hygiene as needed including keeping skin clean and dry  - Evaluate need for skin moisturizer/barrier cream  - Collaborate with interdisciplinary team   - Patient/family teaching  - Consider wound care consult   9/8/2021 1943 by Eugene Winkler RN  Outcome: Progressing  9/8/2021 1245 by Eugene Winkler RN  Outcome: Progressing     Problem: NEUROSENSORY - ADULT  Goal: Achieves stable or improved neurological status  Description: INTERVENTIONS  - Monitor and report changes in neurological status  - Monitor vital signs such as temperature, blood pressure, glucose, and any other labs ordered   - Initiate measures to prevent increased intracranial pressure  - Monitor for seizure activity and implement precautions if appropriate      9/8/2021 1943 by Eugene Winkler RN  Outcome: Progressing  9/8/2021 1245 by Eugene Winkler RN  Outcome: Progressing  Goal: Achieves maximal functionality and self care  Description: INTERVENTIONS  - Monitor swallowing and airway patency with patient fatigue and changes in neurological status  - Encourage and assist patient to increase activity and self care     - Encourage visually impaired, hearing impaired and aphasic patients to use assistive/communication devices  9/8/2021 1943 by Delmy Hawkins RN  Outcome: Progressing  9/8/2021 1245 by Delmy Hawkins RN  Outcome: Progressing     Problem: CARDIOVASCULAR - ADULT  Goal: Maintains optimal cardiac output and hemodynamic stability  Description: INTERVENTIONS:  - Monitor I/O, vital signs and rhythm  - Monitor for S/S and trends of decreased cardiac output  - Administer and titrate ordered vasoactive medications to optimize hemodynamic stability  - Assess quality of pulses, skin color and temperature  - Assess for signs of decreased coronary artery perfusion  - Instruct patient to report change in severity of symptoms  9/8/2021 1943 by Delmy Hawkins RN  Outcome: Progressing  9/8/2021 1245 by Delmy Hawkins RN  Outcome: Progressing  Goal: Absence of cardiac dysrhythmias or at baseline rhythm  Description: INTERVENTIONS:  - Continuous cardiac monitoring, vital signs, obtain 12 lead EKG if ordered  - Administer antiarrhythmic and heart rate control medications as ordered  - Monitor electrolytes and administer replacement therapy as ordered  9/8/2021 1943 by Delmy Hawkins RN  Outcome: Progressing  9/8/2021 1245 by Delmy Hawkins RN  Outcome: Progressing     Problem: RESPIRATORY - ADULT  Goal: Achieves optimal ventilation and oxygenation  Description: INTERVENTIONS:  - Assess for changes in respiratory status  - Assess for changes in mentation and behavior  - Position to facilitate oxygenation and minimize respiratory effort  - Oxygen administered by appropriate delivery if ordered  - Initiate smoking cessation education as indicated  - Encourage broncho-pulmonary hygiene including cough, deep breathe, Incentive Spirometry  - Assess the need for suctioning and aspirate as needed  - Assess and instruct to report SOB or any respiratory difficulty  - Respiratory Therapy support as indicated  9/8/2021 1943 by Delmy Hawkins RN  Outcome: Progressing  9/8/2021 1245 by Alberto Lerma RN  Outcome: Progressing     Problem: GASTROINTESTINAL - ADULT  Goal: Minimal or absence of nausea and/or vomiting  Description: INTERVENTIONS:  - Administer IV fluids if ordered to ensure adequate hydration  - Maintain NPO status until nausea and vomiting are resolved  - Nasogastric tube if ordered  - Administer ordered antiemetic medications as needed  - Provide nonpharmacologic comfort measures as appropriate  - Advance diet as tolerated, if ordered  - Consider nutrition services referral to assist patient with adequate nutrition and appropriate food choices  9/8/2021 1943 by Alberto Lerma RN  Outcome: Progressing  9/8/2021 1245 by Alberto Lerma RN  Outcome: Progressing  Goal: Maintains or returns to baseline bowel function  Description: INTERVENTIONS:  - Assess bowel function  - Encourage oral fluids to ensure adequate hydration  - Administer IV fluids if ordered to ensure adequate hydration  - Administer ordered medications as needed  - Encourage mobilization and activity  - Consider nutritional services referral to assist patient with adequate nutrition and appropriate food choices  9/8/2021 1943 by Alberto Lerma RN  Outcome: Progressing  9/8/2021 1245 by Alberto Lerma RN  Outcome: Progressing  Goal: Maintains adequate nutritional intake  Description: INTERVENTIONS:  - Monitor percentage of each meal consumed  - Identify factors contributing to decreased intake, treat as appropriate  - Assist with meals as needed  - Monitor I&O, weight, and lab values if indicated  - Obtain nutrition services referral as needed  9/8/2021 1943 by Alberto Lerma RN  Outcome: Progressing  9/8/2021 1245 by Alberto Lerma RN  Outcome: Progressing  Goal: Oral mucous membranes remain intact  Description: INTERVENTIONS  - Assess oral mucosa and hygiene practices  - Implement preventative oral hygiene regimen  - Implement oral medicated treatments as ordered  - Initiate Nutrition services referral as needed  9/8/2021 1943 by Maurilio Baum RN  Outcome: Progressing  9/8/2021 1245 by Maurilio Baum RN  Outcome: Progressing     Problem: GENITOURINARY - ADULT  Goal: Maintains or returns to baseline urinary function  Description: INTERVENTIONS:  - Assess urinary function  - Encourage oral fluids to ensure adequate hydration if ordered  - Administer IV fluids as ordered to ensure adequate hydration  - Administer ordered medications as needed  - Offer frequent toileting  - Follow urinary retention protocol if ordered  9/8/2021 1943 by Maurilio Baum RN  Outcome: Progressing  9/8/2021 1245 by Maurilio Baum RN  Outcome: Progressing  Goal: Absence of urinary retention  Description: INTERVENTIONS:  - Assess patients ability to void and empty bladder  - Monitor I/O  - Bladder scan as needed  - Discuss with physician/AP medications to alleviate retention as needed  - Discuss catheterization for long term situations as appropriate  9/8/2021 1943 by Maurilio Baum RN  Outcome: Progressing  9/8/2021 1245 by Maurilio Baum RN  Outcome: Progressing     Problem: METABOLIC, FLUID AND ELECTROLYTES - ADULT  Goal: Electrolytes maintained within normal limits  Description: INTERVENTIONS:  - Monitor labs and assess patient for signs and symptoms of electrolyte imbalances  - Administer electrolyte replacement as ordered  - Monitor response to electrolyte replacements, including repeat lab results as appropriate  - Instruct patient on fluid and nutrition as appropriate  9/8/2021 1943 by Maurilio Baum RN  Outcome: Progressing  9/8/2021 1245 by Maurilio Baum RN  Outcome: Progressing  Goal: Fluid balance maintained  Description: INTERVENTIONS:  - Monitor labs   - Monitor I/O and WT  - Instruct patient on fluid and nutrition as appropriate  - Assess for signs & symptoms of volume excess or deficit  9/8/2021 1943 by Maurilio Baum RN  Outcome: Progressing  9/8/2021 1245 by Maurilio Baum RN  Outcome: Progressing  Goal: Glucose maintained within target range  Description: INTERVENTIONS:  - Monitor Blood Glucose as ordered  - Assess for signs and symptoms of hyperglycemia and hypoglycemia  - Administer ordered medications to maintain glucose within target range  - Assess nutritional intake and initiate nutrition service referral as needed  9/8/2021 1943 by Val Vu RN  Outcome: Progressing  9/8/2021 1245 by Val Vu RN  Outcome: Progressing     Problem: SKIN/TISSUE INTEGRITY - ADULT  Goal: Skin Integrity remains intact(Skin Breakdown Prevention)  Description: Assess:  -Perform Kevon assessment   -Clean and moisturize skin   -Inspect skin when repositioning, toileting, and assisting with ADLS  -Assess under medical devices  -Assess extremities for adequate circulation and sensation     Bed Management:  -Have minimal linens on bed & keep smooth, unwrinkled  -Change linens as needed when moist or perspiring  -Avoid sitting or lying in one position     Toileting:  -Offer bedside commode  -Assess for incontinence  -Use incontinent care products after each incontinent episode s    Activity:  -Mobilize patient   -Encourage activity and walks on unit  -Encourage or provide ROM exercises   -Turn and reposition patient every 2 Hours  -Use appropriate equipment to lift or move patient in bed  -Instruct/ Assist with weight shifting    Skin Care:  -Avoid use of baby powder, tape, friction and shearing, hot water or constrictive clothing  -Relieve pressure over bony prominences   -Do not massage red bony areas    Next Steps:  -Teach patient strategies to minimize risks  -Consider consults to  interdisciplinary teams   9/8/2021 1943 by Val Vu RN  Outcome: Progressing  9/8/2021 1245 by Val Vu RN  Outcome: Progressing     Problem: HEMATOLOGIC - ADULT  Goal: Maintains hematologic stability  Description: INTERVENTIONS  - Assess for signs and symptoms of bleeding or hemorrhage  - Monitor labs  - Administer supportive blood products/factors as ordered and appropriate  9/8/2021 1943 by Delmy Hawkins RN  Outcome: Progressing  9/8/2021 1245 by Delmy Hawkins RN  Outcome: Progressing     Problem: MUSCULOSKELETAL - ADULT  Goal: Maintain or return mobility to safest level of function  Description: INTERVENTIONS:  - Assess patient's ability to carry out ADLs; assess patient's baseline for ADL function and identify physical deficits which impact ability to perform ADLs (bathing, care of mouth/teeth, toileting, grooming, dressing, etc )  - Assess/evaluate cause of self-care deficits   - Assess range of motion  - Assess patient's mobility  - Assess patient's need for assistive devices and provide as appropriate  - Encourage maximum independence but intervene and supervise when necessary  - Involve family in performance of ADLs  - Assess for home care needs following discharge   - Consider OT consult to assist with ADL evaluation and planning for discharge  - Provide patient education as appropriate  9/8/2021 1943 by Delmy Hawkins RN  Outcome: Progressing  9/8/2021 1245 by Delmy Hawkins RN  Outcome: Progressing     Problem: Nutrition/Hydration-ADULT  Goal: Nutrient/Hydration intake appropriate for improving, restoring or maintaining nutritional needs  Description: Monitor and assess patient's nutrition/hydration status for malnutrition  Collaborate with interdisciplinary team and initiate plan and interventions as ordered  Monitor patient's weight and dietary intake as ordered or per policy  Utilize nutrition screening tool and intervene as necessary  Determine patient's food preferences and provide high-protein, high-caloric foods as appropriate       INTERVENTIONS:  - Monitor oral intake, urinary output, labs, and treatment plans  - Assess nutrition and hydration status and recommend course of action  - Evaluate amount of meals eaten  - Assist patient with eating if necessary   - Allow adequate time for meals  - Recommend/ encourage appropriate diets, oral nutritional supplements, and vitamin/mineral supplements  - Order, calculate, and assess calorie counts as needed  - Recommend, monitor, and adjust tube feedings and TPN/PPN based on assessed needs  - Assess need for intravenous fluids  - Provide specific nutrition/hydration education as appropriate  - Include patient/family/caregiver in decisions related to nutrition  9/8/2021 1943 by Nelson Dial, RN  Outcome: Progressing  9/8/2021 1245 by Nelson Dial, RN  Outcome: Progressing

## 2021-09-08 NOTE — PROGRESS NOTES
[facility]  Progress Note Sha Pro 1940, 78 y o  male MRN: [id number]  Unit/Bed#: [address] [place] [address] 216-01 Encounter: [id number]  Primary Care Provider: Rene Blanco DO   Date and time admitted to hospital: 8/19/2021  8:25 PM    * Acute respiratory failure with hypoxia [facility])  Assessment & Plan  78year old male presenting with acute respiratory failure with hypoxia  Possibly due to vascular congestion / multifocal pneumonia  Required ICU level of care / intubation  Completed antibiotic therapy  S/p extubation and now down to 1L NC   - Diuretics and fluid balance per nephrology    Acute on chronic diastolic CHF (congestive heart failure) ([facility])  Assessment & Plan  Wt Readings from Last 3 Encounters:   09/08/21 73 kg (160 lb 15 oz)   07/26/21 83 5 kg (184 lb 2 oz)   03/19/21 82 8 kg (182 lb 9 6 oz)     Presented with acute on chronic diastolic heart failure exacerbation  Euvolemic / hypovolemic    - Lasix infusion discontinued  Now hypovolemic  On gentle hydration    - Appreciate renal and cardiology recommendations  Hypernatremia  Assessment & Plan  Due to free water deficit and dehydration   - IV hydration  - Management per renal     Recent Labs     09/05/21  1533 09/06/21  0923 09/07/21  0435   SODIUM 148* 151* 153*             Atrial fibrillation ([facility])  Assessment & Plan  New onset atrial fibrillation with RVR  Currently on NSR   - Continue Eliquis 2 5mg BID  - Continue amiodarone load per cardiology  - Cardiology recommends cath for ischemic testing once stabilized and cleared by renal to do so  catheterization dye    CANDIE (acute kidney injury) [facility])  Assessment & Plan  Acute kidney injury requiring hemodialysis  Continues to improve  HD catheter line discontinued       Recent Labs     09/06/21  0923 09/07/21  0435   BUN 83* 63*   CREATININE 1 76* 1 55*   EGFR 35 41       Results from last 7 days   Lab Units 09/01/21  1651   BLOOD UA  Negative   PROTEIN UA mg/dl Negative         Multifocal pneumonia  Assessment & Plan  Completed antibiotic therapy  Was restarted on antibiotics due to concerns for aspiration pneumonia  As per previous providers discussion with ID, likely due to aspiration pneumonitis  - Continue monitoring off antibiotics  - Follow-up cultures  - Aspiration precautions  - Dysphagia diet    Mild cognitive impairment  Assessment & Plan  Delirium precautions  Preferably would like to avoid chemical medications given QTC of 510  Geriatrics consulted  Essential hypertension  Assessment & Plan  Controlled  - Continue hydralazine 25mg TID, isosorbide 10mg tid, and metoprolol 12 5mg Q12  VTE Pharmacologic Prophylaxis:   Pharmacologic: Apixaban (Eliquis)  Mechanical VTE Prophylaxis in Place: Yes    Patient Centered Rounds: I have performed bedside rounds with nursing staff today  Discussions with Specialists or Other Care Team Provider: nursing    Education and Discussions with Family / Patient: patient    Time Spent for Care: 30 minutes  More than 50% of total time spent on counseling and coordination of care as described above  Current Length of Stay: 20 day(s)    Current Patient Status: Inpatient   Certification Statement: The patient will continue to require additional inpatient hospital stay due to iv hydration    Discharge Plan: active    Code Status: Level 1 - Full Code      Subjective:   Patient seen and examined at bedside  Doing better mentally today  NO acute issues overnight  Objective:     Vitals:   Temp (24hrs), Av °F (36 7 °C), Min:96 4 °F (35 8 °C), Max:98 9 °F (37 2 °C)    Temp:  [96 4 °F (35 8 °C)-98 9 °F (37 2 °C)] 98 8 °F (37 1 °C)  HR:  [82-89] 83  Resp:  [16-25] 18  BP: (113-119)/(58-96) 113/72  SpO2:  [84 %-94 %] 92 %  Body mass index is 27 62 kg/m²  Input and Output Summary (last 24 hours):        Intake/Output Summary (Last 24 hours) at 2021 0934  Last data filed at 2021 0933  Gross per 24 hour   Intake 2120 ml   Output --   Net 2120 ml       Physical Exam:     Physical Exam  Vitals reviewed  HENT:      Head: Normocephalic  Nose: Nose normal       Mouth/Throat:      Mouth: Mucous membranes are moist    Eyes:      General: No scleral icterus  Cardiovascular:      Rate and Rhythm: Normal rate  Pulmonary:      Effort: Pulmonary effort is normal    Abdominal:      Palpations: Abdomen is soft  Tenderness: There is no abdominal tenderness  Skin:     General: Skin is warm  Neurological:      Mental Status: He is alert  Mental status is at baseline  Comments: Questionable orientation at this time  Psychiatric:         Mood and Affect: Mood normal          Behavior: Behavior normal        Additional Data:     Labs:    Results from last 7 days   Lab Units 09/07/21  0435 09/04/21  0445   WBC Thousand/uL 17 92* 21 00*   HEMOGLOBIN g/dL 9 1* 10 2*   HEMATOCRIT % 32 1* 36 1*   PLATELETS Thousands/uL 474* 413*   BANDS PCT %  --  1   NEUTROS PCT % 73  --    LYMPHS PCT % 10*  --    LYMPHO PCT %  --  7*   MONOS PCT % 10  --    MONO PCT %  --  6   EOS PCT % 4 0     Results from last 7 days   Lab Units 09/07/21  0435 09/03/21  0456   SODIUM mmol/L 153* 145   POTASSIUM mmol/L 3 2* 3 1*   CHLORIDE mmol/L 115* 103   CO2 mmol/L 25 25   BUN mg/dL 63* 180*   CREATININE mg/dL 1 55* 3 16*   ANION GAP mmol/L 13 17*   CALCIUM mg/dL 8 8 9 5   ALBUMIN g/dL  --  3 3*   TOTAL BILIRUBIN mg/dL  --  0 54   ALK PHOS U/L  --  85   ALT U/L  --  55   AST U/L  --  38   GLUCOSE RANDOM mg/dL 141* 116         Results from last 7 days   Lab Units 09/01/21  1719   POC GLUCOSE mg/dl 164*         Results from last 7 days   Lab Units 09/07/21  0435 09/02/21  0441 09/01/21  1215   PROCALCITONIN ng/ml <0 05 0 12 0 17           * I Have Reviewed All Lab Data Listed Above  * Additional Pertinent Lab Tests Reviewed:  All Labs For Current Hospital Admission Reviewed    Imaging:    Imaging Reports Reviewed Today Include: no new imaging    Recent Cultures (last 7 days):     Results from last 7 days   Lab Units 09/01/21  1215   BLOOD CULTURE  No Growth After 5 Days  No Growth After 5 Days  Last 24 Hours Medication List:   Current Facility-Administered Medications   Medication Dose Route Frequency Provider Last Rate    Acetaminophen  650 mg Per NG Tube Q6H PRN Max 4/day Ean Espinosa, ANATOLY      ALPRAZolam  0 25 mg Oral HS PRN Marvel Prey, ANATOLY      amiodarone  200 mg Oral TID With Meals Kiran Law PA-C      [START ON 9/9/2021] amiodarone  200 mg Oral Daily With Breakfast Kiran Law PA-C      apixaban  2 5 mg Oral BID Evern Orly Sonday, ANATOLY      hydrALAZINE  10 mg Intravenous Q6H PRN Di Ion, ANATOLY      hydrALAZINE  25 mg Oral [facility] Ean Ord, CRLORENA      isosorbide dinitrate  10 mg Oral TID after meals Di Ion, CRNP      lidocaine  1 patch Topical Daily Alireza Cassidy MD      metoprolol tartrate  12 5 mg Oral Q12H [address] Ean Sonday, ANATOLY      omeprazole (PRILOSEC) suspension 2 mg/mL  20 mg Oral Daily Di Ion, CRNP      IV infusion builder   Intravenous Continuous Eloy Opitz, MD Stopped (09/08/21 0933)    potassium chloride  40 mEq Oral BID Eloy Opitz, MD      pravastatin  40 mg Oral Daily With [employer] Sonday, CRNP      saccharomyces boulardii  250 mg Oral BID Alireza Cassidy MD      senna-docusate sodium  2 tablet Oral BID PRN Alireza Cassidy MD      sertraline  50 mg Oral Daily Muriel Richardson MD          Today, Patient Was Seen By: Esperanza Mancilla MD    ** Please Note: Dictation voice to text software may have been used in the creation of this document   **

## 2021-09-09 ENCOUNTER — APPOINTMENT (INPATIENT)
Dept: RADIOLOGY | Facility: HOSPITAL | Age: 81
DRG: 870 | End: 2021-09-09
Payer: MEDICARE

## 2021-09-09 LAB
ANION GAP SERPL CALCULATED.3IONS-SCNC: 12 MMOL/L (ref 4–13)
ATRIAL RATE: 92 BPM
ATRIAL RATE: 93 BPM
ATRIAL RATE: 94 BPM
BUN SERPL-MCNC: 32 MG/DL (ref 5–25)
CALCIUM SERPL-MCNC: 7.9 MG/DL (ref 8.3–10.1)
CHLORIDE SERPL-SCNC: 104 MMOL/L (ref 100–108)
CO2 SERPL-SCNC: 20 MMOL/L (ref 21–32)
CREAT SERPL-MCNC: 1.28 MG/DL (ref 0.6–1.3)
GFR SERPL CREATININE-BSD FRML MDRD: 51 ML/MIN/1.73SQ M
GLUCOSE SERPL-MCNC: 120 MG/DL (ref 65–140)
P AXIS: 23 DEGREES
P AXIS: 24 DEGREES
P AXIS: 7 DEGREES
POTASSIUM SERPL-SCNC: 4.5 MMOL/L (ref 3.5–5.3)
PR INTERVAL: 182 MS
PR INTERVAL: 186 MS
PR INTERVAL: 196 MS
QRS AXIS: -39 DEGREES
QRSD INTERVAL: 112 MS
QT INTERVAL: 396 MS
QT INTERVAL: 408 MS
QT INTERVAL: 408 MS
QTC INTERVAL: 489 MS
QTC INTERVAL: 507 MS
QTC INTERVAL: 510 MS
SODIUM SERPL-SCNC: 136 MMOL/L (ref 136–145)
T WAVE AXIS: 16 DEGREES
T WAVE AXIS: 26 DEGREES
T WAVE AXIS: 41 DEGREES
VENTRICULAR RATE: 92 BPM
VENTRICULAR RATE: 93 BPM
VENTRICULAR RATE: 94 BPM

## 2021-09-09 PROCEDURE — 71045 X-RAY EXAM CHEST 1 VIEW: CPT

## 2021-09-09 PROCEDURE — 99232 SBSQ HOSP IP/OBS MODERATE 35: CPT | Performed by: STUDENT IN AN ORGANIZED HEALTH CARE EDUCATION/TRAINING PROGRAM

## 2021-09-09 PROCEDURE — 93005 ELECTROCARDIOGRAM TRACING: CPT

## 2021-09-09 PROCEDURE — 99232 SBSQ HOSP IP/OBS MODERATE 35: CPT | Performed by: INTERNAL MEDICINE

## 2021-09-09 PROCEDURE — 93010 ELECTROCARDIOGRAM REPORT: CPT | Performed by: INTERNAL MEDICINE

## 2021-09-09 PROCEDURE — 99232 SBSQ HOSP IP/OBS MODERATE 35: CPT

## 2021-09-09 PROCEDURE — 80048 BASIC METABOLIC PNL TOTAL CA: CPT | Performed by: INTERNAL MEDICINE

## 2021-09-09 RX ORDER — DEXTROSE AND POTASSIUM CHLORIDE 5; .15 G/100ML; G/100ML
150 SOLUTION INTRAVENOUS CONTINUOUS
Status: DISCONTINUED | OUTPATIENT
Start: 2021-09-09 | End: 2021-09-09

## 2021-09-09 RX ORDER — FUROSEMIDE 10 MG/ML
40 INJECTION INTRAMUSCULAR; INTRAVENOUS ONCE
Status: COMPLETED | OUTPATIENT
Start: 2021-09-09 | End: 2021-09-09

## 2021-09-09 RX ADMIN — Medication 12.5 MG: at 08:00

## 2021-09-09 RX ADMIN — Medication 12.5 MG: at 21:29

## 2021-09-09 RX ADMIN — ACETAMINOPHEN 650 MG: 650 SUSPENSION ORAL at 05:26

## 2021-09-09 RX ADMIN — ISOSORBIDE DINITRATE 10 MG: 10 TABLET ORAL at 17:06

## 2021-09-09 RX ADMIN — ACETAMINOPHEN 650 MG: 650 SUSPENSION ORAL at 17:06

## 2021-09-09 RX ADMIN — HYDRALAZINE HYDROCHLORIDE 25 MG: 25 TABLET, FILM COATED ORAL at 21:29

## 2021-09-09 RX ADMIN — DEXTROSE AND POTASSIUM CHLORIDE 150 ML/HR: 5; .15 SOLUTION INTRAVENOUS at 10:33

## 2021-09-09 RX ADMIN — Medication 250 MG: at 17:06

## 2021-09-09 RX ADMIN — ACETAMINOPHEN 650 MG: 650 SUSPENSION ORAL at 10:31

## 2021-09-09 RX ADMIN — MELATONIN 3 MG: at 21:28

## 2021-09-09 RX ADMIN — Medication 20 MG: at 08:00

## 2021-09-09 RX ADMIN — SERTRALINE HYDROCHLORIDE 50 MG: 50 TABLET ORAL at 08:00

## 2021-09-09 RX ADMIN — POTASSIUM CHLORIDE: 2 INJECTION, SOLUTION, CONCENTRATE INTRAVENOUS at 01:56

## 2021-09-09 RX ADMIN — AMIODARONE HYDROCHLORIDE 200 MG: 200 TABLET ORAL at 07:46

## 2021-09-09 RX ADMIN — APIXABAN 2.5 MG: 2.5 TABLET, FILM COATED ORAL at 08:00

## 2021-09-09 RX ADMIN — ACETAMINOPHEN 650 MG: 650 SUSPENSION ORAL at 21:28

## 2021-09-09 RX ADMIN — PRAVASTATIN SODIUM 40 MG: 40 TABLET ORAL at 17:06

## 2021-09-09 RX ADMIN — FUROSEMIDE 40 MG: 10 INJECTION, SOLUTION INTRAVENOUS at 18:33

## 2021-09-09 RX ADMIN — Medication 250 MG: at 08:00

## 2021-09-09 RX ADMIN — APIXABAN 2.5 MG: 2.5 TABLET, FILM COATED ORAL at 17:06

## 2021-09-09 RX ADMIN — GABAPENTIN 100 MG: 100 CAPSULE ORAL at 21:29

## 2021-09-09 NOTE — PHYSICIAN ADVISOR
Current patient class: Inpatient  The patient is currently on Hospital Day: 22      The patient was admitted to the hospital at 10:35 PM on 8/19/21 for the following diagnosis:  Acute respiratory failure ([facility] [address] ) [J96 00]  Leukocytosis [D72 829]  Respiratory distress [R06 03]  Severe sepsis ([facility] [address] ) [A41 9, R65 20]     CMS OUTLIER STAY REVIEW    After review of the relevant documentation, labs, vital signs and test results, the patient is appropriate for CONTINUED INPATIENT ADMISSION  The patient continues to remain hospitalized receiving acute medical care  The patient has surpassed the expected duration of stay, however given the clinical condition, need for further acute care management, the patient is appropriate to remain in an inpatient status  The patient still being actively managed, and does have unresolved medical issues requiring further hospitalization  This review is conducted at 20 days, to help satisfy the requirements for significant outlier stay review as per CMS  Given the current condition of this patient, the patient satisfies this review was determination for continued inpatient stay  Rationale is as follows: The patient is a 78 yrs old Male who presented to the ED at 8/19/2021  8:25 PM with a chief complaint of Respiratory Distress (EMS called due to patient feeling CP and SOB x2 days  68% on RA upon EMS arrival  patient arriving on bipap )   Patient was noted to have a multifocal pneumonia in at 1 point was intubated  He was in the ICU for several days  Patient also had acute on chronic congestive heart failure  Currently the diuretics and fluid bounces are being managed by Nephrology  Patient still requiring supplemental oxygen at this time  During the hospitalization the patient also had atrial fibrillation rapid ventricular response but now appears to be normal sinus rhythm and is on amiodarone    Patient will need a cardiac catheterization for ischemic workup once stabilized    Given the above the patient is still  Requiring acute hospital level care and is inpatient appropriate    The patients vitals on arrival were   ED Triage Vitals   Temperature Pulse Respirations Blood Pressure SpO2   08/19/21 2051 08/19/21 2029 08/19/21 2029 08/19/21 2028 08/19/21 2028   97 9 °F (36 6 °C) 98 (!) 24 134/63 91 %      Temp Source Heart Rate Source Patient Position - Orthostatic VS BP Location FiO2 (%)   08/19/21 2051 08/19/21 2029 08/19/21 2028 08/19/21 2028 08/19/21 2029   Rectal Monitor Lying Right arm 100      Pain Score       08/19/21 2053       Worst Possible Pain           Past Medical History:   Diagnosis Date    Actinic keratosis     LAST ASSESSED: [id number]    Allergic rhinitis     LAST ASSESSED: [id number]    Anxiety     LAST ASSESSED: [id number]    Anxiety disorder     LAST ASSESSED: 28USM5775    Atelectasis of right lung     ABNORMAL CT SCAN OF [address] 12/2/2016 REPEAT NEEDED PER RADIOLOGY IN 3 MONTHS LAST ASSESSED: [id number]    Atypical chest pain     LAST ASSESSED: [id number]    Benign paroxysmal vertigo     LAST ASSESSED: 08LWO9391    Chronic cough     LAST ASSESSED: 87BCP8201    Chronic GERD     Degenerative arthritis of knee, bilateral     LAST ASSESSED: 87GAJ9832    Diverticulitis of colon     LAST ASSESSED: 14EHS5380    Diverticulosis     LAST ASSESSED: 02HDY7845    Do not resuscitate status 1/25/2021    Foreign body, eye     LAST ASSESSED: 44NMM4698    Functional gait abnormality     LAST ASSESSED: 24KCR9522    Hyperlipidemia     Hypertension     Hyponatremia     LAST ASSESSED: 54OIJ4810    Lactic acidosis 8/19/2021    Leukocytosis     LAST ASSESSED: 29MBP9702    Multifocal pneumonia 8/19/2021    Murmur     Newly recognized murmur     LAST ASSESSED: [id number]    Olecranon bursitis, unspecified elbow     Presence of indwelling urethral catheter     RESOLVED: [id number]    Transient cerebral ischemia     LAST ASSESSED: 15DYR0245    Urinary incontinence     LAST ASSESSED: 47GLT7200    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     LAST ASSESSED: 58NYM4966     Past Surgical History:   Procedure Laterality Date    ADENOIDECTOMY      APPENDECTOMY      COLONOSCOPY  10/2006    FIBEROPTIC    INGUINAL HERNIA REPAIR      IR TEMPORARY DIALYSIS CATHETER PLACEMENT  9/1/2021    JOINT REPLACEMENT      b/l TKR Sept 2015    SINUS SURGERY      TONSILLECTOMY             Consults have been placed to:   IP CONSULT TO CASE MANAGEMENT  IP CONSULT TO CARDIOLOGY  IP CONSULT TO PHARMACY  IP CONSULT TO NUTRITION SERVICES  IP CONSULT TO NEPHROLOGY  INPATIENT CONSULT TO IR  IP CONSULT TO INFECTIOUS DISEASES  INPATIENT CONSULT TO IR  IP CONSULT TO GERONTOLOGY  IP CONSULT TO NEUROLOGY    Vitals:    09/09/21 0600 09/09/21 0745 09/09/21 0758 09/09/21 1040   BP:  121/61 122/60 123/60   Pulse:  79 77 74   Resp:  16 16    Temp:  (!) 97 4 °F (36 3 °C) 98 1 °F (36 7 °C)    TempSrc:       SpO2:  92% 92% 90%   Weight: 72 8 kg (160 lb 7 9 oz)      Height:           Most recent labs:    Recent Labs     09/07/21  0435 09/09/21  0445   WBC 17 92*  --    HGB 9 1*  --    HCT 32 1*  --    *  --    K 3 2* 4 5   CALCIUM 8 8 7 9*   BUN 63* 32*   CREATININE 1 55* 1 28       Scheduled Meds:  Current Facility-Administered Medications   Medication Dose Route Frequency Provider Last Rate    Acetaminophen  650 mg Per NG Tube Q6H Dearl MD Aurora      ALPRAZolam  0 25 mg Oral HS PRN ANATOLY Martell      amiodarone  200 mg Oral Daily With Breakfast Abdirahman Ayala PA-C      apixaban  2 5 mg Oral BID ANATOLY Small      gabapentin  100 mg Oral HS Dearl MD Aurora      hydrALAZINE  10 mg Intravenous Q6H PRN ANATOLY Stafford      hydrALAZINE  25 mg Oral [facility] [address] ANATOLY Almodovar      isosorbide dinitrate  10 mg Oral TID after meals ANATOLY Small      lidocaine  1 patch Topical Daily Aman Mcclure MD      melatonin  3 mg Oral HS Nehemias Palumbo MD Mary      metoprolol tartrate  12 5 mg Oral Q12H [address] [place] Ord, ANATOLY      omeprazole (PRILOSEC) suspension 2 mg/mL  20 mg Oral Daily Ean ANATOLY Espinosa      pravastatin  40 mg Oral Daily With [employer], CRLORENA      saccharomyces boulardii  250 mg Oral BID Natalia Jaramillo MD      senna-docusate sodium  2 tablet Oral BID PRN Natalia Jaramillo MD      sertraline  50 mg Oral Daily Jesus MD Kyrie       Continuous Infusions:   PRN Meds:  ALPRAZolam    hydrALAZINE    senna-docusate sodium    Surgical procedures (if appropriate):

## 2021-09-09 NOTE — ASSESSMENT & PLAN NOTE
Due to free water deficit and dehydration   Resolved    Recent Labs     09/07/21  0435 09/08/21  0449 09/08/21  1722 09/09/21  0445   SODIUM 153* 145 138 136

## 2021-09-09 NOTE — QUICK NOTE
Patient tachypneic, and developed shortness of breath  Clinically seems volume overloaded at this time   One time lasix 40mg IV given  ekg xray pending

## 2021-09-09 NOTE — ASSESSMENT & PLAN NOTE
Wt Readings from Last 3 Encounters:   09/09/21 72 8 kg (160 lb 7 9 oz)   07/26/21 83 5 kg (184 lb 2 oz)   03/19/21 82 8 kg (182 lb 9 6 oz)     Presented with acute on chronic diastolic heart failure exacerbation  Euvolemic  - Appreciate renal and cardiology recommendations

## 2021-09-09 NOTE — ASSESSMENT & PLAN NOTE
Patient with moderate aortic stenosis  - Appreciate cardiology evaluation and recommendations  Outpatient cardiology follow-up

## 2021-09-09 NOTE — ASSESSMENT & PLAN NOTE
78year old male presenting with acute respiratory failure with hypoxia  Possibly due to vascular congestion / multifocal pneumonia  Required ICU level of care / intubation  Completed antibiotic therapy  S/p extubation and now down to 1L NC   - Cleared by renal and cardiology for discharge  Possibly to rehab evan

## 2021-09-09 NOTE — PROGRESS NOTES
Progress Note - Cardiology   Fortunato Jin 78 y o  male MRN: [id number]  Unit/Bed#: [place] [address] -01 Encounter: [id number]      Assessment/Recommendations/Discussion:   1  Hypoxic respiratory failure  2  Multifocal pneumonia  3  Acute diastolic heart failure  4  Moderate aortic stenosis  5  Acute renal failure, resolved  6  Paroxysmal atrial fibrillation and atrial flutter, on amiodarone, Eliquis  7  Non MI troponin elevation  8  Probable subclinical CAD    PLAN   Had long discussion with him and his wife today   Would continue with medical mgmt of presumed CAD and diastolic CHF, along with aortic stenosis   Change to amiodarone 200mg daily today  · Eliquis 2 5 b i d   · Isosorbide dinitrate 10 t i d , hydralazine 25 t i d   · Metoprolol tartrate 12 5 b i d   · Pravastatin 40 mg daily  · Would repeat an echo as outpatient in about 6 months to re-eval valve and LV dimensions  · Still appears euvolemic, hold diuretics  Renal recovery noted  · WBC improving  · Continue with med mgmt  · Will definitely need rehab on discharge  Cardiology will follow him peripherally while he is still here    Subjective:   HPI  Less confused today, denies significant SOB, feels likel he is improving, denies chest pain       Review of Systems: As noted in HPI  Rest of ROS is negative  Vitals:   /60   Pulse 74   Temp 98 1 °F (36 7 °C)   Resp 16   Ht 5' 4" (1 626 m)   Wt 72 8 kg (160 lb 7 9 oz)   SpO2 90%   BMI 27 55 kg/m²   Vitals:    09/08/21 0600 09/09/21 0600   Weight: 73 kg (160 lb 15 oz) 72 8 kg (160 lb 7 9 oz)       Intake/Output Summary (Last 24 hours) at 9/9/2021 1355  Last data filed at 9/9/2021 1032  Gross per 24 hour   Intake 3030 ml   Output 202 ml   Net 2828 ml       Physical Exam   Constitutional: awake, alert and oriented, in no acute distress, no obvious deformities  Head: Normocephalic, without obvious abnormality, atraumatic  Eyes: conjunctivae clear and moist  Sclera anicteric  No xanthelasmas  Pupils equal bilaterally  Extraocular motions are full  Ear nose mouth and throat: ears are symmetrical bilaterally, hearing appears to be equal bilaterally, no nasal discharge or epistaxis, oropharynx is clear with moist mucous membranes  Neck:  Trachea is midline, neck is supple, no thyromegaly or significant lymphadenopathy, there is full range of motion  Lungs: decreased breath sounds bilaterally with rhonchi  Heart: regular rate and rhythm, S1, S2 normal, 3/6 systolic murmur RUSB, no click, rub or gallop, no lower extremity edema  Abdomen: soft, non-tender; bowel sounds normal; no masses,  no organomegaly  Psychiatric:  Patient is oriented to time, place, person, mood/affect is negative for depression, anxiety, agitation, appears to have appropriate insight  Skin: Skin is warm, dry, intact  No obvious rashes or lesions on exposed extremities  Nail beds are pink with no cyanosis or clubbing      Lab Results:  Results from last 7 days   Lab Units 09/07/21  0435   WBC Thousand/uL 17 92*   HEMOGLOBIN g/dL 9 1*   HEMATOCRIT % 32 1*   PLATELETS Thousands/uL 474*     Results from last 7 days   Lab Units 09/09/21  0445 09/03/21  0456   POTASSIUM mmol/L 4 5 3 1*   CHLORIDE mmol/L 104 103   CO2 mmol/L 20* 25   BUN mg/dL 32* 180*   CREATININE mg/dL 1 28 3 16*   CALCIUM mg/dL 7 9* 9 5   ALK PHOS U/L  --  85   ALT U/L  --  55   AST U/L  --  38     Results from last 7 days   Lab Units 09/09/21  0445   POTASSIUM mmol/L 4 5   CHLORIDE mmol/L 104   CO2 mmol/L 20*   BUN mg/dL 32*   CREATININE mg/dL 1 28   CALCIUM mg/dL 7 9*           Medications:    Current Facility-Administered Medications:     Acetaminophen (TYLENOL) oral suspension 650 mg, 650 mg, Per NG Tube, Q6H, Mikhail An MD, 650 mg at 09/09/21 1031    ALPRAZolam (XANAX) tablet 0 25 mg, 0 25 mg, Oral, HS PRN, ANATOLY Duarte, 0 25 mg at 09/06/21 2216    amiodarone tablet 200 mg, 200 mg, Oral, Daily With Breakfast, Sussy Cao PA-C, 200 mg at 09/09/21 3453    apixaban (ELIQUIS) tablet 2 5 mg, 2 5 mg, Oral, BID, ANATOLY Small, 2 5 mg at 09/09/21 0800    gabapentin (NEURONTIN) capsule 100 mg, 100 mg, Oral, HS, Patricio Gallegos MD, 100 mg at 09/08/21 2107    hydrALAZINE (APRESOLINE) injection 10 mg, 10 mg, Intravenous, Q6H PRN, ANATOLY Zapata, 10 mg at 08/25/21 2025    hydrALAZINE (APRESOLINE) tablet 25 mg, 25 mg, Oral, Q8H [facility], Ean Johnsonday, CRNP, 25 mg at 09/06/21 0542    isosorbide dinitrate (ISORDIL) tablet 10 mg, 10 mg, Oral, TID after meals, ANATOLY Small, 10 mg at 09/06/21 1034    lidocaine (LIDODERM) 5 % patch 1 patch, 1 patch, Topical, Daily, Anaya Beasley MD, 1 patch at 09/08/21 0801    melatonin tablet 3 mg, 3 mg, Oral, HS, Patricio Gallegos MD, 3 mg at 09/08/21 2107    metoprolol tartrate (LOPRESSOR) partial tablet 12 5 mg, 12 5 mg, Oral, Q12H [facility], ANATOLY Small, 12 5 mg at 09/09/21 0800    omeprazole (PRILOSEC) suspension 2 mg/mL, 20 mg, Oral, Daily, ANATOLY Small, 20 mg at 09/09/21 0800    pravastatin (PRAVACHOL) tablet 40 mg, 40 mg, Oral, Daily With Samantha ANATOLY Serrano, 40 mg at 09/08/21 1704    saccharomyces boulardii (FLORASTOR) capsule 250 mg, 250 mg, Oral, BID, Anaya Beasley MD, 250 mg at 09/09/21 0800    senna-docusate sodium (SENOKOT S) 8 6-50 mg per tablet 2 tablet, 2 tablet, Oral, BID PRN, Anaya Beasley MD    sertraline (ZOLOFT) tablet 50 mg, 50 mg, Oral, Daily, Patricio Gallegos MD, 50 mg at 09/09/21 0800    This note was completed in part utilizing M-Modal Flu[employer] Direct Software  Grammatical errors, random word insertions, spelling mistakes, and incomplete sentences may be an occasional consequence of this system secondary to software limitations, ambient noise, and hardware issues  If you have any questions or concerns about the content, text, or information contained within the body of this dictation, please contact the provider for clarification      Colletta Skipper, DO, [facility]  9/9/2021 1:55 PM

## 2021-09-09 NOTE — ASSESSMENT & PLAN NOTE
New onset atrial fibrillation with RVR   Currently on NSR   - Continue Eliquis 2 5mg BID  - Continue amiodarone daily

## 2021-09-09 NOTE — PROGRESS NOTES
Follow up Consultation    Nephrology   Ronny Richardson 78 y o  male MRN: [id number]  Unit/Bed#: [address] 216-01 Encounter: [id number]      Physician Requesting Consult: Safia Monroe MD        ASSESSMENT/PLAN:     · Acute kidney injury :  - CANDIE most likely secondary to ischemic injury due to relative hypotension in light of underlying comorbidities plus cardiorenal syndrome plus some component of prerenal azotemia due to over-diuresis  - After review of records [facility] as well as Care everywhere it appears that the patient has a baseline Creatinine of 0 7-0 9 mg/dL  - patient was admitted with a creatinine of 0 97 mg/dL on 08/19/2021   - peak creatinine thus far during this hospitalization at 3 84 mg/dL on 09/01/2021   - patient's creatinine today is at 1 28 mg/dL, stable and continues to improve towards baseline   - no further indication for dialysis  - patient underwent dialysis treatment on 09/01/2021 however 50 minutes into treatment patient had issues with hypotension and treatment was discontinued  - right IJ temporary dialysis catheter removed on 09/07/2021  -DC IV fluids for now  - hold diuretics  - check BMP in a m  If patient still in the hospital  - no acute indication for dialysis at this time continue monitor daily for dialysis needs  - check BMP, magnesium, phosphorus in a m   - CT chest abdomen pelvis from 09/01/2021 showing no hydronephrosis  Extensive ground-glass opacities compatible with nonspecific infectious process     - Await renal recovery  - Optimize hemodynamic status to avoid delay in renal recovery    - Place on a renal diet when allowed diet order    - Avoid nephrotoxins, adjust meds to appropriate GFR   - Strict I/O   - Daily weights  - Urinary retention protocol if patient does not have a [place]  - will need to set up patient for follow up with Nephrology as an outpatient post hospitalization   - as an outpatient for nephrology patient follows up with no nephrologist  - recommend patient have follow-up outpatient with blood work 2 weeks post hospital discharge      · Blood pressure/hypertension:  - current medications:  Hydralazine 25 mg p o  Q 8, Isordil 10 mg p o  T i d  metoprolol 12 5 mg p o  B i d   - recommendations:  No changes for now  - Optimize hemodynamics   - Maintain MAP > 65mmHg  - Avoid BP fluctuations      · H/H/anemia:  - most recent hemoglobin at 9 1 grams/deciliter  - maintain hemoglobin greater than 8 grams/deciliter     · Acid-base electrolytes:  ? Hypernatremia:    § Most recent sodium at 136 mEq  ? DC IV fluids for now  ? Encourage free water intake    ? Hypokalemia:  § Most recent potassium 4 5 mEq  § Resolved        ? Significant azotemia:  § Most recent BUN of 32 improving  § Hold further IV fluids for now  ? Acid-base:    § Most recent bicarb at 20  Stable continue to monitor     · Volume status:  ?  Clinically appears to be euvolemic  Hold diuretics      · Proteinuria:   ? Most recent UA with no protein as of 09/01/2021 or blood     · Other medical problems:  ? Leukocytosis:  Management per primary team   Likely secondary to multifocal pneumonia  COVID negative  ? Acute on chronic CHF:  Management per primary team   Hold diuretics for now patient also has history of moderate aortic stenosis EF of 52%  Previously was on Lasix drip    hold diuretics for now for repeat echo per Cardiology  Follow-up with cardiology  ? Altered mental status:  Management primary team     Thanks for the consult  Will sign off  Please call with questions/ concerns  Above-mentioned orders and Plan in terms of acute kidney injury was discussed with the team in [address] MD, FASN, 9/9/2021, 1:07 PM              Objective :   Patient seen and examined in his room no overnight events hemodynamically stable urine output 200 cc plus    Remains pleasantly confused has no complaints      PHYSICAL EXAM  /60   Pulse 74   Temp 98 1 °F (36 7 °C)   Resp 16   Ht 5' 4" (1 626 m)   Wt 72 8 kg (160 lb 7 9 oz)   SpO2 90%   BMI 27 55 kg/m²   Temp (24hrs), Av 9 °F (36 6 °C), Min:97 4 °F (36 3 °C), Max:98 1 °F (36 7 °C)        Intake/Output Summary (Last 24 hours) at 2021 1307  Last data filed at 2021 1032  Gross per 24 hour   Intake 3030 ml   Output 202 ml   Net 2828 ml       I/O last 24 hours: In: 4035 [P O :360; I V :4030]  Out: 202 [Urine:200; Stool:2]      Current Weight: Weight - Scale: 72 8 kg (160 lb 7 9 oz)  First Weight: Weight - Scale: 85 4 kg (188 lb 4 4 oz)  Physical Exam  Vitals and nursing note reviewed  Constitutional:       General: He is not in acute distress  Appearance: Normal appearance  He is normal weight  He is not ill-appearing, toxic-appearing or diaphoretic  HENT:      Head: Normocephalic and atraumatic  Mouth/Throat:      Mouth: Mucous membranes are moist       Pharynx: Oropharynx is clear  No oropharyngeal exudate  Eyes:      General: No scleral icterus  Conjunctiva/sclera: Conjunctivae normal    Cardiovascular:      Rate and Rhythm: Normal rate  Heart sounds: Normal heart sounds  No friction rub  Pulmonary:      Effort: Pulmonary effort is normal  No respiratory distress  Breath sounds: Normal breath sounds  No wheezing  Abdominal:      General: There is no distension  Palpations: Abdomen is soft  There is no mass  Tenderness: There is no abdominal tenderness  Musculoskeletal:         General: No swelling  Cervical back: Normal range of motion and neck supple  Skin:     General: Skin is warm  Coloration: Skin is not jaundiced  Neurological:      General: No focal deficit present  Mental Status: He is alert  Psychiatric:         Mood and Affect: Mood normal          Behavior: Behavior normal              Review of Systems   Constitutional: Negative for fatigue and fever  HENT: Negative for congestion  Respiratory: Negative for cough and shortness of breath  Gastrointestinal: Negative for abdominal pain, constipation, diarrhea, nausea and vomiting  Musculoskeletal: Negative for back pain  Skin: Negative for rash  Neurological: Negative for dizziness and headaches  Psychiatric/Behavioral: Negative for confusion  All other systems reviewed and are negative  Scheduled Meds:  Current Facility-Administered Medications   Medication Dose Route Frequency Provider Last Rate    Acetaminophen  650 mg Per NG Tube Q6H Lemond Apgar, MD      ALPRAZolam  0 25 mg Oral HS PRN Fransisco Lieberman, ANATOLY      amiodarone  200 mg Oral Daily With Breakfast Tay Chavez PA-C      apixaban  2 5 mg Oral BID , CRNP      dextrose 5 % with KCl 20 mEq/L  150 mL/hr Intravenous Continuous Juleen Siemens,  mL/hr (21 1033)    gabapentin  100 mg Oral HS Lemond Apgar, MD      hydrALAZINE  10 mg Intravenous Q6H PRN Sebastián Perry, ANATOLY      hydrALAZINE  25 mg Oral [facility] Ean , CRNP      isosorbide dinitrate  10 mg Oral TID after meals , CRNP      lidocaine  1 patch Topical Daily Diaz Alexandre MD      melatonin  3 mg Oral HS Lemond Apgar, MD      metoprolol tartrate  12 5 mg Oral Q12H [address] Ean , CRNP      omeprazole (PRILOSEC) suspension 2 mg/mL  20 mg Oral Daily , CRLORENA      pravastatin  40 mg Oral Daily With [facility] , CRNP      saccharomyces boulardii  250 mg Oral BID Diaz Alexandre MD      senna-docusate sodium  2 tablet Oral BID PRN Diaz Alexandre MD      sertraline  50 mg Oral Daily Lemond Apgar, MD         PRN Meds:  ALPRAZolam    hydrALAZINE    senna-docusate sodium    Continuous Infusions:dextrose 5 % with KCl 20 mEq/L, 150 mL/hr, Last Rate: 150 mL/hr (21 1033)          Invasive Devices: Invasive Devices     Peripheral Intravenous Line            Peripheral IV 21 Dorsal (posterior); Right Hand <1 day                  LABORATORY:    Results from last 7 days   Lab Units 09/09/21  0445 09/08/21  1722 09/08/21  0449 09/07/21  0435 09/06/21  0923 09/06/21  0541 09/05/21  0647 09/04/21  0445 09/03/21  0456   WBC Thousand/uL  --   --   --  17 92*  --   --   --  21 00* 21 32*   HEMOGLOBIN g/dL  --   --   --  9 1*  --   --   --  10 2* 9 8*   HEMATOCRIT %  --   --   --  32 1*  --   --   --  36 1* 34 0*   PLATELETS Thousands/uL  --   --   --  474*  --   --   --  413* 426*   POTASSIUM mmol/L 4 5 4 8 4 0 3 2* 3 7  --  3 5 3 5 3 1*   CHLORIDE mmol/L 104 107 114* 115* 112*  --  112* 108 103   CO2 mmol/L 20* 21 22 25 26  --  26 23 25   BUN mg/dL 32* 37* 44* 63* 83*  --  114* 143* 180*   CREATININE mg/dL 1 28 1 25 1 32* 1 55* 1 76*  --  2 09* 2 46* 3 16*   CALCIUM mg/dL 7 9* 8 1* 8 9 8 8 9 3  --  8 9 9 5 9 5   MAGNESIUM mg/dL  --   --   --  2 4  --   --   --  3 0* 3 1*   PHOSPHORUS mg/dL  --   --   --   --   --  4 0  --  5 2*  --       rest all reviewed    RADIOLOGY:  CT head wo contrast   Final Result by Crystal Rutledge MD (09/07 1413)      No acute intracranial abnormality  Workstation performed: [id number]         CT chest abdomen pelvis wo contrast   Final Result by Miguel Hopson MD (09/01 2022)      Limited study due to motion artifact  Extensive groundglass opacities throughout both lungs, compatible with nonspecific infectious process, including COVID 19 pneumonia  Fluid-filled small bowel and colon which may represent nonspecific gastroenteritis  Colonic diverticulosis without diverticulitis  Workstation performed: [id number]         IR temporary dialysis catheter placement   Final Result by Tenzin Chowdary MD (09/02 1521)   Impression: Image guided placement of nontunneled central venous dialysis catheter  Workstation performed: [id number]         XR chest portable   Final Result by Michelle Daily, DO (09/01 1424)      Stable patchy bilateral airspace disease                    Workstation performed: [id number]         VAS NICK & waveform analysis, multiple levels   Final Result by Shayne eH MD (08/30 2108)      XR chest portable ICU   Final Result by Alpesh Dupree MD (08/30 9798)      Stable patchy bilateral airspace disease question related to pulmonary edema or infiltrate  Workstation performed: [id number]         XR chest portable ICU   Final Result by Marietta Smith DO (08/27 1555)      No significant change in bilateral airspace opacities  Stable life-support tubes  Workstation performed: [id number]         XR chest portable ICU   Final Result by Moody Alexis MD (08/26 1022)      Increasing right peripheral consolidation with improving aeration at the lung bases  Workstation performed: [id number]         XR chest portable   Final Result by Delaney Vasquez MD (08/25 1511)   Persistent bilateral infiltrates suspicious for multifocal pneumonia      Typical endotracheal tube and enteric tube      Increased gastric distention               Workstation performed: [id number]         XR chest portable ICU   Final Result by Petros Pierre MD (08/24 8305)      Slight improvement in bilateral parenchymal infiltrative changes  ET tube now at the brent  Workstation performed: [id number]         XR chest portable ICU   Final Result by Petros Pierre MD (08/22 1509)   Worsening bilateral parenchymal changes  Endotracheal tube in the right main bronchus to be pulled back  If this has been pulled back a repeat x-ray for confirmation as indicated clinically  The study was marked in [facility] for immediate notification  Workstation performed: [id number]         XR chest portable ICU   Final Result by Aurelia Amaya MD (08/22 1059)      Persistent pulmonary edema                    Workstation performed: [id number]         XR chest 1 view portable   ED Interpretation by Beatrice Mendez MD (08/19 2123)   Bilateral diffuse infiltrates Final Result by Bobby Wood MD (08/20 2687)      Diffuse patchy airspace disease bilaterally  Consider CHF versus multifocal infiltrate  Findings concur with the preliminary ER interpretation  Workstation performed: [id number]           Rest all reviewed    Portions of the record may have been created with voice recognition software  Occasional wrong word or "sound a like" substitutions may have occurred due to the inherent limitations of voice recognition software  Read the chart carefully and recognize, using context, where substitutions have occurred  If you have any questions, please contact the dictating provider

## 2021-09-09 NOTE — PROGRESS NOTES
[facility]  Progress Note Yaquelin Jamison 1940, 78 y o  male MRN: [id number]  Unit/Bed#: [address] 216-01 Encounter: [id number]  Primary Care Provider: Samira Amanda DO   Date and time admitted to hospital: 8/19/2021  8:25 PM    * Acute respiratory failure with hypoxia [facility])  Assessment & Plan  78year old male presenting with acute respiratory failure with hypoxia  Possibly due to vascular congestion / multifocal pneumonia  Required ICU level of care / intubation  Completed antibiotic therapy  S/p extubation and now down to 1L NC   - Cleared by renal and cardiology for discharge  Possibly to rehab evan  Acute on chronic diastolic CHF (congestive heart failure) (HCC)  Assessment & Plan  Wt Readings from Last 3 Encounters:   09/09/21 72 8 kg (160 lb 7 9 oz)   07/26/21 83 5 kg (184 lb 2 oz)   03/19/21 82 8 kg (182 lb 9 6 oz)     Presented with acute on chronic diastolic heart failure exacerbation  Euvolemic  - Appreciate renal and cardiology recommendations  Hypernatremia  Assessment & Plan  Due to free water deficit and dehydration  Resolved    Recent Labs     09/07/21  0435 09/08/21  0449 09/08/21  1722 09/09/21  0445   SODIUM 153* 145 138 136             Atrial fibrillation ([facility])  Assessment & Plan  New onset atrial fibrillation with RVR  Currently on NSR   - Continue Eliquis 2 5mg BID  - Continue amiodarone daily    CANDIE (acute kidney injury) ([facility] [address] )  Assessment & Plan  Acute kidney injury requiring hemodialysis  Resolved  HD catheter line discontinued  Recent Labs     09/08/21  0449 09/08/21  1722 09/09/21  0445   BUN 44* 37* 32*   CREATININE 1 32* 1 25 1 28   EGFR 50 53 51               Multifocal pneumonia  Assessment & Plan  Completed antibiotic therapy  Was restarted on antibiotics due to concerns for aspiration pneumonia  As per previous providers discussion with ID, likely due to aspiration pneumonitis    - Continue monitoring off antibiotics  - Follow-up cultures  - Aspiration precautions  - Dysphagia diet    Mild cognitive impairment  Assessment & Plan  Delirium precautions  Appreciate geriatric recommendations  Aortic stenosis, moderate  Assessment & Plan  Patient with moderate aortic stenosis  - Appreciate cardiology evaluation and recommendations  Outpatient cardiology follow-up  Essential hypertension  Assessment & Plan  Controlled  - Continue hydralazine 25mg TID, isosorbide 10mg tid, and metoprolol 12 5mg Q12  VTE Pharmacologic Prophylaxis:   Pharmacologic: Apixaban (Eliquis)  Mechanical VTE Prophylaxis in Place: Yes    Patient Centered Rounds: I have performed bedside rounds with nursing staff today  Discussions with Specialists or Other Care Team Provider: nursing    Education and Discussions with Family / Patient: patient    Time Spent for Care: 30 minutes  More than 50% of total time spent on counseling and coordination of care as described above  Current Length of Stay: 21 day(s)    Current Patient Status: Inpatient   Certification Statement: The patient will continue to require additional inpatient hospital stay due to placement    Discharge Plan: placement 24 hours    Code Status: Level 1 - Full Code      Subjective:   Patient seen and examined at bedside  Comfortable  No acute issues ov    Objective:     Vitals:   Temp (24hrs), Av 6 °F (36 4 °C), Min:97 3 °F (36 3 °C), Max:98 1 °F (36 7 °C)    Temp:  [97 3 °F (36 3 °C)-98 1 °F (36 7 °C)] 97 3 °F (36 3 °C)  HR:  [74-83] 77  Resp:  [16-17] 16  BP: (113-123)/(60-62) 123/60  SpO2:  [90 %-93 %] 91 %  Body mass index is 27 55 kg/m²  Input and Output Summary (last 24 hours): Intake/Output Summary (Last 24 hours) at 2021 1518  Last data filed at 2021 1449  Gross per 24 hour   Intake 3670 ml   Output 201 ml   Net 3469 ml       Physical Exam:     Physical Exam  Vitals reviewed  HENT:      Head: Normocephalic        Nose: Nose normal       Mouth/Throat:      Mouth: Mucous membranes are moist    Eyes:      General: No scleral icterus  Cardiovascular:      Rate and Rhythm: Normal rate  Pulmonary:      Effort: Pulmonary effort is normal  No respiratory distress  Abdominal:      Palpations: Abdomen is soft  Tenderness: There is no abdominal tenderness  Skin:     General: Skin is warm  Neurological:      Mental Status: He is alert  Mental status is at baseline  Psychiatric:         Mood and Affect: Mood normal          Behavior: Behavior normal        Additional Data:     Labs:    Results from last 7 days   Lab Units 09/07/21  0435 09/04/21  0445   WBC Thousand/uL 17 92* 21 00*   HEMOGLOBIN g/dL 9 1* 10 2*   HEMATOCRIT % 32 1* 36 1*   PLATELETS Thousands/uL 474* 413*   BANDS PCT %  --  1   NEUTROS PCT % 73  --    LYMPHS PCT % 10*  --    LYMPHO PCT %  --  7*   MONOS PCT % 10  --    MONO PCT %  --  6   EOS PCT % 4 0     Results from last 7 days   Lab Units 09/09/21  0445 09/03/21  0456   SODIUM mmol/L 136 145   POTASSIUM mmol/L 4 5 3 1*   CHLORIDE mmol/L 104 103   CO2 mmol/L 20* 25   BUN mg/dL 32* 180*   CREATININE mg/dL 1 28 3 16*   ANION GAP mmol/L 12 17*   CALCIUM mg/dL 7 9* 9 5   ALBUMIN g/dL  --  3 3*   TOTAL BILIRUBIN mg/dL  --  0 54   ALK PHOS U/L  --  85   ALT U/L  --  55   AST U/L  --  38   GLUCOSE RANDOM mg/dL 120 116                 Results from last 7 days   Lab Units 09/08/21  0449 09/07/21  0435   PROCALCITONIN ng/ml <0 05 <0 05           * I Have Reviewed All Lab Data Listed Above  * Additional Pertinent Lab Tests Reviewed:  [address] [facility] Admission Reviewed    Imaging:    Imaging Reports Reviewed Today Include: no new imaging    Recent Cultures (last 7 days):           Last 24 Hours Medication List:   Current Facility-Administered Medications   Medication Dose Route Frequency Provider Last Rate    Acetaminophen  650 mg Per NG Tube Q6H Shreya Leonard MD      ALPRAZolam  0 25 mg Oral HS PRN ANATOLY Wilson      amiodarone  200 mg Oral Daily With Breakfast Neyda Hu PA-C      apixaban  2 5 mg Oral BID Mono Davidson, ANATOLY      gabapentin  100 mg Oral HS Debra Gutiérrez MD      hydrALAZINE  10 mg Intravenous Q6H PRN ANATOLY Arce      hydrALAZINE  25 mg Oral [facility] Mono Lety, [address]      isosorbide dinitrate  10 mg Oral TID after meals ANATOLY Arce      lidocaine  1 patch Topical Daily Vanita Alcala MD      melatonin  3 mg Oral HS Debra Gutiérrez MD      metoprolol tartrate  12 5 mg Oral Q12H [address] Charles Sonday, ANATOLY      omeprazole (PRILOSEC) suspension 2 mg/mL  20 mg Oral Daily Charles Sonday, ANATOLY      pravastatin  40 mg Oral Daily With [facility] Sonday, ANATOLY      saccharomyces boulardii  250 mg Oral BID Vanita Alcala MD      senna-docusate sodium  2 tablet Oral BID PRN Vnaita Alcala MD      sertraline  50 mg Oral Daily Debra Gutiérrez MD          Today, Patient Was Seen By: Shayla Orozco MD    ** Please Note: Dictation voice to text software may have been used in the creation of this document   **

## 2021-09-10 ENCOUNTER — APPOINTMENT (INPATIENT)
Dept: CT IMAGING | Facility: HOSPITAL | Age: 81
DRG: 870 | End: 2021-09-10
Payer: MEDICARE

## 2021-09-10 LAB
ANION GAP SERPL CALCULATED.3IONS-SCNC: 12 MMOL/L (ref 4–13)
ATRIAL RATE: 87 BPM
ATRIAL RATE: 88 BPM
ATRIAL RATE: 88 BPM
BUN SERPL-MCNC: 25 MG/DL (ref 5–25)
CALCIUM SERPL-MCNC: 8.3 MG/DL (ref 8.3–10.1)
CHLORIDE SERPL-SCNC: 102 MMOL/L (ref 100–108)
CO2 SERPL-SCNC: 22 MMOL/L (ref 21–32)
CREAT SERPL-MCNC: 1.31 MG/DL (ref 0.6–1.3)
GFR SERPL CREATININE-BSD FRML MDRD: 50 ML/MIN/1.73SQ M
GLUCOSE SERPL-MCNC: 103 MG/DL (ref 65–140)
MAGNESIUM SERPL-MCNC: 1.7 MG/DL (ref 1.6–2.6)
P AXIS: 24 DEGREES
P AXIS: 24 DEGREES
P AXIS: 25 DEGREES
POTASSIUM SERPL-SCNC: 4.6 MMOL/L (ref 3.5–5.3)
PR INTERVAL: 186 MS
PR INTERVAL: 188 MS
PR INTERVAL: 188 MS
QRS AXIS: -36 DEGREES
QRS AXIS: -37 DEGREES
QRS AXIS: -38 DEGREES
QRSD INTERVAL: 112 MS
QRSD INTERVAL: 114 MS
QRSD INTERVAL: 114 MS
QT INTERVAL: 406 MS
QT INTERVAL: 410 MS
QT INTERVAL: 420 MS
QTC INTERVAL: 491 MS
QTC INTERVAL: 493 MS
QTC INTERVAL: 508 MS
SARS-COV-2 RNA RESP QL NAA+PROBE: NEGATIVE
SODIUM SERPL-SCNC: 136 MMOL/L (ref 136–145)
T WAVE AXIS: -3 DEGREES
T WAVE AXIS: 10 DEGREES
T WAVE AXIS: 18 DEGREES
VENTRICULAR RATE: 87 BPM
VENTRICULAR RATE: 88 BPM
VENTRICULAR RATE: 88 BPM

## 2021-09-10 PROCEDURE — G1004 CDSM NDSC: HCPCS

## 2021-09-10 PROCEDURE — 97535 SELF CARE MNGMENT TRAINING: CPT

## 2021-09-10 PROCEDURE — U0003 INFECTIOUS AGENT DETECTION BY NUCLEIC ACID (DNA OR RNA); SEVERE ACUTE RESPIRATORY SYNDROME CORONAVIRUS 2 (SARS-COV-2) (CORONAVIRUS DISEASE [COVID-19]), AMPLIFIED PROBE TECHNIQUE, MAKING USE OF HIGH THROUGHPUT TECHNOLOGIES AS DESCRIBED BY CMS-2020-01-R: HCPCS | Performed by: STUDENT IN AN ORGANIZED HEALTH CARE EDUCATION/TRAINING PROGRAM

## 2021-09-10 PROCEDURE — 97110 THERAPEUTIC EXERCISES: CPT

## 2021-09-10 PROCEDURE — 93010 ELECTROCARDIOGRAM REPORT: CPT | Performed by: INTERNAL MEDICINE

## 2021-09-10 PROCEDURE — 97530 THERAPEUTIC ACTIVITIES: CPT

## 2021-09-10 PROCEDURE — U0005 INFEC AGEN DETEC AMPLI PROBE: HCPCS | Performed by: STUDENT IN AN ORGANIZED HEALTH CARE EDUCATION/TRAINING PROGRAM

## 2021-09-10 PROCEDURE — 71250 CT THORAX DX C-: CPT

## 2021-09-10 PROCEDURE — 80048 BASIC METABOLIC PNL TOTAL CA: CPT | Performed by: STUDENT IN AN ORGANIZED HEALTH CARE EDUCATION/TRAINING PROGRAM

## 2021-09-10 PROCEDURE — 99232 SBSQ HOSP IP/OBS MODERATE 35: CPT | Performed by: STUDENT IN AN ORGANIZED HEALTH CARE EDUCATION/TRAINING PROGRAM

## 2021-09-10 PROCEDURE — 83735 ASSAY OF MAGNESIUM: CPT | Performed by: STUDENT IN AN ORGANIZED HEALTH CARE EDUCATION/TRAINING PROGRAM

## 2021-09-10 RX ORDER — FUROSEMIDE 10 MG/ML
40 INJECTION INTRAMUSCULAR; INTRAVENOUS ONCE
Status: COMPLETED | OUTPATIENT
Start: 2021-09-10 | End: 2021-09-10

## 2021-09-10 RX ADMIN — Medication 20 MG: at 10:58

## 2021-09-10 RX ADMIN — HYDRALAZINE HYDROCHLORIDE 25 MG: 25 TABLET, FILM COATED ORAL at 04:41

## 2021-09-10 RX ADMIN — AMIODARONE HYDROCHLORIDE 200 MG: 200 TABLET ORAL at 08:55

## 2021-09-10 RX ADMIN — SERTRALINE HYDROCHLORIDE 50 MG: 50 TABLET ORAL at 08:55

## 2021-09-10 RX ADMIN — Medication 12.5 MG: at 08:55

## 2021-09-10 RX ADMIN — ACETAMINOPHEN 650 MG: 650 SUSPENSION ORAL at 10:56

## 2021-09-10 RX ADMIN — MELATONIN 3 MG: at 21:59

## 2021-09-10 RX ADMIN — Medication 250 MG: at 08:55

## 2021-09-10 RX ADMIN — PRAVASTATIN SODIUM 40 MG: 40 TABLET ORAL at 17:43

## 2021-09-10 RX ADMIN — ALPRAZOLAM 0.25 MG: 0.25 TABLET ORAL at 03:05

## 2021-09-10 RX ADMIN — GABAPENTIN 100 MG: 100 CAPSULE ORAL at 21:59

## 2021-09-10 RX ADMIN — ACETAMINOPHEN 650 MG: 650 SUSPENSION ORAL at 21:59

## 2021-09-10 RX ADMIN — FUROSEMIDE 40 MG: 10 INJECTION, SOLUTION INTRAVENOUS at 15:30

## 2021-09-10 RX ADMIN — APIXABAN 2.5 MG: 2.5 TABLET, FILM COATED ORAL at 17:43

## 2021-09-10 RX ADMIN — Medication 250 MG: at 17:43

## 2021-09-10 RX ADMIN — ALPRAZOLAM 0.25 MG: 0.25 TABLET ORAL at 21:59

## 2021-09-10 RX ADMIN — HYDRALAZINE HYDROCHLORIDE 25 MG: 25 TABLET, FILM COATED ORAL at 21:58

## 2021-09-10 RX ADMIN — ACETAMINOPHEN 650 MG: 650 SUSPENSION ORAL at 17:43

## 2021-09-10 RX ADMIN — ISOSORBIDE DINITRATE 10 MG: 10 TABLET ORAL at 08:58

## 2021-09-10 RX ADMIN — Medication 12.5 MG: at 21:59

## 2021-09-10 RX ADMIN — APIXABAN 2.5 MG: 2.5 TABLET, FILM COATED ORAL at 08:55

## 2021-09-10 RX ADMIN — ACETAMINOPHEN 650 MG: 650 SUSPENSION ORAL at 04:40

## 2021-09-10 NOTE — PROGRESS NOTES
[facility]  Progress Note Dai Rader 1940, 78 y o  male MRN: [id number]  Unit/Bed#: [address] [place] [address] 216-01 Encounter: [id number]  Primary Care Provider: Grabiel Sherman DO   Date and time admitted to hospital: 8/19/2021  8:25 PM    * Acute respiratory failure with hypoxia [facility])  Assessment & Plan  78year old male presenting with acute respiratory failure with hypoxia  Possibly due to vascular congestion / multifocal pneumonia  Required ICU level of care / intubation  Completed antibiotic therapy  S/p extubation and clinically improved until yesterday 9/9 where he decompensated  He now requires midflow nasal cannula  Received IV lasix yesterday  COVID-19 negative, no febrile episode overnight  - Cleared by renal and cardiology for discharge previously, but will have to keep due to worsening respiratory status   - CT chest    Acute on chronic diastolic CHF (congestive heart failure) (HCC)  Assessment & Plan  Wt Readings from Last 3 Encounters:   09/10/21 77 8 kg (171 lb 8 3 oz)   07/26/21 83 5 kg (184 lb 2 oz)   03/19/21 82 8 kg (182 lb 9 6 oz)     Presented with acute on chronic diastolic heart failure exacerbation  Seems clinically overloaded overnight  - Appreciate renal and cardiology recommendations  Hypernatremia  Assessment & Plan  Due to free water deficit and dehydration  Resolved    Recent Labs     09/08/21  0449 09/08/21  1722 09/09/21  0445 09/10/21  0450   SODIUM 145 138 136 136             Atrial fibrillation (HCC)  Assessment & Plan  New onset atrial fibrillation with RVR  Currently on NSR   - Continue Eliquis 2 5mg BID  - Continue amiodarone daily    CANDIE (acute kidney injury) ([facility] [address] )  Assessment & Plan  Acute kidney injury requiring hemodialysis  Resolved  HD catheter line discontinued       Recent Labs     09/08/21  1722 09/09/21  0445 09/10/21  0450   BUN 37* 32* 25   CREATININE 1 25 1 28 1 31*   EGFR 53 51 50               Multifocal pneumonia  Assessment & Plan  Completed antibiotic therapy  Was restarted on antibiotics due to concerns for aspiration pneumonia  As per previous providers discussion with ID, likely due to aspiration pneumonitis  - Continue monitoring off antibiotics  - Follow-up cultures  - Aspiration precautions  - Dysphagia diet    Mild cognitive impairment  Assessment & Plan  Delirium precautions  Appreciate geriatric recommendations  Aortic stenosis, moderate  Assessment & Plan  Patient with moderate aortic stenosis  - Appreciate cardiology evaluation and recommendations  Outpatient cardiology follow-up  Essential hypertension  Assessment & Plan  Controlled  - Continue hydralazine 25mg TID, isosorbide 10mg tid, and metoprolol 12 5mg Q12  VTE Pharmacologic Prophylaxis:   Pharmacologic: Apixaban (Eliquis)  Mechanical VTE Prophylaxis in Place: Yes    Patient Centered Rounds: I have performed bedside rounds with nursing staff today  Discussions with Specialists or Other Care Team Provider: nursing    Education and Discussions with Family / Patient: patient, daughter    Time Spent for Care: 45 minutes  More than 50% of total time spent on counseling and coordination of care as described above  Current Length of Stay: 22 day(s)    Current Patient Status: Inpatient   Certification Statement: The patient will continue to require additional inpatient hospital stay due to ct chest, iv diuretics, hypoxia    Discharge Plan: active    Code Status: Level 1 - Full Code      Subjective:   Patient seen and examined at bedside  Overnight, he desaturated to the 80s and required midflow and iv lasix  Slightly doing better today, but could still not be weaned off oxygen       Objective:     Vitals:   Temp (24hrs), Av 6 °F (37 °C), Min:97 7 °F (36 5 °C), Max:99 2 °F (37 3 °C)    Temp:  [97 7 °F (36 5 °C)-99 2 °F (37 3 °C)] 97 7 °F (36 5 °C)  HR:  [] 86  Resp:  [36-52] 36  BP: ()/(45-81) 122/64  SpO2:  [84 %-95 %] 86 %  Body mass index is 29 44 kg/m²  Input and Output Summary (last 24 hours):     No intake or output data in the 24 hours ending 09/10/21 1519    Physical Exam:     Physical Exam  Vitals reviewed  Constitutional:       General: He is not in acute distress  HENT:      Head: Normocephalic  Nose: Nose normal       Mouth/Throat:      Mouth: Mucous membranes are moist    Eyes:      General: No scleral icterus  Cardiovascular:      Rate and Rhythm: Normal rate  Pulmonary:      Effort: Respiratory distress present  Breath sounds: Rales present  No wheezing  Abdominal:      General: There is no distension  Palpations: Abdomen is soft  Tenderness: There is no abdominal tenderness  Musculoskeletal:      Right lower leg: No edema  Left lower leg: No edema  Skin:     General: Skin is warm  Neurological:      Mental Status: He is alert  Mental status is at baseline  Psychiatric:         Mood and Affect: Mood normal          Behavior: Behavior normal        dditional Data:     Labs:    Results from last 7 days   Lab Units 09/07/21  0435 09/04/21  0445   WBC Thousand/uL 17 92* 21 00*   HEMOGLOBIN g/dL 9 1* 10 2*   HEMATOCRIT % 32 1* 36 1*   PLATELETS Thousands/uL 474* 413*   BANDS PCT %  --  1   NEUTROS PCT % 73  --    LYMPHS PCT % 10*  --    LYMPHO PCT %  --  7*   MONOS PCT % 10  --    MONO PCT %  --  6   EOS PCT % 4 0     Results from last 7 days   Lab Units 09/10/21  0450   SODIUM mmol/L 136   POTASSIUM mmol/L 4 6   CHLORIDE mmol/L 102   CO2 mmol/L 22   BUN mg/dL 25   CREATININE mg/dL 1 31*   ANION GAP mmol/L 12   CALCIUM mg/dL 8 3   GLUCOSE RANDOM mg/dL 103                 Results from last 7 days   Lab Units 09/08/21  0449 09/07/21  0435   PROCALCITONIN ng/ml <0 05 <0 05           * I Have Reviewed All Lab Data Listed Above  * Additional Pertinent Lab Tests Reviewed:  All Labs For Current Hospital Admission Reviewed    Imaging:    Imaging Reports Reviewed Today Include: xr chest    Recent Cultures (last 7 days):           Last 24 Hours Medication List:   Current Facility-Administered Medications   Medication Dose Route Frequency Provider Last Rate    Acetaminophen  650 mg Per NG Tube Q6H Lemond Apgar, MD      ALPRAZolam  0 25 mg Oral HS PRN Fransisco Lieberman, ANATOLY      amiodarone  200 mg Oral Daily With Breakfast Tay Chavez PA-C      apixaban  2 5 mg Oral BID , CRLORENA      furosemide  40 mg Intravenous Once Lemond Apgar, MD      gabapentin  100 mg Oral HS Lemond Apgar, MD      hydrALAZINE  25 mg Oral [facility] Ean Ord, [address]      isosorbide dinitrate  10 mg Oral TID after meals , CRNP      lidocaine  1 patch Topical Daily Diaz Alexandre MD      melatonin  3 mg Oral HS Lemond Apgar, MD      metoprolol tartrate  12 5 mg Oral Q12H [address] , CRNP      omeprazole (PRILOSEC) suspension 2 mg/mL  20 mg Oral Daily , CRLORENA      pravastatin  40 mg Oral Daily With Jabil , CRLORENA      saccharomyces boulardii  250 mg Oral BID Diaz Alexandre MD      senna-docusate sodium  2 tablet Oral BID PRN Diaz Alexandre MD      sertraline  50 mg Oral Daily Lemond Apgar, MD          Today, Patient Was Seen By: Julián Tolliver MD    ** Please Note: Dictation voice to text software may have been used in the creation of this document   **

## 2021-09-10 NOTE — PLAN OF CARE
Problem: PHYSICAL THERAPY ADULT  Goal: Performs mobility at highest level of function for planned discharge setting  See evaluation for individualized goals  Description: Treatment/Interventions: Functional transfer training, LE strengthening/ROM, Therapeutic exercise, Endurance training, Cognitive reorientation, Patient/family training, Equipment eval/education, Bed mobility, Compensatory technique education, Continued evaluation, Spoke to nursing, OT          See flowsheet documentation for full assessment, interventions and recommendations  Outcome: Progressing  Note: Prognosis: Fair  Problem List: Decreased strength, Decreased endurance, Impaired balance, Decreased mobility, Decreased cognition, Decreased safety awareness, Impaired judgement, Impaired hearing  Assessment: PT  ALERT, RESPONSIVE COOPERATIVE ORIENTED TO PERSON AND PLACE  PT MAKING SLOW PROGRESS TOWARD GOALS WITH IMPROVED TOLERANCE TO ACTIVITY  IMPROVED SITTING TOLERANCE WHILE AT EOB X 5 MINUTES WITH CLOSE SUPERVISION WITH B/L UE SUPPORT AND USE OF BED RAIL  PT ABLE TO MAINTAIN MIDLINE AND UPRIGHT POSTURE, PT REQUIRING MIN ASSIST FOR SUPPORT AS PT BECAME FATIGUED  ENCOURAGEMENT REQUIRED FOR SIT TO STAND TRANSFERS, PT FEARFUL OF FALLING  PT PERFORMS SIT TO STAND WITH MAX ASSIST X2  AND MAX ASSIST X2 TO MAINTAIN STATIC STANDING WITH SUPPORT OF RW   PT  DEMONSTRATES FORWARD FLEXED POSTURE AND B/L KNEE FLEXION PT UNABLE TO ACHIEVE FULL UPRIGHT POSTURE  INCREASED ANXIETY AND SOB NOTED WITH  STANDING TRIAL  PT DESATS TO 83%-85% ON 7L MIDLFOW O2   PT RETURNED TO SUPINE WITH MOD ASSIST X2 AND PERFORM ROLLING TO lEFT  AND RIGHT WITH MIN - MOD ASSIST X1 WITH USE OF BED RAILS  PT PERFORMS B/L LE AROM EXERCISE IN SUPINE X 5 REPS WITH FREQUENT REST BREAKS DUE TO MENDOZA AND FATIGUE  OVERALL NOTED IMPROVED PARTICIPATION IN PT, TOLERANCE TO ACTIVITY AND SITTING BALANCE   The patient's AM-PAC Basic Mobility Inpatient Short Form Low Function Raw Score 14 , Standardized Score is 22 01  A standardized score less 42 9 suggests the patient may benefit from discharge to post-acute rehab services  Please also refer to the recommendation of the Physical Therapist for safe discharge planning  CONTINUE TO RECOMMEND str AT D/C IN ORDER TO MAXIMIZE FUNCTIONAL OUTCOMES, MOBILITY AND INDEPENDENCE  Barriers to Discharge: Inaccessible home environment, Decreased caregiver support  Barriers to Discharge Comments: Level of support at home  PT Discharge Recommendation: Post acute rehabilitation services     PT - OK to Discharge: Yes    See flowsheet documentation for full assessment

## 2021-09-10 NOTE — ASSESSMENT & PLAN NOTE
78year old male presenting with acute respiratory failure with hypoxia  Possibly due to vascular congestion / multifocal pneumonia  Required ICU level of care / intubation  Completed antibiotic therapy  S/p extubation and clinically improved until yesterday 9/9 where he decompensated  He now requires midflow nasal cannula  Received IV lasix yesterday  COVID-19 negative, no febrile episode overnight     - Cleared by renal and cardiology for discharge previously, but will have to keep due to worsening respiratory status   - CT chest

## 2021-09-10 NOTE — ASSESSMENT & PLAN NOTE
Due to free water deficit and dehydration   Resolved    Recent Labs     09/08/21  0449 09/08/21  1722 09/09/21  0445 09/10/21  0450   SODIUM 145 138 136 136

## 2021-09-10 NOTE — OCCUPATIONAL THERAPY NOTE
Occupational Therapy Progress Note(time=1330-1410)     Patient Name: Magnus ACOSTA Date: 9/10/2021  Problem List  Principal Problem:    Acute respiratory failure with hypoxia [facility])  Active Problems:    Essential hypertension    Aortic stenosis, moderate    Mild cognitive impairment    Multifocal pneumonia    Acute on chronic diastolic CHF (congestive heart failure) ([facility])    CANDIE (acute kidney injury) ([facility] [address] )    Atrial fibrillation (HCC)    Hypernatremia    PAD (peripheral artery disease) ([facility])    Leukocytosis    Acute metabolic encephalopathy    Ambulatory dysfunction    Acute midline low back pain without sciatica    Insomnia due to medical condition    Anemia     09/10/21 1330   Note Type   Note Type Treatment   Restrictions/Precautions   Weight Bearing Precautions Per Order No   Other Precautions Fall Risk;Pain; Chair Alarm; Bed Alarm;O2;Multiple lines;Cognitive   Pain Assessment   Pain Assessment Tool FLACC   Pain Rating: FLACC (Rest) - Face 0   Pain Rating: FLACC (Rest) - Legs 0   Pain Rating: FLACC (Rest) - Activity 0   Pain Rating: FLACC (Rest) - Cry 0   Pain Rating: FLACC (Rest) - Consolability 0   Score: FLACC (Rest) 0   ADL   Where Assessed Edge of bed   LB Dressing Assistance 1  Total Assistance   LB Dressing Deficit Don/doff R sock; Don/doff L sock   Toileting Assistance  1  Total Assistance   Toileting Deficit Use of bedpan/urinal setup; Increased time to complete;Clothing management up;Clothing management down;Perineal hygiene   Functional Standing Tolerance   Time 10-20secs   Bed Mobility   Rolling R 4  Minimal assistance   Additional items Assist x 1; Increased time required;Verbal cues;LE management   Rolling L 3  Moderate assistance   Additional items Assist x 1; Increased time required;Verbal cues;LE management   Supine to Sit 2  Maximal assistance   Additional items Assist x 1; Increased time required;Verbal cues   Sit to Supine 3  Moderate assistance   Additional items Assist x 2;Increased time required;Verbal cues;LE management   Transfers   Sit to Stand 2  Maximal assistance   Additional items Assist x 2; Increased time required;Verbal cues   Stand to Sit 2  Maximal assistance   Additional items Assist x 2; Increased time required;Verbal cues   Functional Mobility   Functional Mobility   (continue to recommend hoyerlift for OOB with nsg)   Cognition   Overall Cognitive Status Impaired   Arousal/Participation Alert   Attention Attends with cues to redirect   Orientation Level Oriented to person;Disoriented to time;Disoriented to situation   Memory Decreased short term memory;Decreased recall of recent events;Decreased recall of precautions   Following Commands Follows one step commands with increased time or repetition   Activity Tolerance   Activity Tolerance Patient limited by fatigue;Treatment limited secondary to medical complications (Comment)  (low SPO2 levels with activity )   Medical Staff Made Aware nsg, P T  Assessment   Assessment Pt seen for 40 min tx session with focus on functional balance, functional mobility, ADL status, transfer safety, activity tolerance, and cognition  Pt able to tolerate OOB mobility; sitting balance=f/f-, standing balance=p-  Pt required heavy assistance with functional mobility tasks  Dependent with LE ADLs  Lowering SPO2 levels(on 8 liters of O2) affecting activity level  Cognitive deficits noted(i e orientation, memory), but improved since initial evaluation(i e pt only speaking [facility])  Pt continues to demonstrate appropriateness for inpt rehab to improve his overall level of independence  Plan   Treatment Interventions ADL retraining;Functional transfer training;UE strengthening/ROM; Endurance training;Cognitive reorientation;Patient/family training;Equipment evaluation/education; Compensatory technique education;Continued evaluation   Goal Expiration Date 09/13/21   OT Treatment Day 3   OT Frequency 3-5x/wk   Recommendation   OT Discharge Recommendation Post acute rehabilitation services   AM-PAC Daily Activity Inpatient   Lower Body Dressing 1   Bathing 1   Toileting 1   Upper Body Dressing 2   Grooming 3   Eating 3   Daily Activity Raw Score 11   Daily Activity Standardized Score (Calc for Raw Score >=11) 29 04   Turning Head Towards Sound 3   Follow Simple Instructions 3   Low Function Daily Activity Raw Score 17   Low Function Daily Activity Standardized Score 28 95   AM-PAC Applied Cognition Inpatient   Following a Speech/Presentation 3   Understanding Ordinary Conversation 2   Taking Medications 2   Remembering Where Things Are Placed or Put Away 2   Remembering List of 4-5 Errands 2   Taking Care of Complicated Tasks 2   Applied Cognition Raw Score 13   Applied Cognition Standardized Score 30 46   Jocelin Kapadia, OT

## 2021-09-10 NOTE — ASSESSMENT & PLAN NOTE
Wt Readings from Last 3 Encounters:   09/10/21 77 8 kg (171 lb 8 3 oz)   07/26/21 83 5 kg (184 lb 2 oz)   03/19/21 82 8 kg (182 lb 9 6 oz)     Presented with acute on chronic diastolic heart failure exacerbation  Seems clinically overloaded overnight  - Appreciate renal and cardiology recommendations

## 2021-09-10 NOTE — ASSESSMENT & PLAN NOTE
Acute kidney injury requiring hemodialysis  Resolved  HD catheter line discontinued       Recent Labs     09/08/21  1722 09/09/21  0445 09/10/21  0450   BUN 37* 32* 25   CREATININE 1 25 1 28 1 31*   EGFR 53 51 50

## 2021-09-10 NOTE — SPEECH THERAPY NOTE
Speech Language/Pathology  Pt sleeping, family member at bedside  Not eating at this time  F/u as able

## 2021-09-10 NOTE — PHYSICAL THERAPY NOTE
Physical Therapy Treatment Note     09/10/21 5190   Note Type   Note Type Treatment   Pain Assessment   Pain Assessment Tool 0-10   Pain Score No Pain   Restrictions/Precautions   Other Precautions Fall Risk;O2;Chair Alarm; Bed Alarm   General   Chart Reviewed Yes   Family/Caregiver Present No   Cognition   Overall Cognitive Status Impaired   Arousal/Participation Responsive; Cooperative   Attention Within functional limits   Orientation Level Oriented to person;Oriented to place; Disoriented to time;Disoriented to situation   Memory Decreased recall of precautions   Subjective   Subjective " i DON'T WANT TO TRY TO STAND "     Bed Mobility   Rolling R 4  Minimal assistance   Additional items Assist x 1;Bedrails; Increased time required;Verbal cues;LE management   Rolling L 3  Moderate assistance   Additional items Assist x 1;Bedrails; Increased time required;Verbal cues;LE management   Supine to Sit 2  Maximal assistance   Additional items Assist x 1;HOB elevated; Increased time required;Verbal cues;LE management   Sit to Supine 3  Moderate assistance   Additional items Assist x 2; Increased time required;Verbal cues;LE management   Transfers   Sit to Stand 2  Maximal assistance   Additional items Assist x 2; Increased time required;Verbal cues; Bedrails   Stand to Sit 2  Maximal assistance   Additional items Assist x 2; Increased time required;Verbal cues   Additional Comments STATIC STANDING X 10 SDECONDS WITH MAX ASSIST X2 WITH SUPPORT OF RW WORKING ON POSTURE, BALANCE, STANDING TOLERANCE AND LE WEIGHTBEARING ACTIVITY      Ambulation/Elevation   Gait pattern Not appropriate   Balance   Static Sitting Fair   Dynamic Sitting Fair   Static Standing Poor -   Dynamic Standing Poor -   Ambulatory Zero   Endurance Deficit   Endurance Deficit Description WEAKNESS, FATIGUE, MENDOZA   Activity Tolerance   Activity Tolerance Patient limited by fatigue  (DESATURATION FROM 93% AT REST TO 83%-85%WITH ACTIVITY/ MOBIL)   Medical Staff Made Aware OT TAMAR   Exercises   Heelslides Supine;5 reps;AROM; Bilateral   Hip Flexion Supine;5 reps;AROM; Bilateral   Hip Abduction Sitting;5 reps;AROM; Bilateral   Hip Adduction Supine;5 reps;AROM; Bilateral   Ankle Pumps Supine;10 reps;AROM; Bilateral   Balance training  STATIC STANDING BALANCE WITH RW AND MAX ASSIST X2  X 10 SECONDS    Assessment   Prognosis Fair   Problem List Decreased strength;Decreased endurance; Impaired balance;Decreased mobility; Decreased cognition;Decreased safety awareness; Impaired judgement; Impaired hearing   Assessment PT  ALERT, RESPONSIVE COOPERATIVE ORIENTED TO PERSON AND PLACE  PT MAKING SLOW PROGRESS TOWARD GOALS WITH IMPROVED TOLERANCE TO ACTIVITY  IMPROVED SITTING TOLERANCE WHILE AT EOB X 5 MINUTES WITH CLOSE SUPERVISION WITH B/L UE SUPPORT AND USE OF BED RAIL  PT ABLE TO MAINTAIN MIDLINE AND UPRIGHT POSTURE, PT REQUIRING MIN ASSIST FOR SUPPORT AS PT BECAME FATIGUED  ENCOURAGEMENT REQUIRED FOR SIT TO STAND TRANSFERS, PT FEARFUL OF FALLING  PT PERFORMS SIT TO STAND WITH MAX ASSIST X2  AND MAX ASSIST X2 TO MAINTAIN STATIC STANDING WITH SUPPORT OF RW   PT  DEMONSTRATES FORWARD FLEXED POSTURE AND B/L KNEE FLEXION PT UNABLE TO ACHIEVE FULL UPRIGHT POSTURE  INCREASED ANXIETY AND SOB NOTED WITH  STANDING TRIAL  PT DESATS TO 83%-85% ON 7L MIDLFOW O2   PT RETURNED TO SUPINE WITH MOD ASSIST X2 AND PERFORM ROLLING TO lEFT  AND RIGHT WITH MIN - MOD ASSIST X1 WITH USE OF BED RAILS  PT PERFORMS B/L LE AROM EXERCISE IN SUPINE X 5 REPS WITH FREQUENT REST BREAKS DUE TO MENDOZA AND FATIGUE  OVERALL NOTED IMPROVED PARTICIPATION IN PT, TOLERANCE TO ACTIVITY AND SITTING BALANCE  The patient's AM-PAC Basic Mobility Inpatient Short Form Low Function Raw Score 14 , Standardized Score is 22 01  A standardized score less 42 9 suggests the patient may benefit from discharge to post-acute rehab services  Please also refer to the recommendation of the Physical Therapist for safe discharge planning   CONTINUE TO RECOMMEND str AT D/C IN ORDER TO MAXIMIZE FUNCTIONAL OUTCOMES, MOBILITY AND INDEPENDENCE  Goals   Patient Goals TO BE AQBLE TO SEE MY GRANDCHILDREN "   STG Expiration Date 09/13/21   PT Treatment Day 3   Plan   Treatment/Interventions Functional transfer training;LE strengthening/ROM; Therapeutic exercise; Endurance training;Cognitive reorientation;Patient/family training;Equipment eval/education; Bed mobility;Spoke to nursing;OT   Progress Slow progress, decreased activity tolerance   PT Frequency Other (Comment)  (3-5X/ WEEK)   Recommendation   PT Discharge Recommendation Post acute rehabilitation services   PT - OK to Discharge Yes   AM-PAC Basic Mobility Inpatient   Turning in Bed Without Bedrails 1   Lying on Back to Sitting on Edge of Flat Bed 1   Moving Bed to Chair 1   Standing Up From Chair 1   Walk in Room 1   Climb 3-5 Stairs 1   Basic Mobility Inpatient Raw Score 6   Turning Head Towards Sound 4   Follow Simple Instructions 4   Low Function Basic Mobility Raw Score 14   Low Function Basic Mobility Standardized Score 22 01      09/10/21 1340   Note Type   Note Type Treatment   Pain Assessment   Pain Assessment Tool 0-10   Pain Score No Pain   Restrictions/Precautions   Other Precautions Fall Risk;O2;Chair Alarm; Bed Alarm   General   Chart Reviewed Yes   Family/Caregiver Present No   Cognition   Overall Cognitive Status Impaired   Arousal/Participation Responsive; Cooperative   Attention Within functional limits   Orientation Level Oriented to person;Oriented to place; Disoriented to time;Disoriented to situation   Memory Decreased recall of precautions   Subjective   Subjective " i DON'T WANT TO TRY TO STAND "     Bed Mobility   Rolling R 4  Minimal assistance   Additional items Assist x 1;Bedrails; Increased time required;Verbal cues;LE management   Rolling L 3  Moderate assistance   Additional items Assist x 1;Bedrails; Increased time required;Verbal cues;LE management   Supine to Sit 2  Maximal assistance Additional items Assist x 1;HOB elevated; Increased time required;Verbal cues;LE management   Sit to Supine 3  Moderate assistance   Additional items Assist x 2; Increased time required;Verbal cues;LE management   Transfers   Sit to Stand 2  Maximal assistance   Additional items Assist x 2; Increased time required;Verbal cues; Bedrails   Stand to Sit 2  Maximal assistance   Additional items Assist x 2; Increased time required;Verbal cues   Additional Comments STATIC STANDING X 10 SDECONDS WITH MAX ASSIST X2 WITH SUPPORT OF RW WORKING ON POSTURE, BALANCE, STANDING TOLERANCE AND LE WEIGHTBEARING ACTIVITY  Ambulation/Elevation   Gait pattern Not appropriate   Balance   Static Sitting Fair   Dynamic Sitting Fair   Static Standing Poor -   Dynamic Standing Poor -   Ambulatory Zero   Endurance Deficit   Endurance Deficit Description WEAKNESS, FATIGUE, MENDOZA   Activity Tolerance   Activity Tolerance Patient limited by fatigue  (DESATURATION FROM 93% AT REST TO 83%-85%WITH ACTIVITY/ MOBIL)   Medical Staff Made Aware OT TAMAR   Exercises   Heelslides Supine;5 reps;AROM; Bilateral   Hip Flexion Supine;5 reps;AROM; Bilateral   Hip Abduction Sitting;5 reps;AROM; Bilateral   Hip Adduction Supine;5 reps;AROM; Bilateral   Ankle Pumps Supine;10 reps;AROM; Bilateral   Balance training  STATIC STANDING BALANCE WITH RW AND MAX ASSIST X2  X 10 SECONDS    Assessment   Prognosis Fair   Problem List Decreased strength;Decreased endurance; Impaired balance;Decreased mobility; Decreased cognition;Decreased safety awareness; Impaired judgement; Impaired hearing   Assessment PT  ALERT, RESPONSIVE COOPERATIVE ORIENTED TO PERSON AND PLACE  PT MAKING SLOW PROGRESS TOWARD GOALS WITH IMPROVED TOLERANCE TO ACTIVITY  IMPROVED SITTING TOLERANCE WHILE AT EOB X 5 MINUTES WITH CLOSE SUPERVISION WITH B/L UE SUPPORT AND USE OF BED RAIL   PT ABLE TO MAINTAIN MIDLINE AND UPRIGHT POSTURE, PT REQUIRING MIN ASSIST FOR SUPPORT AS PT BECAME FATIGUED  ENCOURAGEMENT REQUIRED FOR SIT TO STAND TRANSFERS, PT FEARFUL OF FALLING  PT PERFORMS SIT TO STAND WITH MAX ASSIST X2  AND MAX ASSIST X2 TO MAINTAIN STATIC STANDING WITH SUPPORT OF RW   PT  DEMONSTRATES FORWARD FLEXED POSTURE AND B/L KNEE FLEXION PT UNABLE TO ACHIEVE FULL UPRIGHT POSTURE  INCREASED ANXIETY AND SOB NOTED WITH  STANDING TRIAL  PT DESATS TO 83%-85% ON 7L MIDLFOW O2   PT RETURNED TO SUPINE WITH MOD ASSIST X2 AND PERFORM ROLLING TO lEFT  AND RIGHT WITH MIN - MOD ASSIST X1 WITH USE OF BED RAILS  PT PERFORMS B/L LE AROM EXERCISE IN SUPINE X 5 REPS WITH FREQUENT REST BREAKS DUE TO MENDOZA AND FATIGUE  OVERALL NOTED IMPROVED PARTICIPATION IN PT, TOLERANCE TO ACTIVITY AND SITTING BALANCE  The patient's AM-PAC Basic Mobility Inpatient Short Form Low Function Raw Score 14 , Standardized Score is 22 01  A standardized score less 42 9 suggests the patient may benefit from discharge to post-acute rehab services  Please also refer to the recommendation of the Physical Therapist for safe discharge planning  CONTINUE TO RECOMMEND str AT D/C IN ORDER TO MAXIMIZE FUNCTIONAL OUTCOMES, MOBILITY AND INDEPENDENCE  Goals   Patient Goals TO BE AQBLE TO SEE MY GRANDCHILDREN "   STG Expiration Date 09/13/21   PT Treatment Day 3   Plan   Treatment/Interventions Functional transfer training;LE strengthening/ROM; Therapeutic exercise; Endurance training;Cognitive reorientation;Patient/family training;Equipment eval/education; Bed mobility;Spoke to nursing;OT   Progress Slow progress, decreased activity tolerance   PT Frequency Other (Comment)  (3-5X/ WEEK)   Recommendation   PT Discharge Recommendation Post acute rehabilitation services   PT - OK to Discharge Yes   AM-PAC Basic Mobility Inpatient   Turning in Bed Without Bedrails 1   Lying on Back to Sitting on Edge of Flat Bed 1   Moving Bed to Chair 1   Standing Up From Chair 1   Walk in Room 1   Climb 3-5 Stairs 1   Basic Mobility Inpatient Raw Score 6   Turning Head Towards Sound 4   Follow Simple Instructions 4   Low Function Basic Mobility Raw Score 14   Low Function Basic Mobility Standardized Score 22 01         Sury Steiner, MARSHALL

## 2021-09-10 NOTE — PLAN OF CARE
Problem: OCCUPATIONAL THERAPY ADULT  Goal: Performs self-care activities at highest level of function for planned discharge setting  See evaluation for individualized goals  Description: Treatment Interventions: ADL retraining, Functional transfer training, UE strengthening/ROM, Endurance training, Cognitive reorientation, Patient/family training, Equipment evaluation/education, Compensatory technique education, Continued evaluation          See flowsheet documentation for full assessment, interventions and recommendations  Note: Limitation: Decreased UE strength, Decreased ADL status, Decreased UE ROM, Decreased Safe judgement during ADL, Decreased cognition, Decreased endurance, Decreased high-level ADLs  Prognosis: Fair  Assessment: Pt seen for 40 min tx session with focus on functional balance, functional mobility, ADL status, transfer safety, activity tolerance, and cognition  Pt able to tolerate OOB mobility; sitting balance=f/f-, standing balance=p-  Pt required heavy assistance with functional mobility tasks  Dependent with LE ADLs  Lowering SPO2 levels(on 8 liters of O2) affecting activity level  Cognitive deficits noted(i e orientation, memory), but improved since initial evaluation(i e pt only speaking [facility])  Pt continues to demonstrate appropriateness for inpt rehab to improve his overall level of independence        OT Discharge Recommendation: Post acute rehabilitation services

## 2021-09-10 NOTE — QUICK NOTE
QUICK NOTE - Deterioration Index  Jessica Pearce 78 y o  male MRN: [id number]  Unit/Bed#: [address]01 Encounter: [id number]    Date Paged: 09/10/21  Time Paged: [address]  Room #: 18  Primary RN: Gianna Hartley  Arrival Time: 0345  Deterioration index score at time of page: 68 7  %  Spoke with Gianna Hartley from primary team  Need to escalate level of care: no     PROBLEMS resulting in high DI score:    Nurse reports, patient takes his oxgyen off and desaturates, but improves when oxygen is replaced   No new issues  Please call [phone] with any questions       Vitals:   Vitals:    21 1446 21 1508 21 1706 21   BP: 123/60 123/60 143/67 140/66   Pulse: 76 77 86 94   Resp: 17 16  (!) 52   Temp: (!) 97 3 °F (36 3 °C) (!) 97 3 °F (36 3 °C)  99 2 °F (37 3 °C)   TempSrc:       SpO2: 92% 91% 90% (!) 84%   Weight:       Height:           Respiratory:   SpO2: SpO2: (!) 84 %    O2 Flow Rate (L/min): 3 L/min    Temperature: Temp (24hrs), Av 9 °F (36 6 °C), Min:97 3 °F (36 3 °C), Max:99 2 °F (37 3 °C)  Current: Temperature: 99 2 °F (37 3 °C)    Labs:   Results from last 7 days   Lab Units 21  0435 21  0445 21  0456   WBC Thousand/uL 17 92* 21 00* 21 32*   HEMOGLOBIN g/dL 9 1* 10 2* 9 8*   HEMATOCRIT % 32 1* 36 1* 34 0*   PLATELETS Thousands/uL 474* 413* 426*   NEUTROS PCT % 73  --   --    MONOS PCT % 10  --   --    MONO PCT %  --  6 2*     Results from last 7 days   Lab Units 21  0445 21  1722 21  0449 21  0456   SODIUM mmol/L 136 138 145 145   POTASSIUM mmol/L 4 5 4 8 4 0 3 1*   CHLORIDE mmol/L 104 107 114* 103   CO2 mmol/L 20* 21 22 25   BUN mg/dL 32* 37* 44* 180*   CREATININE mg/dL 1 28 1 25 1 32* 3 16*   CALCIUM mg/dL 7 9* 8 1* 8 9 9 5   ALK PHOS U/L  --   --   --  85   ALT U/L  --   --   --  55   AST U/L  --   --   --  38     Results from last 7 days   Lab Units 21  0435 21  0445 21  0456   MAGNESIUM mg/dL 2 4 3 0* 3 1*             Results from last 7 days   Lab Units 09/08/21  0449 09/07/21  0435   PROCALCITONIN ng/ml <0 05 <0 05       Code Status: Level 1 - Full Code

## 2021-09-11 LAB
ALBUMIN SERPL BCP-MCNC: 2.4 G/DL (ref 3.5–5)
ALP SERPL-CCNC: 117 U/L (ref 46–116)
ALT SERPL W P-5'-P-CCNC: 33 U/L (ref 12–78)
ANION GAP SERPL CALCULATED.3IONS-SCNC: 12 MMOL/L (ref 4–13)
AST SERPL W P-5'-P-CCNC: 19 U/L (ref 5–45)
BASOPHILS # BLD AUTO: 0.08 THOUSANDS/ΜL (ref 0–0.1)
BASOPHILS NFR BLD AUTO: 1 % (ref 0–1)
BILIRUB SERPL-MCNC: 0.59 MG/DL (ref 0.2–1)
BUN SERPL-MCNC: 24 MG/DL (ref 5–25)
CALCIUM ALBUM COR SERPL-MCNC: 9.8 MG/DL (ref 8.3–10.1)
CALCIUM SERPL-MCNC: 8.5 MG/DL (ref 8.3–10.1)
CHLORIDE SERPL-SCNC: 101 MMOL/L (ref 100–108)
CO2 SERPL-SCNC: 23 MMOL/L (ref 21–32)
CREAT SERPL-MCNC: 1 MG/DL (ref 0.6–1.3)
EOSINOPHIL # BLD AUTO: 0.47 THOUSAND/ΜL (ref 0–0.61)
EOSINOPHIL NFR BLD AUTO: 3 % (ref 0–6)
ERYTHROCYTE [DISTWIDTH] IN BLOOD BY AUTOMATED COUNT: 25.8 % (ref 11.6–15.1)
GFR SERPL CREATININE-BSD FRML MDRD: 69 ML/MIN/1.73SQ M
GLUCOSE SERPL-MCNC: 92 MG/DL (ref 65–140)
HCT VFR BLD AUTO: 31.7 % (ref 36.5–49.3)
HGB BLD-MCNC: 9.3 G/DL (ref 12–17)
IMM GRANULOCYTES # BLD AUTO: 0.1 THOUSAND/UL (ref 0–0.2)
IMM GRANULOCYTES NFR BLD AUTO: 1 % (ref 0–2)
LYMPHOCYTES # BLD AUTO: 1.16 THOUSANDS/ΜL (ref 0.6–4.47)
LYMPHOCYTES NFR BLD AUTO: 8 % (ref 14–44)
MAGNESIUM SERPL-MCNC: 1.7 MG/DL (ref 1.6–2.6)
MCH RBC QN AUTO: 24.7 PG (ref 26.8–34.3)
MCHC RBC AUTO-ENTMCNC: 29.3 G/DL (ref 31.4–37.4)
MCV RBC AUTO: 84 FL (ref 82–98)
MONOCYTES # BLD AUTO: 1.12 THOUSAND/ΜL (ref 0.17–1.22)
MONOCYTES NFR BLD AUTO: 8 % (ref 4–12)
NEUTROPHILS # BLD AUTO: 11.91 THOUSANDS/ΜL (ref 1.85–7.62)
NEUTS SEG NFR BLD AUTO: 79 % (ref 43–75)
NRBC BLD AUTO-RTO: 0 /100 WBCS
NT-PROBNP SERPL-MCNC: 5616 PG/ML
PLATELET # BLD AUTO: 531 THOUSANDS/UL (ref 149–390)
PMV BLD AUTO: 10.6 FL (ref 8.9–12.7)
POTASSIUM SERPL-SCNC: 3.5 MMOL/L (ref 3.5–5.3)
PROCALCITONIN SERPL-MCNC: 0.1 NG/ML
PROT SERPL-MCNC: 7 G/DL (ref 6.4–8.2)
RBC # BLD AUTO: 3.77 MILLION/UL (ref 3.88–5.62)
SODIUM SERPL-SCNC: 136 MMOL/L (ref 136–145)
WBC # BLD AUTO: 14.84 THOUSAND/UL (ref 4.31–10.16)

## 2021-09-11 PROCEDURE — 85025 COMPLETE CBC W/AUTO DIFF WBC: CPT | Performed by: STUDENT IN AN ORGANIZED HEALTH CARE EDUCATION/TRAINING PROGRAM

## 2021-09-11 PROCEDURE — 83735 ASSAY OF MAGNESIUM: CPT | Performed by: STUDENT IN AN ORGANIZED HEALTH CARE EDUCATION/TRAINING PROGRAM

## 2021-09-11 PROCEDURE — 99232 SBSQ HOSP IP/OBS MODERATE 35: CPT | Performed by: INTERNAL MEDICINE

## 2021-09-11 PROCEDURE — 99232 SBSQ HOSP IP/OBS MODERATE 35: CPT | Performed by: STUDENT IN AN ORGANIZED HEALTH CARE EDUCATION/TRAINING PROGRAM

## 2021-09-11 PROCEDURE — 83880 ASSAY OF NATRIURETIC PEPTIDE: CPT | Performed by: INTERNAL MEDICINE

## 2021-09-11 PROCEDURE — 84145 PROCALCITONIN (PCT): CPT | Performed by: STUDENT IN AN ORGANIZED HEALTH CARE EDUCATION/TRAINING PROGRAM

## 2021-09-11 PROCEDURE — 80053 COMPREHEN METABOLIC PANEL: CPT | Performed by: STUDENT IN AN ORGANIZED HEALTH CARE EDUCATION/TRAINING PROGRAM

## 2021-09-11 RX ADMIN — MELATONIN 3 MG: at 21:14

## 2021-09-11 RX ADMIN — Medication 12.5 MG: at 08:41

## 2021-09-11 RX ADMIN — Medication 250 MG: at 17:52

## 2021-09-11 RX ADMIN — APIXABAN 2.5 MG: 2.5 TABLET, FILM COATED ORAL at 08:41

## 2021-09-11 RX ADMIN — Medication 20 MG: at 10:41

## 2021-09-11 RX ADMIN — ACETAMINOPHEN 650 MG: 650 SUSPENSION ORAL at 10:40

## 2021-09-11 RX ADMIN — SERTRALINE HYDROCHLORIDE 50 MG: 50 TABLET ORAL at 08:41

## 2021-09-11 RX ADMIN — GABAPENTIN 100 MG: 100 CAPSULE ORAL at 21:14

## 2021-09-11 RX ADMIN — Medication 250 MG: at 08:41

## 2021-09-11 RX ADMIN — APIXABAN 2.5 MG: 2.5 TABLET, FILM COATED ORAL at 17:52

## 2021-09-11 RX ADMIN — Medication 12.5 MG: at 21:14

## 2021-09-11 RX ADMIN — ACETAMINOPHEN 650 MG: 650 SUSPENSION ORAL at 05:27

## 2021-09-11 RX ADMIN — ACETAMINOPHEN 650 MG: 650 SUSPENSION ORAL at 21:47

## 2021-09-11 RX ADMIN — ACETAMINOPHEN 650 MG: 650 SUSPENSION ORAL at 17:52

## 2021-09-11 RX ADMIN — ISOSORBIDE DINITRATE 10 MG: 10 TABLET ORAL at 17:53

## 2021-09-11 RX ADMIN — HYDRALAZINE HYDROCHLORIDE 25 MG: 25 TABLET, FILM COATED ORAL at 05:27

## 2021-09-11 RX ADMIN — AMIODARONE HYDROCHLORIDE 200 MG: 200 TABLET ORAL at 08:41

## 2021-09-11 RX ADMIN — PRAVASTATIN SODIUM 40 MG: 40 TABLET ORAL at 17:52

## 2021-09-11 NOTE — ASSESSMENT & PLAN NOTE
Acute kidney injury requiring hemodialysis  Resolved  HD catheter line discontinued       Recent Labs     09/09/21  0445 09/10/21  0450 09/11/21  0519   BUN 32* 25 24   CREATININE 1 28 1 31* 1 00   EGFR 51 50 69

## 2021-09-11 NOTE — PROGRESS NOTES
[facility]  Progress Note Grant Rachel 1940, 78 y o  male MRN: [id number]  Unit/Bed#: [address] Encounter: [id number]  Primary Care Provider: Micah Hunter DO   Date and time admitted to hospital: 8/19/2021  8:25 PM    * Acute respiratory failure with hypoxia [facility])  Assessment & Plan  78year old male presenting with acute respiratory failure with hypoxia  Possibly due to vascular congestion / multifocal pneumonia  Required ICU level of care / intubation  Completed antibiotic therapy  S/p extubation and clinically improved until yesterday 9/9 where he decompensated  He now requires midflow nasal cannula  Received IV lasix yesterday  COVID-19 negative, no febrile episode overnight  - Cleared by renal and cardiology for discharge previously, but he had to be kept due to worsening respiratory status  - Pulmonary was consulted to assess need for further evaluation / intervention given CT findings and hypoxia  Acute on chronic diastolic CHF (congestive heart failure) ([facility])  Assessment & Plan  Wt Readings from Last 3 Encounters:   09/11/21 75 1 kg (165 lb 9 1 oz)   07/26/21 83 5 kg (184 lb 2 oz)   03/19/21 82 8 kg (182 lb 9 6 oz)     Presented with acute on chronic diastolic heart failure exacerbation  Seems clinically overloaded s/p lasix x2  - Appreciate renal and cardiology recommendations  Hypernatremia  Assessment & Plan  Due to free water deficit and dehydration  Resolved    Recent Labs     09/08/21  1722 09/09/21  0445 09/10/21  0450 09/11/21  0519   SODIUM 138 136 136 136             Atrial fibrillation (HCC)  Assessment & Plan  New onset atrial fibrillation with RVR  Currently on NSR   - Continue Eliquis 2 5mg BID  - Continue amiodarone daily    CANDIE (acute kidney injury) ([facility] [address] )  Assessment & Plan  Acute kidney injury requiring hemodialysis  Resolved  HD catheter line discontinued       Recent Labs     09/09/21  0445 09/10/21  0450 09/11/21  8578 BUN 32* 25 24   CREATININE 1 28 1 31* 1 00   EGFR 51 50 69               Multifocal pneumonia  Assessment & Plan  Completed antibiotic therapy  Was restarted on antibiotics due to concerns for aspiration pneumonia  As per previous providers discussion with ID, likely due to aspiration pneumonitis  - Continue monitoring off antibiotics  - Aspiration precautions  - Dysphagia diet    Mild cognitive impairment  Assessment & Plan  Delirium precautions  Appreciate geriatric recommendations  Aortic stenosis, moderate  Assessment & Plan  Patient with moderate aortic stenosis  - Appreciate cardiology evaluation and recommendations  Outpatient cardiology follow-up  Essential hypertension  Assessment & Plan  Controlled  - Continue hydralazine 25mg TID, isosorbide 10mg tid, and metoprolol 12 5mg Q12  VTE Pharmacologic Prophylaxis:   Pharmacologic: Apixaban (Eliquis)  Mechanical VTE Prophylaxis in Place: Yes    Patient Centered Rounds: I have performed bedside rounds with nursing staff today  Discussions with Specialists or Other Care Team Provider: nursing    Education and Discussions with Family / Patient: patient    Time Spent for Care: 30 minutes  More than 50% of total time spent on counseling and coordination of care as described above  Current Length of Stay: 23 day(s)    Current Patient Status: Inpatient   Certification Statement: The patient will continue to require additional inpatient hospital stay due to pulm eval, hypoxia, daily weaning trials    Discharge Plan: active    Code Status: Level 1 - Full Code      Subjective:   Patient seen and examined at bedside  No acute issues overnight  Slowly being weaned off oxygen  Updated family at bedside       Objective:     Vitals:   Temp (24hrs), Av 8 °F (36 6 °C), Min:97 4 °F (36 3 °C), Max:98 2 °F (36 8 °C)    Temp:  [97 4 °F (36 3 °C)-98 2 °F (36 8 °C)] 97 4 °F (36 3 °C)  HR:  [80-86] 80  Resp:  [24-38] 38  BP: ()/(45-65) 120/65  SpO2:  [90 %-99 %] 99 %  Body mass index is 28 42 kg/m²  Input and Output Summary (last 24 hours): Intake/Output Summary (Last 24 hours) at 9/11/2021 1151  Last data filed at 9/11/2021 0909  Gross per 24 hour   Intake 120 ml   Output --   Net 120 ml       Physical Exam:     Physical Exam  Vitals reviewed  Constitutional:       General: He is not in acute distress  HENT:      Head: Normocephalic  Mouth/Throat:      Mouth: Mucous membranes are moist    Eyes:      General: No scleral icterus  Cardiovascular:      Rate and Rhythm: Normal rate  Pulmonary:      Effort: Pulmonary effort is normal  No respiratory distress  Breath sounds: No wheezing or rales  Abdominal:      General: There is no distension  Palpations: Abdomen is soft  Tenderness: There is no abdominal tenderness  Musculoskeletal:      Right lower leg: No edema  Left lower leg: No edema  Skin:     General: Skin is warm  Neurological:      Mental Status: He is alert  Mental status is at baseline  Psychiatric:         Mood and Affect: Mood normal          Behavior: Behavior normal        Additional Data:     Labs:    Results from last 7 days   Lab Units 09/11/21  0519   WBC Thousand/uL 14 84*   HEMOGLOBIN g/dL 9 3*   HEMATOCRIT % 31 7*   PLATELETS Thousands/uL 531*   NEUTROS PCT % 79*   LYMPHS PCT % 8*   MONOS PCT % 8   EOS PCT % 3     Results from last 7 days   Lab Units 09/11/21  0519   SODIUM mmol/L 136   POTASSIUM mmol/L 3 5   CHLORIDE mmol/L 101   CO2 mmol/L 23   BUN mg/dL 24   CREATININE mg/dL 1 00   ANION GAP mmol/L 12   CALCIUM mg/dL 8 5   ALBUMIN g/dL 2 4*   TOTAL BILIRUBIN mg/dL 0 59   ALK PHOS U/L 117*   ALT U/L 33   AST U/L 19   GLUCOSE RANDOM mg/dL 92                 Results from last 7 days   Lab Units 09/08/21  0449 09/07/21  0435   PROCALCITONIN ng/ml <0 05 <0 05           * I Have Reviewed All Lab Data Listed Above  * Additional Pertinent Lab Tests Reviewed:  Sly [facility] Admission Reviewed    Imaging:    Imaging Reports Reviewed Today Include: ct chest    Recent Cultures (last 7 days):           Last 24 Hours Medication List:   Current Facility-Administered Medications   Medication Dose Route Frequency Provider Last Rate    Acetaminophen  650 mg Per NG Tube Q6H Kimberly Kennedy MD      ALPRAZolam  0 25 mg Oral HS PRN Soren Delacruz, CRNP      amiodarone  200 mg Oral Daily With Breakfast Brenda Sorto PA-C      apixaban  2 5 mg Oral BID Charles Sonday, CRNP      gabapentin  100 mg Oral HS Kimberly Kennedy MD      hydrALAZINE  25 mg Oral [facility] Ean Ord, CRNP      isosorbide dinitrate  10 mg Oral TID after meals Charles Sonday, CRLORENA      lidocaine  1 patch Topical Daily Min Bennett MD      melatonin  3 mg Oral HS Kimberly Kennedy MD      metoprolol tartrate  12 5 mg Oral Q12H [facility] Charles Sonday, CRNP      omeprazole (PRILOSEC) suspension 2 mg/mL  20 mg Oral Daily Charles Sonday, CRLORENA      pravastatin  40 mg Oral Daily With Jabil [facility] Sonday, CRNP      saccharomyces boulardii  250 mg Oral BID Min Bennett MD      senna-docusate sodium  2 tablet Oral BID PRN Min Bennett MD      sertraline  50 mg Oral Daily Kimberly Kennedy MD          Today, Patient Was Seen By: Georgette Camargo MD    ** Please Note: Dictation voice to text software may have been used in the creation of this document   **

## 2021-09-11 NOTE — ASSESSMENT & PLAN NOTE
78year old male presenting with acute respiratory failure with hypoxia  Possibly due to vascular congestion / multifocal pneumonia  Required ICU level of care / intubation  Completed antibiotic therapy  S/p extubation and clinically improved until yesterday 9/9 where he decompensated  He now requires midflow nasal cannula  Received IV lasix yesterday  COVID-19 negative, no febrile episode overnight  - Cleared by renal and cardiology for discharge previously, but he had to be kept due to worsening respiratory status  - Pulmonary was consulted to assess need for further evaluation / intervention given CT findings and hypoxia

## 2021-09-11 NOTE — PROGRESS NOTES
Progress Note - Pulmonary   Erwin Madden 78 y o  male MRN: [id number]  Unit/Bed#: [place] [address] -[id number] Encounter: [id number]    Assessment/Plan:    1  Acute hypoxic respiratory failure likely multifaceted as listed below       -  currently 6 L mid,- 99% patient does not wear home O2       -  maintain saturations greater than 89%       -  pulmonary toileting:  Chest PT, deep breathing cough, OOB as tolerated       -  will order home O2 eval    Will evaluate true oxygen requirement, it is noted that patient desats very quickly when he takes off his oxygen, I do not feel repeat bronchoscopy will yield much different results at this point will repeat procalcitonin    2  Abnormal chest CT       -  worsening ground-glass opacity       -  8/22/2021- bronchoscopy       -  patient received 5 days of ceftriaxone and Flagyl       -  pending microbiology consider reinitiating antibiotic vs diuresing vs steroids       -  speech is following       -  maintain aspiration precautions    3  Acute on chronic diastolic CHF w/ new onset AFib and AS       -  Echo 9/2021- EF 55       proBNP     2737       -  diuresing per Cardiology       -  recommend accurate I&Os and daily weights       -  continue Eliquis and amiodarone      Chief Complaint:    "I want to go home"    Subjective:    Dom Hdz was comfortably sitting in his bed  He reports he would like to be discharged  Overnight events to report patient becoming acute hypoxic at 84% eventually requiring 13 L  Patient currently denying any fevers, chills, hemoptysis, headaches, night sweats, pleuritic chest pain, or palpitations  Objective:    Vitals: Blood pressure 120/65, pulse 80, temperature (!) 97 4 °F (36 3 °C), temperature source Oral, resp  rate (!) 38, height 5' 4" (1 626 m), weight 75 1 kg (165 lb 9 1 oz), SpO2 99 %  6L,Body mass index is 28 42 kg/m²        Intake/Output Summary (Last 24 hours) at 9/11/2021 1421  Last data filed at 9/11/2021 0909  Gross per 24 hour Intake 120 ml   Output --   Net 120 ml       Invasive Devices     Peripheral Intravenous Line            Peripheral IV 09/08/21 Dorsal (posterior); Right Hand 3 days                Physical Exam:   Physical Exam  Constitutional:       General: He is not in acute distress  Appearance: Normal appearance  He is normal weight  He is not ill-appearing  HENT:      Head: Normocephalic and atraumatic  Nose: Nose normal  No congestion or rhinorrhea  Mouth/Throat:      Mouth: Mucous membranes are dry  Cardiovascular:      Rate and Rhythm: Normal rate and regular rhythm  Pulses: Normal pulses  Heart sounds: Normal heart sounds  No murmur heard  No friction rub  No gallop  Pulmonary:      Effort: Pulmonary effort is normal  No tachypnea, bradypnea or respiratory distress  Breath sounds: No stridor, decreased air movement or transmitted upper airway sounds  Rhonchi present  No decreased breath sounds, wheezing or rales  Comments: Scattered rhonchi  Chest:      Chest wall: No tenderness  Abdominal:      General: Abdomen is flat  Bowel sounds are normal    Musculoskeletal:         General: No swelling or tenderness  Normal range of motion  Cervical back: Normal range of motion and neck supple  No rigidity or tenderness  Skin:     General: Skin is warm and dry  Coloration: Skin is not jaundiced or pale  Neurological:      General: No focal deficit present  Mental Status: He is alert and oriented to person, place, and time  Mental status is at baseline  Psychiatric:         Mood and Affect: Mood normal          Behavior: Behavior normal          Labs:    I have personally reviewed pertinent lab result CBC:   Lab Results   Component Value Date    WBC 14 84 (H) 09/11/2021    HGB 9 3 (L) 09/11/2021    HCT 31 7 (L) 09/11/2021    MCV 84 09/11/2021     (H) 09/11/2021    MCH 24 7 (L) 09/11/2021    MCHC 29 3 (L) 09/11/2021    RDW 25 8 (H) 09/11/2021    MPV 10 6 09/11/2021    NRBC 0 09/11/2021   , CMP:   Lab Results   Component Value Date    SODIUM 136 09/11/2021    K 3 5 09/11/2021     09/11/2021    CO2 23 09/11/2021    BUN 24 09/11/2021    CREATININE 1 00 09/11/2021    CALCIUM 8 5 09/11/2021    AST 19 09/11/2021    ALT 33 09/11/2021    ALKPHOS 117 (H) 09/11/2021    EGFR 69 09/11/2021       Imaging and other studies: I have personally reviewed pertinent films in PACS

## 2021-09-11 NOTE — ASSESSMENT & PLAN NOTE
Patient with moderate aortic stenosis  - Appreciate cardiology evaluation and recommendations  Outpatient cardiology follow-up  Simona Knight Fall Risk Assessment:     Last Known Fall: Within the last six months  Mobility: Immobilized/requires assist of one person  Medications: Cardiovascular and central nervous system meds  Mental Status/LOC/Awareness: Memory loss/confusion and requires reorienting  Toileting Needs: Incontinence  Volume/Electrolyte Status: No problems  Communication/Sensory: Visual (Glasses)/hearing deficit  Behavior: Appropriate behavior  Simona Knight Fall Risk Total: 16  Fall Risk Level: HIGH RISK     Universal Fall Precautions:  call light/belongings in reach, bed in low position and locked, wheelchairs and assistive devices out of sight, siderails up x 2, use non-slip footwear, adequate lighting, clutter free and spill free environment, educate on level of risk, educate to call for assistance     Fall Risk Level Interventions:    TRIAL ([employer] 8, NEURO, MED JENNIE 5) Moderate Fall Risk Interventions  Place yellow fall risk ID band on patient: verified  Provide patient/family education based on risk assessment : completed  Educate patient/family to call staff for assistance when getting out of bed: completed  Place fall precaution signage outside patient door: verified  Utilize bed/chair fall alarm: verifiedTRIAL (TELE 8, NEURO, [employer] JENNIE 5) High Fall Risk Interventions  Place yellow fall risk ID band on patient: verified  Provide patient/family education based on risk assessment: completed  Educate patient/family to call staff for assistance when getting out of bed: completed  Place fall precaution signage outside patient door: verified  Place patient in room close to nursing station: currently not available/charge notified  Utilize bed/chair fall alarm: verified  Notify charge of high risk for huddle: verified     Patient Specific Interventions:      Medication: review medications with patient and family, assess for medications that can be discontinued or dosage decreased and limit combination of prn medications  Mental  Status/LOC/Awareness: reorient patient, reinforce falls education, encourage family to stay with patient, check on patient hourly, utilize bed/chair fall alarm, reinforce the use of call light and provide activity  Toileting: consider obtaining elevated toilet seat or bedside commode (BSC), provide frquent toileting, monitor intake and output/use of appropriate interventions, instruct patient/family on the use of grab bars and instruct patient/family on the need to call for assistance when toileting  Volume/Electrolyte Status: ensure patient remains hydrated, advance diet as tolerated, administer medications as ordered for nausea and vomiting, monitor abnormal lab values and ensure IV fluids are appropriate  Communication/Sensory: update plan of care on whiteboard, provide communication alternatives/, ensure proper positioning when transferrng/ambulating, ensure patient has glasses/contacts and hearing aids/dentures and for visually impaired patients orient to their room surrounding and do not change their surroundings  Behavioral: encourage patient to voice feelings, engage patient in daily activities, administer medication as ordered and instruct/reinforce fall program rationale  Mobility: schedule physical activity throughout the day, provide comfort measures during transport, dangle prior to standing, utilize bed/chair fall alarm, ensure bed is locked and in lowest position, provide appropriate assistive device, instruct patient to exit bed on their strongest side and collaborate with doctor for possible PT/OT consult

## 2021-09-11 NOTE — ASSESSMENT & PLAN NOTE
Completed antibiotic therapy  Was restarted on antibiotics due to concerns for aspiration pneumonia  As per previous providers discussion with ID, likely due to aspiration pneumonitis  - Continue monitoring off antibiotics    - Aspiration precautions  - Dysphagia diet

## 2021-09-11 NOTE — ASSESSMENT & PLAN NOTE
Due to free water deficit and dehydration   Resolved    Recent Labs     09/08/21  1722 09/09/21  0445 09/10/21  0450 09/11/21  0519   SODIUM 138 136 136 136

## 2021-09-12 LAB
ANION GAP SERPL CALCULATED.3IONS-SCNC: 13 MMOL/L (ref 4–13)
BASOPHILS # BLD AUTO: 0.06 THOUSANDS/ΜL (ref 0–0.1)
BASOPHILS NFR BLD AUTO: 0 % (ref 0–1)
BUN SERPL-MCNC: 23 MG/DL (ref 5–25)
CALCIUM SERPL-MCNC: 8.8 MG/DL (ref 8.3–10.1)
CHLORIDE SERPL-SCNC: 98 MMOL/L (ref 100–108)
CO2 SERPL-SCNC: 24 MMOL/L (ref 21–32)
CREAT SERPL-MCNC: 1.09 MG/DL (ref 0.6–1.3)
EOSINOPHIL # BLD AUTO: 0.57 THOUSAND/ΜL (ref 0–0.61)
EOSINOPHIL NFR BLD AUTO: 4 % (ref 0–6)
ERYTHROCYTE [DISTWIDTH] IN BLOOD BY AUTOMATED COUNT: 25 % (ref 11.6–15.1)
GFR SERPL CREATININE-BSD FRML MDRD: 62 ML/MIN/1.73SQ M
GLUCOSE SERPL-MCNC: 96 MG/DL (ref 65–140)
HCT VFR BLD AUTO: 27.3 % (ref 36.5–49.3)
HGB BLD-MCNC: 8.1 G/DL (ref 12–17)
IMM GRANULOCYTES # BLD AUTO: 0.1 THOUSAND/UL (ref 0–0.2)
IMM GRANULOCYTES NFR BLD AUTO: 1 % (ref 0–2)
LYMPHOCYTES # BLD AUTO: 1.12 THOUSANDS/ΜL (ref 0.6–4.47)
LYMPHOCYTES NFR BLD AUTO: 8 % (ref 14–44)
MAGNESIUM SERPL-MCNC: 1.8 MG/DL (ref 1.6–2.6)
MCH RBC QN AUTO: 25.1 PG (ref 26.8–34.3)
MCHC RBC AUTO-ENTMCNC: 29.7 G/DL (ref 31.4–37.4)
MCV RBC AUTO: 85 FL (ref 82–98)
MONOCYTES # BLD AUTO: 1.24 THOUSAND/ΜL (ref 0.17–1.22)
MONOCYTES NFR BLD AUTO: 9 % (ref 4–12)
NEUTROPHILS # BLD AUTO: 11.29 THOUSANDS/ΜL (ref 1.85–7.62)
NEUTS SEG NFR BLD AUTO: 78 % (ref 43–75)
NRBC BLD AUTO-RTO: 0 /100 WBCS
PLATELET # BLD AUTO: 583 THOUSANDS/UL (ref 149–390)
PMV BLD AUTO: 10.8 FL (ref 8.9–12.7)
POTASSIUM SERPL-SCNC: 3.5 MMOL/L (ref 3.5–5.3)
PROCALCITONIN SERPL-MCNC: 0.06 NG/ML
RBC # BLD AUTO: 3.23 MILLION/UL (ref 3.88–5.62)
SODIUM SERPL-SCNC: 135 MMOL/L (ref 136–145)
WBC # BLD AUTO: 14.38 THOUSAND/UL (ref 4.31–10.16)

## 2021-09-12 PROCEDURE — 85025 COMPLETE CBC W/AUTO DIFF WBC: CPT | Performed by: STUDENT IN AN ORGANIZED HEALTH CARE EDUCATION/TRAINING PROGRAM

## 2021-09-12 PROCEDURE — 99232 SBSQ HOSP IP/OBS MODERATE 35: CPT | Performed by: STUDENT IN AN ORGANIZED HEALTH CARE EDUCATION/TRAINING PROGRAM

## 2021-09-12 PROCEDURE — 80048 BASIC METABOLIC PNL TOTAL CA: CPT | Performed by: STUDENT IN AN ORGANIZED HEALTH CARE EDUCATION/TRAINING PROGRAM

## 2021-09-12 PROCEDURE — 83735 ASSAY OF MAGNESIUM: CPT | Performed by: STUDENT IN AN ORGANIZED HEALTH CARE EDUCATION/TRAINING PROGRAM

## 2021-09-12 PROCEDURE — 84145 PROCALCITONIN (PCT): CPT | Performed by: STUDENT IN AN ORGANIZED HEALTH CARE EDUCATION/TRAINING PROGRAM

## 2021-09-12 PROCEDURE — 94761 N-INVAS EAR/PLS OXIMETRY MLT: CPT

## 2021-09-12 RX ADMIN — Medication 12.5 MG: at 08:59

## 2021-09-12 RX ADMIN — ACETAMINOPHEN 650 MG: 650 SUSPENSION ORAL at 20:40

## 2021-09-12 RX ADMIN — GABAPENTIN 100 MG: 100 CAPSULE ORAL at 20:40

## 2021-09-12 RX ADMIN — ISOSORBIDE DINITRATE 10 MG: 10 TABLET ORAL at 17:50

## 2021-09-12 RX ADMIN — APIXABAN 2.5 MG: 2.5 TABLET, FILM COATED ORAL at 08:59

## 2021-09-12 RX ADMIN — AMIODARONE HYDROCHLORIDE 200 MG: 200 TABLET ORAL at 08:59

## 2021-09-12 RX ADMIN — ACETAMINOPHEN 650 MG: 650 SUSPENSION ORAL at 04:57

## 2021-09-12 RX ADMIN — Medication 250 MG: at 17:50

## 2021-09-12 RX ADMIN — APIXABAN 2.5 MG: 2.5 TABLET, FILM COATED ORAL at 17:50

## 2021-09-12 RX ADMIN — HYDRALAZINE HYDROCHLORIDE 25 MG: 25 TABLET, FILM COATED ORAL at 20:40

## 2021-09-12 RX ADMIN — PRAVASTATIN SODIUM 40 MG: 40 TABLET ORAL at 17:50

## 2021-09-12 RX ADMIN — Medication 20 MG: at 08:59

## 2021-09-12 RX ADMIN — Medication 12.5 MG: at 20:40

## 2021-09-12 RX ADMIN — ACETAMINOPHEN 650 MG: 650 SUSPENSION ORAL at 17:01

## 2021-09-12 RX ADMIN — ACETAMINOPHEN 650 MG: 650 SUSPENSION ORAL at 11:33

## 2021-09-12 RX ADMIN — MELATONIN 3 MG: at 20:40

## 2021-09-12 RX ADMIN — Medication 250 MG: at 08:59

## 2021-09-12 RX ADMIN — SERTRALINE HYDROCHLORIDE 50 MG: 50 TABLET ORAL at 08:59

## 2021-09-12 NOTE — ASSESSMENT & PLAN NOTE
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

## 2021-09-12 NOTE — ASSESSMENT & PLAN NOTE
Due to free water deficit and dehydration   Resolved    Recent Labs     09/10/21  0450 09/11/21  0519 09/12/21  0457   SODIUM 136 136 135*

## 2021-09-12 NOTE — PROGRESS NOTES
[address] [facility]  Progress Note Jeancarlos Julian 1940, 78 y o  male MRN: [id number]  [address] Encounter: [id number]  Primary Care Provider: Simon Rios DO   Date and time admitted to hospital: 8/19/2021  8:25 PM    * Acute respiratory failure with hypoxia [facility])  Assessment & Plan  78year old male presenting with acute respiratory failure with hypoxia  Possibly due to vascular congestion / multifocal pneumonia  Required ICU level of care / intubation  Completed antibiotic therapy  S/p extubation and clinically improved until 9/9 where he decompensated  He now requires midflow nasal cannula  Has been receiving PRN lasix so far  COVID-19 negative, no febrile episode overnight  Pulmonary was consulted to assist in the management of his acute respiratory failure with hypoxia  They suspect that this is 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     - Outpatient PFT and pulmonary follow-up on discharge  - Wean off to reasonable levels (still on midflow), then anticipate discharge to rehab  Acute on chronic diastolic CHF (congestive heart failure) (HCC)  Assessment & Plan  Wt Readings from Last 3 Encounters:   09/12/21 75 kg (165 lb 5 5 oz)   07/26/21 83 5 kg (184 lb 2 oz)   03/19/21 82 8 kg (182 lb 9 6 oz)     Presented with acute on chronic diastolic heart failure exacerbation    - Appreciate renal and cardiology recommendations  Hypernatremia  Assessment & Plan  Due to free water deficit and dehydration  Resolved    Recent Labs     09/10/21  0450 09/11/21  0519 09/12/21  0457   SODIUM 136 136 135*             Atrial fibrillation (HCC)  Assessment & Plan  New onset atrial fibrillation with RVR  Currently on NSR   - Continue Eliquis 2 5mg BID  - Continue amiodarone daily    CANDIE (acute kidney injury) ([facility] [address] )  Assessment & Plan  Acute kidney injury requiring hemodialysis  Resolved  HD catheter line discontinued       Recent Labs 09/10/21  0450 21  0519 21  0457   BUN 25 24 23   CREATININE 1 31* 1 00 1 09   EGFR 50 69 62               Multifocal pneumonia  Assessment & Plan  Completed antibiotic therapy  Was restarted on antibiotics due to concerns for aspiration pneumonia  As per previous providers discussion with ID, likely due to aspiration pneumonitis  - Continue monitoring off antibiotics  - Aspiration precautions  - Dysphagia diet    Mild cognitive impairment  Assessment & Plan  Delirium precautions  Appreciate geriatric recommendations  Essential hypertension  Assessment & Plan  Controlled  - Continue hydralazine 25mg TID, isosorbide 10mg tid, and metoprolol 12 5mg Q12  VTE Pharmacologic Prophylaxis:   Pharmacologic: Apixaban (Eliquis)  Mechanical VTE Prophylaxis in Place: Yes    Patient Centered Rounds: I have performed bedside rounds with nursing staff today  Discussions with Specialists or Other Care Team Provider: nursing    Education and Discussions with Family / Patient: patient    Time Spent for Care: 30 minutes  More than 50% of total time spent on counseling and coordination of care as described above  Current Length of Stay: 24 day(s)    Current Patient Status: Inpatient   Certification Statement: The patient will continue to require additional inpatient hospital stay due to hypoxia, possible placement    Discharge Plan: active    Code Status: Level 1 - Full Code      Subjective:   Patient seen and examined at bedside  States that he had a rough night  No new complaints otherwise  Objective:     Vitals:   Temp (24hrs), Av 8 °F (36 6 °C), Min:97 6 °F (36 4 °C), Max:97 9 °F (36 6 °C)    Temp:  [97 6 °F (36 4 °C)-97 9 °F (36 6 °C)] 97 6 °F (36 4 °C)  HR:  [80-99] 80  Resp:  [22-36] 26  BP: (116-149)/(60-69) 125/61  SpO2:  [89 %-96 %] 93 %  Body mass index is 28 38 kg/m²  Input and Output Summary (last 24 hours):        Intake/Output Summary (Last 24 hours) at 2021 1005  Last data filed at 9/12/2021 0901  Gross per 24 hour   Intake 420 ml   Output --   Net 420 ml       Physical Exam:     Physical Exam  Vitals reviewed  Constitutional:       General: He is not in acute distress  Appearance: He is not ill-appearing  HENT:      Head: Normocephalic  Nose: Nose normal       Mouth/Throat:      Mouth: Mucous membranes are moist    Eyes:      General: No scleral icterus  Cardiovascular:      Rate and Rhythm: Normal rate  Pulmonary:      Effort: Pulmonary effort is normal  No respiratory distress  Breath sounds: Rhonchi present  No wheezing  Abdominal:      Palpations: Abdomen is soft  Tenderness: There is no abdominal tenderness  Skin:     General: Skin is warm  Neurological:      Mental Status: He is alert and oriented to person, place, and time  Psychiatric:         Mood and Affect: Mood normal          Behavior: Behavior normal        Additional Data:     Labs:    Results from last 7 days   Lab Units 09/12/21  0457   WBC Thousand/uL 14 38*   HEMOGLOBIN g/dL 8 1*   HEMATOCRIT % 27 3*   PLATELETS Thousands/uL 583*   NEUTROS PCT % 78*   LYMPHS PCT % 8*   MONOS PCT % 9   EOS PCT % 4     Results from last 7 days   Lab Units 09/12/21  0457 09/11/21  0519   SODIUM mmol/L 135* 136   POTASSIUM mmol/L 3 5 3 5   CHLORIDE mmol/L 98* 101   CO2 mmol/L 24 23   BUN mg/dL 23 24   CREATININE mg/dL 1 09 1 00   ANION GAP mmol/L 13 12   CALCIUM mg/dL 8 8 8 5   ALBUMIN g/dL  --  2 4*   TOTAL BILIRUBIN mg/dL  --  0 59   ALK PHOS U/L  --  117*   ALT U/L  --  33   AST U/L  --  19   GLUCOSE RANDOM mg/dL 96 92                 Results from last 7 days   Lab Units 09/11/21  0519 09/08/21  0449 09/07/21  0435   PROCALCITONIN ng/ml 0 10 <0 05 <0 05           * I Have Reviewed All Lab Data Listed Above  * Additional Pertinent Lab Tests Reviewed:  [facility]    Imaging:    Imaging Reports Reviewed Today Include: No new imaging    Recent Cultures (last 7 days): Last 24 Hours Medication List:   Current Facility-Administered Medications   Medication Dose Route Frequency Provider Last Rate    Acetaminophen  650 mg Per NG Tube Q6H Manjit Birmingham MD      ALPRAZolam  0 25 mg Oral HS PRN ANATOLY Nguyen      amiodarone  200 mg Oral Daily With Breakfast Ryder Kirk PA-C      apixaban  2 5 mg Oral BID Charles Sonday, ANATOLY      gabapentin  100 mg Oral HS Manjit Birmingham MD      hydrALAZINE  25 mg Oral [facility] [address]      isosorbide dinitrate  10 mg Oral TID after meals Charles Sonday, CRLORENA      lidocaine  1 patch Topical Daily Haroldo Arreaga MD      melatonin  3 mg Oral HS Manjit Birmingham MD      metoprolol tartrate  12 5 mg Oral Q12H [facility] Charles Sonday, ANATOLY      omeprazole (PRILOSEC) suspension 2 mg/mL  20 mg Oral Daily Charles Sonday, ANATOLY      pravastatin  40 mg Oral Daily With [facility] Sonday, CRLORENA      saccharomyces boulardii  250 mg Oral BID Haroldo Arreaga MD      senna-docusate sodium  2 tablet Oral BID PRN Haroldo Arreaga MD      sertraline  50 mg Oral Daily Manjit Birmingham MD          Today, Patient Was Seen By: Olivia Bailey MD    ** Please Note: Dictation voice to text software may have been used in the creation of this document   **

## 2021-09-12 NOTE — PLAN OF CARE
Problem: Potential for Falls  Goal: Patient will remain free of falls  Description: INTERVENTIONS:  - Educate patient/family on patient safety including physical limitations  - Instruct patient to call for assistance with activity   - Consult OT/PT to assist with strengthening/mobility   - Keep Call bell within reach  - Keep bed low and locked with side rails adjusted as appropriate  - Keep care items and personal belongings within reach  - Initiate and maintain comfort rounds  - Make Fall Risk Sign visible to staff  - Offer Toileting every 2 Hours, in advance of need  - Initiate/Maintain bed alarm  - Obtain necessary fall risk management equipment:   - Apply yellow socks and bracelet for high fall risk patients  - Consider moving patient to room near nurses station  Outcome: Progressing     Problem: MOBILITY - ADULT  Goal: Maintain or return to baseline ADL function  Description: INTERVENTIONS:  -  Assess patient's ability to carry out ADLs; assess patient's baseline for ADL function and identify physical deficits which impact ability to perform ADLs (bathing, care of mouth/teeth, toileting, grooming, dressing, etc )  - Assess/evaluate cause of self-care deficits   - Assess range of motion  - Assess patient's mobility; develop plan if impaired  - Assess patient's need for assistive devices and provide as appropriate  - Encourage maximum independence but intervene and supervise when necessary  - Involve family in performance of ADLs  - Assess for home care needs following discharge   - Consider OT consult to assist with ADL evaluation and planning for discharge  - Provide patient education as appropriate  Outcome: Progressing  Goal: Maintains/Returns to pre admission functional level  Description: INTERVENTIONS:  - Perform BMAT or MOVE assessment daily    - Set and communicate daily mobility goal to care team and patient/family/caregiver     - Collaborate with rehabilitation services on mobility goals if consulted  - Perform Range of Motion 3 times a day  - Reposition patient every 2 hours  - Dangle patient 2 times a day  - Stand patient 2 times a day  - Ambulate patient 2 times a day  - Out of bed to chair 2 times a day   - Out of bed for meals 2 times a day  - Out of bed for toileting  - Record patient progress and toleration of activity level   Outcome: Progressing     Problem: Prexisting or High Potential for Compromised Skin Integrity  Goal: Skin integrity is maintained or improved  Description: INTERVENTIONS:  - Identify patients at risk for skin breakdown  - Assess and monitor skin integrity  - Assess and monitor nutrition and hydration status  - Monitor labs   - Assess for incontinence   - Turn and reposition patient  - Assist with mobility/ambulation  - Relieve pressure over bony prominences  - Avoid friction and shearing  - Provide appropriate hygiene as needed including keeping skin clean and dry  - Evaluate need for skin moisturizer/barrier cream  - Collaborate with interdisciplinary team   - Patient/family teaching  - Consider wound care consult   Outcome: Progressing     Problem: NEUROSENSORY - ADULT  Goal: Achieves stable or improved neurological status  Description: INTERVENTIONS  - Monitor and report changes in neurological status  - Monitor vital signs such as temperature, blood pressure, glucose, and any other labs ordered   - Initiate measures to prevent increased intracranial pressure  - Monitor for seizure activity and implement precautions if appropriate      Outcome: Progressing  Goal: Achieves maximal functionality and self care  Description: INTERVENTIONS  - Monitor swallowing and airway patency with patient fatigue and changes in neurological status  - Encourage and assist patient to increase activity and self care     - Encourage visually impaired, hearing impaired and aphasic patients to use assistive/communication devices  Outcome: Progressing     Problem: CARDIOVASCULAR - ADULT  Goal: Maintains optimal cardiac output and hemodynamic stability  Description: INTERVENTIONS:  - Monitor I/O, vital signs and rhythm  - Monitor for S/S and trends of decreased cardiac output  - Administer and titrate ordered vasoactive medications to optimize hemodynamic stability  - Assess quality of pulses, skin color and temperature  - Assess for signs of decreased coronary artery perfusion  - Instruct patient to report change in severity of symptoms  Outcome: Progressing  Goal: Absence of cardiac dysrhythmias or at baseline rhythm  Description: INTERVENTIONS:  - Continuous cardiac monitoring, vital signs, obtain 12 lead EKG if ordered  - Administer antiarrhythmic and heart rate control medications as ordered  - Monitor electrolytes and administer replacement therapy as ordered  Outcome: Progressing     Problem: RESPIRATORY - ADULT  Goal: Achieves optimal ventilation and oxygenation  Description: INTERVENTIONS:  - Assess for changes in respiratory status  - Assess for changes in mentation and behavior  - Position to facilitate oxygenation and minimize respiratory effort  - Oxygen administered by appropriate delivery if ordered  - Initiate smoking cessation education as indicated  - Encourage broncho-pulmonary hygiene including cough, deep breathe, Incentive Spirometry  - Assess the need for suctioning and aspirate as needed  - Assess and instruct to report SOB or any respiratory difficulty  - Respiratory Therapy support as indicated  Outcome: Progressing     Problem: GASTROINTESTINAL - ADULT  Goal: Minimal or absence of nausea and/or vomiting  Description: INTERVENTIONS:  - Administer IV fluids if ordered to ensure adequate hydration  - Maintain NPO status until nausea and vomiting are resolved  - Nasogastric tube if ordered  - Administer ordered antiemetic medications as needed  - Provide nonpharmacologic comfort measures as appropriate  - Advance diet as tolerated, if ordered  - Consider nutrition services referral to assist patient with adequate nutrition and appropriate food choices  Outcome: Progressing  Goal: Maintains or returns to baseline bowel function  Description: INTERVENTIONS:  - Assess bowel function  - Encourage oral fluids to ensure adequate hydration  - Administer IV fluids if ordered to ensure adequate hydration  - Administer ordered medications as needed  - Encourage mobilization and activity  - Consider nutritional services referral to assist patient with adequate nutrition and appropriate food choices  Outcome: Progressing  Goal: Maintains adequate nutritional intake  Description: INTERVENTIONS:  - Monitor percentage of each meal consumed  - Identify factors contributing to decreased intake, treat as appropriate  - Assist with meals as needed  - Monitor I&O, weight, and lab values if indicated  - Obtain nutrition services referral as needed  Outcome: Progressing  Goal: Oral mucous membranes remain intact  Description: INTERVENTIONS  - Assess oral mucosa and hygiene practices  - Implement preventative oral hygiene regimen  - Implement oral medicated treatments as ordered  - Initiate Nutrition services referral as needed  Outcome: Progressing     Problem: GENITOURINARY - ADULT  Goal: Maintains or returns to baseline urinary function  Description: INTERVENTIONS:  - Assess urinary function  - Encourage oral fluids to ensure adequate hydration if ordered  - Administer IV fluids as ordered to ensure adequate hydration  - Administer ordered medications as needed  - Offer frequent toileting  - Follow urinary retention protocol if ordered  Outcome: Progressing  Goal: Absence of urinary retention  Description: INTERVENTIONS:  - Assess patients ability to void and empty bladder  - Monitor I/O  - Bladder scan as needed  - Discuss with physician/AP medications to alleviate retention as needed  - Discuss catheterization for long term situations as appropriate  Outcome: Progressing     Problem: METABOLIC, FLUID AND ELECTROLYTES - ADULT  Goal: Electrolytes maintained within normal limits  Description: INTERVENTIONS:  - Monitor labs and assess patient for signs and symptoms of electrolyte imbalances  - Administer electrolyte replacement as ordered  - Monitor response to electrolyte replacements, including repeat lab results as appropriate  - Instruct patient on fluid and nutrition as appropriate  Outcome: Progressing  Goal: Fluid balance maintained  Description: INTERVENTIONS:  - Monitor labs   - Monitor I/O and WT  - Instruct patient on fluid and nutrition as appropriate  - Assess for signs & symptoms of volume excess or deficit  Outcome: Progressing  Goal: Glucose maintained within target range  Description: INTERVENTIONS:  - Monitor Blood Glucose as ordered  - Assess for signs and symptoms of hyperglycemia and hypoglycemia  - Administer ordered medications to maintain glucose within target range  - Assess nutritional intake and initiate nutrition service referral as needed  Outcome: Progressing     Problem: SKIN/TISSUE INTEGRITY - ADULT  Goal: Skin Integrity remains intact(Skin Breakdown Prevention)  Description: Assess:  -Perform Kevon assessment every shift  -Clean and moisturize skin every shift  -Inspect skin when repositioning, toileting, and assisting with ADLS  -Assess under medical devices such as nasal cannula every 2 hours  -Assess extremities for adequate circulation and sensation     Bed Management:  -Have minimal linens on bed & keep smooth, unwrinkled  -Change linens as needed when moist or perspiring  -Avoid sitting or lying in one position for more than 2 hours while in bed  -Keep HOB at 30 degrees     Toileting:  -Offer bedside commode  -Assess for incontinence every 1 hour  -Use incontinent care products after each incontinent episode such as pads    Activity:  -Mobilize patient 2 times a day  -Encourage activity and walks on unit  -Encourage or provide ROM exercises   -Turn and reposition patient every 2 Hours  -Use appropriate equipment to lift or move patient in bed  -Instruct/ Assist with weight shifting every 2 when out of bed in chair  -Consider limitation of chair time 2 hour intervals    Skin Care:  -Avoid use of baby powder, tape, friction and shearing, hot water or constrictive clothing  -Relieve pressure over bony prominences using wedges  -Do not massage red bony areas    Next Steps:  -Teach patient strategies to minimize risks such as    -Consider consults to  interdisciplinary teams such as   Outcome: Progressing     Problem: HEMATOLOGIC - ADULT  Goal: Maintains hematologic stability  Description: INTERVENTIONS  - Assess for signs and symptoms of bleeding or hemorrhage  - Monitor labs  - Administer supportive blood products/factors as ordered and appropriate  Outcome: Progressing     Problem: MUSCULOSKELETAL - ADULT  Goal: Maintain or return mobility to safest level of function  Description: INTERVENTIONS:  - Assess patient's ability to carry out ADLs; assess patient's baseline for ADL function and identify physical deficits which impact ability to perform ADLs (bathing, care of mouth/teeth, toileting, grooming, dressing, etc )  - Assess/evaluate cause of self-care deficits   - Assess range of motion  - Assess patient's mobility  - Assess patient's need for assistive devices and provide as appropriate  - Encourage maximum independence but intervene and supervise when necessary  - Involve family in performance of ADLs  - Assess for home care needs following discharge   - Consider OT consult to assist with ADL evaluation and planning for discharge  - Provide patient education as appropriate  Outcome: Progressing     Problem: Nutrition/Hydration-ADULT  Goal: Nutrient/Hydration intake appropriate for improving, restoring or maintaining nutritional needs  Description: Monitor and assess patient's nutrition/hydration status for malnutrition   Collaborate with interdisciplinary team and initiate plan and interventions as ordered  Monitor patient's weight and dietary intake as ordered or per policy  Utilize nutrition screening tool and intervene as necessary  Determine patient's food preferences and provide high-protein, high-caloric foods as appropriate       INTERVENTIONS:  - Monitor oral intake, urinary output, labs, and treatment plans  - Assess nutrition and hydration status and recommend course of action  - Evaluate amount of meals eaten  - Assist patient with eating if necessary   - Allow adequate time for meals  - Recommend/ encourage appropriate diets, oral nutritional supplements, and vitamin/mineral supplements  - Order, calculate, and assess calorie counts as needed  - Recommend, monitor, and adjust tube feedings and TPN/PPN based on assessed needs  - Assess need for intravenous fluids  - Provide specific nutrition/hydration education as appropriate  - Include patient/family/caregiver in decisions related to nutrition  Outcome: Progressing

## 2021-09-12 NOTE — ASSESSMENT & PLAN NOTE
Acute kidney injury requiring hemodialysis  Resolved  HD catheter line discontinued       Recent Labs     09/10/21  0450 09/11/21  0519 09/12/21  0457   BUN 25 24 23   CREATININE 1 31* 1 00 1 09   EGFR 50 69 62

## 2021-09-12 NOTE — RESPIRATORY THERAPY NOTE
Home Oxygen Qualifying Test       Patient name: Balaji Fan        : 1940   Date of Test:  2021  Diagnosis:      Home Oxygen Test:    **Medicare Guidelines require item(s) 1-5 on all ambulatory patients or 1 and 2 on non-ambulatory patients  1   Baseline SPO2 on Room Air at rest 7 %  2   SPO2 during exercise on Room Air N/A %  During exercise monitor SpO2  If SPO2 increases >=89% with ambulation do not add supplemental             oxygen  If <= 88% on room air add O2 via NC and titrate patient  Patient must be ambulated with O2 and titrated to > 88% with exertion         3   SPO2 on Oxygen at rest 95 % 10 lpm     4   SPO2 during exercise on Oxygen  NA% a liter flow of NA lpm     5   Exercise performed:              NA         Respiratory Additional Notes- HERB Sams Ip, RT

## 2021-09-12 NOTE — ASSESSMENT & PLAN NOTE
Wt Readings from Last 3 Encounters:   09/12/21 75 kg (165 lb 5 5 oz)   07/26/21 83 5 kg (184 lb 2 oz)   03/19/21 82 8 kg (182 lb 9 6 oz)     Presented with acute on chronic diastolic heart failure exacerbation    - Appreciate renal and cardiology recommendations

## 2021-09-13 ENCOUNTER — APPOINTMENT (INPATIENT)
Dept: RADIOLOGY | Facility: HOSPITAL | Age: 81
DRG: 870 | End: 2021-09-13
Payer: MEDICARE

## 2021-09-13 PROBLEM — D64.9 ANEMIA: Status: RESOLVED | Noted: 2021-09-07 | Resolved: 2021-09-13

## 2021-09-13 PROBLEM — M54.50 ACUTE MIDLINE LOW BACK PAIN WITHOUT SCIATICA: Status: RESOLVED | Noted: 2021-09-07 | Resolved: 2021-09-13

## 2021-09-13 LAB
ANION GAP SERPL CALCULATED.3IONS-SCNC: 13 MMOL/L (ref 4–13)
BASOPHILS # BLD AUTO: 0.13 THOUSANDS/ΜL (ref 0–0.1)
BASOPHILS NFR BLD AUTO: 1 % (ref 0–1)
BUN SERPL-MCNC: 18 MG/DL (ref 5–25)
CALCIUM SERPL-MCNC: 8.5 MG/DL (ref 8.3–10.1)
CHLORIDE SERPL-SCNC: 96 MMOL/L (ref 100–108)
CO2 SERPL-SCNC: 23 MMOL/L (ref 21–32)
CREAT SERPL-MCNC: 0.96 MG/DL (ref 0.6–1.3)
EOSINOPHIL # BLD AUTO: 0.34 THOUSAND/ΜL (ref 0–0.61)
EOSINOPHIL NFR BLD AUTO: 1 % (ref 0–6)
ERYTHROCYTE [DISTWIDTH] IN BLOOD BY AUTOMATED COUNT: 24.3 % (ref 11.6–15.1)
GFR SERPL CREATININE-BSD FRML MDRD: 73 ML/MIN/1.73SQ M
GLUCOSE SERPL-MCNC: 142 MG/DL (ref 65–140)
HCT VFR BLD AUTO: 34.1 % (ref 36.5–49.3)
HGB BLD-MCNC: 10 G/DL (ref 12–17)
IMM GRANULOCYTES # BLD AUTO: 0.26 THOUSAND/UL (ref 0–0.2)
IMM GRANULOCYTES NFR BLD AUTO: 1 % (ref 0–2)
LYMPHOCYTES # BLD AUTO: 0.79 THOUSANDS/ΜL (ref 0.6–4.47)
LYMPHOCYTES NFR BLD AUTO: 3 % (ref 14–44)
MAGNESIUM SERPL-MCNC: 1.5 MG/DL (ref 1.6–2.6)
MCH RBC QN AUTO: 24.7 PG (ref 26.8–34.3)
MCHC RBC AUTO-ENTMCNC: 29.3 G/DL (ref 31.4–37.4)
MCV RBC AUTO: 84 FL (ref 82–98)
MONOCYTES # BLD AUTO: 1.48 THOUSAND/ΜL (ref 0.17–1.22)
MONOCYTES NFR BLD AUTO: 6 % (ref 4–12)
NEUTROPHILS # BLD AUTO: 23.69 THOUSANDS/ΜL (ref 1.85–7.62)
NEUTS SEG NFR BLD AUTO: 88 % (ref 43–75)
NRBC BLD AUTO-RTO: 0 /100 WBCS
PLATELET # BLD AUTO: 628 THOUSANDS/UL (ref 149–390)
PMV BLD AUTO: 10.1 FL (ref 8.9–12.7)
POTASSIUM SERPL-SCNC: 4.1 MMOL/L (ref 3.5–5.3)
RBC # BLD AUTO: 4.05 MILLION/UL (ref 3.88–5.62)
SODIUM SERPL-SCNC: 132 MMOL/L (ref 136–145)
WBC # BLD AUTO: 26.69 THOUSAND/UL (ref 4.31–10.16)

## 2021-09-13 PROCEDURE — 71045 X-RAY EXAM CHEST 1 VIEW: CPT

## 2021-09-13 PROCEDURE — 80048 BASIC METABOLIC PNL TOTAL CA: CPT | Performed by: STUDENT IN AN ORGANIZED HEALTH CARE EDUCATION/TRAINING PROGRAM

## 2021-09-13 PROCEDURE — 99232 SBSQ HOSP IP/OBS MODERATE 35: CPT | Performed by: INTERNAL MEDICINE

## 2021-09-13 PROCEDURE — 83735 ASSAY OF MAGNESIUM: CPT | Performed by: STUDENT IN AN ORGANIZED HEALTH CARE EDUCATION/TRAINING PROGRAM

## 2021-09-13 PROCEDURE — 85025 COMPLETE CBC W/AUTO DIFF WBC: CPT | Performed by: STUDENT IN AN ORGANIZED HEALTH CARE EDUCATION/TRAINING PROGRAM

## 2021-09-13 PROCEDURE — 99233 SBSQ HOSP IP/OBS HIGH 50: CPT | Performed by: INTERNAL MEDICINE

## 2021-09-13 RX ORDER — FUROSEMIDE 10 MG/ML
40 INJECTION INTRAMUSCULAR; INTRAVENOUS ONCE
Status: COMPLETED | OUTPATIENT
Start: 2021-09-13 | End: 2021-09-13

## 2021-09-13 RX ORDER — METHYLPREDNISOLONE SODIUM SUCCINATE 40 MG/ML
40 INJECTION, POWDER, LYOPHILIZED, FOR SOLUTION INTRAMUSCULAR; INTRAVENOUS DAILY
Status: DISCONTINUED | OUTPATIENT
Start: 2021-09-13 | End: 2021-09-15

## 2021-09-13 RX ORDER — LORAZEPAM 2 MG/ML
0.5 INJECTION INTRAMUSCULAR EVERY 6 HOURS PRN
Status: DISCONTINUED | OUTPATIENT
Start: 2021-09-13 | End: 2021-09-17 | Stop reason: HOSPADM

## 2021-09-13 RX ADMIN — HYDRALAZINE HYDROCHLORIDE 25 MG: 25 TABLET, FILM COATED ORAL at 05:00

## 2021-09-13 RX ADMIN — FUROSEMIDE 40 MG: 10 INJECTION, SOLUTION INTRAVENOUS at 17:00

## 2021-09-13 RX ADMIN — MELATONIN 3 MG: at 22:28

## 2021-09-13 RX ADMIN — ACETAMINOPHEN 650 MG: 650 SUSPENSION ORAL at 05:00

## 2021-09-13 RX ADMIN — Medication 20 MG: at 09:10

## 2021-09-13 RX ADMIN — ACETAMINOPHEN 650 MG: 650 SUSPENSION ORAL at 09:10

## 2021-09-13 RX ADMIN — SERTRALINE HYDROCHLORIDE 50 MG: 50 TABLET ORAL at 09:10

## 2021-09-13 RX ADMIN — ALPRAZOLAM 0.25 MG: 0.25 TABLET ORAL at 03:53

## 2021-09-13 RX ADMIN — APIXABAN 2.5 MG: 2.5 TABLET, FILM COATED ORAL at 09:09

## 2021-09-13 RX ADMIN — Medication 250 MG: at 09:10

## 2021-09-13 RX ADMIN — AMIODARONE HYDROCHLORIDE 200 MG: 200 TABLET ORAL at 09:10

## 2021-09-13 RX ADMIN — METHYLPREDNISOLONE SODIUM SUCCINATE 40 MG: 40 INJECTION, POWDER, FOR SOLUTION INTRAMUSCULAR; INTRAVENOUS at 17:17

## 2021-09-13 RX ADMIN — Medication 12.5 MG: at 09:10

## 2021-09-13 RX ADMIN — ALPRAZOLAM 0.25 MG: 0.25 TABLET ORAL at 22:28

## 2021-09-13 NOTE — ASSESSMENT & PLAN NOTE
Wt Readings from Last 3 Encounters:   09/13/21 74 9 kg (165 lb 2 oz)   07/26/21 83 5 kg (184 lb 2 oz)   03/19/21 82 8 kg (182 lb 9 6 oz)     Presented with acute on chronic diastolic heart failure exacerbation    - Appreciate renal and cardiology recommendations

## 2021-09-13 NOTE — ASSESSMENT & PLAN NOTE
78year old male presenting with acute respiratory failure with hypoxia  Possibly due to vascular congestion / multifocal pneumonia  Required ICU level of care / intubation  Completed antibiotic therapy  S/p extubation and clinically improved until 9/9 where he decompensated      He now requires 15 L midflow nasal cannula  Worse today  Will give IV furosemide and IV steroid  Chest x-ray with significant worsening of aeration  Make NPO - high risk for aspiration    Palliative care consultation placed

## 2021-09-13 NOTE — PROGRESS NOTES
[address] [facility]  Progress Note [facility] 1940, 78 y o  male MRN: [id number]  Unit/Bed#: [address] [place] 216-01 Encounter: [id number]  Primary Care Provider: Kristina Sin DO   Date and time admitted to hospital: 8/19/2021  8:25 PM    Insomnia due to medical condition  Assessment & Plan  Continue frequent reorientation    Ambulatory dysfunction  Assessment & Plan  PT OT evals    Acute metabolic encephalopathy  Assessment & Plan  Waxing and waning  Usually is able to speak [place], now speaking [place]   Likely delirium    Leukocytosis  Assessment & Plan  -patient initially had a leukocytosis, secondary to multifocal pneumonia  -it had progressively improved with his course of antibiotics  -8/31 white blood cell count had improved to 11  -9/1 white blood cell count accelerated to 20  Medications reviewed, patient has not received any steroids or other medications that would accelerate his white blood cell count  -patient with new abdominal discomfort on exam  -currently evaluate source of leukocytosis  -a) pulmonary:  Patient with recent lethargy, and dysphagia, at risk for aspiration pneumonia  -restarted empiric antibiotics with cefepime/Flagyl:  Transitioned to Unasyn by ID  -9/1 repeat blood cultures negative x2  -9/1 CT scan chest: "Limited study due to motion artifact  Extensive groundglass opacities throughout both lungs, compatible with nonspecific infectious process, including COVID 19 pneumonia    Fluid-filled small bowel and colon which may represent nonspecific gastroenteritis  Colonic diverticulosis without diverticulitis "  b) GI:  Mild abdominal pain:  Fluid-filled colon    Patient with small amount of loose stool, not consistent with C diff  -started probiotic  c) patient with suprapubic tenderness:    -negative urinalysis  -continue urinary retention protocol:  -current bladder scan without significant retention  -appreciate Infectious Disease teams evaluation and recommendations:  Patient's leukocytosis may be secondary to aspiration pneumonitis, rather than bacterial aspiration pneumonia in light of normal procalcitonin x2  -9/3 antibiotics discontinued:  Continue to monitor  -peripheral smear:  No evidence of blasts or leukemic cells, appears neutrophilic predominance          PAD (peripheral artery disease) ([facility] )  Assessment & Plan  -patient noted to have a dusky left foot while in the ICU  -LEADS performed:  Left lower extremity NICK 1 38  Great toe pressure within the healing range   -vascular surgery consult not required  -patient with palpable pulses  -cont eliquis  -continue monitor    Hypernatremia  Assessment & Plan  Due to free water deficit and dehydration  Patient is self fluid restricting  Hold IV fluid for now given concern for volume over    Recent Labs     09/11/21  0519 09/12/21  0457 09/13/21  0507   SODIUM 136 135* 132*             Atrial fibrillation (HCC)  Assessment & Plan  New onset atrial fibrillation with RVR  Currently on NSR   - Continue Eliquis 2 5mg BID  - Continue amiodarone daily    CANDIE (acute kidney injury) ([facility] )  Assessment & Plan  Acute kidney injury requiring hemodialysis, could not tolerate hemodialysis  Resolved  HD catheter line discontinued  Recent Labs     09/11/21 0519 09/12/21 0457 09/13/21  0507   BUN 24 23 18   CREATININE 1 00 1 09 0 96   EGFR 69 62 73               Acute on chronic diastolic CHF (congestive heart failure) ([facility])  Assessment & Plan  Wt Readings from Last 3 Encounters:   09/13/21 74 9 kg (165 lb 2 oz)   07/26/21 83 5 kg (184 lb 2 oz)   03/19/21 82 8 kg (182 lb 9 6 oz)     Presented with acute on chronic diastolic heart failure exacerbation    - Appreciate renal and cardiology recommendations  Multifocal pneumonia  Assessment & Plan  Completed antibiotic therapy  Was restarted on antibiotics due to concerns for aspiration pneumonia   As per previous providers discussion with ID, likely due to aspiration pneumonitis  - Continue monitoring off antibiotics  - Aspiration precautions- suspects on aspiration  NPO except for meds, until re-evaluated    Mild cognitive impairment  Assessment & Plan  Delirium precautions  Appreciate geriatric recommendations  Aortic stenosis, moderate  Assessment & Plan  Patient with moderate aortic stenosis  - Appreciate cardiology evaluation and recommendations  Outpatient cardiology follow-up  Essential hypertension  Assessment & Plan  Controlled  - Continue hydralazine 25mg TID, isosorbide 10mg tid, and metoprolol 12 5mg Q12  * Acute respiratory failure with hypoxia [facility])  Assessment & Plan  78year old male presenting with acute respiratory failure with hypoxia  Possibly due to vascular congestion / multifocal pneumonia  Required ICU level of care / intubation  Completed antibiotic therapy  S/p extubation and clinically improved until 9/9 where he decompensated      He now requires 15 L midflow nasal cannula  Worse today  Will give IV furosemide and IV steroid  Chest x-ray with significant worsening of aeration  Make NPO - high risk for aspiration    Palliative care consultation placed       [facility] Internal Medicine Progress Note  Patient: Indra Jackson 78 y o  male   MRN: [id number]  PCP: Radha Whitley, DO  Unit/Bed#: [place] 2 -01 Encounter: [id number]  Date Of Visit: 09/13/21    Assessment:    Principal Problem:    Acute respiratory failure with hypoxia ([facility] )  Active Problems:    Essential hypertension    Aortic stenosis, moderate    Mild cognitive impairment    Multifocal pneumonia    Acute on chronic diastolic CHF (congestive heart failure) ([facility])    CANDIE (acute kidney injury) ([facility] )    Atrial fibrillation (HCC)    Hypernatremia    PAD (peripheral artery disease) ([facility])    Leukocytosis    Acute metabolic encephalopathy    Ambulatory dysfunction    Insomnia due to medical condition      Plan:    · Palliative care consult  · IV steroid  · IV furosemide  · Prognosis poor  ·        VTE Pharmacologic Prophylaxis:   Pharmacologic: Apixaban (Eliquis)  Mechanical VTE Prophylaxis in Place: Yes    Patient Centered Rounds: I have performed bedside rounds with nursing staff today  Discussions with Specialists or Other Care Team Provider:  Case management    Education and Discussions with Family / Patient:  Patient    Time Spent for Care: 45 minutes  More than 50% of total time spent on counseling and coordination of care as described above  Current Length of Stay: 25 day(s)    Current Patient Status: Inpatient   Certification Statement: The patient will continue to require additional inpatient hospital stay due to Acute hypoxemic respiratory failure    Discharge Plan / Estimated Discharge Date: To be determined    Code Status: Level 1 - Full Code      Subjective:   Seen and examined, significant shortness of breath difficulty breathing    A complete and comprehensive 14 point organ system review has been performed and all other systems are negative other than stated above  Objective:     Vitals:   Temp (24hrs), Av 3 °F (36 8 °C), Min:97 3 °F (36 3 °C), Max:99 3 °F (37 4 °C)    Temp:  [97 3 °F (36 3 °C)-99 3 °F (37 4 °C)] 97 3 °F (36 3 °C)  HR:  [] 79  Resp:  [18-28] 28  BP: (114-149)/(57-74) 127/57  SpO2:  [94 %-98 %] 94 %  Body mass index is 28 34 kg/m²       Input and Output Summary (last 24 hours):     No intake or output data in the 24 hours ending 21 1752    Physical Exam:     General:  Appears ill  HEENT: atraumatic, PERRLA, moist mucosa, normal pharynx, normal tonsils and adenoids, normal tongue, no fluid in sinuses  Neck: Trachea midline, no carotid bruit, no masses  Respiratory:  Coarse breath sounds   Cardiovascular:  Tachycardia  Abdomen: Soft, non-tender, non-distended, normal bowel sounds in all quadrants, no hepatosplenomegaly, no tympany  Rectal: deferred  Musculoskeletal: normal ROM in upper and lower extremities  Integumentary: warm, dry, and pink, with no rash, purpura, or petechia  Heme/Lymph: no lymphadenopathy, no bruises  Neurological: Cranial Nerves II-XII grossly intact, no tics, normal sensation to pressure and light touch  Psychiatric:  Apparent dementia      Additional Data:     Labs:    Results from last 7 days   Lab Units 09/13/21  0507   WBC Thousand/uL 26 69*   HEMOGLOBIN g/dL 10 0*   HEMATOCRIT % 34 1*   PLATELETS Thousands/uL 628*   NEUTROS PCT % 88*   LYMPHS PCT % 3*   MONOS PCT % 6   EOS PCT % 1     Results from last 7 days   Lab Units 09/13/21  0507 09/11/21  0519   POTASSIUM mmol/L 4 1 3 5   CHLORIDE mmol/L 96* 101   CO2 mmol/L 23 23   BUN mg/dL 18 24   CREATININE mg/dL 0 96 1 00   CALCIUM mg/dL 8 5 8 5   ALK PHOS U/L  --  117*   ALT U/L  --  33   AST U/L  --  19           * I Have Reviewed All Lab Data Listed Above  * Additional Pertinent Lab Tests Reviewed:  Sly [facility] Admission Reviewed    Imaging:    Imaging Reports Reviewed Today Include:  Chest x-ray  Imaging Personally Reviewed by Myself Includes:  Chest x-ray    Recent Cultures (last 7 days):           Last 24 Hours Medication List:   Current Facility-Administered Medications   Medication Dose Route Frequency Provider Last Rate    Acetaminophen  650 mg Per NG Tube Q6H Maritza Watkins MD      ALPRAZolam  0 25 mg Oral HS PRN ANATOLY Torre      amiodarone  200 mg Oral Daily With Breakfast Kristi Reynolds PA-C      apixaban  2 5 mg Oral BID ANATOLY Small      gabapentin  100 mg Oral HS Maritza Watkins MD      hydrALAZINE  25 mg Oral [facility] [address] [place], ANATOLY      isosorbide dinitrate  10 mg Oral TID after meals ANATOLY Small      lidocaine  1 patch Topical Daily Ginger Stephens MD      melatonin  3 mg Oral HS Maritza Watkins MD      methylPREDNISolone sodium succinate  40 mg Intravenous Daily Nicolás Galaviz DO      metoprolol tartrate  12 5 mg Oral Q12H [address] ANATOLY [place]      omeprazole (PRILOSEC) suspension 2 mg/mL  20 mg Oral Daily ANATOLY Small      pravastatin  40 mg Oral Daily With ANATOLY [place]      saccharomyces boulardii  250 mg Oral BID Catalino Burnette MD      senna-docusate sodium  2 tablet Oral BID PRN Catalino Burnette MD      sertraline  50 mg Oral Daily Shreya Leonard MD          Today, Patient Was Seen By: Idania Snowden DO    ** Please Note: This note was completed in part utilizing [employer] Flu[employer] Direct Software  Grammatical errors, random word insertions, spelling mistakes, and incomplete sentences may be an occasional consequence of this system secondary to software limitations, ambient noise, and hardware issues  If you have any questions or concerns about the content, text, or information contained within the body of this dictation, please contact the provider for clarification   **

## 2021-09-13 NOTE — PROGRESS NOTES
Progress Note - Pulmonary   Hallie Mcmahan 78 y o  male MRN: [id number]  Unit/Bed#: [place] [address] -01 Encounter: [id number]      Assessment / Plan :  1  Acute hypoxic respiratory failure  · Patient unfortunately is now requiring 15 L nasal cannula to maintain SpO2 greater than equal to 95% during my evaluation today  He has minimal reserve and desaturates very quickly  He appears very short of breath and with mild conversational dyspnea on my evaluation  · I do believe the patient would possibly benefit from steroids and further diuresis however I will discuss with my attending physician  · Repeat chest x-ray today    2  Abnormal CT of the chest  · CT images show significant interstitial lung disease/ARDS fibrosis  · Patient will need repeat imaging and PFTs as an outpatient  · Patient did undergo a bronchoscopy on 08/22/2021  He received 5 days of ceftriaxone and Flagyl  · Speech therapy is consulted  Appreciate their evaluation  Continue aspiration precautions  Patient could possibly be silently aspirating which is now contributing to his worsening breathing/oxygen requirements  · I do not feel the patient needs another antibiotic at this time however will discuss with my attending physician  I do believe patient needs steroids and diuresis to help with his hypoxia  3  Interstitial lung disease/ARDS fibrosis  · Repeat chest x-ray imaging  · Consider possible steroids and further diuresis to optimize his lungs  · Patient will ultimately need follow-up in the pulmonary office with PFTs  · Based on CT imaging I do expect patient to have significant desaturations with minimal exertion     4  Acute on chronic diastolic CHF with new onset AFib and aortic stenosis  · Cardiology was consulted  · Consider more diuresis as patient does have slight/trace bilateral lower extremity edema and now worsening oxygen requirements    · I have ordered a repeat chest x-ray to see if there is any further volume overload or pulmonary vascular congestion that is contributing to his worsening hypoxia  · Patient had an echocardiogram on 09/20/2021 which revealed ejection fraction of 55  ProBNP was 27 37  · Continue to monitor I&O and daily weight  · Continue Eliquis and amiodarone      Subjective:   Patient is seen and evaluated by myself  He is resting comfortably in bed  He is on 15 L nasal cannula which is a significant increase from previous  He had been doing well however he now is acutely more short of breath requiring more oxygen  He does have some slight volume on board in his bilateral lower extremities  He is not coughing or bringing up any secretions however the wife does report that he occasionally coughs up yellow secretions  He does have a history of aspiration however denies any recent choking or coughing on food  Objective:   Vitals: Blood pressure 114/62, pulse 101, temperature 99 3 °F (37 4 °C), resp  rate (!) 26, height 5' 4" (1 626 m), weight 74 9 kg (165 lb 2 oz), SpO2 98 %  , 15 liters/minute nasal cannula, Body mass index is 28 34 kg/m²  No intake or output data in the 24 hours ending 09/13/21 1222      Physical Exam  Gen: Awake, alert, oriented x 3, no acute distress  HEENT: Mucous membranes moist, no oral lesions, no thrush  NECK: No accessory muscle use, JVP not elevated  Cardiac: Regular, single S1, single S2, no murmurs, no rubs, no gallops  Lungs:  Breath sounds are decreased in the bilateral lower lobes with faint inspiratory crackles  Abdomen: normoactive bowel sounds, soft nontender, nondistended, no rebound or rigidity, no guarding  Extremities: no cyanosis, no clubbing, no edema    Labs: I have personally reviewed pertinent lab results  , ABG: No results found for: PHART, AMI5IPM, PO2ART, WQZ3CAI, Y7VNLVZB, BEART, SOURCE, BNP: No results found for: BNP, CBC:   Lab Results   Component Value Date    WBC 26 69 (H) 09/13/2021    HGB 10 0 (L) 09/13/2021    HCT 34 1 (L) 09/13/2021 MCV 84 09/13/2021     (H) 09/13/2021    MCH 24 7 (L) 09/13/2021    MCHC 29 3 (L) 09/13/2021    RDW 24 3 (H) 09/13/2021    MPV 10 1 09/13/2021    NRBC 0 09/13/2021   , CMP:   Lab Results   Component Value Date    SODIUM 132 (L) 09/13/2021    K 4 1 09/13/2021    CL 96 (L) 09/13/2021    CO2 23 09/13/2021    BUN 18 09/13/2021    CREATININE 0 96 09/13/2021    CALCIUM 8 5 09/13/2021    EGFR 73 09/13/2021   , PT/INR: No results found for: PT, INR, Troponin: No results found for: TROPONINI     Imaging and other studies: I have personally reviewed pertinent reports  CT scan of the chest 09/10/2021    FINDINGS:     LUNGS:  The lungs demonstrate mixed groundglass and reticular opacity both centrally and peripherally somewhat sparing the right upper lobe as compared to other areas  The appearance has progressed since the prior study with more opacities noted at the   lung apices bilaterally and with somewhat denser and confluent opacity seen in the lower lobes  Dense consolidation is still not present however      Reticular opacities suggest the possibility of underlying more chronic changes although chest x-ray from 2018 did not show significant findings reported CT from 2017      There is no tracheal or endobronchial lesion      PLEURA:  Unremarkable      HEART/GREAT VESSELS:  Cardiomegaly is present with coronary calcifications      MEDIASTINUM AND LUPE:  Small lymph nodes are identified      CHEST WALL AND LOWER NECK:   Unremarkable      VISUALIZED STRUCTURES IN THE UPPER ABDOMEN:  Peripancreatic calcifications are noted possibly within lymph nodes versus vascular calcifications  Small hiatus hernia is present      OSSEOUS STRUCTURES:  No acute fracture or destructive osseous lesion      IMPRESSION:     Worsening groundglass opacities suggest worsening pneumonia  Other causes of airspace disease would include hypersensitivity reaction, acute inhalational disorder or atypical infection    This pattern would be atypical for edema      No pleural effusion is seen      The study was marked in EPIC for significant notification    Garo Padrno PA-C

## 2021-09-13 NOTE — ASSESSMENT & PLAN NOTE
Acute kidney injury requiring hemodialysis, could not tolerate hemodialysis  Resolved  HD catheter line discontinued       Recent Labs     09/11/21  0519 09/12/21  0457 09/13/21  0507   BUN 24 23 18   CREATININE 1 00 1 09 0 96   EGFR 69 62 73

## 2021-09-13 NOTE — ASSESSMENT & PLAN NOTE
Completed antibiotic therapy  Was restarted on antibiotics due to concerns for aspiration pneumonia  As per previous providers discussion with ID, likely due to aspiration pneumonitis  - Continue monitoring off antibiotics    - Aspiration precautions- suspects on aspiration  NPO except for meds, until re-evaluated

## 2021-09-13 NOTE — ASSESSMENT & PLAN NOTE
Due to free water deficit and dehydration  Patient is self fluid restricting  Hold IV fluid for now given concern for volume over    Recent Labs     09/11/21  0519 09/12/21  0457 09/13/21  0507   SODIUM 136 135* 132*

## 2021-09-14 ENCOUNTER — APPOINTMENT (INPATIENT)
Dept: CT IMAGING | Facility: HOSPITAL | Age: 81
DRG: 870 | End: 2021-09-14
Payer: MEDICARE

## 2021-09-14 ENCOUNTER — APPOINTMENT (INPATIENT)
Dept: RADIOLOGY | Facility: HOSPITAL | Age: 81
DRG: 870 | End: 2021-09-14
Payer: MEDICARE

## 2021-09-14 PROBLEM — D72.829 LEUKOCYTOSIS: Status: RESOLVED | Noted: 2021-09-01 | Resolved: 2021-09-14

## 2021-09-14 PROBLEM — I73.9 PAD (PERIPHERAL ARTERY DISEASE) (HCC): Status: RESOLVED | Noted: 2021-08-31 | Resolved: 2021-09-14

## 2021-09-14 LAB
ANION GAP SERPL CALCULATED.3IONS-SCNC: 9 MMOL/L (ref 4–13)
BUN SERPL-MCNC: 21 MG/DL (ref 5–25)
CALCIUM SERPL-MCNC: 8.8 MG/DL (ref 8.3–10.1)
CHLORIDE SERPL-SCNC: 99 MMOL/L (ref 100–108)
CO2 SERPL-SCNC: 26 MMOL/L (ref 21–32)
CREAT SERPL-MCNC: 0.79 MG/DL (ref 0.6–1.3)
ERYTHROCYTE [DISTWIDTH] IN BLOOD BY AUTOMATED COUNT: 24.1 % (ref 11.6–15.1)
GFR SERPL CREATININE-BSD FRML MDRD: 83 ML/MIN/1.73SQ M
GLUCOSE SERPL-MCNC: 140 MG/DL (ref 65–140)
HCT VFR BLD AUTO: 30.7 % (ref 36.5–49.3)
HGB BLD-MCNC: 9.2 G/DL (ref 12–17)
MCH RBC QN AUTO: 24.9 PG (ref 26.8–34.3)
MCHC RBC AUTO-ENTMCNC: 30 G/DL (ref 31.4–37.4)
MCV RBC AUTO: 83 FL (ref 82–98)
NT-PROBNP SERPL-MCNC: 9042 PG/ML
PLATELET # BLD AUTO: 493 THOUSANDS/UL (ref 149–390)
PMV BLD AUTO: 10 FL (ref 8.9–12.7)
POTASSIUM SERPL-SCNC: 4.1 MMOL/L (ref 3.5–5.3)
RBC # BLD AUTO: 3.69 MILLION/UL (ref 3.88–5.62)
SODIUM SERPL-SCNC: 134 MMOL/L (ref 136–145)
WBC # BLD AUTO: 6.91 THOUSAND/UL (ref 4.31–10.16)

## 2021-09-14 PROCEDURE — 99232 SBSQ HOSP IP/OBS MODERATE 35: CPT | Performed by: INTERNAL MEDICINE

## 2021-09-14 PROCEDURE — 74230 X-RAY XM SWLNG FUNCJ C+: CPT

## 2021-09-14 PROCEDURE — 83880 ASSAY OF NATRIURETIC PEPTIDE: CPT | Performed by: INTERNAL MEDICINE

## 2021-09-14 PROCEDURE — 99233 SBSQ HOSP IP/OBS HIGH 50: CPT | Performed by: INTERNAL MEDICINE

## 2021-09-14 PROCEDURE — 71250 CT THORAX DX C-: CPT

## 2021-09-14 PROCEDURE — 85027 COMPLETE CBC AUTOMATED: CPT | Performed by: INTERNAL MEDICINE

## 2021-09-14 PROCEDURE — G1004 CDSM NDSC: HCPCS

## 2021-09-14 PROCEDURE — 99356 PR PROLONGED SVC I/P OR OBS SETTING 1ST HOUR: CPT | Performed by: INTERNAL MEDICINE

## 2021-09-14 PROCEDURE — 92611 MOTION FLUOROSCOPY/SWALLOW: CPT

## 2021-09-14 PROCEDURE — 92526 ORAL FUNCTION THERAPY: CPT

## 2021-09-14 PROCEDURE — 80048 BASIC METABOLIC PNL TOTAL CA: CPT | Performed by: INTERNAL MEDICINE

## 2021-09-14 PROCEDURE — 99223 1ST HOSP IP/OBS HIGH 75: CPT | Performed by: INTERNAL MEDICINE

## 2021-09-14 RX ORDER — MAGNESIUM SULFATE 1 G/100ML
1 INJECTION INTRAVENOUS ONCE
Status: COMPLETED | OUTPATIENT
Start: 2021-09-14 | End: 2021-09-14

## 2021-09-14 RX ORDER — FUROSEMIDE 10 MG/ML
40 INJECTION INTRAMUSCULAR; INTRAVENOUS ONCE
Status: DISCONTINUED | OUTPATIENT
Start: 2021-09-14 | End: 2021-09-14

## 2021-09-14 RX ORDER — FUROSEMIDE 10 MG/ML
40 INJECTION INTRAMUSCULAR; INTRAVENOUS ONCE
Status: COMPLETED | OUTPATIENT
Start: 2021-09-14 | End: 2021-09-14

## 2021-09-14 RX ADMIN — APIXABAN 2.5 MG: 2.5 TABLET, FILM COATED ORAL at 19:00

## 2021-09-14 RX ADMIN — METHYLPREDNISOLONE SODIUM SUCCINATE 40 MG: 40 INJECTION, POWDER, FOR SOLUTION INTRAMUSCULAR; INTRAVENOUS at 09:27

## 2021-09-14 RX ADMIN — Medication 250 MG: at 11:46

## 2021-09-14 RX ADMIN — MAGNESIUM SULFATE HEPTAHYDRATE 1 G: 1 INJECTION, SOLUTION INTRAVENOUS at 11:45

## 2021-09-14 RX ADMIN — ACETAMINOPHEN 650 MG: 650 SUSPENSION ORAL at 19:00

## 2021-09-14 RX ADMIN — AMIODARONE HYDROCHLORIDE 200 MG: 200 TABLET ORAL at 11:48

## 2021-09-14 RX ADMIN — Medication 12.5 MG: at 21:54

## 2021-09-14 RX ADMIN — ACETAMINOPHEN 650 MG: 650 SUSPENSION ORAL at 11:45

## 2021-09-14 RX ADMIN — GABAPENTIN 100 MG: 100 CAPSULE ORAL at 21:54

## 2021-09-14 RX ADMIN — MELATONIN 3 MG: at 21:54

## 2021-09-14 RX ADMIN — Medication 250 MG: at 19:00

## 2021-09-14 RX ADMIN — FUROSEMIDE 40 MG: 10 INJECTION, SOLUTION INTRAVENOUS at 11:51

## 2021-09-14 RX ADMIN — LORAZEPAM 0.5 MG: 2 INJECTION INTRAMUSCULAR; INTRAVENOUS at 00:17

## 2021-09-14 RX ADMIN — Medication 12.5 MG: at 11:45

## 2021-09-14 RX ADMIN — Medication 20 MG: at 11:45

## 2021-09-14 RX ADMIN — PRAVASTATIN SODIUM 40 MG: 40 TABLET ORAL at 19:00

## 2021-09-14 RX ADMIN — APIXABAN 2.5 MG: 2.5 TABLET, FILM COATED ORAL at 11:45

## 2021-09-14 RX ADMIN — SERTRALINE HYDROCHLORIDE 50 MG: 50 TABLET ORAL at 11:45

## 2021-09-14 NOTE — PROCEDURES
Video Swallow Study      Patient Name: Ronny Richardson  RSGPK'Z Date: 9/14/2021        Past Medical History  Past Medical History:   Diagnosis Date    Actinic keratosis     LAST ASSESSED: [id number]    Allergic rhinitis     LAST ASSESSED: [id number]    Anxiety     LAST ASSESSED: [id number]    Anxiety disorder     LAST ASSESSED: [id number]    Atelectasis of right lung     ABNORMAL CT SCAN OF [address] 12/2/2016 REPEAT NEEDED PER RADIOLOGY IN 3 MONTHS LAST ASSESSED: [id number]    Atypical chest pain     LAST ASSESSED: [id number]    Benign paroxysmal vertigo     LAST ASSESSED: [id number]    Chronic cough     LAST ASSESSED: 59TFN2443    Chronic GERD     Degenerative arthritis of knee, bilateral     LAST ASSESSED: [id number]    Diverticulitis of colon     LAST ASSESSED: 92JOZ1143    Diverticulosis     LAST ASSESSED: [id number]    Do not resuscitate status 1/25/2021    Foreign body, eye     LAST ASSESSED: [id number]    Functional gait abnormality     LAST ASSESSED: 47DQP0524    Hyperlipidemia     Hypertension     Hyponatremia     LAST ASSESSED: 23VUC9949    Lactic acidosis 8/19/2021    Leukocytosis     LAST ASSESSED: 86SJE1890    Multifocal pneumonia 8/19/2021    Murmur     Newly recognized murmur     LAST ASSESSED: [id number]    Olecranon bursitis, unspecified elbow     Presence of indwelling urethral catheter     RESOLVED: [id number]    Transient cerebral ischemia     LAST ASSESSED: [id number]    Urinary incontinence     LAST ASSESSED: 50PUR0647    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     LAST ASSESSED: [id number]        Past Surgical History  Past Surgical History:   Procedure Laterality Date    ADENOIDECTOMY      APPENDECTOMY      COLONOSCOPY  10/2006    FIBEROPTIC    INGUINAL HERNIA REPAIR      IR TEMPORARY DIALYSIS CATHETER PLACEMENT  9/1/2021    JOINT REPLACEMENT      b/l TKR Sept 2015    SINUS SURGERY      TONSILLECTOMY 8:36-9:05  Received eval order  Pt already on caseload  ? Of silent aspiration  Subjective: Pt alert and pleasant  Conversing appropriately  States he has 30 days to live and wants ogo home and see his grandchildren and his flowers  Objective:  Pt was made NPO last night  This am he was seen w/ ice chips, nectar water and  thin  States he is not hungry and all he wants is regular water  Pt was seated fully upright  Took all items w/ prompt swallow, adequate appearing control and transfer  Prompt swallow w/ no overt s/s aspiration  Assessment: no overt s/s aspiration  Plan: family would like vbs done to r/o silent aspiration  Dr Antonieta Levin has already ordered  Will complete later today  Video Barium Swallow Study    Summary:  Given puree, thin, and nectar trials: (pt refused soft solid, stated he was too tired to chew  Currently on 8L mfnc)  Oral stage was WNL for bolus formation, control, and transfer  Pharyngeal stage was WNL for epiglottic inversion, hyolaryngeal excursion, and pharyngeal constriction  Swallows prompt  No pharyngeal retention  No penetration or aspiration observed this study  Transient penetration w/ thin w/ intermittent trace epiglottic undercoat  Esophageal stage was WNL per gross screening  Images are on PACS for review  Recommendations:  Diet: puree  Liquids:thin  Meds: crush (ptt unable to transfer 1/2 pill in puree today)  Strategies: feed only when fully alert and upright  Upright position  F/u ST tx: brief  Therapy Prognosis: ? favorable  Prognosis considerations: ? Overall medical status  Full Supervision  Aspiration Precautions  Reflux Precautions  Consider consult with: nutrition  Results reviewed with: pt, nursing, son in room, physician  Aspiration precautions posted      Patient's goal:  Noted above in progress note    Goals:  Dysphagia LTG  -Patient will demonstrate safe and effective oral intake (without overt s/s significant oral/pharyngeal dysphagia including s/s penetration or aspiration) for the highest appropriate diet level  1 Pt will tolerate least restrictive diet w/out s/s aspiration or oral/pharyngeal difficulties  2 Pt will will effectively  transfer purees w/out s/s dysphagia/aspiration  3 Pt will tolerate thin liquids w/out s/s aspiration  Previous VBS:  none  Current Diet:   was puree and nectar here  Made NPO to r/o silent aspiration  Premorbid diet:  regular  Dentition:  Edentulous  GF states dentures at this time  O2 requirement:  8L mfnc  Oral mech:  Strength and ROM:  wfl  Vocal Quality/Speech:  Wnl  No hoarseness  No wet vocal quality  Cognitive status:  Alert and appropriate at this time but this waxes and wanes    Consistencies administered: Barium laden applesauce,  nectar thick, thin liquids,  1/2 13mm barium pill in puree that he was unable to transfer  Liquids were administered straw  Pt was seated laterally at 90 degrees  Please refer to initial bedside eval and progress notes for additional info, as well as daily charting and radiological studies

## 2021-09-14 NOTE — PLAN OF CARE
Problem: NEUROSENSORY - ADULT  Goal: Achieves stable or improved neurological status  Description: INTERVENTIONS  - Monitor and report changes in neurological status  - Monitor vital signs such as temperature, blood pressure, glucose, and any other labs ordered   - Initiate measures to prevent increased intracranial pressure  - Monitor for seizure activity and implement precautions if appropriate      Outcome: Progressing  Goal: Achieves maximal functionality and self care  Description: INTERVENTIONS  - Monitor swallowing and airway patency with patient fatigue and changes in neurological status  - Encourage and assist patient to increase activity and self care     - Encourage visually impaired, hearing impaired and aphasic patients to use assistive/communication devices  Outcome: Progressing     Problem: CARDIOVASCULAR - ADULT  Goal: Maintains optimal cardiac output and hemodynamic stability  Description: INTERVENTIONS:  - Monitor I/O, vital signs and rhythm  - Monitor for S/S and trends of decreased cardiac output  - Administer and titrate ordered vasoactive medications to optimize hemodynamic stability  - Assess quality of pulses, skin color and temperature  - Assess for signs of decreased coronary artery perfusion  - Instruct patient to report change in severity of symptoms  Outcome: Progressing  Goal: Absence of cardiac dysrhythmias or at baseline rhythm  Description: INTERVENTIONS:  - Continuous cardiac monitoring, vital signs, obtain 12 lead EKG if ordered  - Administer antiarrhythmic and heart rate control medications as ordered  - Monitor electrolytes and administer replacement therapy as ordered  Outcome: Progressing     Problem: RESPIRATORY - ADULT  Goal: Achieves optimal ventilation and oxygenation  Description: INTERVENTIONS:  - Assess for changes in respiratory status  - Assess for changes in mentation and behavior  - Position to facilitate oxygenation and minimize respiratory effort  - Oxygen administered by appropriate delivery if ordered  - Initiate smoking cessation education as indicated  - Encourage broncho-pulmonary hygiene including cough, deep breathe, Incentive Spirometry  - Assess the need for suctioning and aspirate as needed  - Assess and instruct to report SOB or any respiratory difficulty  - Respiratory Therapy support as indicated  Outcome: Progressing     Problem: GENITOURINARY - ADULT  Goal: Maintains or returns to baseline urinary function  Description: INTERVENTIONS:  - Assess urinary function  - Encourage oral fluids to ensure adequate hydration if ordered  - Administer IV fluids as ordered to ensure adequate hydration  - Administer ordered medications as needed  - Offer frequent toileting  - Follow urinary retention protocol if ordered  Outcome: Progressing  Goal: Absence of urinary retention  Description: INTERVENTIONS:  - Assess patients ability to void and empty bladder  - Monitor I/O  - Bladder scan as needed  - Discuss with physician/AP medications to alleviate retention as needed  - Discuss catheterization for long term situations as appropriate  Outcome: Progressing     Problem: METABOLIC, FLUID AND ELECTROLYTES - ADULT  Goal: Electrolytes maintained within normal limits  Description: INTERVENTIONS:  - Monitor labs and assess patient for signs and symptoms of electrolyte imbalances  - Administer electrolyte replacement as ordered  - Monitor response to electrolyte replacements, including repeat lab results as appropriate  - Instruct patient on fluid and nutrition as appropriate  Outcome: Progressing  Goal: Fluid balance maintained  Description: INTERVENTIONS:  - Monitor labs   - Monitor I/O and WT  - Instruct patient on fluid and nutrition as appropriate  - Assess for signs & symptoms of volume excess or deficit  Outcome: Progressing  Goal: Glucose maintained within target range  Description: INTERVENTIONS:  - Monitor Blood Glucose as ordered  - Assess for signs and symptoms of hyperglycemia and hypoglycemia  - Administer ordered medications to maintain glucose within target range  - Assess nutritional intake and initiate nutrition service referral as needed  Outcome: Progressing     Problem: HEMATOLOGIC - ADULT  Goal: Maintains hematologic stability  Description: INTERVENTIONS  - Assess for signs and symptoms of bleeding or hemorrhage  - Monitor labs  - Administer supportive blood products/factors as ordered and appropriate  Outcome: Progressing     Problem: MUSCULOSKELETAL - ADULT  Goal: Maintain or return mobility to safest level of function  Description: INTERVENTIONS:  - Assess patient's ability to carry out ADLs; assess patient's baseline for ADL function and identify physical deficits which impact ability to perform ADLs (bathing, care of mouth/teeth, toileting, grooming, dressing, etc )  - Assess/evaluate cause of self-care deficits   - Assess range of motion  - Assess patient's mobility  - Assess patient's need for assistive devices and provide as appropriate  - Encourage maximum independence but intervene and supervise when necessary  - Involve family in performance of ADLs  - Assess for home care needs following discharge   - Consider OT consult to assist with ADL evaluation and planning for discharge  - Provide patient education as appropriate  Outcome: Progressing

## 2021-09-14 NOTE — PHYSICAL THERAPY NOTE
PHYSICAL THERAPY NOTE          Patient Name: Adelita Fitch  DUY'E Date: 9/14/2021     Pt off floor for VBS  Will follow and progress with PT as appropriate       Deborah Longoria, PT

## 2021-09-14 NOTE — ASSESSMENT & PLAN NOTE
Wt Readings from Last 3 Encounters:   09/14/21 76 1 kg (167 lb 12 3 oz)   07/26/21 83 5 kg (184 lb 2 oz)   03/19/21 82 8 kg (182 lb 9 6 oz)     Presented with acute on chronic diastolic heart failure exacerbation      BNP elevated   Weights appear stable, currently at 76 1 kg down from 182 from admission   Will aim for net negative goal of 500 mL today, redose furosemide again  Hydralazine to be discontinued- to allow for more appropriate diuresed

## 2021-09-14 NOTE — ASSESSMENT & PLAN NOTE
Controlled  - Continue discontinue hydralazine 25mg TID, to allow for IV diuresis isosorbide 10mg tid, and metoprolol 12 5mg Q12

## 2021-09-14 NOTE — OCCUPATIONAL THERAPY NOTE
Patient was attempted this AM however [facility] notified of patient being sent for a video study and therefore withheld treatment at this time       Elie Ge  9/14/2021

## 2021-09-14 NOTE — ASSESSMENT & PLAN NOTE
78year old male presenting with acute respiratory failure with hypoxia  Possibly due to vascular congestion / multifocal pneumonia  Required ICU level of care / intubation  Completed antibiotic therapy  S/p extubation and clinically improved until 9/9 where he decompensated  09/13/2021 required 15 L again  -   Likely aspiration event      Luckily  He is now down to 10 L after  Corticosteroids and intravenous furosemide   Will obtain updated chest CT  Palliative care consultation placed  Given high burden of disease pathology   Even with aggressive care prognosis remains  poor

## 2021-09-14 NOTE — ASSESSMENT & PLAN NOTE
Completed antibiotic therapy  Was restarted on antibiotics due to concerns for aspiration pneumonia  As per previous providers discussion with ID, likely due to aspiration pneumonitis  Continue monitoring off antibiotics  Video swallow reviewed with speech, placed on dysphagia 1 and thin    Luckily no evidence of any current aspiration on VSE today    CT of the chest performed which is more suggestive of heart failure and pulmonary edema  The etiology of this remains unknown - does not examined fluid overloaded, however is on nitrates as well as hydralazine which is likely not allowing appropriate diuresis    Hydralazine to be discontinued, given intravenous furosemide earlier

## 2021-09-14 NOTE — PLAN OF CARE
Problem: Potential for Falls  Goal: Patient will remain free of falls  Description: INTERVENTIONS:  - Educate patient/family on patient safety including physical limitations  - Instruct patient to call for assistance with activity   - Consult OT/PT to assist with strengthening/mobility   - Keep Call bell within reach  - Keep bed low and locked with side rails adjusted as appropriate  - Keep care items and personal belongings within reach  - Initiate and maintain comfort rounds  - Make Fall Risk Sign visible to staff  - Offer Toileting every  Hours, in advance of need  - Initiate/Maintain alarm  - Obtain necessary fall risk management equipment:   - Apply yellow socks and bracelet for high fall risk patients  - Consider moving patient to room near nurses station  Outcome: Progressing     Problem: MOBILITY - ADULT  Goal: Maintain or return to baseline ADL function  Description: INTERVENTIONS:  -  Assess patient's ability to carry out ADLs; assess patient's baseline for ADL function and identify physical deficits which impact ability to perform ADLs (bathing, care of mouth/teeth, toileting, grooming, dressing, etc )  - Assess/evaluate cause of self-care deficits   - Assess range of motion  - Assess patient's mobility; develop plan if impaired  - Assess patient's need for assistive devices and provide as appropriate  - Encourage maximum independence but intervene and supervise when necessary  - Involve family in performance of ADLs  - Assess for home care needs following discharge   - Consider OT consult to assist with ADL evaluation and planning for discharge  - Provide patient education as appropriate  Outcome: Progressing  Goal: Maintains/Returns to pre admission functional level  Description: INTERVENTIONS:  - Perform BMAT or MOVE assessment daily    - Set and communicate daily mobility goal to care team and patient/family/caregiver     - Collaborate with rehabilitation services on mobility goals if consulted  - Perform Range of Motion  times a day  - Reposition patient every  hours  - Dangle patient  times a day  - Stand patient  times a day  - Ambulate patient  times a day  - Out of bed to chair  times a day   - Out of bed for meals  times a day  - Out of bed for toileting  - Record patient progress and toleration of activity level   Outcome: Progressing     Problem: Prexisting or High Potential for Compromised Skin Integrity  Goal: Skin integrity is maintained or improved  Description: INTERVENTIONS:  - Identify patients at risk for skin breakdown  - Assess and monitor skin integrity  - Assess and monitor nutrition and hydration status  - Monitor labs   - Assess for incontinence   - Turn and reposition patient  - Assist with mobility/ambulation  - Relieve pressure over bony prominences  - Avoid friction and shearing  - Provide appropriate hygiene as needed including keeping skin clean and dry  - Evaluate need for skin moisturizer/barrier cream  - Collaborate with interdisciplinary team   - Patient/family teaching  - Consider wound care consult   Outcome: Progressing     Problem: NEUROSENSORY - ADULT  Goal: Achieves stable or improved neurological status  Description: INTERVENTIONS  - Monitor and report changes in neurological status  - Monitor vital signs such as temperature, blood pressure, glucose, and any other labs ordered   - Initiate measures to prevent increased intracranial pressure  - Monitor for seizure activity and implement precautions if appropriate      Outcome: Progressing  Goal: Achieves maximal functionality and self care  Description: INTERVENTIONS  - Monitor swallowing and airway patency with patient fatigue and changes in neurological status  - Encourage and assist patient to increase activity and self care     - Encourage visually impaired, hearing impaired and aphasic patients to use assistive/communication devices  Outcome: Progressing     Problem: CARDIOVASCULAR - ADULT  Goal: Maintains optimal cardiac output and hemodynamic stability  Description: INTERVENTIONS:  - Monitor I/O, vital signs and rhythm  - Monitor for S/S and trends of decreased cardiac output  - Administer and titrate ordered vasoactive medications to optimize hemodynamic stability  - Assess quality of pulses, skin color and temperature  - Assess for signs of decreased coronary artery perfusion  - Instruct patient to report change in severity of symptoms  Outcome: Progressing  Goal: Absence of cardiac dysrhythmias or at baseline rhythm  Description: INTERVENTIONS:  - Continuous cardiac monitoring, vital signs, obtain 12 lead EKG if ordered  - Administer antiarrhythmic and heart rate control medications as ordered  - Monitor electrolytes and administer replacement therapy as ordered  Outcome: Progressing     Problem: RESPIRATORY - ADULT  Goal: Achieves optimal ventilation and oxygenation  Description: INTERVENTIONS:  - Assess for changes in respiratory status  - Assess for changes in mentation and behavior  - Position to facilitate oxygenation and minimize respiratory effort  - Oxygen administered by appropriate delivery if ordered  - Initiate smoking cessation education as indicated  - Encourage broncho-pulmonary hygiene including cough, deep breathe, Incentive Spirometry  - Assess the need for suctioning and aspirate as needed  - Assess and instruct to report SOB or any respiratory difficulty  - Respiratory Therapy support as indicated  Outcome: Progressing     Problem: GASTROINTESTINAL - ADULT  Goal: Minimal or absence of nausea and/or vomiting  Description: INTERVENTIONS:  - Administer IV fluids if ordered to ensure adequate hydration  - Maintain NPO status until nausea and vomiting are resolved  - Nasogastric tube if ordered  - Administer ordered antiemetic medications as needed  - Provide nonpharmacologic comfort measures as appropriate  - Advance diet as tolerated, if ordered  - Consider nutrition services referral to assist patient with adequate nutrition and appropriate food choices  Outcome: Progressing  Goal: Maintains or returns to baseline bowel function  Description: INTERVENTIONS:  - Assess bowel function  - Encourage oral fluids to ensure adequate hydration  - Administer IV fluids if ordered to ensure adequate hydration  - Administer ordered medications as needed  - Encourage mobilization and activity  - Consider nutritional services referral to assist patient with adequate nutrition and appropriate food choices  Outcome: Progressing  Goal: Maintains adequate nutritional intake  Description: INTERVENTIONS:  - Monitor percentage of each meal consumed  - Identify factors contributing to decreased intake, treat as appropriate  - Assist with meals as needed  - Monitor I&O, weight, and lab values if indicated  - Obtain nutrition services referral as needed  Outcome: Progressing  Goal: Oral mucous membranes remain intact  Description: INTERVENTIONS  - Assess oral mucosa and hygiene practices  - Implement preventative oral hygiene regimen  - Implement oral medicated treatments as ordered  - Initiate Nutrition services referral as needed  Outcome: Progressing     Problem: GENITOURINARY - ADULT  Goal: Maintains or returns to baseline urinary function  Description: INTERVENTIONS:  - Assess urinary function  - Encourage oral fluids to ensure adequate hydration if ordered  - Administer IV fluids as ordered to ensure adequate hydration  - Administer ordered medications as needed  - Offer frequent toileting  - Follow urinary retention protocol if ordered  Outcome: Progressing  Goal: Absence of urinary retention  Description: INTERVENTIONS:  - Assess patients ability to void and empty bladder  - Monitor I/O  - Bladder scan as needed  - Discuss with physician/AP medications to alleviate retention as needed  - Discuss catheterization for long term situations as appropriate  Outcome: Progressing     Problem: METABOLIC, FLUID AND ELECTROLYTES - ADULT  Goal: Electrolytes maintained within normal limits  Description: INTERVENTIONS:  - Monitor labs and assess patient for signs and symptoms of electrolyte imbalances  - Administer electrolyte replacement as ordered  - Monitor response to electrolyte replacements, including repeat lab results as appropriate  - Instruct patient on fluid and nutrition as appropriate  Outcome: Progressing  Goal: Fluid balance maintained  Description: INTERVENTIONS:  - Monitor labs   - Monitor I/O and WT  - Instruct patient on fluid and nutrition as appropriate  - Assess for signs & symptoms of volume excess or deficit  Outcome: Progressing  Goal: Glucose maintained within target range  Description: INTERVENTIONS:  - Monitor Blood Glucose as ordered  - Assess for signs and symptoms of hyperglycemia and hypoglycemia  - Administer ordered medications to maintain glucose within target range  - Assess nutritional intake and initiate nutrition service referral as needed  Outcome: Progressing     Problem: SKIN/TISSUE INTEGRITY - ADULT  Goal: Skin Integrity remains intact(Skin Breakdown Prevention)  Description: Assess:  -Perform Kevon assessment every   -Clean and moisturize skin every   -Inspect skin when repositioning, toileting, and assisting with ADLS  -Assess under medical devices such as  every   -Assess extremities for adequate circulation and sensation     Bed Management:  -Have minimal linens on bed & keep smooth, unwrinkled  -Change linens as needed when moist or perspiring  -Avoid sitting or lying in one position for more than  hours while in bed  -Keep HOB at degrees     Toileting:  -Offer bedside commode  -Assess for incontinence every   -Use incontinent care products after each incontinent episode such as     Activity:  -Mobilize patient  times a day  -Encourage activity and walks on unit  -Encourage or provide ROM exercises   -Turn and reposition patient every  Hours  -Use appropriate equipment to lift or move patient in bed  -Instruct/ Assist with weight shifting every  when out of bed in chair  -Consider limitation of chair time  hour intervals    Skin Care:  -Avoid use of baby powder, tape, friction and shearing, hot water or constrictive clothing  -Relieve pressure over bony prominences using   -Do not massage red bony areas    Next Steps:  -Teach patient strategies to minimize risks such as    -Consider consults to  interdisciplinary teams such as:  Outcome: Progressing     Problem: HEMATOLOGIC - ADULT  Goal: Maintains hematologic stability  Description: INTERVENTIONS  - Assess for signs and symptoms of bleeding or hemorrhage  - Monitor labs  - Administer supportive blood products/factors as ordered and appropriate  Outcome: Progressing     Problem: MUSCULOSKELETAL - ADULT  Goal: Maintain or return mobility to safest level of function  Description: INTERVENTIONS:  - Assess patient's ability to carry out ADLs; assess patient's baseline for ADL function and identify physical deficits which impact ability to perform ADLs (bathing, care of mouth/teeth, toileting, grooming, dressing, etc )  - Assess/evaluate cause of self-care deficits   - Assess range of motion  - Assess patient's mobility  - Assess patient's need for assistive devices and provide as appropriate  - Encourage maximum independence but intervene and supervise when necessary  - Involve family in performance of ADLs  - Assess for home care needs following discharge   - Consider OT consult to assist with ADL evaluation and planning for discharge  - Provide patient education as appropriate  Outcome: Progressing     Problem: Nutrition/Hydration-ADULT  Goal: Nutrient/Hydration intake appropriate for improving, restoring or maintaining nutritional needs  Description: Monitor and assess patient's nutrition/hydration status for malnutrition  Collaborate with interdisciplinary team and initiate plan and interventions as ordered    Monitor patient's weight and dietary intake as ordered or per policy  Utilize nutrition screening tool and intervene as necessary  Determine patient's food preferences and provide high-protein, high-caloric foods as appropriate       INTERVENTIONS:  - Monitor oral intake, urinary output, labs, and treatment plans  - Assess nutrition and hydration status and recommend course of action  - Evaluate amount of meals eaten  - Assist patient with eating if necessary   - Allow adequate time for meals  - Recommend/ encourage appropriate diets, oral nutritional supplements, and vitamin/mineral supplements  - Order, calculate, and assess calorie counts as needed  - Recommend, monitor, and adjust tube feedings and TPN/PPN based on assessed needs  - Assess need for intravenous fluids  - Provide specific nutrition/hydration education as appropriate  - Include patient/family/caregiver in decisions related to nutrition  Outcome: Progressing

## 2021-09-14 NOTE — CONSULTS
Consultation - Palliative & Supportive Care   Alexandra Phillips  78 y o   male  [address] M*   MRN: [id number]  Encounter: [id number]    ASSESSMENT:    Patient Active Problem List   Diagnosis    Mixed hyperlipidemia    Essential hypertension    Aortic stenosis, moderate    Enlarged prostate without lower urinary tract symptoms (luts)    Fatty liver disease, nonalcoholic    Moderate episode of recurrent major depressive disorder ([facility] )    PVC (premature ventricular contraction)    Medicare annual wellness visit, subsequent    Mild cognitive impairment    Obesity (BMI 30 0-34  9)    Chronic constipation    Iron deficiency anemia secondary to inadequate dietary iron intake    Multifocal pneumonia    Acute respiratory failure with hypoxia (HCC)    Acute on chronic diastolic CHF (congestive heart failure) ([facility])    Hypochromic microcytic anemia    CANDIE (acute kidney injury) ([facility] )    Atrial fibrillation (HCC)    Elevated troponin level    Hypermagnesemia    Hypernatremia    Acute metabolic encephalopathy    Ambulatory dysfunction    Insomnia due to medical condition       Active problems addressed:    Consult is for goals of care discussion    PLAN:    1  Goals:    Discussion with family, 2 sons and significant other present at bedside  Patient verbalized 'he is ready to go' and thinking of pursuing hospice, will discuss with rest of family today regarding his wishes   Will continue to discuss goals of care    Code status: Level 1 - Full Code   Decisional apparatus:  Patient does have capacity to make medical decisions on my exam today  If such capacity is lost, patient's substitute decision maker would default to children (listed on file sons Raz Barajas and Tamika Camacho) by [place]  Advance Directive / Living Will / POLST:    2  Social Support:   Supportive listening provided     Patient stated he is 'ready to go' and discussed with his significant other last night regarding his wishes  He is thinking of pursuing hospice care moving forward, although he would want to discuss further with his family  3  Symptom management:   Currently no reports of ongoing pain, appears well controlled on regimen  o Continue with scheduled Tylenol, Gabapentin 100mg HS, lidocaine patch   Insomnia  o Melatonin 3mg HS    Controlled Substance Review    [place]MP or [place]  reviewed: No red flags were identified; safe to proceed with prescription         I have reviewed the patient's controlled substance dispensing history in the Prescription Drug Monitoring Program in compliance with the [facility] regulations before prescribing any controlled substances  We appreciate the opportunity to participate in this patient's care  We will continue to follow  Please do not hesitate to contact our on-call provider through our clinic answering service at [phone] should you have acute symptom control concerns  IDENTIFICATION:  Inpatient consult to Palliative Care  Consult performed by: Kaity Tatum MD  Consult ordered by: Cipriano Mart DO        Reason for Consult / Principal Problem: goals of care    HISTORY OF PRESENT ILLNESS:    Indra Jackson is a 78 y o  male with multiple medical comorbidities including HTN, HLD, mild cognitive impairment, GERD, anxiety and AS admitted 8/20/2021 for SOB and chest pain  Patient with a prolonged hospital course and was managed initially in the ICU for acute hypoxic respiratory failure thought to be secondary to acute CHF exacerbation and possible aspiration/bacterial PNA  Patient was subsequently intubated due to agitation and respiratory distress  Patient's course was also complicated by new onset atrial fibrillation  Patient was evaluated by ID and Cardiology  He was initiated on broad spectrum antibiotics  He was also aggressively diuresed   Patient also noted to have worsening kidney function necessitating brief HD that was eventually discontinued due to hypotension  Patient also underwent bronchoscopy/BAL which was unrevealing  Patient eventually extubated and was transferred to the medical floor on 08/30, however still with noted significant need for oxygen supplementation  There was also concern regarding continued aspiration events  Patient was evaluated by speech  Due to concern for increasing white count, repeat CXR was done which showed persistence of infiltrates with concern for volume overload vs aspiration pneumonitis  Currently patient is being monitored off antibiotics  Patient is scheduled to undergo barium swallow study  Patient is also being managed with diuresis and steroids for continued hypoxia and possible ILD  Interview and exam limited by: None    Review of Systems   Constitutional: Positive for fatigue  HENT: Negative for tinnitus and trouble swallowing  Eyes: Negative for pain and visual disturbance  Respiratory: Positive for shortness of breath  Cardiovascular: Positive for chest pain  Gastrointestinal: Negative for abdominal pain  Genitourinary: Negative for dysuria and hematuria  Musculoskeletal: Positive for arthralgias  Skin: Negative for color change and pallor  Neurological: Negative for dizziness and light-headedness  Hematological: Negative for adenopathy  Psychiatric/Behavioral: Negative for hallucinations  All other systems reviewed and are negative        Past Medical History:   Diagnosis Date    Actinic keratosis     LAST ASSESSED: [id number]    Allergic rhinitis     LAST ASSESSED: [id number]    Anxiety     LAST ASSESSED: [id number]    Anxiety disorder     LAST ASSESSED: [id number]    Atelectasis of right lung     ABNORMAL CT SCAN OF [facility] 12/2/2016 REPEAT NEEDED PER RADIOLOGY IN 3 MONTHS LAST ASSESSED: [id number]    Atypical chest pain     LAST ASSESSED: [id number]    Benign paroxysmal vertigo     LAST ASSESSED: 80WDX7884    Chronic cough     LAST ASSESSED: 25YMN4353    Chronic GERD     Degenerative arthritis of knee, bilateral     LAST ASSESSED: 55LLA1227    Diverticulitis of colon     LAST ASSESSED: 57SJK0056    Diverticulosis     LAST ASSESSED: [id number]    Do not resuscitate status 2021    Foreign body, eye     LAST ASSESSED: 24CMI2970    Functional gait abnormality     LAST ASSESSED: 21MDH9623    Hyperlipidemia     Hypertension     Hyponatremia     LAST ASSESSED: 06YJI6543    Lactic acidosis 2021    Leukocytosis     LAST ASSESSED: 07CFE3840    Multifocal pneumonia 2021    Murmur     Newly recognized murmur     LAST ASSESSED: 21OPP4703    Olecranon bursitis, unspecified elbow     Presence of indwelling urethral catheter     RESOLVED: [id number]    Transient cerebral ischemia     LAST ASSESSED: [id number]    Urinary incontinence     LAST ASSESSED: 46DVG2989    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     LAST ASSESSED: 79BPA1379     Past Surgical History:   Procedure Laterality Date    ADENOIDECTOMY      APPENDECTOMY      COLONOSCOPY  10/2006    FIBEROPTIC    INGUINAL HERNIA REPAIR      IR TEMPORARY DIALYSIS CATHETER PLACEMENT  2021    JOINT REPLACEMENT      b/l TKR 2015    SINUS SURGERY      TONSILLECTOMY       Social History     Socioeconomic History    Marital status:       Spouse name: Not on file    Number of children: Not on file    Years of education: Not on file    Highest education level: Not on file   Occupational History    Occupation: tailor, 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   Tobacco Use    Smoking status: Former Smoker     Quit date: 1960     Years since quittin 7    Smokeless tobacco: Never Used    Tobacco comment: 2 ppd for 12 years    Vaping Use    Vaping Use: Former   Substance and Sexual Activity    Alcohol use: Yes     Comment: occ, SOCIAL    Drug use: No    Sexual activity: Not Currently   Other Topics Concern    Not on file   Social History Narrative    USES SAFETY EQUIPMENT - [employer] Social Determinants of Health     Financial Resource Strain:     Difficulty of Paying Living Expenses:    Food Insecurity:     Worried About Running Out of Food in the Last Year:     [address] in the Last Year:    Transportation Needs:     Lack of Transportation (Medical):      Lack of Transportation (Non-Medical):    Physical Activity:     Days of Exercise per Week:     Minutes of Exercise per Session:    Stress:     Feeling of Stress :    Social Connections:     Frequency of Communication with Friends and Family:     Frequency of Social Gatherings with Friends and Family:     Attends [employer] Services:     Active Member of Clubs or Organizations:     Attends Club or Organization Meetings:     Marital Status:    Intimate Partner Violence:     Fear of Current or Ex-Partner:     Emotionally Abused:     Physically Abused:     Sexually Abused:      Family History   Problem Relation Age of Onset    Alzheimer's disease Mother     Coronary artery disease Brother        MEDICATIONS / ALLERGIES:  all current active meds have been reviewed and current meds:   Current Facility-Administered Medications   Medication Dose Route Frequency    Acetaminophen (TYLENOL) oral suspension 650 mg  650 mg Per NG Tube Q6H    ALPRAZolam (XANAX) tablet 0 25 mg  0 25 mg Oral HS PRN    amiodarone tablet 200 mg  200 mg Oral Daily With Breakfast    apixaban (ELIQUIS) tablet 2 5 mg  2 5 mg Oral BID    gabapentin (NEURONTIN) capsule 100 mg  100 mg Oral HS    hydrALAZINE (APRESOLINE) tablet 25 mg  25 mg Oral Q8H [facility]    isosorbide dinitrate (ISORDIL) tablet 10 mg  10 mg Oral TID after meals    lidocaine (LIDODERM) 5 % patch 1 patch  1 patch Topical Daily    LORazepam (ATIVAN) injection 0 5 mg  0 5 mg Intravenous Q6H PRN    melatonin tablet 3 mg  3 mg Oral HS    methylPREDNISolone sodium succinate (Solu-MEDROL) injection 40 mg  40 mg Intravenous Daily    metoprolol tartrate (LOPRESSOR) partial tablet 12 5 mg 12 5 mg Oral Q12H [address]    omeprazole (PRILOSEC) suspension 2 mg/mL  20 mg Oral Daily    pravastatin (PRAVACHOL) tablet 40 mg  40 mg Oral Daily With Dinner    saccharomyces boulardii (FLORASTOR) capsule 250 mg  250 mg Oral BID    senna-docusate sodium (SENOKOT S) 8 6-50 mg per tablet 2 tablet  2 tablet Oral BID PRN    sertraline (ZOLOFT) tablet 50 mg  50 mg Oral Daily       Allergies   Allergen Reactions    Ace Inhibitors Cough     Pt denied any allergies         OBJECTIVE:  /67 (BP Location: Left arm)   Pulse 86   Temp (!) 96 7 °F (35 9 °C) (Oral)   Resp 20   Ht 5' 4" (1 626 m)   Wt 76 1 kg (167 lb 12 3 oz)   SpO2 92%   BMI 28 80 kg/m²     Physical Exam  Vitals and nursing note reviewed  Constitutional:       General: He is not in acute distress  Comments: Frail appearing, ill-appearing, pleasant elderly gentleman   HENT:      Head: Normocephalic and atraumatic  Nose: Nose normal       Mouth/Throat:      Mouth: Mucous membranes are moist       Pharynx: Oropharynx is clear  Eyes:      Conjunctiva/sclera: Conjunctivae normal       Pupils: Pupils are equal, round, and reactive to light  Cardiovascular:      Rate and Rhythm: Normal rate and regular rhythm  Pulmonary:      Effort: Pulmonary effort is normal       Comments: diminished throughout, on 15L O2 supplementation  Abdominal:      General: Bowel sounds are normal       Tenderness: There is no abdominal tenderness  There is no guarding  Musculoskeletal:         General: Normal range of motion  Cervical back: Normal range of motion  Skin:     General: Skin is warm and dry  Neurological:      General: No focal deficit present  Mental Status: He is alert and oriented to person, place, and time  Mental status is at baseline  Psychiatric:         Mood and Affect: Mood normal          Thought Content: Thought content normal           Lab Results:   I have personally reviewed pertinent labs  , CBC:   Lab Results Component Value Date    WBC 6 91 09/14/2021    HGB 9 2 (L) 09/14/2021    HCT 30 7 (L) 09/14/2021    MCV 83 09/14/2021     (H) 09/14/2021    MCH 24 9 (L) 09/14/2021    MCHC 30 0 (L) 09/14/2021    RDW 24 1 (H) 09/14/2021    MPV 10 0 09/14/2021   , CMP:   Lab Results   Component Value Date    SODIUM 134 (L) 09/14/2021    K 4 1 09/14/2021    CL 99 (L) 09/14/2021    CO2 26 09/14/2021    BUN 21 09/14/2021    CREATININE 0 79 09/14/2021    CALCIUM 8 8 09/14/2021    EGFR 83 09/14/2021   , BMP:  Lab Results   Component Value Date    SODIUM 134 (L) 09/14/2021    K 4 1 09/14/2021    CL 99 (L) 09/14/2021    CO2 26 09/14/2021    BUN 21 09/14/2021    CREATININE 0 79 09/14/2021    GLUC 140 09/14/2021    CALCIUM 8 8 09/14/2021    AGAP 9 09/14/2021    EGFR 83 09/14/2021   , PT/PTT:No results found for: PT, PTT  Imaging Studies: I have personally reviewed pertinent reports  and I have personally reviewed pertinent films in PACS  EKG, Pathology, and Other Studies: I have personally reviewed pertinent reports  and I have personally reviewed pertinent films in PACS    Counseling / Coordination of Care:  Counseling / Coordination of Care  Please refer to attending's attestation      Den Michaud MD  [address] [place] [address]

## 2021-09-14 NOTE — ASSESSMENT & PLAN NOTE
Patient with moderate aortic stenosis, likely contributing to volume overload  His respiratory status remains tenuous  BNP elevated

## 2021-09-14 NOTE — ASSESSMENT & PLAN NOTE
Acute kidney injury requiring hemodialysis, could not tolerate hemodialysis  Resolved     Recent Labs     09/12/21  0457 09/13/21  0507 09/14/21  0440   BUN 23 18 21   CREATININE 1 09 0 96 0 79   EGFR 62 73 83

## 2021-09-14 NOTE — ASSESSMENT & PLAN NOTE
Due to free water deficit and dehydration    Close monitor, has normalized   Encourage free water intake    Recent Labs     09/12/21  0457 09/13/21  0507 09/14/21  0440   SODIUM 135* 132* 134*

## 2021-09-14 NOTE — PROGRESS NOTES
[address] [facility]  Progress Note Ignacio Amor 1940, 78 y o  male MRN: [id number]  Unit/Bed#: [address] 68 2 [address] 216-01 Encounter: [id number]  Primary Care Provider: Zenobia Payment, DO   Date and time admitted to hospital: 8/19/2021  8:25 PM    Insomnia due to medical condition  Assessment & Plan  Continue frequent reorientation   Had geriatrics consultation earlier in the admission    Ambulatory dysfunction  Assessment & Plan  PT OT evals -   If recovers from pulmonary process    Acute metabolic encephalopathy  Assessment & Plan  Waxing and waning  In the setting of acute illness, more alert today  Likely decreased cerebral perfusion in the setting of hypoxic    Hypernatremia  Assessment & Plan  Due to free water deficit and dehydration    Close monitor, has normalized   Encourage free water intake    Recent Labs     09/12/21  0457 09/13/21  0507 09/14/21  0440   SODIUM 135* 132* 134*             Atrial fibrillation ([facility] )  Assessment & Plan  New onset atrial fibrillation with RVR  Currently on NSR   - Continue Eliquis 2 5mg BID  - Continue amiodarone daily    CANDIE (acute kidney injury) ([facility] )  Assessment & Plan  Acute kidney injury requiring hemodialysis, could not tolerate hemodialysis  Resolved  Recent Labs     09/12/21  0457 09/13/21  0507 09/14/21  0440   BUN 23 18 21   CREATININE 1 09 0 96 0 79   EGFR 62 73 83               Acute on chronic diastolic CHF (congestive heart failure) ([facility])  Assessment & Plan  Wt Readings from Last 3 Encounters:   09/14/21 76 1 kg (167 lb 12 3 oz)   07/26/21 83 5 kg (184 lb 2 oz)   03/19/21 82 8 kg (182 lb 9 6 oz)     Presented with acute on chronic diastolic heart failure exacerbation      BNP elevated   Weights appear stable, currently at 76 1 kg down from 182 from admission   Will aim for net negative goal of 500 mL today, redose furosemide again  Hydralazine to be discontinued- to allow for more appropriate diuresed    Multifocal pneumonia  Assessment & Plan  Completed antibiotic therapy  Was restarted on antibiotics due to concerns for aspiration pneumonia  As per previous providers discussion with ID, likely due to aspiration pneumonitis  Continue monitoring off antibiotics  Video swallow reviewed with speech, placed on dysphagia 1 and thin  Luckily no evidence of any current aspiration on VSE today    CT of the chest performed which is more suggestive of heart failure and pulmonary edema  The etiology of this remains unknown - does not examined fluid overloaded, however is on nitrates as well as hydralazine which is likely not allowing appropriate diuresis    Hydralazine to be discontinued, given intravenous furosemide earlier    Mild cognitive impairment  Assessment & Plan  Delirium precautions  Appreciate geriatric recommendations  Aortic stenosis, moderate  Assessment & Plan  Patient with moderate aortic stenosis, likely contributing to volume overload  His respiratory status remains tenuous  BNP elevated    Essential hypertension  Assessment & Plan  Controlled  - Continue discontinue hydralazine 25mg TID, to allow for IV diuresis isosorbide 10mg tid, and metoprolol 12 5mg Q12  * Acute respiratory failure with hypoxia [facility])  Assessment & Plan  78year old male presenting with acute respiratory failure with hypoxia  Possibly due to vascular congestion / multifocal pneumonia  Required ICU level of care / intubation  Completed antibiotic therapy  S/p extubation and clinically improved until 9/9 where he decompensated  09/13/2021 required 15 L again  -   Likely aspiration event      Luckily  He is now down to 10 L after  Corticosteroids and intravenous furosemide   Will obtain updated chest CT  Palliative care consultation placed  Given high burden of disease pathology   Even with aggressive care prognosis remains  poor    Leukocytosis-resolved as of 9/14/2021  Assessment & Plan  -patient initially had a leukocytosis, secondary to multifocal pneumonia  -it had progressively improved with his course of antibiotics  -8/31 white blood cell count had improved to 11  -9/1 white blood cell count accelerated to 20  Medications reviewed, patient has not received any steroids or other medications that would accelerate his white blood cell count  -patient with new abdominal discomfort on exam  -currently evaluate source of leukocytosis  -a) pulmonary:  Patient with recent lethargy, and dysphagia, at risk for aspiration pneumonia  -restarted empiric antibiotics with cefepime/Flagyl:  Transitioned to Unasyn by ID  -9/1 repeat blood cultures negative x2  -9/1 CT scan chest: "Limited study due to motion artifact  Extensive groundglass opacities throughout both lungs, compatible with nonspecific infectious process, including COVID 19 pneumonia    Fluid-filled small bowel and colon which may represent nonspecific gastroenteritis  Colonic diverticulosis without diverticulitis "  b) GI:  Mild abdominal pain:  Fluid-filled colon    Patient with small amount of loose stool, not consistent with C diff  -started probiotic  c) patient with suprapubic tenderness:    -negative urinalysis  -continue urinary retention protocol:  -current bladder scan without significant retention  -appreciate Infectious Disease teams evaluation and recommendations:  Patient's leukocytosis may be secondary to aspiration pneumonitis, rather than bacterial aspiration pneumonia in light of normal procalcitonin x2  -9/3 antibiotics discontinued:  Continue to monitor  -peripheral smear:  No evidence of blasts or leukemic cells, appears neutrophilic predominance            [facility] Internal Medicine Progress Note  Patient: Mona Bond 78 y o  male   MRN: [id number]  PCP: Ignacio Bolden DO  Unit/Bed#: [place]  -01 Encounter: [id number]  Date Of Visit: 09/14/21    Assessment:    Principal Problem:    Acute respiratory failure with hypoxia ([facility] )  Active Problems: Essential hypertension    Aortic stenosis, moderate    Mild cognitive impairment    Multifocal pneumonia    Acute on chronic diastolic CHF (congestive heart failure) (HCC)    CANDIE (acute kidney injury) ([facility] )    Atrial fibrillation (HCC)    Hypernatremia    Acute metabolic encephalopathy    Ambulatory dysfunction    Insomnia due to medical condition      Plan:    · Interestingly enough, the patient underwent video swallow evaluation with no evidence of aspiration  · Repeat chest CT showed resolved right lower lobe infiltrate  · Suspect aspiration pneumonitis rather than aspiration pneumonia  · BNP elevated, will re-dose furosemide as had some improvement  · Palliative care consultation placed today given patient's overall high burden of disease pathology is he has unlikely to survive for more than a few weeks given significantly impaired lung function and overall disease burden  · Will continue to support family's decision on code status as well as aggressive medical management and care    Appreciate pulmonary recommendations    CT of the chest personally reviewed by me, demonstrates similar appearance from 4 days ago, there is no new right lower airspace disease which was visualized on chest radiograph on 09/13/2021       VTE Pharmacologic Prophylaxis:   Pharmacologic: Apixaban (Eliquis)  Mechanical VTE Prophylaxis in Place: Yes    Patient Centered Rounds: I have performed bedside rounds with nursing staff today  Discussions with Specialists or Other Care Team Provider:  /pulmonary/palliative care    Education and Discussions with Family / Patient:  Son at bedside    Time Spent for Care: 45 minutes  More than 50% of total time spent on counseling and coordination of care as described above      Current Length of Stay: 26 day(s)    Current Patient Status: Inpatient   Certification Statement: The patient will continue to require additional inpatient hospital stay due to Treatment of acute hypoxemic respiratory failure    Discharge Plan / Estimated Discharge Date:  48 hour    Code Status: Level 1 - Full Code      Subjective:   Seen and examined, he is much more alert awake today  Case was discussed with speech therapy throughout the day, he did have video swallow evaluation with no overt aspiration  Improvement with intravenous diuretic from yesterday, will re-dose today    Patient without any chest pain, he is much more comfortable  He would like to be discharged home so he can see family members  A complete and comprehensive 14 point organ system review has been performed and all other systems are negative other than stated above  Objective:     Vitals:   Temp (24hrs), Av °F (36 1 °C), Min:96 7 °F (35 9 °C), Max:97 2 °F (36 2 °C)    Temp:  [96 7 °F (35 9 °C)-97 2 °F (36 2 °C)] 97 °F (36 1 °C)  HR:  [76-87] 76  Resp:  [20-21] 20  BP: (118-136)/(54-67) 118/54  SpO2:  [91 %-97 %] 91 %  Body mass index is 28 8 kg/m²       Input and Output Summary (last 24 hours):     No intake or output data in the 24 hours ending 21 1729    Physical Exam:     General:  Appears chronically  HEENT: atraumatic, PERRLA, moist mucosa, normal pharynx, normal tonsils and adenoids, normal tongue, no fluid in sinuses  Neck: Trachea midline, no carotid bruit, no masses  Respiratory:  Coarse breath sounds, right greater than  Cardiovascular:  Irregularly irregular  Abdomen: Soft, non-tender, non-distended, normal bowel sounds in all quadrants, no hepatosplenomegaly, no tympany  Rectal: deferred  Musculoskeletal: normal ROM in upper and lower extremities  Integumentary: warm, dry, and pink, with no rash, purpura, or petechia  Heme/Lymph: no lymphadenopathy, no bruises  Neurological: Cranial Nerves II-XII grossly intact, no tics, normal sensation to pressure and light touch  Psychiatric:  More alert      Additional Data:     Labs:    Results from last 7 days   Lab Units 21  0440 21  0507   WBC Thousand/uL 6 91 26 69*   HEMOGLOBIN g/dL 9 2* 10 0*   HEMATOCRIT % 30 7* 34 1*   PLATELETS Thousands/uL 493* 628*   NEUTROS PCT %  --  88*   LYMPHS PCT %  --  3*   MONOS PCT %  --  6   EOS PCT %  --  1     Results from last 7 days   Lab Units 09/14/21  0440 09/11/21  0519   POTASSIUM mmol/L 4 1 3 5   CHLORIDE mmol/L 99* 101   CO2 mmol/L 26 23   BUN mg/dL 21 24   CREATININE mg/dL 0 79 1 00   CALCIUM mg/dL 8 8 8 5   ALK PHOS U/L  --  117*   ALT U/L  --  33   AST U/L  --  19           * I Have Reviewed All Lab Data Listed Above  * Additional Pertinent Lab Tests Reviewed:  [facility] [address] Admission Reviewed    Imaging:    Imaging Reports Reviewed Today Include:  CT of the chest   Imaging Personally Reviewed by Myself Includes:  CT of the chest    Recent Cultures (last 7 days):           Last 24 Hours Medication List:   Current Facility-Administered Medications   Medication Dose Route Frequency Provider Last Rate    Acetaminophen  650 mg Per NG Tube Q6H Alise Cunningham MD      ALPRAZolam  0 25 mg Oral HS PRN ANATOLY Raphael      amiodarone  200 mg Oral Daily With Breakfast Debbie Carvalho PA-C      apixaban  2 5 mg Oral BID Charles Sonday, ANATOLY      gabapentin  100 mg Oral HS Alise Cunningham MD      isosorbide dinitrate  10 mg Oral TID after meals ANATOLY Laguerre      lidocaine  1 patch Topical Daily Antonio Alva MD      LORazepam  0 5 mg Intravenous Q6H PRN Neal Moore, DO      melatonin  3 mg Oral HS Alise Cunningham MD      methylPREDNISolone sodium succinate  40 mg Intravenous Daily Nicolás Galaviz, DO      metoprolol tartrate  12 5 mg Oral Q12H [address] Charles Sonday, CRNP      omeprazole (PRILOSEC) suspension 2 mg/mL  20 mg Oral Daily Charles Sonday, ANATOLY      pravastatin  40 mg Oral Daily With [employer] Sonday, CRLORENA      saccharomyces boulardii  250 mg Oral BID Antonio Alva MD      senna-docusate sodium  2 tablet Oral BID PRN Antonio Alva MD      sertraline  50 mg Oral Daily Mor Groom MD Mary          Today, Patient Was Seen By: Michael Fajardo DO    ** Please Note: This note was completed in part utilizing M-Modal Fluency Direct Software  Grammatical errors, random word insertions, spelling mistakes, and incomplete sentences may be an occasional consequence of this system secondary to software limitations, ambient noise, and hardware issues  If you have any questions or concerns about the content, text, or information contained within the body of this dictation, please contact the provider for clarification   **

## 2021-09-14 NOTE — PROGRESS NOTES
Progress Note - Pulmonary   Adelita Fitch 78 y o  male MRN: [id number]  Unit/Bed#: [place] [address] -01 Encounter: [id number]      Assessment/Plan:    1  Acute hypoxic respiratory failure  1  Weaned to 10 L midflow  2  No baseline requirements  3  Titrate to maintain SpO2>/=89%  4  Pulmonary toilet: IS, cough deep breathe  5  Improvement noted with diuresis  2  Abnormal chest CT  1  ILD/ARDS fibrosis  2  Repeat chest imaging concerning for repeat aspiration pneumonitis vs pneumonia  3  Repeat PCT pending, leukocytosis resolved-continue to hold on ABX at this time  4  Repeat chest CT per IM-increased pulmonary edema  5  Concern for aspiration despite VBS-would continue with strict aspiration precautions and modified diet    3  PAF  1  eliquis and amiodarone  4  Acute on chronic diastolic heart failure with new onset Afib and AS  1  BNP worsening  2  Would recommend increased diuresis  3  Would recommend repeat cardiology evaluation        Subjective:     Kody Enrique was seen in bed upon entering the room  Denies acute overnight events  Reports breathing and cough has improved today  +scant amounts of yellow sputum  Denies: chest pain, fevers, chills or hemoptysis    Objective:         Vitals: Blood pressure 136/67, pulse 86, temperature (!) 96 7 °F (35 9 °C), temperature source Oral, resp  rate 20, height 5' 4" (1 626 m), weight 76 1 kg (167 lb 12 3 oz), SpO2 92 %  , 10L midflow, Body mass index is 28 8 kg/m²      No intake or output data in the 24 hours ending 09/14/21 1320      Physical Exam  Gen: Awake, alert, oriented x 3, no acute distress  HEENT: Mucous membranes moist, no oral lesions, no thrush, oxyen via midflow  NECK: no accessory muscle use, JVP not elevated  Cardiac: Regular, single S1, single S2, no murmurs, no rubs, no gallops  Lungs: scattered rhonchi  Abdomen: normoactive bowel sounds, soft nontender, nondistended, no rebound or rigidity, no guarding  Extremities: no cyanosis, no clubbing, no edema    Labs: I have personally reviewed pertinent lab results    Imaging and other studies: I have personally reviewed pertinent reports   , I have personally reviewed pertinent films in PACS and CXR 9/13/21 diffuse airspace disease       Leeann Mooney

## 2021-09-14 NOTE — ASSESSMENT & PLAN NOTE
Waxing and waning  In the setting of acute illness, more alert today  Likely decreased cerebral perfusion in the setting of hypoxic

## 2021-09-15 LAB
ANION GAP SERPL CALCULATED.3IONS-SCNC: 6 MMOL/L (ref 4–13)
BUN SERPL-MCNC: 26 MG/DL (ref 5–25)
CALCIUM SERPL-MCNC: 9 MG/DL (ref 8.3–10.1)
CHLORIDE SERPL-SCNC: 96 MMOL/L (ref 100–108)
CO2 SERPL-SCNC: 30 MMOL/L (ref 21–32)
CREAT SERPL-MCNC: 0.87 MG/DL (ref 0.6–1.3)
ERYTHROCYTE [DISTWIDTH] IN BLOOD BY AUTOMATED COUNT: 23.6 % (ref 11.6–15.1)
GFR SERPL CREATININE-BSD FRML MDRD: 80 ML/MIN/1.73SQ M
GLUCOSE SERPL-MCNC: 102 MG/DL (ref 65–140)
HCT VFR BLD AUTO: 32.8 % (ref 36.5–49.3)
HGB BLD-MCNC: 9.8 G/DL (ref 12–17)
MAGNESIUM SERPL-MCNC: 1.8 MG/DL (ref 1.6–2.6)
MCH RBC QN AUTO: 24.5 PG (ref 26.8–34.3)
MCHC RBC AUTO-ENTMCNC: 29.9 G/DL (ref 31.4–37.4)
MCV RBC AUTO: 82 FL (ref 82–98)
PHOSPHATE SERPL-MCNC: 3.8 MG/DL (ref 2.3–4.1)
PLATELET # BLD AUTO: 504 THOUSANDS/UL (ref 149–390)
PMV BLD AUTO: 9.1 FL (ref 8.9–12.7)
POTASSIUM SERPL-SCNC: 3.9 MMOL/L (ref 3.5–5.3)
RBC # BLD AUTO: 4 MILLION/UL (ref 3.88–5.62)
SODIUM SERPL-SCNC: 132 MMOL/L (ref 136–145)
WBC # BLD AUTO: 13.6 THOUSAND/UL (ref 4.31–10.16)

## 2021-09-15 PROCEDURE — 83735 ASSAY OF MAGNESIUM: CPT | Performed by: NURSE PRACTITIONER

## 2021-09-15 PROCEDURE — 99232 SBSQ HOSP IP/OBS MODERATE 35: CPT | Performed by: INTERNAL MEDICINE

## 2021-09-15 PROCEDURE — 97530 THERAPEUTIC ACTIVITIES: CPT

## 2021-09-15 PROCEDURE — 92526 ORAL FUNCTION THERAPY: CPT

## 2021-09-15 PROCEDURE — 80048 BASIC METABOLIC PNL TOTAL CA: CPT | Performed by: INTERNAL MEDICINE

## 2021-09-15 PROCEDURE — 84100 ASSAY OF PHOSPHORUS: CPT | Performed by: NURSE PRACTITIONER

## 2021-09-15 PROCEDURE — 97110 THERAPEUTIC EXERCISES: CPT

## 2021-09-15 PROCEDURE — 85027 COMPLETE CBC AUTOMATED: CPT | Performed by: INTERNAL MEDICINE

## 2021-09-15 RX ORDER — PREDNISONE 20 MG/1
40 TABLET ORAL DAILY
Status: DISCONTINUED | OUTPATIENT
Start: 2021-09-16 | End: 2021-09-16

## 2021-09-15 RX ORDER — DOCUSATE SODIUM 100 MG/1
100 CAPSULE, LIQUID FILLED ORAL 2 TIMES DAILY
Status: DISCONTINUED | OUTPATIENT
Start: 2021-09-15 | End: 2021-09-17 | Stop reason: HOSPADM

## 2021-09-15 RX ORDER — FUROSEMIDE 10 MG/ML
40 INJECTION INTRAMUSCULAR; INTRAVENOUS ONCE
Status: COMPLETED | OUTPATIENT
Start: 2021-09-15 | End: 2021-09-15

## 2021-09-15 RX ADMIN — Medication 12.5 MG: at 21:43

## 2021-09-15 RX ADMIN — ISOSORBIDE DINITRATE 10 MG: 10 TABLET ORAL at 13:12

## 2021-09-15 RX ADMIN — GABAPENTIN 100 MG: 100 CAPSULE ORAL at 21:43

## 2021-09-15 RX ADMIN — APIXABAN 2.5 MG: 2.5 TABLET, FILM COATED ORAL at 17:18

## 2021-09-15 RX ADMIN — SERTRALINE HYDROCHLORIDE 50 MG: 50 TABLET ORAL at 08:41

## 2021-09-15 RX ADMIN — ACETAMINOPHEN 650 MG: 650 SUSPENSION ORAL at 12:02

## 2021-09-15 RX ADMIN — Medication 12.5 MG: at 08:41

## 2021-09-15 RX ADMIN — ACETAMINOPHEN 650 MG: 650 SUSPENSION ORAL at 01:12

## 2021-09-15 RX ADMIN — ISOSORBIDE DINITRATE 10 MG: 10 TABLET ORAL at 17:19

## 2021-09-15 RX ADMIN — METHYLPREDNISOLONE SODIUM SUCCINATE 40 MG: 40 INJECTION, POWDER, FOR SOLUTION INTRAMUSCULAR; INTRAVENOUS at 08:41

## 2021-09-15 RX ADMIN — MELATONIN 3 MG: at 21:43

## 2021-09-15 RX ADMIN — Medication 250 MG: at 08:41

## 2021-09-15 RX ADMIN — AMIODARONE HYDROCHLORIDE 200 MG: 200 TABLET ORAL at 08:41

## 2021-09-15 RX ADMIN — DOCUSATE SODIUM 100 MG: 100 CAPSULE, LIQUID FILLED ORAL at 12:02

## 2021-09-15 RX ADMIN — ACETAMINOPHEN 650 MG: 650 SUSPENSION ORAL at 18:33

## 2021-09-15 RX ADMIN — FUROSEMIDE 40 MG: 10 INJECTION, SOLUTION INTRAVENOUS at 14:22

## 2021-09-15 RX ADMIN — ACETAMINOPHEN 650 MG: 650 SUSPENSION ORAL at 06:23

## 2021-09-15 RX ADMIN — Medication 20 MG: at 08:43

## 2021-09-15 RX ADMIN — PRAVASTATIN SODIUM 40 MG: 40 TABLET ORAL at 17:18

## 2021-09-15 RX ADMIN — Medication 250 MG: at 17:18

## 2021-09-15 RX ADMIN — APIXABAN 2.5 MG: 2.5 TABLET, FILM COATED ORAL at 08:41

## 2021-09-15 NOTE — PLAN OF CARE
Problem: PHYSICAL THERAPY ADULT  Goal: Performs mobility at highest level of function for planned discharge setting  See evaluation for individualized goals  Description: Treatment/Interventions: Functional transfer training, LE strengthening/ROM, Therapeutic exercise, Endurance training, Cognitive reorientation, Patient/family training, Equipment eval/education, Bed mobility, Compensatory technique education, Continued evaluation, Spoke to nursing, OT          See flowsheet documentation for full assessment, interventions and recommendations  Note: Prognosis: Fair  Problem List: Decreased strength, Decreased endurance, Impaired balance, Decreased mobility, Decreased cognition, Impaired judgement, Decreased safety awareness  Assessment: Pt seen for PT per POC  Pt agreeable to bedlevel thera  ex only 2* to inc fatigue  Pt reported that he just worked w/ OT & able to sit at EOB hence now very fatigued  Pt tolerated above mentioned thera  ex fairly w/ rest periods in between exercises  Pt on 12L MF O2 t/o session  SpO2 85% upon my arrival  SpO2 improved during thera ex, 87-91%  Nsg staff most recent vital signs as follows: /59   Pulse 76   Temp (!) 97 1 °F (36 2 °C) Comment: Re-assessment  Resp 21   Ht 5' 4" (1 626 m)   Wt 78 3 kg (172 lb 9 9 oz)   SpO2 93%   BMI 29 63 kg/m²   Will continue PT per POC  PT goals , no goals met  Extended PT goals [id number], please see below for details  At end of session, pt remain in bed in stable condition  Call bell & phone in reach  Bed alarm activated  Fall precautions reinforced w/ good understanding  The patient's AM-PAC Basic Mobility Inpatient Short Form Low Function Raw Score 13 , Standardized Score is 20  14  A standardized score less 42 9 suggests the patient may benefit from discharge to post-acute rehab services  Please also refer to the recommendation of the Physical Therapist for safe discharge planning   From PT standpoint, will continue to recommend inpt rehab at D/C  CM to follow  Nsg staff to continue to mobilized pt as tolerated to prevent decline in function  Nsg notified  Barriers to Discharge: Inaccessible home environment, Decreased caregiver support  Barriers to Discharge Comments: Level of support at home  PT Discharge Recommendation: Post acute rehabilitation services     PT - OK to Discharge: Yes (to STR when medically cleared)    See flowsheet documentation for full assessment

## 2021-09-15 NOTE — OCCUPATIONAL THERAPY NOTE
Occupational Therapy Progress Note(time=1115-1140)     Patient Name: Zandra Andrade  [id number] Date: 9/15/2021  Problem List  Principal Problem:    Acute respiratory failure with hypoxia [facility])  Active Problems:    Essential hypertension    Aortic stenosis, moderate    Mild cognitive impairment    Multifocal pneumonia    Acute on chronic diastolic CHF (congestive heart failure) (HCC)    CANDIE (acute kidney injury) ([facility] )    Atrial fibrillation (HCC)    Hypernatremia    Acute metabolic encephalopathy    Ambulatory dysfunction    Insomnia due to medical condition       09/15/21 1140   Note Type   Note Type Treatment   Restrictions/Precautions   Weight Bearing Precautions Per Order No   Other Precautions Fall Risk;Pain;Cognitive; Chair Alarm; Bed Alarm;O2;Multiple lines   Pain Assessment   Pain Assessment Tool FLACC   Pain Rating: FLACC (Rest) - Face 0   Pain Rating: FLACC (Rest) - Legs 0   Pain Rating: FLACC (Rest) - Activity 0   Pain Rating: FLACC (Rest) - Cry 1   Pain Rating: FLACC (Rest) - Consolability 0   Score: FLACC (Rest) 1   ADL   Where Assessed Edge of bed   LB Dressing Assistance 1  Total Assistance   LB Dressing Deficit Don/doff R sock; Don/doff L sock   Bed Mobility   Rolling R 3  Moderate assistance   Additional items Assist x 1; Increased time required;Verbal cues;LE management   Rolling L 3  Moderate assistance   Additional items Assist x 1; Increased time required;Verbal cues;LE management   Supine to Sit 2  Maximal assistance   Additional items Assist x 1; Increased time required;Verbal cues;LE management   Sit to Supine 2  Maximal assistance   Additional items Assist x 1; Increased time required;Verbal cues;LE management   Functional Mobility   Functional Mobility   (continue to recommend hoyerlift for OOB with nsg)   Therapeutic Excerise-Strength   UE Strength   (b/l UE AAROM(shr flex/ext)1set, 10reps)   Cognition   Overall Cognitive Status Impaired   Arousal/Participation Alert   Attention Attends with cues to redirect   Orientation Level Oriented to person;Oriented to place; Disoriented to time   Memory Decreased short term memory;Decreased recall of recent events;Decreased recall of precautions   Following Commands Follows one step commands with increased time or repetition   Activity Tolerance   Activity Tolerance Patient limited by fatigue   Medical Staff Made Aware nsg    Assessment   Assessment Pt seen for 25min tx session with focus on functional balance, functional mobility, ADL status, b/l UE strength, and cognition  Pt able to tolerate EOB sitting for 5-10mins with f/f- sitting balance  Pt demonstrating need for heavy assistance with all ADL tasks  Pt fatigues quickly with all functional mobility tasks; noted decreased SPO2 levels(on 12liters of O2)  Cognitive deficits noted--i e orientation, memory, problem-solving  Pt continues to demonstrate appropriateness for inpt rehab to improve his overall level of independence  Will continue  Goals stated on initial eval remain appropriate  Will extend goal date  Plan   Treatment Interventions ADL retraining;Functional transfer training;UE strengthening/ROM; Endurance training;Cognitive reorientation;Patient/family training;Equipment evaluation/education; Compensatory technique education;Continued evaluation   Goal Expiration Date 09/29/21   OT Treatment Day 4   OT Frequency 3-5x/wk   Recommendation   OT Discharge Recommendation Post acute rehabilitation services   AM-PAC Daily Activity Inpatient   Lower Body Dressing 2   Bathing 2   Toileting 2   Upper Body Dressing 2   Grooming 2   Eating 2   Daily Activity Raw Score 12   Daily Activity Standardized Score (Calc for Raw Score >=11) 30 6   Turning Head Towards Sound 3   Follow Simple Instructions 3   Low Function Daily Activity Raw Score 18   Low Function Daily Activity Standardized Score 30 17   AM-PAC Applied Cognition Inpatient   Following a Speech/Presentation 3   Understanding Ordinary Conversation 2 Taking Medications 2   Remembering Where Things Are Placed or Put Away 2   Remembering List of 4-5 Errands 2   Taking Care of Complicated Tasks 1   Applied Cognition Raw Score 12   Applied Cognition Standardized Score 28 82   Eliot Gip, OT

## 2021-09-15 NOTE — ASSESSMENT & PLAN NOTE
Controlled  Discontinue hydralazine 25mg TID, to allow for IV diuresis  On isosorbide 10mg tid, and metoprolol 12 5mg Q12

## 2021-09-15 NOTE — PLAN OF CARE
Problem: Potential for Falls  Goal: Patient will remain free of falls  Description: INTERVENTIONS:  - Educate patient/family on patient safety including physical limitations  - Instruct patient to call for assistance with activity   - Consult OT/PT to assist with strengthening/mobility   - Keep Call bell within reach  - Keep bed low and locked with side rails adjusted as appropriate  - Keep care items and personal belongings within reach  - Initiate and maintain comfort rounds  - Make Fall Risk Sign visible to staff  - Offer Toileting every  Hours, in advance of need  - Initiate/Maintain alarm  - Obtain necessary fall risk management equipment:   - Apply yellow socks and bracelet for high fall risk patients  - Consider moving patient to room near nurses station  Outcome: Progressing     Problem: MOBILITY - ADULT  Goal: Maintain or return to baseline ADL function  Description: INTERVENTIONS:  -  Assess patient's ability to carry out ADLs; assess patient's baseline for ADL function and identify physical deficits which impact ability to perform ADLs (bathing, care of mouth/teeth, toileting, grooming, dressing, etc )  - Assess/evaluate cause of self-care deficits   - Assess range of motion  - Assess patient's mobility; develop plan if impaired  - Assess patient's need for assistive devices and provide as appropriate  - Encourage maximum independence but intervene and supervise when necessary  - Involve family in performance of ADLs  - Assess for home care needs following discharge   - Consider OT consult to assist with ADL evaluation and planning for discharge  - Provide patient education as appropriate  Outcome: Progressing  Goal: Maintains/Returns to pre admission functional level  Description: INTERVENTIONS:  - Perform BMAT or MOVE assessment daily    - Set and communicate daily mobility goal to care team and patient/family/caregiver     - Collaborate with rehabilitation services on mobility goals if consulted  - Perform Range of Motion  times a day  - Reposition patient every  hours  - Dangle patient  times a day  - Stand patient  times a day  - Ambulate patient  times a day  - Out of bed to chair  times a day   - Out of bed for meals  times a day  - Out of bed for toileting  - Record patient progress and toleration of activity level   Outcome: Progressing     Problem: Prexisting or High Potential for Compromised Skin Integrity  Goal: Skin integrity is maintained or improved  Description: INTERVENTIONS:  - Identify patients at risk for skin breakdown  - Assess and monitor skin integrity  - Assess and monitor nutrition and hydration status  - Monitor labs   - Assess for incontinence   - Turn and reposition patient  - Assist with mobility/ambulation  - Relieve pressure over bony prominences  - Avoid friction and shearing  - Provide appropriate hygiene as needed including keeping skin clean and dry  - Evaluate need for skin moisturizer/barrier cream  - Collaborate with interdisciplinary team   - Patient/family teaching  - Consider wound care consult   Outcome: Progressing     Problem: NEUROSENSORY - ADULT  Goal: Achieves stable or improved neurological status  Description: INTERVENTIONS  - Monitor and report changes in neurological status  - Monitor vital signs such as temperature, blood pressure, glucose, and any other labs ordered   - Initiate measures to prevent increased intracranial pressure  - Monitor for seizure activity and implement precautions if appropriate      Outcome: Progressing  Goal: Achieves maximal functionality and self care  Description: INTERVENTIONS  - Monitor swallowing and airway patency with patient fatigue and changes in neurological status  - Encourage and assist patient to increase activity and self care     - Encourage visually impaired, hearing impaired and aphasic patients to use assistive/communication devices  Outcome: Progressing     Problem: CARDIOVASCULAR - ADULT  Goal: Maintains optimal cardiac output and hemodynamic stability  Description: INTERVENTIONS:  - Monitor I/O, vital signs and rhythm  - Monitor for S/S and trends of decreased cardiac output  - Administer and titrate ordered vasoactive medications to optimize hemodynamic stability  - Assess quality of pulses, skin color and temperature  - Assess for signs of decreased coronary artery perfusion  - Instruct patient to report change in severity of symptoms  Outcome: Progressing  Goal: Absence of cardiac dysrhythmias or at baseline rhythm  Description: INTERVENTIONS:  - Continuous cardiac monitoring, vital signs, obtain 12 lead EKG if ordered  - Administer antiarrhythmic and heart rate control medications as ordered  - Monitor electrolytes and administer replacement therapy as ordered  Outcome: Progressing     Problem: RESPIRATORY - ADULT  Goal: Achieves optimal ventilation and oxygenation  Description: INTERVENTIONS:  - Assess for changes in respiratory status  - Assess for changes in mentation and behavior  - Position to facilitate oxygenation and minimize respiratory effort  - Oxygen administered by appropriate delivery if ordered  - Initiate smoking cessation education as indicated  - Encourage broncho-pulmonary hygiene including cough, deep breathe, Incentive Spirometry  - Assess the need for suctioning and aspirate as needed  - Assess and instruct to report SOB or any respiratory difficulty  - Respiratory Therapy support as indicated  Outcome: Progressing     Problem: GASTROINTESTINAL - ADULT  Goal: Minimal or absence of nausea and/or vomiting  Description: INTERVENTIONS:  - Administer IV fluids if ordered to ensure adequate hydration  - Maintain NPO status until nausea and vomiting are resolved  - Nasogastric tube if ordered  - Administer ordered antiemetic medications as needed  - Provide nonpharmacologic comfort measures as appropriate  - Advance diet as tolerated, if ordered  - Consider nutrition services referral to assist patient with adequate nutrition and appropriate food choices  Outcome: Progressing  Goal: Maintains or returns to baseline bowel function  Description: INTERVENTIONS:  - Assess bowel function  - Encourage oral fluids to ensure adequate hydration  - Administer IV fluids if ordered to ensure adequate hydration  - Administer ordered medications as needed  - Encourage mobilization and activity  - Consider nutritional services referral to assist patient with adequate nutrition and appropriate food choices  Outcome: Progressing  Goal: Maintains adequate nutritional intake  Description: INTERVENTIONS:  - Monitor percentage of each meal consumed  - Identify factors contributing to decreased intake, treat as appropriate  - Assist with meals as needed  - Monitor I&O, weight, and lab values if indicated  - Obtain nutrition services referral as needed  Outcome: Progressing  Goal: Oral mucous membranes remain intact  Description: INTERVENTIONS  - Assess oral mucosa and hygiene practices  - Implement preventative oral hygiene regimen  - Implement oral medicated treatments as ordered  - Initiate Nutrition services referral as needed  Outcome: Progressing     Problem: GENITOURINARY - ADULT  Goal: Maintains or returns to baseline urinary function  Description: INTERVENTIONS:  - Assess urinary function  - Encourage oral fluids to ensure adequate hydration if ordered  - Administer IV fluids as ordered to ensure adequate hydration  - Administer ordered medications as needed  - Offer frequent toileting  - Follow urinary retention protocol if ordered  Outcome: Progressing  Goal: Absence of urinary retention  Description: INTERVENTIONS:  - Assess patients ability to void and empty bladder  - Monitor I/O  - Bladder scan as needed  - Discuss with physician/AP medications to alleviate retention as needed  - Discuss catheterization for long term situations as appropriate  Outcome: Progressing     Problem: METABOLIC, FLUID AND ELECTROLYTES - ADULT  Goal: Electrolytes maintained within normal limits  Description: INTERVENTIONS:  - Monitor labs and assess patient for signs and symptoms of electrolyte imbalances  - Administer electrolyte replacement as ordered  - Monitor response to electrolyte replacements, including repeat lab results as appropriate  - Instruct patient on fluid and nutrition as appropriate  Outcome: Progressing  Goal: Fluid balance maintained  Description: INTERVENTIONS:  - Monitor labs   - Monitor I/O and WT  - Instruct patient on fluid and nutrition as appropriate  - Assess for signs & symptoms of volume excess or deficit  Outcome: Progressing  Goal: Glucose maintained within target range  Description: INTERVENTIONS:  - Monitor Blood Glucose as ordered  - Assess for signs and symptoms of hyperglycemia and hypoglycemia  - Administer ordered medications to maintain glucose within target range  - Assess nutritional intake and initiate nutrition service referral as needed  Outcome: Progressing     Problem: SKIN/TISSUE INTEGRITY - ADULT  Goal: Skin Integrity remains intact(Skin Breakdown Prevention)  Description: Assess:  -Perform Kevon assessment every   -Clean and moisturize skin every   -Inspect skin when repositioning, toileting, and assisting with ADLS  -Assess under medical devices such as  every   -Assess extremities for adequate circulation and sensation     Bed Management:  -Have minimal linens on bed & keep smooth, unwrinkled  -Change linens as needed when moist or perspiring  -Avoid sitting or lying in one position for more than  hours while in bed  -Keep HOB at degrees     Toileting:  -Offer bedside commode  -Assess for incontinence every   -Use incontinent care products after each incontinent episode such as     Activity:  -Mobilize patient  times a day  -Encourage activity and walks on unit  -Encourage or provide ROM exercises   -Turn and reposition patient every  Hours  -Use appropriate equipment to lift or move patient in bed  -Instruct/ Assist with weight shifting every  when out of bed in chair  -Consider limitation of chair time  hour intervals    Skin Care:  -Avoid use of baby powder, tape, friction and shearing, hot water or constrictive clothing  -Relieve pressure over bony prominences using   -Do not massage red bony areas    Next Steps:  -Teach patient strategies to minimize risks such as    -Consider consults to  interdisciplinary teams such   Outcome: Progressing     Problem: HEMATOLOGIC - ADULT  Goal: Maintains hematologic stability  Description: INTERVENTIONS  - Assess for signs and symptoms of bleeding or hemorrhage  - Monitor labs  - Administer supportive blood products/factors as ordered and appropriate  Outcome: Progressing     Problem: MUSCULOSKELETAL - ADULT  Goal: Maintain or return mobility to safest level of function  Description: INTERVENTIONS:  - Assess patient's ability to carry out ADLs; assess patient's baseline for ADL function and identify physical deficits which impact ability to perform ADLs (bathing, care of mouth/teeth, toileting, grooming, dressing, etc )  - Assess/evaluate cause of self-care deficits   - Assess range of motion  - Assess patient's mobility  - Assess patient's need for assistive devices and provide as appropriate  - Encourage maximum independence but intervene and supervise when necessary  - Involve family in performance of ADLs  - Assess for home care needs following discharge   - Consider OT consult to assist with ADL evaluation and planning for discharge  - Provide patient education as appropriate  Outcome: Progressing     Problem: Nutrition/Hydration-ADULT  Goal: Nutrient/Hydration intake appropriate for improving, restoring or maintaining nutritional needs  Description: Monitor and assess patient's nutrition/hydration status for malnutrition  Collaborate with interdisciplinary team and initiate plan and interventions as ordered    Monitor patient's weight and dietary intake as ordered or per policy  Utilize nutrition screening tool and intervene as necessary  Determine patient's food preferences and provide high-protein, high-caloric foods as appropriate       INTERVENTIONS:  - Monitor oral intake, urinary output, labs, and treatment plans  - Assess nutrition and hydration status and recommend course of action  - Evaluate amount of meals eaten  - Assist patient with eating if necessary   - Allow adequate time for meals  - Recommend/ encourage appropriate diets, oral nutritional supplements, and vitamin/mineral supplements  - Order, calculate, and assess calorie counts as needed  - Recommend, monitor, and adjust tube feedings and TPN/PPN based on assessed needs  - Assess need for intravenous fluids  - Provide specific nutrition/hydration education as appropriate  - Include patient/family/caregiver in decisions related to nutrition  Outcome: Progressing

## 2021-09-15 NOTE — PALLIATIVE CARE CONFERENCE
Palliative MSW saw patient at the bedside today  MSW appreciates the opportunity to provider patient/family with inpatient emotional support and guidance while patient continues to receive medical attention from the medical team     Topics discussed: Pt and I discussed hospice services, as a follow up from the conversation yesterday they had with Dr Becca Mercado  MSW discussed their services and pt asked me to call his son, Migel Trujillo  MSW called and left a VM and asked for a return call  Pt expressed how proud he is that he raised his family and that he is well supported at home  He did states that if deciding on hospice he would be able to return home  Areas that need follow-up: Continue comfort care discussion with pt and family  Resources given: Educated on Hospice services  Others present:  N/A    MSW will continue to follow as requested by the medical team, patient, or family    MSW spoke with pts son, Freddy Aguero, regarding hospice  At this time they are undecided, as pt wants to go home, but the girlfriend is the only one available most of the time  We discussed private caregivers as an option  Freddy Aguero and his brother Migel Trujillo live an hour and 40 minutes away  Freddy Aguero does come every Tuesday, Thursday and Saturday, but is concerned if something should happen and they are so far away  We also discussed the option of a SNF  I encouraged him to continue discussing with his father and brother and then they can further discuss with the   Freddy Aguero would like more information on the hospice house and how someone qualifies to be there

## 2021-09-15 NOTE — PHYSICAL THERAPY NOTE
PT PROGRESS NOTE    Name: Balaji Fan  AGE: 78 y o  MRN: [id number]  LENGTH OF STAY: 32    Admitting Dx:  Acute respiratory failure ([place] [address] ) [J96 00]  Leukocytosis [D72 829]  Respiratory distress [R06 03]  Severe sepsis ([place] [address] ) [A41 9, R65 20]      Patient Active Problem List   Diagnosis    Mixed hyperlipidemia    Essential hypertension    Aortic stenosis, moderate    Enlarged prostate without lower urinary tract symptoms (luts)    Fatty liver disease, nonalcoholic    Moderate episode of recurrent major depressive disorder ([place] [address] )    PVC (premature ventricular contraction)    Medicare annual wellness visit, subsequent    Mild cognitive impairment    Obesity (BMI 30 0-34  9)    Chronic constipation    Iron deficiency anemia secondary to inadequate dietary iron intake    Multifocal pneumonia    Acute respiratory failure with hypoxia (HCC)    Acute on chronic diastolic CHF [facility]congestive heart failure) [facility])    Hypochromic microcytic anemia    CANDIE (acute kidney injury) ([place] [address] )    Atrial fibrillation (HCC)    Elevated troponin level    Hypermagnesemia    Hypernatremia    Acute metabolic encephalopathy    Ambulatory dysfunction    Insomnia due to medical condition              09/15/21 1230   PT Last Visit   PT Visit Date 09/15/21   Note Type   Note Type Treatment   Pain Assessment   Pain Score No Pain   Restrictions/Precautions   Weight Bearing Precautions Per Order No   Other Precautions Multiple lines;O2;Fall Risk;Cognitive; Chair Alarm; Bed Alarm  (12L MFO2)   General   Chart Reviewed Yes   Response to Previous Treatment Patient reporting fatigue but able to participate     Family/Caregiver Present Yes   Cognition   Overall Cognitive Status Impaired   Arousal/Participation Alert   Attention Attends with cues to redirect   Orientation Level Oriented to person;Oriented to place   Following Commands Follows one step commands with increased time or repetition   Subjective   Subjective Pt agreeable to bedlevel thera  ex only after encouragement 2* to inc fatigue  Bed Mobility   Supine to Sit Unable to assess   Sit to Supine Unable to assess   Additional Comments pt declined OOB mobility 2* to fatigue   Transfers   Sit to Stand Unable to assess   Stand to Sit Unable to assess   Additional Comments pt declined OOB mobility 2* to fatigue   Endurance Deficit   Endurance Deficit Yes   Endurance Deficit Description weakness; fatigue   Activity Tolerance   Activity Tolerance Patient limited by fatigue;Treatment limited secondary to medical complications (Comment)   Nurse Made Aware RN Candy   Exercises   Heelslides Supine;10 reps;AAROM; Bilateral   Hip Flexion Supine;10 reps;AAROM; Bilateral  (SLR)   Hip Abduction Supine;10 reps;AAROM; Bilateral   Hip Adduction Supine;10 reps;AAROM; Bilateral   Knee AROM Short Arc Quad Supine;10 reps;AAROM; Bilateral   Ankle Pumps Supine;10 reps;AAROM; Bilateral   Assessment   Prognosis Fair   Problem List Decreased strength;Decreased endurance; Impaired balance;Decreased mobility; Decreased cognition; Impaired judgement;Decreased safety awareness   Assessment Pt seen for PT per POC  Pt agreeable to bedlevel thera  ex only 2* to inc fatigue  Pt reported that he just worked w/ OT & able to sit at EOB hence now very fatigued  Pt tolerated above mentioned thera  ex fairly w/ rest periods in between exercises  Pt on 12L MF O2 t/o session  SpO2 85% upon my arrival  SpO2 improved during thera ex, 87-91%  Nsg staff most recent vital signs as follows: /59   Pulse 76   Temp (!) 97 1 °F (36 2 °C) Comment: Re-assessment  Resp 21   Ht 5' 4" (1 626 m)   Wt 78 3 kg (172 lb 9 9 oz)   SpO2 93%   BMI 29 63 kg/m²   Will continue PT per POC  PT goals , no goals met  Extended PT goals [id number], please see below for details  At end of session, pt remain in bed in stable condition  Call bell & phone in reach  Bed alarm activated  Fall precautions reinforced w/ good understanding   The patient's AM-PAC Basic Mobility Inpatient Short Form Low Function Raw Score 13 , Standardized Score is 20  14  A standardized score less 42 9 suggests the patient may benefit from discharge to post-acute rehab services  Please also refer to the recommendation of the Physical Therapist for safe discharge planning  From PT standpoint, will continue to recommend inpt rehab at D/C  CM to follow  Nsg staff to continue to mobilized pt as tolerated to prevent decline in function  Nsg notified  Barriers to Discharge Inaccessible home environment;Decreased caregiver support   Goals   Patient Goals to rest   STG Expiration Date 09/27/21   Short Term Goal #1 extend goals [id number] : min assist for bed moiblity, mod assist of 1 for transfers  improve strength and balance by 1/2 to 1 grade  demonstrate good safety practices  tolerate 45 minutes activity  PT Treatment Day 4   Plan   Treatment/Interventions Functional transfer training;LE strengthening/ROM; Therapeutic exercise; Endurance training;Patient/family training;Bed mobility;Gait training;Spoke to nursing;OT;Family   Progress Slow progress, medical status limitations   PT Frequency Other (Comment)  (3-5x/wk)   Recommendation   PT Discharge Recommendation Post acute rehabilitation services   PT - OK to Discharge Yes  (to STR when medically cleared)   AM-PAC Basic Mobility Inpatient   Turning in Bed Without Bedrails 1   Lying on Back to Sitting on Edge of Flat Bed 1   Moving Bed to Chair 1   Standing Up From Chair 1   Walk in Room 1   Climb 3-5 Stairs 1   Basic Mobility Inpatient Raw Score 6   Turning Head Towards Sound 4   Follow Simple Instructions 3   Low Function Basic Mobility Raw Score 13   Low Function Basic Mobility Standardized Score 20 14   Kallie Flores, PT

## 2021-09-15 NOTE — PROGRESS NOTES
Progress Note - Pulmonary   Avis Florez 78 y o  male MRN: [id number]  Unit/Bed#: [place] [address] -01 Encounter: [id number]      Assessment/Plan:    1  Acute hypoxic respiratory failure  1  Current O2 needs 12L NC  2  No baseline requirements  3  Titrate to maintain SpO2>/=89%  4  Pulmonary toilet: IS, cough deep breathe  5  Improvement noted with diuresis  2  Abnormal chest CT  1  ILD/ARDS fibrosis  2  Repeat chest imaging concerning for repeat aspiration pneumonitis vs pneumonia  3  PCT 0 10--0 06, leukocytosis mildly elevated-continue to hold on ABX at this time  4  Repeat chest CT per IM-increased pulmonary edema  5  Concern for aspiration despite VBS-would continue with strict aspiration precautions and modified diet  6  Discontinue solumedrol and transition to prednisone for 3 days and stop     3  PAF  1  eliquis and amiodarone  4  Acute on chronic diastolic heart failure with new onset Afib and AS  2  Chest CT repeated and concerning for increased pulmonary edema  3  BNP worsening-recheck 9/16/21  4  Plan:  1  Would recommend increased diuresis-will order Lasix again today  2  Strict I &O-patient is incontinent  3  Daily weights  4  Would recommend repeat cardiology evaluation             Subjective:     lila was seen in bed upon entering the room  Denies acute overnight events  Breathing is unchanged from day prior  Denies cough or sputum production today  Primary complaint is constipation    Objective:         Vitals: Blood pressure 127/53, pulse 72, temperature (!) 97 1 °F (36 2 °C), resp  rate 17, height 5' 4" (1 626 m), weight 78 3 kg (172 lb 9 9 oz), SpO2 98 %  , 12L midflow, Body mass index is 29 63 kg/m²  15L midflow      Intake/Output Summary (Last 24 hours) at 9/15/2021 0911  Last data filed at 9/15/2021 3365  Gross per 24 hour   Intake 700 ml   Output --   Net 700 ml         Physical Exam  Gen: Awake, alert, oriented x 3, no acute distress  HEENT: Mucous membranes moist, no oral lesions, no thrush, oxygen via midflow  NECK: no accessory muscle use, JVP not elevated  Cardiac: Regular, single S1, single S2, no murmurs, no rubs, no gallops  Lungs: bilateral rales  Abdomen: normoactive bowel sounds, soft nontender, nondistended, no rebound or rigidity, no guarding  Extremities: no cyanosis, no clubbing, no edema    Labs: I have personally reviewed pertinent lab results  , CBC:   Lab Results   Component Value Date    WBC 13 60 (H) 09/15/2021    HGB 9 8 (L) 09/15/2021    HCT 32 8 (L) 09/15/2021    MCV 82 09/15/2021     (H) 09/15/2021    MCH 24 5 (L) 09/15/2021    MCHC 29 9 (L) 09/15/2021    RDW 23 6 (H) 09/15/2021    MPV 9 1 09/15/2021   , CMP:   Lab Results   Component Value Date    SODIUM 132 (L) 09/15/2021    K 3 9 09/15/2021    CL 96 (L) 09/15/2021    CO2 30 09/15/2021    BUN 26 (H) 09/15/2021    CREATININE 0 87 09/15/2021    CALCIUM 9 0 09/15/2021    EGFR 80 09/15/2021     Imaging and other studies: I have personally reviewed pertinent reports   , I have personally reviewed pertinent films in PACS and Chest CT 9/14/21    FINDINGS:     Motion mildly degrades images      LUNGS:    Similar low lung volumes and bilateral, symmetric, upper lobe and peripheral predominant groundglass airspace opacities with upper lobe air bronchograms, interlobular septal thickening and peripheral-basilar interstitial changes      Similar relative sparing/lucency in the right upper lobe anteriorly with mild focal hyperexpansion, suspicious for air trapping      Patent airways      No new consolidation in the right lower lobe      PLEURA:  No effusions    Juanis aDng

## 2021-09-15 NOTE — SPEECH THERAPY NOTE
Speech Language/Pathology    Speech/Language Pathology Progress Note    Patient Name: Valeriy Shin  DNCQA'B Date: 9/15/2021     Problem List  Principal Problem:    Acute respiratory failure with hypoxia ([facility] [address] )  Active Problems:    Essential hypertension    Aortic stenosis, moderate    Mild cognitive impairment    Multifocal pneumonia    Acute on chronic diastolic CHF (congestive heart failure) ([facility])    CANDIE (acute kidney injury) ([facility] [address] )    Atrial fibrillation (HCC)    Hypernatremia    Acute metabolic encephalopathy    Ambulatory dysfunction    Insomnia due to medical condition       Past Medical History  Past Medical History:   Diagnosis Date    Actinic keratosis     LAST ASSESSED: [id number]    Allergic rhinitis     LAST ASSESSED: 21MZG8721    Anxiety     LAST ASSESSED: 53PYB1792    Anxiety disorder     LAST ASSESSED: 95OGQ2147    Atelectasis of right lung     ABNORMAL CT SCAN OF [address] 12/2/2016 REPEAT NEEDED PER RADIOLOGY IN 3 MONTHS LAST ASSESSED: [id number]    Atypical chest pain     LAST ASSESSED: 35BHC0878    Benign paroxysmal vertigo     LAST ASSESSED: 34HTU8786    Chronic cough     LAST ASSESSED: 13XOD3930    Chronic GERD     Degenerative arthritis of knee, bilateral     LAST ASSESSED: 62TCL2518    Diverticulitis of colon     LAST ASSESSED: 76YZD1149    Diverticulosis     LAST ASSESSED: 45MCP4217    Do not resuscitate status 1/25/2021    Foreign body, eye     LAST ASSESSED: 72YHE7244    Functional gait abnormality     LAST ASSESSED: 33EOA9375    Hyperlipidemia     Hypertension     Hyponatremia     LAST ASSESSED: 80SOY9076    Lactic acidosis 8/19/2021    Leukocytosis     LAST ASSESSED: 72EYK1271    Multifocal pneumonia 8/19/2021    Murmur     Newly recognized murmur     LAST ASSESSED: 25GDB7093    Olecranon bursitis, unspecified elbow     Presence of indwelling urethral catheter     RESOLVED: 09HSR2589    Transient cerebral ischemia     LAST ASSESSED: 94DNP3300    Urinary incontinence     LAST ASSESSED: 49LIS0628    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     LAST ASSESSED: 89PBA6813        Past Surgical History  Past Surgical History:   Procedure Laterality Date    ADENOIDECTOMY      APPENDECTOMY      COLONOSCOPY  10/2006    FIBEROPTIC    INGUINAL HERNIA REPAIR      IR TEMPORARY DIALYSIS CATHETER PLACEMENT  9/1/2021    JOINT REPLACEMENT      b/l TKR Sept 2015    SINUS SURGERY      TONSILLECTOMY           Subjective:  Pt was alert and very pleasant today  Objective:  Pt was seen/examined at bedside for Po breakfast  Pt ate pureed Yoruba toast and applesauce with no overt s/s aspiration, and he tolerated small sips of thin water  Mastication, bolus formation and control were all WNL  No obvious oral residue  Laryngeal rise appeared WNL  2 instances of mild throat clear  No cough or decline in O2 saturation at ~95 throughout session  Prompt swallow and transfer  Vocal quality was not wet or hoarse  Ct chest CT 9/14/21  IMPRESSION:   Similar appearance of the chest to prior study from 4 days ago  Bilateral, symmetric airspace disease  Detailed description above  No new right lower lobe airspace disease  Assessment:  Pt seems to be doing much better today  Tolerates diet and prefers someone feed him at this time  No overt s/s aspiration  Plan/Recommendations:  Continue diet as recommended, puree and thin  Will f/u as able

## 2021-09-15 NOTE — ASSESSMENT & PLAN NOTE
Acute hypoxic respiratory failure, poa, a/e/b tachypnea, RR 24-27, sob and hypoxia (desaturation levels not recorded) requiring BIPAP Possibly due to vascular congestion / multifocal pneumonia  Required ICU level of care / intubation  Completed antibiotic therapy  S/p extubation and clinically improved until 9/9 where he decompensated, requiring 15L, down to 10L midflow but now back on 15L Midflow  On IV lasix prn    Repeat chest CT- Unchanged, similar to prior - Groundglass opacities  Palliative care consult placed given high burden of disease pathology and guarded prognosis    Patient's wife not ready for hospice care, wants to give her  a chance

## 2021-09-15 NOTE — PROGRESS NOTES
[facility]  Progress Note Louise Mathews 1940, 78 y o  male MRN: [id number]  Unit/Bed#: [address] [place] [address]01 Encounter: [id number]  Primary Care Provider: Janina Wolff DO   Date and time admitted to hospital: 8/19/2021  8:25 PM    * Acute respiratory failure with hypoxia [facility])  Assessment & Plan  Acute hypoxic respiratory failure, poa, a/e/b tachypnea, RR 24-27, sob and hypoxia (desaturation levels not recorded) requiring BIPAP Possibly due to vascular congestion / multifocal pneumonia  Required ICU level of care / intubation  Completed antibiotic therapy  S/p extubation and clinically improved until 9/9 where he decompensated, requiring 15L, down to 10L midflow but now back on 15L Midflow  On IV lasix prn    Repeat chest CT- Unchanged, similar to prior - Groundglass opacities  Palliative care consult placed given high burden of disease pathology and guarded prognosis  Patient's wife not ready for hospice care, wants to give her  a chance    Multifocal pneumonia  Assessment & Plan  Completed antibiotic therapy  Was restarted on antibiotics due to concerns for aspiration pneumonia  As per previous providers discussion with ID, likely due to aspiration pneumonitis  Continue monitoring off antibiotics  Video swallow reviewed with speech, placed on dysphagia 1 and thin no evidence of aspiration  CT chest (9/14) Groundglass opacities  Hydralazine discontinued, Now on IV lasix prn     Mild cognitive impairment  Assessment & Plan  Delirium precautions  Appreciate geriatric recommendations  Aortic stenosis, moderate  Assessment & Plan  Patient with moderate aortic stenosis, likely contributing to volume overload  His respiratory status remains tenuous  BNP elevated    Essential hypertension  Assessment & Plan  Controlled  Discontinue hydralazine 25mg TID, to allow for IV diuresis  On isosorbide 10mg tid, and metoprolol 12 5mg Q12         VTE Pharmacologic Prophylaxis:   Pharmacologic: Apixaban (Eliquis)  Mechanical VTE Prophylaxis in Place: Yes    Patient Centered Rounds: I have performed bedside rounds with nursing staff today  Discussions with Specialists or Other Care Team Provider:  Case Management    Education and Discussions with Family / Patient:  Updated patient's wife at bedside    Time Spent for Care: 30 minutes  More than 50% of total time spent on counseling and coordination of care as described above  Current Length of Stay: 27 day(s)    Current Patient Status: Inpatient   Certification Statement: The patient will continue to require additional inpatient hospital stay due to As above    Discharge Plan: TBD    Code Status: Level 1 - Full Code      Subjective:   No overnight events reported  I did my rounds while patient was undergoing occupational therapy today, and he was noted to become very anxious, became tachypneic, was hyperventilating and mouth breathing  He requested to lay back down in bed and his oxygen saturation improved back up    Objective:     Vitals:   Temp (24hrs), Av 8 °F (36 °C), Min:96 4 °F (35 8 °C), Max:97 1 °F (36 2 °C)    Temp:  [96 4 °F (35 8 °C)-97 1 °F (36 2 °C)] 97 1 °F (36 2 °C)  HR:  [71-84] 71  Resp:  [17-22] 17  BP: (118-142)/(53-58) 142/58  SpO2:  [87 %-98 %] 93 %  Body mass index is 29 63 kg/m²  Input and Output Summary (last 24 hours): Intake/Output Summary (Last 24 hours) at 9/15/2021 1400  Last data filed at 9/15/2021 1001  Gross per 24 hour   Intake 1060 ml   Output --   Net 1060 ml       Physical Exam:     Physical Exam  Vitals and nursing note reviewed  Constitutional:       General: He is not in acute distress  Appearance: Normal appearance  He is not ill-appearing, toxic-appearing or diaphoretic  Cardiovascular:      Rate and Rhythm: Normal rate  Rhythm irregular  Pulses: Normal pulses  Heart sounds: Murmur heard       Pulmonary:      Effort: Pulmonary effort is normal  No respiratory distress  Breath sounds: No stridor  Examination of the right-lower field reveals decreased breath sounds  Examination of the left-lower field reveals decreased breath sounds  Decreased breath sounds present  No wheezing, rhonchi or rales  Chest:      Chest wall: No tenderness  Abdominal:      General: Bowel sounds are normal  There is no distension  Palpations: Abdomen is soft  Tenderness: There is no abdominal tenderness  There is no right CVA tenderness, left CVA tenderness or guarding  Musculoskeletal:         General: No swelling or deformity  Normal range of motion  Right lower leg: No edema  Left lower leg: No edema  Skin:     General: Skin is warm and dry  Capillary Refill: Capillary refill takes less than 2 seconds  Coloration: Skin is not jaundiced  Findings: No bruising, erythema, lesion or rash  Neurological:      General: No focal deficit present  Mental Status: He is alert  Mental status is at baseline  Cranial Nerves: No cranial nerve deficit     Psychiatric:         Mood and Affect: Mood normal          Additional Data:     Labs:    Results from last 7 days   Lab Units 09/15/21  0559 09/13/21  0507   WBC Thousand/uL 13 60* 26 69*   HEMOGLOBIN g/dL 9 8* 10 0*   HEMATOCRIT % 32 8* 34 1*   PLATELETS Thousands/uL 504* 628*   NEUTROS PCT %  --  88*   LYMPHS PCT %  --  3*   MONOS PCT %  --  6   EOS PCT %  --  1     Results from last 7 days   Lab Units 09/15/21  0558 09/11/21  0519   SODIUM mmol/L 132* 136   POTASSIUM mmol/L 3 9 3 5   CHLORIDE mmol/L 96* 101   CO2 mmol/L 30 23   BUN mg/dL 26* 24   CREATININE mg/dL 0 87 1 00   ANION GAP mmol/L 6 12   CALCIUM mg/dL 9 0 8 5   ALBUMIN g/dL  --  2 4*   TOTAL BILIRUBIN mg/dL  --  0 59   ALK PHOS U/L  --  117*   ALT U/L  --  33   AST U/L  --  19   GLUCOSE RANDOM mg/dL 102 92                 Results from last 7 days   Lab Units 09/12/21  0457 09/11/21  0519   PROCALCITONIN ng/ml 0 06 0 10 * I Have Reviewed All Lab Data Listed Above  * Additional Pertinent Lab Tests Reviewed: All Labs Within Last 24 Hours Reviewed    Imaging:    Imaging Reports Reviewed Today Include: Ct chest  Imaging Personally Reviewed by Myself Includes:  Ct chest    Recent Cultures (last 7 days):           Last 24 Hours Medication List:   Current Facility-Administered Medications   Medication Dose Route Frequency Provider Last Rate    Acetaminophen  650 mg Per NG Tube Q6H Alise Cunningham MD      ALPRAZolam  0 25 mg Oral HS PRN Marisamanuel Stinson, ANATOLY      amiodarone  200 mg Oral Daily With Breakfast Debbie Carvalho PA-C      apixaban  2 5 mg Oral BID Charles Sonday, ANATOLY      docusate sodium  100 mg Oral BID Jurgen Cantor, ANATOLY      furosemide  40 mg Intravenous Once Jurgen Cantor, ANATOLY      gabapentin  100 mg Oral HS Alise Cunningham MD      isosorbide dinitrate  10 mg Oral TID after meals Melanie Smith, ANATOLY      lidocaine  1 patch Topical Daily Antonio Alva MD      LORazepam  0 5 mg Intravenous Q6H PRN Neal Moore DO      melatonin  3 mg Oral HS Alise Cunningham MD      metoprolol tartrate  12 5 mg Oral Q12H [address] Ean Alexday, ANATOLY      omeprazole (PRILOSEC) suspension 2 mg/mL  20 mg Oral Daily Charles Sonday, ANATOLY      pravastatin  40 mg Oral Daily With [facility], ANATOLY      [START ON 9/16/2021] predniSONE  40 mg Oral Daily Jurgen Cantor, ANATOLY      saccharomyces boulardii  250 mg Oral BID Antonio Alva MD      senna-docusate sodium  2 tablet Oral BID PRN Antonio Alva MD      sertraline  50 mg Oral Daily Alise Cunningham MD          Today, Patient Was Seen By: Tavia Monge MD    ** Please Note: Dictation voice to text software may have been used in the creation of this document   **

## 2021-09-15 NOTE — ASSESSMENT & PLAN NOTE
Completed antibiotic therapy  Was restarted on antibiotics due to concerns for aspiration pneumonia  As per previous providers discussion with ID, likely due to aspiration pneumonitis  Continue monitoring off antibiotics    Video swallow reviewed with speech, placed on dysphagia 1 and thin no evidence of aspiration  CT chest (9/14) Groundglass opacities  Hydralazine discontinued, Now on IV lasix prn

## 2021-09-15 NOTE — PLAN OF CARE
Problem: OCCUPATIONAL THERAPY ADULT  Goal: Performs self-care activities at highest level of function for planned discharge setting  See evaluation for individualized goals  Description: Treatment Interventions: ADL retraining, Functional transfer training, UE strengthening/ROM, Endurance training, Cognitive reorientation, Patient/family training, Equipment evaluation/education, Compensatory technique education, Continued evaluation          See flowsheet documentation for full assessment, interventions and recommendations  Note: Limitation: Decreased UE strength, Decreased ADL status, Decreased UE ROM, Decreased Safe judgement during ADL, Decreased cognition, Decreased endurance, Decreased high-level ADLs  Prognosis: Fair  Assessment: Pt seen for 25min tx session with focus on functional balance, functional mobility, ADL status, b/l UE strength, and cognition  Pt able to tolerate EOB sitting for 5-10mins with f/f- sitting balance  Pt demonstrating need for heavy assistance with all ADL tasks  Pt fatigues quickly with all functional mobility tasks; noted decreased SPO2 levels(on 12liters of O2)  Cognitive deficits noted--i e orientation, memory, problem-solving  Pt continues to demonstrate appropriateness for inpt rehab to improve his overall level of independence  Will continue  Goals stated on initial eval remain appropriate  Will extend goal date        OT Discharge Recommendation: Post acute rehabilitation services

## 2021-09-16 LAB
ANION GAP SERPL CALCULATED.3IONS-SCNC: 10 MMOL/L (ref 4–13)
BUN SERPL-MCNC: 27 MG/DL (ref 5–25)
CALCIUM SERPL-MCNC: 8.5 MG/DL (ref 8.3–10.1)
CHLORIDE SERPL-SCNC: 98 MMOL/L (ref 100–108)
CO2 SERPL-SCNC: 28 MMOL/L (ref 21–32)
CREAT SERPL-MCNC: 0.67 MG/DL (ref 0.6–1.3)
ERYTHROCYTE [DISTWIDTH] IN BLOOD BY AUTOMATED COUNT: 22.7 % (ref 11.6–15.1)
GFR SERPL CREATININE-BSD FRML MDRD: 89 ML/MIN/1.73SQ M
GLUCOSE SERPL-MCNC: 94 MG/DL (ref 65–140)
HCT VFR BLD AUTO: 29.1 % (ref 36.5–49.3)
HGB BLD-MCNC: 8.8 G/DL (ref 12–17)
MCH RBC QN AUTO: 24.9 PG (ref 26.8–34.3)
MCHC RBC AUTO-ENTMCNC: 30.2 G/DL (ref 31.4–37.4)
MCV RBC AUTO: 82 FL (ref 82–98)
NT-PROBNP SERPL-MCNC: 1816 PG/ML
PLATELET # BLD AUTO: 442 THOUSANDS/UL (ref 149–390)
PMV BLD AUTO: 9.9 FL (ref 8.9–12.7)
POTASSIUM SERPL-SCNC: 3.6 MMOL/L (ref 3.5–5.3)
RBC # BLD AUTO: 3.53 MILLION/UL (ref 3.88–5.62)
SODIUM SERPL-SCNC: 136 MMOL/L (ref 136–145)
WBC # BLD AUTO: 11.71 THOUSAND/UL (ref 4.31–10.16)

## 2021-09-16 PROCEDURE — 83880 ASSAY OF NATRIURETIC PEPTIDE: CPT | Performed by: NURSE PRACTITIONER

## 2021-09-16 PROCEDURE — 85027 COMPLETE CBC AUTOMATED: CPT | Performed by: INTERNAL MEDICINE

## 2021-09-16 PROCEDURE — 80048 BASIC METABOLIC PNL TOTAL CA: CPT | Performed by: INTERNAL MEDICINE

## 2021-09-16 PROCEDURE — 99233 SBSQ HOSP IP/OBS HIGH 50: CPT | Performed by: INTERNAL MEDICINE

## 2021-09-16 RX ORDER — FUROSEMIDE 10 MG/ML
80 INJECTION INTRAMUSCULAR; INTRAVENOUS ONCE
Status: COMPLETED | OUTPATIENT
Start: 2021-09-16 | End: 2021-09-16

## 2021-09-16 RX ORDER — METOLAZONE 5 MG/1
5 TABLET ORAL ONCE
Status: COMPLETED | OUTPATIENT
Start: 2021-09-16 | End: 2021-09-16

## 2021-09-16 RX ORDER — METHYLPREDNISOLONE SODIUM SUCCINATE 125 MG/2ML
80 INJECTION, POWDER, LYOPHILIZED, FOR SOLUTION INTRAMUSCULAR; INTRAVENOUS ONCE
Status: COMPLETED | OUTPATIENT
Start: 2021-09-16 | End: 2021-09-16

## 2021-09-16 RX ORDER — METHYLPREDNISOLONE SODIUM SUCCINATE 40 MG/ML
40 INJECTION, POWDER, LYOPHILIZED, FOR SOLUTION INTRAMUSCULAR; INTRAVENOUS ONCE
Status: DISCONTINUED | OUTPATIENT
Start: 2021-09-16 | End: 2021-09-16

## 2021-09-16 RX ADMIN — ISOSORBIDE DINITRATE 10 MG: 10 TABLET ORAL at 17:55

## 2021-09-16 RX ADMIN — ACETAMINOPHEN 650 MG: 650 SUSPENSION ORAL at 00:44

## 2021-09-16 RX ADMIN — DOCUSATE SODIUM 100 MG: 100 CAPSULE, LIQUID FILLED ORAL at 08:47

## 2021-09-16 RX ADMIN — PREDNISONE 40 MG: 20 TABLET ORAL at 08:47

## 2021-09-16 RX ADMIN — Medication 250 MG: at 17:50

## 2021-09-16 RX ADMIN — Medication 20 MG: at 08:52

## 2021-09-16 RX ADMIN — FUROSEMIDE 80 MG: 10 INJECTION, SOLUTION INTRAVENOUS at 09:01

## 2021-09-16 RX ADMIN — ACETAMINOPHEN 650 MG: 650 SUSPENSION ORAL at 18:02

## 2021-09-16 RX ADMIN — PRAVASTATIN SODIUM 40 MG: 40 TABLET ORAL at 15:54

## 2021-09-16 RX ADMIN — MORPHINE SULFATE 2 MG: 2 INJECTION, SOLUTION INTRAMUSCULAR; INTRAVENOUS at 15:54

## 2021-09-16 RX ADMIN — ISOSORBIDE DINITRATE 10 MG: 10 TABLET ORAL at 13:03

## 2021-09-16 RX ADMIN — Medication 12.5 MG: at 21:27

## 2021-09-16 RX ADMIN — MELATONIN 3 MG: at 21:27

## 2021-09-16 RX ADMIN — ALPRAZOLAM 0.25 MG: 0.25 TABLET ORAL at 21:27

## 2021-09-16 RX ADMIN — Medication 12.5 MG: at 08:46

## 2021-09-16 RX ADMIN — AMIODARONE HYDROCHLORIDE 200 MG: 200 TABLET ORAL at 08:46

## 2021-09-16 RX ADMIN — ISOSORBIDE DINITRATE 10 MG: 10 TABLET ORAL at 08:48

## 2021-09-16 RX ADMIN — Medication 250 MG: at 08:47

## 2021-09-16 RX ADMIN — GABAPENTIN 100 MG: 100 CAPSULE ORAL at 21:27

## 2021-09-16 RX ADMIN — SERTRALINE HYDROCHLORIDE 50 MG: 50 TABLET ORAL at 08:46

## 2021-09-16 RX ADMIN — MORPHINE SULFATE 2 MG: 2 INJECTION, SOLUTION INTRAMUSCULAR; INTRAVENOUS at 20:16

## 2021-09-16 RX ADMIN — APIXABAN 2.5 MG: 2.5 TABLET, FILM COATED ORAL at 08:47

## 2021-09-16 RX ADMIN — METHYLPREDNISOLONE SODIUM SUCCINATE 80 MG: 125 INJECTION, POWDER, FOR SOLUTION INTRAMUSCULAR; INTRAVENOUS at 21:27

## 2021-09-16 RX ADMIN — APIXABAN 2.5 MG: 2.5 TABLET, FILM COATED ORAL at 17:50

## 2021-09-16 RX ADMIN — LORAZEPAM 0.5 MG: 2 INJECTION INTRAMUSCULAR; INTRAVENOUS at 08:47

## 2021-09-16 RX ADMIN — ACETAMINOPHEN 650 MG: 650 SUSPENSION ORAL at 13:03

## 2021-09-16 RX ADMIN — METOLAZONE 5 MG: 5 TABLET ORAL at 21:27

## 2021-09-16 RX ADMIN — LORAZEPAM 0.5 MG: 2 INJECTION INTRAMUSCULAR; INTRAVENOUS at 17:50

## 2021-09-16 NOTE — NURSING NOTE
Per Hospice services, patient to be attempted to be weaned from 15 L to 10 L and Ativan to be used to assist with patient's anxiety control  Oncoming RN to administer nighttime dose of Ativan to assist with sleep      Juan David Hutchison RN 9/16/2021 7:13 PM

## 2021-09-16 NOTE — CASE MANAGEMENT
Hospice consulted per [facility] request  Hospice Liaison evaluated and spoke to family members  Decision for transfer to [facility] tomorrow  Arranged BLS transport with [facility] with pickup at 0930 tomorrow  Completed medical necessity  Faxed to [facility]  Placed copy in chart  No other d/c needs identified at this time

## 2021-09-16 NOTE — HOSPICE NOTE
Hospice liaison met with family and pt early this morning and explained levels of care that can be provided to pt  Pt has been approved for both Home hospice Routine level  and Inpt Hospice Acute level of care, however the family is still discussing what they would like to do  Liaison assured them the Hospice team would provided the best care possible at both levels, but more care can and will be provided at the acute inpt level  The family is very concerned at this point because they would like to honor their fathers wishes by taking him home but they are concerned because he is at such high flow 02 and they do not have someone that could care for him at the level of care that he would need  Liaison did speak directly to the pt and he did express his wish to be comfortable  He struggled to get out full sentences due to SOB and liaison did note increased work of breathing with minimal exertion  Liaison did speak to hospice physician face to face regarding Mr Roberto Chacon case in regards to the stress and sadness this is causing the family  At this time Liaison is waiting to speak to [facility] before making any final decisions  Moving forward plan is to wean pt to 10L of 02 and d/c pt to either home on hospice or d/c pt to the hospice house

## 2021-09-16 NOTE — HOSPICE NOTE
Hospice liaison will reach out today and speak with the family regarding hospice services and their options  Liaison will plan to be at bedside today around  to assess and speak with pt

## 2021-09-16 NOTE — ASSESSMENT & PLAN NOTE
Acute hypoxic respiratory failure, poa, a/e/b tachypnea, RR 24-27, sob and hypoxia (desaturation levels not recorded) requiring BIPAP Possibly due to vascular congestion / multifocal pneumonia  Required ICU level of care / intubation  Completed antibiotic therapy  S/p extubation and clinically improved until 9/9 where he decompensated, requiring 15L, down to 10L midflow but now back on 15L Midflow    IV furosemide given again    Patient with no improvement in 27 days despite aggressive treatment and manage  Based upon current radiographs, clinical deterioration - high likelihood of death within the next 72 hours-96 hours  Hospice consultation placed - will be transition to inpatient hospice  Will give morphine for air hunger and discomfort

## 2021-09-16 NOTE — ASSESSMENT & PLAN NOTE
Acute kidney injury requiring hemodialysis, could not tolerate hemodialysis  Resolved     Recent Labs     09/14/21  0440 09/15/21  0558 09/16/21  0449   BUN 21 26* 27*   CREATININE 0 79 0 87 0 67   EGFR 83 80 89

## 2021-09-16 NOTE — ASSESSMENT & PLAN NOTE
Waxing and waning  In the setting of acute illness, more alert today  Likely decreased cerebral perfusion in the setting of hypoxia

## 2021-09-16 NOTE — ASSESSMENT & PLAN NOTE
Completed antibiotic therapy  Was restarted on antibiotics due to concerns for aspiration pneumonia  As per previous providers discussion with ID, likely due to aspiration pneumonitis  Continue monitoring off antibiotics    Video swallow reviewed with speech, placed on dysphagia 1 and thin no evidence of aspiration  CT chest (9/14) Groundglass opacities  Hydralazine discontinued, IV lasix one given this am

## 2021-09-16 NOTE — ASSESSMENT & PLAN NOTE
Wt Readings from Last 3 Encounters:   09/16/21 78 kg (171 lb 15 3 oz)   07/26/21 83 5 kg (184 lb 2 oz)   03/19/21 82 8 kg (182 lb 9 6 oz)     Presented with acute on chronic diastolic heart failure exacerbation      BNP elevated  Re-dose intravenous furosemide   Will aim for net negative goal of 1 L today,

## 2021-09-16 NOTE — PLAN OF CARE
Problem: Potential for Falls  Goal: Patient will remain free of falls  Description: INTERVENTIONS:  - Educate patient/family on patient safety including physical limitations  - Instruct patient to call for assistance with activity   - Consult OT/PT to assist with strengthening/mobility   - Keep Call bell within reach  - Keep bed low and locked with side rails adjusted as appropriate  - Keep care items and personal belongings within reach  - Initiate and maintain comfort rounds  - Make Fall Risk Sign visible to staff  - Offer Toileting every 2 Hours, in advance of need  - Initiate/Maintain   alarm  - Obtain necessary fall risk management equipment:   - Apply yellow socks and bracelet for high fall risk patients  - Consider moving patient to room near nurses station  Outcome: Progressing     Problem: MOBILITY - ADULT  Goal: Maintain or return to baseline ADL function  Description: INTERVENTIONS:  -  Assess patient's ability to carry out ADLs; assess patient's baseline for ADL function and identify physical deficits which impact ability to perform ADLs (bathing, care of mouth/teeth, toileting, grooming, dressing, etc )  - Assess/evaluate cause of self-care deficits   - Assess range of motion  - Assess patient's mobility; develop plan if impaired  - Assess patient's need for assistive devices and provide as appropriate  - Encourage maximum independence but intervene and supervise when necessary  - Involve family in performance of ADLs  - Assess for home care needs following discharge   - Consider OT consult to assist with ADL evaluation and planning for discharge  - Provide patient education as appropriate  Outcome: Progressing  Goal: Maintains/Returns to pre admission functional level  Description: INTERVENTIONS:  - Perform BMAT or MOVE assessment daily    - Set and communicate daily mobility goal to care team and patient/family/caregiver     - Collaborate with rehabilitation services on mobility goals if consulted  - Perform Range of Motion 3 times a day  - Reposition patient every 2 hours  - Dangle patient 3 times a day  - Stand patient 3 times a day  - Ambulate patient 3 times a day  - Out of bed to chair 3 times a day   - Out of bed for meals 3 times a day  - Out of bed for toileting  - Record patient progress and toleration of activity level   Outcome: Progressing     Problem: Prexisting or High Potential for Compromised Skin Integrity  Goal: Skin integrity is maintained or improved  Description: INTERVENTIONS:  - Identify patients at risk for skin breakdown  - Assess and monitor skin integrity  - Assess and monitor nutrition and hydration status  - Monitor labs   - Assess for incontinence   - Turn and reposition patient  - Assist with mobility/ambulation  - Relieve pressure over bony prominences  - Avoid friction and shearing  - Provide appropriate hygiene as needed including keeping skin clean and dry  - Evaluate need for skin moisturizer/barrier cream  - Collaborate with interdisciplinary team   - Patient/family teaching  - Consider wound care consult   Outcome: Progressing     Problem: NEUROSENSORY - ADULT  Goal: Achieves stable or improved neurological status  Description: INTERVENTIONS  - Monitor and report changes in neurological status  - Monitor vital signs such as temperature, blood pressure, glucose, and any other labs ordered   - Initiate measures to prevent increased intracranial pressure  - Monitor for seizure activity and implement precautions if appropriate      Outcome: Progressing  Goal: Achieves maximal functionality and self care  Description: INTERVENTIONS  - Monitor swallowing and airway patency with patient fatigue and changes in neurological status  - Encourage and assist patient to increase activity and self care     - Encourage visually impaired, hearing impaired and aphasic patients to use assistive/communication devices  Outcome: Progressing

## 2021-09-16 NOTE — PROGRESS NOTES
[address] [facility]  Progress Note Jessica Pearce 1940, 78 y o  male MRN: [id number]  [facility] [address] 216-01 Encounter: [id number]  Primary Care Provider: Tyler Castorena DO   Date and time admitted to hospital: 8/19/2021  8:25 PM    Insomnia due to medical condition  Assessment & Plan  Continue frequent reorientation   Had geriatrics consultation earlier in the admission    Ambulatory dysfunction  Assessment & Plan  PT OT evals -   If recovers from pulmonary process    Acute metabolic encephalopathy  Assessment & Plan  Waxing and waning  In the setting of acute illness, more alert today  Likely decreased cerebral perfusion in the setting of hypoxia    Hypernatremia  Assessment & Plan  Due to free water deficit and dehydration    Close monitor, has normalized   Encourage free water intake    Recent Labs     09/14/21  0440 09/15/21  0558 09/16/21  0449   SODIUM 134* 132* 136             Atrial fibrillation (HCC)  Assessment & Plan  New onset atrial fibrillation with RVR  Currently on NSR   - Continue Eliquis 2 5mg BID  - Continue amiodarone daily    CANDIE (acute kidney injury) ([facility] [address] )  Assessment & Plan  Acute kidney injury requiring hemodialysis, could not tolerate hemodialysis  Resolved  Recent Labs     09/14/21  0440 09/15/21  0558 09/16/21  0449   BUN 21 26* 27*   CREATININE 0 79 0 87 0 67   EGFR 83 80 89               Acute on chronic diastolic CHF (congestive heart failure) (HCC)  Assessment & Plan  Wt Readings from Last 3 Encounters:   09/16/21 78 kg (171 lb 15 3 oz)   07/26/21 83 5 kg (184 lb 2 oz)   03/19/21 82 8 kg (182 lb 9 6 oz)     Presented with acute on chronic diastolic heart failure exacerbation  BNP elevated  Re-dose intravenous furosemide   Will aim for net negative goal of 1 L today,     Multifocal pneumonia  Assessment & Plan  Completed antibiotic therapy  Was restarted on antibiotics due to concerns for aspiration pneumonia   As per previous providers discussion with ID, likely due to aspiration pneumonitis  Continue monitoring off antibiotics  Video swallow reviewed with speech, placed on dysphagia 1 and thin no evidence of aspiration  CT chest (9/14) Groundglass opacities  Hydralazine discontinued, IV lasix one given this am     Mild cognitive impairment  Assessment & Plan  Delirium precautions  Appreciate geriatric recommendations  Aortic stenosis, moderate  Assessment & Plan  Patient with moderate aortic stenosis, likely contributing to volume overload  His respiratory status remains tenuous  BNP elevated    Essential hypertension  Assessment & Plan  Controlled  Discontinue hydralazine 25mg TID, to allow for IV diuresis  On isosorbide 10mg tid, and metoprolol 12 5mg Q12  * Acute respiratory failure with hypoxia (HCC)  Assessment & Plan  Acute hypoxic respiratory failure, poa, a/e/b tachypnea, RR 24-27, sob and hypoxia (desaturation levels not recorded) requiring BIPAP Possibly due to vascular congestion / multifocal pneumonia  Required ICU level of care / intubation  Completed antibiotic therapy  S/p extubation and clinically improved until 9/9 where he decompensated, requiring 15L, down to 10L midflow but now back on 15L Midflow    IV furosemide given again    Patient with no improvement in 27 days despite aggressive treatment and manage  Based upon current radiographs, clinical deterioration - high likelihood of death within the next 67 hours-96 hours  Hospice consultation placed - will be transition to inpatient hospice  Will give morphine for air hunger and discomfort        [facility] Progress Note  Patient: Yakelin Lorenzana 78 y o  male   MRN: [id number]  PCP: Hector Vazquez DO  Unit/Bed#: [place] [address] 2 -01 Encounter: [id number]  Date Of Visit: 09/16/21    Assessment:    Principal Problem:    Acute respiratory failure with hypoxia ([facility] [address] )  Active Problems:    Essential hypertension    Aortic stenosis, moderate    Mild cognitive impairment    Multifocal pneumonia    Acute on chronic diastolic CHF (congestive heart failure) ([facility])    CANDIE (acute kidney injury) ([facility] [address] )    Atrial fibrillation (HCC)    Hypernatremia    Acute metabolic encephalopathy    Ambulatory dysfunction    Insomnia due to medical condition      Plan:    · Aim for goal is comfort  · Transition to hospice  · Supportive care for family members  · Spiritual care consult  · Re-dose intravenous furosemide       VTE Pharmacologic Prophylaxis:   Pharmacologic: Apixaban (Eliquis)  Mechanical VTE Prophylaxis in Place: Yes    Patient Centered Rounds: I have performed bedside rounds with nursing staff today  Discussions with Specialists or Other Care Team Provider:  Hospice    Education and Discussions with Family / Patient:  Patient family at bedside    Time Spent for Care: 45 minutes  More than 50% of total time spent on counseling and coordination of care as described above  Current Length of Stay: 28 day(s)    Current Patient Status: Inpatient   Certification Statement: The patient will continue to require additional inpatient hospital stay due to Acute hypoxemic respiratory failure, and end of life    Discharge Plan / Estimated Discharge Date:  Tomorrow    Code Status: Level 1 - Full Code      Subjective:   Patient seen and examined  Multiple questions answered by family members  The patient has hypoxia with minimal movement at this point  He is not uncomfortable, but has periods where he developed significant anxiety and air hunger  Although he understands his prognosis, he has some denial about his current terminal diagnosis  Denies any nausea, no vomiting    A complete and comprehensive 14 point organ system review has been performed and all other systems are negative other than stated above      Objective:     Vitals:   Temp (24hrs), Av 2 °F (36 2 °C), Min:96 9 °F (36 1 °C), Max:97 5 °F (36 4 °C)    Temp:  [96 9 °F (36 1 °C)-97 5 °F (36 4 °C)] 97 5 °F (36 4 °C)  HR:  [76-82] 80  Resp:  [16-18] 16  BP: (127-146)/(60-69) 127/66  SpO2:  [94 %-97 %] 94 %  Body mass index is 29 52 kg/m²  Input and Output Summary (last 24 hours): Intake/Output Summary (Last 24 hours) at 9/16/2021 1620  Last data filed at 9/16/2021 1301  Gross per 24 hour   Intake 400 ml   Output --   Net 400 ml       Physical Exam:     General:  Appears ill with episodes of respiratory distress  HEENT: atraumatic, PERRLA, moist mucosa, normal pharynx, normal tonsils and adenoids, normal tongue, no fluid in sinuses  Neck: Trachea midline, no carotid bruit, no masses  Respiratory:  Coarse breath sounds to all lung fields   Cardiovascular:  Irregularly irregular  Abdomen: Soft, non-tender, non-distended, normal bowel sounds in all quadrants, no hepatosplenomegaly, no tympany  Rectal: deferred  Musculoskeletal:  Moves all  Integumentary: warm, dry, and pink, with no rash, purpura, or petechia  Heme/Lymph: no lymphadenopathy, no bruises  Neurological: Cranial Nerves II-XII grossly intact, no tics, normal sensation to pressure and light touch  Psychiatric: cooperative      Additional Data:     Labs:    Results from last 7 days   Lab Units 09/16/21  0449 09/13/21  0507   WBC Thousand/uL 11 71* 26 69*   HEMOGLOBIN g/dL 8 8* 10 0*   HEMATOCRIT % 29 1* 34 1*   PLATELETS Thousands/uL 442* 628*   NEUTROS PCT %  --  88*   LYMPHS PCT %  --  3*   MONOS PCT %  --  6   EOS PCT %  --  1     Results from last 7 days   Lab Units 09/16/21  0449 09/11/21  0519   POTASSIUM mmol/L 3 6 3 5   CHLORIDE mmol/L 98* 101   CO2 mmol/L 28 23   BUN mg/dL 27* 24   CREATININE mg/dL 0 67 1 00   CALCIUM mg/dL 8 5 8 5   ALK PHOS U/L  --  117*   ALT U/L  --  33   AST U/L  --  19           * I Have Reviewed All Lab Data Listed Above  * Additional Pertinent Lab Tests Reviewed:  [facility] Admission Reviewed    Imaging:    Imaging Reports Reviewed Today Include:  No new imaging  Imaging Personally Reviewed by Myself Includes:  No new imaging    Recent Cultures (last 7 days):           Last 24 Hours Medication List:   Current Facility-Administered Medications   Medication Dose Route Frequency Provider Last Rate    Acetaminophen  650 mg Per NG Tube Q6H Anupama Holloway MD      ALPRAZolam  0 25 mg Oral HS PRN Erickson Willisa, ANATOLY      amiodarone  200 mg Oral Daily With Breakfast Shankar Turner PA-C      apixaban  2 5 mg Oral BID Charles Sonday, CRNP      docusate sodium  100 mg Oral BID Alex Bhat, CRLORENA      gabapentin  100 mg Oral HS Anupama Holloway MD      isosorbide dinitrate  10 mg Oral TID after meals ANATOLY Peterson      lidocaine  1 patch Topical Daily Danny Finnegan MD      LORazepam  0 5 mg Intravenous Q6H PRN Jaspreet Cortez DO      melatonin  3 mg Oral HS Anupama Holloway MD      metoprolol tartrate  12 5 mg Oral Q12H [facility] [address]      morphine injection  2 mg Intravenous Q4H PRN Jaspreet Cortez DO      omeprazole (PRILOSEC) suspension 2 mg/mL  20 mg Oral Daily Charles Sonday, CRLORENA      pravastatin  40 mg Oral Daily With [employer] Sonday, CRNP      predniSONE  40 mg Oral Daily Alex Bhat, CRLORENA      saccharomyces boulardii  250 mg Oral BID Danny Finnegan MD      senna-docusate sodium  2 tablet Oral BID PRN Danny Finnegan MD      sertraline  50 mg Oral Daily Anupama Holloway MD          Today, Patient Was Seen By: Eli Reno DO    ** Please Note: This note was completed in part utilizing [employer] Software  Grammatical errors, random word insertions, spelling mistakes, and incomplete sentences may be an occasional consequence of this system secondary to software limitations, ambient noise, and hardware issues  If you have any questions or concerns about the content, text, or information contained within the body of this dictation, please contact the provider for clarification   **

## 2021-09-16 NOTE — ASSESSMENT & PLAN NOTE
Due to free water deficit and dehydration    Close monitor, has normalized   Encourage free water intake    Recent Labs     09/14/21  0440 09/15/21  0558 09/16/21  0449   SODIUM 134* 132* 136

## 2021-09-17 VITALS
HEART RATE: 78 BPM | HEIGHT: 64 IN | BODY MASS INDEX: 27.89 KG/M2 | DIASTOLIC BLOOD PRESSURE: 62 MMHG | TEMPERATURE: 98 F | WEIGHT: 163.36 LBS | OXYGEN SATURATION: 97 % | SYSTOLIC BLOOD PRESSURE: 125 MMHG | RESPIRATION RATE: 20 BRPM

## 2021-09-17 PROBLEM — J80 ARDS (ADULT RESPIRATORY DISTRESS SYNDROME) (HCC): Status: ACTIVE | Noted: 2021-09-17

## 2021-09-17 PROCEDURE — 99239 HOSP IP/OBS DSCHRG MGMT >30: CPT | Performed by: INTERNAL MEDICINE

## 2021-09-17 RX ORDER — LANOLIN ALCOHOL/MO/W.PET/CERES
3 CREAM (GRAM) TOPICAL
Qty: 30 TABLET | Refills: 0
Start: 2021-09-17

## 2021-09-17 RX ORDER — ISOSORBIDE DINITRATE 10 MG/1
10 TABLET ORAL
Qty: 30 TABLET | Refills: 0
Start: 2021-09-17

## 2021-09-17 RX ORDER — AMIODARONE HYDROCHLORIDE 200 MG/1
200 TABLET ORAL
Qty: 30 TABLET | Refills: 0
Start: 2021-09-17

## 2021-09-17 RX ORDER — SACCHAROMYCES BOULARDII 250 MG
250 CAPSULE ORAL 2 TIMES DAILY
Qty: 30 CAPSULE | Refills: 0
Start: 2021-09-17

## 2021-09-17 RX ORDER — ALPRAZOLAM 0.25 MG/1
0.25 TABLET ORAL
Qty: 30 TABLET | Refills: 0 | Status: SHIPPED | OUTPATIENT
Start: 2021-09-17 | End: 2021-09-27

## 2021-09-17 RX ORDER — GABAPENTIN 100 MG/1
100 CAPSULE ORAL
Qty: 30 CAPSULE | Refills: 0
Start: 2021-09-17

## 2021-09-17 RX ADMIN — LORAZEPAM 0.5 MG: 2 INJECTION INTRAMUSCULAR; INTRAVENOUS at 05:18

## 2021-09-17 RX ADMIN — ACETAMINOPHEN 650 MG: 650 SUSPENSION ORAL at 05:19

## 2021-09-17 RX ADMIN — MORPHINE SULFATE 2 MG: 2 INJECTION, SOLUTION INTRAMUSCULAR; INTRAVENOUS at 09:13

## 2021-09-17 RX ADMIN — LORAZEPAM 0.5 MG: 2 INJECTION INTRAMUSCULAR; INTRAVENOUS at 09:13

## 2021-09-17 NOTE — CASE MANAGEMENT
Call from Bebeto Noel at [facility] with /outfit needing to be changed with Kirt GIL to  at 1030  RN (informing wife of same) and Hospice Liaison made aware of same  CMN faxed to [facility] as requested  No further d/c needs identified

## 2021-09-17 NOTE — ASSESSMENT & PLAN NOTE
Acute hypoxic respiratory failure, poa, a/e/b tachypnea, RR 24-27, sob and hypoxia (desaturation levels not recorded) requiring BIPAP Possibly due to vascular congestion / multifocal pneumonia  Required ICU level of care / intubation  Completed antibiotic therapy  S/p extubation and clinically improved until 9/9 where he decompensated, requiring 15L, down to 10L midflow but now back on 15L Midflow    IV furosemide given again    Patient with no improvement in 27 days despite aggressive treatment and manage  Based upon current radiographs, clinical deterioration - high likelihood of death within the next 72 hours-96 hours  Hospice consultation placed - will be transition to inpatient hospice

## 2021-09-17 NOTE — PLAN OF CARE
Problem: Potential for Falls  Goal: Patient will remain free of falls  Description: INTERVENTIONS:  - Educate patient/family on patient safety including physical limitations  - Instruct patient to call for assistance with activity   - Consult OT/PT to assist with strengthening/mobility   - Keep Call bell within reach  - Keep bed low and locked with side rails adjusted as appropriate  - Keep care items and personal belongings within reach  - Initiate and maintain comfort rounds  - Make Fall Risk Sign visible to staff  - Offer Toileting every  Hours, in advance of need  - Initiate/Maintain alarm  - Obtain necessary fall risk management equipment:   - Apply yellow socks and bracelet for high fall risk patients  - Consider moving patient to room near nurses station  9/17/2021 1110 by Galilea Mccord RN  Outcome: Adequate for Discharge  9/17/2021 0741 by Galilea Mccord RN  Outcome: Progressing     Problem: MOBILITY - ADULT  Goal: Maintain or return to baseline ADL function  Description: INTERVENTIONS:  -  Assess patient's ability to carry out ADLs; assess patient's baseline for ADL function and identify physical deficits which impact ability to perform ADLs (bathing, care of mouth/teeth, toileting, grooming, dressing, etc )  - Assess/evaluate cause of self-care deficits   - Assess range of motion  - Assess patient's mobility; develop plan if impaired  - Assess patient's need for assistive devices and provide as appropriate  - Encourage maximum independence but intervene and supervise when necessary  - Involve family in performance of ADLs  - Assess for home care needs following discharge   - Consider OT consult to assist with ADL evaluation and planning for discharge  - Provide patient education as appropriate  9/17/2021 1110 by Galilea Mccord RN  Outcome: Adequate for Discharge  9/17/2021 0741 by Galilea Mccord RN  Outcome: Progressing  Goal: Maintains/Returns to pre admission functional level  Description: INTERVENTIONS:  - Perform BMAT or MOVE assessment daily    - Set and communicate daily mobility goal to care team and patient/family/caregiver  - Collaborate with rehabilitation services on mobility goals if consulted  - Perform Range of Motion  times a day  - Reposition patient every  hours    - Dangle patient  times a day  - Stand patient  times a day  - Ambulate patient  times a day  - Out of bed to chair  times a day   - Out of bed for meals  times a day  - Out of bed for toileting  - Record patient progress and toleration of activity level   9/17/2021 1110 by Mary Ch RN  Outcome: Adequate for Discharge  9/17/2021 0741 by Mary Ch RN  Outcome: Progressing     Problem: Prexisting or High Potential for Compromised Skin Integrity  Goal: Skin integrity is maintained or improved  Description: INTERVENTIONS:  - Identify patients at risk for skin breakdown  - Assess and monitor skin integrity  - Assess and monitor nutrition and hydration status  - Monitor labs   - Assess for incontinence   - Turn and reposition patient  - Assist with mobility/ambulation  - Relieve pressure over bony prominences  - Avoid friction and shearing  - Provide appropriate hygiene as needed including keeping skin clean and dry  - Evaluate need for skin moisturizer/barrier cream  - Collaborate with interdisciplinary team   - Patient/family teaching  - Consider wound care consult   9/17/2021 1110 by Mary Ch RN  Outcome: Adequate for Discharge  9/17/2021 0741 by Mary Ch RN  Outcome: Progressing     Problem: NEUROSENSORY - ADULT  Goal: Achieves stable or improved neurological status  Description: INTERVENTIONS  - Monitor and report changes in neurological status  - Monitor vital signs such as temperature, blood pressure, glucose, and any other labs ordered   - Initiate measures to prevent increased intracranial pressure  - Monitor for seizure activity and implement precautions if appropriate      9/17/2021 1110 by Mary Ch RN  Outcome: Adequate for Discharge  9/17/2021 0741 by Deepak Sharpe RN  Outcome: Progressing  Goal: Achieves maximal functionality and self care  Description: INTERVENTIONS  - Monitor swallowing and airway patency with patient fatigue and changes in neurological status  - Encourage and assist patient to increase activity and self care     - Encourage visually impaired, hearing impaired and aphasic patients to use assistive/communication devices  9/17/2021 1110 by Deepak Sharpe RN  Outcome: Adequate for Discharge  9/17/2021 0741 by Deepak Sharpe RN  Outcome: Progressing     Problem: CARDIOVASCULAR - ADULT  Goal: Maintains optimal cardiac output and hemodynamic stability  Description: INTERVENTIONS:  - Monitor I/O, vital signs and rhythm  - Monitor for S/S and trends of decreased cardiac output  - Administer and titrate ordered vasoactive medications to optimize hemodynamic stability  - Assess quality of pulses, skin color and temperature  - Assess for signs of decreased coronary artery perfusion  - Instruct patient to report change in severity of symptoms  9/17/2021 1110 by Deepak Sharpe RN  Outcome: Adequate for Discharge  9/17/2021 0741 by Deepak Sharpe RN  Outcome: Progressing  Goal: Absence of cardiac dysrhythmias or at baseline rhythm  Description: INTERVENTIONS:  - Continuous cardiac monitoring, vital signs, obtain 12 lead EKG if ordered  - Administer antiarrhythmic and heart rate control medications as ordered  - Monitor electrolytes and administer replacement therapy as ordered  9/17/2021 1110 by Deepak Sharpe RN  Outcome: Adequate for Discharge  9/17/2021 0741 by Deepak Sharpe RN  Outcome: Progressing     Problem: RESPIRATORY - ADULT  Goal: Achieves optimal ventilation and oxygenation  Description: INTERVENTIONS:  - Assess for changes in respiratory status  - Assess for changes in mentation and behavior  - Position to facilitate oxygenation and minimize respiratory effort  - Oxygen administered by appropriate delivery if ordered  - Initiate smoking cessation education as indicated  - Encourage broncho-pulmonary hygiene including cough, deep breathe, Incentive Spirometry  - Assess the need for suctioning and aspirate as needed  - Assess and instruct to report SOB or any respiratory difficulty  - Respiratory Therapy support as indicated  9/17/2021 1110 by Debbie Pearl RN  Outcome: Adequate for Discharge  9/17/2021 0741 by Debbie Pearl RN  Outcome: Progressing     Problem: GASTROINTESTINAL - ADULT  Goal: Minimal or absence of nausea and/or vomiting  Description: INTERVENTIONS:  - Administer IV fluids if ordered to ensure adequate hydration  - Maintain NPO status until nausea and vomiting are resolved  - Nasogastric tube if ordered  - Administer ordered antiemetic medications as needed  - Provide nonpharmacologic comfort measures as appropriate  - Advance diet as tolerated, if ordered  - Consider nutrition services referral to assist patient with adequate nutrition and appropriate food choices  9/17/2021 1110 by Debbie Pearl RN  Outcome: Adequate for Discharge  9/17/2021 0741 by Debbie Pearl RN  Outcome: Progressing  Goal: Maintains or returns to baseline bowel function  Description: INTERVENTIONS:  - Assess bowel function  - Encourage oral fluids to ensure adequate hydration  - Administer IV fluids if ordered to ensure adequate hydration  - Administer ordered medications as needed  - Encourage mobilization and activity  - Consider nutritional services referral to assist patient with adequate nutrition and appropriate food choices  9/17/2021 1110 by Debbie Pearl RN  Outcome: Adequate for Discharge  9/17/2021 0741 by Debbie Pearl RN  Outcome: Progressing  Goal: Maintains adequate nutritional intake  Description: INTERVENTIONS:  - Monitor percentage of each meal consumed  - Identify factors contributing to decreased intake, treat as appropriate  - Assist with meals as needed  - Monitor I&O, weight, and lab values if indicated  - Obtain nutrition services referral as needed  9/17/2021 1110 by Eve Cortez RN  Outcome: Adequate for Discharge  9/17/2021 0741 by Eve Cortez RN  Outcome: Progressing  Goal: Oral mucous membranes remain intact  Description: INTERVENTIONS  - Assess oral mucosa and hygiene practices  - Implement preventative oral hygiene regimen  - Implement oral medicated treatments as ordered  - Initiate Nutrition services referral as needed  9/17/2021 1110 by Eve Cortez RN  Outcome: Adequate for Discharge  9/17/2021 0741 by Eve Cortez RN  Outcome: Progressing     Problem: GENITOURINARY - ADULT  Goal: Maintains or returns to baseline urinary function  Description: INTERVENTIONS:  - Assess urinary function  - Encourage oral fluids to ensure adequate hydration if ordered  - Administer IV fluids as ordered to ensure adequate hydration  - Administer ordered medications as needed  - Offer frequent toileting  - Follow urinary retention protocol if ordered  9/17/2021 1110 by Eve Cortez RN  Outcome: Adequate for Discharge  9/17/2021 0741 by Eve Cortez RN  Outcome: Progressing  Goal: Absence of urinary retention  Description: INTERVENTIONS:  - Assess patients ability to void and empty bladder  - Monitor I/O  - Bladder scan as needed  - Discuss with physician/AP medications to alleviate retention as needed  - Discuss catheterization for long term situations as appropriate  9/17/2021 1110 by Eev Cortez RN  Outcome: Adequate for Discharge  9/17/2021 0741 by Eve Cortez RN  Outcome: Progressing     Problem: METABOLIC, FLUID AND ELECTROLYTES - ADULT  Goal: Electrolytes maintained within normal limits  Description: INTERVENTIONS:  - Monitor labs and assess patient for signs and symptoms of electrolyte imbalances  - Administer electrolyte replacement as ordered  - Monitor response to electrolyte replacements, including repeat lab results as appropriate  - Instruct patient on fluid and nutrition as appropriate  9/17/2021 1110 by Eve Cortez RN  Outcome: Adequate for Discharge  9/17/2021 0741 by Stu Salgado RN  Outcome: Progressing  Goal: Fluid balance maintained  Description: INTERVENTIONS:  - Monitor labs   - Monitor I/O and WT  - Instruct patient on fluid and nutrition as appropriate  - Assess for signs & symptoms of volume excess or deficit  9/17/2021 1110 by Stu Salgado RN  Outcome: Adequate for Discharge  9/17/2021 0741 by Stu Salgado RN  Outcome: Progressing  Goal: Glucose maintained within target range  Description: INTERVENTIONS:  - Monitor Blood Glucose as ordered  - Assess for signs and symptoms of hyperglycemia and hypoglycemia  - Administer ordered medications to maintain glucose within target range  - Assess nutritional intake and initiate nutrition service referral as needed  9/17/2021 1110 by Stu Salgado RN  Outcome: Adequate for Discharge  9/17/2021 0741 by Stu Salgado RN  Outcome: Progressing     Problem: SKIN/TISSUE INTEGRITY - ADULT  Goal: Skin Integrity remains intact(Skin Breakdown Prevention)  Description: Assess:  -Perform Kevon assessment every   -Clean and moisturize skin every   -Inspect skin when repositioning, toileting, and assisting with ADLS  -Assess under medical devices such as  every   -Assess extremities for adequate circulation and sensation     Bed Management:  -Have minimal linens on bed & keep smooth, unwrinkled  -Change linens as needed when moist or perspiring  -Avoid sitting or lying in one position for more than  hours while in bed  -Keep HOB at degrees     Toileting:  -Offer bedside commode  -Assess for incontinence every   -Use incontinent care products after each incontinent episode such as     Activity:  -Mobilize patient  times a day  -Encourage activity and walks on unit  -Encourage or provide ROM exercises   -Turn and reposition patient every  Hours  -Use appropriate equipment to lift or move patient in bed  -Instruct/ Assist with weight shifting every  when out of bed in chair  -Consider limitation of chair time  hour intervals    Skin Care:  -Avoid use of baby powder, tape, friction and shearing, hot water or constrictive clothing  -Relieve pressure over bony prominences using   -Do not massage red bony areas    Next Steps:  -Teach patient strategies to minimize risks such as    -Consider consults to  interdisciplinary teams such   9/17/2021 1110 by Anil Martinez RN  Outcome: Adequate for Discharge  9/17/2021 0741 by Anil Martinez RN  Outcome: Progressing     Problem: HEMATOLOGIC - ADULT  Goal: Maintains hematologic stability  Description: INTERVENTIONS  - Assess for signs and symptoms of bleeding or hemorrhage  - Monitor labs  - Administer supportive blood products/factors as ordered and appropriate  9/17/2021 1110 by Anil Martinez RN  Outcome: Adequate for Discharge  9/17/2021 0741 by Anil Martinez RN  Outcome: Progressing     Problem: MUSCULOSKELETAL - ADULT  Goal: Maintain or return mobility to safest level of function  Description: INTERVENTIONS:  - Assess patient's ability to carry out ADLs; assess patient's baseline for ADL function and identify physical deficits which impact ability to perform ADLs (bathing, care of mouth/teeth, toileting, grooming, dressing, etc )  - Assess/evaluate cause of self-care deficits   - Assess range of motion  - Assess patient's mobility  - Assess patient's need for assistive devices and provide as appropriate  - Encourage maximum independence but intervene and supervise when necessary  - Involve family in performance of ADLs  - Assess for home care needs following discharge   - Consider OT consult to assist with ADL evaluation and planning for discharge  - Provide patient education as appropriate  9/17/2021 1110 by Anil Martinez RN  Outcome: Adequate for Discharge  9/17/2021 0741 by Anil Martinez RN  Outcome: Progressing     Problem: Nutrition/Hydration-ADULT  Goal: Nutrient/Hydration intake appropriate for improving, restoring or maintaining nutritional needs  Description: Monitor and assess patient's nutrition/hydration status for malnutrition  Collaborate with interdisciplinary team and initiate plan and interventions as ordered  Monitor patient's weight and dietary intake as ordered or per policy  Utilize nutrition screening tool and intervene as necessary  Determine patient's food preferences and provide high-protein, high-caloric foods as appropriate       INTERVENTIONS:  - Monitor oral intake, urinary output, labs, and treatment plans  - Assess nutrition and hydration status and recommend course of action  - Evaluate amount of meals eaten  - Assist patient with eating if necessary   - Allow adequate time for meals  - Recommend/ encourage appropriate diets, oral nutritional supplements, and vitamin/mineral supplements  - Order, calculate, and assess calorie counts as needed  - Recommend, monitor, and adjust tube feedings and TPN/PPN based on assessed needs  - Assess need for intravenous fluids  - Provide specific nutrition/hydration education as appropriate  - Include patient/family/caregiver in decisions related to nutrition  9/17/2021 1110 by Tiana Mckeon RN  Outcome: Adequate for Discharge  9/17/2021 0741 by Tiana Mckeon RN  Outcome: Progressing

## 2021-09-17 NOTE — HOSPICE NOTE
Hospice liaison has received consents from family to move forward with sending pt to the in patient hospice house for Acute management   Transport has been arranged for 0930 9/17/2021  Family is aware of transport time  Please medicate the pt prior to transport for anxiety and SOB  Leave all IVs in place  Please call The main number at the [facility] to give a full detailed report 1/2 prior to transport   OOH-DNR will be sent to  and printed for the Primary Physician to sign for EMS to transport the pt  Please contact liaison in the am if there have been any significant  changes throughout the evening at

## 2021-09-17 NOTE — ASSESSMENT & PLAN NOTE
Wt Readings from Last 3 Encounters:   09/17/21 74 1 kg (163 lb 5 8 oz)   07/26/21 83 5 kg (184 lb 2 oz)   03/19/21 82 8 kg (182 lb 9 6 oz)     Presented with acute on chronic diastolic heart failure exacerbation      BNP elevated  Re-dose intravenous furosemide  Weaned to 10 L

## 2021-09-17 NOTE — ASSESSMENT & PLAN NOTE
Due to free water deficit and dehydration    Close monitor, has normalized   Encourage free water intake    Recent Labs     09/15/21  0558 09/16/21  0449   SODIUM 132* 136

## 2021-09-17 NOTE — ASSESSMENT & PLAN NOTE
Acute kidney injury requiring hemodialysis, could not tolerate hemodialysis  Resolved     Recent Labs     09/15/21  0558 09/16/21  0449   BUN 26* 27*   CREATININE 0 87 0 67   EGFR 80 89

## 2021-09-17 NOTE — ASSESSMENT & PLAN NOTE
Completed antibiotic therapy  Was restarted on antibiotics due to concerns for aspiration pneumonia  As per previous providers discussion with ID, likely due to aspiration pneumonitis  Continue monitoring off antibiotics    Video swallow reviewed with speech, placed on dysphagia 1 and thin no evidence of aspiration  CT chest (9/14) Groundglass opacities  Hydralazine discontinued

## 2021-09-17 NOTE — DISCHARGE INSTRUCTIONS
Death and Dying   WHAT YOU NEED TO KNOW:   Healthcare providers can recognize signs and symptoms that mean a person may be close to dying  The person and his loved ones can be helped to prepare for and accept death  A dying person may fear being alone, becoming a burden, or having pain  Medicines and other supportive care can help the person feel peaceful during the last part of his life  DISCHARGE INSTRUCTIONS:   Follow up with your healthcare provider as directed:  Write down your questions so you remember to ask them during your visits  Care for the person:   · Keep the person company:  Always be willing to listen to the person, especially if he talks about his life  Do things together, such as watching television and reading books, or just sit with him  · Make the person comfortable:  Help the person manage stress, and be relaxed and comfortable  Work together with people the person can trust to help him get through the hard times  · Keep the person involved: Many people want to be included in their treatment and care plan  Avoid holding back information that you think the person should not or does not want to hear  · Be respectful:  Respect the person's feelings and need for privacy  Let him express fears and concerns about dying, such as leaving family and friends behind  Reassure him that you will follow and honor his wishes  · Help the person create an advance directive:  Advance directives are spoken or written legal and medical care instructions  They are made by the person in case he becomes unable to decide for himself later  Examples include living gilbert, organ donation, and CPR attempts  Without advance directives, someone who has permission will make these decisions for the person  Signs and symptoms that may occur as a person is dying:   · Energy:  As a person nears death, his body changes the way it uses energy   He may stop talking or responding, and begin sleeping more and more  He may also have pain, weakness, or fatigue  He may not want to eat or drink as his body shuts down  · Body temperature: The person's body may become cool to the touch because of the decreasing blood flow in his body  · Breathing patterns:  Irregular breathing patterns often change as the body stops working  There may be periods of rapid, shallow breathing with longer gaps between breaths  · Confusion or disorientation:  These may be caused by decreased oxygen in the brain, chemical changes in the body, or medicines  The person may start having problems thinking clearly or seem confused  · Skin color:  Poor blood flow to the skin may cause it to look dull, darker, or mottled (blue and blotchy)  This may be most noticeable on his hands and feet  · Coma: This happens when a person cannot be awakened or aroused from a deep sleep  Coma may lasts from minutes to hours before death occurs  After death occurs:   · All the remaining tubes, IV lines, and other equipment will be removed  · Family members may want clothing that was removed or cut away during resuscitation (reviving) efforts  · A spiritual counselor may do a Adventist ritual depending on the person's or his family's wishes  · Ask if you can spend time with the body  · If the person or family agreed on organ donation, certain body organs or tissue may be taken for donation  · The body may be bathed or dressed  The  home may be called when the family is ready to have the body moved  For support and more information:   · [facility]  [facility], [address] [address]  [place] , [address] [place] [address]  Phone: [phone]  Web Address: [email or web address]    Contact the person's healthcare provider if:   · The person has new signs and symptoms since his last appointment  · The person has questions or concerns about his condition or care      Seek care immediately or call 911 if:   · The person asks you to bring him to his healthcare provider or nearest hospital     · The person feels like hurting himself or someone else  · The person feels pain that is not relieved with pain medicines  · The person feels that he cannot cope with his condition  © Copyright [employer] 2021 Information is for End User's use only and may not be sold, redistributed or otherwise used for commercial purposes  All illustrations and images included in CareNotes® are the copyrighted property of [employer]  or [facility] Nathanael Alcala   The above information is an  only  It is not intended as medical advice for individual conditions or treatments  Talk to your doctor, nurse or pharmacist before following any medical regimen to see if it is safe and effective for you

## 2021-09-17 NOTE — DISCHARGE SUMMARY
[facility]  Discharge- Indra Jackson 1940, 78 y o  male MRN: [id number]  [facility] [address] [address] [id number] Encounter: [id number]  Primary Care Provider: Radha Whitley DO   Date and time admitted to hospital: 8/19/2021  8:25 PM    Admitting Provider:  Aquiles Hendricks MD  Discharge Provider:  Yesica Sharma DO  Admission Date: 8/19/2021       Discharge Date: 09/17/21   LOS: 29  Primary Care Physician at Discharge: Radha Whitley, [address] [place] [address] COURSE:  Indra Jackson is a 78 y o  male who presented with shortness of breath and chest pain  He was found to be hypoxic in the emergency room and was initially placed on BiPAP and started on antibiotics for community associated pneumonia  He was subsequently admitted to the intensive care unit  Patient was subsequently intubated, he had a prolonged intensive care unit stay and subsequently was extubated  He remained on the general medical floor, he received intravenous diuresis as well as intravenous corticosteroids  However despite all aggressive medical management he continued to deteriorate  He was unable to weaned off oxygen  He was evaluated in consultation by the pulmonary service as well as Cardiology  Due to in overwhelming high burden of disease pathology the patient was subsequently transition to hospice services  Patient family members were contacted at discussions were ongoing with hospice liaison as well as palliative care  At the time of discharge the patient was tolerating oral diet is without acute complaint and medically cleared for discharge  All questions were answered the patient's satisfaction was agreement discharge      At the time of discharge the patient was comfortable, he had been weaned to 10 L      DISCHARGE DIAGNOSES  Insomnia due to medical condition  Assessment & Plan  Continue frequent reorientation   Had geriatrics consultation earlier in the admission    Ambulatory dysfunction  Assessment & Plan  PT OT evals -   If recovers from pulmonary process    Acute metabolic encephalopathy  Assessment & Plan  Waxing and waning  In the setting of acute illness, more alert today  Likely decreased cerebral perfusion in the setting of hypoxia    Hypernatremia  Assessment & Plan  Due to free water deficit and dehydration    Close monitor, has normalized   Encourage free water intake    Recent Labs     09/15/21  0558 09/16/21  0449   SODIUM 132* 136             Atrial fibrillation ([facility] [address] )  Assessment & Plan  New onset atrial fibrillation with RVR  Currently on NSR   - Continue Eliquis 2 5mg BID  - Continue amiodarone daily    CANDIE (acute kidney injury) ([facility] [address] )  Assessment & Plan  Acute kidney injury requiring hemodialysis, could not tolerate hemodialysis  Resolved  Recent Labs     09/15/21  0558 09/16/21  0449   BUN 26* 27*   CREATININE 0 87 0 67   EGFR 80 89               Acute on chronic diastolic CHF (congestive heart failure) ([facility])  Assessment & Plan  Wt Readings from Last 3 Encounters:   09/17/21 74 1 kg (163 lb 5 8 oz)   07/26/21 83 5 kg (184 lb 2 oz)   03/19/21 82 8 kg (182 lb 9 6 oz)     Presented with acute on chronic diastolic heart failure exacerbation  BNP elevated  Re-dose intravenous furosemide  Weaned to 10 L    Multifocal pneumonia  Assessment & Plan  Completed antibiotic therapy  Was restarted on antibiotics due to concerns for aspiration pneumonia  As per previous providers discussion with ID, likely due to aspiration pneumonitis  Continue monitoring off antibiotics  Video swallow reviewed with speech, placed on dysphagia 1 and thin no evidence of aspiration  CT chest (9/14) Groundglass opacities  Hydralazine discontinued    Mild cognitive impairment  Assessment & Plan  Delirium precautions  Appreciate geriatric recommendations      Aortic stenosis, moderate  Assessment & Plan  Patient with moderate aortic stenosis, likely contributing to volume overload  His respiratory status remains tenuous  BNP elevated    Essential hypertension  Assessment & Plan  Controlled  Discontinue hydralazine 25mg TID, to allow for IV diuresis  On isosorbide 10mg tid, and metoprolol 12 5mg Q12  * Acute respiratory failure with hypoxia (HCC)  Assessment & Plan  Acute hypoxic respiratory failure, poa, a/e/b tachypnea, RR 24-27, sob and hypoxia (desaturation levels not recorded) requiring BIPAP Possibly due to vascular congestion / multifocal pneumonia  Required ICU level of care / intubation  Completed antibiotic therapy  S/p extubation and clinically improved until  where he decompensated, requiring 15L, down to 10L midflow but now back on 15L Midflow  IV furosemide given again    Patient with no improvement in 27 days despite aggressive treatment and manage  Based upon current radiographs, clinical deterioration - high likelihood of death within the next 72 hours-96 hours  Hospice consultation placed - will be transition to inpatient hospice        [address]  IP CONSULT TO NEPHROLOGY  INPATIENT CONSULT TO IR  IP CONSULT TO INFECTIOUS DISEASES  INPATIENT CONSULT TO IR  IP CONSULT TO GERONTOLOGY  IP CONSULT TO NEUROLOGY  IP CONSULT TO PALLIATIVE CARE  IP CONSULT TO PASTORAL CARE  IP CONSULT TO HOSPICE    PROCEDURES PERFORMED  * No surgery found *    RADIOLOGY RESULTS  Echo complete with contrast if indicated    Result Date: 2021  Narrative: [address]   [place], [address] [phone] Transthoracic Echocardiogram 2D, M-mode, Doppler, and Color Doppler Study date:  20-Aug-2021 Patient: Francisco Queen MR number: [id number] Account number: [de-identified] : 1940 Age: 78 years Gender: Male Status: Inpatient Location: Bedside Height: 64 in Weight: 187 7 lb BP: 160/ 73 mmHg Indications: Heart failure Diagnoses: I50 9 - Heart failure, unspecified Sonographer:  Darling Knight RDCS Primary Physician:  Nevelyn Holstein, DO Referring Physician:  ANATOLY Prescott Group:  [facility] Interpreting Physician:  Kendra Ellis DO SUMMARY LEFT VENTRICLE: Systolic function was at the lower limits of normal  Ejection fraction was estimated to be 50 %  There was mild to moderate hypokinesis of the mid-apical septal myocardial wall segments  Wall thickness was mildly increased  Features were likely suggestive of a pseudonormal left ventricular filling pattern, with concomitant abnormal relaxation and increased filling pressure (grade 2 diastolic dysfunction)  LEFT ATRIUM: The atrium was mildly dilated  MITRAL VALVE: There was moderate annular calcification  There was mild stenosis (mean diastolic transvalvular gradient ~ 3 3 mmHg at HR 86 BPM)  There was mild regurgitation  AORTIC VALVE: The valve was trileaflet  Leaflets exhibited markedly increased thickness, marked calcification, markedly reduced cuspal separation, reduced mobility, and sclerosis  There was moderate stenosis  There was mild regurgitation  The peak valve velocity was 3 3 cm/s  Valve mean gradient was 22 mmHg  The aortic valve obstructive index (by VTI) was 0 29  Difficult to accurately estimate aortic valve area due to suboptimal LVOT visualization and measurements  TRICUSPID VALVE: There was mild regurgitation  AORTA: The root exhibited mild dilatation (4 1 cm [2 1 cm/m2] at the sinuses of valsalva), and mild fibrocalcific change  SUMMARY MEASUREMENTS 2D measurements: Unspecified Anatomy:   %FS was 23 9 %  Ao Diam was 3 9 cm  Ao asc was 3 4 cm   EDV(Teich) was 110 5 ml   EF(Teich) was 47 6 %  ESV(Teich) was 57 9 ml  IVSd was 1 2 cm  LA Diam was 4 5 cm  LAAs A2C was 27 1 cm2  LAAs A4C was 26 2 cm2  LAESV A-L A2C was 101 1 ml  LAESV A-L A4C was 92 8 ml  LAESV Index (A-L) was 51 1 ml/m2  LAESV MOD A2C was 98 2 ml  LAESV MOD A4C was 86 6 ml  LAESV(A-L) was 97 5 ml  LAESV(MOD BP) was 92 3 ml  LAESVInd MOD BP was 48 4 ml/m2  LALs A2C was 6 2 cm  LALs A4C was 6 3 cm  LVEDV MOD A4C was 195 6 ml  LVEF MOD A4C was 42 1 %  LVESV MOD A4C was 113 2 ml  LVIDd was 4 9 cm  LVIDs was 3 7 cm  LVLd A4C was 10 6 cm  LVLs A4C was 9 6 cm  LVOT Diam was 2 6 cm  LVPWd was 0 9 cm  RAAs A4C was 21 1 cm2  RAESV A-L was 66 8 ml  RAESV MOD was 65 4 ml  RALs was 5 7 cm  RVIDd was 4 7 cm  RWT was 0 4   SV MOD A4C was 82 4 ml   SV(Teich) was 52 6 ml  CW measurements: Unspecified Anatomy:   AV Env  Ti was 267 9 ms   AV VTI was 59 6 cm   AV Vmax was 2 9 m/s  AV Vmean was 2 2 m/s  AV maxPG was 34 5 mmHg  AV meanPG was 21 6 mmHg  MV VTI was 35 cm  MV Vmax was 1 2 m/s  MV Vmean was 0 9 m/s  MV maxPG was 6 mmHg  MV meanPG was 3 4 mmHg  TR Vmax was 3 2 m/s   TR maxPG was 40 mmHg  MM measurements: Unspecified Anatomy:   TAPSE was 2 4 cm  PW measurements: Unspecified Anatomy:   GREGORY (VTI) was 1 7 cm2  GREGORY Vmax was 1 8 cm2  AVAI (VTI) was 0 cm2/m2  AVAI Vmax was 0 cm2/m2  DVI was 0 3   E' Sept was 0 m/s  E/E' Sept was 31 6   LVOT Env  Ti was 308 3 ms  LVOT VTI was 20 3 cm  LVOT Vmax was 1 m/s  LVOT Vmean was 0 7 m/s  LVOT maxPG was 4 2 mmHg  LVOT meanPG was 2 1 mmHg  LVSI Dopp was 54 5 ml/m2  LVSV Dopp was 104 1 ml   MV A David was 1 2 m/s  MV Dec [place] was 7 1 m/s2  MV DecT was 204 7 ms   MV E David was 1 4 m/s  MV E/A Ratio was 1 2   MV PHT was 59 4 ms  MVA (VTI) was 3 cm2  MVA By PHT was 3 7 cm2  HISTORY: PRIOR HISTORY: HTN; AS; CP; HLD; PVC; Acute respiratory failure with hypoxia; CHF; Obesity; Depression PROCEDURE: The procedure was performed at the bedside  This was a routine study  The transthoracic approach was used  The study included complete 2D imaging, M-mode, complete spectral Doppler, and color Doppler  The heart rate was 98 bpm, at the start of the study   Images were obtained from the parasternal, apical, subcostal, and suprasternal notch acoustic windows  Echocardiographic views were limited due to poor acoustic window availability  This was a technically difficult study  LEFT VENTRICLE: Size was normal  Systolic function was at the lower limits of normal  Ejection fraction was estimated to be 50 %  There was mild to moderate hypokinesis of the mid-apical septal myocardial wall segments  Wall thickness was mildly increased  DOPPLER: Features were likely suggestive of a pseudonormal left ventricular filling pattern, with concomitant abnormal relaxation and increased filling pressure (grade 2 diastolic dysfunction)  RIGHT VENTRICLE: The size was normal  Systolic function was normal  Wall thickness was normal  LEFT ATRIUM: The atrium was mildly dilated  RIGHT ATRIUM: Size was normal  MITRAL VALVE: There was moderate annular calcification  DOPPLER: There was mild stenosis (mean diastolic transvalvular gradient ~ 3 3 mmHg at HR 86 BPM)  There was mild regurgitation  AORTIC VALVE: The valve was trileaflet  Leaflets exhibited markedly increased thickness, marked calcification, markedly reduced cuspal separation, reduced mobility, and sclerosis  DOPPLER: There was moderate stenosis  There was mild regurgitation  TRICUSPID VALVE: Not well visualized  DOPPLER: There was mild regurgitation  PULMONIC VALVE: Not well visualized  PERICARDIUM: There was no pericardial effusion  The pericardium was normal in appearance  AORTA: The root exhibited mild dilatation (4 1 cm [2 1 cm/m2] at the sinuses of valsalva), and mild fibrocalcific change  SYSTEMIC VEINS: IVC: The inferior vena cava was not well visualized   MEASUREMENT TABLES DOPPLER MEASUREMENTS Aortic valve   (Reference normals) Peak brandi   3 3 cm/s   (--) Mean gradient   22 mmHg   (--) Obstr index, VTI   0 29    (--) SYSTEM MEASUREMENT TABLES 2D %FS: 23 9 % Ao Diam: 3 9 cm Ao asc: 3 4 cm EDV(Teich): 110 5 ml EF(Teich): 47 6 % ESV(Teich): 57 9 ml IVSd: 1 2 cm LA Diam: 4 5 cm LAAs A2C: 27 1 cm2 LAAs A4C: 26 2 cm2 LAESV A-L A2C: 101 1 ml LAESV A-L A4C: 92 8 ml LAESV Index (A-L): 51 1 ml/m2 LAESV MOD A2C: 98 2 ml LAESV MOD A4C: 86 6 ml LAESV(A-L): 97 5 ml LAESV(MOD BP): 92 3 ml LAESVInd MOD BP: 48 4 ml/m2 LALs A2C: 6 2 cm LALs A4C: 6 3 cm LVEDV MOD A4C: 195 6 ml LVEF MOD A4C: 42 1 % LVESV MOD A4C: 113 2 ml LVIDd: 4 9 cm LVIDs: 3 7 cm LVLd A4C: 10 6 cm LVLs A4C: 9 6 cm LVOT Diam: 2 6 cm LVPWd: 0 9 cm RAAs A4C: 21 1 cm2 RAESV A-L: 66 8 ml RAESV MOD: 65 4 ml RALs: 5 7 cm RVIDd: 4 7 cm RWT: 0 4 SV MOD A4C: 82 4 ml SV(Teich): 52 6 ml CW AV Env  Ti: 267 9 ms AV VTI: 59 6 cm AV Vmax: 2 9 m/s AV Vmean: 2 2 m/s AV maxP 5 mmHg AV meanP 6 mmHg MV VTI: 35 cm MV Vmax: 1 2 m/s MV Vmean: 0 9 m/s MV maxP mmHg MV meanPG: 3 4 mmHg TR Vmax: 3 2 m/s TR maxP mmHg MM TAPSE: 2 4 cm PW GREGORY (VTI): 1 7 cm2 GREGORY Vmax: 1 8 cm2 AVAI (VTI): 0 cm2/m2 AVAI Vmax: 0 cm2/m2 DVI: 0 3 E' Sept: 0 m/s E/E' Sept: 31 6 LVOT Env  Ti: 308 3 ms LVOT VTI: 20 3 cm LVOT Vmax: 1 m/s LVOT Vmean: 0 7 m/s LVOT maxP 2 mmHg LVOT meanP 1 mmHg LVSI Dopp: 54 5 ml/m2 LVSV Dopp: 104 1 ml MV A David: 1 2 m/s MV Dec [place]: 7 1 m/s2 MV DecT: 204 7 ms MV E David: 1 4 m/s MV E/A Ratio: 1 2 MV PHT: 59 4 ms MVA (VTI): 3 cm2 MVA By PHT: 3 7 cm2 [employer] [facility]redited [facility] Prepared and electronically signed by Inez Chowdhury DO Signed 20-Aug-2021 14:28:06     CT chest abdomen pelvis wo contrast    Result Date: 2021  Narrative: CT CHEST, ABDOMEN AND PELVIS WITHOUT IV CONTRAST INDICATION:   Sepsis leukocytosis  recent pneumonia  abd pain    COMPARISON:  Chest CT 2/10/2017  CT chest abdomen pelvis 2016  TECHNIQUE: CT examination of the chest, abdomen and pelvis was performed without intravenous contrast   Axial, sagittal, and coronal 2D reformatted images were created from the source data and submitted for interpretation  Radiation dose length product (DLP) for this visit:  1424 mGy-cm     This examination, like all CT scans performed in the [facility], was performed utilizing techniques to minimize radiation dose exposure, including the use of iterative reconstruction and automated exposure control  Enteric contrast was not administered  FINDINGS: CHEST LUNGS:  Study limited by respiratory motion artifact  Peripheral groundglass opacities throughout both lungs  There is no tracheal or endobronchial lesion  PLEURA:  Unremarkable  HEART/GREAT VESSELS:  Top normal heart size  Extensive coronary artery calcifications  Normal caliber thoracic aorta with mild atherosclerotic calcifications  MEDIASTINUM AND LUPE:  Unremarkable  CHEST WALL AND LOWER NECK:   Unremarkable  ABDOMEN Absence of intravenous contrast enhancement limits evaluation of the solid abdominal viscera  Additionally, evaluation is limited by patient motion artifact  LIVER/BILIARY TREE:  Unremarkable  GALLBLADDER:  No calcified gallstones  No pericholecystic inflammatory change  SPLEEN:  Unremarkable  PANCREAS:  Unremarkable  ADRENAL GLANDS:  Unremarkable  KIDNEYS/URETERS:  Unremarkable  No hydronephrosis  STOMACH AND BOWEL:  No obstruction or acute inflammatory changes  Fluid throughout the small bowel and colon, which may reflect nonspecific gastroenteritis  There is colonic diverticulosis without evidence of acute diverticulitis  APPENDIX:  No findings to suggest appendicitis  ABDOMINOPELVIC CAVITY:  No ascites  No pneumoperitoneum  No lymphadenopathy  Stable calcified periportal lymph nodes noted  VESSELS:  Unremarkable for patient's age  PELVIS REPRODUCTIVE ORGANS:  Unremarkable for patient's age  URINARY BLADDER:  Unremarkable  ABDOMINAL WALL/INGUINAL REGIONS:  Focal infiltration of the subcutaneous fat in the lower left anterior pelvic wall suggestive of hematoma  OSSEOUS STRUCTURES:  No acute fracture or destructive osseous lesion  Chronic moderate wedge deformity of L1       Impression: Limited study due to motion artifact  Extensive groundglass opacities throughout both lungs, compatible with nonspecific infectious process, including COVID 19 pneumonia  Fluid-filled small bowel and colon which may represent nonspecific gastroenteritis  Colonic diverticulosis without diverticulitis  Workstation performed: [id number]     XR chest portable    Result Date: 9/14/2021  Narrative: CHEST INDICATION:   Worsening Hypoxia  COMPARISON:  09/09/2021 EXAM PERFORMED/VIEWS:  XR CHEST PORTABLE 1 image FINDINGS: Cardiomediastinal silhouette appears enlarged  Diffuse abnormal airspace disease  This appears slightly worse compared to the prior study  Underlying chronic changes present  No pneumothorax or pleural effusion  Osseous structures appear within normal limits for patient age  Degenerative changes in the left shoulder joint  Impression: Diffuse abnormal airspace disease concerning for worsening pneumonia  Workstation performed: [id number]     XR chest portable    Result Date: 9/10/2021  Narrative: CHEST INDICATION:   sob  COMPARISON:  9/1/2021; CT from 9/1/2021; 8/21/2021; 2/10/2017; 5/23/2018 EXAM PERFORMED/VIEWS:  XR CHEST PORTABLE FINDINGS:  Hypoventilation is noted  Cardiomediastinal silhouette appears unremarkable  There is fairly diffuse but heterogeneous mixed groundglass and airspace opacities  This is slightly worse than the prior examination  No pneumothorax or pleural effusion  Osseous structures appear within normal limits for patient age  Impression: Findings are consistent with diffuse pneumonia; Covid 19 is a possibility in the proper clinical context  Asymmetric edema is also a consideration  Workstation performed: [id number]     XR chest portable    Result Date: 9/1/2021  Narrative: CHEST INDICATION:  Leukocytosis, aspiration risk  COMPARISON:  8/29/2021, CT chest 2/10/2017 EXAM PERFORMED/VIEWS:  XR CHEST PORTABLE FINDINGS: Cardiomediastinal silhouette appears stable   Patchy bilateral airspace disease, similar  No pneumothorax or pleural effusion  Slight elevation of the left diaphragm  Degenerative changes of the spine and left shoulder  Impression: Stable patchy bilateral airspace disease  Workstation performed: [id number]     XR chest portable    Result Date: 8/25/2021  Narrative: CHEST INDICATION:   VDRF  COMPARISON:  8/24/2021 EXAM PERFORMED/VIEWS:  XR CHEST PORTABLE Single view FINDINGS: Persistent bilateral infiltrates suspicious for multifocal pneumonia Endotracheal tube is again evident terminating approximately 4 cm above the brent  Typical enteric tube terminates at the proximal duodenum  Gaseous distention of the stomach has developed Persistent cardiomegaly No pneumothorax or pleural effusion  Osseous structures appear within normal limits for patient age  Impression: Persistent bilateral infiltrates suspicious for multifocal pneumonia Typical endotracheal tube and enteric tube Increased gastric distention Workstation performed: [id number]     XR chest 1 view portable    Result Date: 8/20/2021  Narrative: CHEST INDICATION:   Respiratory Distress  COMPARISON:  Chest x-ray 3/23/2018 EXAM PERFORMED/VIEWS:  XR CHEST PORTABLE FINDINGS: Cardiac silhouette appears enlarged, stable  Diffuse patchy airspace disease bilaterally  No pneumothorax or pleural effusion  Degenerative spurring at both shoulders  Impression: Diffuse patchy airspace disease bilaterally  Consider CHF versus multifocal infiltrate  Findings concur with the preliminary ER interpretation  Workstation performed: [id number]     CT head wo contrast    Result Date: 9/7/2021  Narrative: CT BRAIN - WITHOUT CONTRAST INDICATION:   Altered mental status acute enceph  COMPARISON:  CT from February 19, 2021 TECHNIQUE:  CT examination of the brain was performed  In addition to axial images, sagittal and coronal 2D reformatted images were created and submitted for interpretation   Radiation dose length product (DLP) for this visit:  900 mGy-cm   This examination, like all CT scans performed in the [facility], was performed utilizing techniques to minimize radiation dose exposure, including the use of iterative reconstruction and automated exposure control  IMAGE QUALITY:  Diagnostic  FINDINGS: PARENCHYMA:  No intracranial mass, mass effect or midline shift  No CT signs of acute infarction  No acute parenchymal hemorrhage  VENTRICLES AND EXTRA-AXIAL SPACES:  Ventricles and extra-axial CSF spaces are prominent commensurate with the degree of volume loss  No hydrocephalus  No acute extra-axial hemorrhage  VISUALIZED ORBITS AND PARANASAL SINUSES:  Orbits unremarkable  Chronic opacification of the right maxillary sinus  Retention cysts or polyps in the left maxillary sinus and right frontal sinus, unchanged since 2/19/2021  CALVARIUM AND EXTRACRANIAL SOFT TISSUES:  Normal      Impression: No acute intracranial abnormality  Workstation performed: [id number]     CT chest wo contrast    Result Date: 9/14/2021  Narrative: CT CHEST WITHOUT IV CONTRAST INDICATION:   shortness of breath, ? RLL infiltrate  COMPARISON:  9/10/2021 TECHNIQUE: CT examination of the chest was performed without intravenous contrast   Axial, sagittal, and coronal 2D reformatted images were created from the source data and submitted for interpretation  Radiation dose length product (DLP) for this visit:  365 mGy-cm   This examination, like all CT scans performed in the [facility], was performed utilizing techniques to minimize radiation dose exposure, including the use of iterative reconstruction and automated exposure control  FINDINGS: Motion mildly degrades images  LUNGS:  Similar low lung volumes and bilateral, symmetric, upper lobe and peripheral predominant groundglass airspace opacities with upper lobe air bronchograms, interlobular septal thickening and peripheral-basilar interstitial changes   Similar relative sparing/lucency in the right upper lobe anteriorly with mild focal hyperexpansion, suspicious for air trapping  Patent airways  No new consolidation in the right lower lobe  PLEURA:  No effusions  HEART/GREAT VESSELS:  Similar cardiomegaly, mitral and aortic annular calcification and atherosclerosis  MEDIASTINUM AND LUPE:  No enlarged lymph nodes  High density in the distal esophagus dependently not present on the prior study, likely residual oral contrast from today's barium swallow exam   Mild hiatal hernia  CHEST WALL AND LOWER NECK:   No acute findings  VISUALIZED STRUCTURES IN THE UPPER ABDOMEN:  No acute findings  OSSEOUS STRUCTURES:  No acute findings  Similar Schmorl's node inferior endplate J95 causing mild loss of height  No acute change compared to the prior study  Impression: Similar appearance of the chest to prior study from 4 days ago  Bilateral, symmetric airspace disease  Detailed description above  No new right lower lobe airspace disease  Workstation performed: [id number]     CT chest wo contrast    Result Date: 9/10/2021  Narrative: CT CHEST WITHOUT IV CONTRAST INDICATION:   Shortness of breath sob  Negative coronavirus test as of today COMPARISON:  9/1/2021; CT from 2/10/2017 TECHNIQUE: CT examination of the chest was performed without intravenous contrast   Axial, sagittal, and coronal 2D reformatted images were created from the source data and submitted for interpretation  Some respiratory artifacts limit the study  Radiation dose length product (DLP) for this visit:  247 mGy-cm   This examination, like all CT scans performed in the [facility], was performed utilizing techniques to minimize radiation dose exposure, including the use of iterative reconstruction and automated exposure control   FINDINGS: LUNGS:  The lungs demonstrate mixed groundglass and reticular opacity both centrally and peripherally somewhat sparing the right upper lobe as compared to other areas   The appearance has progressed since the prior study with more opacities noted at the lung apices bilaterally and with somewhat denser and confluent opacity seen in the lower lobes  Dense consolidation is still not present however  Reticular opacities suggest the possibility of underlying more chronic changes although chest x-ray from 2018 did not show significant findings reported CT from 2017  There is no tracheal or endobronchial lesion  PLEURA:  Unremarkable  HEART/GREAT VESSELS:  Cardiomegaly is present with coronary calcifications  MEDIASTINUM AND LUPE:  Small lymph nodes are identified  CHEST WALL AND LOWER NECK:   Unremarkable  VISUALIZED STRUCTURES IN THE UPPER ABDOMEN:  Peripancreatic calcifications are noted possibly within lymph nodes versus vascular calcifications  Small hiatus hernia is present  OSSEOUS STRUCTURES:  No acute fracture or destructive osseous lesion  Impression: Worsening groundglass opacities suggest worsening pneumonia  Other causes of airspace disease would include hypersensitivity reaction, acute inhalational disorder or atypical infection  This pattern would be atypical for edema  No pleural effusion is seen  The study was marked in EPIC for significant notification  Workstation performed: [id number]     FL barium swallow video w speech    Result Date: 9/14/2021  Narrative: A video barium swallow study was performed by the Department of Speech Pathology  Please refer to the report for the official interpretation  The images are stored for archival purposes only  Study images were not formally reviewed by the Radiology Department  Bronchoscopy    Result Date: 8/22/2021  Narrative: [id number] [address] [address] [phone] DATE OF SERVICE: 8/22/21 PHYSICIAN(S): Dr Yessenia Douglas: Acute respiratory failure ([address] ) POST-OP DIAGNOSIS: See the impression below   PREPROCEDURE: Standard airway preparation completed per respiratory therapy protocol  Informed consent was obtained  Images reviewed prior to the procedure  A Time Out was performed  No suspicion or identified risk for TB or other airborne infectious disease; bronchoscopy procedure being performed for diagnostic purposes  DETAILS OF PROCEDURE: Patient was taken to the procedure room where a time out was performed to confirm correct patient and correct procedure  The patient was already under sedation prior to the procedure  The patient's blood pressure, heart rate, level of consciousness, oxygen and respirations were monitored throughout the procedure  The patient experienced no blood loss  The scope was introduced through the endotracheal tube  The procedure was not difficult  The patient tolerated the procedure well  There were complications related or due to hypoxia  ANESTHESIA INFORMATION: ASA: ASA status not filed in the log  Anesthesia Type: Anesthesia type not filed in the log  FINDINGS: The upper trachea, middle trachea, lower trachea, main brent, left main stem, left upper lobar bronchus, lingular lobar bronchus, left lower lobar bronchus, right main stem, RUL, bronchus intermedius, RML and RLL appeared normal Minimal, thick and bloody secretions present in the left main stem, left upper lobar bronchus, lingular lobar bronchus, left lower lobar bronchus, right main stem, right upper lobar bronchus, bronchus intermedius, right middle lobar bronchus and right lower lobar bronchus; performed washing using 40 mL in total; secretions were easily removed by therapeutic aspiration and the airway was cleared Bronchoalveolar lavage was performed x2 in the RML with 120 mL of saline instilled and a total return of 40 mL; the fluid appeared clear Bronchoalveolar lavage was performed x2 in the JAYSON with 120 mL of saline instilled and a total return of 35 mL; the fluid appeared clear SPECIMENS: RML BAL JAYSON BAL Tracheal washing      Impression: Cont   Vent support RECOMMENDATION: Follow results  Echo limited with contrast if indicated    Result Date: 2021  Narrative: Letty [address]  [place], [address] [phone] Transthoracic Echocardiogram Limited 2D, M-mode, Doppler, and Color Doppler Study date:  08-Sep-2021 Patient: Severo Tolentino MR number: [id number] Account number: [de-identified] : 1940 Age: 78 years Gender: Male Status: Inpatient Location: Bedside Height: 64 in Weight: 181 7 lb BP: 118/ 60 mmHg Indications: Follow up aortic stenosis  Follow up aortic insufficiency  Diagnoses: I35 2 - Nonrheumatic aortic (valve) stenosis with insufficiency Sonographer:  MELCHOR Sagastume Primary Physician:  Markos Urena DO Referring Physician:  WILI Robles  Group:  [facility] Interpreting Physician:  WIIL Robles  SUMMARY LEFT VENTRICLE: Systolic function was normal by visual assessment  Ejection fraction was estimated to be 55 %  This study was inadequate for the evaluation of regional wall motion  MITRAL VALVE: There was mild to moderate annular calcification  There was likely progressive mitral stenosis, partially caused by the aortic insufficiency jet causing decreased diastolic excursion of the anterior mitral leaflet  Mean gradient across the mitral valve was measured to be around 3 mmHg at a heart rate of 72 beats per minute  There was mild regurgitation  AORTIC VALVE: The valve was trileaflet  Leaflets exhibited normal thickness, marked calcification, and markedly reduced cuspal separation  There was moderate stenosis  Peak velocity 2 76 m/sec, mean gradient 18 mmHg, aortic valve area by Doppler continuity equation 1 3 cm2, aortic valve area indexed to body surface area 0 672 cm2/meter squared, LVOT diameter 2 1 cm, LVOT VTI 22 6 cm, stroke volume index 43 55 milliliters/meter squared  There was moderate regurgitation  TRICUSPID VALVE: There was mild regurgitation  AORTA: The root exhibited mild dilatation to 4 0 cm  SUMMARY MEASUREMENTS 2D measurements: Unspecified Anatomy: Ao Diam was 4 cm  LVEDV MOD A4C was 170 4 ml  LVEF MOD A4C was 50 9 %  LVESV MOD A4C was 83 7 ml  LVLd A4C was 9 3 cm  LVLs A4C was 8 3 cm  LVOT Diam was 2 1 cm  SV MOD A4C was 86 7 ml  CW measurements: Unspecified Anatomy:   AR Dec Hancock was 3 9 m/s2  AR Dec Time was 989 ms  AR PHT was 286 8 ms  AR Vmax was 3 8 m/s  AR maxPG was 58 4 mmHg  AV Env  Ti was 294 ms  AV VTI was 56 cm  AV Vmax was 2 6 m/s  AV Vmean was 1 9 m/s  AV maxPG was 27 6 mmHg  AV meanPG was 16 mmHg  TR Vmax was 2 7 m/s  TR maxPG was 28 6 mmHg  PW measurements: Unspecified Anatomy:   GREGORY (VTI) was 1 5 cm2  GREGORY Vmax was 1 3 cm2  AVAI (VTI) was 0 cm2/m2  AVAI Vmax was 0 cm2/m2  DVI was 0 4   LVOT Env  Ti was 319 7 ms  LVOT VTI was 22 6 cm  LVOT Vmax was 1 m/s  LVOT Vmean was 0 7 m/s  LVOT maxPG was 3 7 mmHg  LVOT meanPG was 2 2 mmHg  LVSI Dopp was 43 6 ml/m2  LVSV Dopp was 81 9 ml  HISTORY: PRIOR HISTORY: Congestive heart failure  Atrial fibrillation  Risk factors: renal failure  History of moderate stenosis of the aortic valve  PROCEDURE: The procedure was performed at the bedside  This was a routine study  The transthoracic approach was used  The study included limited 2D imaging, M-mode, limited spectral Doppler, and color Doppler  The heart rate was 75 bpm, at the start of the study  Image quality was adequate  LEFT VENTRICLE: Size was normal  Systolic function was normal by visual assessment  Ejection fraction was estimated to be 55 %  This study was inadequate for the evaluation of regional wall motion  Wall thickness was normal  RIGHT VENTRICLE: The size was normal  Systolic function was normal  Wall thickness was normal  MITRAL VALVE: There was mild to moderate annular calcification  Valve structure was normal  There was mild calcification of the anterior and posterior leaflets   There was normal leaflet separation  DOPPLER: The transmitral velocity was within the normal range  There was likely progressive mitral stenosis, partially caused by the aortic insufficiency jet causing decreased diastolic excursion of the anterior mitral leaflet  Mean gradient across the mitral valve was measured to be around 3 mmHg at a heart rate of 72 beats per minute  There was mild regurgitation  AORTIC VALVE: The valve was trileaflet  Leaflets exhibited normal thickness, marked calcification, and markedly reduced cuspal separation  DOPPLER: There was moderate stenosis  Peak velocity 2 76 m/sec, mean gradient 18 mmHg, aortic valve area by Doppler continuity equation 1 3 cm2, aortic valve area indexed to body surface area 0 672 cm2/meter squared, LVOT diameter 2 1 cm, LVOT VTI 22 6 cm, stroke volume index 43 55 milliliters/meter squared  There was moderate regurgitation  TRICUSPID VALVE: The valve structure was normal  There was normal leaflet separation  DOPPLER: The transtricuspid velocity was within the normal range  There was no evidence for stenosis  There was mild regurgitation  PULMONIC VALVE: Not well visualized  PERICARDIUM: There was no pericardial effusion  The pericardium was normal in appearance  AORTA: The root exhibited mild dilatation to 4 0 cm  SYSTEM MEASUREMENT TABLES 2D Ao Diam: 4 cm LVEDV MOD A4C: 170 4 ml LVEF MOD A4C: 50 9 % LVESV MOD A4C: 83 7 ml LVLd A4C: 9 3 cm LVLs A4C: 8 3 cm LVOT Diam: 2 1 cm SV MOD A4C: 86 7 ml CW AR Dec Isabela: 3 9 m/s2 AR Dec Time: 989 ms AR PHT: 286 8 ms AR Vmax: 3 8 m/s AR maxP 4 mmHg AV Env  Ti: 294 ms AV VTI: 56 cm AV Vmax: 2 6 m/s AV Vmean: 1 9 m/s AV maxP 6 mmHg AV meanP mmHg TR Vmax: 2 7 m/s TR maxP 6 mmHg PW GREGORY (VTI): 1 5 cm2 GREGORY Vmax: 1 3 cm2 AVAI (VTI): 0 cm2/m2 AVAI Vmax: 0 cm2/m2 DVI: 0 4 LVOT Env  Ti: 319 7 ms LVOT VTI: 22 6 cm LVOT Vmax: 1 m/s LVOT Vmean: 0 7 m/s LVOT maxPG: 3 7 mmHg LVOT meanP 2 mmHg LVSI Dopp: 43 6 ml/m2 LVSV Dopp: 81 9 ml [address] [facility] Echocardi[facility] Laboratory Prepared and electronically signed by WILI Garcia  Signed 08-Sep-2021 15:23:43     Echo limited with contrast if indicated    Result Date: 2021  Narrative: [address] [place] [address] [facility]  [place], [address] [phone] Transthoracic Echocardiogram Limited 2D, Doppler, and Color Doppler Study date:  27-Aug-2021 Patient: Josefa Zeng MR number: [id number] Account number: [de-identified] : 1940 Age: 78 years Gender: Male Status: Inpatient Location: Bedside Height: 64 in Weight: 182 lb BP: 119/ 46 mmHg Indications: Heart failure Diagnoses: I50 9 - Heart failure, unspecified Sonographer:  Wyatt Seay RDCS Primary Physician:  Jewel Rosenberg DO Referring Physician:  Reggie Castillo MD Group:  [facility] Interpreting Physician:  Bernadine Richardson DO SUMMARY PROCEDURE INFORMATION: This was a technically difficult study  LEFT VENTRICLE: Systolic function was at the lower limits of normal  Ejection fraction was estimated to be 52 %  This study was inadequate for the evaluation of regional wall motion  Wall thickness was moderately increased  RIGHT VENTRICLE: Poorly visualized right ventricle with grossly normal systolic function  LEFT ATRIUM: The atrium was mildly dilated  MITRAL VALVE: There was moderate annular calcification  AORTIC VALVE: Leaflets exhibited moderately increased thickness, moderate calcification, reduced mobility, and sclerosis  There was probable moderate stenosis by suboptimal doppler study  There was probable mild regurgitation by suboptimal color doppler study  AORTA: The root exhibited mild fibrocalcific change  SUMMARY MEASUREMENTS 2D measurements: Unspecified Anatomy:   LVEDV MOD A4C was 79 5 ml  LVEF MOD A4C was 52 3 %  LVESV MOD A4C was 37 9 ml  LVLd A4C was 9 4 cm  LVLs A4C was 8 8 cm  SV MOD A4C was 41 6 ml   HISTORY: PRIOR HISTORY: HTN; AS; CP;CANDIE; HLD; PVC; GERD; Murmur; Respiratory Failure; Sepsis; Obesity; anxiety; PROCEDURE: The procedure was performed at the bedside  This was a routine study  The transthoracic approach was used  The study included limited 2D imaging, limited spectral Doppler, and color Doppler  The heart rate was 83 bpm, at the start of the study  Images were obtained from the parasternal, apical, subcostal, and suprasternal notch acoustic windows  Echocardiographic views were limited due to restricted patient mobility, poor acoustic window availability, and patient on mechanical ventilator  This was a technically difficult study  LEFT VENTRICLE: Size was normal  Systolic function was at the lower limits of normal  Ejection fraction was estimated to be 52 %  This study was inadequate for the evaluation of regional wall motion  Wall thickness was moderately increased  DOPPLER: The study was not technically sufficient to allow evaluation of LV diastolic function  RIGHT VENTRICLE: Poorly visualized right ventricle with grossly normal systolic function  LEFT ATRIUM: The atrium was mildly dilated  RIGHT ATRIUM: Size was normal  MITRAL VALVE: There was moderate annular calcification  AORTIC VALVE: Leaflets exhibited moderately increased thickness, moderate calcification, reduced mobility, and sclerosis  DOPPLER: There was probable moderate stenosis by suboptimal doppler study  There was probable mild regurgitation by suboptimal color doppler study  PULMONIC VALVE: Not well visualized  PERICARDIUM: There was no pericardial effusion  The pericardium was normal in appearance  AORTA: The root exhibited mild fibrocalcific change   SYSTEM MEASUREMENT TABLES 2D LVEDV MOD A4C: 79 5 ml LVEF MOD A4C: 52 3 % LVESV MOD A4C: 37 9 ml LVLd A4C: 9 4 cm LVLs A4C: 8 8 cm SV MOD A4C: 41 6 ml [employer]ters[employer] Accredited Echocardiography Laboratory Prepared and electronically signed by Jose R Adam DO Signed 27-Aug-2021 15:18:59     VAS NICK & waveform analysis, multiple levels    Result Date: 8/30/2021  Narrative:  [facility] REPORT CLINICAL: Indications:  Patient presents with cool left foot  Operative History: Appy Risk Factors The patient has history of HTN and Hyperlipidemia  He has no history of Obesity  Clinical Right Pressure:  130/ mm Hg, Left Pressure:  120/ mm Hg  FINDINGS:  Segment       Rig             Left                          P  W            P  W          Ant  Tibial   178  Triphasic  178  Triphasic  Post  Tibial  182  Triphasic  179  Triphasic  Ankle         182             179             Metatarsal    166             117             Great Toe     146             110                CONCLUSION: Impression RIGHT LOWER LIMB Ankle/Brachial Index: 1 40  which is in the unreliable range PPG/PVR Tracings are normal   Triphasic waveforms at the ankle  Metatarsal Pressure 166 mmHg Great Toe Pressure: 146 mmHg, within the healing range  LEFT LOWER LIMB Ankle/Brachial Index: 1 38 which is in the normal range PPG/PVR Tracings are normal   Triphasic waveforms at the ankle  Metatarsal Pressure 117 mmHg Great Toe Pressure: 110 mmHg, within the healing range  SIGNATURE: Electronically Signed by: Lakeshia Keating MD, RPVI on 2021-08-30 09:08:01 PM    XR chest portable ICU    Result Date: 8/30/2021  Narrative: CHEST INDICATION:   Follow-up pulmonary edema  COMPARISON:  Chest x-ray 8/27/2021 EXAM PERFORMED/VIEWS:  XR CHEST PORTABLE ICU FINDINGS:  ET tube has been removed since the prior exam  Heart size is not well evaluated on this single portable AP view  Patchy bilateral airspace disease appears stable  Low lung volumes  No pneumothorax or pleural effusion  Osseous structures appear within normal limits for patient age  Impression: Stable patchy bilateral airspace disease question related to pulmonary edema or infiltrate   Workstation performed: [id number]     XR chest portable ICU    Result Date: 8/27/2021  Narrative: CHEST INDICATION:  Acute respiratory failure with hypoxia, gastric distention  COMPARISON:  Chest radiograph earlier today, CT chest 2/10/2017 EXAM PERFORMED/VIEWS:  XR CHEST PORTABLE ICU Images: 2 FINDINGS: The patient is rotated to the right  ET tube tip terminates above the brent at the level of the clavicles  Enteric tube courses to the stomach, distal tip not clearly visualized  Cardiomediastinal silhouette appears stable  Bilateral airspace opacities, similar  No pneumothorax or pleural effusion  Paravertebral ossifications thoracic spine  Impression: No significant change in bilateral airspace opacities  Stable life-support tubes  Workstation performed: [id number]     XR chest portable ICU    Result Date: 8/26/2021  Narrative: CHEST INDICATION:   r/o pneumonia  COMPARISON:  Chest x-ray performed the previous day  EXAM PERFORMED/VIEWS:  XR CHEST PORTABLE ICU FINDINGS:  Lines and tubes are unchanged from prior exam  Heart shadow appears unremarkable  Atherosclerotic vascular calcifications are noted  Increasing right peripheral upper lung field consolidation  Improving aeration at both lung bases may indicate resolving atelectasis or improving pneumonia    Osseous structures appear within normal limits for patient age  Impression: Increasing right peripheral consolidation with improving aeration at the lung bases  Workstation performed: [id number]     XR chest portable ICU    Result Date: 8/24/2021  Narrative: CHEST INDICATION:   follow up  COMPARISON:  Compared with 8/21/2021 EXAM PERFORMED/VIEWS:  XR CHEST PORTABLE ICU FINDINGS:  Endotracheal tube tip in the brent  Feeding tube tip below the diaphragm  Cardiac size is normal   Tortuous thoracic aorta  Bilateral patchy parenchymal infiltrative changes with some improvement  No pneumothorax  Osseous structures appear within normal limits for patient age  Impression: Slight improvement in bilateral parenchymal infiltrative changes    ET tube now at the brent  Workstation performed: [id number]     XR chest portable ICU    Result Date: 8/22/2021  Narrative: CHEST INDICATION:   ett tube placement  COMPARISON:  Compared with 8/21/2021 EXAM PERFORMED/VIEWS:  XR CHEST PORTABLE ICU FINDINGS:  Endotracheal tube in the right main bronchus  Feeding tube below the diaphragm  Feeding tube below the diaphragm  Bilateral patchy parenchymal opacities obscuring most of the lung parenchyma with interval worsening  No large pneumothorax  No large effusion  The lungs are clear  No pneumothorax or pleural effusion  Osseous structures appear within normal limits for patient age  Impression: Worsening bilateral parenchymal changes  Endotracheal tube in the right main bronchus to be pulled back  If this has been pulled back a repeat x-ray for confirmation as indicated clinically  The study was marked in [facility] for immediate notification  Workstation performed: [id number]     XR chest portable ICU    Result Date: 8/22/2021  Narrative: CHEST INDICATION:   hypoxia  COMPARISON:  Chest radiograph from 8/19/2021 and chest CT from 2/10/2017  EXAM PERFORMED/VIEWS:  XR CHEST PORTABLE ICU FINDINGS: Mild cardiomegaly  Persistent pulmonary edema  No effusion or pneumothorax  Osseous structures appear within normal limits for patient age  Impression: Persistent pulmonary edema  Workstation performed: [id number]     IR temporary dialysis catheter placement    Result Date: 9/2/2021  Narrative: CENTRAL VENOUS CATHETER PLACEMENT Indication: Renal failure requiring hemodialysis  Uremia  Procedure: The right neck was prepped and draped in sterile fashion  All elements of maximal sterile barrier technique were followed (cap, mask, sterile gown, sterile gloves, large sterile sheet, hand hygiene, and 2% chlorhexidine for cutaneous antisepsis)  Sterile ultrasound technique with sterile gel and sterile probe cover was also utilized  1% lidocaine was used for local anesthetic   The neck was surveyed with ultrasound to assess for vessel patency  The right internal jugular vein was punctured using a low lateral approach and direct ultrasound guidance with a 21-gauge needle  A static sonographic image was saved demonstrating needle entry  A guidewire was advanced into the inferior vena cava through an exchange dilator  The tract was  dilated and a 15 cm 14 Palestinian dual-lumen catheter was advanced  The catheter was secured with suture and a sterile dressing was applied  Fluoroscopic guidance was utilized for catheter placement  Fluoroscopy time: 0 7 minutes Images: 1 Findings: The right internal jugular vein is patent by limited ultrasound  The tip of the catheter terminates at the caval atrial junction  Both ports flush and aspirate blood easily  Impression: Impression: Image guided placement of nontunneled central venous dialysis catheter   Workstation performed: [id number]       LABS  Results from last 7 days   Lab Units 09/16/21  0449 09/15/21  0559 09/14/21  0440 09/13/21  0507 09/12/21  0457 09/11/21  0519   WBC Thousand/uL 11 71* 13 60* 6 91 26 69* 14 38* 14 84*   HEMOGLOBIN g/dL 8 8* 9 8* 9 2* 10 0* 8 1* 9 3*   HEMATOCRIT % 29 1* 32 8* 30 7* 34 1* 27 3* 31 7*   MCV fL 82 82 83 84 85 84   PLATELETS Thousands/uL 442* 504* 493* 628* 583* 531*     Results from last 7 days   Lab Units 09/16/21  0449 09/15/21  0558 09/14/21  0440 09/13/21  0507 09/12/21  0457 09/11/21  0519   SODIUM mmol/L 136 132* 134* 132* 135* 136   POTASSIUM mmol/L 3 6 3 9 4 1 4 1 3 5 3 5   CHLORIDE mmol/L 98* 96* 99* 96* 98* 101   CO2 mmol/L 28 30 26 23 24 23   BUN mg/dL 27* 26* 21 18 23 24   CREATININE mg/dL 0 67 0 87 0 79 0 96 1 09 1 00   CALCIUM mg/dL 8 5 9 0 8 8 8 5 8 8 8 5   ALBUMIN g/dL  --   --   --   --   --  2 4*   TOTAL BILIRUBIN mg/dL  --   --   --   --   --  0 59   ALK PHOS U/L  --   --   --   --   --  117*   ALT U/L  --   --   --   --   --  33   AST U/L  --   --   --   --   --  19   EGFR ml/min/1 73sq m 89 80 83 73 62 69   GLUCOSE RANDOM mg/dL 94 102 140 142* 96 92         Results from last 7 days   Lab Units 09/16/21  0449 09/14/21  0440 09/11/21  0519   NT-PRO BNP pg/mL 1,816* 9,042* 5,616*                      Results from last 7 days   Lab Units 09/12/21  0457   PROCALCITONIN ng/ml 0 06           Cultures:         Invalid input(s): URIBILINOGEN              PHYSICAL EXAM:  Vitals:   Blood Pressure: 125/62 (09/17/21 0725)  Pulse: 78 (09/17/21 0725)  Temperature: 98 °F (36 7 °C) (09/16/21 2106)  Temp Source: Temporal (09/14/21 2156)  Respirations: 20 (09/17/21 0725)  Height: 5' 4" (162 6 cm) (08/26/21 0917)  Weight - Scale: 74 1 kg (163 lb 5 8 oz) (09/17/21 0558)  SpO2: 97 % (09/17/21 0725)      General: well appearing, no acute distress  HEENT: atraumatic, PERRLA, moist mucosa, normal pharynx, normal tonsils and adenoids, normal tongue, no fluid in sinuses  Neck: Trachea midline, no carotid bruit, no masses  Respiratory:  Coarse breath sounds   Cardiovascular: RRR, no m/r/g, Normal S1 and S2  Abdomen: Soft, non-tender, non-distended, normal bowel sounds in all quadrants, no hepatosplenomegaly, no tympany  Rectal: deferred  Musculoskeletal: normal ROM in upper and lower extremities  Integumentary: warm, dry, and pink, with no rash, purpura, or petechia  Heme/Lymph: no lymphadenopathy, no bruises  Neurological: Cranial Nerves II-XII grossly intact, no tics, normal sensation to pressure and light touch  Psychiatric: cooperative with normal mood, affect, and cognition      Discharge Disposition:  Hospice, inpatient    Test Results Pending at Discharge:   Pending Labs     Order Current Status    AFB Culture with Stain Preliminary result              Medications   · Summary of Medication Adjustments made as a result of this hospitalization:  See discharge list  · Medication Dosing Tapers - Please refer to Discharge Medication List for details on any medication dosing tapers (if applicable to patient)    · Discharge Medication List: See after visit summary for reconciled discharge medications  Diet restrictions:         Diet Orders   (From admission, onward)             Start     Ordered    09/14/21 1022  Diet Dysphagia/Modified Consistency; Dysphagia 1-Pureed; Thin Liquid  Diet effective now     Question Answer Comment   Diet Type Dysphagia/Modified Consistency    Dysphagia/Modified Consistency Dysphagia 1-Pureed    Liquid Modifier Thin Liquid    RD to adjust diet per protocol? Yes        09/14/21 1021    08/30/21 1203  Dietary nutrition supplements  Once     Question Answer Comment   Select Supplement: Magic [employer], Dinner        08/30/21 1202    08/30/21 1203  Dietary nutrition supplements  Once     Question Answer Comment   Select Supplement: Ensure Pudding-Chocolate    Frequency Lunch        08/30/21 1202              Activity restrictions: No strenuous activity  Discharge Condition: good    Outpatient Follow-Up and Discharge Instructions  See after visit summary section titled Discharge Instructions for information provided to patient and family  Code Status: Level 1 - Full Code  Discharge Statement   I spent 35 minutes discharging the patient  This time was spent on the day of discharge  Greater than 50% of total time was spent with the patient and / or family counseling and / or coordination of care  ** Please Note: This note was completed in part utilizing [employer] Software  Grammatical errors, random word insertions, spelling mistakes, and incomplete sentences may be an occasional consequence of this system secondary to software limitations, ambient noise, and hardware issues  If you have any questions or concerns about the content, text, or information contained within the body of this dictation, please contact the provider for clarification  **

## 2021-09-17 NOTE — NURSING NOTE
Patient discharged 9/17/2021 11:12 AM  Report called to [facility], IRVING Hammond  All questions answered  Patient's belongings packed with family  Patient picked up by transport team to be taken to [facility]  Patient in no apparent distress upon discharge      Eliot Jaramillo RN 9/17/2021 11:13 AM

## 2021-10-12 LAB
MYCOBACTERIUM SPEC CULT: NORMAL
RHODAMINE-AURAMINE STN SPEC: NORMAL

## 2024-12-12 NOTE — ASSESSMENT & PLAN NOTE
Physical Therapy Treatment    Patient Name:  Selwyn Crowder   MRN:  [id number]    Recommendations:     Discharge Recommendations: Low Intensity Therapy  Discharge Equipment Recommendations: walker, rolling, 3-in-1 commode, bath bench, wheelchair  Barriers to discharge: None    Assessment:     Selwyn Crowder is a 54 y.o. female admitted with a medical diagnosis of Cellulitis of right lower extremity.  She presents with the following impairments/functional limitations: weakness, impaired endurance, impaired self care skills, impaired functional mobility, gait instability, impaired balance, pain, impaired skin, edema.    Pt tolerated session fairly well and without incident. Pt demonstrating improved pain tolerance and endurance as evidenced by pt ability to ambulate increased distance (26 ft) using RW with CGA. Pt requiring increased time to complete trial with standing rest breaks taken due to pain/fatigue. Pt pleasant throughout session; agreeable to brief exercise program while reclined in bedside chair. Pt politely declining second gait trial at end of session. Pt will continue to benefit from skilled PT services in order to further promote functional mobility, endurance, and BLE strengthening. Pt remains appropriate for PT treatment at this time.    Rehab Prognosis: Good; patient would benefit from acute skilled PT services to address these deficits and reach maximum level of function.    Recent Surgery: * No surgery found *      Plan:     During this hospitalization, patient to be seen 4 x/week to address the identified rehab impairments via gait training, therapeutic activities, therapeutic exercises, neuromuscular re-education and progress toward the following goals:    Plan of Care Expires:  01/09/25    Subjective     Chief Complaint: RLE pain, particularly with weight bearing  Patient/Family Comments/goals: agreeable to PT  Pain/Comfort:  Pain Rating 1: 8/10  Location - Side 1: Right  Location -  · Record review reveals stable memory issues with outpatient followup with geriatrics  · TSH, B12 nl in past  · Family reports no progression at this point  · Continue outpatient sertraline when appropriate Orientation 1: generalized  Location 1: leg  Pain Addressed 1: Pre-medicate for activity, Reposition, Distraction  Pain Rating Post-Intervention 1: 8/10      Objective:     Communicated with nursing prior to session.  Patient found up in chair with peripheral IV, telemetry upon PT entry to room.     General Precautions: Standard, fall  Orthopedic Precautions: N/A  Braces: N/A  Respiratory Status: Room air     Functional Mobility:  Transfers:     Sit to Stand:  contact guard assistance with rolling walker (from low bedside chair)  Gait: 26 ft using RW with CGA; pt with decreased bilateral step length, decreased weight shift, and significantly decreased harrison d/t pain and fatigue. No LOB noted. Pt declining second trial, stating she ambulated to and from restroom without AD earlier this morning (with RN).  Balance: static standing at RW with CGA     AM-PAC 6 CLICK MOBILITY  Turning over in bed (including adjusting bedclothes, sheets and blankets)?: 3  Sitting down on and standing up from a chair with arms (e.g., wheelchair, bedside commode, etc.): 3  Moving from lying on back to sitting on the side of the bed?: 3  Moving to and from a bed to a chair (including a wheelchair)?: 3  Need to walk in hospital room?: 3  Climbing 3-5 steps with a railing?: 2  Basic Mobility Total Score: 17     Treatment & Education:  Pt able to perform seated exercises (reclined in BSC) consisting of B ankle DF/PF, B ankle circles (clockwise and counterclockwise), and toe curls x 10 reps. All exercises incorporated to promote tissue extensibility, joint mobility.    Bariatric  socks donned at end of treatment total A replacing soiled non-bariatric socks. BLE elevated on pillow; heels floated, towel placed under wound on RLE due to seepage.     Patient educated on calling for assistance for any needs to improve overall safety awareness.  Patient educated on importance of OOB activity/daily ambulation to promote overall  endurance.  Patient educated on current level of function and progression towards therapeutic goals.  All items placed within reach, and notified on call light usage for assistance with any needs for fall prevention.    Patient left up in chair with all lines intact and call button in reach..    GOALS:   Multidisciplinary Problems       Physical Therapy Goals          Problem: Physical Therapy    Goal Priority Disciplines Outcome Interventions   Physical Therapy Goal     PT, PT/OT Progressing    Description: Goals to be met by: 2025     Patient will increase functional independence with mobility by performin. Sit to stand transfer with Supervision  2. Bed to chair transfer with Stand-by Assistance using LRAD  3. Gait  x 150 feet with Stand-by Assistance using LRAD..   4. Ascend/descend flight of stairs with bilateral Handrails Contact Guard Assistance using No Assistive Device.                          Time Tracking:     PT Received On: 24  PT Start Time: 1038     PT Stop Time: 1103  PT Total Time (min): 25 min     Billable Minutes: Gait Training 17 and Therapeutic Exercise 8    Treatment Type: Treatment  PT/PTA: PTA     Number of PTA visits since last PT visit: 2024